# Patient Record
Sex: MALE | Race: WHITE | NOT HISPANIC OR LATINO
[De-identification: names, ages, dates, MRNs, and addresses within clinical notes are randomized per-mention and may not be internally consistent; named-entity substitution may affect disease eponyms.]

---

## 2017-05-22 ENCOUNTER — APPOINTMENT (OUTPATIENT)
Dept: DERMATOLOGY | Facility: CLINIC | Age: 72
End: 2017-05-22

## 2017-12-01 ENCOUNTER — APPOINTMENT (OUTPATIENT)
Dept: DERMATOLOGY | Facility: CLINIC | Age: 72
End: 2017-12-01
Payer: COMMERCIAL

## 2017-12-01 PROCEDURE — 99213 OFFICE O/P EST LOW 20 MIN: CPT | Mod: 25

## 2017-12-01 PROCEDURE — 11100 BX SKIN SUBCUTANEOUS&/MUCOUS MEMBRANE 1 LESION: CPT

## 2017-12-01 PROCEDURE — 11101 BIOPSY SKIN SUBQ&/MUCOUS MEMBRANE EA ADDL LESN: CPT

## 2018-06-15 ENCOUNTER — APPOINTMENT (OUTPATIENT)
Dept: DERMATOLOGY | Facility: CLINIC | Age: 73
End: 2018-06-15
Payer: COMMERCIAL

## 2018-06-15 PROCEDURE — 17003 DESTRUCT PREMALG LES 2-14: CPT

## 2018-06-15 PROCEDURE — 11101 BIOPSY SKIN SUBQ&/MUCOUS MEMBRANE EA ADDL LESN: CPT

## 2018-06-15 PROCEDURE — 99214 OFFICE O/P EST MOD 30 MIN: CPT | Mod: 25

## 2018-06-15 PROCEDURE — 17000 DESTRUCT PREMALG LESION: CPT

## 2018-06-15 PROCEDURE — 11100 BX SKIN SUBCUTANEOUS&/MUCOUS MEMBRANE 1 LESION: CPT | Mod: 59

## 2018-09-14 ENCOUNTER — APPOINTMENT (OUTPATIENT)
Dept: FAMILY MEDICINE | Facility: CLINIC | Age: 73
End: 2018-09-14
Payer: COMMERCIAL

## 2018-09-14 VITALS
SYSTOLIC BLOOD PRESSURE: 146 MMHG | HEART RATE: 68 BPM | WEIGHT: 201 LBS | DIASTOLIC BLOOD PRESSURE: 72 MMHG | HEIGHT: 71 IN | BODY MASS INDEX: 28.14 KG/M2

## 2018-09-14 VITALS — SYSTOLIC BLOOD PRESSURE: 128 MMHG | DIASTOLIC BLOOD PRESSURE: 84 MMHG

## 2018-09-14 DIAGNOSIS — Z78.9 OTHER SPECIFIED HEALTH STATUS: ICD-10-CM

## 2018-09-14 DIAGNOSIS — C44.91 BASAL CELL CARCINOMA OF SKIN, UNSPECIFIED: ICD-10-CM

## 2018-09-14 DIAGNOSIS — Z82.49 FAMILY HISTORY OF ISCHEMIC HEART DISEASE AND OTHER DISEASES OF THE CIRCULATORY SYSTEM: ICD-10-CM

## 2018-09-14 PROCEDURE — G0008: CPT

## 2018-09-14 PROCEDURE — 99204 OFFICE O/P NEW MOD 45 MIN: CPT | Mod: 25

## 2018-09-14 PROCEDURE — 90662 IIV NO PRSV INCREASED AG IM: CPT

## 2018-09-15 NOTE — REVIEW OF SYSTEMS
[Fever] : no fever [Chills] : no chills [Fatigue] : no fatigue [Discharge] : no discharge [Pain] : no pain [Chest Pain] : no chest pain [Palpitations] : no palpitations [Lower Ext Edema] : no lower extremity edema [Shortness Of Breath] : no shortness of breath [Cough] : no cough [Abdominal Pain] : no abdominal pain [Diarrhea] : no diarrhea [Vomiting] : no vomiting [Melena] : no melena [Dysuria] : no dysuria [Hematuria] : no hematuria [Joint Pain] : no joint pain [Back Pain] : no back pain [Headache] : no headache [Dizziness] : no dizziness [Fainting] : no fainting [Easy Bleeding] : no easy bleeding [Easy Bruising] : no easy bruising

## 2018-09-15 NOTE — PLAN
[FreeTextEntry1] : COLONOSCOPY REFUSED - PURPOSE REVIEWED; AWARE TO SCREEN FOR COLON CANCER\par FLU VACCINE GIVEN AND TOLERATED WELL \par CONSIDER NEW SHINGLES VACCINE\par NEED CARDIOLOGY CONSULTANT NOTES\par NEED COPY OF COMPLETE LAB WORK AT [facility]\par NEED VACCINATION RECORDS FOR REVIEW\par HEALTHY DIET AND LIFESTYLE MODIFICATIONS\par HEALTHY WEIGHT LOSS \par MONITOR BLOOD PRESSURE\par FOLLOW-UP ALL SPECIALISTS AS DIRECTED \par CALL WITH ANY QUESTIONS, CONCERNS OR CHANGES

## 2018-09-15 NOTE — HISTORY OF PRESENT ILLNESS
[FreeTextEntry1] : ESTABLISH CARE, HYPERTENSION [de-identified] : PRESENTING TO ESTABLISH CARE.  CHRONIC HISTORY OF HYPERTENSION AND HYPERLIPIDEMIA AS WELL AS A HISTORY OF BCC OF SKIN.  HAD A RECENT ECHOCARDIOGRAM.  REFERRED TO [facility] CARDIOLOGY FOR MURMUR.  HAD CAROTIDS, NUCLEAR STRESS TEST.  IS VERY ACTIVE.  TRIES TO WATCH DIET.  TRIES TO KEEP WEIGHT OFF.  TAKING ALL MEDICATIONS AS DIRECTED WITHOUT SIDE EFFECTS.  BLOOD PRESSURE USUALLY WELL CONTROLLED ON DIOVAN AND METOPROLOL.  STATES THAT SOMETIMES HIGHER AT DOCTORS VISITS DUE TO "WHITE COAT".   HAS HAD NO HEADACHE, DIZZINESS, CHEST PAIN, SHORTNESS OF BREATH, GI OR URINARY COMPLAINTS.  HAS NOT BEEN ON MEDICATION FOR CHOLESTEROL.  HAS NO OTHER COMPLAINTS TODAY.  TRIES TO AVOID THE SUN.  \par \par DERMATOLOGY: DR. SARMIENTO - SCREENING ROUTINELY\par CARDIOLOGY: DR. VILLA; JULY - TO FOLLOW-UP IN NOVEMBER\par OPHTHALMOLOGY: DR. CRUZ - TOREYLIP

## 2018-10-25 ENCOUNTER — APPOINTMENT (OUTPATIENT)
Dept: DERMATOLOGY | Facility: CLINIC | Age: 73
End: 2018-10-25
Payer: COMMERCIAL

## 2018-10-25 PROCEDURE — 99213 OFFICE O/P EST LOW 20 MIN: CPT

## 2018-11-16 ENCOUNTER — APPOINTMENT (OUTPATIENT)
Dept: FAMILY MEDICINE | Facility: CLINIC | Age: 73
End: 2018-11-16
Payer: COMMERCIAL

## 2018-11-16 VITALS
DIASTOLIC BLOOD PRESSURE: 90 MMHG | HEART RATE: 72 BPM | WEIGHT: 207 LBS | HEIGHT: 71 IN | BODY MASS INDEX: 28.98 KG/M2 | SYSTOLIC BLOOD PRESSURE: 162 MMHG

## 2018-11-16 VITALS — SYSTOLIC BLOOD PRESSURE: 144 MMHG | DIASTOLIC BLOOD PRESSURE: 88 MMHG

## 2018-11-16 DIAGNOSIS — Z92.29 PERSONAL HISTORY OF OTHER DRUG THERAPY: ICD-10-CM

## 2018-11-16 LAB — HEMOCCULT STL QL IA: POSITIVE

## 2018-11-16 PROCEDURE — 99214 OFFICE O/P EST MOD 30 MIN: CPT

## 2018-11-17 PROBLEM — Z92.29 HISTORY OF INFLUENZA VACCINATION: Status: RESOLVED | Noted: 2018-09-14 | Resolved: 2018-11-17

## 2018-11-17 NOTE — PHYSICAL EXAM
[No Acute Distress] : no acute distress [Well Nourished] : well nourished [Well-Appearing] : well-appearing [Normal Sclera/Conjunctiva] : normal sclera/conjunctiva [PERRL] : pupils equal round and reactive to light [EOMI] : extraocular movements intact [Supple] : supple [No Lymphadenopathy] : no lymphadenopathy [No Respiratory Distress] : no respiratory distress  [Clear to Auscultation] : lungs were clear to auscultation bilaterally [No Accessory Muscle Use] : no accessory muscle use [Normal Rate] : normal rate  [Regular Rhythm] : with a regular rhythm [Normal S1, S2] : normal S1 and S2 [No Murmur] : no murmur heard [Soft] : abdomen soft [Non Tender] : non-tender [Normal Bowel Sounds] : normal bowel sounds [No Joint Swelling] : no joint swelling [Grossly Normal Strength/Tone] : grossly normal strength/tone

## 2018-11-17 NOTE — HISTORY OF PRESENT ILLNESS
[FreeTextEntry1] : F/U HTN [de-identified] : PRESENTING FOR FOLLOW-UP.  CHRONIC HISTORY OF HYPERTENSION, HYPERLIPIDEMIA AND OVERWEIGHT.  IS TO SEE CARDIOLOGY IN FOLLOW-UP NEXT MONTH.  TO SEE OPHTHALMOLOGY THE END OF THE MONTH DR. GARCIA.  USED TO TAKE CHOLESTEROL A CHOLESTEROL MEDICATION BUT HAD SIDE EFFECTS TO MEDICATION.  CAN'T RECALL WHICH MEDICATION.  DOES NOT WANT TO TAKE MEDICATION.  BLOOD PRESSURE USUALLY CONTROLLED BUT STATES HE GETS "WHITE COAT".  ON METOPROLOL AND VALSARTAN.  TRYING TO WATCH DIET AND REMAIN ACTIVE.  HAS NOT GONE FOR COLONOSCOPY.  DENIES ABDOMINAL PAIN OR N/V/D.  NO BLOOD IN STOOL.   HAS HAD NO HEADACHE, DIZZINESS, CHEST PAIN, SHORTNESS OF BREATH, GI OR URINARY COMPLAINTS.  NO OTHER COMPLAINTS TODAY.

## 2018-11-17 NOTE — REVIEW OF SYSTEMS
[Fever] : no fever [Chills] : no chills [Fatigue] : no fatigue [Discharge] : no discharge [Pain] : no pain [Chest Pain] : no chest pain [Palpitations] : no palpitations [Lower Ext Edema] : no lower extremity edema [Shortness Of Breath] : no shortness of breath [Wheezing] : no wheezing [Cough] : no cough [Abdominal Pain] : no abdominal pain [Diarrhea] : no diarrhea [Melena] : no melena [Dysuria] : no dysuria [Hematuria] : no hematuria [Joint Pain] : no joint pain [Back Pain] : no back pain [Headache] : no headache [Dizziness] : no dizziness [Fainting] : no fainting

## 2018-11-17 NOTE — PLAN
[FreeTextEntry1] : GI EVALUATION FOR COLONOSCOPY WITH POSITIVE STOOL OCCULT - AWARE TO EVALUATE FOR COLON CANCER\par HEALTHY DIET AND LIFESTYLE MODIFICATIONS\par HEALTHY WEIGHT LOSS \par MONITOR LAB WORK INCLUDING LIPIDS\par TO SEE CARDIOLOGY NEXT MONTHS; DISCUSSED ELEVATED BLOOD PRESSURE AND MR. HOLDSWORTH STATES "WHITE COAT".  WILL HAVE RECHECKED AT CARDIOLOGY APPOINTMENT\par FOLLOW-UP ALL SPECIALISTS AS DIRECTED \par CALL WITH ANY QUESTIONS, CONCERNS OR CHANGES

## 2018-12-06 ENCOUNTER — APPOINTMENT (OUTPATIENT)
Dept: DERMATOLOGY | Facility: CLINIC | Age: 73
End: 2018-12-06
Payer: COMMERCIAL

## 2018-12-06 ENCOUNTER — RESULT REVIEW (OUTPATIENT)
Age: 73
End: 2018-12-06

## 2018-12-06 PROCEDURE — 11100 BX SKIN SUBCUTANEOUS&/MUCOUS MEMBRANE 1 LESION: CPT

## 2018-12-06 PROCEDURE — 99213 OFFICE O/P EST LOW 20 MIN: CPT | Mod: 25

## 2019-01-10 ENCOUNTER — APPOINTMENT (OUTPATIENT)
Dept: GASTROENTEROLOGY | Facility: CLINIC | Age: 74
End: 2019-01-10
Payer: COMMERCIAL

## 2019-01-10 VITALS
SYSTOLIC BLOOD PRESSURE: 156 MMHG | DIASTOLIC BLOOD PRESSURE: 97 MMHG | BODY MASS INDEX: 29.45 KG/M2 | HEIGHT: 70.5 IN | HEART RATE: 92 BPM | WEIGHT: 208 LBS

## 2019-01-10 DIAGNOSIS — Z86.39 PERSONAL HISTORY OF OTHER ENDOCRINE, NUTRITIONAL AND METABOLIC DISEASE: ICD-10-CM

## 2019-01-10 DIAGNOSIS — Z12.11 ENCOUNTER FOR SCREENING FOR MALIGNANT NEOPLASM OF COLON: ICD-10-CM

## 2019-01-10 DIAGNOSIS — Z86.79 PERSONAL HISTORY OF OTHER DISEASES OF THE CIRCULATORY SYSTEM: ICD-10-CM

## 2019-01-10 PROCEDURE — 99204 OFFICE O/P NEW MOD 45 MIN: CPT

## 2019-05-03 ENCOUNTER — OUTPATIENT (OUTPATIENT)
Dept: OUTPATIENT SERVICES | Facility: HOSPITAL | Age: 74
LOS: 1 days | End: 2019-05-03
Payer: COMMERCIAL

## 2019-05-03 ENCOUNTER — APPOINTMENT (OUTPATIENT)
Dept: GASTROENTEROLOGY | Facility: GI CENTER | Age: 74
End: 2019-05-03
Payer: COMMERCIAL

## 2019-05-03 ENCOUNTER — RESULT REVIEW (OUTPATIENT)
Age: 74
End: 2019-05-03

## 2019-05-03 DIAGNOSIS — Z12.11 ENCOUNTER FOR SCREENING FOR MALIGNANT NEOPLASM OF COLON: ICD-10-CM

## 2019-05-03 DIAGNOSIS — K64.8 OTHER HEMORRHOIDS: ICD-10-CM

## 2019-05-03 DIAGNOSIS — K57.90 DIVERTICULOSIS OF INTESTINE, PART UNSPECIFIED, W/OUT PERFORATION OR ABSCESS W/OUT BLEEDING: ICD-10-CM

## 2019-05-03 PROCEDURE — 88305 TISSUE EXAM BY PATHOLOGIST: CPT | Mod: 26

## 2019-05-03 PROCEDURE — 45381 COLONOSCOPY SUBMUCOUS NJX: CPT | Mod: 59

## 2019-05-03 PROCEDURE — 45385 COLONOSCOPY W/LESION REMOVAL: CPT

## 2019-05-03 PROCEDURE — 88305 TISSUE EXAM BY PATHOLOGIST: CPT

## 2019-05-03 PROCEDURE — 45380 COLONOSCOPY AND BIOPSY: CPT | Mod: XS

## 2019-05-03 PROCEDURE — 45385 COLONOSCOPY W/LESION REMOVAL: CPT | Mod: 59

## 2019-05-03 PROCEDURE — 45380 COLONOSCOPY AND BIOPSY: CPT | Mod: 59

## 2019-05-03 PROCEDURE — 45381 COLONOSCOPY SUBMUCOUS NJX: CPT | Mod: XS

## 2019-05-03 PROCEDURE — C1889: CPT

## 2019-05-03 NOTE — PROCEDURE
[Procedure Explained] : The procedure was explained [Allergies Reviewed] : allergies reviewed. [Benefits] : benefits [Risks] : Risks [Alternatives] : alternatives [Consent Obtained] : written consent was obtained prior to the procedure and is detailed in the patient's record [Patient] : the patient [Bowel Prep Kit] : the patient took the appropriate bowel preparation kit as directed [Approved Diet Followed] : the patient avoided solid foods and adhered to the approved diet list for 24 hours prior to the procedure [Automated Blood Pressure Cuff] : automated blood pressure cuff [Cardiac Monitor] : cardiac monitor [Left Lateral Decubitus] : The patient was positioned in the left lateral decubitus position [Pulse Oximeter] : pulse oximeter [No Difficulty] : without difficulty [Insufflated] : insufflated [Retroflex View] : a retroflex view of the rectum was performed [Sent to Pathology] : was sent to pathology for analysis [Tolerated Well] : the patient tolerated the procedure well [Vital Signs Stable] : the vital signs were stable [No Complications] : There were no complications [With Biopsy] : with biopsy [With Lisa Ink Tattoo] : Lisa ink tattoo [With Cautery] : cautery [With Snare Polypectomy] : snare polypectomy [Hemoccult +] : hemoccult positive stool [3] : 3 [Prep Qualtiy: ___] : Prep Quality:  [unfilled] [Sedation Clearance] : the patient was cleared for moderate sedation [Performed By: ___] : Performed by:  ANTONINO [Slightly Enlarged Prostate] : a slightly enlarged prostate [Terminal Ileum via Ileocecal Valve] : and the terminal ileum was examined by entering the ileocecal valve [Single Pass Needed] : after a single pass [Normal] : Normal [Biopsy] : biopsy [Hot Snare Polypectomy] : hot snare polypectomy [Polyps] : polyps [Hemorrhoids] : hemorrhoids [Diverticulosis] : diverticulosis [Cold Snare Polypectomy] : cold snare polypectomy [Abnormal Rectum] : a normal rectum [External Hemorrhoids] : no external hemorrhoids [Patient Rotated Into Alternating Positions] : the patient was not rotated [de-identified] : Ileocecal valve- Adenomatous polyp and on both lips s/p cold biopsy\par Normal terminal ileum [de-identified] : Hepatic flexure 3.5 cm fungating mass s/p cold biopsy and distal 3 quadrant tattoo placement [de-identified] : 1 cm polyp s/p hot snare polypectomy\par  [de-identified] : 5 mm polyp s/p cold snare polypectomy [de-identified] : Ileocecal valve polyp; Hepatic flexure mass; transverse colon polyp; descending colon polyp

## 2019-05-03 NOTE — HISTORY OF PRESENT ILLNESS
[de-identified] : the patient arrived for evaluation for screening colonoscopy due to positive FIT test.The patient otherwise denies any abdominal pain, nausea, vomiting, diarrhea, change in bowel habits or any blood in stool. He never had a colonoscopy done before. He denies any chest pain or dyspnea on exertion. He denies any family history of any colorectal cancer or polyps. He takes medication for high blood pressure. He has been even evaluated by cardiology. He had stress test done which was normal. He denies any smoking or any drug use. He drinks one to 2 drinks every day. He takes valsartan and metoprolol.

## 2019-05-03 NOTE — CONSULT LETTER
[Dear  ___] : Dear  [unfilled], [Consult Letter:] : I had the pleasure of evaluating your patient, [unfilled]. [Consult Closing:] : Thank you very much for allowing me to participate in the care of this patient.  If you have any questions, please do not hesitate to contact me. [Sincerely,] : Sincerely, [FreeTextEntry3] : Alex Son MD\par Gastroenterology \par \par

## 2019-05-03 NOTE — PHYSICAL EXAM
[General Appearance - Alert] : alert [General Appearance - In No Acute Distress] : in no acute distress [Sclera] : the sclera and conjunctiva were normal [PERRL With Normal Accommodation] : pupils were equal in size, round, and reactive to light [Extraocular Movements] : extraocular movements were intact [Outer Ear] : the ears and nose were normal in appearance [Oropharynx] : the oropharynx was normal [Neck Appearance] : the appearance of the neck was normal [Neck Cervical Mass (___cm)] : no neck mass was observed [Jugular Venous Distention Increased] : there was no jugular-venous distention [Thyroid Diffuse Enlargement] : the thyroid was not enlarged [Thyroid Nodule] : there were no palpable thyroid nodules [Auscultation Breath Sounds / Voice Sounds] : lungs were clear to auscultation bilaterally [Apical Impulse] : the apical impulse was normal [Heart Rate And Rhythm] : heart rate was normal and rhythm regular [Heart Sounds] : normal S1 and S2 [FreeTextEntry1] : Aortic systolic murmur [Full Pulse] : the pedal pulses are present [Edema] : there was no peripheral edema [Bowel Sounds] : normal bowel sounds [Abdomen Soft] : soft [Abdomen Tenderness] : non-tender [Abdomen Mass (___ Cm)] : no abdominal mass palpated [Cervical Lymph Nodes Enlarged Posterior Bilaterally] : posterior cervical [Supraclavicular Lymph Nodes Enlarged Bilaterally] : supraclavicular [Cervical Lymph Nodes Enlarged Anterior Bilaterally] : anterior cervical [No CVA Tenderness] : no ~M costovertebral angle tenderness [No Spinal Tenderness] : no spinal tenderness [Abnormal Walk] : normal gait [Motor Tone] : muscle strength and tone were normal [Nail Clubbing] : no clubbing  or cyanosis of the fingernails [Musculoskeletal - Swelling] : no joint swelling seen [Skin Turgor] : normal skin turgor [Skin Color & Pigmentation] : normal skin color and pigmentation [] : no rash [No Focal Deficits] : no focal deficits [Impaired Insight] : insight and judgment were intact [Oriented To Time, Place, And Person] : oriented to person, place, and time [Affect] : the affect was normal

## 2019-05-03 NOTE — ASSESSMENT
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

## 2019-05-03 NOTE — ASSESSMENT
[FreeTextEntry1] : The patient is doing fine. He has FIT test positive. I recommended a colonoscopy. The bowel preparation was discussed at length. Risks (including bleeding, pain, perforation, incomplete examination, splenic laceration, adverse reactions to medications, aspiration and death), benefits and alternatives were discussed. Patient is agreeable for the colonoscopy. The patient is medically optimized for the procedure. We will schedule the patient for the procedure. Bowel preparation was sent to the pharmacy.\par

## 2019-05-03 NOTE — PROCEDURE
[Procedure Explained] : The procedure was explained [Allergies Reviewed] : allergies reviewed. [Benefits] : benefits [Risks] : Risks [Alternatives] : alternatives [Patient] : the patient [Consent Obtained] : written consent was obtained prior to the procedure and is detailed in the patient's record [Bowel Prep Kit] : the patient took the appropriate bowel preparation kit as directed [Approved Diet Followed] : the patient avoided solid foods and adhered to the approved diet list for 24 hours prior to the procedure [Automated Blood Pressure Cuff] : automated blood pressure cuff [Cardiac Monitor] : cardiac monitor [Left Lateral Decubitus] : The patient was positioned in the left lateral decubitus position [Pulse Oximeter] : pulse oximeter [No Difficulty] : without difficulty [Insufflated] : insufflated [Sent to Pathology] : was sent to pathology for analysis [Retroflex View] : a retroflex view of the rectum was performed [Tolerated Well] : the patient tolerated the procedure well [Vital Signs Stable] : the vital signs were stable [No Complications] : There were no complications [With Lisa Ink Tattoo] : Lisa ink tattoo [With Biopsy] : with biopsy [With Snare Polypectomy] : snare polypectomy [With Cautery] : cautery [Hemoccult +] : hemoccult positive stool [3] : 3 [Prep Qualtiy: ___] : Prep Quality:  [unfilled] [Sedation Clearance] : the patient was cleared for moderate sedation [Performed By: ___] : Performed by:  ANTONINO [Slightly Enlarged Prostate] : a slightly enlarged prostate [Terminal Ileum via Ileocecal Valve] : and the terminal ileum was examined by entering the ileocecal valve [Single Pass Needed] : after a single pass [Biopsy] : biopsy [Normal] : Normal [Hot Snare Polypectomy] : hot snare polypectomy [Polyps] : polyps [Diverticulosis] : diverticulosis [Hemorrhoids] : hemorrhoids [Cold Snare Polypectomy] : cold snare polypectomy [Abnormal Rectum] : a normal rectum [External Hemorrhoids] : no external hemorrhoids [Patient Rotated Into Alternating Positions] : the patient was not rotated [de-identified] : Ileocecal valve- Adenomatous polyp and on both lips s/p cold biopsy\par Normal terminal ileum [de-identified] : Hepatic flexure 3.5 cm fungating mass s/p cold biopsy and distal 3 quadrant tattoo placement [de-identified] : 1 cm polyp s/p hot snare polypectomy\par  [de-identified] : 5 mm polyp s/p cold snare polypectomy [de-identified] : Ileocecal valve polyp; Hepatic flexure mass; transverse colon polyp; descending colon polyp

## 2019-05-03 NOTE — PHYSICAL EXAM
[General Appearance - Alert] : alert [General Appearance - In No Acute Distress] : in no acute distress [Sclera] : the sclera and conjunctiva were normal [PERRL With Normal Accommodation] : pupils were equal in size, round, and reactive to light [Extraocular Movements] : extraocular movements were intact [Outer Ear] : the ears and nose were normal in appearance [Oropharynx] : the oropharynx was normal [Neck Appearance] : the appearance of the neck was normal [Neck Cervical Mass (___cm)] : no neck mass was observed [Jugular Venous Distention Increased] : there was no jugular-venous distention [Thyroid Diffuse Enlargement] : the thyroid was not enlarged [Thyroid Nodule] : there were no palpable thyroid nodules [Auscultation Breath Sounds / Voice Sounds] : lungs were clear to auscultation bilaterally [Apical Impulse] : the apical impulse was normal [Heart Rate And Rhythm] : heart rate was normal and rhythm regular [Heart Sounds] : normal S1 and S2 [FreeTextEntry1] : Aortic systolic murmur [Full Pulse] : the pedal pulses are present [Edema] : there was no peripheral edema [Bowel Sounds] : normal bowel sounds [Abdomen Soft] : soft [Abdomen Tenderness] : non-tender [Abdomen Mass (___ Cm)] : no abdominal mass palpated [Cervical Lymph Nodes Enlarged Posterior Bilaterally] : posterior cervical [Supraclavicular Lymph Nodes Enlarged Bilaterally] : supraclavicular [Cervical Lymph Nodes Enlarged Anterior Bilaterally] : anterior cervical [No CVA Tenderness] : no ~M costovertebral angle tenderness [No Spinal Tenderness] : no spinal tenderness [Musculoskeletal - Swelling] : no joint swelling seen [Abnormal Walk] : normal gait [Motor Tone] : muscle strength and tone were normal [Nail Clubbing] : no clubbing  or cyanosis of the fingernails [Skin Color & Pigmentation] : normal skin color and pigmentation [Skin Turgor] : normal skin turgor [] : no rash [Oriented To Time, Place, And Person] : oriented to person, place, and time [Impaired Insight] : insight and judgment were intact [No Focal Deficits] : no focal deficits [Affect] : the affect was normal

## 2019-05-03 NOTE — PHYSICAL EXAM
[General Appearance - Alert] : alert [General Appearance - In No Acute Distress] : in no acute distress [Sclera] : the sclera and conjunctiva were normal [PERRL With Normal Accommodation] : pupils were equal in size, round, and reactive to light [Extraocular Movements] : extraocular movements were intact [Outer Ear] : the ears and nose were normal in appearance [Oropharynx] : the oropharynx was normal [Neck Appearance] : the appearance of the neck was normal [Neck Cervical Mass (___cm)] : no neck mass was observed [Jugular Venous Distention Increased] : there was no jugular-venous distention [Thyroid Diffuse Enlargement] : the thyroid was not enlarged [Thyroid Nodule] : there were no palpable thyroid nodules [Auscultation Breath Sounds / Voice Sounds] : lungs were clear to auscultation bilaterally [Apical Impulse] : the apical impulse was normal [Heart Rate And Rhythm] : heart rate was normal and rhythm regular [Heart Sounds] : normal S1 and S2 [Full Pulse] : the pedal pulses are present [Edema] : there was no peripheral edema [Bowel Sounds] : normal bowel sounds [Abdomen Soft] : soft [Abdomen Tenderness] : non-tender [Abdomen Mass (___ Cm)] : no abdominal mass palpated [Cervical Lymph Nodes Enlarged Posterior Bilaterally] : posterior cervical [Cervical Lymph Nodes Enlarged Anterior Bilaterally] : anterior cervical [Supraclavicular Lymph Nodes Enlarged Bilaterally] : supraclavicular [No CVA Tenderness] : no ~M costovertebral angle tenderness [No Spinal Tenderness] : no spinal tenderness [Abnormal Walk] : normal gait [Nail Clubbing] : no clubbing  or cyanosis of the fingernails [Musculoskeletal - Swelling] : no joint swelling seen [Motor Tone] : muscle strength and tone were normal [Skin Color & Pigmentation] : normal skin color and pigmentation [Skin Turgor] : normal skin turgor [] : no rash [No Focal Deficits] : no focal deficits [Oriented To Time, Place, And Person] : oriented to person, place, and time [Impaired Insight] : insight and judgment were intact [Affect] : the affect was normal [FreeTextEntry1] : Aortic systolic murmur

## 2019-05-07 LAB — SURGICAL PATHOLOGY STUDY: SIGNIFICANT CHANGE UP

## 2019-05-08 ENCOUNTER — RESULT REVIEW (OUTPATIENT)
Age: 74
End: 2019-05-08

## 2019-05-17 ENCOUNTER — APPOINTMENT (OUTPATIENT)
Dept: FAMILY MEDICINE | Facility: CLINIC | Age: 74
End: 2019-05-17
Payer: COMMERCIAL

## 2019-05-17 ENCOUNTER — APPOINTMENT (OUTPATIENT)
Dept: COLORECTAL SURGERY | Facility: CLINIC | Age: 74
End: 2019-05-17
Payer: COMMERCIAL

## 2019-05-17 VITALS — SYSTOLIC BLOOD PRESSURE: 148 MMHG | DIASTOLIC BLOOD PRESSURE: 90 MMHG

## 2019-05-17 VITALS
HEART RATE: 86 BPM | WEIGHT: 202 LBS | BODY MASS INDEX: 28.92 KG/M2 | SYSTOLIC BLOOD PRESSURE: 150 MMHG | HEIGHT: 70 IN | DIASTOLIC BLOOD PRESSURE: 88 MMHG

## 2019-05-17 VITALS
WEIGHT: 208 LBS | HEIGHT: 70 IN | HEART RATE: 88 BPM | SYSTOLIC BLOOD PRESSURE: 148 MMHG | TEMPERATURE: 98.1 F | BODY MASS INDEX: 29.78 KG/M2 | DIASTOLIC BLOOD PRESSURE: 84 MMHG

## 2019-05-17 DIAGNOSIS — E66.3 OVERWEIGHT: ICD-10-CM

## 2019-05-17 PROCEDURE — 99214 OFFICE O/P EST MOD 30 MIN: CPT

## 2019-05-17 PROCEDURE — 99245 OFF/OP CONSLTJ NEW/EST HI 55: CPT

## 2019-05-17 NOTE — CONSULT LETTER
[Dear  ___] : Dear  [unfilled], [Consult Letter:] : I had the pleasure of evaluating your patient, [unfilled]. [Please see my note below.] : Please see my note below. [Consult Closing:] : Thank you very much for allowing me to participate in the care of this patient.  If you have any questions, please do not hesitate to contact me. [Sincerely,] : Sincerely, [FreeTextEntry3] : Dong Chung M.D. FACS, FASCRS

## 2019-05-17 NOTE — DATA REVIEWED
[FreeTextEntry1] : Colonoscopy demonstrates a large ulcerated mass hepatic flexure\par Pathology demonstrates polyp

## 2019-05-17 NOTE — PHYSICAL EXAM
[Abdomen Tenderness] : ~T No ~M abdominal tenderness [Abdomen Masses] : No abdominal masses [Tender] : nontender [JVD] : no jugular venous distention  [Normal Breath Sounds] : Normal breath sounds [Normal Heart Sounds] : normal heart sounds [No Rash or Lesion] : No rash or lesion [Alert] : alert [Normal Rate and Rhythm] : normal rate and rhythm [Oriented to Time] : oriented to time [Oriented to Place] : oriented to place [Oriented to Person] : oriented to person [Calm] : calm [de-identified] : Looks well in no distress, of stated age. [de-identified] : moves all 4 extremities appropriately with 5 over 5 strength [de-identified] : pupils equal reactive to light normocephalic atraumatic.

## 2019-05-17 NOTE — ASSESSMENT
[FreeTextEntry1] : 73-year-old male with ulcerated mass of the hepatic colon flexure/transverse colon consistent with cancer. Recommend laparoscopic possible open extended right hemicolectomy with lymph node dissection.Risk and benefits of the surgery have been discussed which include bleeding, infection, sepsis, multiorgan failure, inadvertent injury including hollow viscus, solid organ, ureter neurovascular and neurological structures, DVT PE, heart attack, stroke, hernias, recurrence of tumor, leakage of anastomosis requiring reoperation possible stoma and death.

## 2019-05-20 ENCOUNTER — OTHER (OUTPATIENT)
Age: 74
End: 2019-05-20

## 2019-05-27 PROBLEM — E66.3 OVERWEIGHT: Status: ACTIVE | Noted: 2018-09-15

## 2019-05-27 NOTE — PLAN
[FreeTextEntry1] : SHINGRIX VACCINE RECOMMENDED\par MONITOR BLOOD PRESSURE\par HEALTHY DIET AND LIFESTYLE MODIFICATIONS\par HEALTHY WEIGHT LOSS \par MONITOR LAB WORK INCLUDING LIPIDS\par COLORECTAL CONSULTANT NOTE APPRECIATED\par SUPPORT GIVEN\par FOLLOW-UP ALL SPECIALISTS AS DIRECTED \par CALL WITH ANY QUESTIONS, CONCERNS OR CHANGES

## 2019-05-27 NOTE — HISTORY OF PRESENT ILLNESS
[FreeTextEntry1] : F/U HTN [de-identified] : PRESENTING FOR FOLLOW-UP.  HAD COLONOSCOPY AND POLYP FOUND.  REFERRED TO COLORECTAL SURGERY DR. JONES.  TO HAVE CT SCAN AND LAPAROSCOPIC RIGHT HEMICOLECTOMY RECOMMENDED.  CHRONIC HISTORY OF HYPERTENSION AND HYPERLIPIDEMIA.  ON VALSARTAN AND METOPROLOL FOR BLOOD PRESSURE.  STATES USUALLY WELL CONTROLLED.  SEEING CARDIOLOGY ROUTINELY.  TRYING TO AVOID MEDICATION FOR CHOLESTEROL.  REMAINING ACTIVE.  DIET IS OVERALL GOOD.  \par BITTEN BY A NEIGHBORS DOG ON RIGHT LATERAL CALF.  HAPPENED SIX WEEKS AND "ALL HEALED AND NO PROBLEMS".  \par SAW CARDIOLOGY LAST MONTH AND BLOOD PRESSURE WAS IN 'S.

## 2019-05-27 NOTE — PHYSICAL EXAM
[No Acute Distress] : no acute distress [Well Nourished] : well nourished [Well-Appearing] : well-appearing [Supple] : supple [No Lymphadenopathy] : no lymphadenopathy [Clear to Auscultation] : lungs were clear to auscultation bilaterally [No Respiratory Distress] : no respiratory distress  [Normal Rate] : normal rate  [No Accessory Muscle Use] : no accessory muscle use [Normal S1, S2] : normal S1 and S2 [Regular Rhythm] : with a regular rhythm [Soft] : abdomen soft [Non Tender] : non-tender [Normal Bowel Sounds] : normal bowel sounds [No Joint Swelling] : no joint swelling [Grossly Normal Strength/Tone] : grossly normal strength/tone [Normal Gait] : normal gait [No Focal Deficits] : no focal deficits

## 2019-06-05 ENCOUNTER — FORM ENCOUNTER (OUTPATIENT)
Age: 74
End: 2019-06-05

## 2019-06-06 ENCOUNTER — APPOINTMENT (OUTPATIENT)
Dept: CT IMAGING | Facility: CLINIC | Age: 74
End: 2019-06-06
Payer: COMMERCIAL

## 2019-06-06 ENCOUNTER — OUTPATIENT (OUTPATIENT)
Dept: OUTPATIENT SERVICES | Facility: HOSPITAL | Age: 74
LOS: 1 days | End: 2019-06-06

## 2019-06-06 DIAGNOSIS — K63.9 DISEASE OF INTESTINE, UNSPECIFIED: ICD-10-CM

## 2019-06-06 PROCEDURE — 74177 CT ABD & PELVIS W/CONTRAST: CPT | Mod: 26

## 2019-06-17 ENCOUNTER — OTHER (OUTPATIENT)
Age: 74
End: 2019-06-17

## 2019-06-17 ENCOUNTER — OUTPATIENT (OUTPATIENT)
Dept: OUTPATIENT SERVICES | Facility: HOSPITAL | Age: 74
LOS: 1 days | End: 2019-06-17
Payer: COMMERCIAL

## 2019-06-17 VITALS
SYSTOLIC BLOOD PRESSURE: 131 MMHG | RESPIRATION RATE: 16 BRPM | HEART RATE: 89 BPM | DIASTOLIC BLOOD PRESSURE: 86 MMHG | TEMPERATURE: 100 F | HEIGHT: 70 IN | WEIGHT: 203.49 LBS

## 2019-06-17 DIAGNOSIS — Z29.9 ENCOUNTER FOR PROPHYLACTIC MEASURES, UNSPECIFIED: ICD-10-CM

## 2019-06-17 DIAGNOSIS — Z98.890 OTHER SPECIFIED POSTPROCEDURAL STATES: Chronic | ICD-10-CM

## 2019-06-17 DIAGNOSIS — Z01.818 ENCOUNTER FOR OTHER PREPROCEDURAL EXAMINATION: ICD-10-CM

## 2019-06-17 DIAGNOSIS — C18.9 MALIGNANT NEOPLASM OF COLON, UNSPECIFIED: ICD-10-CM

## 2019-06-17 DIAGNOSIS — I10 ESSENTIAL (PRIMARY) HYPERTENSION: ICD-10-CM

## 2019-06-17 LAB
ALBUMIN SERPL ELPH-MCNC: 4.3 G/DL — SIGNIFICANT CHANGE UP (ref 3.3–5.2)
ALP SERPL-CCNC: 78 U/L — SIGNIFICANT CHANGE UP (ref 40–120)
ALT FLD-CCNC: 24 U/L — SIGNIFICANT CHANGE UP
ANION GAP SERPL CALC-SCNC: 15 MMOL/L — SIGNIFICANT CHANGE UP (ref 5–17)
APTT BLD: 29.6 SEC — SIGNIFICANT CHANGE UP (ref 27.5–36.3)
AST SERPL-CCNC: 26 U/L — SIGNIFICANT CHANGE UP
BASOPHILS # BLD AUTO: 0 K/UL — SIGNIFICANT CHANGE UP (ref 0–0.2)
BASOPHILS NFR BLD AUTO: 0.6 % — SIGNIFICANT CHANGE UP (ref 0–2)
BILIRUB SERPL-MCNC: 0.3 MG/DL — LOW (ref 0.4–2)
BLD GP AB SCN SERPL QL: SIGNIFICANT CHANGE UP
BUN SERPL-MCNC: 21 MG/DL — HIGH (ref 8–20)
CALCIUM SERPL-MCNC: 9.5 MG/DL — SIGNIFICANT CHANGE UP (ref 8.6–10.2)
CEA SERPL-MCNC: 4.3 NG/ML — HIGH (ref 0–3.8)
CHLORIDE SERPL-SCNC: 104 MMOL/L — SIGNIFICANT CHANGE UP (ref 98–107)
CO2 SERPL-SCNC: 23 MMOL/L — SIGNIFICANT CHANGE UP (ref 22–29)
CREAT SERPL-MCNC: 0.78 MG/DL — SIGNIFICANT CHANGE UP (ref 0.5–1.3)
EOSINOPHIL # BLD AUTO: 0.3 K/UL — SIGNIFICANT CHANGE UP (ref 0–0.5)
EOSINOPHIL NFR BLD AUTO: 4.2 % — SIGNIFICANT CHANGE UP (ref 0–5)
GLUCOSE SERPL-MCNC: 150 MG/DL — HIGH (ref 70–115)
HBA1C BLD-MCNC: 5.3 % — SIGNIFICANT CHANGE UP (ref 4–5.6)
HCT VFR BLD CALC: 48.4 % — SIGNIFICANT CHANGE UP (ref 42–52)
HGB BLD-MCNC: 16.5 G/DL — SIGNIFICANT CHANGE UP (ref 14–18)
INR BLD: 0.9 RATIO — SIGNIFICANT CHANGE UP (ref 0.88–1.16)
LYMPHOCYTES # BLD AUTO: 1.4 K/UL — SIGNIFICANT CHANGE UP (ref 1–4.8)
LYMPHOCYTES # BLD AUTO: 20.1 % — SIGNIFICANT CHANGE UP (ref 20–55)
MCHC RBC-ENTMCNC: 32.2 PG — HIGH (ref 27–31)
MCHC RBC-ENTMCNC: 34.1 G/DL — SIGNIFICANT CHANGE UP (ref 32–36)
MCV RBC AUTO: 94.3 FL — HIGH (ref 80–94)
MONOCYTES # BLD AUTO: 0.7 K/UL — SIGNIFICANT CHANGE UP (ref 0–0.8)
MONOCYTES NFR BLD AUTO: 9.9 % — SIGNIFICANT CHANGE UP (ref 3–10)
NEUTROPHILS # BLD AUTO: 4.7 K/UL — SIGNIFICANT CHANGE UP (ref 1.8–8)
NEUTROPHILS NFR BLD AUTO: 64.9 % — SIGNIFICANT CHANGE UP (ref 37–73)
PLATELET # BLD AUTO: 324 K/UL — SIGNIFICANT CHANGE UP (ref 150–400)
POTASSIUM SERPL-MCNC: 4.3 MMOL/L — SIGNIFICANT CHANGE UP (ref 3.5–5.3)
POTASSIUM SERPL-SCNC: 4.3 MMOL/L — SIGNIFICANT CHANGE UP (ref 3.5–5.3)
PROT SERPL-MCNC: 7.7 G/DL — SIGNIFICANT CHANGE UP (ref 6.6–8.7)
PROTHROM AB SERPL-ACNC: 10.3 SEC — SIGNIFICANT CHANGE UP (ref 10–12.9)
RBC # BLD: 5.13 M/UL — SIGNIFICANT CHANGE UP (ref 4.6–6.2)
RBC # FLD: 13 % — SIGNIFICANT CHANGE UP (ref 11–15.6)
SODIUM SERPL-SCNC: 142 MMOL/L — SIGNIFICANT CHANGE UP (ref 135–145)
TYPE + AB SCN PNL BLD: SIGNIFICANT CHANGE UP
WBC # BLD: 7.2 K/UL — SIGNIFICANT CHANGE UP (ref 4.8–10.8)
WBC # FLD AUTO: 7.2 K/UL — SIGNIFICANT CHANGE UP (ref 4.8–10.8)

## 2019-06-17 PROCEDURE — 80053 COMPREHEN METABOLIC PANEL: CPT

## 2019-06-17 PROCEDURE — 86850 RBC ANTIBODY SCREEN: CPT

## 2019-06-17 PROCEDURE — 83036 HEMOGLOBIN GLYCOSYLATED A1C: CPT

## 2019-06-17 PROCEDURE — 86901 BLOOD TYPING SEROLOGIC RH(D): CPT

## 2019-06-17 PROCEDURE — G0463: CPT

## 2019-06-17 PROCEDURE — 85730 THROMBOPLASTIN TIME PARTIAL: CPT

## 2019-06-17 PROCEDURE — 85027 COMPLETE CBC AUTOMATED: CPT

## 2019-06-17 PROCEDURE — 85610 PROTHROMBIN TIME: CPT

## 2019-06-17 PROCEDURE — 36415 COLL VENOUS BLD VENIPUNCTURE: CPT

## 2019-06-17 PROCEDURE — 82378 CARCINOEMBRYONIC ANTIGEN: CPT

## 2019-06-17 PROCEDURE — 93005 ELECTROCARDIOGRAM TRACING: CPT

## 2019-06-17 PROCEDURE — 93010 ELECTROCARDIOGRAM REPORT: CPT

## 2019-06-17 PROCEDURE — 86900 BLOOD TYPING SEROLOGIC ABO: CPT

## 2019-06-17 RX ORDER — SODIUM CHLORIDE 9 MG/ML
3 INJECTION INTRAMUSCULAR; INTRAVENOUS; SUBCUTANEOUS EVERY 8 HOURS
Refills: 0 | Status: DISCONTINUED | OUTPATIENT
Start: 2019-06-27 | End: 2019-06-29

## 2019-06-17 RX ORDER — BUPIVACAINE 13.3 MG/ML
20 INJECTION, SUSPENSION, LIPOSOMAL INFILTRATION ONCE
Refills: 0 | Status: DISCONTINUED | OUTPATIENT
Start: 2019-06-27 | End: 2019-06-29

## 2019-06-17 RX ORDER — CEFOTETAN DISODIUM 1 G
2 VIAL (EA) INJECTION ONCE
Refills: 0 | Status: COMPLETED | OUTPATIENT
Start: 2019-06-27 | End: 2019-06-27

## 2019-06-17 NOTE — H&P PST ADULT - ASSESSMENT
medications reviewed, instructions given on what medications to take and what not to take.   CAPRINI VTE 2.0 SCORE [CLOT updated 2019]    AGE RELATED RISK FACTORS                                                       MOBILITY RELATED FACTORS  [ ] Age 41-60 years                                            (1 Point)                    [ ] Bed rest                                                        (1 Point)  [x ] Age: 61-74 years                                           (2 Points)                  [ ] Plaster cast                                                   (2 Points)  [ ] Age= 75 years                                              (3 Points)                    [ ] Bed bound for more than 72 hours                 (2 Points)    DISEASE RELATED RISK FACTORS                                               GENDER SPECIFIC FACTORS  [ ] Edema in the lower extremities                       (1 Point)              [ ] Pregnancy                                                     (1 Point)  [ ] Varicose veins                                               (1 Point)                     [ ] Post-partum < 6 weeks                                   (1 Point)             [x ] BMI > 25 Kg/m2                                            (1 Point)                     [ ] Hormonal therapy  or oral contraception          (1 Point)                 [ ] Sepsis (in the previous month)                        (1 Point)               [ ] History of pregnancy complications                 (1 point)  [ ] Pneumonia or serious lung disease                                               [ ] Unexplained or recurrent                     (1 Point)           (in the previous month)                               (1 Point)  [ ] Abnormal pulmonary function test                     (1 Point)                 SURGERY RELATED RISK FACTORS  [ ] Acute myocardial infarction                              (1 Point)               [ ]  Section                                             (1 Point)  [ ] Congestive heart failure (in the previous month)  (1 Point)      [ ] Minor surgery                                                  (1 Point)   [ ] Inflammatory bowel disease                             (1 Point)               [ ] Arthroscopic surgery                                        (2 Points)  [ ] Central venous access                                      (2 Points)                [x ] General surgery lasting more than 45 minutes (2 points)  [x ] Malignancy- Present or previous                   (2 Points)                [ ] Elective arthroplasty                                         (5 points)    [ ] Stroke (in the previous month)                          (5 Points)                                                                                                                                                           HEMATOLOGY RELATED FACTORS                                                 TRAUMA RELATED RISK FACTORS  [ ] Prior episodes of VTE                                     (3 Points)                [ ] Fracture of the hip, pelvis, or leg                       (5 Points)  [ ] Positive family history for VTE                         (3 Points)             [ ] Acute spinal cord injury (in the previous month)  (5 Points)  [ ] Prothrombin 10872 A                                     (3 Points)               [ ] Paralysis  (less than 1 month)                             (5 Points)  [ ] Factor V Leiden                                             (3 Points)                  [ ] Multiple Trauma within 1 month                        (5 Points)  [ ] Lupus anticoagulants                                     (3 Points)                                                           [ ] Anticardiolipin antibodies                               (3 Points)                                                       [ ] High homocysteine in the blood                      (3 Points)                                             [ ] Other congenital or acquired thrombophilia      (3 Points)                                                [ ] Heparin induced thrombocytopenia                  (3 Points)                                     Total Score [   7       ]  OPIOID RISK TOOL    ESTEFANY EACH BOX THAT APPLIES AND ADD TOTALS AT THE END    FAMILY HISTORY OF SUBSTANCE ABUSE                 FEMALE         MALE                                                Alcohol                             [  ]1 pt          [  ]3pts                                               Illegal Drugs                     [  ]2 pts        [  ]3pts                                               Rx Drugs                           [  ]4 pts        [  ]4 pts    PERSONAL HISTORY OF SUBSTANCE ABUSE                                                                                          Alcohol                             [  ]3 pts       [  ]3 pts                                               Illegal Drugs                     [  ]4 pts        [  ]4 pts                                               Rx Drugs                           [  ]5 pts        [  ]5 pts    AGE BETWEEN 16-45 YEARS                                      [  ]1 pt         [  ]1 pt    HISTORY OF PREADOLESCENT   SEXUAL ABUSE                                                             [  ]3 pts        [  ]0pts    PSYCHOLOGICAL DISEASE                     ADD, OCD, Bipolar, Schizophrenia        [  ]2 pts         [  ]2 pts                      Depression                                               [  ]1 pt           [  ]1 pt           SCORING TOTAL   (add numbers and type here)              ( 0)                                     A score of 3 or lower indicated LOW risk for future opioid abuse  A score of 4 to 7 indicated moderate risk for future opioid abuse  A score of 8 or higher indicates a high risk for opioid abuse

## 2019-06-17 NOTE — PATIENT PROFILE ADULT - NSPROEDALEARNPREF_GEN_A_NUR
Pre op teaching surgical scrub pain management instructions given to pt/verbal instruction/individual instruction/written material

## 2019-06-17 NOTE — H&P PST ADULT - NSANTHOSAYNRD_GEN_A_CORE
No. CAPRI screening performed.  STOP BANG Legend: 0-2 = LOW Risk; 3-4 = INTERMEDIATE Risk; 5-8 = HIGH Risk

## 2019-06-17 NOTE — H&P PST ADULT - NSICDXPROBLEM_GEN_ALL_CORE_FT
PROBLEM DIAGNOSES  Problem: Need for prophylactic measure  Assessment and Plan: Caprini Score 7 High risk,  Surgical team should assess /Strongly recommend pharmacological and mechanical measures for VTE prophylaxis     Problem: Hypertension  Assessment and Plan: continue medications as instructed. Asked the patient to take the Blood pressure medication/ heart medication on DOP.     Problem: Colon cancer  Assessment and Plan: Laparoscopic robotic assisted possible open right hemicolectomy mobilization of splenic flexure, rigid proctosigmoidoscopy lymph node dissection. Medical Clearance pending

## 2019-06-17 NOTE — H&P PST ADULT - HISTORY OF PRESENT ILLNESS
73 year old male 73 year old male who states that he had a routine colonoscopy in May 2019 which showed a mass in his colon , he has no complains of pain ,bleeding or constipation

## 2019-06-20 ENCOUNTER — APPOINTMENT (OUTPATIENT)
Dept: FAMILY MEDICINE | Facility: CLINIC | Age: 74
End: 2019-06-20
Payer: COMMERCIAL

## 2019-06-20 VITALS
HEART RATE: 82 BPM | BODY MASS INDEX: 28.63 KG/M2 | DIASTOLIC BLOOD PRESSURE: 82 MMHG | HEIGHT: 70 IN | WEIGHT: 200 LBS | SYSTOLIC BLOOD PRESSURE: 136 MMHG

## 2019-06-20 DIAGNOSIS — Z01.818 ENCOUNTER FOR OTHER PREPROCEDURAL EXAMINATION: ICD-10-CM

## 2019-06-20 PROCEDURE — 99215 OFFICE O/P EST HI 40 MIN: CPT

## 2019-06-20 NOTE — PHYSICAL EXAM
[No Acute Distress] : no acute distress [Well Nourished] : well nourished [Well-Appearing] : well-appearing [Normal Sclera/Conjunctiva] : normal sclera/conjunctiva [PERRL] : pupils equal round and reactive to light [Normal Outer Ear/Nose] : the outer ears and nose were normal in appearance [Normal Oropharynx] : the oropharynx was normal [Normal TMs] : both tympanic membranes were normal [No Lymphadenopathy] : no lymphadenopathy [Supple] : supple [No Accessory Muscle Use] : no accessory muscle use [Clear to Auscultation] : lungs were clear to auscultation bilaterally [No Respiratory Distress] : no respiratory distress  [Normal Rate] : normal rate  [Regular Rhythm] : with a regular rhythm [Normal S1, S2] : normal S1 and S2 [Soft] : abdomen soft [Non Tender] : non-tender [Normal Bowel Sounds] : normal bowel sounds [Grossly Normal Strength/Tone] : grossly normal strength/tone [No Joint Swelling] : no joint swelling [Normal Gait] : normal gait [Coordination Grossly Intact] : coordination grossly intact [No Rash] : no rash [Deep Tendon Reflexes (DTR)] : deep tendon reflexes were 2+ and symmetric [No Focal Deficits] : no focal deficits [Normal Insight/Judgement] : insight and judgment were intact [Speech Grossly Normal] : speech grossly normal [Normal Mood] : the mood was normal [de-identified] : TROUBLE RAISING LEFT ARM ABOVE HEAD [de-identified] : +MURMUR

## 2019-06-20 NOTE — ASSESSMENT
[Patient Optimized for Surgery] : Patient optimized for surgery [Cardiology consultation] : Cardiology consultation [As per surgery] : as per surgery

## 2019-06-20 NOTE — HISTORY OF PRESENT ILLNESS
[Aortic Stenosis] : aortic stenosis [No Pertinent Pulmonary History] : no history of asthma, COPD, sleep apnea, or smoking [No Adverse Anesthesia Reaction] : no adverse anesthesia reaction in self or family member [(Patient denies any chest pain, claudication, dyspnea on exertion, orthopnea, palpitations or syncope)] : Patient denies any chest pain, claudication, dyspnea on exertion, orthopnea, palpitations or syncope [Atrial Fibrillation] : no atrial fibrillation [Coronary Artery Disease] : no coronary artery disease [Recent Myocardial Infarction] : no recent myocardial infarction [Implantable Device/Pacemaker] : no implantable device/pacemaker [Asthma] : no asthma [COPD] : no COPD [Sleep Apnea] : no sleep apnea [Smoker] : not a smoker [Family Member] : no family member with adverse anesthesia reaction/sudden death [Self] : no previous adverse anesthesia reaction [Chronic Anticoagulation] : no chronic anticoagulation [Chronic Kidney Disease] : no chronic kidney disease [Diabetes] : no diabetes [FreeTextEntry1] : LAPAROSCOPIC POSSIBLE OPEN EXTENDED RIGHT HEMICOLECTOMY WITH LYMPH NODE DISSECTION [FreeTextEntry2] : JUNE 27, 2019 [FreeTextEntry3] : DR. JONES [FreeTextEntry4] : PRESENTING FOR MEDICAL CLEARANCE FOR UPCOMING COLON SURGERY DUE TO AN ULCERATED MASS OF THE HEPATIC COLON FLEXURE/TRANSVERSE COLON ON COLONOSCOPY.  SENT FOR CT ABDOMEN/PELVIS - COLON MASS, NO METASTASIS.  FEELING WELL TODAY.  NO HISTORY OF MI, STROKE OR SEIZURE.  NO PERSONAL OR FAMILY HISTORY OF BLOOD DISORDERS.  DENIES EASY BLEEDING OR BRUISING.  IS ABLE TO WALK MULTIPLE BLOCKS AND GO UP MULTIPLE FLIGHTS OF STAIRS WITHOUT DIFFICULTY.   CHRONIC HISTORY OF RHEUMATIC AORTIC STENOSIS.  SAW CARDIOLOGY FOR CLEARANCE.  HAD ECHO IN APRIL.  ALSO CHRONIC HISTORY OF HYPERTENSION AND HYPERLIPIDEMIA.  BLOOD PRESSURE CONTROLLED ON MEDICATIONS.  HAS HAD NO HEADACHE, DIZZINESS, CHEST PAIN, SHORTNESS OF BREATH, GI OR URINARY COMPLAINTS.  NO OTHER COMPLAINTS TODAY.  [FreeTextEntry5] : RHEUMATIC AORTIC STENOSIS [FreeTextEntry7] : ECHOCARDIOGRAM APRIL 2019 WITH CARDIOLOGY\par SAW CARDIOLOGY FOR CLEARANCE MAY 2019

## 2019-06-20 NOTE — PLAN
[FreeTextEntry1] : OPTIMIZED MEDICALLY FOR PROPOSED SURGICAL PROCEDURE\par SEE CARDIOLOGY CLEARANCE AND RECOMMENDATIONS\par PRE-OPERATIVE TESTING REVIEWED\par REVIEWED CT ABDOMEN/PELVIS\par WILL NEED DEDICATED CT CHEST POST PROCEDURE\par RE-ADVISED LAB WORK INCLUDING PSA - UROLOGY EVALUATION PENDING RESULTS \par HAS GOOD EXERCISE CAPACITY\par GI/DVT PROPHYLAXIS PER SURGERY\par AVOIDANCE OF NSAIDS, VITAMINS AND ASPIRIN CONTAINING PRODUCTS PRIOR TO SURGERY\par CALL WITH ANY QUESTIONS, CONCERNS OR CHANGES PRIOR TO PROCEDURE\par SUPPORT PROVIDED\par FOLLOW-UP POST-OPERATIVELY

## 2019-06-26 ENCOUNTER — TRANSCRIPTION ENCOUNTER (OUTPATIENT)
Age: 74
End: 2019-06-26

## 2019-06-27 ENCOUNTER — INPATIENT (INPATIENT)
Facility: HOSPITAL | Age: 74
LOS: 1 days | Discharge: HOME | DRG: 331 | End: 2019-06-29
Attending: COLON & RECTAL SURGERY | Admitting: COLON & RECTAL SURGERY
Payer: COMMERCIAL

## 2019-06-27 ENCOUNTER — RESULT REVIEW (OUTPATIENT)
Age: 74
End: 2019-06-27

## 2019-06-27 ENCOUNTER — APPOINTMENT (OUTPATIENT)
Dept: COLORECTAL SURGERY | Facility: HOSPITAL | Age: 74
End: 2019-06-27
Payer: COMMERCIAL

## 2019-06-27 VITALS
SYSTOLIC BLOOD PRESSURE: 122 MMHG | OXYGEN SATURATION: 98 % | WEIGHT: 203.49 LBS | HEIGHT: 70 IN | DIASTOLIC BLOOD PRESSURE: 69 MMHG | RESPIRATION RATE: 16 BRPM | HEART RATE: 82 BPM | TEMPERATURE: 98 F

## 2019-06-27 DIAGNOSIS — Z98.890 OTHER SPECIFIED POSTPROCEDURAL STATES: Chronic | ICD-10-CM

## 2019-06-27 DIAGNOSIS — C18.9 MALIGNANT NEOPLASM OF COLON, UNSPECIFIED: ICD-10-CM

## 2019-06-27 PROBLEM — I10 ESSENTIAL (PRIMARY) HYPERTENSION: Chronic | Status: ACTIVE | Noted: 2019-06-17

## 2019-06-27 PROBLEM — I35.0 NONRHEUMATIC AORTIC (VALVE) STENOSIS: Chronic | Status: ACTIVE | Noted: 2019-06-17

## 2019-06-27 LAB
ABO RH CONFIRMATION: SIGNIFICANT CHANGE UP
GLUCOSE BLDC GLUCOMTR-MCNC: 108 MG/DL — HIGH (ref 70–99)
GLUCOSE BLDC GLUCOMTR-MCNC: 134 MG/DL — HIGH (ref 70–99)
GLUCOSE BLDC GLUCOMTR-MCNC: 137 MG/DL — HIGH (ref 70–99)

## 2019-06-27 PROCEDURE — 44204 LAPARO PARTIAL COLECTOMY: CPT | Mod: AS

## 2019-06-27 PROCEDURE — 38570 LAPAROSCOPY LYMPH NODE BIOP: CPT

## 2019-06-27 PROCEDURE — 44213 LAP MOBIL SPLENIC FL ADD-ON: CPT

## 2019-06-27 PROCEDURE — 88307 TISSUE EXAM BY PATHOLOGIST: CPT | Mod: 26

## 2019-06-27 PROCEDURE — 44213 LAP MOBIL SPLENIC FL ADD-ON: CPT | Mod: AS

## 2019-06-27 PROCEDURE — 38570 LAPAROSCOPY LYMPH NODE BIOP: CPT | Mod: AS

## 2019-06-27 PROCEDURE — 88342 IMHCHEM/IMCYTCHM 1ST ANTB: CPT | Mod: 26

## 2019-06-27 PROCEDURE — 44204 LAPARO PARTIAL COLECTOMY: CPT

## 2019-06-27 RX ORDER — ONDANSETRON 8 MG/1
4 TABLET, FILM COATED ORAL ONCE
Refills: 0 | Status: DISCONTINUED | OUTPATIENT
Start: 2019-06-27 | End: 2019-06-27

## 2019-06-27 RX ORDER — HYDROMORPHONE HYDROCHLORIDE 2 MG/ML
0.5 INJECTION INTRAMUSCULAR; INTRAVENOUS; SUBCUTANEOUS
Refills: 0 | Status: DISCONTINUED | OUTPATIENT
Start: 2019-06-27 | End: 2019-06-27

## 2019-06-27 RX ORDER — ACETAMINOPHEN 500 MG
650 TABLET ORAL EVERY 6 HOURS
Refills: 0 | Status: DISCONTINUED | OUTPATIENT
Start: 2019-06-27 | End: 2019-06-29

## 2019-06-27 RX ORDER — SODIUM CHLORIDE 9 MG/ML
1000 INJECTION, SOLUTION INTRAVENOUS
Refills: 0 | Status: DISCONTINUED | OUTPATIENT
Start: 2019-06-27 | End: 2019-06-28

## 2019-06-27 RX ORDER — ACETAMINOPHEN 500 MG
1000 TABLET ORAL EVERY 6 HOURS
Refills: 0 | Status: COMPLETED | OUTPATIENT
Start: 2019-06-27 | End: 2019-06-28

## 2019-06-27 RX ORDER — HEPARIN SODIUM 5000 [USP'U]/ML
5000 INJECTION INTRAVENOUS; SUBCUTANEOUS ONCE
Refills: 0 | Status: COMPLETED | OUTPATIENT
Start: 2019-06-27 | End: 2019-06-27

## 2019-06-27 RX ORDER — HEPARIN SODIUM 5000 [USP'U]/ML
5000 INJECTION INTRAVENOUS; SUBCUTANEOUS EVERY 12 HOURS
Refills: 0 | Status: DISCONTINUED | OUTPATIENT
Start: 2019-06-28 | End: 2019-06-29

## 2019-06-27 RX ORDER — VALSARTAN 80 MG/1
160 TABLET ORAL DAILY
Refills: 0 | Status: DISCONTINUED | OUTPATIENT
Start: 2019-06-27 | End: 2019-06-29

## 2019-06-27 RX ORDER — METOPROLOL TARTRATE 50 MG
25 TABLET ORAL DAILY
Refills: 0 | Status: DISCONTINUED | OUTPATIENT
Start: 2019-06-27 | End: 2019-06-29

## 2019-06-27 RX ORDER — ALVIMOPAN 12 MG/1
12 CAPSULE ORAL ONCE
Refills: 0 | Status: COMPLETED | OUTPATIENT
Start: 2019-06-27 | End: 2019-06-27

## 2019-06-27 RX ORDER — CEFOTETAN DISODIUM 1 G
2 VIAL (EA) INJECTION ONCE
Refills: 0 | Status: COMPLETED | OUTPATIENT
Start: 2019-06-28 | End: 2019-06-28

## 2019-06-27 RX ORDER — IBUPROFEN 200 MG
600 TABLET ORAL
Refills: 0 | Status: DISCONTINUED | OUTPATIENT
Start: 2019-06-27 | End: 2019-06-29

## 2019-06-27 RX ORDER — SODIUM CHLORIDE 9 MG/ML
1000 INJECTION, SOLUTION INTRAVENOUS
Refills: 0 | Status: DISCONTINUED | OUTPATIENT
Start: 2019-06-27 | End: 2019-06-27

## 2019-06-27 RX ORDER — FENTANYL CITRATE 50 UG/ML
25 INJECTION INTRAVENOUS
Refills: 0 | Status: DISCONTINUED | OUTPATIENT
Start: 2019-06-27 | End: 2019-06-27

## 2019-06-27 RX ORDER — METHOCARBAMOL 500 MG/1
750 TABLET, FILM COATED ORAL THREE TIMES A DAY
Refills: 0 | Status: DISCONTINUED | OUTPATIENT
Start: 2019-06-27 | End: 2019-06-29

## 2019-06-27 RX ORDER — ALVIMOPAN 12 MG/1
12 CAPSULE ORAL
Refills: 0 | Status: DISCONTINUED | OUTPATIENT
Start: 2019-06-27 | End: 2019-06-29

## 2019-06-27 RX ORDER — ONDANSETRON 8 MG/1
4 TABLET, FILM COATED ORAL EVERY 6 HOURS
Refills: 0 | Status: DISCONTINUED | OUTPATIENT
Start: 2019-06-27 | End: 2019-06-29

## 2019-06-27 RX ADMIN — SODIUM CHLORIDE 3 MILLILITER(S): 9 INJECTION INTRAMUSCULAR; INTRAVENOUS; SUBCUTANEOUS at 21:38

## 2019-06-27 RX ADMIN — Medication 400 MILLIGRAM(S): at 22:52

## 2019-06-27 RX ADMIN — Medication 400 MILLIGRAM(S): at 17:23

## 2019-06-27 RX ADMIN — Medication 1000 MILLIGRAM(S): at 23:07

## 2019-06-27 RX ADMIN — HEPARIN SODIUM 5000 UNIT(S): 5000 INJECTION INTRAVENOUS; SUBCUTANEOUS at 07:27

## 2019-06-27 RX ADMIN — Medication 1000 MILLIGRAM(S): at 17:35

## 2019-06-27 RX ADMIN — SODIUM CHLORIDE 3 MILLILITER(S): 9 INJECTION INTRAMUSCULAR; INTRAVENOUS; SUBCUTANEOUS at 15:55

## 2019-06-27 RX ADMIN — ALVIMOPAN 12 MILLIGRAM(S): 12 CAPSULE ORAL at 07:26

## 2019-06-27 RX ADMIN — METHOCARBAMOL 750 MILLIGRAM(S): 500 TABLET, FILM COATED ORAL at 22:53

## 2019-06-27 RX ADMIN — ALVIMOPAN 12 MILLIGRAM(S): 12 CAPSULE ORAL at 17:23

## 2019-06-27 NOTE — BRIEF OPERATIVE NOTE - OPERATION/FINDINGS
tattoo present at hepatic flexure, medial to lateral dissection performed, ileotransverse anastomosis created extracorporeally without tension

## 2019-06-27 NOTE — CHART NOTE - NSCHARTNOTEFT_GEN_A_CORE
Brief Post Op Check    Patient now s/p uncomplicated Lap R hemicolectomy; progressing well, adequate UOP.    S: Patient without complaints; denies pain, SOB, CP, fevers. Has not passed flatus nor BM as expected. States that he has plans to walk in the morning.     PE:    VS WNL, afebrile  Midline Prevena with good seal  Lap incision sites c/d/i  Abd appropriately TTP  Barrios draining cristino colored urine    A/P:    Pt now s/p  uncomplicated Lap R hemicolectomy; progressing well, HDW, adequate UOP, pain controlled, no complaints     -Plan to d/c Barrios in AM; RN aware  -ERAS  -Ambulate tomorrow

## 2019-06-28 LAB
ANION GAP SERPL CALC-SCNC: 13 MMOL/L — SIGNIFICANT CHANGE UP (ref 5–17)
BASOPHILS # BLD AUTO: 0.02 K/UL — SIGNIFICANT CHANGE UP (ref 0–0.2)
BASOPHILS NFR BLD AUTO: 0.2 % — SIGNIFICANT CHANGE UP (ref 0–2)
BUN SERPL-MCNC: 20 MG/DL — SIGNIFICANT CHANGE UP (ref 8–20)
CALCIUM SERPL-MCNC: 9 MG/DL — SIGNIFICANT CHANGE UP (ref 8.6–10.2)
CHLORIDE SERPL-SCNC: 103 MMOL/L — SIGNIFICANT CHANGE UP (ref 98–107)
CO2 SERPL-SCNC: 22 MMOL/L — SIGNIFICANT CHANGE UP (ref 22–29)
CREAT SERPL-MCNC: 0.83 MG/DL — SIGNIFICANT CHANGE UP (ref 0.5–1.3)
EOSINOPHIL # BLD AUTO: 0.03 K/UL — SIGNIFICANT CHANGE UP (ref 0–0.5)
EOSINOPHIL NFR BLD AUTO: 0.2 % — SIGNIFICANT CHANGE UP (ref 0–6)
GLUCOSE SERPL-MCNC: 97 MG/DL — SIGNIFICANT CHANGE UP (ref 70–115)
HCT VFR BLD CALC: 43 % — SIGNIFICANT CHANGE UP (ref 39–50)
HGB BLD-MCNC: 14.3 G/DL — SIGNIFICANT CHANGE UP (ref 13–17)
IMM GRANULOCYTES NFR BLD AUTO: 0.4 % — SIGNIFICANT CHANGE UP (ref 0–1.5)
LYMPHOCYTES # BLD AUTO: 1.31 K/UL — SIGNIFICANT CHANGE UP (ref 1–3.3)
LYMPHOCYTES # BLD AUTO: 10.3 % — LOW (ref 13–44)
MAGNESIUM SERPL-MCNC: 1.9 MG/DL — SIGNIFICANT CHANGE UP (ref 1.6–2.6)
MCHC RBC-ENTMCNC: 32.1 PG — SIGNIFICANT CHANGE UP (ref 27–34)
MCHC RBC-ENTMCNC: 33.3 GM/DL — SIGNIFICANT CHANGE UP (ref 32–36)
MCV RBC AUTO: 96.6 FL — SIGNIFICANT CHANGE UP (ref 80–100)
MONOCYTES # BLD AUTO: 1.35 K/UL — HIGH (ref 0–0.9)
MONOCYTES NFR BLD AUTO: 10.6 % — SIGNIFICANT CHANGE UP (ref 2–14)
NEUTROPHILS # BLD AUTO: 9.95 K/UL — HIGH (ref 1.8–7.4)
NEUTROPHILS NFR BLD AUTO: 78.3 % — HIGH (ref 43–77)
PHOSPHATE SERPL-MCNC: 4.3 MG/DL — SIGNIFICANT CHANGE UP (ref 2.4–4.7)
PLATELET # BLD AUTO: 295 K/UL — SIGNIFICANT CHANGE UP (ref 150–400)
POTASSIUM SERPL-MCNC: 4.2 MMOL/L — SIGNIFICANT CHANGE UP (ref 3.5–5.3)
POTASSIUM SERPL-SCNC: 4.2 MMOL/L — SIGNIFICANT CHANGE UP (ref 3.5–5.3)
RBC # BLD: 4.45 M/UL — SIGNIFICANT CHANGE UP (ref 4.2–5.8)
RBC # FLD: 12.3 % — SIGNIFICANT CHANGE UP (ref 10.3–14.5)
SODIUM SERPL-SCNC: 138 MMOL/L — SIGNIFICANT CHANGE UP (ref 135–145)
WBC # BLD: 12.71 K/UL — HIGH (ref 3.8–10.5)
WBC # FLD AUTO: 12.71 K/UL — HIGH (ref 3.8–10.5)

## 2019-06-28 RX ORDER — LANOLIN ALCOHOL/MO/W.PET/CERES
3 CREAM (GRAM) TOPICAL AT BEDTIME
Refills: 0 | Status: DISCONTINUED | OUTPATIENT
Start: 2019-06-28 | End: 2019-06-29

## 2019-06-28 RX ORDER — SODIUM CHLORIDE 9 MG/ML
1000 INJECTION, SOLUTION INTRAVENOUS
Refills: 0 | Status: DISCONTINUED | OUTPATIENT
Start: 2019-06-28 | End: 2019-06-29

## 2019-06-28 RX ADMIN — METHOCARBAMOL 750 MILLIGRAM(S): 500 TABLET, FILM COATED ORAL at 21:57

## 2019-06-28 RX ADMIN — Medication 100 GRAM(S): at 06:04

## 2019-06-28 RX ADMIN — HEPARIN SODIUM 5000 UNIT(S): 5000 INJECTION INTRAVENOUS; SUBCUTANEOUS at 06:05

## 2019-06-28 RX ADMIN — Medication 1000 MILLIGRAM(S): at 12:00

## 2019-06-28 RX ADMIN — SODIUM CHLORIDE 75 MILLILITER(S): 9 INJECTION, SOLUTION INTRAVENOUS at 13:50

## 2019-06-28 RX ADMIN — SODIUM CHLORIDE 3 MILLILITER(S): 9 INJECTION INTRAMUSCULAR; INTRAVENOUS; SUBCUTANEOUS at 05:54

## 2019-06-28 RX ADMIN — SODIUM CHLORIDE 3 MILLILITER(S): 9 INJECTION INTRAMUSCULAR; INTRAVENOUS; SUBCUTANEOUS at 21:56

## 2019-06-28 RX ADMIN — METHOCARBAMOL 750 MILLIGRAM(S): 500 TABLET, FILM COATED ORAL at 14:34

## 2019-06-28 RX ADMIN — Medication 1000 MILLIGRAM(S): at 06:17

## 2019-06-28 RX ADMIN — ALVIMOPAN 12 MILLIGRAM(S): 12 CAPSULE ORAL at 06:05

## 2019-06-28 RX ADMIN — ALVIMOPAN 12 MILLIGRAM(S): 12 CAPSULE ORAL at 18:54

## 2019-06-28 RX ADMIN — Medication 25 MILLIGRAM(S): at 06:04

## 2019-06-28 RX ADMIN — VALSARTAN 160 MILLIGRAM(S): 80 TABLET ORAL at 06:04

## 2019-06-28 RX ADMIN — HEPARIN SODIUM 5000 UNIT(S): 5000 INJECTION INTRAVENOUS; SUBCUTANEOUS at 18:53

## 2019-06-28 RX ADMIN — Medication 400 MILLIGRAM(S): at 11:30

## 2019-06-28 RX ADMIN — SODIUM CHLORIDE 3 MILLILITER(S): 9 INJECTION INTRAMUSCULAR; INTRAVENOUS; SUBCUTANEOUS at 14:35

## 2019-06-28 RX ADMIN — METHOCARBAMOL 750 MILLIGRAM(S): 500 TABLET, FILM COATED ORAL at 06:05

## 2019-06-28 RX ADMIN — Medication 400 MILLIGRAM(S): at 06:04

## 2019-06-28 NOTE — PHYSICAL THERAPY INITIAL EVALUATION ADULT - STAIR PATTERN, REHAB EVAL
[facility] [place] Lab Visit: 
 
[id number]  Pt arrived via stretcher with ambulance transport and in no distress, labs drawn per Fredy Kimble RN peripherally in right arm. Departed [facility] [place] via stretcher with ambulance transport and in no distress.  
 
Dakota Bourgeois RN 
 
 
 step over step

## 2019-06-28 NOTE — PROGRESS NOTE ADULT - ASSESSMENT
74yo male s/p right hemicolectomy. Pt progressing well.     - Adv diet  - multimodal pain control  - TOV this am  - DVT ppx: heparin  Injectable 5000 Unit(s) SubCutaneous every 12 hours and SCD's

## 2019-06-28 NOTE — PROGRESS NOTE ADULT - ATTENDING COMMENTS
Seen and examined.  No c/o. Seen at PT. Ambulating. Passing gas and stool but feels distended. No N/V.  Keep on clear liquids for now.  Decrease IVF.  Cont postop care.

## 2019-06-28 NOTE — PHYSICAL THERAPY INITIAL EVALUATION ADULT - ADDITIONAL COMMENTS
Pt [place] ranch [place] with spouse, 3 steps to enter (+) right ascending rail. Independent at baseline, no device. Pt reports that spouse and extended family are very supportive and available to assist as needed.

## 2019-06-28 NOTE — PHYSICAL THERAPY INITIAL EVALUATION ADULT - GENERAL OBSERVATIONS, REHAB EVAL
Pt received reclined in bedside chair, (+) prevena wound vac, (+) IV left UE, NAD. Agreeable to PT evaluation. Pt received reclined in bedside chair, (+) Prevena wound vac, (+) IV left UE, NAD. Agreeable to PT evaluation.

## 2019-06-28 NOTE — PROGRESS NOTE ADULT - SUBJECTIVE AND OBJECTIVE BOX
Colorectal Surgery Progress Note:  No acute overnight events. Patient afebrile, VSS. Pain well controlled. Denies n/v/f/c/cp/sob.     Medications:   acetaminophen  IVPB .. 1000 milliGRAM(s) IV Intermittent every 6 hours  alvimopan 12 milliGRAM(s) Oral two times a day  BUpivacaine liposome 1.3% Injectable 20 milliLiter(s) Local Injection once  cefoTEtan  IVPB 2 Gram(s) IV Intermittent once  heparin  Injectable 5000 Unit(s) SubCutaneous every 12 hours  lactated ringers. 1000 milliLiter(s) (125 mL/Hr) IV Continuous <Continuous>  methocarbamol 750 milliGRAM(s) Oral three times a day  metoprolol succinate ER 25 milliGRAM(s) Oral daily  sodium chloride 0.9% lock flush 3 milliLiter(s) IV Push every 8 hours  valsartan 160 milliGRAM(s) Oral daily    acetaminophen   Tablet .. 650 milliGRAM(s) Oral every 6 hours PRN Temp greater or equal to 38C (100.4F), Mild Pain (1 - 3)  ibuprofen  Tablet. 600 milliGRAM(s) Oral two times a day PRN Moderate Pain (4 - 6)  ondansetron Injectable 4 milliGRAM(s) IV Push every 6 hours PRN Nausea and/or Vomiting          Objective:   T(F): 97.6 (06-28 @ 00:30), Max: 98.1 (06-27 @ 06:57)  HR: 63 (06-28 @ 00:30) (63 - 82)  BP: 122/64 (06-28 @ 00:30) (103/62 - 141/63)  BP(mean): --  ABP: --  ABP(mean): --  RR: 18 (06-28 @ 00:30) (10 - 21)  SpO2: 98% (06-28 @ 00:30) (94% - 100%)      Physical Exam:   GEN: patient resting comfortably in bed, in no acute distress  RESP: respirations are unlabored, no accessory muscle use, no conversational dyspnea  CVS: RRR  GI: Abdomen soft, apppropriately tender, non-distended, no rebound tenderness / guarding. Incisions c/d/i.     I&O's    06-27 @ 07:01  -  06-28 @ 05:42  --------------------------------------------------------  IN:  Total IN: 0 mL    OUT:    Voided: 500 mL  Total OUT: 500 mL    Total NET: -500 mL          LABS:                MICROBIOLOGY:       PATHOLOGY:

## 2019-06-28 NOTE — PHYSICAL THERAPY INITIAL EVALUATION ADULT - PERTINENT HX OF CURRENT PROBLEM, REHAB EVAL
72 y/o male with mass in colon found on routine colonoscopy 72 y/o male with mass in colon found on routine colonoscopy. Now status post hemicolectomy

## 2019-06-29 ENCOUNTER — TRANSCRIPTION ENCOUNTER (OUTPATIENT)
Age: 74
End: 2019-06-29

## 2019-06-29 VITALS
SYSTOLIC BLOOD PRESSURE: 108 MMHG | RESPIRATION RATE: 18 BRPM | OXYGEN SATURATION: 98 % | HEART RATE: 79 BPM | TEMPERATURE: 98 F | DIASTOLIC BLOOD PRESSURE: 71 MMHG

## 2019-06-29 LAB
ANION GAP SERPL CALC-SCNC: 11 MMOL/L — SIGNIFICANT CHANGE UP (ref 5–17)
BASOPHILS # BLD AUTO: 0.05 K/UL — SIGNIFICANT CHANGE UP (ref 0–0.2)
BASOPHILS NFR BLD AUTO: 0.4 % — SIGNIFICANT CHANGE UP (ref 0–2)
BUN SERPL-MCNC: 11 MG/DL — SIGNIFICANT CHANGE UP (ref 8–20)
CALCIUM SERPL-MCNC: 9.6 MG/DL — SIGNIFICANT CHANGE UP (ref 8.6–10.2)
CHLORIDE SERPL-SCNC: 102 MMOL/L — SIGNIFICANT CHANGE UP (ref 98–107)
CO2 SERPL-SCNC: 24 MMOL/L — SIGNIFICANT CHANGE UP (ref 22–29)
CREAT SERPL-MCNC: 0.6 MG/DL — SIGNIFICANT CHANGE UP (ref 0.5–1.3)
EOSINOPHIL # BLD AUTO: 0.16 K/UL — SIGNIFICANT CHANGE UP (ref 0–0.5)
EOSINOPHIL NFR BLD AUTO: 1.2 % — SIGNIFICANT CHANGE UP (ref 0–6)
GLUCOSE SERPL-MCNC: 90 MG/DL — SIGNIFICANT CHANGE UP (ref 70–115)
HCT VFR BLD CALC: 41 % — SIGNIFICANT CHANGE UP (ref 39–50)
HGB BLD-MCNC: 13.8 G/DL — SIGNIFICANT CHANGE UP (ref 13–17)
IMM GRANULOCYTES NFR BLD AUTO: 0.6 % — SIGNIFICANT CHANGE UP (ref 0–1.5)
LYMPHOCYTES # BLD AUTO: 1.3 K/UL — SIGNIFICANT CHANGE UP (ref 1–3.3)
LYMPHOCYTES # BLD AUTO: 10 % — LOW (ref 13–44)
MAGNESIUM SERPL-MCNC: 1.8 MG/DL — SIGNIFICANT CHANGE UP (ref 1.6–2.6)
MCHC RBC-ENTMCNC: 31.7 PG — SIGNIFICANT CHANGE UP (ref 27–34)
MCHC RBC-ENTMCNC: 33.7 GM/DL — SIGNIFICANT CHANGE UP (ref 32–36)
MCV RBC AUTO: 94 FL — SIGNIFICANT CHANGE UP (ref 80–100)
MONOCYTES # BLD AUTO: 1.3 K/UL — HIGH (ref 0–0.9)
MONOCYTES NFR BLD AUTO: 10 % — SIGNIFICANT CHANGE UP (ref 2–14)
NEUTROPHILS # BLD AUTO: 10.14 K/UL — HIGH (ref 1.8–7.4)
NEUTROPHILS NFR BLD AUTO: 77.8 % — HIGH (ref 43–77)
PHOSPHATE SERPL-MCNC: 2.1 MG/DL — LOW (ref 2.4–4.7)
PLATELET # BLD AUTO: 293 K/UL — SIGNIFICANT CHANGE UP (ref 150–400)
POTASSIUM SERPL-MCNC: 4 MMOL/L — SIGNIFICANT CHANGE UP (ref 3.5–5.3)
POTASSIUM SERPL-SCNC: 4 MMOL/L — SIGNIFICANT CHANGE UP (ref 3.5–5.3)
RBC # BLD: 4.36 M/UL — SIGNIFICANT CHANGE UP (ref 4.2–5.8)
RBC # FLD: 12.2 % — SIGNIFICANT CHANGE UP (ref 10.3–14.5)
SODIUM SERPL-SCNC: 137 MMOL/L — SIGNIFICANT CHANGE UP (ref 135–145)
WBC # BLD: 13.03 K/UL — HIGH (ref 3.8–10.5)
WBC # FLD AUTO: 13.03 K/UL — HIGH (ref 3.8–10.5)

## 2019-06-29 PROCEDURE — 88307 TISSUE EXAM BY PATHOLOGIST: CPT

## 2019-06-29 PROCEDURE — C1889: CPT

## 2019-06-29 PROCEDURE — 80048 BASIC METABOLIC PNL TOTAL CA: CPT

## 2019-06-29 PROCEDURE — 84100 ASSAY OF PHOSPHORUS: CPT

## 2019-06-29 PROCEDURE — 88342 IMHCHEM/IMCYTCHM 1ST ANTB: CPT

## 2019-06-29 PROCEDURE — 97163 PT EVAL HIGH COMPLEX 45 MIN: CPT

## 2019-06-29 PROCEDURE — 36415 COLL VENOUS BLD VENIPUNCTURE: CPT

## 2019-06-29 PROCEDURE — 83735 ASSAY OF MAGNESIUM: CPT

## 2019-06-29 PROCEDURE — 85027 COMPLETE CBC AUTOMATED: CPT

## 2019-06-29 PROCEDURE — 82962 GLUCOSE BLOOD TEST: CPT

## 2019-06-29 RX ORDER — IBUPROFEN 200 MG
1 TABLET ORAL
Qty: 0 | Refills: 0 | DISCHARGE
Start: 2019-06-29

## 2019-06-29 RX ORDER — OXYCODONE HYDROCHLORIDE 5 MG/1
1 TABLET ORAL
Qty: 12 | Refills: 0
Start: 2019-06-29 | End: 2019-07-01

## 2019-06-29 RX ORDER — ACETAMINOPHEN 500 MG
2 TABLET ORAL
Qty: 0 | Refills: 0 | DISCHARGE
Start: 2019-06-29

## 2019-06-29 RX ADMIN — METHOCARBAMOL 750 MILLIGRAM(S): 500 TABLET, FILM COATED ORAL at 13:05

## 2019-06-29 RX ADMIN — ALVIMOPAN 12 MILLIGRAM(S): 12 CAPSULE ORAL at 06:00

## 2019-06-29 RX ADMIN — VALSARTAN 160 MILLIGRAM(S): 80 TABLET ORAL at 06:00

## 2019-06-29 RX ADMIN — SODIUM CHLORIDE 3 MILLILITER(S): 9 INJECTION INTRAMUSCULAR; INTRAVENOUS; SUBCUTANEOUS at 06:42

## 2019-06-29 RX ADMIN — Medication 25 MILLIGRAM(S): at 06:00

## 2019-06-29 RX ADMIN — HEPARIN SODIUM 5000 UNIT(S): 5000 INJECTION INTRAVENOUS; SUBCUTANEOUS at 05:59

## 2019-06-29 RX ADMIN — SODIUM CHLORIDE 3 MILLILITER(S): 9 INJECTION INTRAMUSCULAR; INTRAVENOUS; SUBCUTANEOUS at 13:05

## 2019-06-29 RX ADMIN — METHOCARBAMOL 750 MILLIGRAM(S): 500 TABLET, FILM COATED ORAL at 06:00

## 2019-06-29 NOTE — PROGRESS NOTE ADULT - ASSESSMENT
72yo male s/p right hemicolectomy. Pt progressing well.     - Adv diet  - multimodal pain control  - TOV this am  - DVT ppx: heparin  Injectable 5000 Unit(s) SubCutaneous every 12 hours and SCD's  - DC home today

## 2019-06-29 NOTE — DISCHARGE NOTE PROVIDER - HOSPITAL COURSE
Patient presented to [facility] on 6/27 for elective procedure of laparoscopic R hemicolectomy for colon polyp. Patient remained hemodynamically stable, no complications, tolerated procedure well, was extubated and transferred from PACU to floor.     Hospital course uneventful, patient's diet was advanced, tolerated regular diet without nausea or vomiting, pain well controlled, ambulating and voiding indepedently, HD stable, labs/vitals stable.     Will follow-up outpatient.    Appropriate medications sent to pharmacy.

## 2019-06-29 NOTE — PROGRESS NOTE ADULT - SUBJECTIVE AND OBJECTIVE BOX
SUBJECTIVE: Doing well this AM. Tolerating fulls. Denies nausea/vomiting. Passing flatus and having bowel movements.       MEDICATIONS  (STANDING):  BUpivacaine liposome 1.3% Injectable 20 milliLiter(s) Local Injection once  heparin  Injectable 5000 Unit(s) SubCutaneous every 12 hours  methocarbamol 750 milliGRAM(s) Oral three times a day  metoprolol succinate ER 25 milliGRAM(s) Oral daily  sodium chloride 0.9% lock flush 3 milliLiter(s) IV Push every 8 hours  valsartan 160 milliGRAM(s) Oral daily    MEDICATIONS  (PRN):  acetaminophen   Tablet .. 650 milliGRAM(s) Oral every 6 hours PRN Temp greater or equal to 38C (100.4F), Mild Pain (1 - 3)  ibuprofen  Tablet. 600 milliGRAM(s) Oral two times a day PRN Moderate Pain (4 - 6)  melatonin 3 milliGRAM(s) Oral at bedtime PRN Insomnia  ondansetron Injectable 4 milliGRAM(s) IV Push every 6 hours PRN Nausea and/or Vomiting      Vital Signs Last 24 Hrs  T(C): 36.7 (29 Jun 2019 04:02), Max: 37.1 (28 Jun 2019 23:13)  T(F): 98.1 (29 Jun 2019 04:02), Max: 98.8 (28 Jun 2019 23:13)  HR: 84 (29 Jun 2019 04:02) (82 - 84)  BP: 139/78 (29 Jun 2019 04:02) (127/69 - 139/78)  BP(mean): --  RR: 18 (29 Jun 2019 04:02) (18 - 18)  SpO2: 92% (29 Jun 2019 04:02) (92% - 92%)    PE  GEN: patient resting comfortably in bed, in no acute distress  RESP: respirations are unlabored, no accessory muscle use, no conversational dyspnea  CVS: RRR  GI: Abdomen soft, apppropriately tender, non-distended, no rebound tenderness / guarding. Incisions c/d/i.     I&O's Detail    28 Jun 2019 07:01  -  29 Jun 2019 07:00  --------------------------------------------------------  IN:    IV PiggyBack: 100 mL    lactated ringers.: 625 mL    lactated ringers.: 1350 mL    Oral Fluid: 240 mL  Total IN: 2315 mL    OUT:    Voided: 1375 mL  Total OUT: 1375 mL    Total NET: 940 mL          LABS:                        13.8   13.03 )-----------( 293      ( 29 Jun 2019 06:55 )             41.0     06-28    138  |  103  |  20.0  ----------------------------<  97  4.2   |  22.0  |  0.83    Ca    9.0      28 Jun 2019 06:00  Phos  4.3     06-28  Mg     1.9     06-28            RADIOLOGY & ADDITIONAL STUDIES:

## 2019-06-29 NOTE — DISCHARGE NOTE PROVIDER - CARE PROVIDER_API CALL
Dong Chung)  ColonRectal Surgery; Surgery  [address] [place] [address]  Phone: [phone]  Fax: [phone]  Follow Up Time:

## 2019-06-29 NOTE — DISCHARGE NOTE PROVIDER - NSDCFUADDINST_GEN_ALL_CORE_FT
WOUND CARE: You may put gauze and paper tape over incision once a day. The dermabond over the port sites will fall off on its own.   BATHING: Please do not submerge wound underwater. You may shower and/or sponge bathe.   ACTIVITY: No heavy lifting or straining. Otherwise, you may return to your usual level of physical activity. If you are taking narcotic pain medication (such as oxycodone) DO NOT drive a car, operate machinery or make important decisions.  DIET: Return to your usual diet.  NOTIFY YOUR SURGEON IF: You have any bleeding that does not stop, any pus draining from your wound(s), any fever (over 100.4 F) or chills, persistent nausea/vomiting, persistent diarrhea, or if your pain is not controlled on your discharge pain medications.  FOLLOW-UP: Please follow up with colorectal in 1-2 weeks. Call for appointment upon discharge.   A prescription for oxycodone has been sent to your pharmacy. You are encouraged to take tylenol or ibuprofen for mild-moderate pain, oxycodone for severe.

## 2019-06-29 NOTE — DISCHARGE NOTE NURSING/CASE MANAGEMENT/SOCIAL WORK - NSDCDPATPORTLINK_GEN_ALL_CORE
You can access the [facility] Patient Portal, offered by [facility], by registering with the following website: [email or web address]

## 2019-07-03 ENCOUNTER — INPATIENT (INPATIENT)
Facility: HOSPITAL | Age: 74
LOS: 3 days | Discharge: HOME | DRG: 871 | End: 2019-07-07
Attending: STUDENT IN AN ORGANIZED HEALTH CARE EDUCATION/TRAINING PROGRAM | Admitting: STUDENT IN AN ORGANIZED HEALTH CARE EDUCATION/TRAINING PROGRAM
Payer: COMMERCIAL

## 2019-07-03 VITALS
HEART RATE: 76 BPM | OXYGEN SATURATION: 92 % | WEIGHT: 169.98 LBS | SYSTOLIC BLOOD PRESSURE: 73 MMHG | TEMPERATURE: 98 F | DIASTOLIC BLOOD PRESSURE: 52 MMHG | HEIGHT: 71 IN | RESPIRATION RATE: 20 BRPM

## 2019-07-03 DIAGNOSIS — Z98.890 OTHER SPECIFIED POSTPROCEDURAL STATES: Chronic | ICD-10-CM

## 2019-07-03 DIAGNOSIS — Z90.49 ACQUIRED ABSENCE OF OTHER SPECIFIED PARTS OF DIGESTIVE TRACT: Chronic | ICD-10-CM

## 2019-07-03 LAB
ALBUMIN SERPL ELPH-MCNC: 4.1 G/DL — SIGNIFICANT CHANGE UP (ref 3.3–5.2)
ALP SERPL-CCNC: 85 U/L — SIGNIFICANT CHANGE UP (ref 40–120)
ALT FLD-CCNC: 117 U/L — HIGH
ANION GAP SERPL CALC-SCNC: 25 MMOL/L — HIGH (ref 5–17)
APTT BLD: 27.5 SEC — SIGNIFICANT CHANGE UP (ref 27.5–36.3)
AST SERPL-CCNC: 62 U/L — HIGH
BASOPHILS # BLD AUTO: 0 K/UL — SIGNIFICANT CHANGE UP (ref 0–0.2)
BASOPHILS NFR BLD AUTO: 0 % — SIGNIFICANT CHANGE UP (ref 0–2)
BILIRUB SERPL-MCNC: 0.4 MG/DL — SIGNIFICANT CHANGE UP (ref 0.4–2)
BLD GP AB SCN SERPL QL: SIGNIFICANT CHANGE UP
BUN SERPL-MCNC: 101 MG/DL — HIGH (ref 8–20)
CALCIUM SERPL-MCNC: 9.8 MG/DL — SIGNIFICANT CHANGE UP (ref 8.6–10.2)
CHLORIDE SERPL-SCNC: 96 MMOL/L — LOW (ref 98–107)
CO2 SERPL-SCNC: 13 MMOL/L — LOW (ref 22–29)
CREAT SERPL-MCNC: 10.46 MG/DL — HIGH (ref 0.5–1.3)
EOSINOPHIL # BLD AUTO: 0.51 K/UL — HIGH (ref 0–0.5)
EOSINOPHIL NFR BLD AUTO: 2.6 % — SIGNIFICANT CHANGE UP (ref 0–6)
GLUCOSE SERPL-MCNC: 131 MG/DL — HIGH (ref 70–115)
HCT VFR BLD CALC: 46.7 % — SIGNIFICANT CHANGE UP (ref 39–50)
HGB BLD-MCNC: 15.9 G/DL — SIGNIFICANT CHANGE UP (ref 13–17)
INR BLD: 1.03 RATIO — SIGNIFICANT CHANGE UP (ref 0.88–1.16)
LACTATE BLDV-MCNC: 1.4 MMOL/L — SIGNIFICANT CHANGE UP (ref 0.5–2)
LIDOCAIN IGE QN: 80 U/L — HIGH (ref 22–51)
LYMPHOCYTES # BLD AUTO: 11.3 % — LOW (ref 13–44)
LYMPHOCYTES # BLD AUTO: 2.21 K/UL — SIGNIFICANT CHANGE UP (ref 1–3.3)
MAGNESIUM SERPL-MCNC: 2.8 MG/DL — HIGH (ref 1.6–2.6)
MANUAL SMEAR VERIFICATION: SIGNIFICANT CHANGE UP
MCHC RBC-ENTMCNC: 31.5 PG — SIGNIFICANT CHANGE UP (ref 27–34)
MCHC RBC-ENTMCNC: 34 GM/DL — SIGNIFICANT CHANGE UP (ref 32–36)
MCV RBC AUTO: 92.5 FL — SIGNIFICANT CHANGE UP (ref 80–100)
MONOCYTES # BLD AUTO: 1.37 K/UL — HIGH (ref 0–0.9)
MONOCYTES NFR BLD AUTO: 7 % — SIGNIFICANT CHANGE UP (ref 2–14)
NEUTROPHILS # BLD AUTO: 15.46 K/UL — HIGH (ref 1.8–7.4)
NEUTROPHILS NFR BLD AUTO: 79.1 % — HIGH (ref 43–77)
PLAT MORPH BLD: NORMAL — SIGNIFICANT CHANGE UP
PLATELET # BLD AUTO: 518 K/UL — HIGH (ref 150–400)
POTASSIUM SERPL-MCNC: 5.4 MMOL/L — HIGH (ref 3.5–5.3)
POTASSIUM SERPL-SCNC: 5.4 MMOL/L — HIGH (ref 3.5–5.3)
PROT SERPL-MCNC: 8.5 G/DL — SIGNIFICANT CHANGE UP (ref 6.6–8.7)
PROTHROM AB SERPL-ACNC: 11.9 SEC — SIGNIFICANT CHANGE UP (ref 10–12.9)
RBC # BLD: 5.05 M/UL — SIGNIFICANT CHANGE UP (ref 4.2–5.8)
RBC # FLD: 12.2 % — SIGNIFICANT CHANGE UP (ref 10.3–14.5)
RBC BLD AUTO: NORMAL — SIGNIFICANT CHANGE UP
SODIUM SERPL-SCNC: 134 MMOL/L — LOW (ref 135–145)
SURGICAL PATHOLOGY STUDY: SIGNIFICANT CHANGE UP
TYPE + AB SCN PNL BLD: SIGNIFICANT CHANGE UP
WBC # BLD: 19.55 K/UL — HIGH (ref 3.8–10.5)
WBC # FLD AUTO: 19.55 K/UL — HIGH (ref 3.8–10.5)

## 2019-07-03 PROCEDURE — 74176 CT ABD & PELVIS W/O CONTRAST: CPT | Mod: 26

## 2019-07-03 PROCEDURE — 99285 EMERGENCY DEPT VISIT HI MDM: CPT

## 2019-07-03 PROCEDURE — 71045 X-RAY EXAM CHEST 1 VIEW: CPT | Mod: 26

## 2019-07-03 PROCEDURE — 99223 1ST HOSP IP/OBS HIGH 75: CPT | Mod: 24

## 2019-07-03 RX ORDER — VANCOMYCIN HCL 1 G
1000 VIAL (EA) INTRAVENOUS ONCE
Refills: 0 | Status: COMPLETED | OUTPATIENT
Start: 2019-07-03 | End: 2019-07-03

## 2019-07-03 RX ORDER — SODIUM CHLORIDE 9 MG/ML
2500 INJECTION INTRAMUSCULAR; INTRAVENOUS; SUBCUTANEOUS ONCE
Refills: 0 | Status: COMPLETED | OUTPATIENT
Start: 2019-07-03 | End: 2019-07-03

## 2019-07-03 RX ORDER — MORPHINE SULFATE 50 MG/1
4 CAPSULE, EXTENDED RELEASE ORAL ONCE
Refills: 0 | Status: DISCONTINUED | OUTPATIENT
Start: 2019-07-03 | End: 2019-07-03

## 2019-07-03 RX ORDER — PIPERACILLIN AND TAZOBACTAM 4; .5 G/20ML; G/20ML
3.38 INJECTION, POWDER, LYOPHILIZED, FOR SOLUTION INTRAVENOUS ONCE
Refills: 0 | Status: COMPLETED | OUTPATIENT
Start: 2019-07-03 | End: 2019-07-03

## 2019-07-03 RX ADMIN — MORPHINE SULFATE 4 MILLIGRAM(S): 50 CAPSULE, EXTENDED RELEASE ORAL at 20:36

## 2019-07-03 RX ADMIN — MORPHINE SULFATE 4 MILLIGRAM(S): 50 CAPSULE, EXTENDED RELEASE ORAL at 21:16

## 2019-07-03 RX ADMIN — Medication 250 MILLIGRAM(S): at 22:49

## 2019-07-03 RX ADMIN — SODIUM CHLORIDE 5000 MILLILITER(S): 9 INJECTION INTRAMUSCULAR; INTRAVENOUS; SUBCUTANEOUS at 21:43

## 2019-07-03 RX ADMIN — PIPERACILLIN AND TAZOBACTAM 3.38 GRAM(S): 4; .5 INJECTION, POWDER, LYOPHILIZED, FOR SOLUTION INTRAVENOUS at 23:38

## 2019-07-03 RX ADMIN — SODIUM CHLORIDE 2500 MILLILITER(S): 9 INJECTION INTRAMUSCULAR; INTRAVENOUS; SUBCUTANEOUS at 23:38

## 2019-07-03 RX ADMIN — PIPERACILLIN AND TAZOBACTAM 200 GRAM(S): 4; .5 INJECTION, POWDER, LYOPHILIZED, FOR SOLUTION INTRAVENOUS at 21:43

## 2019-07-03 NOTE — ED ADULT TRIAGE NOTE - CHIEF COMPLAINT QUOTE
patient had colon surgery thursday, states dehydration, clammy,  unable to keep foods or drink  down lost 30 lbs , denies fever, sent by MD

## 2019-07-03 NOTE — ED PROVIDER NOTE - CLINICAL SUMMARY MEDICAL DECISION MAKING FREE TEXT BOX
spoke with surgery requesting admision for sicu hydratedin ed ct scan reviewed will cover for sepsis based off recent surgery pt and pts family agree to plan of care

## 2019-07-03 NOTE — ED PROVIDER NOTE - OBJECTIVE STATEMENT
72 y/o M pt with hx of HTN, aortic stenosis, and ELGIN inguinal hernia repair presents to ED c/o generalized weakness, nausea, and bilious vomiting that began 2 days ago s/p hemicolectomy 6 days ago. Pt was released from the hospital 5  Pt's wife reports he has been constantly hiccuping. He has been unable to tolerate PO. Denies lightheadedness, dizziness. denies fever. denies HA or neck pain. no chest pain or sob. no abd pain. no diarrhea. no urinary f/u/d. no back pain. no motor or sensory deficits. denies illicit drug use. no recent travel. no rash. no other acute issues symptoms or concerns.   Surgeon: Abbie  PMD: Cher Calvillo

## 2019-07-03 NOTE — H&P ADULT - HISTORY OF PRESENT ILLNESS
73 year old male presents to ED with "pressure-like" abdominal pain that has persisted since he was discharged from the [facility] recently. Patient has had poor oral intake both with food/liquids. Started experiencing bilious emesis today and has hiccups. Passing flatus and having nonbloody diarrhea. Has only urinated once today. subjective fevers  Recently underwent laparoscopic right hemicolectomy with primary ileotransverse anastomosis on June 27 with pathology findings consistent with tubulovillous adenoma and 19 benign lymph nodes. Post operative course was uncomplicated and patient was discharged post operative day 2

## 2019-07-03 NOTE — H&P ADULT - SKIN COMMENTS
mini midline skin incision with staples intact. no erythema/drainage or fluctuance. LLQ trochar site with 2 staples in tact

## 2019-07-03 NOTE — ED ADULT NURSE NOTE - NS ED NURSE LEVEL OF CONSCIOUSNESS ORIENTATION
07/03/17 1920   Maternal Vital Signs   Temp (!) 100.9 °F (38.3 °C)   Temp Source Axillary   Pulse (Heart Rate) 82   Level of Consciousness Alert   /57   MAP (Calculated) 74   BP 1 Method Automatic   BP 1 Location Right arm   BP Patient Position Sitting Oriented - self; Oriented - place; Oriented - time

## 2019-07-03 NOTE — H&P ADULT - NSICDXPASTSURGICALHX_GEN_ALL_CORE_FT
PAST SURGICAL HISTORY:  H/O inguinal hernia repair B/L 2013    S/P laparoscopic colectomy right colectomy with ileotransverse anastomosis

## 2019-07-03 NOTE — H&P ADULT - ASSESSMENT
73 year old male with history of tubulovillous adenoma s/p laparoscopic right hemicolectomy with ileotransverse anastomosis POD 6 who returns with abdominal pain, poor oral intake, hypotensive with acute renal failure. leukocytosis of 20K    -admit to SICU   -NPO/IV fluids  -IV zosyn  -aggressive fluid resuscitation  -ram for strict I/O's  -hold home med metoprolol   -SCDs bilaterally

## 2019-07-03 NOTE — ED PROVIDER NOTE - CARE PLAN
Principal Discharge DX:	Renal failure Principal Discharge DX:	Renal failure  Secondary Diagnosis:	Dehydration  Secondary Diagnosis:	Nausea & vomiting

## 2019-07-03 NOTE — ED ADULT NURSE NOTE - OBJECTIVE STATEMENT
Pt presents to ED with decreased PO intake, N/V/D, and a feeling of pressure in the LRQ of abd s/p hemicolectomy last Thursday with symptoms beginning Saturday when he went home. Pt denies pain and states "it feels more like pressure". Wife states two days ago Pt had pain in extremeties but since has subsided. Wife states Pt has had one egg in the last 4 days and has not been able to tolerate anything PO. Wife states pt lost 30lbs in last 7 days. Pt has hx of HTN, aortic stenosis. Pt abd does not appear distended and incision sites are clean, dry and intact and are located under umbilicus.

## 2019-07-04 DIAGNOSIS — N19 UNSPECIFIED KIDNEY FAILURE: ICD-10-CM

## 2019-07-04 LAB
ACETONE SERPL-MCNC: NEGATIVE — SIGNIFICANT CHANGE UP
ALBUMIN SERPL ELPH-MCNC: 3 G/DL — LOW (ref 3.3–5.2)
ALBUMIN SERPL ELPH-MCNC: 3.3 G/DL — SIGNIFICANT CHANGE UP (ref 3.3–5.2)
ALP SERPL-CCNC: 67 U/L — SIGNIFICANT CHANGE UP (ref 40–120)
ALP SERPL-CCNC: 70 U/L — SIGNIFICANT CHANGE UP (ref 40–120)
ALT FLD-CCNC: 77 U/L — HIGH
ALT FLD-CCNC: 94 U/L — HIGH
ANION GAP SERPL CALC-SCNC: 11 MMOL/L — SIGNIFICANT CHANGE UP (ref 5–17)
ANION GAP SERPL CALC-SCNC: 13 MMOL/L — SIGNIFICANT CHANGE UP (ref 5–17)
ANION GAP SERPL CALC-SCNC: 15 MMOL/L — SIGNIFICANT CHANGE UP (ref 5–17)
ANION GAP SERPL CALC-SCNC: 20 MMOL/L — HIGH (ref 5–17)
APPEARANCE UR: ABNORMAL
APPEARANCE UR: ABNORMAL
AST SERPL-CCNC: 37 U/L — SIGNIFICANT CHANGE UP
AST SERPL-CCNC: 52 U/L — HIGH
BACTERIA # UR AUTO: ABNORMAL
BACTERIA # UR AUTO: ABNORMAL
BASE EXCESS BLDA CALC-SCNC: -12.6 MMOL/L — LOW (ref -2–2)
BASE EXCESS BLDV CALC-SCNC: -4.5 MMOL/L — LOW (ref -3–3)
BASOPHILS # BLD AUTO: 0.02 K/UL — SIGNIFICANT CHANGE UP (ref 0–0.2)
BASOPHILS # BLD AUTO: 0.06 K/UL — SIGNIFICANT CHANGE UP (ref 0–0.2)
BASOPHILS NFR BLD AUTO: 0.1 % — SIGNIFICANT CHANGE UP (ref 0–2)
BASOPHILS NFR BLD AUTO: 0.4 % — SIGNIFICANT CHANGE UP (ref 0–2)
BILIRUB SERPL-MCNC: 0.3 MG/DL — LOW (ref 0.4–2)
BILIRUB SERPL-MCNC: 0.3 MG/DL — LOW (ref 0.4–2)
BILIRUB UR-MCNC: ABNORMAL
BILIRUB UR-MCNC: NEGATIVE — SIGNIFICANT CHANGE UP
BLOOD GAS COMMENTS ARTERIAL: SIGNIFICANT CHANGE UP
BUN SERPL-MCNC: 79 MG/DL — HIGH (ref 8–20)
BUN SERPL-MCNC: 85 MG/DL — HIGH (ref 8–20)
BUN SERPL-MCNC: 91 MG/DL — HIGH (ref 8–20)
BUN SERPL-MCNC: 94 MG/DL — HIGH (ref 8–20)
C DIFF BY PCR RESULT: SIGNIFICANT CHANGE UP
C DIFF TOX GENS STL QL NAA+PROBE: SIGNIFICANT CHANGE UP
CA-I SERPL-SCNC: 1.18 MMOL/L — SIGNIFICANT CHANGE UP (ref 1.12–1.3)
CALCIUM SERPL-MCNC: 8 MG/DL — LOW (ref 8.6–10.2)
CALCIUM SERPL-MCNC: 8.2 MG/DL — LOW (ref 8.6–10.2)
CALCIUM SERPL-MCNC: 8.2 MG/DL — LOW (ref 8.6–10.2)
CALCIUM SERPL-MCNC: 8.5 MG/DL — LOW (ref 8.6–10.2)
CHLORIDE BLDV-SCNC: 112 MMOL/L — HIGH (ref 98–106)
CHLORIDE SERPL-SCNC: 103 MMOL/L — SIGNIFICANT CHANGE UP (ref 98–107)
CHLORIDE SERPL-SCNC: 108 MMOL/L — HIGH (ref 98–107)
CHLORIDE SERPL-SCNC: 109 MMOL/L — HIGH (ref 98–107)
CHLORIDE SERPL-SCNC: 109 MMOL/L — HIGH (ref 98–107)
CO2 SERPL-SCNC: 12 MMOL/L — LOW (ref 22–29)
CO2 SERPL-SCNC: 12 MMOL/L — LOW (ref 22–29)
CO2 SERPL-SCNC: 16 MMOL/L — LOW (ref 22–29)
CO2 SERPL-SCNC: 17 MMOL/L — LOW (ref 22–29)
COLOR SPEC: YELLOW — SIGNIFICANT CHANGE UP
COLOR SPEC: YELLOW — SIGNIFICANT CHANGE UP
CREAT ?TM UR-MCNC: 491 MG/DL — SIGNIFICANT CHANGE UP
CREAT SERPL-MCNC: 2.27 MG/DL — HIGH (ref 0.5–1.3)
CREAT SERPL-MCNC: 3.58 MG/DL — HIGH (ref 0.5–1.3)
CREAT SERPL-MCNC: 6.33 MG/DL — HIGH (ref 0.5–1.3)
CREAT SERPL-MCNC: 8.67 MG/DL — HIGH (ref 0.5–1.3)
DIFF PNL FLD: ABNORMAL
DIFF PNL FLD: ABNORMAL
EOSINOPHIL # BLD AUTO: 0.11 K/UL — SIGNIFICANT CHANGE UP (ref 0–0.5)
EOSINOPHIL # BLD AUTO: 0.2 K/UL — SIGNIFICANT CHANGE UP (ref 0–0.5)
EOSINOPHIL NFR BLD AUTO: 0.7 % — SIGNIFICANT CHANGE UP (ref 0–6)
EOSINOPHIL NFR BLD AUTO: 1.3 % — SIGNIFICANT CHANGE UP (ref 0–6)
EPI CELLS # UR: SIGNIFICANT CHANGE UP
EPI CELLS # UR: SIGNIFICANT CHANGE UP
GAS PNL BLDA: SIGNIFICANT CHANGE UP
GAS PNL BLDV: 139 MMOL/L — SIGNIFICANT CHANGE UP (ref 136–146)
GAS PNL BLDV: SIGNIFICANT CHANGE UP
GAS PNL BLDV: SIGNIFICANT CHANGE UP
GLUCOSE BLDV-MCNC: 106 MG/DL — HIGH (ref 70–99)
GLUCOSE SERPL-MCNC: 105 MG/DL — SIGNIFICANT CHANGE UP (ref 70–115)
GLUCOSE SERPL-MCNC: 119 MG/DL — HIGH (ref 70–115)
GLUCOSE SERPL-MCNC: 145 MG/DL — HIGH (ref 70–115)
GLUCOSE SERPL-MCNC: 94 MG/DL — SIGNIFICANT CHANGE UP (ref 70–115)
GLUCOSE UR QL: 50 MG/DL
GLUCOSE UR QL: NEGATIVE MG/DL — SIGNIFICANT CHANGE UP
HCO3 BLDA-SCNC: 15 MMOL/L — LOW (ref 20–26)
HCO3 BLDV-SCNC: 20 MMOL/L — SIGNIFICANT CHANGE UP (ref 20–26)
HCT VFR BLD CALC: 39.5 % — SIGNIFICANT CHANGE UP (ref 39–50)
HCT VFR BLD CALC: 42.7 % — SIGNIFICANT CHANGE UP (ref 39–50)
HCT VFR BLDA CALC: 41 — SIGNIFICANT CHANGE UP (ref 39–50)
HGB BLD CALC-MCNC: 13.2 G/DL — SIGNIFICANT CHANGE UP (ref 13–17)
HGB BLD-MCNC: 13.1 G/DL — SIGNIFICANT CHANGE UP (ref 13–17)
HGB BLD-MCNC: 14.6 G/DL — SIGNIFICANT CHANGE UP (ref 13–17)
HOROWITZ INDEX BLDA+IHG-RTO: 21 — SIGNIFICANT CHANGE UP
IMM GRANULOCYTES NFR BLD AUTO: 0.9 % — SIGNIFICANT CHANGE UP (ref 0–1.5)
IMM GRANULOCYTES NFR BLD AUTO: 1 % — SIGNIFICANT CHANGE UP (ref 0–1.5)
KETONES UR-MCNC: ABNORMAL
KETONES UR-MCNC: NEGATIVE — SIGNIFICANT CHANGE UP
LACTATE BLDV-MCNC: 0.8 MMOL/L — SIGNIFICANT CHANGE UP (ref 0.5–2)
LACTATE BLDV-MCNC: 1 MMOL/L — SIGNIFICANT CHANGE UP (ref 0.5–2)
LEUKOCYTE ESTERASE UR-ACNC: ABNORMAL
LEUKOCYTE ESTERASE UR-ACNC: ABNORMAL
LYMPHOCYTES # BLD AUTO: 0.68 K/UL — LOW (ref 1–3.3)
LYMPHOCYTES # BLD AUTO: 1.11 K/UL — SIGNIFICANT CHANGE UP (ref 1–3.3)
LYMPHOCYTES # BLD AUTO: 4.2 % — LOW (ref 13–44)
LYMPHOCYTES # BLD AUTO: 7.3 % — LOW (ref 13–44)
MAGNESIUM SERPL-MCNC: 2.1 MG/DL — SIGNIFICANT CHANGE UP (ref 1.8–2.6)
MAGNESIUM SERPL-MCNC: 2.3 MG/DL — SIGNIFICANT CHANGE UP (ref 1.6–2.6)
MCHC RBC-ENTMCNC: 31.3 PG — SIGNIFICANT CHANGE UP (ref 27–34)
MCHC RBC-ENTMCNC: 31.9 PG — SIGNIFICANT CHANGE UP (ref 27–34)
MCHC RBC-ENTMCNC: 33.2 GM/DL — SIGNIFICANT CHANGE UP (ref 32–36)
MCHC RBC-ENTMCNC: 34.2 GM/DL — SIGNIFICANT CHANGE UP (ref 32–36)
MCV RBC AUTO: 93.2 FL — SIGNIFICANT CHANGE UP (ref 80–100)
MCV RBC AUTO: 94.5 FL — SIGNIFICANT CHANGE UP (ref 80–100)
MONOCYTES # BLD AUTO: 1.68 K/UL — HIGH (ref 0–0.9)
MONOCYTES # BLD AUTO: 1.71 K/UL — HIGH (ref 0–0.9)
MONOCYTES NFR BLD AUTO: 10.4 % — SIGNIFICANT CHANGE UP (ref 2–14)
MONOCYTES NFR BLD AUTO: 11 % — SIGNIFICANT CHANGE UP (ref 2–14)
NEUTROPHILS # BLD AUTO: 12.02 K/UL — HIGH (ref 1.8–7.4)
NEUTROPHILS # BLD AUTO: 13.7 K/UL — HIGH (ref 1.8–7.4)
NEUTROPHILS NFR BLD AUTO: 79.1 % — HIGH (ref 43–77)
NEUTROPHILS NFR BLD AUTO: 83.6 % — HIGH (ref 43–77)
NITRITE UR-MCNC: NEGATIVE — SIGNIFICANT CHANGE UP
NITRITE UR-MCNC: POSITIVE
OTHER CELLS CSF MANUAL: 17 ML/DL — LOW (ref 18–22)
PCO2 BLDA: 29 MMHG — LOW (ref 35–45)
PCO2 BLDV: 33 MMHG — LOW (ref 35–50)
PH BLDA: 7.26 — LOW (ref 7.35–7.45)
PH BLDV: 7.38 — SIGNIFICANT CHANGE UP (ref 7.35–7.45)
PH UR: 5 — SIGNIFICANT CHANGE UP (ref 5–8)
PH UR: 5 — SIGNIFICANT CHANGE UP (ref 5–8)
PHOSPHATE SERPL-MCNC: 6.6 MG/DL — HIGH (ref 2.4–4.7)
PHOSPHATE SERPL-MCNC: 8 MG/DL — HIGH (ref 2.4–4.7)
PLATELET # BLD AUTO: 372 K/UL — SIGNIFICANT CHANGE UP (ref 150–400)
PLATELET # BLD AUTO: 379 K/UL — SIGNIFICANT CHANGE UP (ref 150–400)
PO2 BLDA: 84 MMHG — SIGNIFICANT CHANGE UP (ref 83–108)
PO2 BLDV: 58 MMHG — HIGH (ref 25–45)
POTASSIUM BLDV-SCNC: 3.8 MMOL/L — SIGNIFICANT CHANGE UP (ref 3.4–4.5)
POTASSIUM SERPL-MCNC: 3.9 MMOL/L — SIGNIFICANT CHANGE UP (ref 3.5–5.3)
POTASSIUM SERPL-MCNC: 4.3 MMOL/L — SIGNIFICANT CHANGE UP (ref 3.5–5.3)
POTASSIUM SERPL-MCNC: 4.9 MMOL/L — SIGNIFICANT CHANGE UP (ref 3.5–5.3)
POTASSIUM SERPL-MCNC: 5 MMOL/L — SIGNIFICANT CHANGE UP (ref 3.5–5.3)
POTASSIUM SERPL-SCNC: 3.9 MMOL/L — SIGNIFICANT CHANGE UP (ref 3.5–5.3)
POTASSIUM SERPL-SCNC: 4.3 MMOL/L — SIGNIFICANT CHANGE UP (ref 3.5–5.3)
POTASSIUM SERPL-SCNC: 4.9 MMOL/L — SIGNIFICANT CHANGE UP (ref 3.5–5.3)
POTASSIUM SERPL-SCNC: 5 MMOL/L — SIGNIFICANT CHANGE UP (ref 3.5–5.3)
POTASSIUM UR-SCNC: 40 MMOL/L — SIGNIFICANT CHANGE UP
PROT SERPL-MCNC: 6.1 G/DL — LOW (ref 6.6–8.7)
PROT SERPL-MCNC: 7.1 G/DL — SIGNIFICANT CHANGE UP (ref 6.6–8.7)
PROT UR-MCNC: 100 MG/DL
PROT UR-MCNC: 30 MG/DL
RBC # BLD: 4.18 M/UL — LOW (ref 4.2–5.8)
RBC # BLD: 4.58 M/UL — SIGNIFICANT CHANGE UP (ref 4.2–5.8)
RBC # FLD: 12.3 % — SIGNIFICANT CHANGE UP (ref 10.3–14.5)
RBC # FLD: 12.3 % — SIGNIFICANT CHANGE UP (ref 10.3–14.5)
RBC CASTS # UR COMP ASSIST: ABNORMAL /HPF (ref 0–4)
RBC CASTS # UR COMP ASSIST: ABNORMAL /HPF (ref 0–4)
SAO2 % BLDA: 96 % — SIGNIFICANT CHANGE UP (ref 95–99)
SAO2 % BLDV: 91 % — HIGH (ref 67–88)
SODIUM SERPL-SCNC: 135 MMOL/L — SIGNIFICANT CHANGE UP (ref 135–145)
SODIUM SERPL-SCNC: 135 MMOL/L — SIGNIFICANT CHANGE UP (ref 135–145)
SODIUM SERPL-SCNC: 136 MMOL/L — SIGNIFICANT CHANGE UP (ref 135–145)
SODIUM SERPL-SCNC: 139 MMOL/L — SIGNIFICANT CHANGE UP (ref 135–145)
SODIUM UR-SCNC: <30 MMOL/L — SIGNIFICANT CHANGE UP
SP GR SPEC: 1.02 — SIGNIFICANT CHANGE UP (ref 1.01–1.02)
SP GR SPEC: 1.02 — SIGNIFICANT CHANGE UP (ref 1.01–1.02)
TROPONIN T SERPL-MCNC: 0.02 NG/ML — SIGNIFICANT CHANGE UP (ref 0–0.06)
UROBILINOGEN FLD QL: 1 MG/DL
UROBILINOGEN FLD QL: NEGATIVE MG/DL — SIGNIFICANT CHANGE UP
WBC # BLD: 15.21 K/UL — HIGH (ref 3.8–10.5)
WBC # BLD: 16.38 K/UL — HIGH (ref 3.8–10.5)
WBC # FLD AUTO: 15.21 K/UL — HIGH (ref 3.8–10.5)
WBC # FLD AUTO: 16.38 K/UL — HIGH (ref 3.8–10.5)
WBC UR QL: SIGNIFICANT CHANGE UP
WBC UR QL: SIGNIFICANT CHANGE UP

## 2019-07-04 PROCEDURE — 93010 ELECTROCARDIOGRAM REPORT: CPT

## 2019-07-04 PROCEDURE — 99291 CRITICAL CARE FIRST HOUR: CPT

## 2019-07-04 PROCEDURE — 99233 SBSQ HOSP IP/OBS HIGH 50: CPT | Mod: 24

## 2019-07-04 PROCEDURE — 99233 SBSQ HOSP IP/OBS HIGH 50: CPT

## 2019-07-04 PROCEDURE — 99255 IP/OBS CONSLTJ NEW/EST HI 80: CPT

## 2019-07-04 PROCEDURE — 74018 RADEX ABDOMEN 1 VIEW: CPT | Mod: 26

## 2019-07-04 PROCEDURE — 76775 US EXAM ABDO BACK WALL LIM: CPT | Mod: 26

## 2019-07-04 RX ORDER — ACETAMINOPHEN 500 MG
650 TABLET ORAL EVERY 6 HOURS
Refills: 0 | Status: DISCONTINUED | OUTPATIENT
Start: 2019-07-04 | End: 2019-07-04

## 2019-07-04 RX ORDER — HEPARIN SODIUM 5000 [USP'U]/ML
5000 INJECTION INTRAVENOUS; SUBCUTANEOUS EVERY 8 HOURS
Refills: 0 | Status: DISCONTINUED | OUTPATIENT
Start: 2019-07-04 | End: 2019-07-07

## 2019-07-04 RX ORDER — METRONIDAZOLE 500 MG
TABLET ORAL
Refills: 0 | Status: DISCONTINUED | OUTPATIENT
Start: 2019-07-04 | End: 2019-07-04

## 2019-07-04 RX ORDER — CHLORHEXIDINE GLUCONATE 213 G/1000ML
1 SOLUTION TOPICAL
Refills: 0 | Status: DISCONTINUED | OUTPATIENT
Start: 2019-07-04 | End: 2019-07-07

## 2019-07-04 RX ORDER — METRONIDAZOLE 500 MG
500 TABLET ORAL EVERY 8 HOURS
Refills: 0 | Status: DISCONTINUED | OUTPATIENT
Start: 2019-07-04 | End: 2019-07-04

## 2019-07-04 RX ORDER — SODIUM CHLORIDE 9 MG/ML
1000 INJECTION INTRAMUSCULAR; INTRAVENOUS; SUBCUTANEOUS ONCE
Refills: 0 | Status: COMPLETED | OUTPATIENT
Start: 2019-07-04 | End: 2019-07-04

## 2019-07-04 RX ORDER — ACETAMINOPHEN 500 MG
1000 TABLET ORAL ONCE
Refills: 0 | Status: DISCONTINUED | OUTPATIENT
Start: 2019-07-04 | End: 2019-07-05

## 2019-07-04 RX ORDER — VANCOMYCIN HCL 1 G
500 VIAL (EA) INTRAVENOUS EVERY 6 HOURS
Refills: 0 | Status: DISCONTINUED | OUTPATIENT
Start: 2019-07-04 | End: 2019-07-04

## 2019-07-04 RX ORDER — SODIUM CHLORIDE 9 MG/ML
1000 INJECTION, SOLUTION INTRAVENOUS
Refills: 0 | Status: DISCONTINUED | OUTPATIENT
Start: 2019-07-04 | End: 2019-07-05

## 2019-07-04 RX ORDER — PANTOPRAZOLE SODIUM 20 MG/1
40 TABLET, DELAYED RELEASE ORAL DAILY
Refills: 0 | Status: DISCONTINUED | OUTPATIENT
Start: 2019-07-04 | End: 2019-07-07

## 2019-07-04 RX ORDER — ONDANSETRON 8 MG/1
4 TABLET, FILM COATED ORAL EVERY 6 HOURS
Refills: 0 | Status: DISCONTINUED | OUTPATIENT
Start: 2019-07-04 | End: 2019-07-07

## 2019-07-04 RX ORDER — METRONIDAZOLE 500 MG
500 TABLET ORAL ONCE
Refills: 0 | Status: COMPLETED | OUTPATIENT
Start: 2019-07-04 | End: 2019-07-04

## 2019-07-04 RX ADMIN — HEPARIN SODIUM 5000 UNIT(S): 5000 INJECTION INTRAVENOUS; SUBCUTANEOUS at 06:49

## 2019-07-04 RX ADMIN — SODIUM CHLORIDE 1000 MILLILITER(S): 9 INJECTION INTRAMUSCULAR; INTRAVENOUS; SUBCUTANEOUS at 03:20

## 2019-07-04 RX ADMIN — Medication 100 MILLIGRAM(S): at 10:04

## 2019-07-04 RX ADMIN — PANTOPRAZOLE SODIUM 40 MILLIGRAM(S): 20 TABLET, DELAYED RELEASE ORAL at 10:04

## 2019-07-04 RX ADMIN — SODIUM CHLORIDE 115 MILLILITER(S): 9 INJECTION, SOLUTION INTRAVENOUS at 10:04

## 2019-07-04 RX ADMIN — ONDANSETRON 4 MILLIGRAM(S): 8 TABLET, FILM COATED ORAL at 01:00

## 2019-07-04 RX ADMIN — SODIUM CHLORIDE 1000 MILLILITER(S): 9 INJECTION INTRAMUSCULAR; INTRAVENOUS; SUBCUTANEOUS at 00:40

## 2019-07-04 RX ADMIN — HEPARIN SODIUM 5000 UNIT(S): 5000 INJECTION INTRAVENOUS; SUBCUTANEOUS at 13:03

## 2019-07-04 RX ADMIN — HEPARIN SODIUM 5000 UNIT(S): 5000 INJECTION INTRAVENOUS; SUBCUTANEOUS at 21:20

## 2019-07-04 NOTE — PROGRESS NOTE ADULT - ASSESSMENT
73 year old male with history of tubulovillous adenoma s/p laparoscopic right hemicolectomy with ileotransverse anastomosis on 6/27 who presented with sepsis, poor PO intake, and RADHA.  - d/c NGT  - CLD  - continue IV zosyn  - aggressive fluid resuscitation  - continue ram  - continue home meds  - f/u renal consult 73 year old male with history of tubulovillous adenoma s/p laparoscopic right hemicolectomy with ileotransverse anastomosis on 6/27 who presented with sepsis, poor PO intake, and RADHA.  - d/c NGT  - CLD  - aggressive fluid resuscitation  - continue ram  - continue home meds  - f/u renal consult

## 2019-07-04 NOTE — CONSULT NOTE ADULT - ASSESSMENT
1) Prerenal azotemia  2) Acidosis  3) Hyperkalemia  4) Poor po intake    Elvin < 30 consistent with prerenal azotemia  Continue with sodium acetate  Maintain ram; monitor I/O  No need for dialysis at this time  judith Hannah

## 2019-07-04 NOTE — CONSULT NOTE ADULT - SUBJECTIVE AND OBJECTIVE BOX
SICU Consult note     HPI:   73yMale w/ PMH Aortic stenosis, HTN, and lap right hemicolectomy for 2x tubular adenomas on 6/27  presents on 07-04-19 w/ complaint of abd pain and poor po intake. Pt prompted by his wife to present to ED today after several days decreased PO intake w/ associated nonbloody diarrhea and cramp like abdominal pain associated w/ bloating sensation. Pt also noted that he has not urinated his normal amount. He specifically denies emesis but has experienced persistent hiccups. on arrival to [facility] ED, pt hypotensive w/ SBP of 70 and had labs noteable for significant leukocytosis and profound RADHA. Code sepsis was initiated and colorectal surgery saw patient. After CT scan performed, pt transferredt to SICU for fluid resuscitation and monitoring given his critical condition.     PMH:  Aortic stenosis [Active]  Hypertension [Active]      PSH:  S/P laparoscopic colectomy: right colectomy with ileotransverse anastomosis 6/27/2019 - [facility]  H/O inguinal hernia repair: B/L 2013    Home Medications:  metoprolol succinate 25 mg oral tablet, extended release: 1 tab(s) orally once a day  valsartan 160 mg oral capsule: orally once a day    ALL: NKDA      FMH:  Denies family history of gastrointestinal malignancy       SOC Hx:   Denies tobacco/drug use. Drinks socially.      Physical Exam:   Gen: middle aged male,  NAD  Neuro: AOx3, EOMI, PERRLA, no gross deficit on exam  HEENT: No oral/mucosal lesions, no neck masses or lymphadenopathy  RESP: CTABL, nonlabored breathing. Frequent Hiccups  CVS: RRR,   GI: abd soft, NT, moderate distension, tympanic to percussion.  no rebound/guarding. Midline and drain incisions c/d/i.   : ram in place, no CVA tenderness  Extremities: 2+ peripheral pulses

## 2019-07-04 NOTE — PROGRESS NOTE ADULT - SUBJECTIVE AND OBJECTIVE BOX
INTERVAL HPI/OVERNIGHT EVENTS/SUBJECTIVE:  Admitted overnight with hypovolemic shock, hypotension, acute renal failure, likely pre renal, ileus, diarrhea.      ICU Vital Signs Last 24 Hrs  T(C): 37.1 (2019 08:00), Max: 37.1 (2019 08:00)  T(F): 98.8 (2019 08:00), Max: 98.8 (2019 08:00)  HR: 81 (2019 10:00) (71 - 81)  BP: 112/56 (2019 10:00) (73/52 - 127/58)  BP(mean): 78 (2019 10:00) (64 - 83)  ABP: --  ABP(mean): --  RR: 19 (2019 10:00) (19 - 34)  SpO2: 97% (2019 10:00) (92% - 97%)      I&O's Detail    2019 07:01  -  2019 07:00  --------------------------------------------------------  IN:    Sodium Chloride 0.9% IV Bolus: 2000 mL  Total IN: 2000 mL    OUT:    Indwelling Catheter - Urethral: 925 mL    Nasoenteral Tube: 300 mL  Total OUT: 1225 mL    Total NET: 775 mL      2019 07:01  -  2019 11:27  --------------------------------------------------------  IN:    dextrose 5%: 230 mL    IV PiggyBack: 100 mL  Total IN: 330 mL    OUT:    Indwelling Catheter - Urethral: 425 mL    Stool: 300 mL  Total OUT: 725 mL    Total NET: -395 mL                MEDICATIONS  (STANDING):  chlorhexidine 4% Liquid 1 Application(s) Topical <User Schedule>  dextrose 5% 1000 milliLiter(s) (115 mL/Hr) IV Continuous <Continuous>  heparin  Injectable 5000 Unit(s) SubCutaneous every 8 hours  metroNIDAZOLE  IVPB      metroNIDAZOLE  IVPB 500 milliGRAM(s) IV Intermittent every 8 hours  pantoprazole  Injectable 40 milliGRAM(s) IV Push daily  vancomycin    Solution 500 milliGRAM(s) Oral every 6 hours    MEDICATIONS  (PRN):  acetaminophen  IVPB .. 1000 milliGRAM(s) IV Intermittent once PRN Temp greater or equal to 38C (100.4F), Mild Pain (1 - 3)  ondansetron Injectable 4 milliGRAM(s) IV Push every 6 hours PRN Nausea and/or Vomiting         PHYSICAL EXAM:    Gen: NAD    Eyes: PERRLA    Neurological: AAOx3, non focal    Neck: Trachea midline    Pulmonary: Tachypnea non labored    Cardiovascular: RRR    Gastrointestinal: Soft    Genitourinary: Caceres          LABS:  CBC Full  -  ( 2019 08:50 )  WBC Count : 16.38 K/uL  RBC Count : 4.18 M/uL  Hemoglobin : 13.1 g/dL  Hematocrit : 39.5 %  Platelet Count - Automated : 379 K/uL  Mean Cell Volume : 94.5 fl  Mean Cell Hemoglobin : 31.3 pg  Mean Cell Hemoglobin Concentration : 33.2 gm/dL  Auto Neutrophil # : 13.70 K/uL  Auto Lymphocyte # : 0.68 K/uL  Auto Monocyte # : 1.71 K/uL  Auto Eosinophil # : 0.11 K/uL  Auto Basophil # : 0.02 K/uL  Auto Neutrophil % : 83.6 %  Auto Lymphocyte % : 4.2 %  Auto Monocyte % : 10.4 %  Auto Eosinophil % : 0.7 %  Auto Basophil % : 0.1 %    07-    135  |  108<H>  |  91.0<H>  ----------------------------<  94  4.9   |  12.0<L>  |  6.33<H>    Ca    8.2<L>      2019 08:50  Phos  6.6     07-04  Mg     2.1     07-04    TPro  6.1<L>  /  Alb  3.0<L>  /  TBili  0.3<L>  /  DBili  x   /  AST  37  /  ALT  77<H>  /  AlkPhos  67  07-04    PT/INR - ( 2019 20:40 )   PT: 11.9 sec;   INR: 1.03 ratio         PTT - ( 2019 20:40 )  PTT:27.5 sec  Urinalysis Basic - ( 2019 08:49 )    Color: Yellow / Appearance: Slightly Turbid / S.020 / pH: x  Gluc: x / Ketone: Negative  / Bili: Negative / Urobili: Negative mg/dL   Blood: x / Protein: 30 mg/dL / Nitrite: Negative   Leuk Esterase: Trace / RBC: 25-50 /HPF / WBC 0-2   Sq Epi: x / Non Sq Epi: Occasional / Bacteria: Few      RECENT CULTURES:      LIVER FUNCTIONS - ( 2019 08:50 )  Alb: 3.0 g/dL / Pro: 6.1 g/dL / ALK PHOS: 67 U/L / ALT: 77 U/L / AST: 37 U/L / GGT: x           CARDIAC MARKERS ( 2019 01:10 )  x     / 0.02 ng/mL / x     / x     / x          CAPILLARY BLOOD GLUCOSE      RADIOLOGY & ADDITIONAL STUDIES:    ASSESSMENT/PLAN:  73yMale presenting with:  Hypotension from hypovolemia with shock, acute renal failure, severe metabolic acidosis from hyperchloremia and acute uremia.      Neuro: Avoid delirium.      CV: Shock resolved.  Hypotension resolved.  Cont to monitor.      Pulm:  Tachypnea likely secondary to compensation for metabolic acidosis.  ABG to determine pH of serum, ensure compensation remains adequate.  High risk of hypercapnic resp failure if needs to continue to maintain elevated minute ventilation.       GI/Nutrition:  ? C diff colitis given high volume diarrhea and leukocytosis.  PO vanc, IV flagyl.  Stool culture.      /Renal:  Acute renal failure. Improving with volume expansion.  Given rapid rise in BUN and creat concern for potential obstruction, or renal vascular pathology.   Ultrasound ordered.  Consult to nephrology.  Metabolic acidosis.  Hyperchloremia and uremia.  Switch to sodium acetate.  Recheck SMP this PM.      Lines/Tubes: Caceres.  maintain fro strict I/Os, renal failure.     Proph: SQH    Dispo: ICU.     45 minutes of critical care time spent providing medical care for patient's acute illness/conditions that impairs at least one vital organ system and/or poses a high risk of imminent or life threatening deterioration in the patient's condition. It includes time spent evaluating and treating the patient's acute illness as well as time spent reviewing labs, radiology, discussing goals of care with patient and/or patient's family, and discussing the case with a multidisciplinary team in an effort to prevent further life threatening deterioration or end organ damage. This time is independent of any procedures performed.

## 2019-07-04 NOTE — CONSULT NOTE ADULT - SUBJECTIVE AND OBJECTIVE BOX
[facility] DIVISION OF KIDNEY DISEASES AND HYPERTENSION -- INITIAL CONSULT NOTE  --------------------------------------------------------------------------------  HPI:  73M with history of abdominal pain, recent laparoscopic right hemicolectomy with primary ileotransverse anastomosis on June 27th; had tubulovillous adenoma. Pt returns to [facility] with poor po intake, bilious emesis with nonbloody diarrhea; found to have a creatinine nearing 11. Started on IVF; now on sodium acetate with improvement of Cr to 6; making good urine; dark/concentrated. Seen/examined at bedside; awake/alert; in good spirits; able to provide history.    Med HPI:  73 year old male presents to ED with "pressure-like" abdominal pain that has persisted since he was discharged from the [facility] recently. Patient has had poor oral intake both with food/liquids. Started experiencing bilious emesis today and has hiccups. Passing flatus and having nonbloody diarrhea. Has only urinated once today. subjective fevers  Recently underwent laparoscopic right hemicolectomy with primary ileotransverse anastomosis on June 27 with pathology findings consistent with tubulovillous adenoma and 19 benign lymph nodes. Post operative course was uncomplicated and patient was discharged post operative day 2      PAST HISTORY  --------------------------------------------------------------------------------  PAST MEDICAL & SURGICAL HISTORY:  Aortic stenosis  Hypertension  S/P laparoscopic colectomy: right colectomy with ileotransverse anastomosis  H/O inguinal hernia repair: B/L 2013    FAMILY HISTORY:  No pertinent family history in first degree relatives    PAST SOCIAL HISTORY:    ALLERGIES & MEDICATIONS  --------------------------------------------------------------------------------  Allergies    No Known Allergies    Intolerances      Standing Inpatient Medications  chlorhexidine 4% Liquid 1 Application(s) Topical <User Schedule>  dextrose 5% 1000 milliLiter(s) IV Continuous <Continuous>  heparin  Injectable 5000 Unit(s) SubCutaneous every 8 hours  metroNIDAZOLE  IVPB      metroNIDAZOLE  IVPB 500 milliGRAM(s) IV Intermittent every 8 hours  pantoprazole  Injectable 40 milliGRAM(s) IV Push daily  vancomycin    Solution 500 milliGRAM(s) Oral every 6 hours    PRN Inpatient Medications  acetaminophen  IVPB .. 1000 milliGRAM(s) IV Intermittent once PRN  ondansetron Injectable 4 milliGRAM(s) IV Push every 6 hours PRN      REVIEW OF SYSTEMS  --------------------------------------------------------------------------------  Gen: No weight changes, fatigue, fevers/chills, weakness  Skin: No rashes  Head/Eyes/Ears/Mouth: No headache; Normal hearing; Normal vision w/o blurriness; No sinus pain/discomfort, sore throat  Respiratory: No dyspnea, cough, wheezing, hemoptysis  CV: No chest pain, PND, orthopnea  GI: abd pain; diarrhea  : No increased frequency, dysuria, hematuria, nocturia  MSK: No joint pain/swelling; no back pain; no edema  Neuro: No dizziness/lightheadedness, weakness, seizures, numbness, tingling  Heme: No easy bruising or bleeding  Endo: No heat/cold intolerance  Psych: No significant nervousness, anxiety, stress, depression    All other systems were reviewed and are negative, except as noted.    VITALS/PHYSICAL EXAM  --------------------------------------------------------------------------------  T(C): 37.1 (07-04-19 @ 12:00), Max: 37.1 (07-04-19 @ 08:00)  HR: 81 (07-04-19 @ 10:00) (71 - 81)  BP: 112/56 (07-04-19 @ 10:00) (73/52 - 127/58)  RR: 19 (07-04-19 @ 10:00) (19 - 34)  SpO2: 97% (07-04-19 @ 10:00) (92% - 97%)  Wt(kg): --  Height (cm): 180.34 (07-03-19 @ 20:16)  Weight (kg): 77.1 (07-03-19 @ 20:16)  BMI (kg/m2): 23.7 (07-03-19 @ 20:16)  BSA (m2): 1.97 (07-03-19 @ 20:16)      07-03-19 @ 07:01  -  07-04-19 @ 07:00  --------------------------------------------------------  IN: 2000 mL / OUT: 1225 mL / NET: 775 mL    07-04-19 @ 07:01  -  07-04-19 @ 12:48  --------------------------------------------------------  IN: 330 mL / OUT: 725 mL / NET: -395 mL      Physical Exam:  · Constitutional	Well-developed, well nourished	  · Respiratory	Breath Sounds equal & clear to percussion & auscultation, no accessory muscle use	  · Cardiovascular	Regular rate & rhythm, normal S1, S2; no murmurs, gallops or rubs; no S3, S4	  · Gastrointestinal	detailed exam	  · GI Normal	soft; no distention; no rebound tenderness; no guarding; no rigidity	  · GI Abnormal	tender	  · Tenderness	RLQ	  · Rectal	No mass or lesion	  · Vascular	Equal and normal pulses (carotid, femoral, dorsalis pedis)	  · Neurological	Alert & oriented; no sensory, motor or coordination deficits, normal reflexes	  · Skin	detailed exam	  · Skin Comments	mini midline skin incision with staples intact. no erythema/drainage or fluctuance. LLQ trochar site with 2 staples in tact	    LABS/STUDIES  --------------------------------------------------------------------------------              13.1   16.38 >-----------<  379      [07-04-19 @ 08:50]              39.5     135  |  108  |  91.0  ----------------------------<  94      [07-04-19 @ 08:50]  4.9   |  12.0  |  6.33        Ca     8.2     [07-04-19 @ 08:50]      Mg     2.1     [07-04-19 @ 08:50]      Phos  6.6     [07-04-19 @ 08:50]    TPro  6.1  /  Alb  3.0  /  TBili  0.3  /  DBili  x   /  AST  37  /  ALT  77  /  AlkPhos  67  [07-04-19 @ 08:50]    PT/INR: PT 11.9 , INR 1.03       [07-03-19 @ 20:40]  PTT: 27.5       [07-03-19 @ 20:40]    Troponin 0.02      [07-04-19 @ 01:10]    Creatinine Trend:  SCr 6.33 [07-04 @ 08:50]  SCr 8.67 [07-04 @ 01:10]  SCr 10.46 [07-03 @ 20:40]  SCr 0.60 [06-29 @ 06:55]  SCr 0.83 [06-28 @ 06:00]    Urinalysis - [07-04-19 @ 08:49]      Color Yellow / Appearance Slightly Turbid / SG 1.020 / pH 5.0      Gluc Negative / Ketone Negative  / Bili Negative / Urobili Negative       Blood Large / Protein 30 / Leuk Est Trace / Nitrite Negative      RBC 25-50 / WBC 0-2 / Hyaline  / Gran  / Sq Epi  / Non Sq Epi Occasional / Bacteria Few    Urine Creatinine 491      [07-04-19 @ 00:10]  Urine Sodium <30      [07-04-19 @ 00:10]  Urine Potassium 40      [07-04-19 @ 00:10]    HbA1c 5.3      [06-17-19 @ 08:30]

## 2019-07-04 NOTE — PROGRESS NOTE ADULT - SUBJECTIVE AND OBJECTIVE BOX
HPI/OVERNIGHT EVENTS:  Pt was admitted to the ICU for sepsis. NGT placed for nausea and spitting up. Pt complaining of a distended abdomen, however, is continuing to pass flatus. This AM, NGT <50cc of gastric content. Denied nausea/vomiting. Complaining of the room being too cold.    MEDICATIONS  (STANDING):  chlorhexidine 4% Liquid 1 Application(s) Topical <User Schedule>  dextrose 5% 1000 milliLiter(s) (115 mL/Hr) IV Continuous <Continuous>  heparin  Injectable 5000 Unit(s) SubCutaneous every 8 hours  metroNIDAZOLE  IVPB      metroNIDAZOLE  IVPB 500 milliGRAM(s) IV Intermittent every 8 hours  pantoprazole  Injectable 40 milliGRAM(s) IV Push daily  vancomycin    Solution 500 milliGRAM(s) Oral every 6 hours    MEDICATIONS  (PRN):  acetaminophen  IVPB .. 1000 milliGRAM(s) IV Intermittent once PRN Temp greater or equal to 38C (100.4F), Mild Pain (1 - 3)  ondansetron Injectable 4 milliGRAM(s) IV Push every 6 hours PRN Nausea and/or Vomiting      Vital Signs Last 24 Hrs  T(C): 37.1 (2019 08:00), Max: 37.1 (2019 08:00)  T(F): 98.8 (2019 08:00), Max: 98.8 (2019 08:00)  HR: 81 (2019 10:00) (71 - 81)  BP: 112/56 (2019 10:00) (73/52 - 127/58)  BP(mean): 78 (2019 10:00) (64 - 83)  RR: 19 (2019 10:00) (19 - 34)  SpO2: 97% (2019 10:00) (92% - 97%)    gen: nad, a&ox3  cv: rrr  resp: nonlabored breathing  gi: soft, mildly distended, nttp. staples are c/d/i without signs of infection  gu: yo in place with good UOP      I&O's Detail    2019 07:01  -  2019 07:00  --------------------------------------------------------  IN:    Sodium Chloride 0.9% IV Bolus: 2000 mL  Total IN: 2000 mL    OUT:    Indwelling Catheter - Urethral: 925 mL    Nasoenteral Tube: 300 mL  Total OUT: 1225 mL    Total NET: 775 mL      2019 07:  -  2019 11:18  --------------------------------------------------------  IN:    dextrose 5%: 230 mL    IV PiggyBack: 100 mL  Total IN: 330 mL    OUT:    Indwelling Catheter - Urethral: 425 mL    Stool: 300 mL  Total OUT: 725 mL    Total NET: -395 mL          LABS:                        13.1   16.38 )-----------( 379      ( 2019 08:50 )             39.5     07-04    135  |  108<H>  |  91.0<H>  ----------------------------<  94  4.9   |  12.0<L>  |  6.33<H>    Ca    8.2<L>      2019 08:50  Phos  6.6     07-04  Mg     2.1     07-04    TPro  6.1<L>  /  Alb  3.0<L>  /  TBili  0.3<L>  /  DBili  x   /  AST  37  /  ALT  77<H>  /  AlkPhos  67  07-04    PT/INR - ( 2019 20:40 )   PT: 11.9 sec;   INR: 1.03 ratio         PTT - ( 2019 20:40 )  PTT:27.5 sec  Urinalysis Basic - ( 2019 08:49 )    Color: Yellow / Appearance: Slightly Turbid / S.020 / pH: x  Gluc: x / Ketone: Negative  / Bili: Negative / Urobili: Negative mg/dL   Blood: x / Protein: 30 mg/dL / Nitrite: Negative   Leuk Esterase: Trace / RBC: 25-50 /HPF / WBC 0-2   Sq Epi: x / Non Sq Epi: Occasional / Bacteria: Few

## 2019-07-04 NOTE — PATIENT PROFILE ADULT - STATED REASON FOR ADMISSION
Bowel surgery less then a week ago and on Monday started having pain in esophagus and coughing up bile but not nauseous.

## 2019-07-04 NOTE — CONSULT NOTE ADULT - ASSESSMENT
74yo male w/ hx of lap right hemicolectomy on 6/27 readmitted on 7/3 w/ abd distension and severe RADHA. CT scan nonspecific, dilated bowel w/o transition point. Possible sepsis, no external source of infx noted and CT scan w/o clear evidence of leak. Patient in critical condition and in need of ongoing SICU level of care.       Neuro:   - pain control    Pulm: Metabolic acidosis  - encourage OOB/IS  - monitor for increased wob given acidosis    CVS: hypotension 2/2 septic vs hypovolemic shock, hx of Aortic stenosis,   - hypotension resolving w/ fluid resuscitation  - continue cardiac monitoring    GI: s/p right hemicolectomy on 6/27, Diarrhea, distension w/ poor po intake 2/2 likely ileus.   - continue to monitor serial abdominal exams.   - 72yo male w/ hx of lap right hemicolectomy on 6/27 readmitted on 7/3 w/ abd distension and severe RADHA. CT scan nonspecific, dilated bowel w/o transition point. Possible sepsis, no external source of infx noted and CT scan w/o clear evidence of leak. Patient in critical condition and in need of ongoing SICU level of care.       Neuro:   - pain control    Pulm: Metabolic acidosis  - encourage OOB/IS  - monitor for increased wob given acidosis    CVS: hypotension 2/2 septic vs hypovolemic shock, hx of Aortic stenosis,   - hypotension resolving w/ fluid resuscitation  - continue cardiac monitoring  - f/u records for recent preop cardiac eval and outpatient echo    GI: s/p right hemicolectomy on 6/27 for tubular adenoma x 2, Diarrhea, distension w/ poor po intake 2/2 likely ileus.   - continue to monitor serial abdominal exams.   - NPO  - If vomits or distension worsens then pt gets NGT  - Given diarrhea and leukocytosis in pt w/ recent hospitalization and abx, Cdiff ordred as he is high risk.   - plan to start vanco PO empirically for possible CDiff     : Severe RADHA likely 2/2 hypovolemia from poor po intake and diarrhea.   - ram for critical I/O's  - monitor Uop and trend Cr  - f/u urine lytes  - aggressive volume resuscitation    Heme:  - Dvt ppx w/ SQH + scd's    ID: Leukocytosis, possible septic shock.   - f/u 7/3 blood cultures  - Cont Zosyn for empiric coverage of possible leak  - PO vanc started empircially for tx of possible Cdiff    Skin/Wound  - midline incision closed w/ staples, no erythema/drainage noted. continue to monitor    Dispo:   - Pt remains critically ill and in need of SICU level of care. 74yo male w/ hx of lap right hemicolectomy on 6/27 readmitted on 7/3 w/ abd distension and severe RADHA. CT scan nonspecific, dilated bowel w/o transition point. Possible sepsis, no external source of infx noted and CT scan w/o clear evidence of leak. Patient in critical condition and in need of ongoing SICU level of care.       Neuro:   - pain control    Pulm: Metabolic acidosis  - encourage OOB/IS  - monitor for increased wob given acidosis    CVS: hypotension 2/2 septic vs hypovolemic shock, hx of Aortic stenosis,   - hypotension resolving w/ fluid resuscitation  - continue cardiac monitoring  - f/u records for recent preop cardiac eval and outpatient echo    GI: s/p right hemicolectomy on 6/27 for tubular adenoma x 2, Diarrhea, distension w/ poor po intake 2/2 likely ileus.   - continue to monitor serial abdominal exams.   - NPO  - If vomits or distension worsens then pt gets NGT  - Given diarrhea and leukocytosis in pt w/ recent hospitalization and abx, Cdiff ordred as he is high risk.   - plan to start vanco PO empirically for possible CDiff     : Severe RADHA likely 2/2 hypovolemia from poor po intake and diarrhea.   - ram for critical I/O's  - monitor Uop and trend Cr  - f/u urine lytes  - aggressive volume resuscitation    Heme:  - Dvt ppx w/ SQH + scd's    ID: Leukocytosis, possible septic shock.   - f/u 7/3 blood cultures  - Cont Zosyn for empiric coverage of possible  leak  - PO vanc started empirically for tx of possible Cdiff    Skin/Wound  - midline incision closed w/ staples, no erythema/drainage noted. continue to monitor    Dispo:   - Pt remains critically ill and in need of SICU level of care.

## 2019-07-04 NOTE — CONSULT NOTE ADULT - ATTENDING COMMENTS
Patient seen and examined on arrival to SICU.  Non toxic, POD 6 right colectomy with poor PO intake, diarrhea and abd bloating.  on exam no toxic, abdomen soft, incisional tenderness, nor rebound, no guarding.  on land leukocytosis and RADHA.  CT reviewed, post op changes, no collection no pneumoperitoneum.  A/P RADHA, leukocytosis the initial concern was leak, however, CT and exam speaks otherwise.  Aggressive fluid resuscitation, ram for UO. broad spectrum abx  concern or C diff: empirically start metronidazole IV  NGT placed.  DVt chemoprophylaxis  renal consult, Patient seen and examined on arrival to SICU.  Non toxic, POD 6 right colectomy with poor PO intake, diarrhea and abd bloating.  on exam no toxic, abdomen soft, incisional tenderness, nor rebound, no guarding.  on land leukocytosis and RADHA.  CT reviewed, post op changes, no collection no pneumoperitoneum.  A/P RADHA, leukocytosis the initial concern was leak, however, CT and exam speaks otherwise.  lacate is normal  Aggressive fluid resuscitation, ram for UO. broad spectrum abx  concern or C diff: empirically start metronidazole IV  NGT placed.  DVt chemoprophylaxis  renal consult,

## 2019-07-05 LAB
ANION GAP SERPL CALC-SCNC: 13 MMOL/L — SIGNIFICANT CHANGE UP (ref 5–17)
BASOPHILS # BLD AUTO: 0.04 K/UL — SIGNIFICANT CHANGE UP (ref 0–0.2)
BASOPHILS NFR BLD AUTO: 0.4 % — SIGNIFICANT CHANGE UP (ref 0–2)
BUN SERPL-MCNC: 68 MG/DL — HIGH (ref 8–20)
CALCIUM SERPL-MCNC: 8.3 MG/DL — LOW (ref 8.6–10.2)
CHLORIDE SERPL-SCNC: 109 MMOL/L — HIGH (ref 98–107)
CO2 SERPL-SCNC: 22 MMOL/L — SIGNIFICANT CHANGE UP (ref 22–29)
CREAT SERPL-MCNC: 1.44 MG/DL — HIGH (ref 0.5–1.3)
CULTURE RESULTS: NO GROWTH — SIGNIFICANT CHANGE UP
EOSINOPHIL # BLD AUTO: 0.38 K/UL — SIGNIFICANT CHANGE UP (ref 0–0.5)
EOSINOPHIL NFR BLD AUTO: 4 % — SIGNIFICANT CHANGE UP (ref 0–6)
GLUCOSE SERPL-MCNC: 120 MG/DL — HIGH (ref 70–115)
HCT VFR BLD CALC: 36.4 % — LOW (ref 39–50)
HGB BLD-MCNC: 12.6 G/DL — LOW (ref 13–17)
IMM GRANULOCYTES NFR BLD AUTO: 0.3 % — SIGNIFICANT CHANGE UP (ref 0–1.5)
LYMPHOCYTES # BLD AUTO: 0.88 K/UL — LOW (ref 1–3.3)
LYMPHOCYTES # BLD AUTO: 9.2 % — LOW (ref 13–44)
MAGNESIUM SERPL-MCNC: 2.1 MG/DL — SIGNIFICANT CHANGE UP (ref 1.6–2.6)
MCHC RBC-ENTMCNC: 31.8 PG — SIGNIFICANT CHANGE UP (ref 27–34)
MCHC RBC-ENTMCNC: 34.6 GM/DL — SIGNIFICANT CHANGE UP (ref 32–36)
MCV RBC AUTO: 91.9 FL — SIGNIFICANT CHANGE UP (ref 80–100)
MONOCYTES # BLD AUTO: 1.25 K/UL — HIGH (ref 0–0.9)
MONOCYTES NFR BLD AUTO: 13 % — SIGNIFICANT CHANGE UP (ref 2–14)
NEUTROPHILS # BLD AUTO: 7 K/UL — SIGNIFICANT CHANGE UP (ref 1.8–7.4)
NEUTROPHILS NFR BLD AUTO: 73.1 % — SIGNIFICANT CHANGE UP (ref 43–77)
PHOSPHATE SERPL-MCNC: 3.1 MG/DL — SIGNIFICANT CHANGE UP (ref 2.4–4.7)
PLATELET # BLD AUTO: 369 K/UL — SIGNIFICANT CHANGE UP (ref 150–400)
POTASSIUM SERPL-MCNC: 3.9 MMOL/L — SIGNIFICANT CHANGE UP (ref 3.5–5.3)
POTASSIUM SERPL-SCNC: 3.9 MMOL/L — SIGNIFICANT CHANGE UP (ref 3.5–5.3)
RBC # BLD: 3.96 M/UL — LOW (ref 4.2–5.8)
RBC # FLD: 12 % — SIGNIFICANT CHANGE UP (ref 10.3–14.5)
SODIUM SERPL-SCNC: 144 MMOL/L — SIGNIFICANT CHANGE UP (ref 135–145)
SPECIMEN SOURCE: SIGNIFICANT CHANGE UP
WBC # BLD: 9.58 K/UL — SIGNIFICANT CHANGE UP (ref 3.8–10.5)
WBC # FLD AUTO: 9.58 K/UL — SIGNIFICANT CHANGE UP (ref 3.8–10.5)

## 2019-07-05 PROCEDURE — 99231 SBSQ HOSP IP/OBS SF/LOW 25: CPT | Mod: 24

## 2019-07-05 PROCEDURE — 99232 SBSQ HOSP IP/OBS MODERATE 35: CPT | Mod: 24

## 2019-07-05 PROCEDURE — 99233 SBSQ HOSP IP/OBS HIGH 50: CPT

## 2019-07-05 PROCEDURE — 99232 SBSQ HOSP IP/OBS MODERATE 35: CPT

## 2019-07-05 RX ORDER — SODIUM CHLORIDE 9 MG/ML
1000 INJECTION, SOLUTION INTRAVENOUS
Refills: 0 | Status: DISCONTINUED | OUTPATIENT
Start: 2019-07-05 | End: 2019-07-06

## 2019-07-05 RX ORDER — ACETAMINOPHEN 500 MG
650 TABLET ORAL EVERY 6 HOURS
Refills: 0 | Status: DISCONTINUED | OUTPATIENT
Start: 2019-07-05 | End: 2019-07-07

## 2019-07-05 RX ORDER — BENZOCAINE AND MENTHOL 5; 1 G/100ML; G/100ML
1 LIQUID ORAL ONCE
Refills: 0 | Status: DISCONTINUED | OUTPATIENT
Start: 2019-07-05 | End: 2019-07-07

## 2019-07-05 RX ORDER — BENZOCAINE AND MENTHOL 5; 1 G/100ML; G/100ML
1 LIQUID ORAL ONCE
Refills: 0 | Status: COMPLETED | OUTPATIENT
Start: 2019-07-05 | End: 2019-07-05

## 2019-07-05 RX ADMIN — HEPARIN SODIUM 5000 UNIT(S): 5000 INJECTION INTRAVENOUS; SUBCUTANEOUS at 13:04

## 2019-07-05 RX ADMIN — BENZOCAINE AND MENTHOL 1 LOZENGE: 5; 1 LIQUID ORAL at 11:00

## 2019-07-05 RX ADMIN — HEPARIN SODIUM 5000 UNIT(S): 5000 INJECTION INTRAVENOUS; SUBCUTANEOUS at 21:39

## 2019-07-05 RX ADMIN — CHLORHEXIDINE GLUCONATE 1 APPLICATION(S): 213 SOLUTION TOPICAL at 05:37

## 2019-07-05 RX ADMIN — SODIUM CHLORIDE 100 MILLILITER(S): 9 INJECTION, SOLUTION INTRAVENOUS at 06:26

## 2019-07-05 RX ADMIN — HEPARIN SODIUM 5000 UNIT(S): 5000 INJECTION INTRAVENOUS; SUBCUTANEOUS at 05:37

## 2019-07-05 RX ADMIN — SODIUM CHLORIDE 115 MILLILITER(S): 9 INJECTION, SOLUTION INTRAVENOUS at 05:38

## 2019-07-05 RX ADMIN — PANTOPRAZOLE SODIUM 40 MILLIGRAM(S): 20 TABLET, DELAYED RELEASE ORAL at 13:04

## 2019-07-05 NOTE — PROGRESS NOTE ADULT - ASSESSMENT
73yMale presenting with:  Hypotension from hypovolemia with shock, acute renal failure, severe metabolic acidosis from hyperchloremia and acute uremia.      Neuro: Avoid delirium.      CV: Shock resolved.  Hypotension resolved.  Cont to monitor.      Pulm:  Continue pulmonary toilet.      GI/Nutrition:  Tolerating clears and states had BM.  Continue serial abdominal exams.  Diet plan as per primary team    /Renal:  Acute renal failure. Improving with volume expansion.  Creatinine now 1.4.  Making adequate urine.  Acidosis resolved with NaAcetate.  Switched to plasmalyte.      Lines/Tubes: Barrios.  Maintain for strict I/Os, renal failure.      Proph: SQH    Dispo: ICU.

## 2019-07-05 NOTE — PROGRESS NOTE ADULT - SUBJECTIVE AND OBJECTIVE BOX
HPI/OVERNIGHT EVENTS:  No acute events overnight. Pt's NGT was d/c'ed. Patient continues to have a ram to monitor I/Os. Patient is not feeling nauseous. He is +flatus and + BM. States he feels better.     MEDICATIONS  (STANDING):  chlorhexidine 4% Liquid 1 Application(s) Topical <User Schedule>  dextrose 5% 1000 milliLiter(s) (115 mL/Hr) IV Continuous <Continuous>  heparin  Injectable 5000 Unit(s) SubCutaneous every 8 hours  pantoprazole  Injectable 40 milliGRAM(s) IV Push daily    MEDICATIONS  (PRN):  acetaminophen  IVPB .. 1000 milliGRAM(s) IV Intermittent once PRN Temp greater or equal to 38C (100.4F), Mild Pain (1 - 3)  ondansetron Injectable 4 milliGRAM(s) IV Push every 6 hours PRN Nausea and/or Vomiting      Vital Signs Last 24 Hrs  T(C): 36.7 (2019 00:04), Max: 37.1 (2019 08:00)  T(F): 98.1 (2019 00:04), Max: 98.8 (2019 08:00)  HR: 67 (2019 01:00) (67 - 81)  BP: 127/62 (2019 01:00) (102/57 - 127/62)  BP(mean): 89 (2019 01:00) (76 - 89)  RR: 18 (2019 01:00) (16 - 37)  SpO2: 95% (2019 01:00) (94% - 98%)    Gen: nad, a&ox3  cv: rrr  resp: nonlabored breathing  gi: soft, nd, nttp. incisions are c/d/i without evidence of infection  msk: no c/c/e      I&O's Detail    2019 07:01  -  2019 07:00  --------------------------------------------------------  IN:    Sodium Chloride 0.9% IV Bolus: 2000 mL  Total IN: 2000 mL    OUT:    Indwelling Catheter - Urethral: 925 mL    Nasoenteral Tube: 300 mL  Total OUT: 1225 mL    Total NET: 775 mL      2019 07:01  -  2019 01:37  --------------------------------------------------------  IN:    dextrose 5%: 1495 mL    IV PiggyBack: 100 mL  Total IN: 1595 mL    OUT:    Indwelling Catheter - Urethral: 2210 mL    Stool: 300 mL  Total OUT: 2510 mL    Total NET: -915 mL          LABS:                        13.1   16.38 )-----------( 379      ( 2019 08:50 )             39.5     07-04    139  |  109<H>  |  79.0<H>  ----------------------------<  105  3.9   |  17.0<L>  |  2.27<H>    Ca    8.2<L>      2019 21:39  Phos  6.6     07-  Mg     2.1     07-04    TPro  6.1<L>  /  Alb  3.0<L>  /  TBili  0.3<L>  /  DBili  x   /  AST  37  /  ALT  77<H>  /  AlkPhos  67  07-04    PT/INR - ( 2019 20:40 )   PT: 11.9 sec;   INR: 1.03 ratio         PTT - ( 2019 20:40 )  PTT:27.5 sec  Urinalysis Basic - ( 2019 08:49 )    Color: Yellow / Appearance: Slightly Turbid / S.020 / pH: x  Gluc: x / Ketone: Negative  / Bili: Negative / Urobili: Negative mg/dL   Blood: x / Protein: 30 mg/dL / Nitrite: Negative   Leuk Esterase: Trace / RBC: 25-50 /HPF / WBC 0-2   Sq Epi: x / Non Sq Epi: Occasional / Bacteria: Few HPI/OVERNIGHT EVENTS:  No acute events overnight. Pt's NGT was d/c'ed. Patient continues to have a ram to monitor I/Os. Patient is not feeling nauseous. Tolerating liquid diet. He is +flatus and + BM. States he feels better.    MEDICATIONS  (STANDING):  chlorhexidine 4% Liquid 1 Application(s) Topical <User Schedule>  dextrose 5% 1000 milliLiter(s) (115 mL/Hr) IV Continuous <Continuous>  heparin  Injectable 5000 Unit(s) SubCutaneous every 8 hours  pantoprazole  Injectable 40 milliGRAM(s) IV Push daily    MEDICATIONS  (PRN):  acetaminophen  IVPB .. 1000 milliGRAM(s) IV Intermittent once PRN Temp greater or equal to 38C (100.4F), Mild Pain (1 - 3)  ondansetron Injectable 4 milliGRAM(s) IV Push every 6 hours PRN Nausea and/or Vomiting      Vital Signs Last 24 Hrs  T(C): 36.7 (2019 00:04), Max: 37.1 (2019 08:00)  T(F): 98.1 (2019 00:04), Max: 98.8 (2019 08:00)  HR: 67 (2019 01:00) (67 - 81)  BP: 127/62 (2019 01:00) (102/57 - 127/62)  BP(mean): 89 (2019 01:00) (76 - 89)  RR: 18 (2019 01:00) (16 - 37)  SpO2: 95% (2019 01:00) (94% - 98%)    Gen: nad, a&ox3  cv: rrr  resp: nonlabored breathing  gi: soft, nd, nttp. incisions are c/d/i without evidence of infection  msk: no c/c/e      I&O's Detail    2019 07:01  -  2019 07:00  --------------------------------------------------------  IN:    Sodium Chloride 0.9% IV Bolus: 2000 mL  Total IN: 2000 mL    OUT:    Indwelling Catheter - Urethral: 925 mL    Nasoenteral Tube: 300 mL  Total OUT: 1225 mL    Total NET: 775 mL      2019 07:01  -  2019 01:37  --------------------------------------------------------  IN:    dextrose 5%: 1495 mL    IV PiggyBack: 100 mL  Total IN: 1595 mL    OUT:    Indwelling Catheter - Urethral: 2210 mL    Stool: 300 mL  Total OUT: 2510 mL    Total NET: -915 mL          LABS:                        13.1   16.38 )-----------( 379      ( 2019 08:50 )             39.5     07-04    139  |  109<H>  |  79.0<H>  ----------------------------<  105  3.9   |  17.0<L>  |  2.27<H>    Ca    8.2<L>      2019 21:39  Phos  6.6     07-04  Mg     2.1     07-04    TPro  6.1<L>  /  Alb  3.0<L>  /  TBili  0.3<L>  /  DBili  x   /  AST  37  /  ALT  77<H>  /  AlkPhos  67  07-04    PT/INR - ( 2019 20:40 )   PT: 11.9 sec;   INR: 1.03 ratio         PTT - ( 2019 20:40 )  PTT:27.5 sec  Urinalysis Basic - ( 2019 08:49 )    Color: Yellow / Appearance: Slightly Turbid / S.020 / pH: x  Gluc: x / Ketone: Negative  / Bili: Negative / Urobili: Negative mg/dL   Blood: x / Protein: 30 mg/dL / Nitrite: Negative   Leuk Esterase: Trace / RBC: 25-50 /HPF / WBC 0-2   Sq Epi: x / Non Sq Epi: Occasional / Bacteria: Few

## 2019-07-05 NOTE — DIETITIAN INITIAL EVALUATION ADULT. - OTHER INFO
73yoM history of tubulovillous adenoma s/p laparoscopic right hemicolectomy with ileotransverse anastomosis on 6/27 who presented with sepsis, poor PO intake, and RADHA. Pt reports little to no PO intake post surgery one week ago, wt loss of 30# over pst few weeks secondary to poor PO and inability to tolerate solid diet. Pt reports tolerating clear liquids, however now with +diarrhea

## 2019-07-05 NOTE — PROGRESS NOTE ADULT - SUBJECTIVE AND OBJECTIVE BOX
INTERVAL HPI/OVERNIGHT EVENTS:    Pt improving clinically.  Tolerated clears overnight with nausea.  Pt states had BM.  Metabolic acidosis resolved with sodium acetate.  Switched to plasmalyte.  Making adequate urine.  Creatinine down to 1.44    MEDICATIONS  (STANDING):  chlorhexidine 4% Liquid 1 Application(s) Topical <User Schedule>  heparin  Injectable 5000 Unit(s) SubCutaneous every 8 hours  multiple electrolytes Injection Type 1 1000 milliLiter(s) (100 mL/Hr) IV Continuous <Continuous>  pantoprazole  Injectable 40 milliGRAM(s) IV Push daily    MEDICATIONS  (PRN):  acetaminophen  IVPB .. 1000 milliGRAM(s) IV Intermittent once PRN Temp greater or equal to 38C (100.4F), Mild Pain (1 - 3)  ondansetron Injectable 4 milliGRAM(s) IV Push every 6 hours PRN Nausea and/or Vomiting      Drug Dosing Weight  Height (cm): 180.34 (2019 20:16)  Weight (kg): 77.1 (2019 20:16)  BMI (kg/m2): 23.7 (2019 20:16)  BSA (m2): 1.97 (2019 20:16)        PAST MEDICAL & SURGICAL HISTORY:  Aortic stenosis  Hypertension  S/P laparoscopic colectomy: right colectomy with ileotransverse anastomosis  H/O inguinal hernia repair: B/L       ICU Vital Signs Last 24 Hrs  T(C): 36.7 (2019 00:04), Max: 37.1 (2019 08:00)  T(F): 98.1 (2019 00:04), Max: 98.8 (2019 08:00)  HR: 66 (2019 05:00) (59 - 81)  BP: 102/56 (2019 05:00) (102/56 - 134/65)  BP(mean): 73 (2019 05:00) (73 - 94)  ABP: --  ABP(mean): --  RR: 15 (2019 05:00) (15 - 37)  SpO2: 98% (2019 04:00) (94% - 98%)      ABG - ( 2019 11:58 )  pH, Arterial: 7.26  pH, Blood: x     /  pCO2: 29    /  pO2: 84    / HCO3: 15    / Base Excess: -12.6 /  SaO2: 96                  I&O's Detail    2019 07:01  -  2019 07:00  --------------------------------------------------------  IN:    Sodium Chloride 0.9% IV Bolus: 2000 mL  Total IN: 2000 mL    OUT:    Indwelling Catheter - Urethral: 925 mL    Nasoenteral Tube: 300 mL  Total OUT: 1225 mL    Total NET: 775 mL      2019 07:  -  2019 06:21  --------------------------------------------------------  IN:    dextrose 5%: 2070 mL    IV PiggyBack: 100 mL  Total IN: 2170 mL    OUT:    Indwelling Catheter - Urethral: 2745 mL    Stool: 300 mL  Total OUT: 3045 mL    Total NET: -875 mL          PHYSICAL EXAM:    Gen: NAD    Eyes: PERRLA    Neurological: AAOx3, non focal    Neck: Trachea midline    Pulmonary: Tachypnea non labored    Cardiovascular: RRR    Gastrointestinal: Soft, non-distended.  No rigidity, no rebound, no guarding.      Genitourinary: Barrios        LABS:  CBC Full  -  ( 2019 04:12 )  WBC Count : 9.58 K/uL  RBC Count : 3.96 M/uL  Hemoglobin : 12.6 g/dL  Hematocrit : 36.4 %  Platelet Count - Automated : 369 K/uL  Mean Cell Volume : 91.9 fl  Mean Cell Hemoglobin : 31.8 pg  Mean Cell Hemoglobin Concentration : 34.6 gm/dL  Auto Neutrophil # : 7.00 K/uL  Auto Lymphocyte # : 0.88 K/uL  Auto Monocyte # : 1.25 K/uL  Auto Eosinophil # : 0.38 K/uL  Auto Basophil # : 0.04 K/uL  Auto Neutrophil % : 73.1 %  Auto Lymphocyte % : 9.2 %  Auto Monocyte % : 13.0 %  Auto Eosinophil % : 4.0 %  Auto Basophil % : 0.4 %        144  |  109<H>  |  68.0<H>  ----------------------------<  120<H>  3.9   |  22.0  |  1.44<H>    Ca    8.3<L>      2019 04:12  Phos  3.1     07-  Mg     2.1     07-    TPro  6.1<L>  /  Alb  3.0<L>  /  TBili  0.3<L>  /  DBili  x   /  AST  37  /  ALT  77<H>  /  AlkPhos  67  07-04    PT/INR - ( 2019 20:40 )   PT: 11.9 sec;   INR: 1.03 ratio         PTT - ( 2019 20:40 )  PTT:27.5 sec  Urinalysis Basic - ( 2019 08:49 )    Color: Yellow / Appearance: Slightly Turbid / S.020 / pH: x  Gluc: x / Ketone: Negative  / Bili: Negative / Urobili: Negative mg/dL   Blood: x / Protein: 30 mg/dL / Nitrite: Negative   Leuk Esterase: Trace / RBC: 25-50 /HPF / WBC 0-2   Sq Epi: x / Non Sq Epi: Occasional / Bacteria: Few

## 2019-07-05 NOTE — DIETITIAN INITIAL EVALUATION ADULT. - ETIOLOGY
Related to inadequate protein-energy intake with altered GI function in setting of tubulovillous adenoma s/p laparoscopic right hemicolectomy with ileotransverse anastomosis

## 2019-07-05 NOTE — PROGRESS NOTE ADULT - ASSESSMENT
73yoM history of tubulovillous adenoma s/p laparoscopic right hemicolectomy with ileotransverse anastomosis on 6/27 who presented with sepsis, poor PO intake, and RADHA. Cr downtrending.   - Per renal: prerenal azotemia  - continue IVF hydration  - continue ram  - ADAT  - continue DEB 73yoM history of tubulovillous adenoma s/p laparoscopic right hemicolectomy with ileotransverse anastomosis on 6/27 who presented with sepsis, poor PO intake, and RADHA. Cr downtrending.   - Per renal: prerenal azotemia  -downgrade from SICU  -advance diet to full liquid diet  - continue IVF hydration  - continue ram  - ADAT  - continue DEB

## 2019-07-05 NOTE — DIETITIAN INITIAL EVALUATION ADULT. - PERTINENT LABORATORY DATA
07-05 Na144 mmol/L Glu 120 mg/dL<H> K+ 3.9 mmol/L Cr  1.44 mg/dL<H> BUN 68.0 mg/dL<H> Phos 3.1 mg/dL Alb n/a   PAB n/a     n/a  HgbA1C

## 2019-07-05 NOTE — PROGRESS NOTE ADULT - SUBJECTIVE AND OBJECTIVE BOX
[facility] DIVISION OF KIDNEY DISEASES AND HYPERTENSION -- FOLLOW UP NOTE  --------------------------------------------------------------------------------  Chief Complaint: RADHA    24 hour events/subjective:  Seen/examined  Downtrending SCr with IVF; nearing normal      PAST HISTORY  --------------------------------------------------------------------------------  No significant changes to PMH, PSH, FHx, SHx, unless otherwise noted    ALLERGIES & MEDICATIONS  --------------------------------------------------------------------------------  Allergies    No Known Allergies    Intolerances      Standing Inpatient Medications  chlorhexidine 4% Liquid 1 Application(s) Topical <User Schedule>  heparin  Injectable 5000 Unit(s) SubCutaneous every 8 hours  multiple electrolytes Injection Type 1 1000 milliLiter(s) IV Continuous <Continuous>  pantoprazole  Injectable 40 milliGRAM(s) IV Push daily    PRN Inpatient Medications  acetaminophen   Tablet .. 650 milliGRAM(s) Oral every 6 hours PRN  ondansetron Injectable 4 milliGRAM(s) IV Push every 6 hours PRN      REVIEW OF SYSTEMS  --------------------------------------------------------------------------------  Gen: No weight changes, fatigue, fevers/chills, weakness  Skin: No rashes  Head/Eyes/Ears/Mouth: No headache; Normal hearing; Normal vision w/o blurriness; No sinus pain/discomfort, sore throat  Respiratory: No dyspnea, cough, wheezing, hemoptysis  CV: No chest pain, PND, orthopnea  GI: No abdominal pain, diarrhea, constipation, nausea, vomiting, melena, hematochezia  : No increased frequency, dysuria, hematuria, nocturia  MSK: No joint pain/swelling; no back pain; no edema  Neuro: No dizziness/lightheadedness, weakness, seizures, numbness, tingling  Heme: No easy bruising or bleeding  Endo: No heat/cold intolerance  Psych: No significant nervousness, anxiety, stress, depression    All other systems were reviewed and are negative, except as noted.    VITALS/PHYSICAL EXAM  --------------------------------------------------------------------------------  T(C): 36.8 (07-05-19 @ 08:00), Max: 37.1 (07-04-19 @ 16:00)  HR: 71 (07-05-19 @ 13:00) (58 - 76)  BP: 130/69 (07-05-19 @ 13:00) (102/56 - 151/75)  RR: 18 (07-05-19 @ 13:00) (15 - 39)  SpO2: 97% (07-05-19 @ 13:00) (94% - 99%)  Wt(kg): --  Height (cm): 180.34 (07-03-19 @ 20:16)  Weight (kg): 77.1 (07-03-19 @ 20:16)  BMI (kg/m2): 23.7 (07-03-19 @ 20:16)  BSA (m2): 1.97 (07-03-19 @ 20:16)      07-04-19 @ 07:01  -  07-05-19 @ 07:00  --------------------------------------------------------  IN: 2270 mL / OUT: 3155 mL / NET: -885 mL    07-05-19 @ 07:01  -  07-05-19 @ 13:28  --------------------------------------------------------  IN: 300 mL / OUT: 75 mL / NET: 225 mL      Physical Exam:  · Constitutional	Well-developed, well nourished	  · Respiratory	Breath Sounds equal & clear to percussion & auscultation, no accessory muscle use	  · Cardiovascular	Regular rate & rhythm, normal S1, S2; no murmurs, gallops or rubs; no S3, S4	  · Gastrointestinal	detailed exam	  · GI Normal	soft; no distention; no rebound tenderness; no guarding; no rigidity	  · GI Abnormal	tender	  · Tenderness	RLQ	  · Rectal	No mass or lesion	  · Vascular	Equal and normal pulses (carotid, femoral, dorsalis pedis)	  · Neurological	Alert & oriented; no sensory, motor or coordination deficits, normal reflexes	  · Skin	detailed exam	  · Skin Comments	mini midline skin incision with staples intact. no erythema/drainage or fluctuance. LLQ trochar site with 2 staples in tact	    LABS/STUDIES  --------------------------------------------------------------------------------              12.6   9.58  >-----------<  369      [07-05-19 @ 04:12]              36.4     144  |  109  |  68.0  ----------------------------<  120      [07-05-19 @ 04:12]  3.9   |  22.0  |  1.44        Ca     8.3     [07-05-19 @ 04:12]      Mg     2.1     [07-05-19 @ 04:12]      Phos  3.1     [07-05-19 @ 04:12]    TPro  6.1  /  Alb  3.0  /  TBili  0.3  /  DBili  x   /  AST  37  /  ALT  77  /  AlkPhos  67  [07-04-19 @ 08:50]    PT/INR: PT 11.9 , INR 1.03       [07-03-19 @ 20:40]  PTT: 27.5       [07-03-19 @ 20:40]    Troponin 0.02      [07-04-19 @ 01:10]    Creatinine Trend:  SCr 1.44 [07-05 @ 04:12]  SCr 2.27 [07-04 @ 21:39]  SCr 3.58 [07-04 @ 16:04]  SCr 6.33 [07-04 @ 08:50]  SCr 8.67 [07-04 @ 01:10]    Urinalysis - [07-04-19 @ 08:49]      Color Yellow / Appearance Slightly Turbid / SG 1.020 / pH 5.0      Gluc Negative / Ketone Negative  / Bili Negative / Urobili Negative       Blood Large / Protein 30 / Leuk Est Trace / Nitrite Negative      RBC 25-50 / WBC 0-2 / Hyaline  / Gran  / Sq Epi  / Non Sq Epi Occasional / Bacteria Few    Urine Creatinine 491      [07-04-19 @ 00:10]  Urine Sodium <30      [07-04-19 @ 00:10]  Urine Potassium 40      [07-04-19 @ 00:10]    HbA1c 5.3      [06-17-19 @ 08:30]

## 2019-07-05 NOTE — CHART NOTE - NSCHARTNOTEFT_GEN_A_CORE
Upon Nutritional Assessment by the Registered Dietitian your patient was determined to meet criteria / has evidence of the following diagnosis/diagnoses:          [ ]  Mild Protein Calorie Malnutrition        [ ]  Moderate Protein Calorie Malnutrition        [x ] Severe Protein Calorie Malnutrition        [ ] Unspecified Protein Calorie Malnutrition        [ ] Underweight / BMI <19        [ ] Morbid Obesity / BMI > 40      Findings as based on:  •  Comprehensive nutrition assessment and consultation  •  Calorie counts (nutrient intake analysis)  •  Food acceptance and intake status from observations by staff  •  Follow up  •  Patient education  •  Intervention secondary to interdisciplinary rounds  •   concerns      Treatment:    The following diet has been recommended:    Ensure Clear TID     PROVIDER Section:     By signing this assessment you are acknowledging and agree with the diagnosis/diagnoses assigned by the Registered Dietitian    Comments:

## 2019-07-05 NOTE — PROGRESS NOTE ADULT - ASSESSMENT
1) Prerenal azotemia  2) Acidosis  3) Hyperkalemia  4) Poor po intake    Elvin < 30 consistent with prerenal azotemia  Can change fluid to isotonic saline  Would continue with IVF 24-48 hours  Cr nearing normal  Signing off  Please recall if needed    judith Hannah

## 2019-07-06 ENCOUNTER — TRANSCRIPTION ENCOUNTER (OUTPATIENT)
Age: 74
End: 2019-07-06

## 2019-07-06 LAB
ANION GAP SERPL CALC-SCNC: 11 MMOL/L — SIGNIFICANT CHANGE UP (ref 5–17)
BUN SERPL-MCNC: 37 MG/DL — HIGH (ref 8–20)
CALCIUM SERPL-MCNC: 8.7 MG/DL — SIGNIFICANT CHANGE UP (ref 8.6–10.2)
CHLORIDE SERPL-SCNC: 110 MMOL/L — HIGH (ref 98–107)
CO2 SERPL-SCNC: 23 MMOL/L — SIGNIFICANT CHANGE UP (ref 22–29)
CREAT SERPL-MCNC: 0.72 MG/DL — SIGNIFICANT CHANGE UP (ref 0.5–1.3)
GLUCOSE SERPL-MCNC: 120 MG/DL — HIGH (ref 70–115)
MAGNESIUM SERPL-MCNC: 2.1 MG/DL — SIGNIFICANT CHANGE UP (ref 1.6–2.6)
PHOSPHATE SERPL-MCNC: 1.7 MG/DL — LOW (ref 2.4–4.7)
POTASSIUM SERPL-MCNC: 4 MMOL/L — SIGNIFICANT CHANGE UP (ref 3.5–5.3)
POTASSIUM SERPL-SCNC: 4 MMOL/L — SIGNIFICANT CHANGE UP (ref 3.5–5.3)
SODIUM SERPL-SCNC: 144 MMOL/L — SIGNIFICANT CHANGE UP (ref 135–145)

## 2019-07-06 PROCEDURE — 99233 SBSQ HOSP IP/OBS HIGH 50: CPT

## 2019-07-06 RX ORDER — SODIUM,POTASSIUM PHOSPHATES 278-250MG
1 POWDER IN PACKET (EA) ORAL
Refills: 0 | Status: DISCONTINUED | OUTPATIENT
Start: 2019-07-06 | End: 2019-07-07

## 2019-07-06 RX ORDER — SODIUM,POTASSIUM PHOSPHATES 278-250MG
1 POWDER IN PACKET (EA) ORAL ONCE
Refills: 0 | Status: COMPLETED | OUTPATIENT
Start: 2019-07-06 | End: 2019-07-06

## 2019-07-06 RX ORDER — POTASSIUM PHOSPHATE, MONOBASIC POTASSIUM PHOSPHATE, DIBASIC 236; 224 MG/ML; MG/ML
30 INJECTION, SOLUTION INTRAVENOUS ONCE
Refills: 0 | Status: COMPLETED | OUTPATIENT
Start: 2019-07-06 | End: 2019-07-06

## 2019-07-06 RX ORDER — SODIUM,POTASSIUM PHOSPHATES 278-250MG
1 POWDER IN PACKET (EA) ORAL
Refills: 0 | Status: DISCONTINUED | OUTPATIENT
Start: 2019-07-06 | End: 2019-07-06

## 2019-07-06 RX ADMIN — HEPARIN SODIUM 5000 UNIT(S): 5000 INJECTION INTRAVENOUS; SUBCUTANEOUS at 14:57

## 2019-07-06 RX ADMIN — HEPARIN SODIUM 5000 UNIT(S): 5000 INJECTION INTRAVENOUS; SUBCUTANEOUS at 05:22

## 2019-07-06 RX ADMIN — Medication 1 TABLET(S): at 18:53

## 2019-07-06 RX ADMIN — HEPARIN SODIUM 5000 UNIT(S): 5000 INJECTION INTRAVENOUS; SUBCUTANEOUS at 21:36

## 2019-07-06 RX ADMIN — PANTOPRAZOLE SODIUM 40 MILLIGRAM(S): 20 TABLET, DELAYED RELEASE ORAL at 12:00

## 2019-07-06 RX ADMIN — POTASSIUM PHOSPHATE, MONOBASIC POTASSIUM PHOSPHATE, DIBASIC 83.33 MILLIMOLE(S): 236; 224 INJECTION, SOLUTION INTRAVENOUS at 10:16

## 2019-07-06 RX ADMIN — Medication 1 PACKET(S): at 10:16

## 2019-07-06 RX ADMIN — SODIUM CHLORIDE 100 MILLILITER(S): 9 INJECTION, SOLUTION INTRAVENOUS at 05:22

## 2019-07-06 RX ADMIN — Medication 1 TABLET(S): at 14:57

## 2019-07-06 RX ADMIN — Medication 1 TABLET(S): at 21:36

## 2019-07-06 NOTE — PROGRESS NOTE ADULT - ASSESSMENT
72 yo M history of tubulovillous adenoma s/p laparoscopic right hemicolectomy with ileotransverse anastomosis on 6/27 who presented with sepsis, poor PO intake, and RADHA. Patient had return of bowel function, is tolerating diet, and has well controlled pain.    Plan:  - Advance diet as tolerated  - Encourage ambulation  - Follow Nephro recs  - f/u AM labs  - DVT ppx

## 2019-07-06 NOTE — DISCHARGE NOTE NURSING/CASE MANAGEMENT/SOCIAL WORK - NSDCDPATPORTLINK_GEN_ALL_CORE
You can access the [facility] Patient Portal, offered by [facility], by registering with the following website: [email or web address]

## 2019-07-06 NOTE — PROGRESS NOTE ADULT - SUBJECTIVE AND OBJECTIVE BOX
HPI/OVERNIGHT EVENTS: Nephro signed off on patient yesterday. No acute events overnight. Patient seen and examined at bedside this AM. Patient reports his pain is well controlled. He is tolerating low fiber diet. He continues passing flatus. Denies fever, chills, nausea, vomiting, chest pain, SOB, dizziness, abd pain or any other concerning symptoms    Vital Signs Last 24 Hrs  T(C): 36.7 (2019 23:23), Max: 36.8 (2019 05:00)  T(F): 98.1 (2019 23:23), Max: 98.3 (2019 20:30)  HR: 80 (2019 20:00) (58 - 82)  BP: 120/70 (2019 20:00) (102/56 - 151/75)  BP(mean): 84 (2019 20:00) (73 - 100)  RR: 19 (2019 20:00) (15 - 39)  SpO2: 96% (2019 20:00) (94% - 99%)    I&O's Detail    2019 07:01  -  2019 07:00  --------------------------------------------------------  IN:    dextrose 5%: 2070 mL    IV PiggyBack: 100 mL    multiple electrolytes Injection Type 1: 100 mL  Total IN: 2270 mL    OUT:    Indwelling Catheter - Urethral: 2855 mL    Stool: 300 mL  Total OUT: 3155 mL    Total NET: -885 mL      2019 07:01  -  2019 01:04  --------------------------------------------------------  IN:    multiple electrolytes Injection Type 1: 1400 mL    Oral Fluid: 360 mL  Total IN: 1760 mL    OUT:    Indwelling Catheter - Urethral: 75 mL    Voided: 750 mL  Total OUT: 825 mL    Total NET: 935 mL    Physical Exam:  Constitutional: patient resting comfortably in chair, in no acute distress  HEENT: EOMI / PERRL b/l   Neck: No JVD, full ROM without pain  Respiratory: CTAB respirations are unlabored, no accessory muscle use, no conversational dyspnea  Cardiovascular: regular rate & rhythm   Gastrointestinal: Abdomen soft, non-tender, non-distended, no rebound tenderness / guarding  Incision/Wound: Clean, dry and intact  Neurological: GCS: 15 (4/5/6). A&O x 3; no gross sensory / motor / coordination deficits  Back: No CVA tenderness   Psychiatric: Normal mood, normal affect  Musculoskeletal: No joint pain, swelling or deformity; no limitation of movement    LABS:                        12.6   9.58  )-----------( 369      ( 2019 04:12 )             36.4     07-05    144  |  109<H>  |  68.0<H>  ----------------------------<  120<H>  3.9   |  22.0  |  1.44<H>    Ca    8.3<L>      2019 04:12  Phos  3.1     07-05  Mg     2.1     07-05    TPro  6.1<L>  /  Alb  3.0<L>  /  TBili  0.3<L>  /  DBili  x   /  AST  37  /  ALT  77<H>  /  AlkPhos  67  07-04      Urinalysis Basic - ( 2019 08:49 )    Color: Yellow / Appearance: Slightly Turbid / S.020 / pH: x  Gluc: x / Ketone: Negative  / Bili: Negative / Urobili: Negative mg/dL   Blood: x / Protein: 30 mg/dL / Nitrite: Negative   Leuk Esterase: Trace / RBC: 25-50 /HPF / WBC 0-2   Sq Epi: x / Non Sq Epi: Occasional / Bacteria: Few      MEDICATIONS  (STANDING):  chlorhexidine 4% Liquid 1 Application(s) Topical <User Schedule>  heparin  Injectable 5000 Unit(s) SubCutaneous every 8 hours  multiple electrolytes Injection Type 1 1000 milliLiter(s) (100 mL/Hr) IV Continuous <Continuous>  pantoprazole  Injectable 40 milliGRAM(s) IV Push daily    MEDICATIONS  (PRN):  acetaminophen   Tablet .. 650 milliGRAM(s) Oral every 6 hours PRN Mild Pain (1 - 3)  benzocaine 15 mG/menthol 3.6 mG Lozenge 1 Lozenge Oral once PRN Sore Throat  ondansetron Injectable 4 milliGRAM(s) IV Push every 6 hours PRN Nausea and/or Vomiting

## 2019-07-06 NOTE — DISCHARGE NOTE PROVIDER - INSTRUCTIONS
Advanced diet as tolerated.    Please avoid heavy lifting or strenuous activity until cleared by your surgeon at follow-up.

## 2019-07-06 NOTE — DISCHARGE NOTE PROVIDER - NSDCCPCAREPLAN_GEN_ALL_CORE_FT
PRINCIPAL DISCHARGE DIAGNOSIS  Diagnosis: Renal failure  Assessment and Plan of Treatment: Please follow up with Dr. Valentin in 1 week of discharge. If you should have any change in clinical status please return to the emergency room as soon as possible      SECONDARY DISCHARGE DIAGNOSES  Diagnosis: Nausea & vomiting  Assessment and Plan of Treatment:     Diagnosis: Dehydration  Assessment and Plan of Treatment:

## 2019-07-06 NOTE — DISCHARGE NOTE NURSING/CASE MANAGEMENT/SOCIAL WORK - NSDPDISTO_GEN_ALL_CORE
Pt was presenting with seizure like activity, clenching of jaw, tachycardic and hypertensive, 1mg Ativan given as per MD Kaplan request with positive effect, will continue to monitor Home

## 2019-07-06 NOTE — PROGRESS NOTE ADULT - ATTENDING COMMENTS
Seen and examined.  [place] regular diet for breakfast without N/V/abd distention. Cont pass flatus and stools.  Cr normalized.  AFVSS  NAD  incision CDI, soft, NT, mild distention  A/P -   Anticipate d/c home after lunch.
Seen and examined.  Labs normalizing with IV hydration.  C diff negative.  U/S kidneys WNL.  Abd benign  Advance diet.  D/C planning.

## 2019-07-06 NOTE — DISCHARGE NOTE PROVIDER - HOSPITAL COURSE
73 year old male presents to ED with "pressure-like" abdominal pain that has persisted since he was discharged from the [facility] recently. Patient has had poor oral intake both with food/liquids. Started experiencing bilious emesis today and has hiccups. Passing flatus and having nonbloody diarrhea. Has only urinated once today. subjective fevers. He recently underwent laparoscopic right hemicolectomy with primary ileotransverse anastomosis on June 27 with pathology findings consistent with tubulovillous adenoma and 19 benign lymph nodes. Post operative course was uncomplicated and patient was discharged post operative day 2. Patient was worked up in the emergency room and found to be in acute kidney failure and a leukocytosis to 20. Patient was admitted to the surgical ICU and had ct scan of the abdomen that showed Postsurgical changes. Dilated small bowel loops in the right abdomen, which may represent ileus. No discrete transition to suggest bowel obstruction. Patient was admitted the surgical ICU for res 73 year old male presents to ED with "pressure-like" abdominal pain that has persisted since he was discharged from the [facility] recently. Patient has had poor oral intake both with food/liquids. Started experiencing bilious emesis today and has hiccups. Passing flatus and having nonbloody diarrhea. Has only urinated once today. subjective fevers. He recently underwent laparoscopic right hemicolectomy with primary ileotransverse anastomosis on June 27 with pathology findings consistent with tubulovillous adenoma and 19 benign lymph nodes. Post operative course was uncomplicated and patient was discharged post operative day 2. Patient was worked up in the emergency room and found to be in acute kidney failure and a leukocytosis to 20. Patient was admitted to the surgical ICU and had ct scan of the abdomen that showed Postsurgical changes. Dilated small bowel loops in the right abdomen, which may represent ileus. No discrete transition to suggest bowel obstruction. Patient was admitted the surgical ICU for IV resuscitation, he was there for 1 day during that time they he was given  additional liter of fluid  and  sodium acetate was started.  Bicarb 12->16, PH 7.26.  Cr 10.6--> 3.58.  Gap closed.  Colorectal removed NGT and started a clear liquid diet.  He had good urine output. A sample of stool was sent for c-diff and it was negative  and antibiotics were stopped.   Overnight patient  tolerated a clear liquid diet and  Creatinine down to 1.4 .  Acidosis resolved.  [facility] states transfer to floor.  Patient was transferred to the floor with no issues, leukocytosis cleared to 9.58 and creatinine down to 0.71. His diet was advanced and tolerated well, normal bowel function and good urine output continued. Patient will be discharged home and follow up with Dr. Valentin as an outpatient

## 2019-07-06 NOTE — DISCHARGE NOTE PROVIDER - CARE PROVIDER_API CALL
Emili Valentin)  Surgery  [address] [place] [address]  Phone: [phone]  Fax: [phone]  Follow Up Time:

## 2019-07-07 VITALS — TEMPERATURE: 99 F

## 2019-07-07 LAB
ANION GAP SERPL CALC-SCNC: 10 MMOL/L — SIGNIFICANT CHANGE UP (ref 5–17)
BASOPHILS # BLD AUTO: 0.06 K/UL — SIGNIFICANT CHANGE UP (ref 0–0.2)
BASOPHILS NFR BLD AUTO: 0.6 % — SIGNIFICANT CHANGE UP (ref 0–2)
BUN SERPL-MCNC: 20 MG/DL — SIGNIFICANT CHANGE UP (ref 8–20)
CALCIUM SERPL-MCNC: 8.9 MG/DL — SIGNIFICANT CHANGE UP (ref 8.6–10.2)
CHLORIDE SERPL-SCNC: 110 MMOL/L — HIGH (ref 98–107)
CO2 SERPL-SCNC: 25 MMOL/L — SIGNIFICANT CHANGE UP (ref 22–29)
CREAT SERPL-MCNC: 0.75 MG/DL — SIGNIFICANT CHANGE UP (ref 0.5–1.3)
EOSINOPHIL # BLD AUTO: 0.73 K/UL — HIGH (ref 0–0.5)
EOSINOPHIL NFR BLD AUTO: 7.7 % — HIGH (ref 0–6)
GLUCOSE SERPL-MCNC: 101 MG/DL — SIGNIFICANT CHANGE UP (ref 70–115)
HCT VFR BLD CALC: 37.3 % — LOW (ref 39–50)
HGB BLD-MCNC: 12.3 G/DL — LOW (ref 13–17)
IMM GRANULOCYTES NFR BLD AUTO: 0.5 % — SIGNIFICANT CHANGE UP (ref 0–1.5)
LYMPHOCYTES # BLD AUTO: 1.46 K/UL — SIGNIFICANT CHANGE UP (ref 1–3.3)
LYMPHOCYTES # BLD AUTO: 15.5 % — SIGNIFICANT CHANGE UP (ref 13–44)
MAGNESIUM SERPL-MCNC: 1.7 MG/DL — SIGNIFICANT CHANGE UP (ref 1.6–2.6)
MCHC RBC-ENTMCNC: 31.9 PG — SIGNIFICANT CHANGE UP (ref 27–34)
MCHC RBC-ENTMCNC: 33 GM/DL — SIGNIFICANT CHANGE UP (ref 32–36)
MCV RBC AUTO: 96.6 FL — SIGNIFICANT CHANGE UP (ref 80–100)
MONOCYTES # BLD AUTO: 1.09 K/UL — HIGH (ref 0–0.9)
MONOCYTES NFR BLD AUTO: 11.6 % — SIGNIFICANT CHANGE UP (ref 2–14)
NEUTROPHILS # BLD AUTO: 6.04 K/UL — SIGNIFICANT CHANGE UP (ref 1.8–7.4)
NEUTROPHILS NFR BLD AUTO: 64.1 % — SIGNIFICANT CHANGE UP (ref 43–77)
PHOSPHATE SERPL-MCNC: 2.6 MG/DL — SIGNIFICANT CHANGE UP (ref 2.4–4.7)
PLATELET # BLD AUTO: 397 K/UL — SIGNIFICANT CHANGE UP (ref 150–400)
POTASSIUM SERPL-MCNC: 4.4 MMOL/L — SIGNIFICANT CHANGE UP (ref 3.5–5.3)
POTASSIUM SERPL-SCNC: 4.4 MMOL/L — SIGNIFICANT CHANGE UP (ref 3.5–5.3)
RBC # BLD: 3.86 M/UL — LOW (ref 4.2–5.8)
RBC # FLD: 12.1 % — SIGNIFICANT CHANGE UP (ref 10.3–14.5)
SODIUM SERPL-SCNC: 145 MMOL/L — SIGNIFICANT CHANGE UP (ref 135–145)
WBC # BLD: 9.43 K/UL — SIGNIFICANT CHANGE UP (ref 3.8–10.5)
WBC # FLD AUTO: 9.43 K/UL — SIGNIFICANT CHANGE UP (ref 3.8–10.5)

## 2019-07-07 PROCEDURE — 86901 BLOOD TYPING SEROLOGIC RH(D): CPT

## 2019-07-07 PROCEDURE — 36600 WITHDRAWAL OF ARTERIAL BLOOD: CPT

## 2019-07-07 PROCEDURE — 36415 COLL VENOUS BLD VENIPUNCTURE: CPT

## 2019-07-07 PROCEDURE — 96365 THER/PROPH/DIAG IV INF INIT: CPT

## 2019-07-07 PROCEDURE — 84100 ASSAY OF PHOSPHORUS: CPT

## 2019-07-07 PROCEDURE — 71045 X-RAY EXAM CHEST 1 VIEW: CPT

## 2019-07-07 PROCEDURE — 80053 COMPREHEN METABOLIC PANEL: CPT

## 2019-07-07 PROCEDURE — 93005 ELECTROCARDIOGRAM TRACING: CPT

## 2019-07-07 PROCEDURE — 84133 ASSAY OF URINE POTASSIUM: CPT

## 2019-07-07 PROCEDURE — 99238 HOSP IP/OBS DSCHRG MGMT 30/<: CPT | Mod: 24

## 2019-07-07 PROCEDURE — 74018 RADEX ABDOMEN 1 VIEW: CPT

## 2019-07-07 PROCEDURE — 85014 HEMATOCRIT: CPT

## 2019-07-07 PROCEDURE — 83690 ASSAY OF LIPASE: CPT

## 2019-07-07 PROCEDURE — 82009 KETONE BODYS QUAL: CPT

## 2019-07-07 PROCEDURE — 87493 C DIFF AMPLIFIED PROBE: CPT

## 2019-07-07 PROCEDURE — 84295 ASSAY OF SERUM SODIUM: CPT

## 2019-07-07 PROCEDURE — 86900 BLOOD TYPING SEROLOGIC ABO: CPT

## 2019-07-07 PROCEDURE — 83735 ASSAY OF MAGNESIUM: CPT

## 2019-07-07 PROCEDURE — 82570 ASSAY OF URINE CREATININE: CPT

## 2019-07-07 PROCEDURE — 82947 ASSAY GLUCOSE BLOOD QUANT: CPT

## 2019-07-07 PROCEDURE — 82330 ASSAY OF CALCIUM: CPT

## 2019-07-07 PROCEDURE — 80048 BASIC METABOLIC PNL TOTAL CA: CPT

## 2019-07-07 PROCEDURE — 96375 TX/PRO/DX INJ NEW DRUG ADDON: CPT

## 2019-07-07 PROCEDURE — 86850 RBC ANTIBODY SCREEN: CPT

## 2019-07-07 PROCEDURE — 84132 ASSAY OF SERUM POTASSIUM: CPT

## 2019-07-07 PROCEDURE — 97163 PT EVAL HIGH COMPLEX 45 MIN: CPT

## 2019-07-07 PROCEDURE — 84484 ASSAY OF TROPONIN QUANT: CPT

## 2019-07-07 PROCEDURE — 99285 EMERGENCY DEPT VISIT HI MDM: CPT | Mod: 25

## 2019-07-07 PROCEDURE — 85730 THROMBOPLASTIN TIME PARTIAL: CPT

## 2019-07-07 PROCEDURE — 83605 ASSAY OF LACTIC ACID: CPT

## 2019-07-07 PROCEDURE — 84300 ASSAY OF URINE SODIUM: CPT

## 2019-07-07 PROCEDURE — 82803 BLOOD GASES ANY COMBINATION: CPT

## 2019-07-07 PROCEDURE — 74176 CT ABD & PELVIS W/O CONTRAST: CPT

## 2019-07-07 PROCEDURE — 76775 US EXAM ABDO BACK WALL LIM: CPT

## 2019-07-07 PROCEDURE — 96366 THER/PROPH/DIAG IV INF ADDON: CPT

## 2019-07-07 PROCEDURE — 85027 COMPLETE CBC AUTOMATED: CPT

## 2019-07-07 PROCEDURE — 87040 BLOOD CULTURE FOR BACTERIA: CPT

## 2019-07-07 PROCEDURE — 82435 ASSAY OF BLOOD CHLORIDE: CPT

## 2019-07-07 PROCEDURE — 85610 PROTHROMBIN TIME: CPT

## 2019-07-07 PROCEDURE — 87086 URINE CULTURE/COLONY COUNT: CPT

## 2019-07-07 PROCEDURE — 81001 URINALYSIS AUTO W/SCOPE: CPT

## 2019-07-07 RX ADMIN — Medication 1 TABLET(S): at 08:14

## 2019-07-07 RX ADMIN — HEPARIN SODIUM 5000 UNIT(S): 5000 INJECTION INTRAVENOUS; SUBCUTANEOUS at 06:08

## 2019-07-07 NOTE — PROGRESS NOTE ADULT - ASSESSMENT
74 yo M history of tubulovillous adenoma s/p laparoscopic right hemicolectomy with ileotransverse anastomosis on 6/27 who presented with sepsis, poor PO intake, and RADHA. Patient had return of bowel function, is tolerating regular diet, and has well controlled pain.    Plan:  - OOB  - D/c home today

## 2019-07-07 NOTE — PROGRESS NOTE ADULT - SUBJECTIVE AND OBJECTIVE BOX
HPI/OVERNIGHT EVENTS:  No acute events overnight. Patient is tolerating low-fiber diet. Denies nausea, vomiting, SOB, chest pain, fever. He continues to pass gas and have bowel movements. Feels more comfortable about being discharged home today.    MEDICATIONS  (STANDING):  chlorhexidine 4% Liquid 1 Application(s) Topical <User Schedule>  heparin  Injectable 5000 Unit(s) SubCutaneous every 8 hours  pantoprazole  Injectable 40 milliGRAM(s) IV Push daily  potassium acid phosphate/sodium acid phosphate tablet (K-PHOS No. 2) 1 Tablet(s) Oral four times a day with meals    MEDICATIONS  (PRN):  acetaminophen   Tablet .. 650 milliGRAM(s) Oral every 6 hours PRN Mild Pain (1 - 3)  benzocaine 15 mG/menthol 3.6 mG Lozenge 1 Lozenge Oral once PRN Sore Throat  ondansetron Injectable 4 milliGRAM(s) IV Push every 6 hours PRN Nausea and/or Vomiting      Vital Signs Last 24 Hrs  T(C): 36.8 (07 Jul 2019 04:00), Max: 37 (06 Jul 2019 15:22)  T(F): 98.2 (07 Jul 2019 04:00), Max: 98.6 (06 Jul 2019 15:22)  HR: 66 (07 Jul 2019 04:00) (63 - 73)  BP: 125/74 (07 Jul 2019 04:00) (125/74 - 134/69)  BP(mean): 88 (07 Jul 2019 04:00) (86 - 89)  RR: 20 (07 Jul 2019 04:00) (16 - 20)  SpO2: 99% (07 Jul 2019 04:00) (99% - 100%)    Constitutional: patient resting comfortably in bed, in no acute distress  HEENT: EOMI / PERRL b/l, no active drainage or redness  Neck: No JVD, full ROM without pain  Respiratory: respirations are unlabored, no accessory muscle use, no conversational dyspnea  Cardiovascular: regular rate & rhythm  Gastrointestinal: Abdomen soft, non-tender, mildly distended, no rebound tenderness / guarding  Neurological: GCS: 15 (4/5/6). A&O x 3; no gross sensory / motor / coordination deficits  Psychiatric: Normal mood, normal affect  Musculoskeletal: No joint pain, swelling or deformity; no limitation of movement      I&O's Detail    05 Jul 2019 07:01  -  06 Jul 2019 07:00  --------------------------------------------------------  IN:    multiple electrolytes Injection Type 1multiple electrolytes Injection Type 1: 2300 mL    Oral Fluid: 360 mL  Total IN: 2660 mL    OUT:    Indwelling Catheter - Urethral: 75 mL    Voided: 750 mL  Total OUT: 825 mL    Total NET: 1835 mL      06 Jul 2019 07:01  -  07 Jul 2019 06:26  --------------------------------------------------------  IN:    multiple electrolytes Injection Type 1multiple electrolytes Injection Type 1: 600 mL    Oral Fluid: 360 mL    Solution: 333.2 mL  Total IN: 1293.2 mL    OUT:  Total OUT: 0 mL    Total NET: 1293.2 mL          LABS:    07-06    144  |  110<H>  |  37.0<H>  ----------------------------<  120<H>  4.0   |  23.0  |  0.72    Ca    8.7      06 Jul 2019 07:15  Phos  1.7     07-06  Mg     2.1     07-06

## 2019-07-08 LAB
CULTURE RESULTS: SIGNIFICANT CHANGE UP
CULTURE RESULTS: SIGNIFICANT CHANGE UP
SPECIMEN SOURCE: SIGNIFICANT CHANGE UP
SPECIMEN SOURCE: SIGNIFICANT CHANGE UP

## 2019-07-11 ENCOUNTER — EMERGENCY (EMERGENCY)
Facility: HOSPITAL | Age: 74
LOS: 1 days | Discharge: HOME | End: 2019-07-11
Attending: STUDENT IN AN ORGANIZED HEALTH CARE EDUCATION/TRAINING PROGRAM
Payer: COMMERCIAL

## 2019-07-11 ENCOUNTER — APPOINTMENT (OUTPATIENT)
Dept: COLORECTAL SURGERY | Facility: CLINIC | Age: 74
End: 2019-07-11
Payer: COMMERCIAL

## 2019-07-11 VITALS
SYSTOLIC BLOOD PRESSURE: 120 MMHG | DIASTOLIC BLOOD PRESSURE: 70 MMHG | WEIGHT: 200 LBS | HEIGHT: 70 IN | BODY MASS INDEX: 28.63 KG/M2 | TEMPERATURE: 98.1 F

## 2019-07-11 VITALS
WEIGHT: 179.9 LBS | HEART RATE: 94 BPM | TEMPERATURE: 101 F | SYSTOLIC BLOOD PRESSURE: 128 MMHG | HEIGHT: 70 IN | DIASTOLIC BLOOD PRESSURE: 77 MMHG | OXYGEN SATURATION: 95 % | RESPIRATION RATE: 16 BRPM

## 2019-07-11 DIAGNOSIS — Z90.49 ACQUIRED ABSENCE OF OTHER SPECIFIED PARTS OF DIGESTIVE TRACT: ICD-10-CM

## 2019-07-11 DIAGNOSIS — Z90.49 ACQUIRED ABSENCE OF OTHER SPECIFIED PARTS OF DIGESTIVE TRACT: Chronic | ICD-10-CM

## 2019-07-11 DIAGNOSIS — K63.9 DISEASE OF INTESTINE, UNSPECIFIED: ICD-10-CM

## 2019-07-11 DIAGNOSIS — Z98.890 OTHER SPECIFIED POSTPROCEDURAL STATES: Chronic | ICD-10-CM

## 2019-07-11 LAB
ANION GAP SERPL CALC-SCNC: 12 MMOL/L — SIGNIFICANT CHANGE UP (ref 5–17)
APPEARANCE UR: CLEAR — SIGNIFICANT CHANGE UP
APTT BLD: 28.8 SEC — SIGNIFICANT CHANGE UP (ref 27.5–36.3)
BACTERIA # UR AUTO: ABNORMAL
BASOPHILS # BLD AUTO: 0.06 K/UL — SIGNIFICANT CHANGE UP (ref 0–0.2)
BASOPHILS NFR BLD AUTO: 0.4 % — SIGNIFICANT CHANGE UP (ref 0–2)
BILIRUB UR-MCNC: NEGATIVE — SIGNIFICANT CHANGE UP
BUN SERPL-MCNC: 7 MG/DL — LOW (ref 8–20)
CALCIUM SERPL-MCNC: 9 MG/DL — SIGNIFICANT CHANGE UP (ref 8.6–10.2)
CHLORIDE SERPL-SCNC: 101 MMOL/L — SIGNIFICANT CHANGE UP (ref 98–107)
CO2 SERPL-SCNC: 24 MMOL/L — SIGNIFICANT CHANGE UP (ref 22–29)
COLOR SPEC: YELLOW — SIGNIFICANT CHANGE UP
CREAT SERPL-MCNC: 0.85 MG/DL — SIGNIFICANT CHANGE UP (ref 0.5–1.3)
DIFF PNL FLD: ABNORMAL
EOSINOPHIL # BLD AUTO: 0.83 K/UL — HIGH (ref 0–0.5)
EOSINOPHIL NFR BLD AUTO: 5.5 % — SIGNIFICANT CHANGE UP (ref 0–6)
EPI CELLS # UR: SIGNIFICANT CHANGE UP
GLUCOSE SERPL-MCNC: 122 MG/DL — HIGH (ref 70–115)
GLUCOSE UR QL: NEGATIVE MG/DL — SIGNIFICANT CHANGE UP
HCT VFR BLD CALC: 38.7 % — LOW (ref 39–50)
HGB BLD-MCNC: 13 G/DL — SIGNIFICANT CHANGE UP (ref 13–17)
IMM GRANULOCYTES NFR BLD AUTO: 0.5 % — SIGNIFICANT CHANGE UP (ref 0–1.5)
INR BLD: 1.21 RATIO — HIGH (ref 0.88–1.16)
KETONES UR-MCNC: NEGATIVE — SIGNIFICANT CHANGE UP
LACTATE BLDV-MCNC: 1.2 MMOL/L — SIGNIFICANT CHANGE UP (ref 0.5–2)
LEUKOCYTE ESTERASE UR-ACNC: ABNORMAL
LYMPHOCYTES # BLD AUTO: 1.15 K/UL — SIGNIFICANT CHANGE UP (ref 1–3.3)
LYMPHOCYTES # BLD AUTO: 7.6 % — LOW (ref 13–44)
MCHC RBC-ENTMCNC: 31.5 PG — SIGNIFICANT CHANGE UP (ref 27–34)
MCHC RBC-ENTMCNC: 33.6 GM/DL — SIGNIFICANT CHANGE UP (ref 32–36)
MCV RBC AUTO: 93.7 FL — SIGNIFICANT CHANGE UP (ref 80–100)
MONOCYTES # BLD AUTO: 1.05 K/UL — HIGH (ref 0–0.9)
MONOCYTES NFR BLD AUTO: 7 % — SIGNIFICANT CHANGE UP (ref 2–14)
NEUTROPHILS # BLD AUTO: 11.94 K/UL — HIGH (ref 1.8–7.4)
NEUTROPHILS NFR BLD AUTO: 79 % — HIGH (ref 43–77)
NITRITE UR-MCNC: NEGATIVE — SIGNIFICANT CHANGE UP
PH UR: 5 — SIGNIFICANT CHANGE UP (ref 5–8)
PLATELET # BLD AUTO: 399 K/UL — SIGNIFICANT CHANGE UP (ref 150–400)
POTASSIUM SERPL-MCNC: 3.9 MMOL/L — SIGNIFICANT CHANGE UP (ref 3.5–5.3)
POTASSIUM SERPL-SCNC: 3.9 MMOL/L — SIGNIFICANT CHANGE UP (ref 3.5–5.3)
PROT UR-MCNC: 15 MG/DL
PROTHROM AB SERPL-ACNC: 14 SEC — HIGH (ref 10–12.9)
RBC # BLD: 4.13 M/UL — LOW (ref 4.2–5.8)
RBC # FLD: 12 % — SIGNIFICANT CHANGE UP (ref 10.3–14.5)
RBC CASTS # UR COMP ASSIST: SIGNIFICANT CHANGE UP /HPF (ref 0–4)
SODIUM SERPL-SCNC: 137 MMOL/L — SIGNIFICANT CHANGE UP (ref 135–145)
SP GR SPEC: 1.01 — SIGNIFICANT CHANGE UP (ref 1.01–1.02)
UROBILINOGEN FLD QL: NEGATIVE MG/DL — SIGNIFICANT CHANGE UP
WBC # BLD: 15.1 K/UL — HIGH (ref 3.8–10.5)
WBC # FLD AUTO: 15.1 K/UL — HIGH (ref 3.8–10.5)
WBC UR QL: SIGNIFICANT CHANGE UP

## 2019-07-11 PROCEDURE — 83690 ASSAY OF LIPASE: CPT

## 2019-07-11 PROCEDURE — 99284 EMERGENCY DEPT VISIT MOD MDM: CPT

## 2019-07-11 PROCEDURE — 81001 URINALYSIS AUTO W/SCOPE: CPT

## 2019-07-11 PROCEDURE — 87086 URINE CULTURE/COLONY COUNT: CPT

## 2019-07-11 PROCEDURE — 80076 HEPATIC FUNCTION PANEL: CPT

## 2019-07-11 PROCEDURE — 99284 EMERGENCY DEPT VISIT MOD MDM: CPT | Mod: 25

## 2019-07-11 PROCEDURE — 74176 CT ABD & PELVIS W/O CONTRAST: CPT

## 2019-07-11 PROCEDURE — 85027 COMPLETE CBC AUTOMATED: CPT

## 2019-07-11 PROCEDURE — 36415 COLL VENOUS BLD VENIPUNCTURE: CPT

## 2019-07-11 PROCEDURE — 85730 THROMBOPLASTIN TIME PARTIAL: CPT

## 2019-07-11 PROCEDURE — 85610 PROTHROMBIN TIME: CPT

## 2019-07-11 PROCEDURE — 87040 BLOOD CULTURE FOR BACTERIA: CPT

## 2019-07-11 PROCEDURE — 99024 POSTOP FOLLOW-UP VISIT: CPT

## 2019-07-11 PROCEDURE — 80048 BASIC METABOLIC PNL TOTAL CA: CPT

## 2019-07-11 PROCEDURE — 83605 ASSAY OF LACTIC ACID: CPT

## 2019-07-11 RX ORDER — ACETAMINOPHEN 500 MG
650 TABLET ORAL ONCE
Refills: 0 | Status: COMPLETED | OUTPATIENT
Start: 2019-07-11 | End: 2019-07-11

## 2019-07-11 RX ORDER — VALSARTAN 80 MG/1
0 TABLET ORAL
Qty: 0 | Refills: 0 | DISCHARGE

## 2019-07-11 RX ORDER — METRONIDAZOLE 500 MG/1
500 TABLET ORAL
Qty: 3 | Refills: 0 | Status: DISCONTINUED | COMMUNITY
Start: 2019-05-20 | End: 2019-07-11

## 2019-07-11 RX ORDER — POLYETHYLENE GLYCOL 3350, SODIUM SULFATE, SODIUM CHLORIDE, POTASSIUM CHLORIDE, ASCORBIC ACID, SODIUM ASCORBATE 7.5-2.691G
100 KIT ORAL
Qty: 1 | Refills: 0 | Status: DISCONTINUED | COMMUNITY
Start: 2019-01-10 | End: 2019-07-11

## 2019-07-11 RX ORDER — SODIUM CHLORIDE 9 MG/ML
1000 INJECTION INTRAMUSCULAR; INTRAVENOUS; SUBCUTANEOUS ONCE
Refills: 0 | Status: COMPLETED | OUTPATIENT
Start: 2019-07-11 | End: 2019-07-11

## 2019-07-11 RX ORDER — NEOMYCIN SULFATE 500 MG/1
500 TABLET ORAL
Qty: 6 | Refills: 0 | Status: DISCONTINUED | COMMUNITY
Start: 2019-05-20 | End: 2019-07-11

## 2019-07-11 RX ADMIN — Medication 650 MILLIGRAM(S): at 19:30

## 2019-07-11 RX ADMIN — SODIUM CHLORIDE 1000 MILLILITER(S): 9 INJECTION INTRAMUSCULAR; INTRAVENOUS; SUBCUTANEOUS at 22:14

## 2019-07-11 NOTE — ED ADULT NURSE NOTE - OBJECTIVE STATEMENT
c/o fever denies pain denies cough urinary symptoms c/o fever denies pain denies cough urinary symptoms code sepsis activated by walk in triage, pt with 0 cough or urinary complaints, denies complaints

## 2019-07-11 NOTE — ED PROVIDER NOTE - PROGRESS NOTE DETAILS
PT seen and reassessed.  Patient symptomatically improved.   Blood cultures pending. AAOX3, NAD, VSS.  Discussed test results w/ patient, given copy of results. Patient verbalized understanding of hospital course and outpatient plans, has decisional making capacity.  Will follow-up with Primary care doctor in the next 2 days; patient will call for an appointment. Will return to the ED if there is any worsening of symptoms.  Patient able to ambulate w/o difficulty, is tolerating PO intake.

## 2019-07-11 NOTE — ED PROVIDER NOTE - OBJECTIVE STATEMENT
73 year old male with a PMHx of hypertension and aortic stenosis is status 2 weeks post bowel resection for polyps. Patient was hospitalized 6 days after his surgery for sepsis and was released this past Monday. Patient has been afebrile in the four days following his hospital release.  Elevated temperature was found incidentally by home nurse that the patient's wife hired to come in once a day to take vitals. Patient was not catheterized during his last hospital stay. Patient denies trouble breathing, cough, chills, abdominal pain, dysuria, pain on urination, diarrhea or constipation. Patient denies pain, or swelling at or around the surgical site.

## 2019-07-11 NOTE — ED ADULT NURSE REASSESSMENT NOTE - NS ED NURSE REASSESS COMMENT FT1
Report received by Yesi Farley RN. Patient denies any pain at this time. Awaiting blood work results and new orders. Plan of care explained. Verbalized understanding.

## 2019-07-11 NOTE — ED ADULT TRIAGE NOTE - CHIEF COMPLAINT QUOTE
"I had a colon resection on 6/27. I came back and had to be admitted again cause I was septic. now the home care nurse is sending me back in because I have fever." a and o x3. breathing even and unlabored.

## 2019-07-11 NOTE — ED PROVIDER NOTE - CLINICAL SUMMARY MEDICAL DECISION MAKING FREE TEXT BOX
74yo male with pmh of HTN, AS, post op 2 weeks large bowel resection presents with home nurse found him to be febrile. Pt was hospitalized 7/2 for sepsis after his surgery. Pt states during hosp no clear source of infection since then afebrile until today. Pt with flatus and normal stools. Pt denies fevers/chills, ha, neck stiffness, loc, focal neuro deficits, cp/sob/palp, cough, abd pain/n/v/d, urinary symptoms, recent travel and sick contacts.   pt febrile here - will do a infectious work up

## 2019-07-12 ENCOUNTER — APPOINTMENT (OUTPATIENT)
Dept: FAMILY MEDICINE | Facility: CLINIC | Age: 74
End: 2019-07-12
Payer: COMMERCIAL

## 2019-07-12 VITALS
HEART RATE: 84 BPM | SYSTOLIC BLOOD PRESSURE: 114 MMHG | DIASTOLIC BLOOD PRESSURE: 68 MMHG | HEIGHT: 70 IN | WEIGHT: 190 LBS | BODY MASS INDEX: 27.2 KG/M2 | TEMPERATURE: 99.2 F

## 2019-07-12 VITALS
RESPIRATION RATE: 19 BRPM | OXYGEN SATURATION: 99 % | HEART RATE: 75 BPM | TEMPERATURE: 100 F | SYSTOLIC BLOOD PRESSURE: 112 MMHG | DIASTOLIC BLOOD PRESSURE: 66 MMHG

## 2019-07-12 DIAGNOSIS — Z09 ENCOUNTER FOR FOLLOW-UP EXAMINATION AFTER COMPLETED TREATMENT FOR CONDITIONS OTHER THAN MALIGNANT NEOPLASM: ICD-10-CM

## 2019-07-12 DIAGNOSIS — R82.90 UNSPECIFIED ABNORMAL FINDINGS IN URINE: ICD-10-CM

## 2019-07-12 PROCEDURE — 81003 URINALYSIS AUTO W/O SCOPE: CPT | Mod: QW

## 2019-07-12 PROCEDURE — 74176 CT ABD & PELVIS W/O CONTRAST: CPT | Mod: 26

## 2019-07-12 PROCEDURE — 99496 TRANSJ CARE MGMT HIGH F2F 7D: CPT | Mod: 25

## 2019-07-13 PROBLEM — K63.9 MASS OF COLON: Status: ACTIVE | Noted: 2019-05-03

## 2019-07-13 PROBLEM — Z90.49 S/P RIGHT HEMICOLECTOMY: Status: ACTIVE | Noted: 2019-07-13

## 2019-07-13 LAB
CULTURE RESULTS: NO GROWTH — SIGNIFICANT CHANGE UP
SPECIMEN SOURCE: SIGNIFICANT CHANGE UP

## 2019-07-13 NOTE — PHYSICAL EXAM
[Normal Breath Sounds] : Normal breath sounds [Normal Heart Sounds] : normal heart sounds [Normal Rate and Rhythm] : normal rate and rhythm [No Rash or Lesion] : No rash or lesion [Alert] : alert [Oriented to Person] : oriented to person [Oriented to Place] : oriented to place [Oriented to Time] : oriented to time [Calm] : calm [de-identified] : soft, NDNT, surgical incision CDI, no erythema, drainage or odor. all staples were removed  [de-identified] : well appearing, no distress

## 2019-07-13 NOTE — ASSESSMENT
[FreeTextEntry1] : A: 73 year old male s/p right hemicolectomy for colon mass found to benign on surgical pathology. Doing well. \par \par P:\par s/p right hemicolectomy\par - regular diet\par - keep incision clean and dry\par - daily showers with antibacterial soap\par - no heavy lifting\par \par RTC in 2-3 months for f/u

## 2019-07-13 NOTE — HISTORY OF PRESENT ILLNESS
[FreeTextEntry1] : 73 year old male with colon polyp who underwent a laparoscopic right hemicolectomy on 6/27/19 at [facility] with Dr. Chung. Colon mass was found to be benign on surgical pathology.  His post operative course was complicated by re-admission for ileus which resolved with NGT.  He is now home and tolerating PO, ambulating and having daily soft BMs.  Denies fever/chills, n/v.

## 2019-07-14 LAB
BACTERIA UR CULT: NORMAL
BILIRUB UR QL STRIP: NEGATIVE
CLARITY UR: CLEAR
COLLECTION METHOD: NORMAL
GLUCOSE UR-MCNC: NEGATIVE
HCG UR QL: 0.2 EU/DL
HGB UR QL STRIP.AUTO: ABNORMAL
KETONES UR-MCNC: NEGATIVE
LEUKOCYTE ESTERASE UR QL STRIP: NEGATIVE
NITRITE UR QL STRIP: NEGATIVE
PH UR STRIP: 5.5
PROT UR STRIP-MCNC: ABNORMAL
SP GR UR STRIP: 1.02

## 2019-07-17 PROBLEM — Z09 HOSPITAL DISCHARGE FOLLOW-UP: Status: ACTIVE | Noted: 2019-07-17

## 2019-07-17 PROBLEM — R82.90 ABNORMAL URINE FINDINGS: Status: ACTIVE | Noted: 2019-07-12

## 2019-07-17 NOTE — HISTORY OF PRESENT ILLNESS
[Spouse] : spouse [FreeTextEntry1] : HOSPITAL F/U, FEVER [de-identified] : ADMITTED [facility] FROM 7/3/19 - 7/6/19\par CALLED ON PHONE TO DISCUSS HOW IS DOING AND CONCERN FOR FEVER.  REFERRED TO ER YESTERDAY.\par MR. HOLDSWORTH IS A PLEASANT 74 YO PRESENTING FOR FOLLOW-UP FROM RECENT HOSPITALIZATION.  HE PRESENTED TO THE ED WITH "PRESSURE LIKE" ABDOMINAL PAIN THAT HAD PERSISTED SINCE DISCHARGE. HE RECENTLY UNDERWENT A LAP RIGHT HEMICOLECTOMY WITH PRIMARY ILEOTRANSVERSE ANASTOMOSIS ON JUNE 27TH WITH PATHOLOGY CONSISTENT WITH TUBULOVILLOUS ADENOMA AND 19 BENIGN LYMPH NODES. HIS POST OPERATIVE COURSE WAS UNCOMPLICATED AND HE WAS DISCHARGED POST OP DAY 2.  AFTER DISCHARGE HE HAD POOR ORAL INTAKE WITH BILIOUS EMESIS WITH NON-BLOOD DIARRHEA.  ONLY ABLE TO URINATE ONCE THE DAY OF ADMISSION.  HE WAS FOUND TO BE IN ACUTE KIDNEY FAILURE AND HAD LEUKOCYTOSIS AT 20.  HE WAS ADMITTED TO THE SURGICAL ICU AND HAD CT OF THE ABDOMEN SHOWING POSTSURGICAL CHANGES AND POSSIBLE ILEUS.  NO BOWEL OBSTRUCTION.  GIVEN IV FLUIDS.  LAB WORK IMPROVED.  NGT TUBE WAS INITIALLY PLACED AND SUBSEQUENTLY REMOVED.  WAS STARTED ON CLEAR LIQUIDS.  C-DIFF TESTING WAS NEGATIVE.  HE FOLLOWED UP WITH COLORECTAL SURGERY DR. HUYNH YESTERDAY.  HAS HAD A GOOD APPETITE.  FEELING WELL.  TAKING METOPROLOL FOR HYPERTENSION.  VALSARTAN WAS HELD IN THE HOSPITAL DUE TO ACUTE KIDNEY FAILURE.  WE REFERRED HIM BACK TO THE ER YESTERDAY DUE TO FEVER UP .  HAS NOT HAD A FEVER TODAY.  NO D/H/F. NO HEMATURIA.  DENIES ABDOMINAL PAIN OR N/V/D.  NO COUGH.  DENIES CHEST PAIN OR SHORTNESS OF BREATH.  NO EDEMA.  IS "FEELING WELL".

## 2019-07-17 NOTE — HISTORY OF PRESENT ILLNESS
[Spouse] : spouse [FreeTextEntry1] : HOSPITAL F/U, FEVER [de-identified] : ADMITTED [facility] FROM 7/3/19 - 7/6/19\par CALLED ON PHONE TO DISCUSS HOW IS DOING AND CONCERN FOR FEVER.  REFERRED TO ER YESTERDAY.\par MR. HOLDSWORTH IS A PLEASANT 72 YO PRESENTING FOR FOLLOW-UP FROM RECENT HOSPITALIZATION.  HE PRESENTED TO THE ED WITH "PRESSURE LIKE" ABDOMINAL PAIN THAT HAD PERSISTED SINCE DISCHARGE. HE RECENTLY UNDERWENT A LAP RIGHT HEMICOLECTOMY WITH PRIMARY ILEOTRANSVERSE ANASTOMOSIS ON JUNE 27TH WITH PATHOLOGY CONSISTENT WITH TUBULOVILLOUS ADENOMA AND 19 BENIGN LYMPH NODES. HIS POST OPERATIVE COURSE WAS UNCOMPLICATED AND HE WAS DISCHARGED POST OP DAY 2.  AFTER DISCHARGE HE HAD POOR ORAL INTAKE WITH BILIOUS EMESIS WITH NON-BLOOD DIARRHEA.  ONLY ABLE TO URINATE ONCE THE DAY OF ADMISSION.  HE WAS FOUND TO BE IN ACUTE KIDNEY FAILURE AND HAD LEUKOCYTOSIS AT 20.  HE WAS ADMITTED TO THE SURGICAL ICU AND HAD CT OF THE ABDOMEN SHOWING POSTSURGICAL CHANGES AND POSSIBLE ILEUS.  NO BOWEL OBSTRUCTION.  GIVEN IV FLUIDS.  LAB WORK IMPROVED.  NGT TUBE WAS INITIALLY PLACED AND SUBSEQUENTLY REMOVED.  WAS STARTED ON CLEAR LIQUIDS.  C-DIFF TESTING WAS NEGATIVE.  HE FOLLOWED UP WITH COLORECTAL SURGERY DR. HUYNH YESTERDAY.  HAS HAD A GOOD APPETITE.  FEELING WELL.  TAKING METOPROLOL FOR HYPERTENSION.  VALSARTAN WAS HELD IN THE HOSPITAL DUE TO ACUTE KIDNEY FAILURE.  WE REFERRED HIM BACK TO THE ER YESTERDAY DUE TO FEVER UP .  HAS NOT HAD A FEVER TODAY.  NO D/H/F. NO HEMATURIA.  DENIES ABDOMINAL PAIN OR N/V/D.  NO COUGH.  DENIES CHEST PAIN OR SHORTNESS OF BREATH.  NO EDEMA.  IS "FEELING WELL".

## 2019-07-17 NOTE — PHYSICAL EXAM
[No Acute Distress] : no acute distress [Well Nourished] : well nourished [Well-Appearing] : well-appearing [Normal Outer Ear/Nose] : the outer ears and nose were normal in appearance [Normal Oropharynx] : the oropharynx was normal [Normal TMs] : both tympanic membranes were normal [No Lymphadenopathy] : no lymphadenopathy [Supple] : supple [No Respiratory Distress] : no respiratory distress  [No Accessory Muscle Use] : no accessory muscle use [Clear to Auscultation] : lungs were clear to auscultation bilaterally [Normal Rate] : normal rate  [Regular Rhythm] : with a regular rhythm [Normal S1, S2] : normal S1 and S2 [Soft] : abdomen soft [Non Tender] : non-tender [Normal Bowel Sounds] : normal bowel sounds [No Joint Swelling] : no joint swelling [Grossly Normal Strength/Tone] : grossly normal strength/tone [Coordination Grossly Intact] : coordination grossly intact [No Focal Deficits] : no focal deficits [Normal Gait] : normal gait [Speech Grossly Normal] : speech grossly normal [Normal Mood] : the mood was normal [Normal Insight/Judgement] : insight and judgment were intact

## 2019-07-17 NOTE — PLAN
[FreeTextEntry1] : DISCHARGE SUMMARY REVIEWED\par MEDICATION LIST REVIEWED AND UPDATED\par WILL MONITOR BLOOD PRESSURE; VALSARTAN ON HOLD\par REMAIN HYDRATED\par ER LAB WORK REVIEWED\par WILL SEND FOR URINE CULTURE\par BLOOD CULTURES PENDING\par FEELING WELL.  AGREES TO GO TO ER WITH ANY WORSENING OR CONCERNS OR IF RETURN OF FEVER - WIFE TO MONITOR CLOSELY\par FOLLOW-UP WITH COLORECTAL SURGERY\par FOLLOW-UP FOR BLOOD PRESSURE CHECK AND BLOOD WORK MONITORING\par SUPPORT GIVEN\par CALL WITH ANY QUESTIONS, CONCERNS OR CHANGES

## 2019-07-26 ENCOUNTER — APPOINTMENT (OUTPATIENT)
Dept: FAMILY MEDICINE | Facility: CLINIC | Age: 74
End: 2019-07-26
Payer: COMMERCIAL

## 2019-07-26 VITALS
SYSTOLIC BLOOD PRESSURE: 120 MMHG | WEIGHT: 190 LBS | DIASTOLIC BLOOD PRESSURE: 82 MMHG | BODY MASS INDEX: 27.2 KG/M2 | HEIGHT: 70 IN | HEART RATE: 86 BPM

## 2019-07-26 VITALS — SYSTOLIC BLOOD PRESSURE: 118 MMHG | DIASTOLIC BLOOD PRESSURE: 72 MMHG

## 2019-07-26 PROCEDURE — 99214 OFFICE O/P EST MOD 30 MIN: CPT

## 2019-07-28 NOTE — COUNSELING
[Activity counseling provided] : activity [Healthy eating counseling provided] : healthy eating [Low Fat Diet] : Low fat diet [Walking] : Walking [Low Salt Diet] : Low salt diet [None] : None

## 2019-07-28 NOTE — PHYSICAL EXAM
[No Acute Distress] : no acute distress [Well Nourished] : well nourished [Well-Appearing] : well-appearing [No Lymphadenopathy] : no lymphadenopathy [No Respiratory Distress] : no respiratory distress  [No Accessory Muscle Use] : no accessory muscle use [Clear to Auscultation] : lungs were clear to auscultation bilaterally [Normal Rate] : normal rate  [Regular Rhythm] : with a regular rhythm [Normal S1, S2] : normal S1 and S2 [No Murmur] : no murmur heard [Soft] : abdomen soft [Non Tender] : non-tender [Normal Bowel Sounds] : normal bowel sounds [Pedal Pulses Present] : the pedal pulses are present [No Edema] : there was no peripheral edema [No Joint Swelling] : no joint swelling [Grossly Normal Strength/Tone] : grossly normal strength/tone [Normal Gait] : normal gait [No Focal Deficits] : no focal deficits [Coordination Grossly Intact] : coordination grossly intact [Normal Insight/Judgement] : insight and judgment were intact [Normal Mood] : the mood was normal [Speech Grossly Normal] : speech grossly normal

## 2019-07-28 NOTE — PLAN
[FreeTextEntry1] : BLOOD PRESSURE CONTROLLED - WILL CONTINUE TO MONITOR\par HOME BLOOD PRESSURE MACHINE CHECKED TODAY\par RECHECK LAB WORK FOR ELEVATED WBC\par HEALTHY DIET AND LIFESTYLE MODIFICATIONS\par CONTINUE ALL MEDICATION AS DIRECTED.\par FOLLOW-UP WITH ALL SPECIALISTS AS DIRECTED. \par CALL WITH ANY QUESTIONS, CONCERNS OR CHANGES.

## 2019-07-28 NOTE — HISTORY OF PRESENT ILLNESS
[FreeTextEntry1] : F/U HTN. [de-identified] : MR. HOLDSWORTH IS A PLEASANT 72 YO PRESENTING FOR FOLLOW-UP. CHRONIC HISTORY OF HYPERTENSION AND HYPERLIPIDEMIA. MONITORS BLOOD PRESSURE AT HOME. BLOOD PRESSURE CONTROLLED ON METOPROLOL  DISCONTINUED USE OF VALSARTAN WHILE HOSPITALIZED DUE TO ACUTE RENAL FAILURE.  BLOOD PRESSURE HAS REMAINED CONTROLLED OFF MEDICATION. IS TRYING TO AVOID MEDICATION FOR CHOLESTEROL WITH LIFESTYLE CHANGES. IS FEELING VERY WELL.  HAS HAD NO FEVER. NO N/V. DENIES ABDOMINAL PAIN OR BLOOD IN STOOL.  NO URINARY COMPLAINTS OR COUGH.  HAS HAD NO SWELLING.  DENIES CHEST PAIN, SHORTNESS OF BREATH, HEADACHE, DIZZINESS, GI OR URINARY COMPLAINTS. NO OTHER COMPLAINTS TODAY.\par

## 2019-09-20 ENCOUNTER — APPOINTMENT (OUTPATIENT)
Dept: COLORECTAL SURGERY | Facility: CLINIC | Age: 74
End: 2019-09-20
Payer: COMMERCIAL

## 2019-09-20 VITALS
BODY MASS INDEX: 27.2 KG/M2 | WEIGHT: 190 LBS | HEART RATE: 88 BPM | SYSTOLIC BLOOD PRESSURE: 124 MMHG | TEMPERATURE: 97.9 F | HEIGHT: 70 IN | DIASTOLIC BLOOD PRESSURE: 74 MMHG

## 2019-09-20 DIAGNOSIS — K63.5 POLYP OF COLON: ICD-10-CM

## 2019-09-20 PROCEDURE — 99024 POSTOP FOLLOW-UP VISIT: CPT

## 2019-09-20 NOTE — ASSESSMENT
[FreeTextEntry1] : 73-year-old male postop laparoscopic right hemicolectomy for large sessile polyps. Doing well

## 2019-09-20 NOTE — PHYSICAL EXAM
[Abdomen Masses] : No abdominal masses [Abdomen Tenderness] : ~T No ~M abdominal tenderness [de-identified] : Looks well in no distress, of stated age. [de-identified] : Well-healed incision

## 2019-09-20 NOTE — HISTORY OF PRESENT ILLNESS
[FreeTextEntry1] : 73-year-old male postop laparoscopic right hemicolectomy for sessile polyps. Doing very well denies any pain fevers chills nausea or vomiting. Very happy with the results of his surgery

## 2019-10-02 ENCOUNTER — APPOINTMENT (OUTPATIENT)
Dept: CARDIOLOGY | Facility: CLINIC | Age: 74
End: 2019-10-02
Payer: COMMERCIAL

## 2019-10-02 VITALS
HEIGHT: 70 IN | RESPIRATION RATE: 16 BRPM | DIASTOLIC BLOOD PRESSURE: 84 MMHG | SYSTOLIC BLOOD PRESSURE: 136 MMHG | HEART RATE: 86 BPM | BODY MASS INDEX: 27.77 KG/M2 | OXYGEN SATURATION: 98 % | WEIGHT: 194 LBS

## 2019-10-02 VITALS
OXYGEN SATURATION: 98 % | DIASTOLIC BLOOD PRESSURE: 72 MMHG | RESPIRATION RATE: 16 BRPM | HEART RATE: 86 BPM | SYSTOLIC BLOOD PRESSURE: 138 MMHG

## 2019-10-02 PROCEDURE — 99204 OFFICE O/P NEW MOD 45 MIN: CPT | Mod: 25

## 2019-10-02 PROCEDURE — 93000 ELECTROCARDIOGRAM COMPLETE: CPT

## 2019-10-02 RX ORDER — FLUTICASONE PROPIONATE 0.5 MG/G
0.05 CREAM TOPICAL
Refills: 0 | Status: DISCONTINUED | COMMUNITY
Start: 2018-10-25 | End: 2019-10-02

## 2019-10-02 NOTE — HISTORY OF PRESENT ILLNESS
[FreeTextEntry1] : moderate aortic stenosis, hypertension \par \par This is a 73 year old male with history of aortic stenosis, hypertension  here for a new cardiology appt\par feels good. denies any cehst pauin no dyspnea. 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 no dizziness. no chest pauin. no syncope.\par

## 2019-10-02 NOTE — PHYSICAL EXAM
[General Appearance - Well Developed] : well developed [Well Groomed] : well groomed [Normal Appearance] : normal appearance [General Appearance - Well Nourished] : well nourished [No Deformities] : no deformities [General Appearance - In No Acute Distress] : no acute distress [Normal Conjunctiva] : the conjunctiva exhibited no abnormalities [Eyelids - No Xanthelasma] : the eyelids demonstrated no xanthelasmas [Normal Oral Mucosa] : normal oral mucosa [No Oral Pallor] : no oral pallor [No Oral Cyanosis] : no oral cyanosis [Normal Jugular Venous A Waves Present] : normal jugular venous A waves present [Normal Jugular Venous V Waves Present] : normal jugular venous V waves present [No Jugular Venous Parks A Waves] : no jugular venous parks A waves [Heart Rate And Rhythm] : heart rate and rhythm were normal [Heart Sounds] : normal S1 and S2 [FreeTextEntry1] : 4/6 systolci ejection murmur.  [Respiration, Rhythm And Depth] : normal respiratory rhythm and effort [Exaggerated Use Of Accessory Muscles For Inspiration] : no accessory muscle use [Auscultation Breath Sounds / Voice Sounds] : lungs were clear to auscultation bilaterally [Abdomen Soft] : soft [Abdomen Tenderness] : non-tender [Abdomen Mass (___ Cm)] : no abdominal mass palpated [Gait - Sufficient For Exercise Testing] : the gait was sufficient for exercise testing [Abnormal Walk] : normal gait [Nail Clubbing] : no clubbing of the fingernails [Petechial Hemorrhages (___cm)] : no petechial hemorrhages [Cyanosis, Localized] : no localized cyanosis [Skin Color & Pigmentation] : normal skin color and pigmentation [] : no rash [No Venous Stasis] : no venous stasis [Skin Lesions] : no skin lesions [No Skin Ulcers] : no skin ulcer [No Xanthoma] : no  xanthoma was observed [Oriented To Time, Place, And Person] : oriented to person, place, and time [Affect] : the affect was normal [Mood] : the mood was normal [No Anxiety] : not feeling anxious

## 2019-10-02 NOTE — DISCUSSION/SUMMARY
[Patient] : the patient [Risks] : risks [Benefits] : benefits [Alternatives] : alternatives [___ Month(s)] : [unfilled] month(s) [With Me] : with me [FreeTextEntry1] : This is a 73 year old male with history of hypertension and dyslipidemia , moderate aortic stenosis, here with change of cardiologist.\par \par 1) Moderate aortic stenosis: NO cardiac symptoms. Yearly follow up.  \par 2) coronary artery disease evaluation: recent stress test negative. \par 3) hypertension : cotnrolled. ct current meds,.

## 2019-10-02 NOTE — REASON FOR VISIT
[Initial Evaluation] : an initial evaluation of [FreeTextEntry2] : Aortic stenosis.  [FreeTextEntry1] : Aortic stenosis.

## 2019-10-02 NOTE — CARDIOLOGY SUMMARY
[No Exercise Ind Arr] : no exercise induced arrhythmias [No Symptoms] : no Symptoms [___] : [unfilled] [de-identified] : carotid Doppler: Mild plaque without stensois.

## 2019-10-03 ENCOUNTER — APPOINTMENT (OUTPATIENT)
Dept: FAMILY MEDICINE | Facility: CLINIC | Age: 74
End: 2019-10-03
Payer: COMMERCIAL

## 2019-10-03 VITALS
WEIGHT: 198 LBS | HEART RATE: 72 BPM | HEIGHT: 70 IN | BODY MASS INDEX: 28.35 KG/M2 | SYSTOLIC BLOOD PRESSURE: 110 MMHG | DIASTOLIC BLOOD PRESSURE: 74 MMHG

## 2019-10-03 DIAGNOSIS — N40.0 BENIGN PROSTATIC HYPERPLASIA WITHOUT LOWER URINARY TRACT SYMPMS: ICD-10-CM

## 2019-10-03 PROCEDURE — 90471 IMMUNIZATION ADMIN: CPT

## 2019-10-03 PROCEDURE — 99214 OFFICE O/P EST MOD 30 MIN: CPT | Mod: 25

## 2019-10-03 PROCEDURE — 90662 IIV NO PRSV INCREASED AG IM: CPT

## 2019-10-03 NOTE — PLAN
[FreeTextEntry1] : RECENT LAB WORK REVIEWED AND WILL MONITOR INCLUDING LIPIDS AND PSA\par HEALTHY DIET AND LIFESTYLE MODIFICATIONS\par HEALTHY WEIGHT LOSS\par BLOOD PRESSURE CONTROLLED\par FLU VACCINE GIVEN AND TOLERATED WELL\par CONTINUE ALL MEDICATIONS AS DIRECTED\par FOLLOW-UP WITH ALL SPECIALISTS AS DIRECTED\par CALL WITH ANY QUESTIONS, CONCERNS OR CHANGES

## 2019-10-03 NOTE — HISTORY OF PRESENT ILLNESS
[FreeTextEntry1] : F/U HYPERTENSION [de-identified] : MR. HOLDSWORTH IS A PLEASANT 72 YO PRESENTING FOR FOLLOW-UP. CHRONIC HISTORY OF HYPERTENSION AND HYPERLIPIDEMIA. MONITORS BLOOD PRESSURE AT HOME. BLOOD PRESSURE CONTROLLED ON VALSARTAN AND METOPROLOL.  NOW SEEING DR. KEMP FOR CARDIOLOGY CARE. DIET IS "NOT BAD".  TRYING TO MAKE HEALTHIER CHOICES.  REMAINING ACTIVE.  HISTORY OF ENLARGED PROSTATE.  HAS HAD NO HEADACHES, DIZZINESS, CHEST PAIN, SHORTNESS OF BREATH, GI OR URINARY COMPLAINTS. NO OTHER COMPLAINTS TODAY.

## 2019-10-03 NOTE — PHYSICAL EXAM
[No Acute Distress] : no acute distress [Well Nourished] : well nourished [Well-Appearing] : well-appearing [No Lymphadenopathy] : no lymphadenopathy [Supple] : supple [No Respiratory Distress] : no respiratory distress  [No Accessory Muscle Use] : no accessory muscle use [Clear to Auscultation] : lungs were clear to auscultation bilaterally [Normal Rate] : normal rate  [Regular Rhythm] : with a regular rhythm [Normal S1, S2] : normal S1 and S2 [No Murmur] : no murmur heard [Pedal Pulses Present] : the pedal pulses are present [No Edema] : there was no peripheral edema [Soft] : abdomen soft [Non Tender] : non-tender [Normal Bowel Sounds] : normal bowel sounds [No Joint Swelling] : no joint swelling [Grossly Normal Strength/Tone] : grossly normal strength/tone [Speech Grossly Normal] : speech grossly normal [Memory Grossly Normal] : memory grossly normal [Normal Affect] : the affect was normal [Alert and Oriented x3] : oriented to person, place, and time

## 2019-10-03 NOTE — REVIEW OF SYSTEMS
[Fever] : no fever [Chills] : no chills [Fatigue] : no fatigue [Discharge] : no discharge [Pain] : no pain [Chest Pain] : no chest pain [Palpitations] : no palpitations [Lower Ext Edema] : no lower extremity edema [Shortness Of Breath] : no shortness of breath [Wheezing] : no wheezing [Cough] : no cough [Abdominal Pain] : no abdominal pain [Nausea] : no nausea [Diarrhea] : no diarrhea [Vomiting] : no vomiting [Dysuria] : no dysuria [Joint Pain] : no joint pain [Hematuria] : no hematuria [Muscle Pain] : no muscle pain [Muscle Weakness] : no muscle weakness [Headache] : no headache [Dizziness] : no dizziness [Fainting] : no fainting

## 2020-01-28 ENCOUNTER — RX RENEWAL (OUTPATIENT)
Age: 75
End: 2020-01-28

## 2020-06-05 ENCOUNTER — APPOINTMENT (OUTPATIENT)
Dept: CARDIOLOGY | Facility: CLINIC | Age: 75
End: 2020-06-05
Payer: MEDICARE

## 2020-06-05 VITALS
SYSTOLIC BLOOD PRESSURE: 135 MMHG | OXYGEN SATURATION: 99 % | WEIGHT: 192 LBS | DIASTOLIC BLOOD PRESSURE: 64 MMHG | HEART RATE: 78 BPM | TEMPERATURE: 98.5 F | HEIGHT: 70.5 IN | BODY MASS INDEX: 27.18 KG/M2

## 2020-06-05 PROCEDURE — 93000 ELECTROCARDIOGRAM COMPLETE: CPT

## 2020-06-05 PROCEDURE — 99215 OFFICE O/P EST HI 40 MIN: CPT

## 2020-06-05 RX ORDER — VALSARTAN 160 MG/1
160 TABLET, COATED ORAL DAILY
Qty: 90 | Refills: 1 | Status: DISCONTINUED | COMMUNITY
Start: 2020-01-28 | End: 2020-06-05

## 2020-06-05 NOTE — CARDIOLOGY SUMMARY
[No Exercise Ind Arr] : no exercise induced arrhythmias [No Symptoms] : no Symptoms [___] : [unfilled] [de-identified] : carotid Doppler: Mild plaque without stensois.

## 2020-06-05 NOTE — HISTORY OF PRESENT ILLNESS
[FreeTextEntry1] : moderate aortic stenosis, hypertension \par \par \par HPI for today: : feels good. No headaches./ Drinks wine.  every day. 2-3 glasses. no headaches. no dizziness.  \par \par old note: This is a 73 year old male with history of aortic stenosis, hypertension  here for a new cardiology appt\par feels good. denies any cehst pauin no dyspnea. 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 no dizziness. no chest pauin. no syncope.\par

## 2020-06-05 NOTE — PHYSICAL EXAM
[General Appearance - Well Developed] : well developed [Normal Appearance] : normal appearance [Well Groomed] : well groomed [General Appearance - Well Nourished] : well nourished [No Deformities] : no deformities [General Appearance - In No Acute Distress] : no acute distress [Normal Conjunctiva] : the conjunctiva exhibited no abnormalities [Eyelids - No Xanthelasma] : the eyelids demonstrated no xanthelasmas [Normal Oral Mucosa] : normal oral mucosa [No Oral Pallor] : no oral pallor [No Oral Cyanosis] : no oral cyanosis [Normal Jugular Venous A Waves Present] : normal jugular venous A waves present [Normal Jugular Venous V Waves Present] : normal jugular venous V waves present [No Jugular Venous Parks A Waves] : no jugular venous parks A waves [Respiration, Rhythm And Depth] : normal respiratory rhythm and effort [Auscultation Breath Sounds / Voice Sounds] : lungs were clear to auscultation bilaterally [Exaggerated Use Of Accessory Muscles For Inspiration] : no accessory muscle use [Heart Rate And Rhythm] : heart rate and rhythm were normal [Heart Sounds] : normal S1 and S2 [FreeTextEntry1] : 4/6 systolci ejection murmur.  [Abdomen Soft] : soft [Abdomen Tenderness] : non-tender [Abdomen Mass (___ Cm)] : no abdominal mass palpated [Gait - Sufficient For Exercise Testing] : the gait was sufficient for exercise testing [Abnormal Walk] : normal gait [Nail Clubbing] : no clubbing of the fingernails [Cyanosis, Localized] : no localized cyanosis [Petechial Hemorrhages (___cm)] : no petechial hemorrhages [Skin Color & Pigmentation] : normal skin color and pigmentation [] : no rash [No Venous Stasis] : no venous stasis [Skin Lesions] : no skin lesions [No Skin Ulcers] : no skin ulcer [No Xanthoma] : no  xanthoma was observed [Oriented To Time, Place, And Person] : oriented to person, place, and time [Affect] : the affect was normal [Mood] : the mood was normal [No Anxiety] : not feeling anxious

## 2020-06-05 NOTE — DISCUSSION/SUMMARY
[Patient] : the patient [Risks] : risks [Benefits] : benefits [Alternatives] : alternatives [___ Month(s)] : [unfilled] month(s) [With Me] : with me [FreeTextEntry1] : This is a 73 year old male with history of hypertension and dyslipidemia , moderate aortic stenosis, here with change of cardiologist.\par \par 1) Moderate aortic stenosis: NO cardiac symptoms. Yearly follow up.  2D echo, \par 2) coronary artery disease evaluation:  intermediate risk factors for coronary artery disease.   patient defers stress test. echocardiogram with contrast if needed.  \par 3) hypertension : controlled. ct current meds,. \par 4) mild carotid plaque: carotid Duplex.

## 2020-06-18 ENCOUNTER — APPOINTMENT (OUTPATIENT)
Dept: CARDIOLOGY | Facility: CLINIC | Age: 75
End: 2020-06-18
Payer: MEDICARE

## 2020-06-18 PROCEDURE — 93356 MYOCRD STRAIN IMG SPCKL TRCK: CPT

## 2020-06-18 PROCEDURE — 93306 TTE W/DOPPLER COMPLETE: CPT

## 2020-06-22 ENCOUNTER — APPOINTMENT (OUTPATIENT)
Dept: FAMILY MEDICINE | Facility: CLINIC | Age: 75
End: 2020-06-22
Payer: MEDICARE

## 2020-06-22 VITALS
HEIGHT: 70.5 IN | SYSTOLIC BLOOD PRESSURE: 120 MMHG | WEIGHT: 188 LBS | HEART RATE: 66 BPM | DIASTOLIC BLOOD PRESSURE: 60 MMHG | BODY MASS INDEX: 26.62 KG/M2

## 2020-06-22 DIAGNOSIS — Z12.5 ENCOUNTER FOR SCREENING FOR MALIGNANT NEOPLASM OF PROSTATE: ICD-10-CM

## 2020-06-22 PROCEDURE — 36415 COLL VENOUS BLD VENIPUNCTURE: CPT

## 2020-06-22 PROCEDURE — 99214 OFFICE O/P EST MOD 30 MIN: CPT | Mod: 25

## 2020-06-22 NOTE — PLAN
[FreeTextEntry1] : RE-ADVISED CT CHEST\par CHECK LAB WORK INCLUDING LIPIDS\par BLOOD PRESSURE CONTROLLED\par COMMENDED ON HEALTHY DIET AND LIFESTYLE MODIFICATIONS; HEALTHY WEIGHT LOSS\par FOLLOW-UP ALL SPECIALISTS AS DIRECTED INCLUDING GI\par FLU VACCINE IN THE FALL RECOMMENDED\par CONSIDER SHINGRIX\par CALL WITH ANY QUESTIONS, CONCERNS OR CHANGES

## 2020-06-22 NOTE — HISTORY OF PRESENT ILLNESS
[de-identified] : MR. HOLDSWORTH IS A PLEASANT 75 YO PRESENTING FOR FOLLOW-UP.  CHRONIC HISTORY OF HYPERTENSION, AORTIC STENOSIS  AND HYPERLIPIDEMIA. REMAINS ON METOPROLOL AND VALSARTAN FOR BLOOD PRESSURE.  SAW CARDIOLOGY FOR ROUTINE FOLLOW-UP.  HAD ECHOCARDIOGRAM.  TO HAVE CAROTIDS CHECKED TOMORROW.  SEES DERMATOLOGY DR. DE LA ROSA.  HAS HAD SUCCESSFUL WEIGHT LOSS.  PORTIONS IMPROVED.  NOT EATING BIG MEALS LIKE HE HAD BEEN.  PRIOR INCIDENTAL PULMONARY NODULE ON CT IMAGING WITH COLORECTAL SURGERY.  DID NOT GO FOR DEDICATED CT CHEST BUT IS WILLING TO GO.  NO NEW COMPLAINTS TODAY. [FreeTextEntry1] : F/U CHOLESTEROL, BLOOD PRESSURE

## 2020-06-22 NOTE — PHYSICAL EXAM
[No Acute Distress] : no acute distress [Well Nourished] : well nourished [Well-Appearing] : well-appearing [Normal Sclera/Conjunctiva] : normal sclera/conjunctiva [PERRL] : pupils equal round and reactive to light [Normal Outer Ear/Nose] : the outer ears and nose were normal in appearance [Normal Oropharynx] : the oropharynx was normal [Normal TMs] : both tympanic membranes were normal [No Lymphadenopathy] : no lymphadenopathy [Supple] : supple [No Respiratory Distress] : no respiratory distress  [No Accessory Muscle Use] : no accessory muscle use [Clear to Auscultation] : lungs were clear to auscultation bilaterally [Normal Rate] : normal rate  [Regular Rhythm] : with a regular rhythm [Normal S1, S2] : normal S1 and S2 [Soft] : abdomen soft [Non Tender] : non-tender [Normal Bowel Sounds] : normal bowel sounds [No Joint Swelling] : no joint swelling [Grossly Normal Strength/Tone] : grossly normal strength/tone

## 2020-06-23 ENCOUNTER — APPOINTMENT (OUTPATIENT)
Dept: CARDIOLOGY | Facility: CLINIC | Age: 75
End: 2020-06-23
Payer: MEDICARE

## 2020-06-23 PROCEDURE — 93880 EXTRACRANIAL BILAT STUDY: CPT

## 2020-06-25 LAB
ALBUMIN SERPL ELPH-MCNC: 4.7 G/DL
ALP BLD-CCNC: 55 U/L
ALT SERPL-CCNC: 22 U/L
ANION GAP SERPL CALC-SCNC: 17 MMOL/L
AST SERPL-CCNC: 29 U/L
BASOPHILS # BLD AUTO: 0.06 K/UL
BASOPHILS NFR BLD AUTO: 0.7 %
BILIRUB SERPL-MCNC: 1 MG/DL
BUN SERPL-MCNC: 14 MG/DL
CALCIUM SERPL-MCNC: 9.7 MG/DL
CHLORIDE SERPL-SCNC: 102 MMOL/L
CHOLEST SERPL-MCNC: 243 MG/DL
CHOLEST/HDLC SERPL: 3.9 RATIO
CO2 SERPL-SCNC: 22 MMOL/L
CREAT SERPL-MCNC: 0.79 MG/DL
EOSINOPHIL # BLD AUTO: 0.67 K/UL
EOSINOPHIL NFR BLD AUTO: 7.3 %
ESTIMATED AVERAGE GLUCOSE: 105 MG/DL
GLUCOSE SERPL-MCNC: 84 MG/DL
HBA1C MFR BLD HPLC: 5.3 %
HCT VFR BLD CALC: 45.7 %
HDLC SERPL-MCNC: 63 MG/DL
HGB BLD-MCNC: 15.1 G/DL
IMM GRANULOCYTES NFR BLD AUTO: 0.1 %
LDLC SERPL CALC-MCNC: 158 MG/DL
LYMPHOCYTES # BLD AUTO: 1.5 K/UL
LYMPHOCYTES NFR BLD AUTO: 16.4 %
MAN DIFF?: NORMAL
MCHC RBC-ENTMCNC: 32.4 PG
MCHC RBC-ENTMCNC: 33 GM/DL
MCV RBC AUTO: 98.1 FL
MONOCYTES # BLD AUTO: 0.85 K/UL
MONOCYTES NFR BLD AUTO: 9.3 %
NEUTROPHILS # BLD AUTO: 6.05 K/UL
NEUTROPHILS NFR BLD AUTO: 66.2 %
PLATELET # BLD AUTO: 281 K/UL
POTASSIUM SERPL-SCNC: 4.9 MMOL/L
PROT SERPL-MCNC: 7.2 G/DL
PSA SERPL-MCNC: 3.17 NG/ML
RBC # BLD: 4.66 M/UL
RBC # FLD: 13.2 %
SODIUM SERPL-SCNC: 140 MMOL/L
TRIGL SERPL-MCNC: 110 MG/DL
WBC # FLD AUTO: 9.14 K/UL

## 2020-07-30 ENCOUNTER — APPOINTMENT (OUTPATIENT)
Dept: CT IMAGING | Facility: CLINIC | Age: 75
End: 2020-07-30

## 2020-07-30 ENCOUNTER — OUTPATIENT (OUTPATIENT)
Dept: OUTPATIENT SERVICES | Facility: HOSPITAL | Age: 75
LOS: 1 days | End: 2020-07-30
Payer: MEDICARE

## 2020-07-30 DIAGNOSIS — Z90.49 ACQUIRED ABSENCE OF OTHER SPECIFIED PARTS OF DIGESTIVE TRACT: Chronic | ICD-10-CM

## 2020-07-30 DIAGNOSIS — R91.1 SOLITARY PULMONARY NODULE: ICD-10-CM

## 2020-07-30 DIAGNOSIS — Z98.890 OTHER SPECIFIED POSTPROCEDURAL STATES: Chronic | ICD-10-CM

## 2020-07-30 PROCEDURE — 71250 CT THORAX DX C-: CPT | Mod: 26

## 2020-08-06 NOTE — ED ADULT NURSE NOTE - SEPSIS REFERENCE DATA FOR CRITERIA 1 WBC
encephalopathy/confusion and gait instability likely 2/2 worsening cognitive impairment  neurology input appreciated    fall precautions  out of bed with assistance, ambulate with assistance   nutrition consult appreciated     PT consult appreciated, will likely need rehab placement  DNR and DNI - MOLST in the chart  Daughter phone number [phone] encephalopathy/confusion and gait instability likely 2/2 worsening cognitive impairment  neurology input appreciated    fall precautions  out of bed with assistance, ambulate with assistance   nutrition consult appreciated     PT consult appreciated, will likely need rehab placement  Daughter refusing [facility], requesting patient return home with PT and outpatient podiatry follow up  DNR and DNI - MOLST in the chart  Daughter phone number [phone] encephalopathy/confusion and gait instability likely 2/2 worsening cognitive impairment  neurology input appreciated    fall precautions  out of bed with assistance, ambulate with assistance   nutrition consult appreciated     PT consult appreciated, will likely need rehab placement  Daughter declines [facility] due to concern for worsening cognitive impairment and "what will PT do for her if she can't bear weight on her L foot". Despite my counseling, she declines dispo to [facility] and prefers home PT.   DNR and DNI - MOLST in the chart  Daughter phone number [phone] < 4,000 OR > 12,000

## 2020-10-13 ENCOUNTER — RX RENEWAL (OUTPATIENT)
Age: 75
End: 2020-10-13

## 2021-02-05 ENCOUNTER — NON-APPOINTMENT (OUTPATIENT)
Age: 76
End: 2021-02-05

## 2021-02-05 ENCOUNTER — APPOINTMENT (OUTPATIENT)
Dept: CARDIOLOGY | Facility: CLINIC | Age: 76
End: 2021-02-05
Payer: MEDICARE

## 2021-02-05 VITALS
HEART RATE: 72 BPM | TEMPERATURE: 99.1 F | OXYGEN SATURATION: 96 % | SYSTOLIC BLOOD PRESSURE: 140 MMHG | BODY MASS INDEX: 28.03 KG/M2 | DIASTOLIC BLOOD PRESSURE: 78 MMHG | WEIGHT: 198 LBS | HEIGHT: 70.5 IN

## 2021-02-05 PROCEDURE — 99215 OFFICE O/P EST HI 40 MIN: CPT

## 2021-02-05 PROCEDURE — 93000 ELECTROCARDIOGRAM COMPLETE: CPT

## 2021-02-05 NOTE — CARDIOLOGY SUMMARY
[No Exercise Ind Arr] : no exercise induced arrhythmias [No Symptoms] : no Symptoms [___] : [unfilled] [de-identified] : carotid Doppler: Mild plaque without stensois.

## 2021-02-05 NOTE — DISCUSSION/SUMMARY
[Patient] : the patient [Risks] : risks [Benefits] : benefits [Alternatives] : alternatives [___ Month(s)] : [unfilled] month(s) [With Me] : with me [FreeTextEntry1] : This is a 73 year old male with history of hypertension and dyslipidemia , moderate aortic stenosis, here with change of cardiologist.\par \par 1) Moderate aortic stenosis:  stable add statins \par 2) coronary artery disease evaluation: moderate coronary artery calcifications. initiate crestor 5 mg daily coq10 multivitamine \par 3) hypertension : controlled. ct current meds,.  valsartan 80 mg daily. ct toprol 25 mg daily. \par 4) mild carotid plaque:   statins. \par 5) aortic calcifications,  carotid plaque: risk of MI and stroke.  initaite aspirin 81 .

## 2021-02-05 NOTE — PHYSICAL EXAM
[General Appearance - Well Developed] : well developed [Normal Appearance] : normal appearance [Well Groomed] : well groomed [General Appearance - Well Nourished] : well nourished [No Deformities] : no deformities [General Appearance - In No Acute Distress] : no acute distress [Normal Conjunctiva] : the conjunctiva exhibited no abnormalities [Eyelids - No Xanthelasma] : the eyelids demonstrated no xanthelasmas [Normal Oral Mucosa] : normal oral mucosa [No Oral Pallor] : no oral pallor [No Oral Cyanosis] : no oral cyanosis [Normal Jugular Venous A Waves Present] : normal jugular venous A waves present [Normal Jugular Venous V Waves Present] : normal jugular venous V waves present [No Jugular Venous Parks A Waves] : no jugular venous parks A waves [Respiration, Rhythm And Depth] : normal respiratory rhythm and effort [Exaggerated Use Of Accessory Muscles For Inspiration] : no accessory muscle use [Auscultation Breath Sounds / Voice Sounds] : lungs were clear to auscultation bilaterally [Heart Sounds] : normal S1 and S2 [Heart Rate And Rhythm] : heart rate and rhythm were normal [FreeTextEntry1] : 4/6 systolci ejection murmur.  [Abdomen Soft] : soft [Abdomen Tenderness] : non-tender [Abdomen Mass (___ Cm)] : no abdominal mass palpated [Abnormal Walk] : normal gait [Gait - Sufficient For Exercise Testing] : the gait was sufficient for exercise testing [Nail Clubbing] : no clubbing of the fingernails [Cyanosis, Localized] : no localized cyanosis [Petechial Hemorrhages (___cm)] : no petechial hemorrhages [Skin Color & Pigmentation] : normal skin color and pigmentation [] : no rash [No Venous Stasis] : no venous stasis [Skin Lesions] : no skin lesions [No Skin Ulcers] : no skin ulcer [No Xanthoma] : no  xanthoma was observed [Oriented To Time, Place, And Person] : oriented to person, place, and time [Affect] : the affect was normal [Mood] : the mood was normal [No Anxiety] : not feeling anxious

## 2021-02-05 NOTE — HISTORY OF PRESENT ILLNESS
[FreeTextEntry1] : moderate aortic stenosis, hypertension \par \par HPI for today: :  no headches. no dizziness. no palpitations.  drinks one glass of wine every day\par he is out.  physically active.\par ct scan reviewed: my self.  midl aortic calficiation. atlease motderate to severe coronay calcificaitons in LAd and PDA . \par last time he had side effect of statins. possible. he is not on statin. only taking losartan\par \par \par \par \par OLD NOTE:  : feels good. No headaches./ Drinks wine.  every day. 2-3 glasses. no headaches. no dizziness.  \par \par old note: This is a 73 year old male with history of aortic stenosis, hypertension  here for a new cardiology appt\par feels good. denies any cehst pauin no dyspnea. 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 no dizziness. no chest pauin. no syncope.\par

## 2021-04-14 ENCOUNTER — APPOINTMENT (OUTPATIENT)
Dept: CARDIOLOGY | Facility: CLINIC | Age: 76
End: 2021-04-14
Payer: MEDICARE

## 2021-04-14 PROCEDURE — 93351 STRESS TTE COMPLETE: CPT

## 2021-04-14 PROCEDURE — 93320 DOPPLER ECHO COMPLETE: CPT

## 2021-05-06 ENCOUNTER — RX RENEWAL (OUTPATIENT)
Age: 76
End: 2021-05-06

## 2021-06-28 ENCOUNTER — APPOINTMENT (OUTPATIENT)
Dept: FAMILY MEDICINE | Facility: CLINIC | Age: 76
End: 2021-06-28
Payer: MEDICARE

## 2021-06-28 VITALS
TEMPERATURE: 98 F | HEART RATE: 64 BPM | HEIGHT: 70 IN | BODY MASS INDEX: 27.63 KG/M2 | SYSTOLIC BLOOD PRESSURE: 110 MMHG | DIASTOLIC BLOOD PRESSURE: 68 MMHG | OXYGEN SATURATION: 98 % | WEIGHT: 193 LBS

## 2021-06-28 DIAGNOSIS — Z85.038 PERSONAL HISTORY OF OTHER MALIGNANT NEOPLASM OF LARGE INTESTINE: ICD-10-CM

## 2021-06-28 PROCEDURE — 99214 OFFICE O/P EST MOD 30 MIN: CPT

## 2021-06-28 RX ORDER — ROSUVASTATIN CALCIUM 5 MG/1
5 TABLET, FILM COATED ORAL DAILY
Qty: 90 | Refills: 3 | Status: DISCONTINUED | COMMUNITY
Start: 2021-02-05 | End: 2021-06-28

## 2021-06-28 NOTE — PLAN
[FreeTextEntry1] : HEALTHY DIET AND LIFESTYLE MODIFICATIONS\par HEALTHY WEIGHT LOSS \par TRINH NOT TAKE CRESTOR OR ANY OTHER CHOLESTEROL MEDICATIONS\par CHECK LAB WORK\par BLOOD PRESSURE CONTROLLED\par CONTINUE CURRENT MEDICATIONS METOPROLOL 25 MG DAILY AND VALSARTAN 80 MG DAILY\par RX CT CHEST\par FOLLOW-UP ALL SPECIALISTS AS DIRECTED \par CALL WITH ANY QUESTIONS, CONCERNS OR CHANGES \par FOLLOW-UP FOR CPE\par PRIOR LAB WORK, CT CHEST AND CARDIOLOGY CONSULTANT NOTE REVIEWED

## 2021-06-28 NOTE — PHYSICAL EXAM
[No Acute Distress] : no acute distress [Well Nourished] : well nourished [Well-Appearing] : well-appearing [Normal Sclera/Conjunctiva] : normal sclera/conjunctiva [PERRL] : pupils equal round and reactive to light [No Lymphadenopathy] : no lymphadenopathy [Supple] : supple [No Respiratory Distress] : no respiratory distress  [No Accessory Muscle Use] : no accessory muscle use [Clear to Auscultation] : lungs were clear to auscultation bilaterally [Normal Rate] : normal rate  [Regular Rhythm] : with a regular rhythm [Normal S1, S2] : normal S1 and S2 [Speech Grossly Normal] : speech grossly normal [Normal Affect] : the affect was normal [Normal Mood] : the mood was normal

## 2021-06-28 NOTE — HISTORY OF PRESENT ILLNESS
[FreeTextEntry1] : F/U HYPERTENSION [de-identified] : MR. HOLDSWORTH IS A PLEASANT 76 YO PRESENTING FOR FOLLOW-UP.  HISTORY O FHYPERTENSION, HYPERLIPIDEMIA AND OVERWEIGHT.  WILL NOT TAKE CRESTOR.  HAS BEEN MORE ACTIVE.  PORTIONS BETTER.  LESS CARBOHYDRATES  TRYING TO EAT HEALTHIER.  BLOOD PRESSURE CONTROLLED ON METOPROLOL AND VALSARTAN.  PRIOR INCIDENTAL PULMONARY NODULES VISUALIZED ON CT CHEST.  FEELING WELL TODAY WITHOUT COMPLAINT.

## 2021-08-02 ENCOUNTER — OUTPATIENT (OUTPATIENT)
Dept: OUTPATIENT SERVICES | Facility: HOSPITAL | Age: 76
LOS: 1 days | End: 2021-08-02

## 2021-08-02 ENCOUNTER — APPOINTMENT (OUTPATIENT)
Dept: CT IMAGING | Facility: CLINIC | Age: 76
End: 2021-08-02
Payer: MEDICARE

## 2021-08-02 DIAGNOSIS — Z98.890 OTHER SPECIFIED POSTPROCEDURAL STATES: Chronic | ICD-10-CM

## 2021-08-02 DIAGNOSIS — Z90.49 ACQUIRED ABSENCE OF OTHER SPECIFIED PARTS OF DIGESTIVE TRACT: Chronic | ICD-10-CM

## 2021-08-02 DIAGNOSIS — R91.1 SOLITARY PULMONARY NODULE: ICD-10-CM

## 2021-08-02 PROCEDURE — 71250 CT THORAX DX C-: CPT | Mod: 26

## 2021-09-02 NOTE — HISTORY OF PRESENT ILLNESS
NEW PATIENT SPECIALTY PHARMACY CONSULTATION     Situation  Spoke to patient today regarding the new medication, Mavyret (glecaprevir/pibrentasvir), to set up delivery and provide consultation.     Start of Therapy Date: Patient is to start therapy on 9/7/21    Background    Verify Allergies: ALLERGIES:  Patient has no known allergies.    Verify Medication list:   Current Outpatient Medications   Medication Sig   • Glecaprevir-Pibrentasvir (MAVYRET) 100-40 MG Tab per tablet Take 3 tablets by mouth daily. Take with food. For 8 weeks.   • atorvastatin (LIPITOR) 20 MG tablet Take 1 tablet by mouth daily.   • METHADONE HCL PO Take 150 mg by mouth.     No current facility-administered medications for this visit.       Drug Interactions Identified?    No- pt currently not taking atorvastating    Screen Medications for ISMP high alert drugs   Medication profile does NOT include ISMP high alert drugs    Assessment    Purpose of treatment (indication)  - Hepatitis C  - Genotype: 3  - Treatment status: Treatment-naive  - Cirrhosis status: No cirrhosis (Stage 0-3 fibrosis)  - Treatment Duration: 12 weeks    Dose appropriate for renal function: Yes    Dose appropriate for hepatic function: Yes      Warnings and Precautions  - [US BOXED WARNING] Hepatitis B Reactivation: Hepatitis B reactivation can occur in patients co-infected with HBV during or after treatment of HCV infection. Some cases have resulted in fulminant hepatitis, hepatic failure and death. All patients starting hepatitis C treatment should be tested for the presence hepatitis B virus in their blood prior to starting treatment. If you are co-infected with HBV/HCV, you will need to be monitored for hepatitis flare during and after hepatitis C treatment. Hepatitis B treatment may also be warranted.   - Drug-drug interactions:  - Carbamazepine, efavirenz, and Claudia’s wort may significantly decrease plasma concentrations of glecaprevir and pibrentasvir, leading to  [de-identified] : Patient arrived for the colonoscopy. No complaints. Doing well. reduced therapeutic effect of MAVYRET. The use of these agents with MAVYRET is not recommended. Keep all lab and MD appointments to monitor effectiveness and lab values.   - MAVYRET is contraindicated with atazanavir or rifampin  - Contraindicated in patients with severe hepatic impairment (Child-Calderon C)    Fertility, Pregnancy and Lactation  - Pregnancy: No adequate human data are available to establish whether or not MAVYRET poses a risk to pregnancy outcomes.  - Lactation: Not known. The decision to breastfeed during therapy should take into account the risk of exposure to infants and benefits of treatment to the mother.    How to Administer (including dosing schedules if necessary)  - Mavyret (glecaprevir/pibrentasvir) comes as 100-40 mg tablets. Take 3 tablets by  mouth once daily with food.    Missed Doses- Patient was informed to not miss doses, however if one is missed:  • Less than 18 hours from the usual time that MAVYRET should have been taken - advise the patient to take the dose as soon as possible and then to take the next dose at the usual time.   • More than 18 hours from the usual time that MAVYRET should have been taken - advise the patient not to take the missed dose and to take the next dose at the usual time.    Toxicities (Side Effects)  Most common adverse reactions observed were headache and fatigue.    Safe Handling and Disposal  - These medicines must be stored at room temperature, away from heat, moisture and direct light.   - IF MD stops your medication, do not dispose in the household trash, toilet or sink.  - Participate in a drug take-back program in your community.   - Recommend to wear gloves when handling pills and/or regular handwashing after touching the pills.   - Keep away from pets and children.     Educational Materials Provided  ? [employer]   ? [facility] Customer Information Handbook   ? Notice of Privacy Practices    Additional Supplies and  materials given:      List relevant caregivers that can be contacted and/or medication information be discussed    Name Relation Phone Number               Literacy and Adherence Assessment  Patient's potential for non-adherence was assessed using the four question [place]y tool. The patient's responses shown below indicate high probability of adherence. (One or more answers of \"Yes\" is a potential sign of low adherence to medication).    1. Do you ever forget to take your medication? No    2. Do you ever have problems remembering to take your medication? No    3. When you feel better, do you sometimes stop taking your medication? No    4. Sometimes, if you feel worse when you take your medication, do you stop taking it? No    Patient's health literacy was assessed with a brief three question literacy tool.     1. How often do you have someone (like a family member, friend, hospital/clinic worker, or caregiver) help you read healthcare materials? None of the time    2. How often do you have problems learning about your medical condition because of difficulty understanding written information? None of the time    3. How confident are you filling out forms by yourself? All of the time    Based on patient's responses, does patient have potential health literacy issues? No    Recommendations  Pharmacists Care plan including follow up (including clinic contact instructions)  1. Recommend to start medication as prescribed.  2. Patient was counseled on Mavyret (ie. warnings/precautions, side effects, dosing, frequency, route, additional instructions to take etc) and verbalized understanding.  3. Goals of care:  a. Ensure adherence  b. Minimize side effects  c. Maximize patient’s response to therapy  4. Patient was counseled on proper use of additional supplies provided for the initial shipment of medications as detailed above to help with common side effects and promote adherence  5. Patient’s medical records (EPIC  SmartChart) will be reviewed once every month or every cycle, whichever is less, to evaluate refill appropriateness and make interventions based on changes in their profile (ie. new medications, recent lab results etc).  6. An [facility] caregiver will follow up for refill delivery in 4 weeks, and determine treatment compliance and presence of adverse effects to treatment  7. [facility] 30-day assessment due in sept 2021 to evaluate patient’s response to therapy, side effects, changes in their medications/allergies, drug interactions and adherence.  8. Medication will be shipped to home via [employer] and estimated delivery on 9/7/21 [address] [address]  9. .  10. Patient has [facility] phone number, [phone], for questions and concerns.   11. Patient acknowledges and agrees with the plan of care.

## 2021-10-05 ENCOUNTER — APPOINTMENT (OUTPATIENT)
Dept: CARDIOLOGY | Facility: CLINIC | Age: 76
End: 2021-10-05
Payer: MEDICARE

## 2021-10-05 ENCOUNTER — NON-APPOINTMENT (OUTPATIENT)
Age: 76
End: 2021-10-05

## 2021-10-05 VITALS
HEART RATE: 64 BPM | DIASTOLIC BLOOD PRESSURE: 66 MMHG | WEIGHT: 192 LBS | OXYGEN SATURATION: 97 % | BODY MASS INDEX: 27.49 KG/M2 | SYSTOLIC BLOOD PRESSURE: 126 MMHG | TEMPERATURE: 97.8 F | HEIGHT: 70 IN

## 2021-10-05 DIAGNOSIS — I06.0 RHEUMATIC AORTIC STENOSIS: ICD-10-CM

## 2021-10-05 PROCEDURE — 93000 ELECTROCARDIOGRAM COMPLETE: CPT

## 2021-10-05 PROCEDURE — 99215 OFFICE O/P EST HI 40 MIN: CPT

## 2021-10-05 RX ORDER — ASPIRIN 81 MG/1
81 TABLET ORAL DAILY
Qty: 30 | Refills: 3 | Status: DISCONTINUED | COMMUNITY
Start: 2021-02-05 | End: 2021-10-05

## 2021-10-05 NOTE — DISCUSSION/SUMMARY
[Patient] : the patient [Risks] : risks [Benefits] : benefits [Alternatives] : alternatives [With Me] : with me [___ Month(s)] : in [unfilled] month(s) [FreeTextEntry1] : This is a 73 year old male with history of hypertension and dyslipidemia , moderate aortic stenosis, here with change of cardiologist.\par \par 1) Moderate aortic stenosis:  stable repeat echo \par 2) coronary artery disease evaluation: moderate coronary artery calcifications. could not tolerate  crestor 5 mg daily  initiate zetia 10 mg dt bedtime. \par 3) hypertension : controlled. ct current meds,.  valsartan 80 mg daily. ct toprol 25 mg daily. \par 4) mild carotid plaque:  cannot toelrate statins. zetia. fish oil. \par 5) aortic calcifications,  carotid plaque: risk of MI and stroke.   ct aspirin 81 gera other day . canot tatke  statins. \par Will order and review ECG for the above mentioned diagnosis/condition/symptoms

## 2021-10-05 NOTE — HISTORY OF PRESENT ILLNESS
[FreeTextEntry1] : moderate aortic stenosis, hypertension \par \par HPI for today: :  he feels good.  cuts his grass. lawn .  physically active. does not have formal exercsie. very heavy physiocal activity.\par no new symptosm. no chest pain . no dyspnea on exertion \par \par \par \par \par old note :  no headches. no dizziness. no palpitations.  drinks one glass of wine every day\par he is out.  physically active.\par ct scan reviewed: my self.  midl aortic calficiation. atlease motderate to severe coronay calcificaitons in LAd and PDA . \par last time he had side effect of statins. possible. he is not on statin. only taking losartan\par \par \par \par \par OLD NOTE:  : feels good. No headaches./ Drinks wine.  every day. 2-3 glasses. no headaches. no dizziness.  \par \par old note: This is a 73 year old male with history of aortic stenosis, hypertension  here for a new cardiology appt\par feels good. denies any cehst pauin no dyspnea. 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 no dizziness. no chest pauin. no syncope.\par

## 2021-10-05 NOTE — PHYSICAL EXAM
[General Appearance - Well Developed] : well developed [Normal Appearance] : normal appearance [Well Groomed] : well groomed [General Appearance - Well Nourished] : well nourished [No Deformities] : no deformities [General Appearance - In No Acute Distress] : no acute distress [Normal Conjunctiva] : the conjunctiva exhibited no abnormalities [Eyelids - No Xanthelasma] : the eyelids demonstrated no xanthelasmas [Normal Oral Mucosa] : normal oral mucosa [No Oral Pallor] : no oral pallor [No Oral Cyanosis] : no oral cyanosis [Normal Jugular Venous A Waves Present] : normal jugular venous A waves present [Normal Jugular Venous V Waves Present] : normal jugular venous V waves present [No Jugular Venous Parks A Waves] : no jugular venous parks A waves [Respiration, Rhythm And Depth] : normal respiratory rhythm and effort [Exaggerated Use Of Accessory Muscles For Inspiration] : no accessory muscle use [Auscultation Breath Sounds / Voice Sounds] : lungs were clear to auscultation bilaterally [Heart Rate And Rhythm] : heart rate and rhythm were normal [Heart Sounds] : normal S1 and S2 [FreeTextEntry1] : 4/6 systolci ejection murmur.  [Abdomen Soft] : soft [Abdomen Tenderness] : non-tender [Abdomen Mass (___ Cm)] : no abdominal mass palpated [Abnormal Walk] : normal gait [Gait - Sufficient For Exercise Testing] : the gait was sufficient for exercise testing [Nail Clubbing] : no clubbing of the fingernails [Cyanosis, Localized] : no localized cyanosis [Petechial Hemorrhages (___cm)] : no petechial hemorrhages [Skin Color & Pigmentation] : normal skin color and pigmentation [] : no rash [No Venous Stasis] : no venous stasis [Skin Lesions] : no skin lesions [No Skin Ulcers] : no skin ulcer [No Xanthoma] : no  xanthoma was observed [Oriented To Time, Place, And Person] : oriented to person, place, and time [Affect] : the affect was normal [Mood] : the mood was normal [No Anxiety] : not feeling anxious

## 2021-10-05 NOTE — CARDIOLOGY SUMMARY
[de-identified] : 10 5 2021 Sinus  Rhythm \par WITHIN NORMAL LIMITS\par  [de-identified] : july 2021:  ; Total cholesterol 241.  [No Exercise Ind Arr] : no exercise induced arrhythmias [No Symptoms] : no Symptoms [___] : [unfilled] [de-identified] : carotid Doppler: Mild plaque without stensois.  No

## 2021-10-05 NOTE — REASON FOR VISIT
[FreeTextEntry1] : Aortic stenosis.  [Initial Evaluation] : an initial evaluation of [FreeTextEntry2] : Aortic stenosis.

## 2021-10-18 ENCOUNTER — APPOINTMENT (OUTPATIENT)
Dept: DERMATOLOGY | Facility: CLINIC | Age: 76
End: 2021-10-18
Payer: MEDICARE

## 2021-10-18 PROCEDURE — 99213 OFFICE O/P EST LOW 20 MIN: CPT | Mod: 25

## 2021-10-18 PROCEDURE — 17000 DESTRUCT PREMALG LESION: CPT

## 2021-10-27 ENCOUNTER — APPOINTMENT (OUTPATIENT)
Dept: CARDIOLOGY | Facility: CLINIC | Age: 76
End: 2021-10-27
Payer: MEDICARE

## 2021-10-27 PROCEDURE — 93306 TTE W/DOPPLER COMPLETE: CPT

## 2022-01-13 ENCOUNTER — RX RENEWAL (OUTPATIENT)
Age: 77
End: 2022-01-13

## 2022-02-14 ENCOUNTER — NON-APPOINTMENT (OUTPATIENT)
Age: 77
End: 2022-02-14

## 2022-02-14 ENCOUNTER — APPOINTMENT (OUTPATIENT)
Dept: FAMILY MEDICINE | Facility: CLINIC | Age: 77
End: 2022-02-14
Payer: MEDICARE

## 2022-02-14 VITALS
WEIGHT: 195 LBS | DIASTOLIC BLOOD PRESSURE: 70 MMHG | HEART RATE: 85 BPM | HEIGHT: 70 IN | BODY MASS INDEX: 27.92 KG/M2 | SYSTOLIC BLOOD PRESSURE: 140 MMHG

## 2022-02-14 VITALS
SYSTOLIC BLOOD PRESSURE: 130 MMHG | BODY MASS INDEX: 31.64 KG/M2 | DIASTOLIC BLOOD PRESSURE: 80 MMHG | HEART RATE: 56 BPM | WEIGHT: 221 LBS | HEIGHT: 70 IN

## 2022-02-14 VITALS — SYSTOLIC BLOOD PRESSURE: 124 MMHG | DIASTOLIC BLOOD PRESSURE: 84 MMHG

## 2022-02-14 DIAGNOSIS — Z87.898 PERSONAL HISTORY OF OTHER SPECIFIED CONDITIONS: ICD-10-CM

## 2022-02-14 PROCEDURE — 99213 OFFICE O/P EST LOW 20 MIN: CPT

## 2022-02-14 RX ORDER — EZETIMIBE 10 MG/1
10 TABLET ORAL
Qty: 90 | Refills: 3 | Status: DISCONTINUED | COMMUNITY
Start: 2021-10-05 | End: 2022-02-14

## 2022-02-14 NOTE — PHYSICAL EXAM
[No Acute Distress] : no acute distress [Well Nourished] : well nourished [Well-Appearing] : well-appearing [No Respiratory Distress] : no respiratory distress  [No Accessory Muscle Use] : no accessory muscle use [Clear to Auscultation] : lungs were clear to auscultation bilaterally [Normal Rate] : normal rate  [Regular Rhythm] : with a regular rhythm [Normal S1, S2] : normal S1 and S2 [No Joint Swelling] : no joint swelling [Grossly Normal Strength/Tone] : grossly normal strength/tone [Speech Grossly Normal] : speech grossly normal [Normal Mood] : the mood was normal [Memory Grossly Normal] : memory grossly normal

## 2022-02-14 NOTE — PLAN
[FreeTextEntry1] : CARDIOLOGY CONSULTANT NOTE APPRECIATED\par MONITOR LAB WORK INCLUDING LIPIDS\par HEALTHY DIET AND LIFESTYLE MODIFICATIONS\par HEALTHY WEIGHT LOSS \par BLOOD PRESSURE CONTROLLED\par CONTINUE CURRENT MEDICATIONS\par RE-ADVISED UROLOGY FOLLOW-UP\par CALL WITH ANY QUESTIONS, CONCERNS OR CHANGES

## 2022-02-14 NOTE — HISTORY OF PRESENT ILLNESS
[FreeTextEntry1] : F/U HYPERTENSION [de-identified] : MR. HOLDSWORTH IS A PLEASANT 77 YO PRESENTING FOR FOLLOW-UP.  HISTORY OF HYPERTENSION AND HYPERLIPIDEMIA.  SAW CARDIOLOGY AND HAD ECHOCARDIOGRAM.  AORTIC STENOSIS AND AORTIC CALCIFICATION.  IS TAKING VALSARTAN 80 MG 1 1/2 TABLETS DAILY.  IS NOT TAKING ZETIA.  DOES NOT WANT TO TAKE CHOLESTEROL MEDICATION.  STILL NEEDS UROLOGY FOR ELEVATED PSA.

## 2022-06-07 ENCOUNTER — APPOINTMENT (OUTPATIENT)
Dept: CARDIOLOGY | Facility: CLINIC | Age: 77
End: 2022-06-07

## 2022-07-15 ENCOUNTER — RX RENEWAL (OUTPATIENT)
Age: 77
End: 2022-07-15

## 2022-08-02 NOTE — PATIENT PROFILE ADULT - NSPROGENBLOODRESTRICT_GEN_A_NUR
Consent 1/Introductory Paragraph: The rationale for Mohs was explained to the patient and consent was obtained. The risks, benefits and alternatives to therapy were discussed in detail. Specifically, the risks of infection, scarring, pain, bleeding, prolonged wound healing, incomplete removal, allergy to anesthesia, nerve injury, recurrence and the possible need of delayed or additional reconstruction were addressed. Prior to the procedure, the treatment site was clearly identified and confirmed by the patient. All components of Universal Protocol/PAUSE Rule completed. none

## 2022-09-15 ENCOUNTER — APPOINTMENT (OUTPATIENT)
Dept: FAMILY MEDICINE | Facility: CLINIC | Age: 77
End: 2022-09-15

## 2022-09-15 ENCOUNTER — LABORATORY RESULT (OUTPATIENT)
Age: 77
End: 2022-09-15

## 2022-09-15 VITALS
WEIGHT: 187 LBS | OXYGEN SATURATION: 98 % | DIASTOLIC BLOOD PRESSURE: 66 MMHG | SYSTOLIC BLOOD PRESSURE: 122 MMHG | HEART RATE: 69 BPM | HEIGHT: 70 IN | BODY MASS INDEX: 26.77 KG/M2

## 2022-09-15 DIAGNOSIS — Z78.9 OTHER SPECIFIED HEALTH STATUS: ICD-10-CM

## 2022-09-15 DIAGNOSIS — R91.1 SOLITARY PULMONARY NODULE: ICD-10-CM

## 2022-09-15 DIAGNOSIS — Z23 ENCOUNTER FOR IMMUNIZATION: ICD-10-CM

## 2022-09-15 DIAGNOSIS — R97.20 ELEVATED PROSTATE, SPECIFIC ANTIGEN [PSA]: ICD-10-CM

## 2022-09-15 DIAGNOSIS — R71.8 OTHER ABNORMALITY OF RED BLOOD CELLS: ICD-10-CM

## 2022-09-15 PROCEDURE — 36415 COLL VENOUS BLD VENIPUNCTURE: CPT

## 2022-09-15 PROCEDURE — 99214 OFFICE O/P EST MOD 30 MIN: CPT | Mod: 25

## 2022-09-15 PROCEDURE — G0438: CPT

## 2022-09-15 PROCEDURE — G0008: CPT

## 2022-09-15 PROCEDURE — 90662 IIV NO PRSV INCREASED AG IM: CPT

## 2022-09-15 RX ORDER — FLUOROURACIL 50 MG/G
5 CREAM TOPICAL
Qty: 40 | Refills: 0 | Status: DISCONTINUED | COMMUNITY
Start: 2021-10-22 | End: 2022-09-15

## 2022-09-15 RX ORDER — VALSARTAN 80 MG/1
80 TABLET, COATED ORAL DAILY
Qty: 60 | Refills: 2 | Status: DISCONTINUED | COMMUNITY
Start: 2020-10-13 | End: 2022-09-15

## 2022-09-17 PROBLEM — R91.1 PULMONARY NODULE: Status: ACTIVE | Noted: 2019-06-20

## 2022-09-17 PROBLEM — R71.8 ELEVATED MCV: Status: ACTIVE | Noted: 2022-09-17

## 2022-09-17 PROBLEM — R97.20 ELEVATED PSA: Status: ACTIVE | Noted: 2022-09-15

## 2022-09-17 PROBLEM — Z23 INFLUENZA VACCINE NEEDED: Status: ACTIVE | Noted: 2019-10-03

## 2022-09-17 LAB
ALBUMIN SERPL ELPH-MCNC: 4.5 G/DL
ALP BLD-CCNC: 64 U/L
ALT SERPL-CCNC: 23 U/L
ANION GAP SERPL CALC-SCNC: 15 MMOL/L
APPEARANCE: CLEAR
AST SERPL-CCNC: 30 U/L
BILIRUB SERPL-MCNC: 0.9 MG/DL
BILIRUBIN URINE: NEGATIVE
BLOOD URINE: NEGATIVE
BUN SERPL-MCNC: 12 MG/DL
CALCIUM SERPL-MCNC: 9.4 MG/DL
CHLORIDE SERPL-SCNC: 103 MMOL/L
CHOLEST SERPL-MCNC: 236 MG/DL
CO2 SERPL-SCNC: 22 MMOL/L
COLOR: YELLOW
CREAT SERPL-MCNC: 0.81 MG/DL
EGFR: 91 ML/MIN/1.73M2
ESTIMATED AVERAGE GLUCOSE: 105 MG/DL
GLUCOSE QUALITATIVE U: NEGATIVE
GLUCOSE SERPL-MCNC: 99 MG/DL
HBA1C MFR BLD HPLC: 5.3 %
HDLC SERPL-MCNC: 63 MG/DL
KETONES URINE: NEGATIVE
LDLC SERPL CALC-MCNC: 153 MG/DL
LEUKOCYTE ESTERASE URINE: ABNORMAL
NITRITE URINE: NEGATIVE
NONHDLC SERPL-MCNC: 173 MG/DL
PH URINE: 5.5
POTASSIUM SERPL-SCNC: 4.4 MMOL/L
PROT SERPL-MCNC: 7.1 G/DL
PROTEIN URINE: NORMAL
PSA SERPL-MCNC: 3.59 NG/ML
SODIUM SERPL-SCNC: 141 MMOL/L
SPECIFIC GRAVITY URINE: 1.03
T4 FREE SERPL-MCNC: 1 NG/DL
TRIGL SERPL-MCNC: 101 MG/DL
TSH SERPL-ACNC: 1.84 UIU/ML
UROBILINOGEN URINE: NORMAL

## 2022-09-17 NOTE — PLAN
[FreeTextEntry1] : HEALTHY DIET AND LIFESTYLE MODIFICATIONS\par BLOOD PRESSURE CONTROLLED\par CONTINUE VALSARTAN AND METOPROLOL\par MONITOR LAB WORK INCLUDING LIPIDS\par PRIOR LAB WORK, CONSULTANT NOTES AND IMAGING REVIEWED\par CHECK CT CHEST\par FOLLOW-UP ALL SPECIALISTS AS DIRECTED \par CALL WITH ANY QUESTIONS, CONCERNS OR CHANGES

## 2022-09-17 NOTE — HEALTH RISK ASSESSMENT
[Very Good] : ~his/her~ current health as very good [Excellent] : ~his/her~  mood as  excellent [Never] : Never [Yes] : Yes [4 or more  times a week (4 pts)] : 4 or more  times a week (4 points) [1 or 2 (0 pts)] : 1 or 2 (0 points) [Never (0 pts)] : Never (0 points) [No] : In the past 12 months have you used drugs other than those required for medical reasons? No [No falls in past year] : Patient reported no falls in the past year [0] : 2) Feeling down, depressed, or hopeless: Not at all (0) [HIV test declined] : HIV test declined [Hepatitis C test declined] : Hepatitis C test declined [None] : None [With Family] : lives with family [# of Members in Household ___] :  household currently consist of [unfilled] member(s) [Employed] : employed [College] : College [] :  [# Of Children ___] : has [unfilled] children [Sexually Active] : sexually active [Feels Safe at Home] : Feels safe at home [Fully functional (bathing, dressing, toileting, transferring, walking, feeding)] : Fully functional (bathing, dressing, toileting, transferring, walking, feeding) [Fully functional (using the telephone, shopping, preparing meals, housekeeping, doing laundry, using] : Fully functional and needs no help or supervision to perform IADLs (using the telephone, shopping, preparing meals, housekeeping, doing laundry, using transportation, managing medications and managing finances) [Reports normal functional visual acuity (ie: able to read med bottle)] : Reports normal functional visual acuity [Smoke Detector] : smoke detector [Carbon Monoxide Detector] : carbon monoxide detector [Safety elements used in home] : safety elements used in home [Seat Belt] :  uses seat belt [With Patient/Caregiver] : , with patient/caregiver [Audit-CScore] : 4 [de-identified] : REMAINING VERY ACTIVE, WALKING [de-identified] : "GOOD" [[id number]] : 0 [Change in mental status noted] : No change in mental status noted [Language] : denies difficulty with language [Learning/Retaining New Information] : denies difficulty learning/retaining new information [Handling Complex Tasks] : denies difficulty handling complex tasks [High Risk Behavior] : no high risk behavior [Reports changes in hearing] : Reports no changes in hearing [Reports changes in vision] : Reports no changes in vision [Reports changes in dental health] : Reports no changes in dental health [Sunscreen] : does not use sunscreen [ColonoscopyDate] : 05/19 [de-identified] : GLASSES FOR READING [AdvancecareDate] : 09/22

## 2022-09-17 NOTE — PHYSICAL EXAM

## 2022-09-17 NOTE — HEALTH RISK ASSESSMENT
[Very Good] : ~his/her~ current health as very good [Excellent] : ~his/her~  mood as  excellent [Never] : Never [Yes] : Yes [4 or more  times a week (4 pts)] : 4 or more  times a week (4 points) [1 or 2 (0 pts)] : 1 or 2 (0 points) [Never (0 pts)] : Never (0 points) [No] : In the past 12 months have you used drugs other than those required for medical reasons? No [No falls in past year] : Patient reported no falls in the past year [0] : 2) Feeling down, depressed, or hopeless: Not at all (0) [HIV test declined] : HIV test declined [Hepatitis C test declined] : Hepatitis C test declined [None] : None [With Family] : lives with family [# of Members in Household ___] :  household currently consist of [unfilled] member(s) [Employed] : employed [College] : College [] :  [# Of Children ___] : has [unfilled] children [Sexually Active] : sexually active [Feels Safe at Home] : Feels safe at home [Fully functional (bathing, dressing, toileting, transferring, walking, feeding)] : Fully functional (bathing, dressing, toileting, transferring, walking, feeding) [Fully functional (using the telephone, shopping, preparing meals, housekeeping, doing laundry, using] : Fully functional and needs no help or supervision to perform IADLs (using the telephone, shopping, preparing meals, housekeeping, doing laundry, using transportation, managing medications and managing finances) [Reports normal functional visual acuity (ie: able to read med bottle)] : Reports normal functional visual acuity [Smoke Detector] : smoke detector [Carbon Monoxide Detector] : carbon monoxide detector [Safety elements used in home] : safety elements used in home [Seat Belt] :  uses seat belt [With Patient/Caregiver] : , with patient/caregiver [Audit-CScore] : 4 [de-identified] : REMAINING VERY ACTIVE, WALKING [de-identified] : "GOOD" [[id number]] : 0 [Change in mental status noted] : No change in mental status noted [Language] : denies difficulty with language [Learning/Retaining New Information] : denies difficulty learning/retaining new information [Handling Complex Tasks] : denies difficulty handling complex tasks [High Risk Behavior] : no high risk behavior [Reports changes in hearing] : Reports no changes in hearing [Reports changes in vision] : Reports no changes in vision [Reports changes in dental health] : Reports no changes in dental health [Sunscreen] : does not use sunscreen [ColonoscopyDate] : 05/19 [de-identified] : GLASSES FOR READING [AdvancecareDate] : 09/22

## 2022-09-17 NOTE — HISTORY OF PRESENT ILLNESS
[FreeTextEntry1] : FOLLOW-UP HYPERTENSION [de-identified] : MR. HOLDSWORTH IS A PLEASANT 75 YO PRESENTING FOR FOLLOW-UP. HISTORY OF HYPERTENSION AND HYPERLIPIDEMIA AS WELL AS AORTIC STENOSIS AND AORTIC CALCIFICATION MONITORED BY CARDIOLOGY.  IS DUE TO SEE CARDIOLOGY..  IS NOT TAKING CHOLESTEROL MEDICATION - WILL NOT TAKE.  BLOOD PRESSURE CONTROLLED ON METOPROLOL AND VALSARTAN.  HAD COVID IN AUGUST.  SAW UROLOGY FOR ELEVATED PSA - HE IS MONITORING.  FEELING WELL TODAY.  DUE FOR FOLLOW-UP CT CHEST FOR PULMONARY NODULE.

## 2022-09-17 NOTE — PHYSICAL EXAM
[No Acute Distress] : no acute distress [Well Nourished] : well nourished [Well-Appearing] : well-appearing [Normal Sclera/Conjunctiva] : normal sclera/conjunctiva [PERRL] : pupils equal round and reactive to light [Normal Outer Ear/Nose] : the outer ears and nose were normal in appearance [Normal Oropharynx] : the oropharynx was normal [Normal TMs] : both tympanic membranes were normal [No Respiratory Distress] : no respiratory distress  [No Accessory Muscle Use] : no accessory muscle use [Clear to Auscultation] : lungs were clear to auscultation bilaterally [Normal Rate] : normal rate  [Regular Rhythm] : with a regular rhythm [Normal S1, S2] : normal S1 and S2 [Soft] : abdomen soft [Non Tender] : non-tender [Normal Bowel Sounds] : normal bowel sounds [No Joint Swelling] : no joint swelling [Grossly Normal Strength/Tone] : grossly normal strength/tone [No Focal Deficits] : no focal deficits [Normal Gait] : normal gait [Deep Tendon Reflexes (DTR)] : deep tendon reflexes were 2+ and symmetric [Speech Grossly Normal] : speech grossly normal [Memory Grossly Normal] : memory grossly normal [Normal Mood] : the mood was normal [de-identified] : SEE SAME DAY NOTE CPE

## 2022-09-17 NOTE — HISTORY OF PRESENT ILLNESS
[FreeTextEntry1] : FOLLOW-UP HYPERTENSION [de-identified] : MR. HOLDSWORTH IS A PLEASANT 75 YO PRESENTING FOR FOLLOW-UP. HISTORY OF HYPERTENSION AND HYPERLIPIDEMIA AS WELL AS AORTIC STENOSIS AND AORTIC CALCIFICATION MONITORED BY CARDIOLOGY.  IS DUE TO SEE CARDIOLOGY..  IS NOT TAKING CHOLESTEROL MEDICATION - WILL NOT TAKE.  BLOOD PRESSURE CONTROLLED ON METOPROLOL AND VALSARTAN.  HAD COVID IN AUGUST.  SAW UROLOGY FOR ELEVATED PSA - HE IS MONITORING.  FEELING WELL TODAY.  DUE FOR FOLLOW-UP CT CHEST FOR PULMONARY NODULE.

## 2022-09-21 LAB
BASOPHILS # BLD AUTO: 0.05 K/UL
BASOPHILS NFR BLD AUTO: 0.6 %
EOSINOPHIL # BLD AUTO: 0.3 K/UL
EOSINOPHIL NFR BLD AUTO: 3.5 %
HCT VFR BLD CALC: 44.5 %
HGB BLD-MCNC: 14.3 G/DL
IMM GRANULOCYTES NFR BLD AUTO: 0.3 %
LYMPHOCYTES # BLD AUTO: 1.6 K/UL
LYMPHOCYTES NFR BLD AUTO: 18.6 %
MAN DIFF?: NORMAL
MCHC RBC-ENTMCNC: 32.1 GM/DL
MCHC RBC-ENTMCNC: 32.6 PG
MCV RBC AUTO: 101.6 FL
MONOCYTES # BLD AUTO: 0.78 K/UL
MONOCYTES NFR BLD AUTO: 9.1 %
NEUTROPHILS # BLD AUTO: 5.83 K/UL
NEUTROPHILS NFR BLD AUTO: 67.9 %
PLATELET # BLD AUTO: 290 K/UL
RBC # BLD: 4.38 M/UL
RBC # FLD: 13.4 %
WBC # FLD AUTO: 8.59 K/UL

## 2022-09-26 LAB — VIT B12 SERPL-MCNC: 283 PG/ML

## 2022-09-28 NOTE — PHYSICAL THERAPY INITIAL EVALUATION ADULT - FOLLOWS COMMANDS/ANSWERS QUESTIONS, REHAB EVAL
100% of the time Bill For Surgical Tray: no Performing Laboratory: 0 Billing Type: [employer] Expected Date Of Service: 09/28/2022

## 2022-10-18 ENCOUNTER — APPOINTMENT (OUTPATIENT)
Dept: DERMATOLOGY | Facility: CLINIC | Age: 77
End: 2022-10-18

## 2022-10-18 PROCEDURE — 17000 DESTRUCT PREMALG LESION: CPT

## 2022-10-18 PROCEDURE — 99213 OFFICE O/P EST LOW 20 MIN: CPT | Mod: 25

## 2022-10-18 PROCEDURE — 17003 DESTRUCT PREMALG LES 2-14: CPT

## 2022-10-19 ENCOUNTER — APPOINTMENT (OUTPATIENT)
Dept: FAMILY MEDICINE | Facility: CLINIC | Age: 77
End: 2022-10-19

## 2022-10-19 VITALS
HEART RATE: 77 BPM | DIASTOLIC BLOOD PRESSURE: 68 MMHG | BODY MASS INDEX: 26.34 KG/M2 | WEIGHT: 184 LBS | SYSTOLIC BLOOD PRESSURE: 126 MMHG | HEIGHT: 70 IN | OXYGEN SATURATION: 99 %

## 2022-10-19 DIAGNOSIS — E53.8 DEFICIENCY OF OTHER SPECIFIED B GROUP VITAMINS: ICD-10-CM

## 2022-10-19 PROCEDURE — 99214 OFFICE O/P EST MOD 30 MIN: CPT | Mod: 25

## 2022-10-19 PROCEDURE — 96372 THER/PROPH/DIAG INJ SC/IM: CPT

## 2022-10-19 RX ORDER — CYANOCOBALAMIN 1000 UG/ML
1000 INJECTION INTRAMUSCULAR; SUBCUTANEOUS
Qty: 0 | Refills: 0 | Status: COMPLETED | OUTPATIENT
Start: 2022-10-19

## 2022-10-19 RX ADMIN — CYANOCOBALAMINE 0 MCG/ML: 1000 INJECTION INTRAMUSCULAR; SUBCUTANEOUS at 00:00

## 2022-10-24 PROBLEM — E53.8 DEFICIENCY OF VITAMIN B12: Status: ACTIVE | Noted: 2022-10-19

## 2022-10-24 NOTE — HISTORY OF PRESENT ILLNESS
[FreeTextEntry1] : f/u lab work [de-identified] : MR. HOLDSWORTH IS A PLEASANT 78 YO PRESENTING FOR FOLLOW-UP OF LAB WORK.  I NOTICED ABNORMALITY RIGHT EYE.  STATES THAT ON SATURDAY HE WAS WEED WACKING.  WASN'T WEARING HIS GOGGLES.  ROCK HIT NEAR RIGHT EYE.  DOESN'T BOTHER HIM AT ALL.  CONJUNCTIVAL HEMORRHAGE.  DIDN'T SEE HIS OPHTHALMOLOGIST.  NO PAIN OR CHANGES IN VISION.  HISTORY OF HYPERLIPIDEMIA.  WON'T TAKE CHOLESTEROL MEDICATIONS.  NO KNOWN PRIOR LOW B12.  NOT TIRED.  STILL NEEDS CT CHEST.

## 2022-10-24 NOTE — PHYSICAL EXAM
[No Acute Distress] : no acute distress [Well Nourished] : well nourished [Well-Appearing] : well-appearing [PERRL] : pupils equal round and reactive to light [No Respiratory Distress] : no respiratory distress  [No Accessory Muscle Use] : no accessory muscle use [Clear to Auscultation] : lungs were clear to auscultation bilaterally [Normal Rate] : normal rate  [Regular Rhythm] : with a regular rhythm [Normal S1, S2] : normal S1 and S2 [No Joint Swelling] : no joint swelling [Speech Grossly Normal] : speech grossly normal [Memory Grossly Normal] : memory grossly normal [Normal Mood] : the mood was normal [de-identified] : CONJUNCTIVAL HEMORRHAGE?

## 2022-10-24 NOTE — PLAN
[FreeTextEntry1] : PRIOR CT CHEST REVIEWED - TO GO FOR FOLLOW-UP STUDY\par PRIOR NOTE AND LAB WORK REVIEWED\par B12 SHOT GIVEN AND TOLERATED WELL\par START VITAMIN B12 1000 DAILY\par MONITOR B12 AND WILL CHECK INTRINSIC FACTOR AB LEVEL\par WILL NOT TAKE CHOLESTEROL MEDICATION\par HEALTHY DIET AND LIFESTYLE MODIFICATIONS\par HEALTHY WEIGHT LOSS \par STRESSED EVALUATION WITH OPHTHALMOLOGY FOR EYE - DOES NOT WANT TO GO.  ADVISED EXPEDITED APPOINTMENT FOR FULL EVALUATION; DOES NOT WANT TO GO\par CALL WITH ANY QUESTIONS, CONCERNS OR CHANGES

## 2022-11-01 ENCOUNTER — INPATIENT (INPATIENT)
Facility: HOSPITAL | Age: 77
LOS: 5 days | Discharge: HOME | DRG: 872 | End: 2022-11-07
Attending: STUDENT IN AN ORGANIZED HEALTH CARE EDUCATION/TRAINING PROGRAM | Admitting: STUDENT IN AN ORGANIZED HEALTH CARE EDUCATION/TRAINING PROGRAM
Payer: MEDICARE

## 2022-11-01 VITALS
TEMPERATURE: 98 F | RESPIRATION RATE: 20 BRPM | WEIGHT: 169.98 LBS | OXYGEN SATURATION: 97 % | DIASTOLIC BLOOD PRESSURE: 53 MMHG | HEIGHT: 66 IN | HEART RATE: 89 BPM | SYSTOLIC BLOOD PRESSURE: 87 MMHG

## 2022-11-01 DIAGNOSIS — Z90.49 ACQUIRED ABSENCE OF OTHER SPECIFIED PARTS OF DIGESTIVE TRACT: Chronic | ICD-10-CM

## 2022-11-01 DIAGNOSIS — Z98.890 OTHER SPECIFIED POSTPROCEDURAL STATES: Chronic | ICD-10-CM

## 2022-11-01 LAB
ALBUMIN SERPL ELPH-MCNC: 4.1 G/DL — SIGNIFICANT CHANGE UP (ref 3.3–5.2)
ALP SERPL-CCNC: 79 U/L — SIGNIFICANT CHANGE UP (ref 40–120)
ALT FLD-CCNC: 86 U/L — HIGH
ANION GAP SERPL CALC-SCNC: 17 MMOL/L — SIGNIFICANT CHANGE UP (ref 5–17)
APPEARANCE UR: CLEAR — SIGNIFICANT CHANGE UP
APTT BLD: 31.5 SEC — SIGNIFICANT CHANGE UP (ref 27.5–35.5)
AST SERPL-CCNC: 56 U/L — HIGH
BACTERIA # UR AUTO: ABNORMAL
BASOPHILS # BLD AUTO: 0 K/UL — SIGNIFICANT CHANGE UP (ref 0–0.2)
BASOPHILS NFR BLD AUTO: 0 % — SIGNIFICANT CHANGE UP (ref 0–2)
BILIRUB SERPL-MCNC: 0.9 MG/DL — SIGNIFICANT CHANGE UP (ref 0.4–2)
BILIRUB UR-MCNC: NEGATIVE — SIGNIFICANT CHANGE UP
BUN SERPL-MCNC: 20.2 MG/DL — HIGH (ref 8–20)
CALCIUM SERPL-MCNC: 10.1 MG/DL — SIGNIFICANT CHANGE UP (ref 8.4–10.5)
CHLORIDE SERPL-SCNC: 93 MMOL/L — LOW (ref 96–108)
CO2 SERPL-SCNC: 21 MMOL/L — LOW (ref 22–29)
COLOR SPEC: YELLOW — SIGNIFICANT CHANGE UP
CREAT SERPL-MCNC: 1.4 MG/DL — HIGH (ref 0.5–1.3)
DIFF PNL FLD: ABNORMAL
EGFR: 51 ML/MIN/1.73M2 — LOW
EOSINOPHIL # BLD AUTO: 0.45 K/UL — SIGNIFICANT CHANGE UP (ref 0–0.5)
EOSINOPHIL NFR BLD AUTO: 1.7 % — SIGNIFICANT CHANGE UP (ref 0–6)
EPI CELLS # UR: SIGNIFICANT CHANGE UP
GLUCOSE SERPL-MCNC: 92 MG/DL — SIGNIFICANT CHANGE UP (ref 70–99)
GLUCOSE UR QL: NEGATIVE MG/DL — SIGNIFICANT CHANGE UP
HCT VFR BLD CALC: 43.2 % — SIGNIFICANT CHANGE UP (ref 39–50)
HGB BLD-MCNC: 15 G/DL — SIGNIFICANT CHANGE UP (ref 13–17)
HYALINE CASTS # UR AUTO: ABNORMAL /LPF
INR BLD: 1.15 RATIO — SIGNIFICANT CHANGE UP (ref 0.88–1.16)
KETONES UR-MCNC: ABNORMAL
LACTATE BLDV-MCNC: 0.9 MMOL/L — SIGNIFICANT CHANGE UP (ref 0.5–2)
LEUKOCYTE ESTERASE UR-ACNC: ABNORMAL
LYMPHOCYTES # BLD AUTO: 1.86 K/UL — SIGNIFICANT CHANGE UP (ref 1–3.3)
LYMPHOCYTES # BLD AUTO: 7 % — LOW (ref 13–44)
MANUAL SMEAR VERIFICATION: SIGNIFICANT CHANGE UP
MCHC RBC-ENTMCNC: 32.8 PG — SIGNIFICANT CHANGE UP (ref 27–34)
MCHC RBC-ENTMCNC: 34.7 GM/DL — SIGNIFICANT CHANGE UP (ref 32–36)
MCV RBC AUTO: 94.5 FL — SIGNIFICANT CHANGE UP (ref 80–100)
MONOCYTES # BLD AUTO: 2.07 K/UL — HIGH (ref 0–0.9)
MONOCYTES NFR BLD AUTO: 7.8 % — SIGNIFICANT CHANGE UP (ref 2–14)
NEUTROPHILS # BLD AUTO: 22.14 K/UL — HIGH (ref 1.8–7.4)
NEUTROPHILS NFR BLD AUTO: 83.5 % — HIGH (ref 43–77)
NITRITE UR-MCNC: NEGATIVE — SIGNIFICANT CHANGE UP
PH UR: 5 — SIGNIFICANT CHANGE UP (ref 5–8)
PLAT MORPH BLD: NORMAL — SIGNIFICANT CHANGE UP
PLATELET # BLD AUTO: 476 K/UL — HIGH (ref 150–400)
POTASSIUM SERPL-MCNC: 4.1 MMOL/L — SIGNIFICANT CHANGE UP (ref 3.5–5.3)
POTASSIUM SERPL-SCNC: 4.1 MMOL/L — SIGNIFICANT CHANGE UP (ref 3.5–5.3)
PROT SERPL-MCNC: 8 G/DL — SIGNIFICANT CHANGE UP (ref 6.6–8.7)
PROT UR-MCNC: 15
PROTHROM AB SERPL-ACNC: 13.4 SEC — SIGNIFICANT CHANGE UP (ref 10.5–13.4)
RBC # BLD: 4.57 M/UL — SIGNIFICANT CHANGE UP (ref 4.2–5.8)
RBC # FLD: 12.3 % — SIGNIFICANT CHANGE UP (ref 10.3–14.5)
RBC BLD AUTO: NORMAL — SIGNIFICANT CHANGE UP
RBC CASTS # UR COMP ASSIST: SIGNIFICANT CHANGE UP /HPF (ref 0–4)
SODIUM SERPL-SCNC: 131 MMOL/L — LOW (ref 135–145)
SP GR SPEC: 1.02 — SIGNIFICANT CHANGE UP (ref 1.01–1.02)
TROPONIN T SERPL-MCNC: 0.02 NG/ML — SIGNIFICANT CHANGE UP (ref 0–0.06)
UROBILINOGEN FLD QL: NEGATIVE MG/DL — SIGNIFICANT CHANGE UP
WBC # BLD: 26.51 K/UL — HIGH (ref 3.8–10.5)
WBC # FLD AUTO: 26.51 K/UL — HIGH (ref 3.8–10.5)
WBC UR QL: ABNORMAL /HPF (ref 0–5)

## 2022-11-01 PROCEDURE — 71045 X-RAY EXAM CHEST 1 VIEW: CPT | Mod: 26

## 2022-11-01 PROCEDURE — 99285 EMERGENCY DEPT VISIT HI MDM: CPT | Mod: CS

## 2022-11-01 PROCEDURE — 93010 ELECTROCARDIOGRAM REPORT: CPT

## 2022-11-01 RX ORDER — ACETAMINOPHEN 500 MG
650 TABLET ORAL ONCE
Refills: 0 | Status: COMPLETED | OUTPATIENT
Start: 2022-11-01 | End: 2022-11-01

## 2022-11-01 RX ORDER — AZITHROMYCIN 500 MG/1
500 TABLET, FILM COATED ORAL ONCE
Refills: 0 | Status: COMPLETED | OUTPATIENT
Start: 2022-11-01 | End: 2022-11-01

## 2022-11-01 RX ORDER — CEFTRIAXONE 500 MG/1
1000 INJECTION, POWDER, FOR SOLUTION INTRAMUSCULAR; INTRAVENOUS ONCE
Refills: 0 | Status: DISCONTINUED | OUTPATIENT
Start: 2022-11-01 | End: 2022-11-01

## 2022-11-01 RX ORDER — DEXAMETHASONE 0.5 MG/5ML
10 ELIXIR ORAL ONCE
Refills: 0 | Status: COMPLETED | OUTPATIENT
Start: 2022-11-01 | End: 2022-11-01

## 2022-11-01 RX ORDER — SODIUM CHLORIDE 9 MG/ML
2000 INJECTION INTRAMUSCULAR; INTRAVENOUS; SUBCUTANEOUS ONCE
Refills: 0 | Status: COMPLETED | OUTPATIENT
Start: 2022-11-01 | End: 2022-11-01

## 2022-11-01 RX ORDER — CEFTRIAXONE 500 MG/1
1000 INJECTION, POWDER, FOR SOLUTION INTRAMUSCULAR; INTRAVENOUS ONCE
Refills: 0 | Status: COMPLETED | OUTPATIENT
Start: 2022-11-01 | End: 2022-11-01

## 2022-11-01 RX ADMIN — Medication 650 MILLIGRAM(S): at 17:20

## 2022-11-01 RX ADMIN — Medication 102 MILLIGRAM(S): at 17:21

## 2022-11-01 RX ADMIN — CEFTRIAXONE 1000 MILLIGRAM(S): 500 INJECTION, POWDER, FOR SOLUTION INTRAMUSCULAR; INTRAVENOUS at 19:53

## 2022-11-01 RX ADMIN — AZITHROMYCIN 255 MILLIGRAM(S): 500 TABLET, FILM COATED ORAL at 17:21

## 2022-11-01 RX ADMIN — SODIUM CHLORIDE 2000 MILLILITER(S): 9 INJECTION INTRAMUSCULAR; INTRAVENOUS; SUBCUTANEOUS at 17:21

## 2022-11-01 NOTE — ED PROVIDER NOTE - ADMIT DISPOSITION PRESENT ON ADMISSION SEPSIS
Visit 2/  Name: Arian Farris  Clinic Number: [id number]  Date of Treatment: 07/19/2017   Diagnosis:   Encounter Diagnosis   Name Primary?    Left shoulder pain, unspecified chronicity Yes   Diagnosis:  L shoulder pain   Arm Dominance:  R  Involved:  L  DOI:   2nd week May 2017  DOS: NA  Time in:  14:00  Time Out: 15:00  Total Treatment Time: 60'  Group Time: NA  Subjective:  Arian reports worsening of symptoms.  Patient reports their pain to be 0/10 on a 0-10 scale with 0 being no pain and 10 being the worst pain imaginable.  Objective  No objective measures taken this PM  Arian received therapeutic exercises to develop strength for 45 minutes including:   TB ER 3x15 yll  TB IR 3x15 yll  Sup pro 3x15 5#   S/L ER 3x15 2#   Prone s' ext/ER 3x15 0#  Prone row 3x15 5#  Prone HAB/ER 3x10 0#  Written Home Exercises Provided:  Provided RC/scap program, hold s' stabilization program   Pt demo good understanding of the education provided. Arian demonstrated good return demonstration of activities.   Assessment:   Good training effect in L shoulder girdle musculature without pain   Pt will continue to benefit from skilled PT intervention. Medical Necessity is demonstrated by:  Pain limits function of effected part for some activities.  Patient is making good progress towards established goals.  New/Revised goals:   Short Term Goals: 3-4 weeks     1. Increase ROM 50-75%  2. Increase strength 50-75%  Long Term Goals:  6-8 weeks   1. Decrease complaints of pain 100%  2. Restore 100% full ROM t/o shoulder  3. Restore 100% strength t/o shoulder  4. Return to 100% preinjury level of function (see functional limitations)  Plan:  Continue with established Plan of Care towards PT goals with focus on scap stability and RC/scap strength   Lamberto Henley, PT  7/19/2017    
Yes

## 2022-11-01 NOTE — ED ADULT TRIAGE NOTE - CHIEF COMPLAINT QUOTE
Pt walks in c/o cold symptoms , congestion , runny nose  and occasional cough since Friday . denies fever SOB/ chest pains Pt walks in c/o cold symptoms , congestion , runny nose  and occasional cough since Friday . denies fever SOB/ chest pains. Pt hypotensive during Triage. Brought in to [facility] for further eval and treatement

## 2022-11-01 NOTE — ED PROVIDER NOTE - OBJECTIVE STATEMENT
78 y/o M with h/o colon resection and sepsis, former smoker p/w flu like symptoms, fever, chills, cold and congestion for few days and today got worse. Pt brought by ambulance for low BP. Pt denies any nausea, vomiting, diarrhea or abd pain

## 2022-11-01 NOTE — ED ADULT NURSE NOTE - NSIMPLEMENTINTERV_GEN_ALL_ED
Implemented All Fall Risk Interventions:  [place] to call system. Call bell, personal items and telephone within reach. Instruct patient to call for assistance. Room bathroom lighting operational. Non-slip footwear when patient is off stretcher. Physically safe environment: no spills, clutter or unnecessary equipment. Stretcher in lowest position, wheels locked, appropriate side rails in place. Provide visual cue, wrist band, yellow gown, etc. Monitor gait and stability. Monitor for mental status changes and reorient to person, place, and time. Review medications for side effects contributing to fall risk. Reinforce activity limits and safety measures with patient and family.

## 2022-11-01 NOTE — ED PROVIDER NOTE - CLINICAL SUMMARY MEDICAL DECISION MAKING FREE TEXT BOX
p ti shypotnesive with decreased breath sounds, will r/p pna, sepsis, viral illness, treat symptomatically

## 2022-11-01 NOTE — ED ADULT NURSE NOTE - CHIEF COMPLAINT QUOTE
Pt walks in c/o cold symptoms , congestion , runny nose  and occasional cough since Friday . denies fever SOB/ chest pains. Pt hypotensive during Triage. Brought in to [facility] for further eval and treatement

## 2022-11-01 NOTE — ED ADULT NURSE NOTE - OBJECTIVE STATEMENT
Pt is here with c/o congestion and cold/flu symptoms for the past few days.  States today he felt worse.

## 2022-11-02 DIAGNOSIS — N39.0 URINARY TRACT INFECTION, SITE NOT SPECIFIED: ICD-10-CM

## 2022-11-02 LAB
-  STREPTOCOCCUS SP. (NOT GRP A, B OR S PNEUMONIAE): SIGNIFICANT CHANGE UP
ANION GAP SERPL CALC-SCNC: 16 MMOL/L — SIGNIFICANT CHANGE UP (ref 5–17)
BASOPHILS # BLD AUTO: 0.06 K/UL — SIGNIFICANT CHANGE UP (ref 0–0.2)
BASOPHILS NFR BLD AUTO: 0.3 % — SIGNIFICANT CHANGE UP (ref 0–2)
BUN SERPL-MCNC: 24.8 MG/DL — HIGH (ref 8–20)
CALCIUM SERPL-MCNC: 9.4 MG/DL — SIGNIFICANT CHANGE UP (ref 8.4–10.5)
CHLORIDE SERPL-SCNC: 101 MMOL/L — SIGNIFICANT CHANGE UP (ref 96–108)
CO2 SERPL-SCNC: 16 MMOL/L — LOW (ref 22–29)
CREAT SERPL-MCNC: 1.21 MG/DL — SIGNIFICANT CHANGE UP (ref 0.5–1.3)
CULTURE RESULTS: SIGNIFICANT CHANGE UP
EGFR: 61 ML/MIN/1.73M2 — SIGNIFICANT CHANGE UP
EOSINOPHIL # BLD AUTO: 0.03 K/UL — SIGNIFICANT CHANGE UP (ref 0–0.5)
EOSINOPHIL NFR BLD AUTO: 0.1 % — SIGNIFICANT CHANGE UP (ref 0–6)
GLUCOSE SERPL-MCNC: 120 MG/DL — HIGH (ref 70–99)
GRAM STN FLD: SIGNIFICANT CHANGE UP
HCT VFR BLD CALC: 36 % — LOW (ref 39–50)
HGB BLD-MCNC: 12.4 G/DL — LOW (ref 13–17)
IMM GRANULOCYTES NFR BLD AUTO: 0.9 % — SIGNIFICANT CHANGE UP (ref 0–0.9)
LYMPHOCYTES # BLD AUTO: 1.1 K/UL — SIGNIFICANT CHANGE UP (ref 1–3.3)
LYMPHOCYTES # BLD AUTO: 4.8 % — LOW (ref 13–44)
MCHC RBC-ENTMCNC: 32.6 PG — SIGNIFICANT CHANGE UP (ref 27–34)
MCHC RBC-ENTMCNC: 34.4 GM/DL — SIGNIFICANT CHANGE UP (ref 32–36)
MCV RBC AUTO: 94.7 FL — SIGNIFICANT CHANGE UP (ref 80–100)
METHOD TYPE: SIGNIFICANT CHANGE UP
MONOCYTES # BLD AUTO: 1.77 K/UL — HIGH (ref 0–0.9)
MONOCYTES NFR BLD AUTO: 7.7 % — SIGNIFICANT CHANGE UP (ref 2–14)
NEUTROPHILS # BLD AUTO: 19.72 K/UL — HIGH (ref 1.8–7.4)
NEUTROPHILS NFR BLD AUTO: 86.2 % — HIGH (ref 43–77)
PLATELET # BLD AUTO: 363 K/UL — SIGNIFICANT CHANGE UP (ref 150–400)
POTASSIUM SERPL-MCNC: 4.5 MMOL/L — SIGNIFICANT CHANGE UP (ref 3.5–5.3)
POTASSIUM SERPL-SCNC: 4.5 MMOL/L — SIGNIFICANT CHANGE UP (ref 3.5–5.3)
RAPID RVP RESULT: SIGNIFICANT CHANGE UP
RBC # BLD: 3.8 M/UL — LOW (ref 4.2–5.8)
RBC # FLD: 12.4 % — SIGNIFICANT CHANGE UP (ref 10.3–14.5)
SARS-COV-2 RNA SPEC QL NAA+PROBE: SIGNIFICANT CHANGE UP
SODIUM SERPL-SCNC: 132 MMOL/L — LOW (ref 135–145)
SPECIMEN SOURCE: SIGNIFICANT CHANGE UP
WBC # BLD: 22.88 K/UL — HIGH (ref 3.8–10.5)
WBC # FLD AUTO: 22.88 K/UL — HIGH (ref 3.8–10.5)

## 2022-11-02 PROCEDURE — 74177 CT ABD & PELVIS W/CONTRAST: CPT | Mod: 26,MA

## 2022-11-02 PROCEDURE — 99223 1ST HOSP IP/OBS HIGH 75: CPT

## 2022-11-02 RX ORDER — METOPROLOL TARTRATE 50 MG
25 TABLET ORAL DAILY
Refills: 0 | Status: DISCONTINUED | OUTPATIENT
Start: 2022-11-02 | End: 2022-11-07

## 2022-11-02 RX ORDER — SODIUM CHLORIDE 9 MG/ML
1000 INJECTION INTRAMUSCULAR; INTRAVENOUS; SUBCUTANEOUS
Refills: 0 | Status: DISCONTINUED | OUTPATIENT
Start: 2022-11-02 | End: 2022-11-07

## 2022-11-02 RX ORDER — CEFTRIAXONE 500 MG/1
2000 INJECTION, POWDER, FOR SOLUTION INTRAMUSCULAR; INTRAVENOUS EVERY 24 HOURS
Refills: 0 | Status: DISCONTINUED | OUTPATIENT
Start: 2022-11-02 | End: 2022-11-07

## 2022-11-02 RX ORDER — LANOLIN ALCOHOL/MO/W.PET/CERES
3 CREAM (GRAM) TOPICAL AT BEDTIME
Refills: 0 | Status: DISCONTINUED | OUTPATIENT
Start: 2022-11-02 | End: 2022-11-07

## 2022-11-02 RX ORDER — ASPIRIN/CALCIUM CARB/MAGNESIUM 324 MG
81 TABLET ORAL DAILY
Refills: 0 | Status: DISCONTINUED | OUTPATIENT
Start: 2022-11-02 | End: 2022-11-07

## 2022-11-02 RX ORDER — VALSARTAN 80 MG/1
80 TABLET ORAL
Refills: 0 | Status: DISCONTINUED | OUTPATIENT
Start: 2022-11-02 | End: 2022-11-02

## 2022-11-02 RX ORDER — ONDANSETRON 8 MG/1
4 TABLET, FILM COATED ORAL EVERY 8 HOURS
Refills: 0 | Status: DISCONTINUED | OUTPATIENT
Start: 2022-11-02 | End: 2022-11-07

## 2022-11-02 RX ORDER — HEPARIN SODIUM 5000 [USP'U]/ML
5000 INJECTION INTRAVENOUS; SUBCUTANEOUS EVERY 8 HOURS
Refills: 0 | Status: DISCONTINUED | OUTPATIENT
Start: 2022-11-02 | End: 2022-11-07

## 2022-11-02 RX ORDER — CEFTRIAXONE 500 MG/1
2000 INJECTION, POWDER, FOR SOLUTION INTRAMUSCULAR; INTRAVENOUS EVERY 24 HOURS
Refills: 0 | Status: DISCONTINUED | OUTPATIENT
Start: 2022-11-02 | End: 2022-11-02

## 2022-11-02 RX ORDER — PIPERACILLIN AND TAZOBACTAM 4; .5 G/20ML; G/20ML
3.38 INJECTION, POWDER, LYOPHILIZED, FOR SOLUTION INTRAVENOUS EVERY 8 HOURS
Refills: 0 | Status: DISCONTINUED | OUTPATIENT
Start: 2022-11-02 | End: 2022-11-02

## 2022-11-02 RX ORDER — ACETAMINOPHEN 500 MG
975 TABLET ORAL ONCE
Refills: 0 | Status: COMPLETED | OUTPATIENT
Start: 2022-11-02 | End: 2022-11-02

## 2022-11-02 RX ORDER — ACETAMINOPHEN 500 MG
650 TABLET ORAL EVERY 6 HOURS
Refills: 0 | Status: DISCONTINUED | OUTPATIENT
Start: 2022-11-02 | End: 2022-11-07

## 2022-11-02 RX ADMIN — HEPARIN SODIUM 5000 UNIT(S): 5000 INJECTION INTRAVENOUS; SUBCUTANEOUS at 14:19

## 2022-11-02 RX ADMIN — CEFTRIAXONE 2000 MILLIGRAM(S): 500 INJECTION, POWDER, FOR SOLUTION INTRAMUSCULAR; INTRAVENOUS at 21:22

## 2022-11-02 RX ADMIN — Medication 650 MILLIGRAM(S): at 03:45

## 2022-11-02 RX ADMIN — SODIUM CHLORIDE 85 MILLILITER(S): 9 INJECTION INTRAMUSCULAR; INTRAVENOUS; SUBCUTANEOUS at 08:46

## 2022-11-02 RX ADMIN — Medication 975 MILLIGRAM(S): at 03:00

## 2022-11-02 RX ADMIN — PIPERACILLIN AND TAZOBACTAM 25 GRAM(S): 4; .5 INJECTION, POWDER, LYOPHILIZED, FOR SOLUTION INTRAVENOUS at 14:15

## 2022-11-02 RX ADMIN — Medication 81 MILLIGRAM(S): at 12:16

## 2022-11-02 RX ADMIN — HEPARIN SODIUM 5000 UNIT(S): 5000 INJECTION INTRAVENOUS; SUBCUTANEOUS at 06:22

## 2022-11-02 RX ADMIN — PIPERACILLIN AND TAZOBACTAM 25 GRAM(S): 4; .5 INJECTION, POWDER, LYOPHILIZED, FOR SOLUTION INTRAVENOUS at 06:22

## 2022-11-02 RX ADMIN — HEPARIN SODIUM 5000 UNIT(S): 5000 INJECTION INTRAVENOUS; SUBCUTANEOUS at 21:23

## 2022-11-02 NOTE — H&P ADULT - HISTORY OF PRESENT ILLNESS
78 yo male with pmhx HTN presenting to the ED with body aches, fatigue and fever (Tmax 102) at home for the past 12 days. He recently had COVID 10/6/22 and recovered from that. During that time, however, he was diagnosed with Vit B12 deficiency and returned to his doctor 2 weeks ago for a vitamin B12 injection. After that is when he reports starting to feel unwell. He also had his Flu shot within the past month. He denies chest pain, dyspnea, cough, N/V/D, dysuria, or hematuria.     Just prior to my evaluation, patient spiked a temp of 104.7 rectally and became tachycardic to 137. At this time, patient was confused. He was given Tylenol with resolution of symptoms and is AAOx4 during my exam.

## 2022-11-02 NOTE — PROGRESS NOTE ADULT - ASSESSMENT
76 yo male with pmhx HTN presenting to the ED with body aches, fatigue and fever (Tmax 102) at home for the past 12 days.    1- Sepsis   suspected UTI   blood cx gram positive cocci in pairs +   continue empirical iv zosyn   repeat blood cx in am   trend fever leukocytosis     2- h/o HTN   blood pressure low soft   stop valsartan die to low BP and RADHA   metoprolol with holding parameters     3-ARF   improving   cont IVF   hold valsartan     DVt prophylaxis heparin sq   repeat blood cx , labs in am ordered

## 2022-11-02 NOTE — H&P ADULT - NSHPLABSRESULTS_GEN_ALL_CORE
CARDIAC MARKERS ( 2022 16:30 )  x     / 0.02 ng/mL / x     / x     / x                                15.0   26.51 )-----------( 476      ( 2022 16:30 )             43.2     2022 16:30    131    |  93     |  20.2   ----------------------------<  92     4.1     |  21.0   |  1.40     Ca    10.1       :    TPro  8.0    /  Alb  4.1    /  TBili  0.9    /  DBili  x      /  AST  56     /  ALT  86     /  AlkPhos  79     :    PT/INR - (  )   PT: 13.4 sec;   INR: 1.15 ratio         PTT - (  )  PTT:31.5 sec  CAPILLARY BLOOD GLUCOSE        LIVER FUNCTIONS - (  )  Alb: 4.1 g/dL / Pro: 8.0 g/dL / ALK PHOS: 79 U/L / ALT: 86 U/L / AST: 56 U/L / GGT: x           Urinalysis Basic - ( 2022 16:34 )    Color: Yellow / Appearance: Clear / S.025 / pH: x  Gluc: x / Ketone: Trace  / Bili: Negative / Urobili: Negative mg/dL   Blood: x / Protein: 15 / Nitrite: Negative   Leuk Esterase: Small / RBC: 0-2 /HPF / WBC 6-10 /HPF   Sq Epi: x / Non Sq Epi: Occasional / Bacteria: Occasional      < from: CT Abdomen and Pelvis w/ IV Cont (22 @ 00:17) >    IMPRESSION:  No CT evidence of bowel obstruction, active inflammatory process or   intra-abdominal source for infection.    No hydronephrosis or renal stones.  The bladder is underdistended.  Enlarged prostate with volume of approximately 124 mL.    Status post right hemicolectomy with right upper quadrant ileocolic   anastomosis.  Fluid-filled loops of distal small bowel.  Diffuse fluid   distention of the colon and rectum.  No bowel wall thickening or   mesenteric inflammatory changes.      VERTEBRAL BODY ANALYSIS: No Vertebral fracture or low bone density   identified.    < end of copied text >

## 2022-11-02 NOTE — PHARMACOTHERAPY INTERVENTION NOTE - COMMENTS
Recommended de-escalating from piperacillin-tazobactam to ceftriaxone 2g IV q24h in the setting of Streptococcus sp. bacteremia.     Rajendra Love PharmD  Clinical Pharmacy Specialist, Infectious Diseases  Tele-Antimicrobial Stewardship Program (Tele-ASP)  Tele-ASP Phone: [phone]

## 2022-11-02 NOTE — H&P ADULT - ASSESSMENT
76 yo male with pmhx HTN presenting to the ED with body aches, fatigue and fever (Tmax 102) at home for the past 12 days.    Sepsis 2/2 ?Acute UTI   -Admit to medicine  -SIRS criteria met: T 104.7, , WBC 26K, RR 22  -CXR negative for consolidation, CT a/p without sign of infection, enlarged prostrate chronic from prior CTs  -UA +LE, WBCs, Blood, Bacteria  -Lactate 1.90  -Broaden coverage to Zosyn pending cultures  -Follow up Blood and Urine cultures  -Monitor CBC, BMP    HTN  -Continue Metoprolol 25 mg daily and Valsartan 80 mg BID    DVTppx: Heparin SubQ

## 2022-11-02 NOTE — PATIENT PROFILE ADULT - FALL HARM RISK - HARM RISK INTERVENTIONS

## 2022-11-02 NOTE — PATIENT PROFILE ADULT - NSTRANSFERBELONGINGSRESP_GEN_A_NUR
Reviewed phone note from Dr. Flynn's office and they would like PCP to r/o pilonidal cyst. She is 28 weeks pregnant with twins and has a pain above her buttock crease. OBGYN provider is out of office until Tuesday.     Pt states she hasn't seen Dr. Kwong in a while so she didn't call us until OBGYN told her to. Last OV was 2.5 years ago.     Pt is given appt tomorrow to r/o pilonidal cyst.    yes

## 2022-11-02 NOTE — PROGRESS NOTE ADULT - SUBJECTIVE AND OBJECTIVE BOX
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 3.375 Gram(s) IV Intermittent every 8 hours  sodium chloride 0.9%. 1000 milliLiter(s) (85 mL/Hr) IV Continuous <Continuous>    MEDICATIONS  (PRN):  acetaminophen     Tablet .. 650 milliGRAM(s) Oral every 6 hours PRN Temp greater or equal to 38C (100.4F), Mild Pain (1 - 3)  aluminum hydroxide/magnesium hydroxide/simethicone Suspension 30 milliLiter(s) Oral every 4 hours PRN Dyspepsia  melatonin 3 milliGRAM(s) Oral at bedtime PRN Insomnia  ondansetron Injectable 4 milliGRAM(s) IV Push every 8 hours PRN Nausea and/or Vomiting    Vital Signs Last 24 Hrs  T(F): 98.1 (2022 15:39), Max: 104.7 (2022 03:01)  HR: 88 (2022 15:39) (63 - 137)  BP: 101/61 (2022 15:39) (92/56 - 134/67)  RR: 18 (2022 15:39) (18 - 22)  SpO2: 98% (2022 15:39) (95% - 98%)  I&O's Summary      PHYSICAL EXAM:  General: awake alert , no distress   ENT: mouth mucosa moist   Neck: supple, no JVD   Lungs: CTA bilateral , no wheezing   Cardio: RRR, S1/S2, No murmur  Abdomen: Soft, Nontender, Nondistended; Bowel sounds present  Extremities: No calf tenderness, No pitting edema  Skin : warm , normal color , no rash in extremities   LABS:                        12.4   22.88 )-----------( 363      ( 2022 06:58 )             36.0     11-02    132  |  101  |  24.8  ----------------------------<  120  4.5   |  16.0  |  1.21    Ca    9.4      2022 06:58    TPro  8.0  /  Alb  4.1  /  TBili  0.9  /  DBili  x   /  AST  56  /  ALT  86  /  AlkPhos  79  11-01          PT/INR - ( 2022 16:30 )   PT: 13.4 sec;   INR: 1.15 ratio         PTT - ( 2022 16:30 )  PTT:31.5 sec              21:35 - VBG - pH:       | pCO2:       | pO2:       | Lactate: 0.90                 Urinalysis Basic - ( 2022 16:34 )    Color: Yellow / Appearance: Clear / S.025 / pH: x  Gluc: x / Ketone: Trace  / Bili: Negative / Urobili: Negative mg/dL   Blood: x / Protein: 15 / Nitrite: Negative   Leuk Esterase: Small / RBC: 0-2 /HPF / WBC 6-10 /HPF   Sq Epi: x / Non Sq Epi: Occasional / Bacteria: Occasional        Culture - Blood (collected 2022 16:30)  Source: .Blood Blood-Peripheral  Gram Stain (2022 15:22):    Growth in aerobic bottle: Gram Positive Cocci in Pairs and Chains  Preliminary Report (2022 15:22):    Growth in aerobic bottle: Gram Positive Cocci in Pairs and Chains    ***Blood Panel PCR results on this specimen are available    approximately 3 hours after the Gram stain result.***    Gram stain, PCR, and/or culture results may not always    correspond due to difference in methodologies.    ************************************************************    This PCR assay was performed by multiplex PCR. This    Assay tests for 66 bacterial and resistance gene targets.    Please refer to the [facility] [employer] test directory    at [email or web address] for details.    Culture - Blood (collected 2022 16:25)  Source: .Blood Blood-Peripheral  Gram Stain (2022 15:31):    Growth in anaerobic bottle: Gram Positive Cocci in Pairs and Chains  Preliminary Report (2022 15:31):    Growth in anaerobic bottle: Gram Positive Cocci in Pairs and Chains        RADIOLOGY & ADDITIONAL TESTS:

## 2022-11-02 NOTE — PROVIDER CONTACT NOTE (SEPSIS SCREENING) - SEPSIS CRITERIA TO COINCIDE WITH MEWS
WBC less than 4 or greater than 12/Heart Rate greater than 90/Respiratory Rate greater than 20/Temperature less than 96.8 or greater than 100.4/Acute Altered Mental Status

## 2022-11-02 NOTE — H&P ADULT - NSHPPHYSICALEXAM_GEN_ALL_CORE
Vital Signs Last 24 Hrs  T(C): 37.4 (02 Nov 2022 04:11), Max: 40.4 (02 Nov 2022 03:01)  T(F): 99.3 (02 Nov 2022 04:11), Max: 104.7 (02 Nov 2022 03:01)  HR: 118 (02 Nov 2022 03:17) (76 - 137)  BP: 117/62 (02 Nov 2022 03:17) (78/52 - 134/67)  BP(mean): --  RR: 22 (02 Nov 2022 03:17) (18 - 22)  SpO2: 95% (02 Nov 2022 03:17) (95% - 100%)    Parameters below as of 02 Nov 2022 03:17  Patient On (Oxygen Delivery Method): room air    General: Age-appearing, in no acute distress, stutter with speech (chronic)  Head: Normocephalic, atraumatic  ENMT: EOMI, neck supple  Cardiovascular: +S1, S2; Regular rate and rhythm, 3/5 systolic murmur loudest at the R 2nd intercostal space  Respiratory: CTA BL, no wheezes, rales, rhonchi  Gastrointestinal: Abdomen soft, non-tender, +BS in all 4 quadrants  Extremities: No clubbing, cyanosis, or edema  Vascular: 2+ pulses, cap refill < 2 seconds  Neuro: Non-focal, AAOx4, sensation intact BL  Musculoskeletal: Normal tone, no deformities  Skin: Warm, dry; no acute rash seen  Psych: Appropriate, cooperative

## 2022-11-03 LAB
24R-OH-CALCIDIOL SERPL-MCNC: 74.5 NG/ML — SIGNIFICANT CHANGE UP (ref 30–80)
ALBUMIN SERPL ELPH-MCNC: 3.3 G/DL — SIGNIFICANT CHANGE UP (ref 3.3–5.2)
ALP SERPL-CCNC: 63 U/L — SIGNIFICANT CHANGE UP (ref 40–120)
ALT FLD-CCNC: 78 U/L — HIGH
ANION GAP SERPL CALC-SCNC: 14 MMOL/L — SIGNIFICANT CHANGE UP (ref 5–17)
AST SERPL-CCNC: 51 U/L — HIGH
BASOPHILS # BLD AUTO: 0 K/UL — SIGNIFICANT CHANGE UP (ref 0–0.2)
BASOPHILS NFR BLD AUTO: 0 % — SIGNIFICANT CHANGE UP (ref 0–2)
BILIRUB SERPL-MCNC: 0.3 MG/DL — LOW (ref 0.4–2)
BUN SERPL-MCNC: 19.3 MG/DL — SIGNIFICANT CHANGE UP (ref 8–20)
BURR CELLS BLD QL SMEAR: PRESENT — SIGNIFICANT CHANGE UP
CALCIUM SERPL-MCNC: 9.2 MG/DL — SIGNIFICANT CHANGE UP (ref 8.4–10.5)
CHLORIDE SERPL-SCNC: 99 MMOL/L — SIGNIFICANT CHANGE UP (ref 96–108)
CO2 SERPL-SCNC: 19 MMOL/L — LOW (ref 22–29)
CREAT SERPL-MCNC: 0.85 MG/DL — SIGNIFICANT CHANGE UP (ref 0.5–1.3)
CULTURE RESULTS: SIGNIFICANT CHANGE UP
EGFR: 89 ML/MIN/1.73M2 — SIGNIFICANT CHANGE UP
EOSINOPHIL # BLD AUTO: 0.13 K/UL — SIGNIFICANT CHANGE UP (ref 0–0.5)
EOSINOPHIL NFR BLD AUTO: 0.8 % — SIGNIFICANT CHANGE UP (ref 0–6)
FOLATE SERPL-MCNC: 13.8 NG/ML — SIGNIFICANT CHANGE UP
GI PCR PANEL: DETECTED
GLUCOSE SERPL-MCNC: 88 MG/DL — SIGNIFICANT CHANGE UP (ref 70–99)
HCT VFR BLD CALC: 35.6 % — LOW (ref 39–50)
HGB BLD-MCNC: 12.4 G/DL — LOW (ref 13–17)
LYMPHOCYTES # BLD AUTO: 0.83 K/UL — LOW (ref 1–3.3)
LYMPHOCYTES # BLD AUTO: 5.2 % — LOW (ref 13–44)
MAGNESIUM SERPL-MCNC: 1.8 MG/DL — SIGNIFICANT CHANGE UP (ref 1.8–2.6)
MANUAL SMEAR VERIFICATION: SIGNIFICANT CHANGE UP
MCHC RBC-ENTMCNC: 33.1 PG — SIGNIFICANT CHANGE UP (ref 27–34)
MCHC RBC-ENTMCNC: 34.8 GM/DL — SIGNIFICANT CHANGE UP (ref 32–36)
MCV RBC AUTO: 94.9 FL — SIGNIFICANT CHANGE UP (ref 80–100)
MONOCYTES # BLD AUTO: 1.52 K/UL — HIGH (ref 0–0.9)
MONOCYTES NFR BLD AUTO: 9.5 % — SIGNIFICANT CHANGE UP (ref 2–14)
MYELOCYTES NFR BLD: 0.9 % — HIGH (ref 0–0)
NEUTROPHILS # BLD AUTO: 13.33 K/UL — HIGH (ref 1.8–7.4)
NEUTROPHILS NFR BLD AUTO: 83.6 % — HIGH (ref 43–77)
NOROVIRUS GI+II RNA STL QL NAA+NON-PROBE: DETECTED
NT-PROBNP SERPL-SCNC: 375 PG/ML — HIGH (ref 0–300)
PHOSPHATE SERPL-MCNC: 3 MG/DL — SIGNIFICANT CHANGE UP (ref 2.4–4.7)
PLAT MORPH BLD: NORMAL — SIGNIFICANT CHANGE UP
PLATELET # BLD AUTO: 383 K/UL — SIGNIFICANT CHANGE UP (ref 150–400)
POLYCHROMASIA BLD QL SMEAR: SLIGHT — SIGNIFICANT CHANGE UP
POTASSIUM SERPL-MCNC: 4.1 MMOL/L — SIGNIFICANT CHANGE UP (ref 3.5–5.3)
POTASSIUM SERPL-SCNC: 4.1 MMOL/L — SIGNIFICANT CHANGE UP (ref 3.5–5.3)
PROT SERPL-MCNC: 6.7 G/DL — SIGNIFICANT CHANGE UP (ref 6.6–8.7)
RBC # BLD: 3.75 M/UL — LOW (ref 4.2–5.8)
RBC # FLD: 12.5 % — SIGNIFICANT CHANGE UP (ref 10.3–14.5)
RBC BLD AUTO: ABNORMAL
SODIUM SERPL-SCNC: 132 MMOL/L — LOW (ref 135–145)
SPECIMEN SOURCE: SIGNIFICANT CHANGE UP
TSH SERPL-MCNC: 1.95 UIU/ML — SIGNIFICANT CHANGE UP (ref 0.27–4.2)
VIT B12 SERPL-MCNC: 376 PG/ML — SIGNIFICANT CHANGE UP (ref 232–1245)
WBC # BLD: 15.95 K/UL — HIGH (ref 3.8–10.5)
WBC # FLD AUTO: 15.95 K/UL — HIGH (ref 3.8–10.5)

## 2022-11-03 PROCEDURE — 99223 1ST HOSP IP/OBS HIGH 75: CPT

## 2022-11-03 PROCEDURE — 99233 SBSQ HOSP IP/OBS HIGH 50: CPT

## 2022-11-03 PROCEDURE — 93306 TTE W/DOPPLER COMPLETE: CPT | Mod: 26

## 2022-11-03 RX ADMIN — HEPARIN SODIUM 5000 UNIT(S): 5000 INJECTION INTRAVENOUS; SUBCUTANEOUS at 21:59

## 2022-11-03 RX ADMIN — HEPARIN SODIUM 5000 UNIT(S): 5000 INJECTION INTRAVENOUS; SUBCUTANEOUS at 15:27

## 2022-11-03 RX ADMIN — Medication 25 MILLIGRAM(S): at 05:37

## 2022-11-03 RX ADMIN — HEPARIN SODIUM 5000 UNIT(S): 5000 INJECTION INTRAVENOUS; SUBCUTANEOUS at 05:38

## 2022-11-03 RX ADMIN — CEFTRIAXONE 2000 MILLIGRAM(S): 500 INJECTION, POWDER, FOR SOLUTION INTRAMUSCULAR; INTRAVENOUS at 23:10

## 2022-11-03 NOTE — PROGRESS NOTE ADULT - SUBJECTIVE AND OBJECTIVE BOX
Chief complaint: SIRS    Patient seen and examined at bedside. No acute overnight events reported. No fever, chills, cough, chest pain, nausea or vomiting.     Vital Signs Last 24 Hrs  T(F): 97.8 (03 Nov 2022 10:43), Max: 98.8 (03 Nov 2022 07:37)  HR: 74 (03 Nov 2022 10:43) (70 - 88)  BP: 123/71 (03 Nov 2022 10:43) (101/61 - 123/71)  RR: 20 (03 Nov 2022 10:43) (18 - 20)  SpO2: 98% (03 Nov 2022 10:43) (96% - 98%)    Physical Exam:  Constitutional: alert and oriented, in no acute distress   Neck: Soft and supple  Respiratory: Clear to auscultation bilaterally, no wheezes or crackles  Cardiovascular: Regular rate and rhyhtm, no murmurs, gallops, rubs  Gastrointestinal: Soft, non-tender to palpation, +bs  Vascular: 2+ peripheral pulses  Neurological: A/O x 3, no focal neurological deficits  Musculoskeletal: 5/5 strength b/l upper and lower extremities, no lower extremity edema bilaterally    Labs;                        12.4   15.95 )-----------( 383      ( 03 Nov 2022 02:10 )             35.6   11-03    132<L>  |  99  |  19.3  ----------------------------<  88  4.1   |  19.0<L>  |  0.85    Ca    9.2      03 Nov 2022 02:10  Phos  3.0     11-03  Mg     1.8     11-03    TPro  6.7  /  Alb  3.3  /  TBili  0.3<L>  /  DBili  x   /  AST  51<H>  /  ALT  78<H>  /  AlkPhos  63  11-03

## 2022-11-03 NOTE — CONSULT NOTE ADULT - ASSESSMENT
76 yo male with pmhx HTN presenting to the ED with body aches, fatigue and fever (Tmax 102) at home for the past 12 days. He recently had COVID 10/6/22 and recovered from that. During that time, however, he was diagnosed with Vit B12 deficiency and returned to his doctor 2 weeks ago for a vitamin B12 injection. After that is when he reports starting to feel unwell.   Found to have streptococcus bacteremia unclear source, no recent dental work, skin infections, no respiratory symptoms    Bacteremia  Leukocytosis  Fever    - Blood cultures + streptococcus, f/u ID+s  - Repeat blood cultures  x2  - TTE  - ct a/p no clear source, suggest ct chest  - Continue ceftriaxone 2 g IV daily  - Trend Fever  - Trend Leukocytosis    d/w attending, pt  Will Follow

## 2022-11-03 NOTE — CONSULT NOTE ADULT - SUBJECTIVE AND OBJECTIVE BOX
[facility]                                                INFECTIOUS DISEASES  =======================================================                               Jl Vaughan MD#    Mk Alaniz MD*                           Sunita Laws MD*   Leila Pederson MD*            Diplomates American Board of Internal Medicine & Infectious Diseases                  # [place] Office - Appt - Tel  [phone] Fax [phone]                * [place] Office - Appt - Tel [phone] Fax [phone]                                  Hospital Consult line:  [phone]  =======================================================      [id number]  GEORGE HOLDSWORTH   HPI:  76 yo male with pmhx HTN presenting to the ED with body aches, fatigue and fever (Tmax 102) at home for the past 12 days. He recently had COVID 10/6/22 and recovered from that. During that time, however, he was diagnosed with Vit B12 deficiency and returned to his doctor 2 weeks ago for a vitamin B12 injection. After that is when he reports starting to feel unwell. He also had his Flu shot within the past month. He denies chest pain, dyspnea, cough, N/V/D, dysuria, or hematuria.     Just prior to my evaluation, patient spiked a temp of 104.7 rectally and became tachycardic to 137. At this time, patient was confused. He was given Tylenol with resolution of symptoms and is AAOx4 during my exam.  (02 Nov 2022 04:03)    ID note-above reviewed    saw dentist for routine eval in april-may  saw derm for annual eval in october, has h/o ?skin lesions  no recent         I have personally reviewed the labs and data; pertinent labs and data are listed in this note; please see below.   =======================================================  Past Medical & Surgical Hx:  =====================  PAST MEDICAL & SURGICAL HISTORY:  Hypertension      Aortic stenosis      H/O inguinal hernia repair  B/L 2013      S/P laparoscopic colectomy  right colectomy with ileotransverse anastomosis        Problem List:  ==========  HEALTH ISSUES - PROBLEM Dx:        Social Hx:  =======  no toxic habits currently    FAMILY HISTORY:  FH: heart disease (Father, Mother)    no significant family history of immunosuppressive disorders in mother or father   =======================================================    REVIEW OF SYSTEMS:  CONSTITUTIONAL:  +fever  HEENT:  No diplopia or blurred vision.  No earache, sore throat or runny nose.  CARDIOVASCULAR:  No pressure, squeezing, strangling, tightness, heaviness or aching about the chest, neck, axilla or epigastrium.  RESPIRATORY:  No cough, shortness of breath  GASTROINTESTINAL:  No nausea, vomiting or diarrhea.  GENITOURINARY:  No dysuria, frequency or urgency. No Blood in urine  MUSCULOSKELETAL:  no joint aches, no muscle pain  SKIN:  No change in skin, hair or nails.  NEUROLOGIC:  No Headaches, seizures or weakness.  PSYCHIATRIC:  No disorder of thought or mood.  ENDOCRINE:  No heat or cold intolerance  HEMATOLOGICAL:  No easy bruising or bleeding.    =======================================================  Allergies    No Known Allergies    Intolerances    Antibiotics:  cefTRIAXone Injectable. 2000 milliGRAM(s) IV Push every 24 hours    Other medications:  aspirin enteric coated 81 milliGRAM(s) Oral daily  heparin   Injectable 5000 Unit(s) SubCutaneous every 8 hours  metoprolol succinate ER 25 milliGRAM(s) Oral daily  sodium chloride 0.9%. 1000 milliLiter(s) IV Continuous <Continuous>   azithromycin  IVPB   255 mL/Hr IV Intermittent (11-01-22 @ 17:21)    cefTRIAXone Injectable.   2000 milliGRAM(s) IV Push (11-02-22 @ 21:22)    cefTRIAXone Injectable.   1000 milliGRAM(s) IV Push (11-01-22 @ 19:53)    piperacillin/tazobactam IVPB..   25 mL/Hr IV Intermittent (11-02-22 @ 06:22)   25 mL/Hr IV Intermittent (11-02-22 @ 14:15)      ======================================================  Physical Exam:  ============  T(F): 98.8 (03 Nov 2022 07:37), Max: 98.8 (03 Nov 2022 07:37)  HR: 70 (03 Nov 2022 07:37)  BP: 103/62 (03 Nov 2022 07:37)  RR: 18 (03 Nov 2022 07:37)  SpO2: 96% (03 Nov 2022 07:37) (96% - 98%)  temp max in last 48H T(F): , Max: 104.7 (11-02-22 @ 03:01)    General:  No acute distress.  Eye: Normal conjunctiva.  HENT: Normocephalic, Oral mucosa is moist, No pharyngeal erythema  Neck: Supple, No lymphadenopathy.  Respiratory: Lungs are clear to auscultation, Respirations are non-labored.  Cardiovascular: Normal rate, Regular rhythm, s1 + s2 +nori  Gastrointestinal: Soft, Non-tender, Non-distended, Normal bowel sounds.  Genitourinary: No costovertebral angle tenderness.  Lymphatics: No lymphadenopathy neck,   Musculoskeletal: Normal range of motion, Normal strength.  Integumentary: No rash.  Neurologic: Alert, Oriented, No focal deficits  Psychiatric: Appropriate mood & affect.    =======================================================  Labs:                        12.4   15.95 )-----------( 383      ( 03 Nov 2022 02:10 )             35.6     11-03    132<L>  |  99  |  19.3  ----------------------------<  88  4.1   |  19.0<L>  |  0.85    Ca    9.2      03 Nov 2022 02:10  Phos  3.0     11-03  Mg     1.8     11-03    TPro  6.7  /  Alb  3.3  /  TBili  0.3<L>  /  DBili  x   /  AST  51<H>  /  ALT  78<H>  /  AlkPhos  63  11-03      Culture - Urine (collected 11-01-22 @ 16:34)  Source: Clean Catch Clean Catch (Midstream)  Final Report (11-02-22 @ 21:33):    <10,000 CFU/mL Normal Urogenital Nataliia    Culture - Blood (collected 11-01-22 @ 16:30)  Source: .Blood Blood-Peripheral  Gram Stain (11-02-22 @ 17:02):    Growth in aerobic bottle: Gram Positive Cocci in Pairs and Chains    Growth in anaerobic bottle: Gram Positive Cocci in Pairs and Chains  Organism: Blood Culture PCR (11-02-22 @ 17:53)  Organism: Blood Culture PCR (11-02-22 @ 17:53)    Sensitivities:      -  Streptococcus sp. (Not Grp A, B or S pneumoniae): Detec      Method Type: PCR    Culture - Blood (collected 11-01-22 @ 16:25)  Source: .Blood Blood-Peripheral  Gram Stain (11-02-22 @ 22:29):    Growth in anaerobic bottle: Gram Positive Cocci in Pairs and Chains    Growth in aerobic bottle: Gram Positive Cocci in Pairs and Chains  Final Report (11-03-22 @ 10:14):    Growth in aerobic and anaerobic bottles: Streptococcus anginosus    See previous culture [id number]       SARS-CoV-2: NotDetec (11-02-22 @ 01:56)       < from: CT Abdomen and Pelvis w/ IV Cont (11.02.22 @ 00:17) >  FINDINGS:  LOWER CHEST: Heavy calcification of the aortic valve leaflets is   partially visualized; calcific aortic stenosis is not excluded.    LIVER: Within normal limits.  BILE DUCTS: Normal caliber.  GALLBLADDER: Contracted.  SPLEEN: Splenomegaly; the spleen measures 13.7 cm in length of 5:44).  PANCREAS: Two nonspecific punctate calcifications in the pancreatic head   (3:52) and in the pancreatic neck (3:46).  ADRENALS: Within normal limits.  KIDNEYS/URETERS: Kidneys enhance symmetrically without hydronephrosis or   renal stones.  Renal cysts measure up to 2.9 cm in the right kidney   (3:41) and 1.4 cm and the left kidney (3:53).  A few scattered   subcentimeter hypoattenuating foci in the left kidney are too small to   accurately characterize by CT scan.    BLADDER: Underdistended.  REPRODUCTIVE ORGANS: Enlarged heterogeneous prostate measures   approximately 5.8 x 6.7 x 6.1 cm, with an ellipsoid volume of   approximately 124 mL.    BOWEL: Patient is status post right hemicolectomy with right upper   quadrant ileocolic anastomosis.  No bowel obstruction, focal bowel wall   thickening or mesenteric inflammatory changes.  There are fluid-filled   loops of distal small bowel.  There is diffuse fluid distention of the   colon and rectum.  PERITONEUM: No ascites.  VESSELS: Atherosclerotic calcifications of the aortoiliac tree and   proximal thigh vasculature.  RETROPERITONEUM/LYMPH NODES: No lymphadenopathy.  ABDOMINAL WALL: The patient appears to be status mesh repair of a right   inguinalhernia.  BONES: Degenerative changes in the spine.  No acute fracture or   suspicious osseous lesion.      IMPRESSION:  No CT evidence of bowel obstruction, active inflammatory process or   intra-abdominal source for infection.    No hydronephrosis or renal stones.  The bladder is underdistended.  Enlarged prostate with volume of approximately 124 mL.    Status post right hemicolectomy with right upper quadrant ileocolic   anastomosis.  Fluid-filled loops of distal small bowel.  Diffuse fluid   distention of the colon and rectum.  No bowel wall thickening or   mesenteric inflammatory changes.      VERTEBRAL BODY ANALYSIS: No Vertebral fracture or low bone density   identified.    < end of copied text >                                              [facility]                                                INFECTIOUS DISEASES  =======================================================                               Jl Vaughan MD#    Mk Alaniz MD*                           Sunita Laws MD*   Leila Pederson MD*            Diplomates American Board of Internal Medicine & Infectious Diseases                  # [place] Office - Appt - Tel  [phone] Fax [phone]                * [place] Office - Appt - Tel [phone] Fax [phone]                                  Hospital Consult line:  [phone]  =======================================================      [id number]  GEORGE HOLDSWORTH   HPI:  76 yo male with pmhx HTN presenting to the ED with body aches, fatigue and fever (Tmax 102) at home for the past 12 days. He recently had COVID 10/6/22 and recovered from that. During that time, however, he was diagnosed with Vit B12 deficiency and returned to his doctor 2 weeks ago for a vitamin B12 injection. After that is when he reports starting to feel unwell. He also had his Flu shot within the past month. He denies chest pain, dyspnea, cough, N/V/D, dysuria, or hematuria.     Just prior to my evaluation, patient spiked a temp of 104.7 rectally and became tachycardic to 137. At this time, patient was confused. He was given Tylenol with resolution of symptoms and is AAOx4 during my exam.  (02 Nov 2022 04:03)    ID note-above reviewed    saw dentist for routine eval in april-may  saw derm for annual eval in october, has h/o ?skin lesions  no recent   skin infections      I have personally reviewed the labs and data; pertinent labs and data are listed in this note; please see below.   =======================================================  Past Medical & Surgical Hx:  =====================  PAST MEDICAL & SURGICAL HISTORY:  Hypertension      Aortic stenosis      H/O inguinal hernia repair  B/L 2013      S/P laparoscopic colectomy  right colectomy with ileotransverse anastomosis        Problem List:  ==========  HEALTH ISSUES - PROBLEM Dx:        Social Hx:  =======  no toxic habits currently    FAMILY HISTORY:  FH: heart disease (Father, Mother)    no significant family history of immunosuppressive disorders in mother or father   =======================================================    REVIEW OF SYSTEMS:  CONSTITUTIONAL:  +fever  HEENT:  No diplopia or blurred vision.  No earache, sore throat or runny nose.  CARDIOVASCULAR:  No pressure, squeezing, strangling, tightness, heaviness or aching about the chest, neck, axilla or epigastrium.  RESPIRATORY:  No cough, shortness of breath  GASTROINTESTINAL:  No nausea, vomiting or diarrhea.  GENITOURINARY:  No dysuria, frequency or urgency. No Blood in urine  MUSCULOSKELETAL:  no joint aches, no muscle pain  SKIN:  No change in skin, hair or nails.  NEUROLOGIC:  No Headaches, seizures or weakness.  PSYCHIATRIC:  No disorder of thought or mood.  ENDOCRINE:  No heat or cold intolerance  HEMATOLOGICAL:  No easy bruising or bleeding.    =======================================================  Allergies    No Known Allergies    Intolerances    Antibiotics:  cefTRIAXone Injectable. 2000 milliGRAM(s) IV Push every 24 hours    Other medications:  aspirin enteric coated 81 milliGRAM(s) Oral daily  heparin   Injectable 5000 Unit(s) SubCutaneous every 8 hours  metoprolol succinate ER 25 milliGRAM(s) Oral daily  sodium chloride 0.9%. 1000 milliLiter(s) IV Continuous <Continuous>   azithromycin  IVPB   255 mL/Hr IV Intermittent (11-01-22 @ 17:21)    cefTRIAXone Injectable.   2000 milliGRAM(s) IV Push (11-02-22 @ 21:22)    cefTRIAXone Injectable.   1000 milliGRAM(s) IV Push (11-01-22 @ 19:53)    piperacillin/tazobactam IVPB..   25 mL/Hr IV Intermittent (11-02-22 @ 06:22)   25 mL/Hr IV Intermittent (11-02-22 @ 14:15)      ======================================================  Physical Exam:  ============  T(F): 98.8 (03 Nov 2022 07:37), Max: 98.8 (03 Nov 2022 07:37)  HR: 70 (03 Nov 2022 07:37)  BP: 103/62 (03 Nov 2022 07:37)  RR: 18 (03 Nov 2022 07:37)  SpO2: 96% (03 Nov 2022 07:37) (96% - 98%)  temp max in last 48H T(F): , Max: 104.7 (11-02-22 @ 03:01)    General:  No acute distress.  Eye: Normal conjunctiva.  HENT: Normocephalic, Oral mucosa is moist, No pharyngeal erythema  Neck: Supple, No lymphadenopathy.  Respiratory: Lungs are clear to auscultation, Respirations are non-labored.  Cardiovascular: Normal rate, Regular rhythm, s1 + s2 +nori  Gastrointestinal: Soft, Non-tender, Non-distended, Normal bowel sounds.  Genitourinary: No costovertebral angle tenderness.  Lymphatics: No lymphadenopathy neck,   Musculoskeletal: Normal range of motion, Normal strength.  Integumentary: No rash.  Neurologic: Alert, Oriented, No focal deficits  Psychiatric: Appropriate mood & affect.    =======================================================  Labs:                        12.4   15.95 )-----------( 383      ( 03 Nov 2022 02:10 )             35.6     11-03    132<L>  |  99  |  19.3  ----------------------------<  88  4.1   |  19.0<L>  |  0.85    Ca    9.2      03 Nov 2022 02:10  Phos  3.0     11-03  Mg     1.8     11-03    TPro  6.7  /  Alb  3.3  /  TBili  0.3<L>  /  DBili  x   /  AST  51<H>  /  ALT  78<H>  /  AlkPhos  63  11-03      Culture - Urine (collected 11-01-22 @ 16:34)  Source: Clean Catch Clean Catch (Midstream)  Final Report (11-02-22 @ 21:33):    <10,000 CFU/mL Normal Urogenital Nataliia    Culture - Blood (collected 11-01-22 @ 16:30)  Source: .Blood Blood-Peripheral  Gram Stain (11-02-22 @ 17:02):    Growth in aerobic bottle: Gram Positive Cocci in Pairs and Chains    Growth in anaerobic bottle: Gram Positive Cocci in Pairs and Chains  Organism: Blood Culture PCR (11-02-22 @ 17:53)  Organism: Blood Culture PCR (11-02-22 @ 17:53)    Sensitivities:      -  Streptococcus sp. (Not Grp A, B or S pneumoniae): Detec      Method Type: PCR    Culture - Blood (collected 11-01-22 @ 16:25)  Source: .Blood Blood-Peripheral  Gram Stain (11-02-22 @ 22:29):    Growth in anaerobic bottle: Gram Positive Cocci in Pairs and Chains    Growth in aerobic bottle: Gram Positive Cocci in Pairs and Chains  Final Report (11-03-22 @ 10:14):    Growth in aerobic and anaerobic bottles: Streptococcus anginosus    See previous culture [id number]       SARS-CoV-2: NotDetec (11-02-22 @ 01:56)       < from: CT Abdomen and Pelvis w/ IV Cont (11.02.22 @ 00:17) >  FINDINGS:  LOWER CHEST: Heavy calcification of the aortic valve leaflets is   partially visualized; calcific aortic stenosis is not excluded.    LIVER: Within normal limits.  BILE DUCTS: Normal caliber.  GALLBLADDER: Contracted.  SPLEEN: Splenomegaly; the spleen measures 13.7 cm in length of 5:44).  PANCREAS: Two nonspecific punctate calcifications in the pancreatic head   (3:52) and in the pancreatic neck (3:46).  ADRENALS: Within normal limits.  KIDNEYS/URETERS: Kidneys enhance symmetrically without hydronephrosis or   renal stones.  Renal cysts measure up to 2.9 cm in the right kidney   (3:41) and 1.4 cm and the left kidney (3:53).  A few scattered   subcentimeter hypoattenuating foci in the left kidney are too small to   accurately characterize by CT scan.    BLADDER: Underdistended.  REPRODUCTIVE ORGANS: Enlarged heterogeneous prostate measures   approximately 5.8 x 6.7 x 6.1 cm, with an ellipsoid volume of   approximately 124 mL.    BOWEL: Patient is status post right hemicolectomy with right upper   quadrant ileocolic anastomosis.  No bowel obstruction, focal bowel wall   thickening or mesenteric inflammatory changes.  There are fluid-filled   loops of distal small bowel.  There is diffuse fluid distention of the   colon and rectum.  PERITONEUM: No ascites.  VESSELS: Atherosclerotic calcifications of the aortoiliac tree and   proximal thigh vasculature.  RETROPERITONEUM/LYMPH NODES: No lymphadenopathy.  ABDOMINAL WALL: The patient appears to be status mesh repair of a right   inguinalhernia.  BONES: Degenerative changes in the spine.  No acute fracture or   suspicious osseous lesion.      IMPRESSION:  No CT evidence of bowel obstruction, active inflammatory process or   intra-abdominal source for infection.    No hydronephrosis or renal stones.  The bladder is underdistended.  Enlarged prostate with volume of approximately 124 mL.    Status post right hemicolectomy with right upper quadrant ileocolic   anastomosis.  Fluid-filled loops of distal small bowel.  Diffuse fluid   distention of the colon and rectum.  No bowel wall thickening or   mesenteric inflammatory changes.      VERTEBRAL BODY ANALYSIS: No Vertebral fracture or low bone density   identified.    < end of copied text >

## 2022-11-03 NOTE — PROGRESS NOTE ADULT - ASSESSMENT
76 yo male with pmhx HTN presenting to the ED with body aches, fatigue and fever (Tmax 102) at home for the past 12 days.    Sepsis secondary to Streptococcus bacteremia  - Source unknown  - ID consult appreciated  - Ceftriaxone 2g daily  - TTE ordered  - CT chest w/wo contrast  - Repeat blood cultures in process    HTN  - Valsartan held  - Toprol XL 25mg daily     ARF  - Resolved  - Valsartan held    DVT PPX  - Heparin SQ

## 2022-11-04 LAB
-  CEFTRIAXONE: SIGNIFICANT CHANGE UP
-  CLINDAMYCIN: SIGNIFICANT CHANGE UP
-  ERYTHROMYCIN: SIGNIFICANT CHANGE UP
-  LEVOFLOXACIN: SIGNIFICANT CHANGE UP
-  PENICILLIN: SIGNIFICANT CHANGE UP
-  VANCOMYCIN: SIGNIFICANT CHANGE UP
ANION GAP SERPL CALC-SCNC: 13 MMOL/L — SIGNIFICANT CHANGE UP (ref 5–17)
BASOPHILS # BLD AUTO: 0.06 K/UL — SIGNIFICANT CHANGE UP (ref 0–0.2)
BASOPHILS NFR BLD AUTO: 0.5 % — SIGNIFICANT CHANGE UP (ref 0–2)
BUN SERPL-MCNC: 12.4 MG/DL — SIGNIFICANT CHANGE UP (ref 8–20)
CALCIUM SERPL-MCNC: 9.3 MG/DL — SIGNIFICANT CHANGE UP (ref 8.4–10.5)
CHLORIDE SERPL-SCNC: 106 MMOL/L — SIGNIFICANT CHANGE UP (ref 96–108)
CO2 SERPL-SCNC: 19 MMOL/L — LOW (ref 22–29)
CREAT SERPL-MCNC: 0.67 MG/DL — SIGNIFICANT CHANGE UP (ref 0.5–1.3)
CULTURE RESULTS: SIGNIFICANT CHANGE UP
EGFR: 96 ML/MIN/1.73M2 — SIGNIFICANT CHANGE UP
EOSINOPHIL # BLD AUTO: 0.24 K/UL — SIGNIFICANT CHANGE UP (ref 0–0.5)
EOSINOPHIL NFR BLD AUTO: 1.8 % — SIGNIFICANT CHANGE UP (ref 0–6)
GLUCOSE SERPL-MCNC: 108 MG/DL — HIGH (ref 70–99)
HCT VFR BLD CALC: 34.8 % — LOW (ref 39–50)
HGB BLD-MCNC: 12 G/DL — LOW (ref 13–17)
IMM GRANULOCYTES NFR BLD AUTO: 0.8 % — SIGNIFICANT CHANGE UP (ref 0–0.9)
LYMPHOCYTES # BLD AUTO: 1.35 K/UL — SIGNIFICANT CHANGE UP (ref 1–3.3)
LYMPHOCYTES # BLD AUTO: 10.2 % — LOW (ref 13–44)
MCHC RBC-ENTMCNC: 32.2 PG — SIGNIFICANT CHANGE UP (ref 27–34)
MCHC RBC-ENTMCNC: 34.5 GM/DL — SIGNIFICANT CHANGE UP (ref 32–36)
MCV RBC AUTO: 93.3 FL — SIGNIFICANT CHANGE UP (ref 80–100)
METHOD TYPE: SIGNIFICANT CHANGE UP
MONOCYTES # BLD AUTO: 1.23 K/UL — HIGH (ref 0–0.9)
MONOCYTES NFR BLD AUTO: 9.3 % — SIGNIFICANT CHANGE UP (ref 2–14)
NEUTROPHILS # BLD AUTO: 10.27 K/UL — HIGH (ref 1.8–7.4)
NEUTROPHILS NFR BLD AUTO: 77.4 % — HIGH (ref 43–77)
ORGANISM # SPEC MICROSCOPIC CNT: SIGNIFICANT CHANGE UP
PLATELET # BLD AUTO: 397 K/UL — SIGNIFICANT CHANGE UP (ref 150–400)
POTASSIUM SERPL-MCNC: 4.3 MMOL/L — SIGNIFICANT CHANGE UP (ref 3.5–5.3)
POTASSIUM SERPL-SCNC: 4.3 MMOL/L — SIGNIFICANT CHANGE UP (ref 3.5–5.3)
RBC # BLD: 3.73 M/UL — LOW (ref 4.2–5.8)
RBC # FLD: 12.5 % — SIGNIFICANT CHANGE UP (ref 10.3–14.5)
SODIUM SERPL-SCNC: 138 MMOL/L — SIGNIFICANT CHANGE UP (ref 135–145)
SPECIMEN SOURCE: SIGNIFICANT CHANGE UP
WBC # BLD: 13.25 K/UL — HIGH (ref 3.8–10.5)
WBC # FLD AUTO: 13.25 K/UL — HIGH (ref 3.8–10.5)

## 2022-11-04 PROCEDURE — 99233 SBSQ HOSP IP/OBS HIGH 50: CPT

## 2022-11-04 PROCEDURE — 99231 SBSQ HOSP IP/OBS SF/LOW 25: CPT

## 2022-11-04 RX ADMIN — Medication 25 MILLIGRAM(S): at 05:59

## 2022-11-04 RX ADMIN — Medication 81 MILLIGRAM(S): at 11:08

## 2022-11-04 RX ADMIN — HEPARIN SODIUM 5000 UNIT(S): 5000 INJECTION INTRAVENOUS; SUBCUTANEOUS at 05:59

## 2022-11-04 RX ADMIN — HEPARIN SODIUM 5000 UNIT(S): 5000 INJECTION INTRAVENOUS; SUBCUTANEOUS at 13:01

## 2022-11-04 RX ADMIN — SODIUM CHLORIDE 85 MILLILITER(S): 9 INJECTION INTRAMUSCULAR; INTRAVENOUS; SUBCUTANEOUS at 13:01

## 2022-11-04 RX ADMIN — HEPARIN SODIUM 5000 UNIT(S): 5000 INJECTION INTRAVENOUS; SUBCUTANEOUS at 22:28

## 2022-11-04 RX ADMIN — CEFTRIAXONE 2000 MILLIGRAM(S): 500 INJECTION, POWDER, FOR SOLUTION INTRAMUSCULAR; INTRAVENOUS at 22:28

## 2022-11-04 NOTE — PROGRESS NOTE ADULT - SUBJECTIVE AND OBJECTIVE BOX
[facility]                                                INFECTIOUS DISEASES  =======================================================                               Jl Vaughan MD#    Mk Alaniz MD*                           Sunita Laws MD*   Leila Pederson MD*            Diplomates American Board of Internal Medicine & Infectious Diseases                  # [place] Office - Appt - Tel  [phone] Fax [phone]                * [place] Office - Appt - Tel [phone] Fax [phone]                                  Hospital Consult line:  [phone]  =======================================================      [facility]-[id number]  GEORGE HOLDSWORTH   follow up for: bacteremia  afebrile  wbc better  feels better  eager to go home  +diarrhea  patient seen and examined.       I have personally reviewed the labs and data; pertinent labs and data are listed in this note; please see below.   ===================================================  REVIEW OF SYSTEMS:  CONSTITUTIONAL:  No Fever or chills  HEENT:  No diplopia or blurred vision.  No earache, sore throat or runny nose.  CARDIOVASCULAR:  No pressure, squeezing, strangling, tightness, heaviness or aching about the chest, neck, axilla or epigastrium.  RESPIRATORY:  No cough, shortness of breath  GASTROINTESTINAL:  No nausea, vomiting + diarrhea.  GENITOURINARY:  No dysuria, frequency or urgency. No Blood in urine  MUSCULOSKELETAL:  no joint aches, no muscle pain  SKIN:  No change in skin, hair or nails.  NEUROLOGIC:  No Headaches, seizures or weakness.  PSYCHIATRIC:  No disorder of thought or mood.  ENDOCRINE:  No heat or cold intolerance  HEMATOLOGICAL:  No easy bruising or bleeding.    =======================================================  Allergies    No Known Allergies    Intolerances    Antibiotics:  cefTRIAXone Injectable. 2000 milliGRAM(s) IV Push every 24 hours    Other medications:  aspirin enteric coated 81 milliGRAM(s) Oral daily  heparin   Injectable 5000 Unit(s) SubCutaneous every 8 hours  metoprolol succinate ER 25 milliGRAM(s) Oral daily  sodium chloride 0.9%. 1000 milliLiter(s) IV Continuous <Continuous>    ======================================================  Physical Exam:  ============  T(F): 98 (04 Nov 2022 04:50), Max: 98 (03 Nov 2022 16:25)  HR: 64 (04 Nov 2022 04:50)  BP: 120/70 (04 Nov 2022 04:50)  RR: 18 (04 Nov 2022 04:50)  SpO2: 95% (04 Nov 2022 04:50) (95% - 95%)  temp max in last 48H T(F): , Max: 98.8 (11-03-22 @ 07:37)    General:  No acute distress.  Eye: no conjunctival pallor, no scleral icterus  HENT: Normocephalic  Respiratory: Lungs are clear to auscultation, Respirations are non-labored.  Cardiovascular: Normal rate, Regular rhythm,  s1+s2 +nori  Gastrointestinal: Soft, Non-tender, Non-distended, Normal bowel sounds.  Genitourinary: No costovertebral angle tenderness  Musculoskeletal: Normal range of motion, Normal strength  Integumentary: No rash  Neurologic: Alert, Oriented, No focal deficits  Psychiatric: Appropriate mood & affect  =======================================================  Labs:                        12.0   13.25 )-----------( 327      ( 04 Nov 2022 05:37 )             34.8     11-04    138  |  106  |  12.4  ----------------------------<  108<H>  4.3   |  19.0<L>  |  0.67    Ca    9.3      04 Nov 2022 05:37  Phos  3.0     11-03  Mg     1.8     11-03    TPro  6.7  /  Alb  3.3  /  TBili  0.3<L>  /  DBili  x   /  AST  51<H>  /  ALT  78<H>  /  AlkPhos  63  11-03      Culture - Blood (collected 11-03-22 @ 02:16)  Source: .Blood Blood-Peripheral    Culture - Blood (collected 11-03-22 @ 02:10)  Source: .Blood Blood-Peripheral    Culture - Urine (collected 11-01-22 @ 16:34)  Source: Clean Catch Clean Catch (Midstream)  Final Report (11-02-22 @ 21:33):    <10,000 CFU/mL Normal Urogenital Nataliia    Culture - Blood (collected 11-01-22 @ 16:30)  Source: .Blood Blood-Peripheral  Gram Stain (11-02-22 @ 17:02):    Growth in aerobic bottle: Gram Positive Cocci in Pairs and Chains    Growth in anaerobic bottle: Gram Positive Cocci in Pairs and Chains  Final Report (11-04-22 @ 12:41):    Growth in aerobic and anaerobic bottles: Streptococcus anginosus    ***Blood Panel PCR results on this specimen are available    approximately 3 hours after the Gram stain result.***    Gram stain, PCR, and/or culture results may not always    correspond due to difference in methodologies.    ************************************************************    This PCR assay was performed by multiplex PCR. This    Assay tests for 66 bacterial and resistance gene targets.    Please refer to the [facility] directory    at [email or web address] for details.  Organism: Blood Culture PCR  Streptococcus anginosus (11-04-22 @ 12:41)  Organism: Streptococcus anginosus (11-04-22 @ 12:41)    Sensitivities:      -  Ceftriaxone: S <=0.25      -  Clindamycin: S <=0.06      -  Erythromycin: S <=0.06      -  Levofloxacin: S 0.5      -  Penicillin: S <=0.03      -  Vancomycin: S 1      Method Type: ANGELINA  Organism: Blood Culture PCR (11-04-22 @ 12:41)    Sensitivities:      -  Streptococcus sp. (Not Grp A, B or S pneumoniae): Detec      Method Type: PCR    Culture - Blood (collected 11-01-22 @ 16:25)  Source: .Blood Blood-Peripheral  Gram Stain (11-02-22 @ 22:29):    Growth in anaerobic bottle: Gram Positive Cocci in Pairs and Chains    Growth in aerobic bottle: Gram Positive Cocci in Pairs and Chains  Final Report (11-03-22 @ 10:14):    Growth in aerobic and anaerobic bottles: Streptococcus anginosus    See previous culture [id number]

## 2022-11-04 NOTE — PROGRESS NOTE ADULT - ASSESSMENT
76 yo male with pmhx HTN presenting to the ED with body aches, fatigue and fever (Tmax 102) at home for the past 12 days. He recently had COVID 10/6/22 and recovered from that. During that time, however, he was diagnosed with Vit B12 deficiency and returned to his doctor 2 weeks ago for a vitamin B12 injection. After that is when he reports starting to feel unwell.   Found to have streptococcus bacteremia unclear source, no recent dental work, skin infections, no respiratory symptoms    Bacteremia  Leukocytosis  Fever  Norovirus    - feels well, reports 2-3x diarrhea per day since monday, reports intermittent diarrhea sine his bowel surgery 2019  - Blood cultures + streptococcus anginosis pansensitive   - Repeat blood cultures  x2  - TTE did not report vegetations  - ct a/p no clear source, suggest CT chest  - if no clear source would consider LILIAN   - Continue ceftriaxone 2 g IV daily  - Trend Fever  - Trend Leukocytosis-improving  - for norovirus-supportive care and contact isolation    d/w attending, pt, wife  Will Follow

## 2022-11-04 NOTE — PROGRESS NOTE ADULT - SUBJECTIVE AND OBJECTIVE BOX
Chief Complaint:  sepsis     SUBJECTIVE / OVERNIGHT EVENTS:     Patient denies chest pain, SOB, abd pain, N/V, fever, chills, dysuria or any other complaints. All remainder ROS negative.       I&O's Summary    03 Nov 2022 07:01  -  04 Nov 2022 07:00  --------------------------------------------------------  IN: 1275 mL / OUT: 1050 mL / NET: 225 mL          PHYSICAL EXAM:  Vital Signs Last 24 Hrs  T(C): 36.7 (04 Nov 2022 04:50), Max: 36.7 (03 Nov 2022 16:25)  T(F): 98 (04 Nov 2022 04:50), Max: 98 (03 Nov 2022 16:25)  HR: 64 (04 Nov 2022 04:50) (64 - 78)  BP: 120/70 (04 Nov 2022 04:50) (111/61 - 120/70)  BP(mean): 78 (03 Nov 2022 16:25) (78 - 78)  RR: 18 (04 Nov 2022 04:50) (18 - 18)  SpO2: 95% (04 Nov 2022 04:50) (95% - 95%)    Parameters below as of 03 Nov 2022 16:25  Patient On (Oxygen Delivery Method): room air          CONSTITUTIONAL: pt examined bedside, laying comfortably in bed in NAD  HEENT: NC/AT, moist oral mucosa, clear conjunctiva, sclera nonicteric, EOMI  RESPIRATORY: Normal respiratory effort; CTA b/l, no wheezing, rhonchi, rales  CARDIOVASCULAR: RRR, normal S1 and S2, no murmur/rub/gallop  ABDOMEN: soft, NT/ND, normoactive bowel sounds, no rebound/guarding, no HSM  MUSCLOSKELETAL:  no joint swelling or tenderness to palpation  EXTREMITIES: No cynaosis, no clubbing, no lower extremity edema; Peripheral pulses are 2+ bilaterally  PSYCH: affect appropriate and cooperative  NEUROLOGY: A+O to person, place, and time, no focal neurologic deficits appreciated   SKIN: No rashes or no palpable lesions        LABS:                        12.0   13.25 )-----------( 397      ( 04 Nov 2022 05:37 )             34.8     11-04    138  |  106  |  12.4  ----------------------------<  108<H>  4.3   |  19.0<L>  |  0.67    Ca    9.3      04 Nov 2022 05:37  Phos  3.0     11-03  Mg     1.8     11-03    TPro  6.7  /  Alb  3.3  /  TBili  0.3<L>  /  DBili  x   /  AST  51<H>  /  ALT  78<H>  /  AlkPhos  63  11-03              Culture - Blood (collected 03 Nov 2022 02:16)  Source: .Blood Blood-Peripheral  Preliminary Report (04 Nov 2022 10:01):    No growth to date.    Culture - Blood (collected 03 Nov 2022 02:10)  Source: .Blood Blood-Peripheral  Preliminary Report (04 Nov 2022 10:01):    No growth to date.    Culture - Urine (collected 01 Nov 2022 16:34)  Source: Clean Catch Clean Catch (Midstream)  Final Report (02 Nov 2022 21:33):    <10,000 CFU/mL Normal Urogenital Nataliia    Culture - Blood (collected 01 Nov 2022 16:30)  Source: .Blood Blood-Peripheral  Gram Stain (02 Nov 2022 17:02):    Growth in aerobic bottle: Gram Positive Cocci in Pairs and Chains    Growth in anaerobic bottle: Gram Positive Cocci in Pairs and Chains  Final Report (04 Nov 2022 12:41):    Growth in aerobic and anaerobic bottles: Streptococcus anginosus    ***Blood Panel PCR results on this specimen are available    approximately 3 hours after the Gram stain result.***    Gram stain, PCR, and/or culture results may not always    correspond due to difference in methodologies.    ************************************************************    This PCR assay was performed by multiplex PCR. This    Assay tests for 66 bacterial and resistance gene targets.    Please refer to the [facility] Labstest directory    at [email or web address] for details.  Organism: Blood Culture PCR  Streptococcus anginosus (04 Nov 2022 12:41)  Organism: Streptococcus anginosus (04 Nov 2022 12:41)  Organism: Blood Culture PCR (04 Nov 2022 12:41)    Culture - Blood (collected 01 Nov 2022 16:25)  Source: .Blood Blood-Peripheral  Gram Stain (02 Nov 2022 22:29):    Growth in anaerobic bottle: Gram Positive Cocci in Pairs and Chains    Growth in aerobic bottle: Gram Positive Cocci in Pairs and Chains  Final Report (03 Nov 2022 10:14):    Growth in aerobic and anaerobic bottles: Streptococcus anginosus    See previous culture [id number]      CAPILLARY BLOOD GLUCOSE            RADIOLOGY & ADDITIONAL TESTS:          MEDICATIONS  (STANDING):  aspirin enteric coated 81 milliGRAM(s) Oral daily  cefTRIAXone Injectable. 2000 milliGRAM(s) IV Push every 24 hours  heparin   Injectable 5000 Unit(s) SubCutaneous every 8 hours  metoprolol succinate ER 25 milliGRAM(s) Oral daily  sodium chloride 0.9%. 1000 milliLiter(s) (85 mL/Hr) IV Continuous <Continuous>    MEDICATIONS  (PRN):  acetaminophen     Tablet .. 650 milliGRAM(s) Oral every 6 hours PRN Temp greater or equal to 38C (100.4F), Mild Pain (1 - 3)  aluminum hydroxide/magnesium hydroxide/simethicone Suspension 30 milliLiter(s) Oral every 4 hours PRN Dyspepsia  melatonin 3 milliGRAM(s) Oral at bedtime PRN Insomnia  ondansetron Injectable 4 milliGRAM(s) IV Push every 8 hours PRN Nausea and/or Vomiting                                     Chief Complaint:  sepsis     SUBJECTIVE / OVERNIGHT EVENTS:     Patient denies chest pain, SOB, abd pain, N/V, fever, chills, dysuria or any other complaints. All remainder ROS negative.       I&O's Summary    03 Nov 2022 07:01  -  04 Nov 2022 07:00  --------------------------------------------------------  IN: 1275 mL / OUT: 1050 mL / NET: 225 mL          PHYSICAL EXAM:  Vital Signs Last 24 Hrs  T(C): 36.7 (04 Nov 2022 04:50), Max: 36.7 (03 Nov 2022 16:25)  T(F): 98 (04 Nov 2022 04:50), Max: 98 (03 Nov 2022 16:25)  HR: 64 (04 Nov 2022 04:50) (64 - 78)  BP: 120/70 (04 Nov 2022 04:50) (111/61 - 120/70)  BP(mean): 78 (03 Nov 2022 16:25) (78 - 78)  RR: 18 (04 Nov 2022 04:50) (18 - 18)  SpO2: 95% (04 Nov 2022 04:50) (95% - 95%)    Parameters below as of 03 Nov 2022 16:25  Patient On (Oxygen Delivery Method): room air          GENERAL: pt examined bedside, laying comfortably in bed in NAD  HEENT: NC/AT, moist oral mucosa, clear conjunctiva, sclera nonicteric  RESPIRATORY: Normal respiratory effort, no wheezing, rhonchi, rales  CARDIOVASCULAR: RRR, normal S1 and S2  ABDOMEN: soft, NT/ND, normoactive bowel sounds, no rebound/guarding  EXTREMITIES: No cynaosis, no clubbing, no lower extremity edema, pulses are 2+ bilaterally  NEUROLOGY: A+O to person, place, and time, no focal neurologic deficits appreciated   SKIN: No rashes or no palpable lesions        LABS:                        12.0   13.25 )-----------( 397      ( 04 Nov 2022 05:37 )             34.8     11-04    138  |  106  |  12.4  ----------------------------<  108<H>  4.3   |  19.0<L>  |  0.67    Ca    9.3      04 Nov 2022 05:37  Phos  3.0     11-03  Mg     1.8     11-03    TPro  6.7  /  Alb  3.3  /  TBili  0.3<L>  /  DBili  x   /  AST  51<H>  /  ALT  78<H>  /  AlkPhos  63  11-03              Culture - Blood (collected 03 Nov 2022 02:16)  Source: .Blood Blood-Peripheral  Preliminary Report (04 Nov 2022 10:01):    No growth to date.    Culture - Blood (collected 03 Nov 2022 02:10)  Source: .Blood Blood-Peripheral  Preliminary Report (04 Nov 2022 10:01):    No growth to date.    Culture - Urine (collected 01 Nov 2022 16:34)  Source: Clean Catch Clean Catch (Midstream)  Final Report (02 Nov 2022 21:33):    <10,000 CFU/mL Normal Urogenital Nataliia    Culture - Blood (collected 01 Nov 2022 16:30)  Source: .Blood Blood-Peripheral  Gram Stain (02 Nov 2022 17:02):    Growth in aerobic bottle: Gram Positive Cocci in Pairs and Chains    Growth in anaerobic bottle: Gram Positive Cocci in Pairs and Chains  Final Report (04 Nov 2022 12:41):    Growth in aerobic and anaerobic bottles: Streptococcus anginosus    ***Blood Panel PCR results on this specimen are available    approximately 3 hours after the Gram stain result.***    Gram stain, PCR, and/or culture results may not always    correspond due to difference in methodologies.    ************************************************************    This PCR assay was performed by multiplex PCR. This    Assay tests for 66 bacterial and resistance gene targets.    Please refer to the [facility] Labstest directory    at [email or web address] for details.  Organism: Blood Culture PCR  Streptococcus anginosus (04 Nov 2022 12:41)  Organism: Streptococcus anginosus (04 Nov 2022 12:41)  Organism: Blood Culture PCR (04 Nov 2022 12:41)    Culture - Blood (collected 01 Nov 2022 16:25)  Source: .Blood Blood-Peripheral  Gram Stain (02 Nov 2022 22:29):    Growth in anaerobic bottle: Gram Positive Cocci in Pairs and Chains    Growth in aerobic bottle: Gram Positive Cocci in Pairs and Chains  Final Report (03 Nov 2022 10:14):    Growth in aerobic and anaerobic bottles: Streptococcus anginosus    See previous culture [id number]      CAPILLARY BLOOD GLUCOSE            RADIOLOGY & ADDITIONAL TESTS:    < from: CT Abdomen and Pelvis w/ IV Cont (11.02.22 @ 00:17) >  IMPRESSION:  No CT evidence of bowel obstruction, active inflammatory process or   intra-abdominal source for infection.    No hydronephrosis or renal stones.  The bladder is underdistended.  Enlarged prostate with volume of approximately 124 mL.    Status post right hemicolectomy with right upper quadrant ileocolic   anastomosis.  Fluid-filled loops of distal small bowel.  Diffuse fluid   distention of the colon and rectum.  No bowel wall thickening or   mesenteric inflammatory changes.    < end of copied text >      < from: Xray Chest 1 View-PORTABLE IMMEDIATE (11.01.22 @ 18:28) >  Impression:    No acute pulmonary disease.    < end of copied text >    < from: TTE Echo Limited or F/U (11.03.22 @ 13:32) >  Summary:   1. Left ventricular ejection fraction, by visual estimation, is 60 to   65%.   2. Normal global left ventricular systolic function.   3. Spectral Doppler shows impaired relaxation pattern of left   ventricular myocardial filling (Grade Idiastolic dysfunction).   4. Normal left ventricular internal cavity size.   5. There is mild concentric left ventricular hypertrophy.   6. Normal right ventricular size and function.   7. Mildly enlarged left atrium.   8. Mild thickening of the anterior and posterior mitral valve leaflets.   9. Moderate mitral valve regurgitation.  10. Moderate aortic valve stenosis.  11. Mild to moderate aortic regurgitation.  12. There is no evidence of pericardial effusion.    < end of copied text >          MEDICATIONS  (STANDING):  aspirin enteric coated 81 milliGRAM(s) Oral daily  cefTRIAXone Injectable. 2000 milliGRAM(s) IV Push every 24 hours  heparin   Injectable 5000 Unit(s) SubCutaneous every 8 hours  metoprolol succinate ER 25 milliGRAM(s) Oral daily  sodium chloride 0.9%. 1000 milliLiter(s) (85 mL/Hr) IV Continuous <Continuous>    MEDICATIONS  (PRN):  acetaminophen     Tablet .. 650 milliGRAM(s) Oral every 6 hours PRN Temp greater or equal to 38C (100.4F), Mild Pain (1 - 3)  aluminum hydroxide/magnesium hydroxide/simethicone Suspension 30 milliLiter(s) Oral every 4 hours PRN Dyspepsia  melatonin 3 milliGRAM(s) Oral at bedtime PRN Insomnia  ondansetron Injectable 4 milliGRAM(s) IV Push every 8 hours PRN Nausea and/or Vomiting

## 2022-11-04 NOTE — PROGRESS NOTE ADULT - ASSESSMENT
76 y/o M w/ PMH of HTN came in presenting w/ body aches, fatigue and fever (Tmax 102) at home for the past 12 days.  pt admitted for sepsis given leukocytosis and fever.  Bcxs grew strep anguinosus.  On IV rocephin.  ID following.  Source of strep unclear; w/u ongoing.       Sepsis secondary to Strep anguinosis bacteremia  - Source unknown at this time   - TTE (-) for vegetations  - LILIAN recommended however pt undecided if he wants it   - CT a/p reviewed and noted above   - CT chest pending   - c/w Ceftriaxone 2g daily  - Repeat Bcxs from 11/03 NGTD  - Leukocytosis trending down  - GI pcr (+) for Noro virus; contact precautions in place   - Monitor CBC and temperature    - ID following and recs noted       HTN  - Valsartan held  - Toprol XL 25mg daily       ARF   - Resolved  - Valsartan held      VTE ppx: Heparin SQ    Dispo: pt remains acute requiring inpt hospitalization. 76 y/o M w/ PMH of HTN came in presenting w/ body aches, fatigue and fever (Tmax 102) at home for the past 12 days.  pt admitted for sepsis given leukocytosis and fever.  Bcxs grew strep anguinosus.  On IV rocephin.  ID following.  Source of strep unclear; w/u ongoing.       Sepsis secondary to Strep anguinosis bacteremia  - Source unknown at this time   - TTE (-) for vegetations  - LILIAN recommended however pt undecided if he wants it   - CT a/p reviewed and noted above   - CT chest pending   - c/w Ceftriaxone 2g daily  - Repeat Bcxs from 11/03 NGTD  - Leukocytosis trending down  - GI pcr (+) for Noro virus; contact precautions in place   - Monitor CBC and temperature    - ID following and recs noted       Normocytic anemia   - H/H stable, hemodynamically stable and no active bleeding  - Monitor CBC and transfuse as needed      HTN  - Valsartan held  - Toprol XL 25mg daily       ARF   - Resolved  - Valsartan held      VTE ppx: Heparin SQ    Dispo: pt remains acute requiring inpt hospitalization.

## 2022-11-05 LAB
ANION GAP SERPL CALC-SCNC: 13 MMOL/L — SIGNIFICANT CHANGE UP (ref 5–17)
BASOPHILS # BLD AUTO: 0.07 K/UL — SIGNIFICANT CHANGE UP (ref 0–0.2)
BASOPHILS NFR BLD AUTO: 0.5 % — SIGNIFICANT CHANGE UP (ref 0–2)
BUN SERPL-MCNC: 9.7 MG/DL — SIGNIFICANT CHANGE UP (ref 8–20)
CALCIUM SERPL-MCNC: 9.2 MG/DL — SIGNIFICANT CHANGE UP (ref 8.4–10.5)
CHLORIDE SERPL-SCNC: 104 MMOL/L — SIGNIFICANT CHANGE UP (ref 96–108)
CO2 SERPL-SCNC: 18 MMOL/L — LOW (ref 22–29)
CREAT SERPL-MCNC: 0.57 MG/DL — SIGNIFICANT CHANGE UP (ref 0.5–1.3)
EGFR: 100 ML/MIN/1.73M2 — SIGNIFICANT CHANGE UP
EOSINOPHIL # BLD AUTO: 0.25 K/UL — SIGNIFICANT CHANGE UP (ref 0–0.5)
EOSINOPHIL NFR BLD AUTO: 1.9 % — SIGNIFICANT CHANGE UP (ref 0–6)
GLUCOSE SERPL-MCNC: 103 MG/DL — HIGH (ref 70–99)
HCT VFR BLD CALC: 35.8 % — LOW (ref 39–50)
HGB BLD-MCNC: 12.4 G/DL — LOW (ref 13–17)
IMM GRANULOCYTES NFR BLD AUTO: 0.7 % — SIGNIFICANT CHANGE UP (ref 0–0.9)
LYMPHOCYTES # BLD AUTO: 1.5 K/UL — SIGNIFICANT CHANGE UP (ref 1–3.3)
LYMPHOCYTES # BLD AUTO: 11.5 % — LOW (ref 13–44)
MCHC RBC-ENTMCNC: 32.3 PG — SIGNIFICANT CHANGE UP (ref 27–34)
MCHC RBC-ENTMCNC: 34.6 GM/DL — SIGNIFICANT CHANGE UP (ref 32–36)
MCV RBC AUTO: 93.2 FL — SIGNIFICANT CHANGE UP (ref 80–100)
MONOCYTES # BLD AUTO: 1.14 K/UL — HIGH (ref 0–0.9)
MONOCYTES NFR BLD AUTO: 8.7 % — SIGNIFICANT CHANGE UP (ref 2–14)
NEUTROPHILS # BLD AUTO: 10 K/UL — HIGH (ref 1.8–7.4)
NEUTROPHILS NFR BLD AUTO: 76.7 % — SIGNIFICANT CHANGE UP (ref 43–77)
PLATELET # BLD AUTO: 444 K/UL — HIGH (ref 150–400)
POTASSIUM SERPL-MCNC: 4.1 MMOL/L — SIGNIFICANT CHANGE UP (ref 3.5–5.3)
POTASSIUM SERPL-SCNC: 4.1 MMOL/L — SIGNIFICANT CHANGE UP (ref 3.5–5.3)
RBC # BLD: 3.84 M/UL — LOW (ref 4.2–5.8)
RBC # FLD: 12.3 % — SIGNIFICANT CHANGE UP (ref 10.3–14.5)
SODIUM SERPL-SCNC: 135 MMOL/L — SIGNIFICANT CHANGE UP (ref 135–145)
WBC # BLD: 13.05 K/UL — HIGH (ref 3.8–10.5)
WBC # FLD AUTO: 13.05 K/UL — HIGH (ref 3.8–10.5)

## 2022-11-05 PROCEDURE — 99232 SBSQ HOSP IP/OBS MODERATE 35: CPT

## 2022-11-05 PROCEDURE — 71250 CT THORAX DX C-: CPT | Mod: 26

## 2022-11-05 RX ADMIN — HEPARIN SODIUM 5000 UNIT(S): 5000 INJECTION INTRAVENOUS; SUBCUTANEOUS at 15:12

## 2022-11-05 RX ADMIN — HEPARIN SODIUM 5000 UNIT(S): 5000 INJECTION INTRAVENOUS; SUBCUTANEOUS at 06:24

## 2022-11-05 RX ADMIN — CEFTRIAXONE 2000 MILLIGRAM(S): 500 INJECTION, POWDER, FOR SOLUTION INTRAMUSCULAR; INTRAVENOUS at 23:27

## 2022-11-05 RX ADMIN — Medication 25 MILLIGRAM(S): at 06:24

## 2022-11-05 RX ADMIN — Medication 81 MILLIGRAM(S): at 12:48

## 2022-11-05 RX ADMIN — HEPARIN SODIUM 5000 UNIT(S): 5000 INJECTION INTRAVENOUS; SUBCUTANEOUS at 23:27

## 2022-11-05 NOTE — PROGRESS NOTE ADULT - ASSESSMENT
76 y/o M w/ PMH of HTN came in presenting w/ body aches, fatigue and fever (Tmax 102) at home for the past 12 days.  pt admitted for sepsis given leukocytosis and fever.  Bcxs grew strep anguinosus.  On IV rocephin.  ID following.  Source of strep unclear; w/u ongoing.       Sepsis secondary to Strep anguinosis bacteremia  - Source unknown at this time   - TTE (-) for vegetations  - LILIAN recommended however pt declined   - CT a/p reviewed and noted above   - CT chest pending   - c/w Ceftriaxone 2g daily  - Repeat Bcxs from 11/03 NGTD  - Leukocytosis trending down  - GI pcr (+) for Noro virus; contact precautions in place   - Monitor CBC and temperature    - ID following and recs noted     Normocytic anemia   - H/H stable, hemodynamically stable and no active bleeding  - Monitor CBC and transfuse as needed    HTN  - Valsartan held  - Toprol XL 25mg daily     ARF   - Resolved  - Valsartan held    VTE ppx: Heparin SQ    Dispo: pending CT chest   will need set up home IV abx likely Monday

## 2022-11-05 NOTE — PROGRESS NOTE ADULT - SUBJECTIVE AND OBJECTIVE BOX
HPI  Pt is a 78yo M admitted to [facility] for sepsis secondary to Strep Angiosa   Pt was seen and examined at bedside with wife at bedside. No overnight complaints. Pt opt not having LILIAN. Tmax 99.0 noted. Reviewed with pt, CT chest ordered     Vital Signs Last 24 Hrs  T(C): 36.7 (05 Nov 2022 11:40), Max: 37.2 (04 Nov 2022 18:19)  T(F): 98 (05 Nov 2022 11:40), Max: 99 (04 Nov 2022 18:19)  HR: 68 (05 Nov 2022 11:40) (68 - 75)  BP: 138/62 (05 Nov 2022 11:40) (110/69 - 138/62)  BP(mean): --  RR: 18 (05 Nov 2022 11:40) (18 - 18)  SpO2: 99% (05 Nov 2022 11:40) (75% - 99%)    Parameters below as of 05 Nov 2022 05:10  Patient On (Oxygen Delivery Method): room air        I&O's Summary    05 Nov 2022 08:01  -  05 Nov 2022 14:45  --------------------------------------------------------  IN: 255 mL / OUT: 300 mL / NET: -45 mL        CAPILLARY BLOOD GLUCOSE          PHYSICAL EXAM:    Constitutional: NAD, awake and alert, well-developed  HEENT: PERR, EOMI, Normal Hearing, MMM  Neck: Soft and supple, No LAD, No JVD  Respiratory: Breath sounds are clear bilaterally, No wheezing, rales or rhonchi  Cardiovascular: S1 and S2, regular rate and rhythm, no Murmurs, gallops or rubs  Gastrointestinal: Bowel Sounds present, soft, nontender, nondistended, no guarding, no rebound  Extremities: No peripheral edema  Vascular: 2+ peripheral pulses  Neurological: A/O x 3, no focal deficits  Musculoskeletal: 5/5 strength b/l upper and lower extremities  Skin: No rashes    MEDICATIONS:  MEDICATIONS  (STANDING):  aspirin enteric coated 81 milliGRAM(s) Oral daily  cefTRIAXone Injectable. 2000 milliGRAM(s) IV Push every 24 hours  heparin   Injectable 5000 Unit(s) SubCutaneous every 8 hours  metoprolol succinate ER 25 milliGRAM(s) Oral daily  sodium chloride 0.9%. 1000 milliLiter(s) (85 mL/Hr) IV Continuous <Continuous>      LABS: All Labs Reviewed:                        12.4   13.05 )-----------( 444      ( 05 Nov 2022 05:00 )             35.8     11-05    135  |  104  |  9.7  ----------------------------<  103<H>  4.1   |  18.0<L>  |  0.57    Ca    9.2      05 Nov 2022 05:00            Blood Culture: 11-03 @ 02:16  Organism --  Gram Stain Blood -- Gram Stain --  Specimen Source .Blood Blood-Peripheral  Culture-Blood --    11-03 @ 02:10  Organism --  Gram Stain Blood -- Gram Stain --  Specimen Source .Blood Blood-Peripheral  Culture-Blood --    11-01 @ 16:34  Organism --  Gram Stain Blood -- Gram Stain --  Specimen Source Clean Catch Clean Catch (Midstream)  Culture-Blood --    11-01 @ 16:30  Organism Blood Culture PCR  Gram Stain Blood -- Gram Stain   Growth in aerobic bottle: Gram Positive Cocci in Pairs and Chains  Growth in anaerobic bottle: Gram Positive Cocci in Pairs and Chains  Specimen Source .Blood Blood-Peripheral  Culture-Blood --    11-01 @ 16:25  Organism --  Gram Stain Blood -- Gram Stain   Growth in anaerobic bottle: Gram Positive Cocci in Pairs and Chains  Growth in aerobic bottle: Gram Positive Cocci in Pairs and Chains  Specimen Source .Blood Blood-Peripheral  Culture-Blood --        RADIOLOGY/EKG:    DVT PPX:    ADVANCED DIRECTIVE:    DISPOSITION:

## 2022-11-06 PROCEDURE — 99232 SBSQ HOSP IP/OBS MODERATE 35: CPT

## 2022-11-06 RX ADMIN — HEPARIN SODIUM 5000 UNIT(S): 5000 INJECTION INTRAVENOUS; SUBCUTANEOUS at 06:37

## 2022-11-06 RX ADMIN — HEPARIN SODIUM 5000 UNIT(S): 5000 INJECTION INTRAVENOUS; SUBCUTANEOUS at 23:21

## 2022-11-06 RX ADMIN — Medication 81 MILLIGRAM(S): at 09:34

## 2022-11-06 RX ADMIN — Medication 25 MILLIGRAM(S): at 06:37

## 2022-11-06 RX ADMIN — CEFTRIAXONE 2000 MILLIGRAM(S): 500 INJECTION, POWDER, FOR SOLUTION INTRAMUSCULAR; INTRAVENOUS at 23:21

## 2022-11-06 NOTE — PROGRESS NOTE ADULT - SUBJECTIVE AND OBJECTIVE BOX
HPI   Pt is a 78yo M admitted to [facility] for sepsis secondary to Strep Angiosa   Pt was seen and examined at bedside. No overnight complaints. Reviewed pt CT chest result. Pt is agitated and want to go home. Explained that he should stay for IV abx and go home tomorrow.     Vital Signs Last 24 Hrs  T(C): 36.7 (05 Nov 2022 11:40), Max: 36.7 (05 Nov 2022 11:40)  T(F): 98 (05 Nov 2022 11:40), Max: 98 (05 Nov 2022 11:40)  HR: 68 (05 Nov 2022 11:40) (68 - 68)  BP: 138/62 (05 Nov 2022 11:40) (138/62 - 138/62)  BP(mean): --  RR: 18 (05 Nov 2022 11:40) (18 - 18)  SpO2: 99% (05 Nov 2022 11:40) (99% - 99%)        I&O's Summary    05 Nov 2022 08:01  -  06 Nov 2022 07:00  --------------------------------------------------------  IN: 255 mL / OUT: 300 mL / NET: -45 mL        CAPILLARY BLOOD GLUCOSE          PHYSICAL EXAM:  Constitutional: NAD, awake and alert, well-developed  HEENT: PERR, EOMI, Normal Hearing, MMM  Neck: Soft and supple, No LAD, No JVD  Respiratory: Breath sounds are clear bilaterally, No wheezing, rales or rhonchi  Cardiovascular: S1 and S2, regular rate and rhythm, no Murmurs, gallops or rubs  Gastrointestinal: Bowel Sounds present, soft, nontender, nondistended, no guarding, no rebound  Extremities: No peripheral edema  Vascular: 2+ peripheral pulses  Neurological: A/O x 3, no focal deficits  Musculoskeletal: 5/5 strength b/l upper and lower extremities  Skin: No rashes      MEDICATIONS:  MEDICATIONS  (STANDING):  aspirin enteric coated 81 milliGRAM(s) Oral daily  cefTRIAXone Injectable. 2000 milliGRAM(s) IV Push every 24 hours  heparin   Injectable 5000 Unit(s) SubCutaneous every 8 hours  metoprolol succinate ER 25 milliGRAM(s) Oral daily  sodium chloride 0.9%. 1000 milliLiter(s) (85 mL/Hr) IV Continuous <Continuous>      LABS: All Labs Reviewed:                        12.4   13.05 )-----------( 444      ( 05 Nov 2022 05:00 )             35.8     11-05    135  |  104  |  9.7  ----------------------------<  103<H>  4.1   |  18.0<L>  |  0.57    Ca    9.2      05 Nov 2022 05:00            Blood Culture: 11-03 @ 02:16  Organism --  Gram Stain Blood -- Gram Stain --  Specimen Source .Blood Blood-Peripheral  Culture-Blood --    11-03 @ 02:10  Organism --  Gram Stain Blood -- Gram Stain --  Specimen Source .Blood Blood-Peripheral  Culture-Blood --    11-01 @ 16:34  Organism --  Gram Stain Blood -- Gram Stain --  Specimen Source Clean Catch Clean Catch (Midstream)  Culture-Blood --    11-01 @ 16:30  Organism Blood Culture PCR  Gram Stain Blood -- Gram Stain   Growth in aerobic bottle: Gram Positive Cocci in Pairs and Chains  Growth in anaerobic bottle: Gram Positive Cocci in Pairs and Chains  Specimen Source .Blood Blood-Peripheral  Culture-Blood --    11-01 @ 16:25  Organism --  Gram Stain Blood -- Gram Stain   Growth in anaerobic bottle: Gram Positive Cocci in Pairs and Chains  Growth in aerobic bottle: Gram Positive Cocci in Pairs and Chains  Specimen Source .Blood Blood-Peripheral  Culture-Blood --        RADIOLOGY/EKG:    DVT PPX:    ADVANCED DIRECTIVE:    DISPOSITION:

## 2022-11-06 NOTE — PROGRESS NOTE ADULT - ASSESSMENT
76 y/o M w/ PMH of HTN came in presenting w/ body aches, fatigue and fever (Tmax 102) at home for the past 12 days.  pt admitted for sepsis given leukocytosis and fever.  Bcxs grew strep anguinosus.  On IV rocephin.  ID following.  Source of strep unclear; w/u ongoing.       *Sepsis secondary to Strep anguinosis bacteremia  Source unknown at this time   TTE (-) for vegetations  LILIAN recommended however pt declined   CT a/p reviewed and noted above   CT chest no acute abn   c/w Ceftriaxone 2g daily  Repeat Bcxs from 11/03 NGTD  Leukocytosis trending down  GI pcr (+) for Noro virus; contact precautions in place   Monitor CBC and temperature    ID following and recs noted     *Normocytic anemia   H/H stable, hemodynamically stable and no active bleeding  Monitor CBC and transfuse as needed    *HTN  Controlled  Valsartan held  Toprol XL 25mg daily     *ARF   Resolved  Valsartan held    VTE ppx: Heparin SQ    Dispo: pending CT chest   will need set up home IV abx likely tomorrow. Please get line set up tomorrow am and call ID  Case updated with wife via phone today

## 2022-11-07 ENCOUNTER — TRANSCRIPTION ENCOUNTER (OUTPATIENT)
Age: 77
End: 2022-11-07

## 2022-11-07 VITALS
TEMPERATURE: 98 F | HEART RATE: 79 BPM | SYSTOLIC BLOOD PRESSURE: 108 MMHG | DIASTOLIC BLOOD PRESSURE: 64 MMHG | RESPIRATION RATE: 19 BRPM | OXYGEN SATURATION: 97 %

## 2022-11-07 LAB
ANION GAP SERPL CALC-SCNC: 13 MMOL/L — SIGNIFICANT CHANGE UP (ref 5–17)
BUN SERPL-MCNC: 11.3 MG/DL — SIGNIFICANT CHANGE UP (ref 8–20)
CALCIUM SERPL-MCNC: 9.2 MG/DL — SIGNIFICANT CHANGE UP (ref 8.4–10.5)
CHLORIDE SERPL-SCNC: 101 MMOL/L — SIGNIFICANT CHANGE UP (ref 96–108)
CO2 SERPL-SCNC: 21 MMOL/L — LOW (ref 22–29)
CREAT SERPL-MCNC: 0.59 MG/DL — SIGNIFICANT CHANGE UP (ref 0.5–1.3)
EGFR: 99 ML/MIN/1.73M2 — SIGNIFICANT CHANGE UP
GLUCOSE SERPL-MCNC: 95 MG/DL — SIGNIFICANT CHANGE UP (ref 70–99)
HCT VFR BLD CALC: 33.6 % — LOW (ref 39–50)
HGB BLD-MCNC: 12 G/DL — LOW (ref 13–17)
MCHC RBC-ENTMCNC: 33.1 PG — SIGNIFICANT CHANGE UP (ref 27–34)
MCHC RBC-ENTMCNC: 35.7 GM/DL — SIGNIFICANT CHANGE UP (ref 32–36)
MCV RBC AUTO: 92.6 FL — SIGNIFICANT CHANGE UP (ref 80–100)
PLATELET # BLD AUTO: 446 K/UL — HIGH (ref 150–400)
POTASSIUM SERPL-MCNC: 4.2 MMOL/L — SIGNIFICANT CHANGE UP (ref 3.5–5.3)
POTASSIUM SERPL-SCNC: 4.2 MMOL/L — SIGNIFICANT CHANGE UP (ref 3.5–5.3)
RBC # BLD: 3.63 M/UL — LOW (ref 4.2–5.8)
RBC # FLD: 12.1 % — SIGNIFICANT CHANGE UP (ref 10.3–14.5)
SODIUM SERPL-SCNC: 135 MMOL/L — SIGNIFICANT CHANGE UP (ref 135–145)
WBC # BLD: 12.66 K/UL — HIGH (ref 3.8–10.5)
WBC # FLD AUTO: 12.66 K/UL — HIGH (ref 3.8–10.5)

## 2022-11-07 PROCEDURE — 99285 EMERGENCY DEPT VISIT HI MDM: CPT

## 2022-11-07 PROCEDURE — 99232 SBSQ HOSP IP/OBS MODERATE 35: CPT

## 2022-11-07 PROCEDURE — 85730 THROMBOPLASTIN TIME PARTIAL: CPT

## 2022-11-07 PROCEDURE — 96375 TX/PRO/DX INJ NEW DRUG ADDON: CPT

## 2022-11-07 PROCEDURE — 84484 ASSAY OF TROPONIN QUANT: CPT

## 2022-11-07 PROCEDURE — 87086 URINE CULTURE/COLONY COUNT: CPT

## 2022-11-07 PROCEDURE — 87077 CULTURE AEROBIC IDENTIFY: CPT

## 2022-11-07 PROCEDURE — 87507 IADNA-DNA/RNA PROBE TQ 12-25: CPT

## 2022-11-07 PROCEDURE — 74177 CT ABD & PELVIS W/CONTRAST: CPT | Mod: MA

## 2022-11-07 PROCEDURE — 87150 DNA/RNA AMPLIFIED PROBE: CPT

## 2022-11-07 PROCEDURE — 84100 ASSAY OF PHOSPHORUS: CPT

## 2022-11-07 PROCEDURE — 85610 PROTHROMBIN TIME: CPT

## 2022-11-07 PROCEDURE — 71045 X-RAY EXAM CHEST 1 VIEW: CPT

## 2022-11-07 PROCEDURE — 76937 US GUIDE VASCULAR ACCESS: CPT | Mod: 26,59

## 2022-11-07 PROCEDURE — 71045 X-RAY EXAM CHEST 1 VIEW: CPT | Mod: 26

## 2022-11-07 PROCEDURE — 82607 VITAMIN B-12: CPT

## 2022-11-07 PROCEDURE — 0225U NFCT DS DNA&RNA 21 SARSCOV2: CPT

## 2022-11-07 PROCEDURE — 76942 ECHO GUIDE FOR BIOPSY: CPT | Mod: 26,59

## 2022-11-07 PROCEDURE — 71250 CT THORAX DX C-: CPT

## 2022-11-07 PROCEDURE — 96374 THER/PROPH/DIAG INJ IV PUSH: CPT

## 2022-11-07 PROCEDURE — 99239 HOSP IP/OBS DSCHRG MGMT >30: CPT

## 2022-11-07 PROCEDURE — 80053 COMPREHEN METABOLIC PANEL: CPT

## 2022-11-07 PROCEDURE — 81001 URINALYSIS AUTO W/SCOPE: CPT

## 2022-11-07 PROCEDURE — 87186 SC STD MICRODIL/AGAR DIL: CPT

## 2022-11-07 PROCEDURE — 83735 ASSAY OF MAGNESIUM: CPT

## 2022-11-07 PROCEDURE — 82306 VITAMIN D 25 HYDROXY: CPT

## 2022-11-07 PROCEDURE — 87040 BLOOD CULTURE FOR BACTERIA: CPT

## 2022-11-07 PROCEDURE — 93308 TTE F-UP OR LMTD: CPT

## 2022-11-07 PROCEDURE — 36573 INSJ PICC RS&I 5 YR+: CPT

## 2022-11-07 PROCEDURE — 83880 ASSAY OF NATRIURETIC PEPTIDE: CPT

## 2022-11-07 PROCEDURE — 80048 BASIC METABOLIC PNL TOTAL CA: CPT

## 2022-11-07 PROCEDURE — 36415 COLL VENOUS BLD VENIPUNCTURE: CPT

## 2022-11-07 PROCEDURE — 84443 ASSAY THYROID STIM HORMONE: CPT

## 2022-11-07 PROCEDURE — 83605 ASSAY OF LACTIC ACID: CPT

## 2022-11-07 PROCEDURE — 85027 COMPLETE CBC AUTOMATED: CPT

## 2022-11-07 PROCEDURE — 85025 COMPLETE CBC W/AUTO DIFF WBC: CPT

## 2022-11-07 PROCEDURE — 82746 ASSAY OF FOLIC ACID SERUM: CPT

## 2022-11-07 PROCEDURE — 93005 ELECTROCARDIOGRAM TRACING: CPT

## 2022-11-07 RX ORDER — CHLORHEXIDINE GLUCONATE 213 G/1000ML
1 SOLUTION TOPICAL
Refills: 0 | Status: DISCONTINUED | OUTPATIENT
Start: 2022-11-07 | End: 2022-11-07

## 2022-11-07 RX ORDER — VALSARTAN 80 MG/1
80 TABLET ORAL DAILY
Refills: 0 | Status: DISCONTINUED | OUTPATIENT
Start: 2022-11-07 | End: 2022-11-07

## 2022-11-07 RX ORDER — VALSARTAN 80 MG/1
1 TABLET ORAL
Qty: 0 | Refills: 0 | DISCHARGE

## 2022-11-07 RX ORDER — SODIUM CHLORIDE 9 MG/ML
10 INJECTION INTRAMUSCULAR; INTRAVENOUS; SUBCUTANEOUS
Refills: 0 | Status: DISCONTINUED | OUTPATIENT
Start: 2022-11-07 | End: 2022-11-07

## 2022-11-07 RX ORDER — ASPIRIN/CALCIUM CARB/MAGNESIUM 324 MG
1 TABLET ORAL
Qty: 0 | Refills: 0 | DISCHARGE
Start: 2022-11-07

## 2022-11-07 RX ADMIN — VALSARTAN 80 MILLIGRAM(S): 80 TABLET ORAL at 13:14

## 2022-11-07 RX ADMIN — CHLORHEXIDINE GLUCONATE 1 APPLICATION(S): 213 SOLUTION TOPICAL at 13:15

## 2022-11-07 RX ADMIN — Medication 81 MILLIGRAM(S): at 13:14

## 2022-11-07 RX ADMIN — HEPARIN SODIUM 5000 UNIT(S): 5000 INJECTION INTRAVENOUS; SUBCUTANEOUS at 13:15

## 2022-11-07 RX ADMIN — Medication 25 MILLIGRAM(S): at 06:38

## 2022-11-07 NOTE — PROGRESS NOTE ADULT - REASON FOR ADMISSION
SIRS, possible sepsis 2/2 UTI

## 2022-11-07 NOTE — PROGRESS NOTE ADULT - SUBJECTIVE AND OBJECTIVE BOX
[facility] Division of Hospital Medicine    Chief Complaint: SIRS, possible sepsis 2/2 UTI     SUBJECTIVE / OVERNIGHT EVENTS: No acute events overnight. HD stable. Denies cough, n/v/f/c/h/d.     Patient denies chest pain, SOB, abd pain, N/V, fever, chills, dysuria or any other complaints. All remainder ROS negative.     MEDICATIONS  (STANDING):  aspirin enteric coated 81 milliGRAM(s) Oral daily  cefTRIAXone Injectable. 2000 milliGRAM(s) IV Push every 24 hours  heparin   Injectable 5000 Unit(s) SubCutaneous every 8 hours  metoprolol succinate ER 25 milliGRAM(s) Oral daily  sodium chloride 0.9%. 1000 milliLiter(s) (85 mL/Hr) IV Continuous <Continuous>    MEDICATIONS  (PRN):  acetaminophen     Tablet .. 650 milliGRAM(s) Oral every 6 hours PRN Temp greater or equal to 38C (100.4F), Mild Pain (1 - 3)  aluminum hydroxide/magnesium hydroxide/simethicone Suspension 30 milliLiter(s) Oral every 4 hours PRN Dyspepsia  melatonin 3 milliGRAM(s) Oral at bedtime PRN Insomnia  ondansetron Injectable 4 milliGRAM(s) IV Push every 8 hours PRN Nausea and/or Vomiting        I&O's Summary      PHYSICAL EXAM:  Vital Signs Last 24 Hrs  T(C): 36.4 (07 Nov 2022 10:17), Max: 37.2 (07 Nov 2022 04:53)  T(F): 97.5 (07 Nov 2022 10:17), Max: 99 (07 Nov 2022 04:53)  HR: 71 (07 Nov 2022 10:17) (71 - 77)  BP: 126/69 (07 Nov 2022 10:17) (117/74 - 132/72)  BP(mean): --  RR: 19 (07 Nov 2022 10:17) (16 - 19)  SpO2: 97% (07 Nov 2022 10:17) (97% - 98%)    Parameters below as of 07 Nov 2022 10:17  Patient On (Oxygen Delivery Method): room air      Constitutional: NAD, awake and alert  Respiratory: cta b/l no wheezing no rhonchi  Cardiovascular: +s1/s2 no edema b/l le  Gastrointestinal: soft nt nd bs+  Vascular: 2+ peripheral pulses  Neurological: A/O x 3, no focal deficits    LABS:                        12.0   12.66 )-----------( 446      ( 07 Nov 2022 04:53 )             33.6     11-07    135  |  101  |  11.3  ----------------------------<  95  4.2   |  21.0<L>  |  0.59    Ca    9.2      07 Nov 2022 04:53                CAPILLARY BLOOD GLUCOSE            RADIOLOGY & ADDITIONAL TESTS:  Results Reviewed:   Imaging Personally Reviewed:  Electrocardiogram Personally Reviewed:

## 2022-11-07 NOTE — DISCHARGE NOTE NURSING/CASE MANAGEMENT/SOCIAL WORK - PATIENT PORTAL LINK FT
You can access the FollowMyHealth Patient Portal offered by [facility] by registering at the following website: [email or web address]. By joining [facility]’s FollowMyHealth portal, you will also be able to view your health information using other applications (apps) compatible with our system.

## 2022-11-07 NOTE — DISCHARGE NOTE PROVIDER - ATTENDING DISCHARGE PHYSICAL EXAMINATION:
Vital Signs Last 24 Hrs  T(F): 97.5 (07 Nov 2022 10:17), Max: 99 (07 Nov 2022 04:53)  HR: 71 (07 Nov 2022 10:17) (71 - 77)  BP: 126/69 (07 Nov 2022 10:17) (117/74 - 132/72)  RR: 19 (07 Nov 2022 10:17) (16 - 19)  SpO2: 97% (07 Nov 2022 10:17) (97% - 98%)    Physical Exam:  Constitutional: NAD, awake and alert  Respiratory: cta b/l no wheezing no rhonchi  Cardiovascular: +s1/s2 no edema b/l le  Gastrointestinal: soft nt nd bs+  Vascular: 2+ peripheral pulses  Neurological: A/O x 3, no focal deficits

## 2022-11-07 NOTE — PROGRESS NOTE ADULT - ASSESSMENT
78 yo male with pmhx HTN presenting to the ED with body aches, fatigue and fever (Tmax 102) at home for the past 12 days. He recently had COVID 10/6/22 and recovered from that. During that time, however, he was diagnosed with Vit B12 deficiency and returned to his doctor 2 weeks ago for a vitamin B12 injection. After that is when he reports starting to feel unwell.   Found to have streptococcus bacteremia unclear source, no recent dental work, skin infections, no respiratory symptoms    Bacteremia  Leukocytosis  Fever  Norovirus    - feels well, reports 2-3x diarrhea per day since monday, reports intermittent diarrhea sine his bowel surgery 2019  - Blood cultures + streptococcus anginosis pansensitive   - Repeat blood cultures 11/3/22 x2 ngtd  - TTE did not report vegetations  - ct c/a/p no clear source  - LILIAN was recommended but pt refused  - Recommended dc home on iv abx. Continue ceftriaxone 2 g IV daily via pic end 11/30/22 weekly cbc cmp fax [phone]  - Trend Fever  - Trend Leukocytosis-improving  - for norovirus-supportive care and contact isolation    f/u ID 3-4 weeks will repeat blood cultures after completing antibiotics    d/w attending, pt, wife, rn, [facility]

## 2022-11-07 NOTE — DISCHARGE NOTE NURSING/CASE MANAGEMENT/SOCIAL WORK - NSDCPEFALRISK_GEN_ALL_CORE
For information on Fall & Injury Prevention, visit: [email or web address] OR  [email or web address] OR  https://www.cdc.gov/steadi/patient.html

## 2022-11-07 NOTE — DISCHARGE NOTE PROVIDER - NSDCMRMEDTOKEN_GEN_ALL_CORE_FT
aspirin 81 mg oral delayed release tablet: 1 tab(s) orally once a day  cefTRIAXone 2 g injection: 2 gram(s) injectable once a day till 12/3  metoprolol succinate 25 mg oral tablet, extended release: 1 tab(s) orally once a day  valsartan 80 mg oral capsule: 1 tab(s) orally 2 times a day   aspirin 81 mg oral delayed release tablet: 1 tab(s) orally once a day  cefTRIAXone 2 g injection: 2 gram(s) injectable once a day till 12/3  metoprolol succinate 25 mg oral tablet, extended release: 1 tab(s) orally once a day  valsartan 80 mg oral capsule: 1 tab(s) orally once a day

## 2022-11-07 NOTE — DISCHARGE NOTE PROVIDER - HOSPITAL COURSE
76 y/o M w/ PMH of HTN came in presenting w/ body aches, fatigue and fever (Tmax 102) at home for the past 12 days.  pt admitted for sepsis given leukocytosis and fever. Bcxs grew strep sanguinosus. On IV rocephin.  ID following. Source of strep unclear. Patient declined LILIAN. Patient will need to be empirically treated for 4 weeks and will need outpatient follow up with Infectious Disease for repeat blood cultures.    76 y/o M w/ PMH of HTN came in presenting w/ body aches, fatigue and fever (Tmax 102) at home for the past 12 days.  pt admitted for sepsis given leukocytosis and fever. Bcxs grew strep sanguinosus. On IV rocephin.  ID following. Source of strep unclear. Patient declined LILIAN. Patient will need to be empirically treated for 4 weeks and will need outpatient follow up with Infectious Disease for repeat blood cultures in 3-4 weeks.

## 2022-11-07 NOTE — PROGRESS NOTE ADULT - ASSESSMENT
78 y/o M w/ PMH of HTN came in presenting w/ body aches, fatigue and fever (Tmax 102) at home for the past 12 days.  pt admitted for sepsis given leukocytosis and fever.  Bcxs grew strep anguinosus.  On IV rocephin.  ID following.  Source of strep unclear; w/u ongoing.     *Sepsis secondary to Strep anguinosis bacteremia  Source unknown at this time   TTE (-) for vegetations  LILIAN recommended however pt declined   CT a/p reviewed and noted above   CT chest no acute abn   c/w Ceftriaxone 2g daily  Repeat Bcxs from 11/03 NGTD  Leukocytosis trending down  GI pcr (+) for Noro virus; contact precautions in place   Monitor CBC and temperature    Appreciate ID recs- Given that the patient declined LILIAN, will empirically treat with IV CTX for 4 weeks     *Normocytic anemia   H/H stable, hemodynamically stable and no active bleeding  Monitor CBC and transfuse as needed    *HTN  Controlled  Valsartan 80 mg QD restarted  Toprol XL 25mg daily     *ARF   Resolved    VTE ppx: Heparin SQ    Dispo: Pending PICC line placement and home IV abx setup.   Updated patient's wife, Shawna ([phone]) on 11/7.

## 2022-11-07 NOTE — DISCHARGE NOTE PROVIDER - NSDCCPCAREPLAN_GEN_ALL_CORE_FT
PRINCIPAL DISCHARGE DIAGNOSIS  Diagnosis: Sepsis, unspecified organism  Assessment and Plan of Treatment: You were found to have Strep sanguinosis bacteremia. Your TTE was negative for vegetations. You refused LILIAN to r/o vegetation. We will treat you empirically with IV CTX for 4 weeks. Please follow up with ID in 4 weeks for repeat blood cultures.      SECONDARY DISCHARGE DIAGNOSES  Diagnosis: Acute UTI  Assessment and Plan of Treatment:     Diagnosis: Sepsis, unspecified organism  Assessment and Plan of Treatment:

## 2022-11-07 NOTE — DISCHARGE NOTE PROVIDER - CARE PROVIDER_API CALL
Sunita Laws)  Internal Medicine  [address] [place] [address]  Phone: [phone]  Fax: [phone]  Follow Up Time: 1 month   150

## 2022-11-07 NOTE — PROGRESS NOTE ADULT - SUBJECTIVE AND OBJECTIVE BOX
[facility]                                                INFECTIOUS DISEASES  =======================================================                               Jl Vaughan MD#    Mk Alaniz MD*                           Sunita Laws MD*   Leila Pederson MD*            Diplomates American Board of Internal Medicine & Infectious Diseases                  # [place] Office - Appt - Tel  [phone] Fax [phone]                * [place] Office - Appt - Tel [phone] Fax [phone]                                  Hospital Consult line:  [phone]  =======================================================      [facility]-[id number]  GEORGE HOLDSWORTH   follow up for: bacteremia  afebrile  wbc better  feels better  LILIAN recommended but pt refused  picc placed-plan for dc home on iv abx  eager to go home- awaiting cxr final read  patient seen and examined.       I have personally reviewed the labs and data; pertinent labs and data are listed in this note; please see below.   ===================================================  REVIEW OF SYSTEMS:  CONSTITUTIONAL:  No Fever or chills  HEENT:  No diplopia or blurred vision.  No earache, sore throat or runny nose.  CARDIOVASCULAR:  No pressure, squeezing, strangling, tightness, heaviness or aching about the chest, neck, axilla or epigastrium.  RESPIRATORY:  No cough, shortness of breath  GASTROINTESTINAL:  No nausea, vomiting + diarrhea.  GENITOURINARY:  No dysuria, frequency or urgency. No Blood in urine  MUSCULOSKELETAL:  no joint aches, no muscle pain  SKIN:  No change in skin, hair or nails.  NEUROLOGIC:  No Headaches, seizures or weakness.  PSYCHIATRIC:  No disorder of thought or mood.  ENDOCRINE:  No heat or cold intolerance  HEMATOLOGICAL:  No easy bruising or bleeding.    =======================================================  Allergies    No Known Allergies    Intolerances    Antibiotics:  cefTRIAXone Injectable. 2000 milliGRAM(s) IV Push every 24 hours    Other medications:  aspirin enteric coated 81 milliGRAM(s) Oral daily  heparin   Injectable 5000 Unit(s) SubCutaneous every 8 hours  metoprolol succinate ER 25 milliGRAM(s) Oral daily  sodium chloride 0.9%. 1000 milliLiter(s) IV Continuous <Continuous>    ======================================================  Physical Exam:  ============  Vital Signs Last 24 Hrs  T(C): 36.4 (07 Nov 2022 10:17), Max: 37.2 (07 Nov 2022 04:53)  T(F): 97.5 (07 Nov 2022 10:17), Max: 99 (07 Nov 2022 04:53)  HR: 71 (07 Nov 2022 10:17) (71 - 77)  BP: 126/69 (07 Nov 2022 10:17) (126/69 - 132/72)  BP(mean): --  RR: 19 (07 Nov 2022 10:17) (16 - 19)  SpO2: 97% (07 Nov 2022 10:17) (97% - 98%)    Parameters below as of 07 Nov 2022 10:17  Patient On (Oxygen Delivery Method): room air        General:  No acute distress.  Eye: no conjunctival pallor, no scleral icterus  HENT: Normocephalic  Respiratory: Lungs are clear to auscultation, Respirations are non-labored.  Cardiovascular: Normal rate, Regular rhythm,  s1+s2 +nori  Gastrointestinal: Soft, Non-tender, Non-distended, Normal bowel sounds.  Genitourinary: No costovertebral angle tenderness  Musculoskeletal: Normal range of motion, Normal strength  Integumentary: No rash  Neurologic: Alert, Oriented, No focal deficits  Psychiatric: Appropriate mood & affect  =======================================================  Labs:                                      12.0   12.66 )-----------( 446      ( 07 Nov 2022 04:53 )             33.6       11-07    135  |  101  |  11.3  ----------------------------<  95  4.2   |  21.0<L>  |  0.59    Ca    9.2      07 Nov 2022 04:53                              CAPILLARY BLOOD GLUCOSE                  Culture - Blood (collected 11-03-22 @ 02:16)  Source: .Blood Blood-Peripheral    Culture - Blood (collected 11-03-22 @ 02:10)  Source: .Blood Blood-Peripheral    Culture - Urine (collected 11-01-22 @ 16:34)  Source: Clean Catch Clean Catch (Midstream)  Final Report (11-02-22 @ 21:33):    <10,000 CFU/mL Normal Urogenital Nataliia    Culture - Blood (collected 11-01-22 @ 16:30)  Source: .Blood Blood-Peripheral  Gram Stain (11-02-22 @ 17:02):    Growth in aerobic bottle: Gram Positive Cocci in Pairs and Chains    Growth in anaerobic bottle: Gram Positive Cocci in Pairs and Chains  Final Report (11-04-22 @ 12:41):    Growth in aerobic and anaerobic bottles: Streptococcus anginosus    ***Blood Panel PCR results on this specimen are available    approximately 3 hours after the Gram stain result.***    Gram stain, PCR, and/or culture results may not always    correspond due to difference in methodologies.    ************************************************************    This PCR assay was performed by multiplex PCR. This    Assay tests for 66 bacterial and resistance gene targets.    Please refer to the [facility] Labstest directory    at [email or web address] for details.  Organism: Blood Culture PCR  Streptococcus anginosus (11-04-22 @ 12:41)  Organism: Streptococcus anginosus (11-04-22 @ 12:41)    Sensitivities:      -  Ceftriaxone: S <=0.25      -  Clindamycin: S <=0.06      -  Erythromycin: S <=0.06      -  Levofloxacin: S 0.5      -  Penicillin: S <=0.03      -  Vancomycin: S 1      Method Type: ANGELINA  Organism: Blood Culture PCR (11-04-22 @ 12:41)    Sensitivities:      -  Streptococcus sp. (Not Grp A, B or S pneumoniae): Detec      Method Type: PCR    Culture - Blood (collected 11-01-22 @ 16:25)  Source: .Blood Blood-Peripheral  Gram Stain (11-02-22 @ 22:29):    Growth in anaerobic bottle: Gram Positive Cocci in Pairs and Chains    Growth in aerobic bottle: Gram Positive Cocci in Pairs and Chains  Final Report (11-03-22 @ 10:14):    Growth in aerobic and anaerobic bottles: Streptococcus anginosus    See previous culture [id number]      < from: CT Chest No Cont (11.05.22 @ 20:08) >    FINDINGS:    AIRWAYS AND LUNGS: The central tracheobronchial tree is patent.    Bilateral calcified granuloma and other nodules measuring up to 3 mm   unchanged.    MEDIASTINUM AND PLEURA: There are no enlarged mediastinal, hilar or   axillary lymph nodes. The visualized portion of the thyroid gland is   unremarkable. There is no pleural effusion. There is no pneumothorax.    HEART AND VESSELS: There is mild cardiomegaly.  There are atherosclerotic   calcifications of the aorta and coronary arteries.  There is no   pericardial effusion.  Aortic valve calcification.    UPPER ABDOMEN: Images of the upper abdomen demonstrate right renal cyst.    BONES AND SOFT TISSUES: The bones are unremarkable.  The soft tissues are   unremarkable.    IMPRESSION:  Stable exam    --- End of Report ---    < end of copied text >

## 2022-11-09 ENCOUNTER — NON-APPOINTMENT (OUTPATIENT)
Age: 77
End: 2022-11-09

## 2022-11-10 ENCOUNTER — NON-APPOINTMENT (OUTPATIENT)
Age: 77
End: 2022-11-10

## 2022-11-21 ENCOUNTER — APPOINTMENT (OUTPATIENT)
Dept: INTERNAL MEDICINE | Facility: CLINIC | Age: 77
End: 2022-11-21

## 2022-11-21 VITALS
DIASTOLIC BLOOD PRESSURE: 69 MMHG | TEMPERATURE: 97.3 F | HEART RATE: 69 BPM | OXYGEN SATURATION: 99 % | HEIGHT: 70 IN | WEIGHT: 173 LBS | SYSTOLIC BLOOD PRESSURE: 121 MMHG | BODY MASS INDEX: 24.77 KG/M2

## 2022-11-21 PROCEDURE — 99495 TRANSJ CARE MGMT MOD F2F 14D: CPT | Mod: 25

## 2022-11-21 PROCEDURE — 36415 COLL VENOUS BLD VENIPUNCTURE: CPT

## 2022-11-26 ENCOUNTER — INPATIENT (INPATIENT)
Facility: HOSPITAL | Age: 77
LOS: 37 days | Discharge: HOME | DRG: 853 | End: 2023-01-03
Attending: THORACIC SURGERY (CARDIOTHORACIC VASCULAR SURGERY) | Admitting: INTERNAL MEDICINE
Payer: MEDICARE

## 2022-11-26 DIAGNOSIS — Z90.49 ACQUIRED ABSENCE OF OTHER SPECIFIED PARTS OF DIGESTIVE TRACT: Chronic | ICD-10-CM

## 2022-11-26 DIAGNOSIS — Z98.890 OTHER SPECIFIED POSTPROCEDURAL STATES: Chronic | ICD-10-CM

## 2022-11-26 PROCEDURE — 99291 CRITICAL CARE FIRST HOUR: CPT | Mod: CS

## 2022-11-27 VITALS
RESPIRATION RATE: 52 BRPM | SYSTOLIC BLOOD PRESSURE: 87 MMHG | HEART RATE: 149 BPM | HEIGHT: 66 IN | TEMPERATURE: 103 F | DIASTOLIC BLOOD PRESSURE: 52 MMHG | OXYGEN SATURATION: 93 %

## 2022-11-27 DIAGNOSIS — A41.9 SEPSIS, UNSPECIFIED ORGANISM: ICD-10-CM

## 2022-11-27 LAB
ALBUMIN SERPL ELPH-MCNC: 3.8 G/DL — SIGNIFICANT CHANGE UP (ref 3.3–5.2)
ALP SERPL-CCNC: 177 U/L — HIGH (ref 40–120)
ALT FLD-CCNC: 101 U/L — HIGH
ANION GAP SERPL CALC-SCNC: 14 MMOL/L — SIGNIFICANT CHANGE UP (ref 5–17)
ANION GAP SERPL CALC-SCNC: 22 MMOL/L — HIGH (ref 5–17)
APPEARANCE UR: CLEAR — SIGNIFICANT CHANGE UP
APTT BLD: 26.2 SEC — LOW (ref 27.5–35.5)
AST SERPL-CCNC: 104 U/L — HIGH
BACTERIA # UR AUTO: ABNORMAL
BASE EXCESS BLDV CALC-SCNC: -11.4 MMOL/L — LOW (ref -2–3)
BASOPHILS # BLD AUTO: 0 K/UL — SIGNIFICANT CHANGE UP (ref 0–0.2)
BASOPHILS NFR BLD AUTO: 0 % — SIGNIFICANT CHANGE UP (ref 0–2)
BILIRUB SERPL-MCNC: 0.6 MG/DL — SIGNIFICANT CHANGE UP (ref 0.4–2)
BILIRUB UR-MCNC: NEGATIVE — SIGNIFICANT CHANGE UP
BUN SERPL-MCNC: 29 MG/DL — HIGH (ref 8–20)
BUN SERPL-MCNC: 31.8 MG/DL — HIGH (ref 8–20)
BURR CELLS BLD QL SMEAR: PRESENT — SIGNIFICANT CHANGE UP
CA-I SERPL-SCNC: 1.3 MMOL/L — SIGNIFICANT CHANGE UP (ref 1.15–1.33)
CALCIUM SERPL-MCNC: 10.4 MG/DL — SIGNIFICANT CHANGE UP (ref 8.4–10.5)
CALCIUM SERPL-MCNC: 9.2 MG/DL — SIGNIFICANT CHANGE UP (ref 8.4–10.5)
CHLORIDE BLDV-SCNC: 101 MMOL/L — SIGNIFICANT CHANGE UP (ref 96–108)
CHLORIDE SERPL-SCNC: 105 MMOL/L — SIGNIFICANT CHANGE UP (ref 96–108)
CHLORIDE SERPL-SCNC: 96 MMOL/L — SIGNIFICANT CHANGE UP (ref 96–108)
CK SERPL-CCNC: 5542 U/L — HIGH (ref 30–200)
CO2 SERPL-SCNC: 13 MMOL/L — LOW (ref 22–29)
CO2 SERPL-SCNC: 16 MMOL/L — LOW (ref 22–29)
COLOR SPEC: YELLOW — SIGNIFICANT CHANGE UP
COMMENT - URINE: SIGNIFICANT CHANGE UP
CREAT SERPL-MCNC: 1.9 MG/DL — HIGH (ref 0.5–1.3)
CREAT SERPL-MCNC: 2.17 MG/DL — HIGH (ref 0.5–1.3)
D DIMER BLD IA.RAPID-MCNC: 4591 NG/ML DDU — HIGH
DIFF PNL FLD: ABNORMAL
EGFR: 30 ML/MIN/1.73M2 — LOW
EGFR: 36 ML/MIN/1.73M2 — LOW
EOSINOPHIL # BLD AUTO: 0 K/UL — SIGNIFICANT CHANGE UP (ref 0–0.5)
EOSINOPHIL NFR BLD AUTO: 0 % — SIGNIFICANT CHANGE UP (ref 0–6)
EPI CELLS # UR: SIGNIFICANT CHANGE UP
GAS PNL BLDV: 130 MMOL/L — LOW (ref 136–145)
GAS PNL BLDV: SIGNIFICANT CHANGE UP
GAS PNL BLDV: SIGNIFICANT CHANGE UP
GLUCOSE BLDC GLUCOMTR-MCNC: 174 MG/DL — HIGH (ref 70–99)
GLUCOSE BLDV-MCNC: 135 MG/DL — HIGH (ref 70–99)
GLUCOSE SERPL-MCNC: 138 MG/DL — HIGH (ref 70–99)
GLUCOSE SERPL-MCNC: 184 MG/DL — HIGH (ref 70–99)
GLUCOSE UR QL: NEGATIVE MG/DL — SIGNIFICANT CHANGE UP
GRAN CASTS # UR COMP ASSIST: ABNORMAL /LPF
HCO3 BLDV-SCNC: 14 MMOL/L — LOW (ref 22–29)
HCT VFR BLD CALC: 39.2 % — SIGNIFICANT CHANGE UP (ref 39–50)
HCT VFR BLDA CALC: 44 % — SIGNIFICANT CHANGE UP
HGB BLD CALC-MCNC: 14.8 G/DL — SIGNIFICANT CHANGE UP (ref 12.6–17.4)
HGB BLD-MCNC: 13.9 G/DL — SIGNIFICANT CHANGE UP (ref 13–17)
HYALINE CASTS # UR AUTO: ABNORMAL /LPF
INR BLD: 1.29 RATIO — HIGH (ref 0.88–1.16)
KETONES UR-MCNC: NEGATIVE — SIGNIFICANT CHANGE UP
LACTATE BLDV-MCNC: 4.9 MMOL/L — CRITICAL HIGH (ref 0.5–2)
LACTATE SERPL-SCNC: 2.2 MMOL/L — HIGH (ref 0.5–2)
LEUKOCYTE ESTERASE UR-ACNC: NEGATIVE — SIGNIFICANT CHANGE UP
LYMPHOCYTES # BLD AUTO: 0.38 K/UL — LOW (ref 1–3.3)
LYMPHOCYTES # BLD AUTO: 1.7 % — LOW (ref 13–44)
MAGNESIUM SERPL-MCNC: 2.1 MG/DL — SIGNIFICANT CHANGE UP (ref 1.8–2.6)
MANUAL SMEAR VERIFICATION: SIGNIFICANT CHANGE UP
MCHC RBC-ENTMCNC: 31.9 PG — SIGNIFICANT CHANGE UP (ref 27–34)
MCHC RBC-ENTMCNC: 35.5 GM/DL — SIGNIFICANT CHANGE UP (ref 32–36)
MCV RBC AUTO: 89.9 FL — SIGNIFICANT CHANGE UP (ref 80–100)
MONOCYTES # BLD AUTO: 2.53 K/UL — HIGH (ref 0–0.9)
MONOCYTES NFR BLD AUTO: 11.3 % — SIGNIFICANT CHANGE UP (ref 2–14)
MRSA PCR RESULT.: SIGNIFICANT CHANGE UP
NEUTROPHILS # BLD AUTO: 19.49 K/UL — HIGH (ref 1.8–7.4)
NEUTROPHILS NFR BLD AUTO: 87 % — HIGH (ref 43–77)
NITRITE UR-MCNC: NEGATIVE — SIGNIFICANT CHANGE UP
OVALOCYTES BLD QL SMEAR: SLIGHT — SIGNIFICANT CHANGE UP
PCO2 BLDV: 24 MMHG — LOW (ref 42–55)
PH BLDV: 7.37 — SIGNIFICANT CHANGE UP (ref 7.32–7.43)
PH UR: 6 — SIGNIFICANT CHANGE UP (ref 5–8)
PHOSPHATE SERPL-MCNC: 4 MG/DL — SIGNIFICANT CHANGE UP (ref 2.4–4.7)
PLAT MORPH BLD: NORMAL — SIGNIFICANT CHANGE UP
PLATELET # BLD AUTO: 420 K/UL — HIGH (ref 150–400)
PO2 BLDV: 111 MMHG — HIGH (ref 25–45)
POIKILOCYTOSIS BLD QL AUTO: SLIGHT — SIGNIFICANT CHANGE UP
POLYCHROMASIA BLD QL SMEAR: SLIGHT — SIGNIFICANT CHANGE UP
POTASSIUM BLDV-SCNC: 4.2 MMOL/L — SIGNIFICANT CHANGE UP (ref 3.5–5.1)
POTASSIUM SERPL-MCNC: 3.9 MMOL/L — SIGNIFICANT CHANGE UP (ref 3.5–5.3)
POTASSIUM SERPL-MCNC: 4.4 MMOL/L — SIGNIFICANT CHANGE UP (ref 3.5–5.3)
POTASSIUM SERPL-SCNC: 3.9 MMOL/L — SIGNIFICANT CHANGE UP (ref 3.5–5.3)
POTASSIUM SERPL-SCNC: 4.4 MMOL/L — SIGNIFICANT CHANGE UP (ref 3.5–5.3)
PROCALCITONIN SERPL-MCNC: 21.2 NG/ML — HIGH (ref 0.02–0.1)
PROT SERPL-MCNC: 7.6 G/DL — SIGNIFICANT CHANGE UP (ref 6.6–8.7)
PROT UR-MCNC: 30 MG/DL
PROTHROM AB SERPL-ACNC: 15 SEC — HIGH (ref 10.5–13.4)
RAPID RVP RESULT: SIGNIFICANT CHANGE UP
RBC # BLD: 4.36 M/UL — SIGNIFICANT CHANGE UP (ref 4.2–5.8)
RBC # FLD: 12.4 % — SIGNIFICANT CHANGE UP (ref 10.3–14.5)
RBC BLD AUTO: SIGNIFICANT CHANGE UP
RBC CASTS # UR COMP ASSIST: ABNORMAL /HPF (ref 0–4)
S AUREUS DNA NOSE QL NAA+PROBE: SIGNIFICANT CHANGE UP
SAO2 % BLDV: 99.3 % — SIGNIFICANT CHANGE UP
SARS-COV-2 RNA SPEC QL NAA+PROBE: SIGNIFICANT CHANGE UP
SODIUM SERPL-SCNC: 131 MMOL/L — LOW (ref 135–145)
SODIUM SERPL-SCNC: 135 MMOL/L — SIGNIFICANT CHANGE UP (ref 135–145)
SP GR SPEC: 1.01 — SIGNIFICANT CHANGE UP (ref 1.01–1.02)
TROPONIN T SERPL-MCNC: 0.16 NG/ML — HIGH (ref 0–0.06)
TROPONIN T SERPL-MCNC: 0.17 NG/ML — HIGH (ref 0–0.06)
UROBILINOGEN FLD QL: NEGATIVE MG/DL — SIGNIFICANT CHANGE UP
WBC # BLD: 22.4 K/UL — HIGH (ref 3.8–10.5)
WBC # FLD AUTO: 22.4 K/UL — HIGH (ref 3.8–10.5)
WBC UR QL: SIGNIFICANT CHANGE UP /HPF (ref 0–5)

## 2022-11-27 PROCEDURE — 70450 CT HEAD/BRAIN W/O DYE: CPT | Mod: 26

## 2022-11-27 PROCEDURE — 74176 CT ABD & PELVIS W/O CONTRAST: CPT | Mod: 26

## 2022-11-27 PROCEDURE — 71250 CT THORAX DX C-: CPT | Mod: 26

## 2022-11-27 PROCEDURE — 99223 1ST HOSP IP/OBS HIGH 75: CPT

## 2022-11-27 PROCEDURE — 93010 ELECTROCARDIOGRAM REPORT: CPT

## 2022-11-27 PROCEDURE — 71045 X-RAY EXAM CHEST 1 VIEW: CPT | Mod: 26

## 2022-11-27 RX ORDER — ACETAMINOPHEN 500 MG
1000 TABLET ORAL ONCE
Refills: 0 | Status: COMPLETED | OUTPATIENT
Start: 2022-11-27 | End: 2022-11-27

## 2022-11-27 RX ORDER — VANCOMYCIN HCL 1 G
1000 VIAL (EA) INTRAVENOUS ONCE
Refills: 0 | Status: COMPLETED | OUTPATIENT
Start: 2022-11-27 | End: 2022-11-27

## 2022-11-27 RX ORDER — PIPERACILLIN AND TAZOBACTAM 4; .5 G/20ML; G/20ML
3.38 INJECTION, POWDER, LYOPHILIZED, FOR SOLUTION INTRAVENOUS ONCE
Refills: 0 | Status: COMPLETED | OUTPATIENT
Start: 2022-11-27 | End: 2022-11-27

## 2022-11-27 RX ORDER — VANCOMYCIN HCL 1 G
1000 VIAL (EA) INTRAVENOUS EVERY 24 HOURS
Refills: 0 | Status: DISCONTINUED | OUTPATIENT
Start: 2022-11-28 | End: 2022-11-30

## 2022-11-27 RX ORDER — SODIUM CHLORIDE 9 MG/ML
3000 INJECTION INTRAMUSCULAR; INTRAVENOUS; SUBCUTANEOUS ONCE
Refills: 0 | Status: COMPLETED | OUTPATIENT
Start: 2022-11-27 | End: 2022-11-27

## 2022-11-27 RX ORDER — SODIUM CHLORIDE 9 MG/ML
1000 INJECTION, SOLUTION INTRAVENOUS
Refills: 0 | Status: DISCONTINUED | OUTPATIENT
Start: 2022-11-27 | End: 2022-11-28

## 2022-11-27 RX ORDER — ENOXAPARIN SODIUM 100 MG/ML
40 INJECTION SUBCUTANEOUS EVERY 24 HOURS
Refills: 0 | Status: DISCONTINUED | OUTPATIENT
Start: 2022-11-27 | End: 2022-11-28

## 2022-11-27 RX ORDER — MEROPENEM 1 G/30ML
1000 INJECTION INTRAVENOUS EVERY 12 HOURS
Refills: 0 | Status: DISCONTINUED | OUTPATIENT
Start: 2022-11-27 | End: 2022-11-28

## 2022-11-27 RX ORDER — HYDROCORTISONE 20 MG
50 TABLET ORAL EVERY 6 HOURS
Refills: 0 | Status: DISCONTINUED | OUTPATIENT
Start: 2022-11-27 | End: 2022-11-28

## 2022-11-27 RX ORDER — SODIUM CHLORIDE 9 MG/ML
1000 INJECTION, SOLUTION INTRAVENOUS ONCE
Refills: 0 | Status: COMPLETED | OUTPATIENT
Start: 2022-11-27 | End: 2022-11-27

## 2022-11-27 RX ORDER — DEXMEDETOMIDINE HYDROCHLORIDE IN 0.9% SODIUM CHLORIDE 4 UG/ML
0.3 INJECTION INTRAVENOUS
Qty: 200 | Refills: 0 | Status: DISCONTINUED | OUTPATIENT
Start: 2022-11-27 | End: 2022-11-27

## 2022-11-27 RX ORDER — MIDODRINE HYDROCHLORIDE 2.5 MG/1
10 TABLET ORAL EVERY 8 HOURS
Refills: 0 | Status: DISCONTINUED | OUTPATIENT
Start: 2022-11-27 | End: 2022-12-10

## 2022-11-27 RX ORDER — SODIUM CHLORIDE 9 MG/ML
1000 INJECTION INTRAMUSCULAR; INTRAVENOUS; SUBCUTANEOUS
Refills: 0 | Status: DISCONTINUED | OUTPATIENT
Start: 2022-11-27 | End: 2022-11-27

## 2022-11-27 RX ORDER — NOREPINEPHRINE BITARTRATE/D5W 8 MG/250ML
0.05 PLASTIC BAG, INJECTION (ML) INTRAVENOUS
Qty: 8 | Refills: 0 | Status: DISCONTINUED | OUTPATIENT
Start: 2022-11-27 | End: 2022-11-28

## 2022-11-27 RX ADMIN — Medication 250 MILLIGRAM(S): at 01:13

## 2022-11-27 RX ADMIN — Medication 400 MILLIGRAM(S): at 00:47

## 2022-11-27 RX ADMIN — PIPERACILLIN AND TAZOBACTAM 25 GRAM(S): 4; .5 INJECTION, POWDER, LYOPHILIZED, FOR SOLUTION INTRAVENOUS at 06:20

## 2022-11-27 RX ADMIN — ENOXAPARIN SODIUM 40 MILLIGRAM(S): 100 INJECTION SUBCUTANEOUS at 12:44

## 2022-11-27 RX ADMIN — MEROPENEM 100 MILLIGRAM(S): 1 INJECTION INTRAVENOUS at 17:02

## 2022-11-27 RX ADMIN — Medication 50 MILLIGRAM(S): at 09:25

## 2022-11-27 RX ADMIN — MEROPENEM 100 MILLIGRAM(S): 1 INJECTION INTRAVENOUS at 08:12

## 2022-11-27 RX ADMIN — MIDODRINE HYDROCHLORIDE 10 MILLIGRAM(S): 2.5 TABLET ORAL at 22:57

## 2022-11-27 RX ADMIN — Medication 7.22 MICROGRAM(S)/KG/MIN: at 04:00

## 2022-11-27 RX ADMIN — SODIUM CHLORIDE 3000 MILLILITER(S): 9 INJECTION INTRAMUSCULAR; INTRAVENOUS; SUBCUTANEOUS at 01:29

## 2022-11-27 RX ADMIN — Medication 50 MILLIGRAM(S): at 22:56

## 2022-11-27 RX ADMIN — Medication 50 MILLIGRAM(S): at 16:52

## 2022-11-27 RX ADMIN — Medication 7.22 MICROGRAM(S)/KG/MIN: at 01:01

## 2022-11-27 RX ADMIN — SODIUM CHLORIDE 1000 MILLILITER(S): 9 INJECTION, SOLUTION INTRAVENOUS at 09:27

## 2022-11-27 RX ADMIN — PIPERACILLIN AND TAZOBACTAM 200 GRAM(S): 4; .5 INJECTION, POWDER, LYOPHILIZED, FOR SOLUTION INTRAVENOUS at 00:29

## 2022-11-27 RX ADMIN — MIDODRINE HYDROCHLORIDE 10 MILLIGRAM(S): 2.5 TABLET ORAL at 13:26

## 2022-11-27 RX ADMIN — SODIUM CHLORIDE 3000 MILLILITER(S): 9 INJECTION INTRAMUSCULAR; INTRAVENOUS; SUBCUTANEOUS at 00:29

## 2022-11-27 NOTE — H&P ADULT - NS PANP COMMENT GEN_ALL_CORE FT
78 yo male with hx of HTN, recent admission to [facility] 11/2-11/7 for sepsis secondary to strep sanguinosus bacteremia of unclear etiology, discharged home with PICC line with instructions to complete course of ceftriaxone.  Now presents with recurrent sepsis/ septic shock, syncope.  No clear cut source of infection noted.  Will treat empirically with broad spectrum abx, await cultures, needs LILIAN (refused in the past).  ID consult placed.

## 2022-11-27 NOTE — H&P ADULT - NSHPADDITIONALINFOADULT_GEN_ALL_CORE
Critical Care time: 35 mins assessing presenting problems of acute illness that poses high probability of life threatening deterioration or end organ damage/dysfunction.  Medical decision making inculding Initiating plan of care, reviewing data, reviewing radiology,direct patient bedside evaluation and interpretation of vital signs, any necessary ventilator management , discusion with multidisciplinary team, discussing goals of care with patient/family, all non inclusive of procedures, COVID 19 specific considerations and therapeutic  options based on the available and rapidly changing literature

## 2022-11-27 NOTE — ED PROVIDER NOTE - OBJECTIVE STATEMENT
76 y/o male with PMHx of HTN and aortic stenosis; recent dx of bacterial sanguinousis and DC'd on IV Rocephin with PICC line, presenting after syncope and collapse, found to be septic, hypotensive, hypoxic and febrile. Pt unsure of mechanism of fall, has a stutter at baseline per EMS. 78 y/o male with PMHx of HTN and aortic stenosis; recent dx of bacterial sanguinousis and DC'd on IV Rocephin with PICC line, presenting after syncope and collapse, found to be septic, hypotensive, hypoxic and febrile. Pt unsure of mechanism of fall, has a stutter at baseline per EMS. Pt was found face-down on bathroom floor, with left leg wedged under cabinet 78 y/o male with PMHx of HTN and aortic stenosis; recent dx of bacterial streptococcus anginosus and DC'd on IV Rocephin with PICC line, presenting after syncope and collapse, found to be septic, hypotensive, hypoxic and febrile. Pt unsure of mechanism of fall, has a stutter at baseline per EMS. Pt was found face-down on bathroom floor, with left leg wedged under cabinet. 78 y/o male with PMHx of HTN and aortic stenosis; recent dx of bacteremia secondary to streptococcus anginosus and DC'd on IV Rocephin with PICC line, presenting after syncope and collapse. As per EMS patient was found down on the bathroom floor by family; patient unsure how he got there but states he felt lightheaded and weak. Patient hypotensive, febrile, tachycardic, satting 90% on RA. Sepsis protocol activated. Patient aaox3, no FND, ill appearing, stutter at baseline, moving all extremities equally, minor abrasions to L frontal skull no active bleed or lacerations. Denies chills, dizziness, dysphagia, dysarthria, diplopia, photophobia, SOB, CP, abdominal pain, neck pain, back pain, diarrhea, dysuria, hematuria, hematochezia, hematemesis, n/v, recent travel, sick contacts, leg swelling.

## 2022-11-27 NOTE — ED PROVIDER NOTE - CONSTITUTIONAL, MLM
normal... Well appearing, awake, alert, oriented to person, place, time/situation and in no apparent distress. Ill appearing, aaox3, in mild distress

## 2022-11-27 NOTE — H&P ADULT - NSHPREVIEWOFSYSTEMS_GEN_ALL_CORE
CONSTITUTIONAL: possitive fever, abrasions with soft tissue swelling to left frontal skull   EYES: No eye pain, visual disturbances, or discharge  ENMT:  No difficulty hearing, tinnitus, vertigo; No sinus or throat pain  NECK: No pain or stiffness  BREASTS: No pain, masses, or nipple discharge  RESPIRATORY: No cough, wheezing, chills or hemoptysis; No shortness of breath  CARDIOVASCULAR: No chest pain, palpitations, dizziness, or leg swelling  GASTROINTESTINAL: No abdominal or epigastric pain. No nausea, vomiting, or hematemesis; No diarrhea or constipation. No melena or hematochezia.  GENITOURINARY: No dysuria, frequency, hematuria, or incontinence  NEUROLOGICAL: No headaches, memory loss, loss of strength, numbness, or tremors  SKIN: No itching, burning, rashes, or lesions   LYMPH NODES: No enlarged glands  ENDOCRINE: No heat or cold intolerance; No hair loss  MUSCULOSKELETAL: No joint pain or swelling; No muscle, back, or extremity pain  PSYCHIATRIC: No depression, anxiety, mood swings, or difficulty sleeping  HEME/LYMPH: No easy bruising, or bleeding gums  ALLERY AND IMMUNOLOGIC: No hives or eczema

## 2022-11-27 NOTE — H&P ADULT - NSHPPHYSICALEXAM_GEN_ALL_CORE
Physicial Exam:     Constitutional: abrasions with soft tissue swelling to left frontal skul  HEENT: PERRLA, EOMI, no drainage or redness, airway patent   Neck: No bruits; no thyromegaly or nodules,  No JVD  Back: Normal spine flexure, No CVA tenderness, No deformity or limitation of movement  Respiratory: Breath Sounds equal & clear to percussion & auscultation, no accessory muscle use  Cardiovascular: Regular rate & rhythm, normal S1, S2; no murmurs, gallops or rubs; no S3, S4  Gastrointestinal: Soft, non-tender, non distended no hepatosplenomegaly, normal bowel sounds  Extremities: No peripheral edema, No cyanosis, clubbing   Vascular: Equal and normal pulses: 2+ peripheral pulses throughout  Neurological: Alert and oriented to person,place and time, CN2-12 intact, PERRLA, EOMI,  Motor Strength 5/5 B/L upper and lower extremities; DTRs 2+ intact and symmetric, normal sensory exam  Psychiatric: Normal mood, normal affect  Musculoskeletal: No joint pain, swelling or deformity; no limitation of movement  Skin: No rashes, Warm and well perfused

## 2022-11-27 NOTE — ED PROVIDER NOTE - SKIN, MLM
Skin normal color for race, warm, dry and intact. No evidence of rash. Superficial abrasions to L frontal skull

## 2022-11-27 NOTE — H&P ADULT - HISTORY OF PRESENT ILLNESS
77 year old male with PMHx of recent  COVID  on 10/6/22, HTN , Vit B12 deficiency, presenting to the ED on 11/2 with body aches, fatigue and fever (Tmax 102) at home for 12 days found to have streptococcus bacteremia and admitted with unclear source, no recent dental work, skin infections, no respiratory symptoms. Pan scan was negative at that time ( LILIAN was recommended but pt refused) He was treated for Bacteremia ( Blood cultures + streptococcus anginosis pansensitive ) and norovirus with supportive care and contact isolation . He was discharged home on 11/7 with IV Rocephin via PICC line. Recommendation was to continue ABX  daily via pic end 11/30/22    Tonight he presented to ER after and episode of syncope and collapse, found to be septic, hypotensive, hypoxic and febrile in the ER. He is unsure of mechanism of fall but remembers being on the ground no reported LOC . He was found face-down on bathroom floor buy family with left leg wedged under cabinet. On my interview he is alert and oriented to person place and time, he has a baseline studder in his speech pattern but otherwise neurologically intcact without defecit.  In responose to hypotension he failed to repsoond to Inital sepsis fluid protocol in ER and now is requiring IV pressor in form of levophed to maintain MAP greater than 60 -65. In the ER he was febrile with inital labs signifacnt for WBC of 22.4 , ProCal of 21.2 first lactate 4.9 via abg with repeat post fluids of 2.2 venous sample.

## 2022-11-27 NOTE — ED ADULT NURSE NOTE - OBJECTIVE STATEMENT
Pt in no apparent distress at this time. Airway patent, breathing spontaneous and nonlabored. Pt A&Ox3 resting in stretcher. Pt BIBA after family found pt face down syncopal episode in bathroom, and AMS at home.  on arrival to ED found hypotensive, tachy, tachypneic, hypoxic, hyperthermic.  pt with RUE picc line.  abrasion to left forehead. no neuro deficits noted. stutter at baseline

## 2022-11-27 NOTE — PATIENT PROFILE ADULT - FALL HARM RISK - PATIENT NEEDS ASSISTANCE
Care Everywhere accessed for disease management for [facility].    Karen Awad, RN BSN  RN -Disease Management  Population Health Services  [facility]     Standing/Walking/Toileting

## 2022-11-27 NOTE — ED ADULT NURSE REASSESSMENT NOTE - NS ED NURSE REASSESS COMMENT FT1
pt BP improving, maintaining baseline mental status throughout time in ED, episode of diaphoresis, finger stick 174, pt with no complaints, vss.

## 2022-11-27 NOTE — ED PROVIDER NOTE - RESPIRATORY, MLM
Breath sounds clear and equal bilaterally. Breath sounds clear and equal bilaterally, not using accessory muscles, satting 90% on RA, improved to 95% on 2L NC

## 2022-11-27 NOTE — ED ADULT TRIAGE NOTE - CHIEF COMPLAINT QUOTE
patient status post syncope and collapse, found in bathroom by family.  family reported pateint altered mental status, awake but confused, arrives with abrasion to left forehead, picc line in right arm, febrile, tachypneic

## 2022-11-27 NOTE — H&P ADULT - ASSESSMENT
77 year old male admitted with sepsis of unkonwn origin/ septic shock not repsonding to fluids and requiring IV pressors to maintain MAP  77 year old male admitted with sepsis of unkonwn origin/ septic shock not repsonding to fluids and requiring IV pressors to maintain MAP     septic shock  -unknown source at this time  -30 cc/kg fluid challenge without improvement in blood pressure  -patient currently on Levophed, will titrate for MAP 65-70  -repeat lactate  -blood/urine/sputum cultures, then initiate broad spectrum antibiotics  -follow cultures and narrow antibitoics as able  -goal UOP >0.5 cc/kg/hr  -procalcitonin   -follow up Norovirus results  -contact isolation for now     suspected bactermia   -recent admission for streptococcus bacteremia   -contineu vanco / zosyn   -follow up cultures   -ID consult     RADHA   -Hold nephrotoxic meds  -Continue aggressive hydration  -Trend urine output  -Follow up BUN/Creatinine  -follow up electrolytes     Syncope and Fall   -no indication of cardiac cause for syncope ( patient did say he was dizzy when he stood)   -Head CT with abdomin and pelvis ordered   -will follow results for any indication of traumatic injury and adjust plan accordingly

## 2022-11-27 NOTE — ED PROVIDER NOTE - ATTENDING CONTRIBUTION TO CARE
VERONICA Hernández: see mdm    I have personally performed a face to face diagnostic evaluation on this patient.  I have reviewed the resident  note and agree with the history, exam, and plan of care, except as noted.   My medical decision making and observations are found above.

## 2022-11-27 NOTE — PATIENT PROFILE ADULT - FALL HARM RISK - HARM RISK INTERVENTIONS

## 2022-11-27 NOTE — ED ADULT NURSE NOTE - NSIMPLEMENTINTERV_GEN_ALL_ED
Implemented All Fall Risk Interventions:  [place] to call system. Call bell, personal items and telephone within reach. Instruct patient to call for assistance. Room bathroom lighting operational. Non-slip footwear when patient is off stretcher. Physically safe environment: no spills, clutter or unnecessary equipment. Stretcher in lowest position, wheels locked, appropriate side rails in place. Provide visual cue, wrist band, yellow gown, etc. Monitor gait and stability. Monitor for mental status changes and reorient to person, place, and time. Review medications for side effects contributing to fall risk. Reinforce activity limits and safety measures with patient and family.

## 2022-11-27 NOTE — ED PROVIDER NOTE - CLINICAL SUMMARY MEDICAL DECISION MAKING FREE TEXT BOX
76 y/o male with PMHx of HTN and aortic stenosis; recent dx of bacteremia secondary to streptococcus anginosus and DC'd on IV Rocephin with PICC line, presenting after syncope and collapse. As per EMS patient was found down on the bathroom floor by family; patient unsure how he got there but states he felt lightheaded and weak. Patient hypotensive, febrile, tachycardic, satting 90% on RA. Sepsis protocol activated. Patient aaox3, no FND, ill appearing, stutter at baseline, moving all extremities equally, minor abrasions to L frontal skull no active bleed or lacerations, lungs ctab, belly soft nontender. Will check labs cultures lactate, give 30 cc per kg of fluids, give IV abx, check CTH to r/o bleed, UA RVP CXR to identify source of infection, likely admit.

## 2022-11-27 NOTE — CONSULT NOTE ADULT - SUBJECTIVE AND OBJECTIVE BOX
INFECTIOUS DISEASES AND INTERNAL MEDICINE at [facility]  =======================================================  Flip Pedesron MD  Diplomates American Board of Internal Medicine and Infectious Diseases  Telephone [phone]  Fax            [phone]  =======================================================    HANNAH [id number]      HPI:  77 year old male with PMHx of recent  COVID  on 10/6/22, HTN , Vit B12 deficiency, presenting to the ED on  with body aches, fatigue and fever (Tmax 102) at home for 12 days found to have streptococcus bacteremia and admitted with unclear source, no recent dental work, skin infections, no respiratory symptoms. Pan scan was negative at that time ( LILIAN was recommended but pt refused) He was treated for Bacteremia ( Blood cultures + streptococcus anginosis pansensitive ) and norovirus with supportive care and contact isolation . He was discharged home on  with IV Rocephin via PICC line. Recommendation was to continue ABX  daily via pic end 22    he  presented to ER after and episode of syncope and collapse, found to be septic, hypotensive, hypoxic and febrile in the ER. He is unsure of mechanism of fall but remembers being on the ground no reported LOC . He was found face-down on bathroom floor buy family with left leg wedged under cabinet. On my interview he is alert and oriented to person place and time, he has a baseline studder in his speech pattern but otherwise neurologically intcact without defecit.  In responose to hypotension he failed to repsoond to Inital sepsis fluid protocol in ER is required IV pressor in form of levophed to maintain MAP greater than 60 -65. In the ER he was febrile with inital labs signifacnt for WBC of 22.4 , ProCal of 21.2 first lactate 4.9 via abg with repeat post fluids of 2.2 venous sample.    PT IN ICU  ASKED TO EVALUATE FROM ID STANDPOINT       PAST MEDICAL & SURGICAL HISTORY:  Hypertension      Aortic stenosis      H/O inguinal hernia repair  B/L       S/P laparoscopic colectomy  right colectomy with ileotransverse anastomosis          ANTIBIOTICS  meropenem  IVPB 1000 milliGRAM(s) IV Intermittent every 12 hours      Allergies    No Known Allergies    Intolerances        SOCIAL HISTORY:     FAMILY HX   FAMILY HISTORY:  FH: heart disease (Father, Mother)        Vital Signs Last 24 Hrs  T(C): 36.4 (2022 12:19), Max: 39.3 (2022 00:01)  T(F): 97.5 (2022 12:19), Max: 102.8 (2022 00:01)  HR: 112 (2022 14:45) (62 - 149)  BP: 104/76 (2022 14:30) (57/40 - 162/60)  BP(mean): 84 (2022 14:30) (45 - 106)  RR: 25 (2022 14:45) (11 - 52)  SpO2: 100% (2022 14:30) (63% - 100%)    Parameters below as of 2022 07:15  Patient On (Oxygen Delivery Method): nasal cannula  O2 Flow (L/min): 2    Drug Dosing Weight  Height (cm): 167.6 (2022 00:01)  Weight (kg): 77 (2022 00:30)  BMI (kg/m2): 27.4 (2022 00:30)  BSA (m2): 1.87 (2022 00:30)      REVIEW OF SYSTEMS:    CONSTITUTIONAL:  As per HPI.    HEENT:  Eyes:  No diplopia or blurred vision. ENT:  No earache, sore throat or runny nose.    CARDIOVASCULAR:  No pressure, squeezing, strangling, tightness, heaviness or aching about the chest, neck, axilla or epigastrium.    RESPIRATORY:  No cough, shortness of breath, PND or orthopnea.    GASTROINTESTINAL:  No nausea, vomiting or diarrhea.    GENITOURINARY:  No dysuria, frequency or urgency.    MUSCULOSKELETAL:  As per HPI.    SKIN:  No change in skin, hair or nails.    NEUROLOGIC:    weakness.                  PHYSICAL EXAMINATION:    GENERAL: The patient is a _____in no apparent distress. ___     VITAL SIGNS: T(C): 36.4 (22 @ 12:19), Max: 39.3 (22 @ 00:01)  HR: 112 (22 @ 14:45) (62 - 149)  BP: 104/76 (22 @ 14:30) (57/40 - 162/60)  RR: 25 (22 @ 14:45) (11 - 52)  SpO2: 100% (22 @ 14:30) (63% - 100%)  Wt(kg): --    HEENT: Head is normocephalic and atraumatic.  ANICTERIC  NECK: Supple. No carotid bruits.  No lymphadenopathy or thyromegaly.    LUNGS:COARSE BREATH SOUNDS    HEART: Regular rate and rhythm without murmur.    ABDOMEN: Soft, nontender, and nondistended.  Positive bowel sounds.  No hepatosplenomegaly was noted. NO REBOUND NO GUARDING    EXTREMITIES: NO EDEMA NO ERYTHEMA    NEUROLOGIC: NON FOCAL      SKIN: No ulceration or induration present. NO RASH        BLOOD CULTURES       URINE CX          LABS:                        13.9   22.40 )-----------( 420      ( 2022 00:04 )             39.2         135  |  105  |  29.0<H>  ----------------------------<  184<H>  3.9   |  16.0<L>  |  1.90<H>    Ca    9.2      2022 09:05  Phos  4.0       Mg     2.1         TPro  7.6  /  Alb  3.8  /  TBili  0.6  /  DBili  x   /  AST  104<H>  /  ALT  101<H>  /  AlkPhos  177<H>      PT/INR - ( 2022 00:04 )   PT: 15.0 sec;   INR: 1.29 ratio         PTT - ( 2022 00:04 )  PTT:26.2 sec  Urinalysis Basic - ( 2022 09:10 )    Color: Yellow / Appearance: Clear / S.010 / pH: x  Gluc: x / Ketone: Negative  / Bili: Negative / Urobili: Negative mg/dL   Blood: x / Protein: 30 mg/dL / Nitrite: Negative   Leuk Esterase: Negative / RBC: 6-10 /HPF / WBC 0-2 /HPF   Sq Epi: x / Non Sq Epi: Occasional / Bacteria: Few        RADIOLOGY & ADDITIONAL STUDIES:      ASSESSMENT/PLAN    77 year old male with PMHx of recent  COVID  on 10/6/22, HTN , Vit B12 deficiency, presenting to the ED on  with body aches, fatigue and fever (Tmax 102) at home for 12 days found to have streptococcus bacteremia and admitted with unclear source, no recent dental work, skin infections, no respiratory symptoms. Pan scan was negative at that time ( LILIAN was recommended but pt refused) He was treated for Bacteremia ( Blood cultures + streptococcus anginosis pansensitive ) and norovirus with supportive care and contact isolation . He was discharged home on  with IV Rocephin via PICC line. Recommendation was to continue ABX  daily via pic end 22    he  presented to ER after and episode of syncope and collapse, found to be septic, hypotensive, hypoxic and febrile in the ER. He is unsure of mechanism of fall but remembers being on the ground no reported LOC . He was found face-down on bathroom floor buy family with left leg wedged under cabinet. On my interview he is alert and oriented to person place and time, he has a baseline studder in his speech pattern but otherwise neurologically intcact without defecit.  In responose to hypotension he failed to repsoond to Inital sepsis fluid protocol in ER is required IV pressor in form of levophed to maintain MAP greater than 60 -65. In the ER he was febrile with inital labs signifacnt for WBC of 22.4 , ProCal of 21.2 first lactate 4.9 via abg with repeat post fluids of 2.2 venous sample.    PT IN ICU  AWAKE ALERT  NO CLEAR SOURCE FOR INFECTION   WBC 22K  AGREE WITH BROAD-SPECTRUM IV ABX UNTIL CX ARE FINALIZED IF RENAL FXN NORMALIZE  CONSIDER IMAGING WITH CONTRAST  WILL FOLLOW UP WITH FURTHER RECOMMENDATIONS                HELLEN HERRERA MD

## 2022-11-27 NOTE — H&P ADULT - NSHPLABSRESULTS_GEN_ALL_CORE
ICU Vital Signs Last 24 Hrs  T(C): 36.7 (27 Nov 2022 01:31), Max: 39.3 (27 Nov 2022 00:01)  T(F): 98 (27 Nov 2022 01:31), Max: 102.8 (27 Nov 2022 00:01)  HR: 83 (27 Nov 2022 01:50) (83 - 149)  BP: 82/51 (27 Nov 2022 01:50) (57/40 - 90/52)  BP(mean): 63 (27 Nov 2022 01:20) (45 - 63)  ABP: --  ABP(mean): --  RR: 24 (27 Nov 2022 01:50) (24 - 52)  SpO2: 97% (27 Nov 2022 01:50) (92% - 98%)    O2 Parameters below as of 27 Nov 2022 01:50  Patient On (Oxygen Delivery Method): nasal cannula  O2 Flow (L/min): 3    11-27    131<L>  |  96  |  31.8<H>  ----------------------------<  138<H>  4.4   |  13.0<L>  |  2.17<H>  Ca    10.4      27 Nov 2022 00:04  TPro  7.6  /  Alb  3.8  /  TBili  0.6  /  DBili  x   /  AST  104<H>  /  ALT  101<H>  /  AlkPhos  177<H>  11-27                       13.9   22.40 )-----------( 420      ( 27 Nov 2022 00:04 )             39.2     PT/INR - ( 27 Nov 2022 00:04 )   PT: 15.0 sec;   INR: 1.29 ratio     PTT - ( 27 Nov 2022 00:04 )  PTT:26.2 sec    LIVER FUNCTIONS - ( 27 Nov 2022 00:04 )  Alb: 3.8 g/dL / Pro: 7.6 g/dL / ALK PHOS: 177 U/L / ALT: 101 U/L / AST: 104 U/L / GGT: x           CAPILLARY BLOOD GLUCOSE  POCT Blood Glucose.: 174 mg/dL (27 Nov 2022 02:43)

## 2022-11-28 LAB
ANION GAP SERPL CALC-SCNC: 15 MMOL/L — SIGNIFICANT CHANGE UP (ref 5–17)
APTT BLD: 100.3 SEC — HIGH (ref 27.5–35.5)
APTT BLD: 57.1 SEC — HIGH (ref 27.5–35.5)
BUN SERPL-MCNC: 24.2 MG/DL — HIGH (ref 8–20)
CALCIUM SERPL-MCNC: 9.1 MG/DL — SIGNIFICANT CHANGE UP (ref 8.4–10.5)
CHLORIDE SERPL-SCNC: 107 MMOL/L — SIGNIFICANT CHANGE UP (ref 96–108)
CK SERPL-CCNC: 2194 U/L — HIGH (ref 30–200)
CO2 SERPL-SCNC: 17 MMOL/L — LOW (ref 22–29)
CREAT SERPL-MCNC: 1.3 MG/DL — SIGNIFICANT CHANGE UP (ref 0.5–1.3)
CULTURE RESULTS: NO GROWTH — SIGNIFICANT CHANGE UP
EGFR: 56 ML/MIN/1.73M2 — LOW
GLUCOSE SERPL-MCNC: 205 MG/DL — HIGH (ref 70–99)
HCT VFR BLD CALC: 31.8 % — LOW (ref 39–50)
HCT VFR BLD CALC: 34 % — LOW (ref 39–50)
HGB BLD-MCNC: 11.1 G/DL — LOW (ref 13–17)
HGB BLD-MCNC: 11.9 G/DL — LOW (ref 13–17)
MAGNESIUM SERPL-MCNC: 2 MG/DL — SIGNIFICANT CHANGE UP (ref 1.6–2.6)
MCHC RBC-ENTMCNC: 31.3 PG — SIGNIFICANT CHANGE UP (ref 27–34)
MCHC RBC-ENTMCNC: 31.4 PG — SIGNIFICANT CHANGE UP (ref 27–34)
MCHC RBC-ENTMCNC: 34.9 GM/DL — SIGNIFICANT CHANGE UP (ref 32–36)
MCHC RBC-ENTMCNC: 35 GM/DL — SIGNIFICANT CHANGE UP (ref 32–36)
MCV RBC AUTO: 89.6 FL — SIGNIFICANT CHANGE UP (ref 80–100)
MCV RBC AUTO: 89.7 FL — SIGNIFICANT CHANGE UP (ref 80–100)
PHOSPHATE SERPL-MCNC: 3 MG/DL — SIGNIFICANT CHANGE UP (ref 2.4–4.7)
PLATELET # BLD AUTO: 277 K/UL — SIGNIFICANT CHANGE UP (ref 150–400)
PLATELET # BLD AUTO: 312 K/UL — SIGNIFICANT CHANGE UP (ref 150–400)
POTASSIUM SERPL-MCNC: 3.7 MMOL/L — SIGNIFICANT CHANGE UP (ref 3.5–5.3)
POTASSIUM SERPL-SCNC: 3.7 MMOL/L — SIGNIFICANT CHANGE UP (ref 3.5–5.3)
RBC # BLD: 3.55 M/UL — LOW (ref 4.2–5.8)
RBC # BLD: 3.79 M/UL — LOW (ref 4.2–5.8)
RBC # FLD: 12.9 % — SIGNIFICANT CHANGE UP (ref 10.3–14.5)
RBC # FLD: 13 % — SIGNIFICANT CHANGE UP (ref 10.3–14.5)
SODIUM SERPL-SCNC: 139 MMOL/L — SIGNIFICANT CHANGE UP (ref 135–145)
SPECIMEN SOURCE: SIGNIFICANT CHANGE UP
TROPONIN T SERPL-MCNC: 0.1 NG/ML — HIGH (ref 0–0.06)
WBC # BLD: 14.62 K/UL — HIGH (ref 3.8–10.5)
WBC # BLD: 15.54 K/UL — HIGH (ref 3.8–10.5)
WBC # FLD AUTO: 14.62 K/UL — HIGH (ref 3.8–10.5)
WBC # FLD AUTO: 15.54 K/UL — HIGH (ref 3.8–10.5)

## 2022-11-28 PROCEDURE — 99233 SBSQ HOSP IP/OBS HIGH 50: CPT

## 2022-11-28 RX ORDER — HEPARIN SODIUM 5000 [USP'U]/ML
INJECTION INTRAVENOUS; SUBCUTANEOUS
Qty: 25000 | Refills: 0 | Status: DISCONTINUED | OUTPATIENT
Start: 2022-11-28 | End: 2022-12-01

## 2022-11-28 RX ORDER — AMIODARONE HYDROCHLORIDE 400 MG/1
0.5 TABLET ORAL
Qty: 900 | Refills: 0 | Status: DISCONTINUED | OUTPATIENT
Start: 2022-11-28 | End: 2022-11-30

## 2022-11-28 RX ORDER — HEPARIN SODIUM 5000 [USP'U]/ML
3000 INJECTION INTRAVENOUS; SUBCUTANEOUS EVERY 6 HOURS
Refills: 0 | Status: DISCONTINUED | OUTPATIENT
Start: 2022-11-28 | End: 2022-12-04

## 2022-11-28 RX ORDER — AMIODARONE HYDROCHLORIDE 400 MG/1
150 TABLET ORAL ONCE
Refills: 0 | Status: COMPLETED | OUTPATIENT
Start: 2022-11-28 | End: 2022-11-28

## 2022-11-28 RX ORDER — HEPARIN SODIUM 5000 [USP'U]/ML
6500 INJECTION INTRAVENOUS; SUBCUTANEOUS EVERY 6 HOURS
Refills: 0 | Status: DISCONTINUED | OUTPATIENT
Start: 2022-11-28 | End: 2022-12-04

## 2022-11-28 RX ORDER — POTASSIUM CHLORIDE 20 MEQ
10 PACKET (EA) ORAL
Refills: 0 | Status: COMPLETED | OUTPATIENT
Start: 2022-11-28 | End: 2022-11-28

## 2022-11-28 RX ORDER — MEROPENEM 1 G/30ML
1000 INJECTION INTRAVENOUS EVERY 8 HOURS
Refills: 0 | Status: COMPLETED | OUTPATIENT
Start: 2022-11-28 | End: 2022-12-05

## 2022-11-28 RX ORDER — HEPARIN SODIUM 5000 [USP'U]/ML
6500 INJECTION INTRAVENOUS; SUBCUTANEOUS ONCE
Refills: 0 | Status: COMPLETED | OUTPATIENT
Start: 2022-11-28 | End: 2022-11-28

## 2022-11-28 RX ORDER — METOPROLOL TARTRATE 50 MG
5 TABLET ORAL EVERY 4 HOURS
Refills: 0 | Status: DISCONTINUED | OUTPATIENT
Start: 2022-11-28 | End: 2022-11-28

## 2022-11-28 RX ORDER — AMIODARONE HYDROCHLORIDE 400 MG/1
1 TABLET ORAL
Qty: 900 | Refills: 0 | Status: DISCONTINUED | OUTPATIENT
Start: 2022-11-28 | End: 2022-11-30

## 2022-11-28 RX ADMIN — AMIODARONE HYDROCHLORIDE 33.3 MG/MIN: 400 TABLET ORAL at 10:02

## 2022-11-28 RX ADMIN — Medication 5 MILLIGRAM(S): at 08:08

## 2022-11-28 RX ADMIN — Medication 50 MILLIGRAM(S): at 01:17

## 2022-11-28 RX ADMIN — MIDODRINE HYDROCHLORIDE 10 MILLIGRAM(S): 2.5 TABLET ORAL at 23:13

## 2022-11-28 RX ADMIN — MEROPENEM 100 MILLIGRAM(S): 1 INJECTION INTRAVENOUS at 05:26

## 2022-11-28 RX ADMIN — Medication 50 MILLIGRAM(S): at 05:26

## 2022-11-28 RX ADMIN — MEROPENEM 1000 MILLIGRAM(S): 1 INJECTION INTRAVENOUS at 15:40

## 2022-11-28 RX ADMIN — AMIODARONE HYDROCHLORIDE 618 MILLIGRAM(S): 400 TABLET ORAL at 09:52

## 2022-11-28 RX ADMIN — MIDODRINE HYDROCHLORIDE 10 MILLIGRAM(S): 2.5 TABLET ORAL at 15:41

## 2022-11-28 RX ADMIN — HEPARIN SODIUM 1600 UNIT(S)/HR: 5000 INJECTION INTRAVENOUS; SUBCUTANEOUS at 15:39

## 2022-11-28 RX ADMIN — HEPARIN SODIUM 1400 UNIT(S)/HR: 5000 INJECTION INTRAVENOUS; SUBCUTANEOUS at 08:10

## 2022-11-28 RX ADMIN — HEPARIN SODIUM 3000 UNIT(S): 5000 INJECTION INTRAVENOUS; SUBCUTANEOUS at 15:41

## 2022-11-28 RX ADMIN — HEPARIN SODIUM 6500 UNIT(S): 5000 INJECTION INTRAVENOUS; SUBCUTANEOUS at 08:09

## 2022-11-28 RX ADMIN — Medication 50 MILLIGRAM(S): at 13:40

## 2022-11-28 RX ADMIN — Medication 250 MILLIGRAM(S): at 01:17

## 2022-11-28 RX ADMIN — MEROPENEM 1000 MILLIGRAM(S): 1 INJECTION INTRAVENOUS at 23:13

## 2022-11-28 RX ADMIN — Medication 100 MILLIEQUIVALENT(S): at 08:08

## 2022-11-28 RX ADMIN — Medication 100 MILLIEQUIVALENT(S): at 13:40

## 2022-11-28 RX ADMIN — MIDODRINE HYDROCHLORIDE 10 MILLIGRAM(S): 2.5 TABLET ORAL at 05:27

## 2022-11-28 RX ADMIN — SODIUM CHLORIDE 100 MILLILITER(S): 9 INJECTION, SOLUTION INTRAVENOUS at 05:34

## 2022-11-28 NOTE — PROGRESS NOTE ADULT - SUBJECTIVE AND OBJECTIVE BOX
INFECTIOUS DISEASES AND INTERNAL MEDICINE at [facility]  =======================================================  Flip Pederson MD  Diplomates American Board of Internal Medicine and Infectious Diseases  Telephone [phone]  Fax            [phone]  =======================================================    HANNAH [id number]      ID f/u- fever,       ANTIBIOTICS  meropenem  IVPB 1000 milliGRAM(s) IV Intermittent every 12 hours      Allergies    No Known Allergies    Intolerances        SOCIAL HISTORY:     FAMILY HX   FAMILY HISTORY:  FH: heart disease (Father, Mother)        Vital Signs Last 24 Hrs  T(C): 36.4 (2022 12:19), Max: 39.3 (2022 00:01)  T(F): 97.5 (2022 12:19), Max: 102.8 (2022 00:01)  HR: 112 (2022 14:45) (62 - 149)  BP: 104/76 (2022 14:30) (57/40 - 162/60)  BP(mean): 84 (2022 14:30) (45 - 106)  RR: 25 (2022 14:45) (11 - 52)  SpO2: 100% (2022 14:30) (63% - 100%)    Parameters below as of 2022 07:15  Patient On (Oxygen Delivery Method): nasal cannula  O2 Flow (L/min): 2    Drug Dosing Weight  Height (cm): 167.6 (2022 00:01)  Weight (kg): 77 (2022 00:30)  BMI (kg/m2): 27.4 (2022 00:30)  BSA (m2): 1.87 (2022 00:30)      REVIEW OF SYSTEMS:    CONSTITUTIONAL:  As per HPI.    HEENT:  Eyes:  No diplopia or blurred vision. ENT:  No earache, sore throat or runny nose.    CARDIOVASCULAR:  No pressure, squeezing, strangling, tightness, heaviness or aching about the chest, neck, axilla or epigastrium.    RESPIRATORY:  No cough, shortness of breath, PND or orthopnea.    GASTROINTESTINAL:  No nausea, vomiting or diarrhea.    GENITOURINARY:  No dysuria, frequency or urgency.    MUSCULOSKELETAL:  As per HPI.    SKIN:  No change in skin, hair or nails.    NEUROLOGIC:    weakness.                  PHYSICAL EXAMINATION:    GENERAL: The patient is a _____in no apparent distress. ___     VITAL SIGNS: T(C): 36.4 (22 @ 12:19), Max: 39.3 (22 @ 00:01)  HR: 112 (22 @ 14:45) (62 - 149)  BP: 104/76 (22 @ 14:30) (57/40 - 162/60)  RR: 25 (22 @ 14:45) (11 - 52)  SpO2: 100% (22 @ 14:30) (63% - 100%)  Wt(kg): --    HEENT: Head is normocephalic and atraumatic.  ANICTERIC  NECK: Supple. No carotid bruits.  No lymphadenopathy or thyromegaly.    LUNGS:COARSE BREATH SOUNDS    HEART: Regular rate and rhythm without murmur.    ABDOMEN: Soft, nontender, and nondistended.  Positive bowel sounds.  No hepatosplenomegaly was noted. NO REBOUND NO GUARDING    EXTREMITIES: NO EDEMA NO ERYTHEMA    NEUROLOGIC: NON FOCAL      SKIN: No ulceration or induration present. NO RASH        BLOOD CULTURES       URINE CX          LABS:                        13.9   22.40 )-----------( 420      ( 2022 00:04 )             39.2         135  |  105  |  29.0<H>  ----------------------------<  184<H>  3.9   |  16.0<L>  |  1.90<H>    Ca    9.2      2022 09:05  Phos  4.0       Mg     2.1         TPro  7.6  /  Alb  3.8  /  TBili  0.6  /  DBili  x   /  AST  104<H>  /  ALT  101<H>  /  AlkPhos  177<H>      PT/INR - ( 2022 00:04 )   PT: 15.0 sec;   INR: 1.29 ratio         PTT - ( 2022 00:04 )  PTT:26.2 sec  Urinalysis Basic - ( 2022 09:10 )    Color: Yellow / Appearance: Clear / S.010 / pH: x  Gluc: x / Ketone: Negative  / Bili: Negative / Urobili: Negative mg/dL   Blood: x / Protein: 30 mg/dL / Nitrite: Negative   Leuk Esterase: Negative / RBC: 6-10 /HPF / WBC 0-2 /HPF   Sq Epi: x / Non Sq Epi: Occasional / Bacteria: Few        RADIOLOGY & ADDITIONAL STUDIES:  < from: CT Chest No Cont (22 @ 03:46) >  IMPRESSION:  No pneumonia.  No acute CT findings in the abdomen or pelvis.    VERTEBRAL BODY ANALYSIS: Low bone density as described above, consider   further workup for osteoporosis.        --- End of Report ---        < end of copied text >      ASSESSMENT/PLAN    77 year old male with PMHx of recent  COVID  on 10/6/22, HTN , Vit B12 deficiency, presenting to the ED on  with body aches, fatigue and fever (Tmax 102) at home for 12 days found to have streptococcus bacteremia and admitted with unclear source, no recent dental work, skin infections, no respiratory symptoms. Pan scan was negative at that time ( LILIAN was recommended but pt refused) He was treated for Bacteremia ( Blood cultures + streptococcus anginosis pansensitive ) and norovirus with supportive care and contact isolation . He was discharged home on  with IV Rocephin via PICC line. Recommendation was to continue ABX  daily via pic end 22    he  presented to ER after and episode of syncope and collapse, found to be septic, hypotensive, hypoxic and febrile in the ER. He is unsure of mechanism of fall but remembers being on the ground no reported LOC . He was found face-down on bathroom floor buy family with left leg wedged under cabinet. On my interview he is alert and oriented to person place and time, he has a baseline studder in his speech pattern but otherwise neurologically intact without deficit  In response to hypotension he failed to respond to Inital sepsis fluid protocol in ER is required IV pressor in form of levophed to maintain MAP greater than 60 -65. In the ER he was febrile with initial labs significant for WBC of 22.4 , ProCal of 21.2 first lactate 4.9 via abg with repeat post fluids of 2.2 venous sample.   PT admitted to [facility] with septic shock unclear source, now off pressors. Found to have new onset A fib and on amio    c/w empiric meropenem and vancomycin by level  ct c/ap non contrast without clear source  bcx ngtd. picc in place  LILIAN pending, agree with LILIAN  may need indium scan  trend fever  trend wbc    will f/u

## 2022-11-28 NOTE — PROGRESS NOTE ADULT - ASSESSMENT
76 yo male with hx of HTN, recent admission to [facility] 11/2-11/7 for sepsis secondary to strep sanguinosus bacteremia of unclear etiology, discharged home with PICC line with instructions to complete course of ceftriaxone.  Now presents 11/26 with recurrent sepsis/ septic shock, syncope, fever.  No clear cut source of infection noted.  Developed new afib with RVR during this admission.     Septic shock  - weaned off pressors  - empiric abx  - if bacteremic again then will need LILIAN and picc removal  Source remains unclear to me    New afib  - started in hospital yesterday  - amio for rate/rhythm control  - heparin drip for AC  - repeat echo pending    Sedation/Analgesia: none  Vasoactive medications: none  DVT prophylaxis: hep  GI prophylaxis: protonix  Nutrition: dash diet  Central line: PICC line  Barrios: none  Mobility: OOB, PT  Patient updated at bedside, all questions answered

## 2022-11-28 NOTE — PROGRESS NOTE ADULT - SUBJECTIVE AND OBJECTIVE BOX
Interval HPI:  off pressors  feels well, no complaints    Exam:  alert and oriented, nad  irregular rhythm, afib  bilat air entry  abd soft  trace edema  skin warm and dry, macular nonblanching rash on outside of left thigh       On Admission  22 (1d)  HPI:  77 year old male with PMHx of recent  COVID  on 10/6/22, HTN , Vit B12 deficiency, presenting to the ED on  with body aches, fatigue and fever (Tmax 102) at home for 12 days found to have streptococcus bacteremia and admitted with unclear source, no recent dental work, skin infections, no respiratory symptoms. Pan scan was negative at that time ( LILIAN was recommended but pt refused) He was treated for Bacteremia ( Blood cultures + streptococcus anginosis pansensitive ) and norovirus with supportive care and contact isolation . He was discharged home on  with IV Rocephin via PICC line. Recommendation was to continue ABX  daily via pic end 22    Tonight he presented to ER after and episode of syncope and collapse, found to be septic, hypotensive, hypoxic and febrile in the ER. He is unsure of mechanism of fall but remembers being on the ground no reported LOC . He was found face-down on bathroom floor buy family with left leg wedged under cabinet. On my interview he is alert and oriented to person place and time, he has a baseline studder in his speech pattern but otherwise neurologically intcact without defecit.  In responose to hypotension he failed to repsoond to Inital sepsis fluid protocol in ER and now is requiring IV pressor in form of levophed to maintain MAP greater than 60 -65. In the ER he was febrile with inital labs signifacnt for WBC of 22.4 , ProCal of 21.2 first lactate 4.9 via abg with repeat post fluids of 2.2 venous sample.        (2022 02:26)    PAST MEDICAL & SURGICAL HISTORY:  Hypertension      Aortic stenosis      H/O inguinal hernia repair  B/L       S/P laparoscopic colectomy  right colectomy with ileotransverse anastomosis          Antimicrobial:  meropenem Injectable 1000 milliGRAM(s) IV Push every 8 hours  vancomycin  IVPB 1000 milliGRAM(s) IV Intermittent every 24 hours    Cardiovascular:  aMIOdarone Infusion 1 mG/Min IV Continuous <Continuous>  aMIOdarone Infusion 0.5 mG/Min IV Continuous <Continuous>  midodrine 10 milliGRAM(s) Oral every 8 hours    Pulmonary:    Hematalogic:  heparin   Injectable 6500 Unit(s) IV Push every 6 hours PRN  heparin   Injectable 3000 Unit(s) IV Push every 6 hours PRN  heparin  Infusion.  Unit(s)/Hr IV Continuous <Continuous>    Other:      Drug Dosing Weight  Height (cm): 167.6 (2022 00:01)  Weight (kg): 77 (2022 00:30)  BMI (kg/m2): 27.4 (2022 00:30)  BSA (m2): 1.87 (2022 00:30)    T(C): 36.4 (22 @ 11:34), Max: 36.4 (22 @ 00:00)  HR: 112 (22 @ 13:00)  BP: 88/70 (22 @ 13:00)  BP(mean): 77 (22 @ 13:00)  ABP: --  ABP(mean): --  RR: 18 (22 @ 13:00)  SpO2: 99% (22 @ 13:00)           @ 07:01  -   @ 07:00  --------------------------------------------------------  IN: 4381.8 mL / OUT: 2405 mL / NET: 1976.8 mL              LABS:  CBC Full  -  ( 2022 13:50 )  WBC Count : 14.62 K/uL  RBC Count : 3.55 M/uL  Hemoglobin : 11.1 g/dL  Hematocrit : 31.8 %  Platelet Count - Automated : 277 K/uL  Mean Cell Volume : 89.6 fl  Mean Cell Hemoglobin : 31.3 pg  Mean Cell Hemoglobin Concentration : 34.9 gm/dL  Auto Neutrophil # : x  Auto Lymphocyte # : x  Auto Monocyte # : x  Auto Eosinophil # : x  Auto Basophil # : x  Auto Neutrophil % : x  Auto Lymphocyte % : x  Auto Monocyte % : x  Auto Eosinophil % : x  Auto Basophil % : x        139  |  107  |  24.2<H>  ----------------------------<  205<H>  3.7   |  17.0<L>  |  1.30    Ca    9.1      2022 03:58  Phos  3.0       Mg     2.0         TPro  7.6  /  Alb  3.8  /  TBili  0.6  /  DBili  x   /  AST  104<H>  /  ALT  101<H>  /  AlkPhos  177<H>      PT/INR - ( 2022 00:04 )   PT: 15.0 sec;   INR: 1.29 ratio         PTT - ( 2022 13:50 )  PTT:57.1 sec  Urinalysis Basic - ( 2022 09:10 )    Color: Yellow / Appearance: Clear / S.010 / pH: x  Gluc: x / Ketone: Negative  / Bili: Negative / Urobili: Negative mg/dL   Blood: x / Protein: 30 mg/dL / Nitrite: Negative   Leuk Esterase: Negative / RBC: 6-10 /HPF / WBC 0-2 /HPF   Sq Epi: x / Non Sq Epi: Occasional / Bacteria: Few      Culture Results:   No growth ( @ 09:10)  Culture Results:   No growth to date. ( @ 00:10)  Culture Results:   No growth to date. ( @ 00:04)    ____________________________________________________________________________________________________

## 2022-11-29 DIAGNOSIS — A41.9 SEPSIS, UNSPECIFIED ORGANISM: ICD-10-CM

## 2022-11-29 DIAGNOSIS — I48.91 UNSPECIFIED ATRIAL FIBRILLATION: ICD-10-CM

## 2022-11-29 DIAGNOSIS — R78.81 BACTEREMIA: ICD-10-CM

## 2022-11-29 LAB
ALBUMIN SERPL ELPH-MCNC: 3 G/DL — LOW (ref 3.3–5.2)
ALP SERPL-CCNC: 94 U/L — SIGNIFICANT CHANGE UP (ref 40–120)
ALT FLD-CCNC: 115 U/L — HIGH
ANION GAP SERPL CALC-SCNC: 11 MMOL/L — SIGNIFICANT CHANGE UP (ref 5–17)
APTT BLD: 40.6 SEC — HIGH (ref 27.5–35.5)
APTT BLD: 62.9 SEC — HIGH (ref 27.5–35.5)
AST SERPL-CCNC: 136 U/L — HIGH
BILIRUB SERPL-MCNC: 0.3 MG/DL — LOW (ref 0.4–2)
BUN SERPL-MCNC: 26.1 MG/DL — HIGH (ref 8–20)
CALCIUM SERPL-MCNC: 8.9 MG/DL — SIGNIFICANT CHANGE UP (ref 8.4–10.5)
CHLORIDE SERPL-SCNC: 109 MMOL/L — HIGH (ref 96–108)
CO2 SERPL-SCNC: 20 MMOL/L — LOW (ref 22–29)
CREAT SERPL-MCNC: 1.24 MG/DL — SIGNIFICANT CHANGE UP (ref 0.5–1.3)
EGFR: 60 ML/MIN/1.73M2 — SIGNIFICANT CHANGE UP
GLUCOSE SERPL-MCNC: 106 MG/DL — HIGH (ref 70–99)
HCT VFR BLD CALC: 31.4 % — LOW (ref 39–50)
HGB BLD-MCNC: 11 G/DL — LOW (ref 13–17)
MAGNESIUM SERPL-MCNC: 2 MG/DL — SIGNIFICANT CHANGE UP (ref 1.6–2.6)
MCHC RBC-ENTMCNC: 31.7 PG — SIGNIFICANT CHANGE UP (ref 27–34)
MCHC RBC-ENTMCNC: 35 GM/DL — SIGNIFICANT CHANGE UP (ref 32–36)
MCV RBC AUTO: 90.5 FL — SIGNIFICANT CHANGE UP (ref 80–100)
PHOSPHATE SERPL-MCNC: 3 MG/DL — SIGNIFICANT CHANGE UP (ref 2.4–4.7)
PLATELET # BLD AUTO: 272 K/UL — SIGNIFICANT CHANGE UP (ref 150–400)
POTASSIUM SERPL-MCNC: 3.9 MMOL/L — SIGNIFICANT CHANGE UP (ref 3.5–5.3)
POTASSIUM SERPL-SCNC: 3.9 MMOL/L — SIGNIFICANT CHANGE UP (ref 3.5–5.3)
PROT SERPL-MCNC: 5.6 G/DL — LOW (ref 6.6–8.7)
RBC # BLD: 3.47 M/UL — LOW (ref 4.2–5.8)
RBC # FLD: 13 % — SIGNIFICANT CHANGE UP (ref 10.3–14.5)
SODIUM SERPL-SCNC: 140 MMOL/L — SIGNIFICANT CHANGE UP (ref 135–145)
VANCOMYCIN TROUGH SERPL-MCNC: 14.2 UG/ML — SIGNIFICANT CHANGE UP (ref 10–20)
WBC # BLD: 13.58 K/UL — HIGH (ref 3.8–10.5)
WBC # FLD AUTO: 13.58 K/UL — HIGH (ref 3.8–10.5)

## 2022-11-29 PROCEDURE — 99233 SBSQ HOSP IP/OBS HIGH 50: CPT

## 2022-11-29 PROCEDURE — 99223 1ST HOSP IP/OBS HIGH 75: CPT

## 2022-11-29 PROCEDURE — 93308 TTE F-UP OR LMTD: CPT | Mod: 26

## 2022-11-29 RX ORDER — SODIUM CHLORIDE 9 MG/ML
1000 INJECTION, SOLUTION INTRAVENOUS
Refills: 0 | Status: DISCONTINUED | OUTPATIENT
Start: 2022-11-29 | End: 2022-12-03

## 2022-11-29 RX ORDER — POTASSIUM CHLORIDE 20 MEQ
20 PACKET (EA) ORAL ONCE
Refills: 0 | Status: COMPLETED | OUTPATIENT
Start: 2022-11-29 | End: 2022-11-29

## 2022-11-29 RX ORDER — ASPIRIN/CALCIUM CARB/MAGNESIUM 324 MG
81 TABLET ORAL DAILY
Refills: 0 | Status: DISCONTINUED | OUTPATIENT
Start: 2022-11-29 | End: 2022-12-07

## 2022-11-29 RX ORDER — SODIUM CHLORIDE 9 MG/ML
1000 INJECTION, SOLUTION INTRAVENOUS ONCE
Refills: 0 | Status: COMPLETED | OUTPATIENT
Start: 2022-11-29 | End: 2022-11-29

## 2022-11-29 RX ORDER — METOPROLOL TARTRATE 50 MG
25 TABLET ORAL
Refills: 0 | Status: DISCONTINUED | OUTPATIENT
Start: 2022-11-29 | End: 2022-11-30

## 2022-11-29 RX ORDER — METOPROLOL TARTRATE 50 MG
5 TABLET ORAL EVERY 6 HOURS
Refills: 0 | Status: DISCONTINUED | OUTPATIENT
Start: 2022-11-29 | End: 2022-12-05

## 2022-11-29 RX ADMIN — Medication 25 MILLIGRAM(S): at 22:17

## 2022-11-29 RX ADMIN — HEPARIN SODIUM 1600 UNIT(S)/HR: 5000 INJECTION INTRAVENOUS; SUBCUTANEOUS at 01:58

## 2022-11-29 RX ADMIN — Medication 250 MILLIGRAM(S): at 01:51

## 2022-11-29 RX ADMIN — MEROPENEM 1000 MILLIGRAM(S): 1 INJECTION INTRAVENOUS at 22:16

## 2022-11-29 RX ADMIN — HEPARIN SODIUM 1800 UNIT(S)/HR: 5000 INJECTION INTRAVENOUS; SUBCUTANEOUS at 22:30

## 2022-11-29 RX ADMIN — SODIUM CHLORIDE 100 MILLILITER(S): 9 INJECTION, SOLUTION INTRAVENOUS at 22:28

## 2022-11-29 RX ADMIN — Medication 250 MILLIGRAM(S): at 15:37

## 2022-11-29 RX ADMIN — AMIODARONE HYDROCHLORIDE 16.7 MG/MIN: 400 TABLET ORAL at 01:50

## 2022-11-29 RX ADMIN — Medication 20 MILLIEQUIVALENT(S): at 09:23

## 2022-11-29 RX ADMIN — MEROPENEM 1000 MILLIGRAM(S): 1 INJECTION INTRAVENOUS at 05:09

## 2022-11-29 RX ADMIN — MIDODRINE HYDROCHLORIDE 10 MILLIGRAM(S): 2.5 TABLET ORAL at 05:08

## 2022-11-29 RX ADMIN — MIDODRINE HYDROCHLORIDE 10 MILLIGRAM(S): 2.5 TABLET ORAL at 13:15

## 2022-11-29 RX ADMIN — MEROPENEM 1000 MILLIGRAM(S): 1 INJECTION INTRAVENOUS at 13:14

## 2022-11-29 RX ADMIN — MIDODRINE HYDROCHLORIDE 10 MILLIGRAM(S): 2.5 TABLET ORAL at 22:17

## 2022-11-29 RX ADMIN — SODIUM CHLORIDE 1000 MILLILITER(S): 9 INJECTION, SOLUTION INTRAVENOUS at 21:16

## 2022-11-29 RX ADMIN — HEPARIN SODIUM 1800 UNIT(S)/HR: 5000 INJECTION INTRAVENOUS; SUBCUTANEOUS at 12:00

## 2022-11-29 NOTE — CONSULT NOTE ADULT - SUBJECTIVE AND OBJECTIVE BOX
This is a 77 year old male patient with a history of HTN, HLD, BPH, moderate aortic stenosis with ELDON 1.1cm, and COVID on 10/6/22 who presents with fever, chills, body aches and fatigue. Noted to have  streptococcus anginosis bacteremia, norovirus, and underwent treatment with Rocephin via PICC line and ultimately discharged on 22. He is now admitted once again now with an episode of syncope. Noted again to be septic, hypotensive, and hypoxic with elevated temperatures.    Cardiac Summary:   Echo: 2019: EF 60-65%; ELDON 1.1cm2, mild LAE; mild concentric LVH  Nuclear Stress Test: 2018: EF 59%; no evidence of ischemia or infarction    PAST MEDICAL & SURGICAL HISTORY:  Hypertension  Aortic stenosis  H/O inguinal hernia repair  B/L   S/P laparoscopic colectomy  right colectomy with ileotransverse anastomosis    REVIEW OF SYSTEMS  General:	  Skin/Breast:  Ophthalmologic:	  ENMT:	  Respiratory and Thorax:	  Cardiovascular:	  Gastrointestinal:	  Genitourinary:	  Musculoskeletal:	  Neurological:	  Psychiatric:	  Hematology/Lymphatics:	  Endocrine:	  Allergic/Immunologic:	    MEDICATIONS  (STANDING):  aMIOdarone Infusion 1 mG/Min (33.3 mL/Hr) IV Continuous <Continuous>  aMIOdarone Infusion 0.5 mG/Min (16.7 mL/Hr) IV Continuous <Continuous>  aspirin  chewable 81 milliGRAM(s) Oral daily  heparin  Infusion.  Unit(s)/Hr (14 mL/Hr) IV Continuous <Continuous>  meropenem Injectable 1000 milliGRAM(s) IV Push every 8 hours  midodrine 10 milliGRAM(s) Oral every 8 hours  vancomycin  IVPB 1000 milliGRAM(s) IV Intermittent <User Schedule>    MEDICATIONS  (PRN):  heparin   Injectable 6500 Unit(s) IV Push every 6 hours PRN For aPTT less than 40  heparin   Injectable 3000 Unit(s) IV Push every 6 hours PRN For aPTT between 40 - 57    Allergies  No Known Allergies    SOCIAL HISTORY: Never smoker, non drinker    FAMILY HISTORY:  FH: heart disease (Father, Mother)    Vital Signs Last 24 Hrs  T(C): 35.8 (2022 11:50), Max: 36.4 (2022 16:19)  T(F): 96.5 (2022 11:50), Max: 97.5 (2022 16:19)  HR: 132 (2022 15:00) (97 - 137)  BP: 113/84 (2022 15:00) (79/65 - 114/76)  BP(mean): 96 (2022 15:00) (60 - 96)  RR: 17 (2022 15:00) (12 - 30)  SpO2: 100% (2022 15:00) (68% - 100%)    Parameters below as of 2022 16:00  Patient On (Oxygen Delivery Method): room air    Physical Exam:  Constitutional: AAOx3, NAD  Neck: supple, No JVD  Cardiovascular: +S1S2 RRR, no murmurs, rubs, gallops   Pulmonary: CTA b/l, unlabored, no wheezes, rales. rhonci  Abdomen: +BS, soft NTND  Extremities: no edema b/l, +distal pulses b/l  Neuro: non focal, speech clear, MEZA x 4    LABS:                        11.0   13.58 )-----------( 272      ( 2022 05:00 )             31.4     140  |  109<H>  |  26.1<H>  ----------------------------<  106<H>  3.9   |  20.0<L>  |  1.24  Ca    8.9      2022 05:00  Phos  3.0     11-  Mg     2.0     11-29  TPro  5.6<L>  /  Alb  3.0<L>  /  TBili  0.3<L>  /  DBili  x   /  AST  136<H>  /  ALT  115<H>  /  AlkPhos  94  11-29  LIVER FUNCTIONS - ( 2022 05:00 )  Alb: 3.0 g/dL / Pro: 5.6 g/dL / ALK PHOS: 94 U/L / ALT: 115 U/L / AST: 136 U/L / GGT: x         PTT - ( 2022 10:39 )  PTT:40.6 secCARDIAC MARKERS ( 2022 13:50 )  x     / 0.10 ng/mL / 2194 U/L / x     / 121.2 ng/mL    RADIOLOGY & ADDITIONAL STUDIES:  TTE 11/29/22  Summary:   1. Technically difficult study.   2. Patient noted to be tachycardic throughout exam.   3. Normal global left ventricular systolic function.   4. Left ventricular ejection fraction, by visual estimation, is 55 to 60%.   5. The mitral in-flow pattern reveals no discernable A-wave, therefore no comment on diastolic function can be made.   6. Mild thickening of the anterior and posterior mitral valve leaflets.   7. Sclerotic aortic valve with decreased opening, gradients not obtained to evaluate degree of stenosis. Mild to moderate regurgitation. Heavily calcified valve.   8. Mild to moderate mitral valve regurgitation.   9. Estimated pulmonary artery systolic pressure is 40.2 mmHg assuming a right atrial pressure of 15 mmHg, which is consistent with mild pulmonary hypertension.  10. Mild pulmonic valve regurgitation.  11. No evidence of vegetation however cannot exclude, consider LILIAN if high clinical suspicion.    EKG: AFib at 105bpm; qRSD 108ms  EK22: SR at 67bpm; qRSD 92ms    A/P   This is a very pleasant 77 year old male patient with a history of HTN, HLD, BPH, moderate aortic stenosis with ELDON 1.1cm, and COVID on 10/6/22 who presents with fever, chills, body aches and fatigue. Noted to have streptococcus anginosis bacteremia, norovirus, and underwent treatment with Rocephin via PICC line and ultimately discharged on 22. He is now admitted once again, now with an episode of syncope.     The patient reports that since his discharge he has been convalescing at home. He feels well and has been taking care of his household and 10 month old dog. He reports he was sleeping upstairs when he got up in the middle of the night to go to the restroom and suddenly found himself on the floor. He known he had head trauma. When EMS arrived he heard them say his BP was low. He has never passed out before in his lifetime. 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 He denies any history of palpitations or chest pain in the past. He reports since COVID in  he has changed his diet and has lost around 70lbs intentionally.      Cardiac Summary:   Echo: 2019: EF 60-65%; ELDON 1.1cm2, mild LAE; mild concentric LVH  Nuclear Stress Test: 2018: EF 59%; no evidence of ischemia or infarction    PAST MEDICAL & SURGICAL HISTORY:  Hypertension  Aortic stenosis  H/O inguinal hernia repair  B/L   S/P laparoscopic colectomy  right colectomy with ileotransverse anastomosis    REVIEW OF SYSTEMS  General: + fever with chills, - fatigue  Skin/Breast: - rashes  Ophthalmologic: - blurred vision	  ENMT: - sore throat  Respiratory and Thorax: - cough	  Cardiovascular: - GERMAN, - chest pain or palpitations  Gastrointestinal:	- N/V/D/C, + intentional weight loss  Genitourinary: - dysuria  Musculoskeletal:	 - arthritis  Neurological: - weaknesses, + stuttering  Psychiatric: - anxiety  Hematology/Lymphatics: - bleeding disorders	  Endocrine: - heat or cold intolerance  Allergic/Immunologic: - allergies     MEDICATIONS  (STANDING):  aMIOdarone Infusion 1 mG/Min (33.3 mL/Hr) IV Continuous <Continuous>  aMIOdarone Infusion 0.5 mG/Min (16.7 mL/Hr) IV Continuous <Continuous>  aspirin  chewable 81 milliGRAM(s) Oral daily  heparin  Infusion.  Unit(s)/Hr (14 mL/Hr) IV Continuous <Continuous>  meropenem Injectable 1000 milliGRAM(s) IV Push every 8 hours  midodrine 10 milliGRAM(s) Oral every 8 hours  vancomycin  IVPB 1000 milliGRAM(s) IV Intermittent <User Schedule>    MEDICATIONS  (PRN):  heparin   Injectable 6500 Unit(s) IV Push every 6 hours PRN For aPTT less than 40  heparin   Injectable 3000 Unit(s) IV Push every 6 hours PRN For aPTT between 40 - 57    Allergies  No Known Allergies    SOCIAL HISTORY: Never smoker, rare ETOH use. Heavier drinker when younger    FAMILY HISTORY:  FH: heart disease (Father, Mother)    Vital Signs Last 24 Hrs  T(C): 35.8 (2022 11:50), Max: 36.4 (2022 16:19)  T(F): 96.5 (2022 11:50), Max: 97.5 (2022 16:19)  HR: 132 (2022 15:00) (97 - 137)  BP: 113/84 (2022 15:00) (79/65 - 114/76)  BP(mean): 96 (2022 15:00) (60 - 96)  RR: 17 (2022 15:00) (12 - 30)  SpO2: 100% (2022 15:00) (68% - 100%)    Parameters below as of 2022 16:00  Patient On (Oxygen Delivery Method): room air    Physical Exam:  Constitutional: AAOx3, NAD, ill appearing  Neck: supple, No JVD  Cardiovascular: +S1S2 IRIR; AFib at time of exam with 2/6 RAJENDRA at LSB   Pulmonary: CTA b/l, unlabored, no wheezes  Abdomen: +BS, soft NTND  Extremities: no edema b/l,  Neuro: non focal, speech clear, MEZA x 4  Psych: appropriate mood and affect.   LABS:                        11.0   13.58 )-----------( 272      ( 2022 05:00 )             31.4     140  |  109<H>  |  26.1<H>  ----------------------------<  106<H>  3.9   |  20.0<L>  |  1.24  Ca    8.9      2022 05:00  Phos  3.0       Mg     2.0       TPro  5.6<L>  /  Alb  3.0<L>  /  TBili  0.3<L>  /  DBili  x   /  AST  136<H>  /  ALT  115<H>  /  AlkPhos  94    LIVER FUNCTIONS - ( 2022 05:00 )  Alb: 3.0 g/dL / Pro: 5.6 g/dL / ALK PHOS: 94 U/L / ALT: 115 U/L / AST: 136 U/L / GGT: x         PTT - ( 2022 10:39 )  PTT:40.6 secCARDIAC MARKERS ( 2022 13:50 )  x     / 0.10 ng/mL / 2194 U/L / x     / 121.2 ng/mL    RADIOLOGY & ADDITIONAL STUDIES:  TTE 22  Summary:   1. Technically difficult study.   2. Patient noted to be tachycardic throughout exam.   3. Normal global left ventricular systolic function.   4. Left ventricular ejection fraction, by visual estimation, is 55 to 60%.   5. The mitral in-flow pattern reveals no discernable A-wave, therefore no comment on diastolic function can be made.   6. Mild thickening of the anterior and posterior mitral valve leaflets.   7. Sclerotic aortic valve with decreased opening, gradients not obtained to evaluate degree of stenosis. Mild to moderate regurgitation. Heavily calcified valve.   8. Mild to moderate mitral valve regurgitation.   9. Estimated pulmonary artery systolic pressure is 40.2 mmHg assuming a right atrial pressure of 15 mmHg, which is consistent with mild pulmonary hypertension.  10. Mild pulmonic valve regurgitation.  11. No evidence of vegetation however cannot exclude, consider LILIAN if high clinical suspicion.    EKG: AFib at 105bpm; qRSD 108ms  EK22: SR at 67bpm; qRSD 92ms    Telemetry: Onset of Afib on 22 at 08:16AM. Preceding SR with PACs. Currently in AFib with rates in the 140s.     A/P  77 year old male patient with a history of HTN, HLD, BPH, moderate aortic stenosis with ELDON 1.1cm, and COVID on 10/6/22 who presents with septic shock, syncope and new onset AFib with rapid rates.     AFib noted on telemetry to have begun on 22 at 08:16AM  Started on Heparin on 22 and Amiodarone gtt 22  BC negative thus far  CHASVASC: 3 (age, HTN)    - Cardiology note appreciated. Please comment on degree of AS on LILIAN.   - Rate control strategy for now. Would use beta blocker or CCB for the time being  - Would refrain from Amiodarone use till ESTEFANY can be cleared by LILIAN.   - Agree with IV Heparin gtt for now. Please maintain therapeutic ptt  - Will consider DCCV at time of LILIAN  - Patient unaware of Afib. May require MCOT with Dr. Young as outpatient.   - Full recommendations to follow.

## 2022-11-29 NOTE — CONSULT NOTE ADULT - PROBLEM SELECTOR RECOMMENDATION 2
.  - new on set Afib with RVR to 130s  - [id number] 3 (age, HTN)  - TTE preserved EF 55-60%, mod MR, mod AS, unchanged from previous  - normal exercise stress test in 2021  - continue heparin GTT and amiodarone GTT  - unable to add further GDMT, hypotensive requiring midodrine, will add as BP tolerates  - EP consult requested for rate control and rhythm management

## 2022-11-29 NOTE — CONSULT NOTE ADULT - SUBJECTIVE AND OBJECTIVE BOX
[facility]                                              CARDIOLOGY AT [place] [address]                                             Telephone: [phone]. Fax:[phone]                                                       CARDIOLOGY CONSULTATION NOTE                                                                                             History obtained by: Patient and medical record  Community Cardiologist: Dr. Young   obtained: Yes [  ] No [x  ]  Reason for Consultation: Afib, Bacteremia  Available out pt records reviewed: Yes [ x ] No [  ]    Chief complaint:    Patient is a 77y old  Male who presents with a chief complaint of septic shock unknown source (29 Nov 2022 11:59)      HPI:  77 year old male with PMHx of COVID (10/6/22), HTN, B12 deficiency, Aortic stenosis presenting to the ED on 11/2 with body aches, fatigue and fever (Tmax 102) at home for 12 days found to have streptococcus bacteremia and admitted with unclear source, no recent dental work, skin infections, no respiratory symptoms. Pan scan was negative at that time ( LILIAN was recommended but pt refused) He was treated for Bacteremia ( Blood cultures + streptococcus anginosis pansensitive ) and norovirus with supportive care and contact isolation . He was discharged home on 11/7 with IV Rocephin via PICC line. Recommendation was to continue ABX  daily via pic end 11/30/22  He presented to [facility] ED on 11/27 with syncopal episode. He was found to be in septic shock requiring vasopressor support.          CARDIAC TESTING: some reports obtained from allscripts  ECHO: 10/27/2021: mod concentric LVH, EF 60-65%, G1DD, mod AV stenosis, unchanged from previous      < from: TTE Echo Limited or F/U (11.29.22 @ 09:46) >  PHYSICIAN INTERPRETATION:  Left Ventricle: The left ventricular internal cavity size is normal.  Global LV systolic function was normal. Left ventricular ejection   fraction, by visual estimation, is 55 to 60%. The mitral in-flow pattern   reveals no discernable A-wave, therefore no comment on diastolic function   can be made.  Fused E/A due to tachycardia.  Right Ventricle: Normal right ventricular size and function.  Pericardium: There is no evidence of pericardial effusion.  Mitral Valve: Mild thickening of the anterior and posterior mitral valve   leaflets. Mild to moderate mitral valve regurgitation is seen. The MR jet   is eccentric posteriorly directed.  Tricuspid Valve: Estimated pulmonary artery systolic pressure is 40.2   mmHg assuming a right atrial pressure of 15 mmHg, which is consistent   with mild pulmonary hypertension.  Aortic Valve: The aortic valve is trileaflet. Sclerotic aortic valve with   decreased opening. Mild to moderate aortic valve regurgitation is seen.  Pulmonic Valve: Structurally normal pulmonic valve, with normal leaflet   excursion. Mild pulmonic valve regurgitation.  Venous: The inferior vena cava was dilated, with respiratory size   variation less than 50%.  In comparison to the previous echocardiogram(s): Prior examinations are   available and were reviewed for comparison purposes.      Summary:   1. Technically difficult study.   2. Patient noted to be tachycardic throughout exam.   3. Normal global left ventricular systolic function.   4. Left ventricular ejection fraction, by visual estimation, is 55 to   60%.   5. The mitral in-flow pattern reveals no discernable A-wave,therefore   no comment on diastolic function can be made.   6. Mild thickening of the anterior and posterior mitral valve leaflets.   7. Sclerotic aortic valve with decreased opening, gradients not obtained   to evaluate degree of stenosis. Mild to moderate regurgitation. Heavily   calacified valve.   8. Mild to moderate mitral valve regurgitation.   9. Estimated pulmonary artery systolic pressure is 40.2 mmHg assuming a   right atrial pressure of 15 mmHg, which is consistent with mild pulmonary   hypertension.  10. Mild pulmonic valve regurgitation.  11. No evidence of vegetation however cannot exclude, consider LILIAN if   high clinical suspicion.    MD Kay Electronically signed on 11/29/2022 at 12:12:23 PM    < end of copied text >            STRESS: 4/14/2021: METS 8, no ischemic changes, no stress induced WMA    CATH:     ELECTROPHYSIOLOGY:     Carotids: 6/23/2020: normal flow b/l      PAST MEDICAL HISTORY  Hypertension  Aortic stenosis    PAST SURGICAL HISTORY  H/O inguinal hernia repair  S/P laparoscopic colectomy    SOCIAL HISTORY:  Denies smoking/drugs  CIGARETTES:     ALCOHOL: social ETOH  DRUGS:    FAMILY HISTORY:  FH: heart disease (Father, Mother)    Family History of Cardiovascular Disease:  Yes [ x ] No [  ]  Coronary Artery Disease in first degree relative: Yes [ x ] No [  ]  Sudden Cardiac Death in First degree relative: Yes [  ] No [x  ]    HOME MEDICATIONS:  aspirin 81 mg oral delayed release tablet: 1 tab(s) orally once a day (28 Nov 2022 10:46)  cefTRIAXone 2 g injection: 2 gram(s) injectable once a day till 12/3 (28 Nov 2022 10:46)  metoprolol succinate 25 mg oral tablet, extended release: 1 tab(s) orally once a day (28 Nov 2022 10:46)  valsartan 80 mg oral capsule: 1 tab(s) orally once a day (28 Nov 2022 10:46)      CURRENT CARDIAC MEDICATIONS:  aMIOdarone Infusion 1 mG/Min IV Continuous <Continuous>, Stop order after: 6 Hours  aMIOdarone Infusion 0.5 mG/Min IV Continuous <Continuous>, Stop order after: 18 Hours  midodrine 10 milliGRAM(s) Oral every 8 hours      CURRENT OTHER MEDICATIONS:  aspirin  chewable 81 milliGRAM(s) Oral daily  heparin   Injectable 6500 Unit(s) IV Push every 6 hours PRN For aPTT less than 40  heparin   Injectable 3000 Unit(s) IV Push every 6 hours PRN For aPTT between 40 - 57  heparin  Infusion.  Unit(s)/Hr (14 mL/Hr) IV Continuous <Continuous>  meropenem Injectable 1000 milliGRAM(s) IV Push every 8 hours, Stop order after: 7 Days  vancomycin  IVPB 1000 milliGRAM(s) IV Intermittent every 24 hours, Stop order after: 7 Days      ALLERGIES:   No Known Allergies      REVIEW OF SYMPTOMS:   CONSTITUTIONAL: No fever, no chills, no weight loss, no weight gain, no fatigue   ENMT:  No vertigo; No sinus or throat pain  NECK: No pain or stiffness  CARDIOVASCULAR: No chest pain, no dyspnea, no syncope/presyncope, no palpitations, no dizziness, no Orthopnea, no Paroxsymal nocturnal dyspnea  RESPIRATORY: no Shortness of breath, no cough, no wheezing  : No dysuria, no hematuria   GI: No dark color stool, no nausea, no diarrhea, no constipation, no abdominal pain   NEURO: No headache, no slurred speech   MUSCULOSKELETAL: No joint pain or swelling; No muscle, back, or extremity pain  PSYCH: No agitation, no anxiety.    ALL OTHER REVIEW OF SYSTEMS ARE NEGATIVE.    VITAL SIGNS:  T(C): 35.8 (11-29-22 @ 11:50), Max: 36.4 (11-28-22 @ 16:19)  T(F): 96.5 (11-29-22 @ 11:50), Max: 97.5 (11-28-22 @ 16:19)  HR: 130 (11-29-22 @ 12:00) (97 - 137)  BP: 102/74 (11-29-22 @ 12:00) (70/59 - 114/70)  RR: 22 (11-29-22 @ 12:00) (12 - 30)  SpO2: 99% (11-29-22 @ 12:00) (68% - 100%)    INTAKE AND OUTPUT:     11-28 @ 07:01  -  11-29 @ 07:00  --------------------------------------------------------  IN: 1908.5 mL / OUT: 1225 mL / NET: 683.5 mL    11-29 @ 07:01  -  11-29 @ 14:10  --------------------------------------------------------  IN: 646.2 mL / OUT: 170 mL / NET: 476.2 mL        PHYSICAL EXAM:  Constitutional: Comfortable . No acute distress.   HEENT: Atraumatic and normocephalic , neck is supple . no JVD. No carotid bruit.  CNS: A&Ox3. No focal deficits.   Respiratory: CTAB, unlabored   Cardiovascular: RRR normal s1 s2. No murmur. No rubs or gallop.  Gastrointestinal: Soft, non-tender. +Bowel sounds.   Extremities: 2+ Peripheral Pulses, No clubbing, cyanosis, or edema  Psychiatric: Calm . no agitation.   Skin: Warm and dry, no ulcers on extremities     LABS:  ( 28 Nov 2022 13:50 )  Troponin T  0.10<H>,  CPK  2194<H>, CKMB  X    , BNP X        , ( 27 Nov 2022 09:05 )  Troponin T  0.16<H>,  CPK  5542<H>, CKMB  X    , BNP X        , ( 27 Nov 2022 00:04 )  Troponin T  0.17<H>,  CPK  X    , CKMB  X    , BNP X                                  11.0   13.58 )-----------( 272      ( 29 Nov 2022 05:00 )             31.4     11-29    140  |  109<H>  |  26.1<H>  ----------------------------<  106<H>  3.9   |  20.0<L>  |  1.24    Ca    8.9      29 Nov 2022 05:00  Phos  3.0     11-29  Mg     2.0     11-29    TPro  5.6<L>  /  Alb  3.0<L>  /  TBili  0.3<L>  /  DBili  x   /  AST  136<H>  /  ALT  115<H>  /  AlkPhos  94  11-29    PTT - ( 29 Nov 2022 10:39 )  PTT:40.6 sec            INTERPRETATION OF TELEMETRY:     ECG: Afib RVR to 130s  Prior ECG: Yes [x  ] No [  ]    RADIOLOGY & ADDITIONAL STUDIES:    X-ray:    CT scan: < from: CT Chest No Cont (11.27.22 @ 03:46) >  IMPRESSION:  No pneumonia.  No acute CT findings in the abdomen or pelvis.    VERTEBRAL BODY ANALYSIS: Low bone density as described above, consider   further workup for osteoporosis.  --- End of Report ---  < end of copied text >    MRI:   US:                                                [facility]                                              CARDIOLOGY AT [place] [address]                                             Telephone: [phone]. Fax:[phone]                                                       CARDIOLOGY CONSULTATION NOTE                                                                                             History obtained by: Patient and medical record  Community Cardiologist: Dr. Young   obtained: Yes [  ] No [x  ]  Reason for Consultation: Afib, Bacteremia  Available out pt records reviewed: Yes [ x ] No [  ]    Chief complaint:    Patient is a 77y old  Male who presents with a chief complaint of septic shock unknown source (29 Nov 2022 11:59)      HPI:  77 year old male with PMHx of COVID (10/6/22), HTN, B12 deficiency, Aortic stenosis presenting to the ED on 11/2 with body aches, fatigue and fever (Tmax 102) at home for 12 days found to have streptococcus bacteremia and admitted with unclear source, no recent dental work, skin infections, no respiratory symptoms. Pan scan was negative at that time ( LILIAN was recommended but pt refused) He was treated for Bacteremia ( Blood cultures + streptococcus anginosis pansensitive ) and norovirus with supportive care and contact isolation . He was discharged home on 11/7 with IV Rocephin via PICC line. Recommendation was to continue ABX  daily via pic end 11/30/22  He presented to [facility] ED on 11/27 with syncopal episode. He was found to be in septic shock requiring vasopressor support. He is now currently off vasopressors and is on amiodarone GTT and heparin GTT. Afib remains in RVR, unable to add GDMT due to hypotension. Patient will need LILIAN to r/o endocarditis. Denies chest pain, back pain, headache, dizziness, SOB, GERMAN, diaphoresis, syncope or N/V.       CARDIAC TESTING: some reports obtained from allscripts  ECHO: 10/27/2021: mod concentric LVH, EF 60-65%, G1DD, mod AV stenosis, unchanged from previous      < from: TTE Echo Limited or F/U (11.29.22 @ 09:46) >  PHYSICIAN INTERPRETATION:  Left Ventricle: The left ventricular internal cavity size is normal.  Global LV systolic function was normal. Left ventricular ejection   fraction, by visual estimation, is 55 to 60%. The mitral in-flow pattern   reveals no discernable A-wave, therefore no comment on diastolic function   can be made.  Fused E/A due to tachycardia.  Right Ventricle: Normal right ventricular size and function.  Pericardium: There is no evidence of pericardial effusion.  Mitral Valve: Mild thickening of the anterior and posterior mitral valve   leaflets. Mild to moderate mitral valve regurgitation is seen. The MR jet   is eccentric posteriorly directed.  Tricuspid Valve: Estimated pulmonary artery systolic pressure is 40.2   mmHg assuming a right atrial pressure of 15 mmHg, which is consistent   with mild pulmonary hypertension.  Aortic Valve: The aortic valve is trileaflet. Sclerotic aortic valve with   decreased opening. Mild to moderate aortic valve regurgitation is seen.  Pulmonic Valve: Structurally normal pulmonic valve, with normal leaflet   excursion. Mild pulmonic valve regurgitation.  Venous: The inferior vena cava was dilated, with respiratory size   variation less than 50%.  In comparison to the previous echocardiogram(s): Prior examinations are   available and were reviewed for comparison purposes.      Summary:   1. Technically difficult study.   2. Patient noted to be tachycardic throughout exam.   3. Normal global left ventricular systolic function.   4. Left ventricular ejection fraction, by visual estimation, is 55 to   60%.   5. The mitral in-flow pattern reveals no discernable A-wave,therefore   no comment on diastolic function can be made.   6. Mild thickening of the anterior and posterior mitral valve leaflets.   7. Sclerotic aortic valve with decreased opening, gradients not obtained   to evaluate degree of stenosis. Mild to moderate regurgitation. Heavily   calacified valve.   8. Mild to moderate mitral valve regurgitation.   9. Estimated pulmonary artery systolic pressure is 40.2 mmHg assuming a   right atrial pressure of 15 mmHg, which is consistent with mild pulmonary   hypertension.  10. Mild pulmonic valve regurgitation.  11. No evidence of vegetation however cannot exclude, consider LILIAN if   high clinical suspicion.    MD Kay Electronically signed on 11/29/2022 at 12:12:23 PM    < end of copied text >            STRESS: 4/14/2021: METS 8, no ischemic changes, no stress induced WMA    CATH:     ELECTROPHYSIOLOGY:     Carotids: 6/23/2020: normal flow b/l      PAST MEDICAL HISTORY  Hypertension  Aortic stenosis    PAST SURGICAL HISTORY  H/O inguinal hernia repair  S/P laparoscopic colectomy    SOCIAL HISTORY:  Denies smoking/drugs  CIGARETTES:     ALCOHOL: social ETOH  DRUGS:    FAMILY HISTORY:  FH: heart disease (Father, Mother)    Family History of Cardiovascular Disease:  Yes [ x ] No [  ]  Coronary Artery Disease in first degree relative: Yes [ x ] No [  ]  Sudden Cardiac Death in First degree relative: Yes [  ] No [x  ]    HOME MEDICATIONS:  aspirin 81 mg oral delayed release tablet: 1 tab(s) orally once a day (28 Nov 2022 10:46)  cefTRIAXone 2 g injection: 2 gram(s) injectable once a day till 12/3 (28 Nov 2022 10:46)  metoprolol succinate 25 mg oral tablet, extended release: 1 tab(s) orally once a day (28 Nov 2022 10:46)  valsartan 80 mg oral capsule: 1 tab(s) orally once a day (28 Nov 2022 10:46)      CURRENT CARDIAC MEDICATIONS:  aMIOdarone Infusion 1 mG/Min IV Continuous <Continuous>, Stop order after: 6 Hours  aMIOdarone Infusion 0.5 mG/Min IV Continuous <Continuous>, Stop order after: 18 Hours  midodrine 10 milliGRAM(s) Oral every 8 hours      CURRENT OTHER MEDICATIONS:  aspirin  chewable 81 milliGRAM(s) Oral daily  heparin   Injectable 6500 Unit(s) IV Push every 6 hours PRN For aPTT less than 40  heparin   Injectable 3000 Unit(s) IV Push every 6 hours PRN For aPTT between 40 - 57  heparin  Infusion.  Unit(s)/Hr (14 mL/Hr) IV Continuous <Continuous>  meropenem Injectable 1000 milliGRAM(s) IV Push every 8 hours, Stop order after: 7 Days  vancomycin  IVPB 1000 milliGRAM(s) IV Intermittent every 24 hours, Stop order after: 7 Days      ALLERGIES:   No Known Allergies      REVIEW OF SYMPTOMS:   CONSTITUTIONAL: No fever, no chills, no weight loss, no weight gain, no fatigue   ENMT:  No vertigo; No sinus or throat pain  NECK: No pain or stiffness  CARDIOVASCULAR: No chest pain, no dyspnea, no syncope/presyncope, no palpitations, no dizziness, no Orthopnea, no Paroxsymal nocturnal dyspnea  RESPIRATORY: no Shortness of breath, no cough, no wheezing  : No dysuria, no hematuria   GI: No dark color stool, no nausea, no diarrhea, no constipation, no abdominal pain   NEURO: No headache, no slurred speech   MUSCULOSKELETAL: No joint pain or swelling; No muscle, back, or extremity pain  PSYCH: No agitation, no anxiety.    ALL OTHER REVIEW OF SYSTEMS ARE NEGATIVE.    VITAL SIGNS:  T(C): 35.8 (11-29-22 @ 11:50), Max: 36.4 (11-28-22 @ 16:19)  T(F): 96.5 (11-29-22 @ 11:50), Max: 97.5 (11-28-22 @ 16:19)  HR: 130 (11-29-22 @ 12:00) (97 - 137)  BP: 102/74 (11-29-22 @ 12:00) (70/59 - 114/70)  RR: 22 (11-29-22 @ 12:00) (12 - 30)  SpO2: 99% (11-29-22 @ 12:00) (68% - 100%)    INTAKE AND OUTPUT:     11-28 @ 07:01  -  11-29 @ 07:00  --------------------------------------------------------  IN: 1908.5 mL / OUT: 1225 mL / NET: 683.5 mL    11-29 @ 07:01  -  11-29 @ 14:10  --------------------------------------------------------  IN: 646.2 mL / OUT: 170 mL / NET: 476.2 mL        PHYSICAL EXAM:  Constitutional: Comfortable . No acute distress.   HEENT: Atraumatic and normocephalic , neck is supple . no JVD. No carotid bruit.  CNS: A&Ox3. No focal deficits. with chronic stutter.  Respiratory: CTAB, unlabored   Cardiovascular: irreg irregular normal s1 s2. No murmur. No rubs or gallop.  Gastrointestinal: Soft, non-tender. +Bowel sounds.   Extremities: 2+ Peripheral Pulses, No clubbing, cyanosis, or edema  Psychiatric: Calm . no agitation.   Skin: Warm and dry, no ulcers on extremities     LABS:  ( 28 Nov 2022 13:50 )  Troponin T  0.10<H>,  CPK  2194<H>, CKMB  X    , BNP X        , ( 27 Nov 2022 09:05 )  Troponin T  0.16<H>,  CPK  5542<H>, CKMB  X    , BNP X        , ( 27 Nov 2022 00:04 )  Troponin T  0.17<H>,  CPK  X    , CKMB  X    , BNP X                                  11.0   13.58 )-----------( 272      ( 29 Nov 2022 05:00 )             31.4     11-29    140  |  109<H>  |  26.1<H>  ----------------------------<  106<H>  3.9   |  20.0<L>  |  1.24    Ca    8.9      29 Nov 2022 05:00  Phos  3.0     11-29  Mg     2.0     11-29    TPro  5.6<L>  /  Alb  3.0<L>  /  TBili  0.3<L>  /  DBili  x   /  AST  136<H>  /  ALT  115<H>  /  AlkPhos  94  11-29    PTT - ( 29 Nov 2022 10:39 )  PTT:40.6 sec            INTERPRETATION OF TELEMETRY:     ECG: Afib RVR to 130s  Prior ECG: Yes [x  ] No [  ]    RADIOLOGY & ADDITIONAL STUDIES:    X-ray:    CT scan: < from: CT Chest No Cont (11.27.22 @ 03:46) >  IMPRESSION:  No pneumonia.  No acute CT findings in the abdomen or pelvis.    VERTEBRAL BODY ANALYSIS: Low bone density as described above, consider   further workup for osteoporosis.  --- End of Report ---  < end of copied text >    MRI:   US:

## 2022-11-29 NOTE — CONSULT NOTE ADULT - PROBLEM SELECTOR RECOMMENDATION 9
.  - h/o strep anginosus bacteremia  - has been on abx for several weeks via PICC at home  - with leukocytosis  - Blood cx negative to date, unclear source  - TTE unchanged from previous, no vegetation or echodensity noted  - ID recc appreciated  - patient had previously refused LILIAN, now agreeable  - Keep NPO after midnight for LILIAN tomorrow patient

## 2022-11-29 NOTE — PROGRESS NOTE ADULT - ASSESSMENT
77M PMH recent COVID-19 (10/2022), HTN, recently found with streptococcus bacteremia, had negative pan-scan, refused LILIAN, was found Norovirus positive, sent home on 11/7/22 with PICC line and IV CTX. He presented on 11/27/22 with episode of syncope and collapse, f/w sepsis, hypotension requiring pressors, hypoxia, AFib with RVR    Impression/Plan:    Septic shock  Recent Norovirus GI infection  S. anginosus bacteremia - from BCx 11/1/22  - Pan-scan negative for source of infection  - C/w Vanc/Meropenem  - ID on board  - F/u repeat culture data  - F/u repeat TTE; if negative would pursue LILIAN  - If this work-up is unremarkable would need to consider WBC scan  - C/w Midodrine    Elevated lactate  - Improved from 4.9 to 2.2 after resuscitation    AFib with RVR  - Amiodarone infusion  - Heparin gtt  - Monitor s/s bleeding  - Serial aPTT    RADHA  - Improved after IVF  - Renally adjust all medications and avoid nephrotoxins    F/E/N/PPx/Lines  - Maintain euvolemia  - Replete lytes PRN  - DASH diet  - VTE PPx - on full A/C as above  - PIV    Ethics/Dispo  - Full code  - C/w care in ICU      Zeferino Allan M.D.  , Pulmonary & Critical Care Medicine  [facility]  Pulmonary and Sleep Medicine at [place]  [address]  [place] [address]  T: [phone]  F: [phone]

## 2022-11-29 NOTE — PROGRESS NOTE ADULT - SUBJECTIVE AND OBJECTIVE BOX
INFECTIOUS DISEASES AND INTERNAL MEDICINE at [place]  =======================================================  Flip Pederson MD  Diplomates [employer] Board of Internal Medicine and Infectious Diseases  Telephone [phone]  Fax            [phone]  =======================================================    GEORGE [id number]      ID f/u- fever  afebrile  HR uncontrolled  no complaints      ANTIBIOTICS  meropenem  IVPB 1000 milliGRAM(s) IV Intermittent every 12 hours      Allergies    No Known Allergies    Intolerances        SOCIAL HISTORY:     FAMILY HX   FAMILY HISTORY:  FH: heart disease (Father, Mother)        Vital Signs Last 24 Hrs  Vital Signs Last 24 Hrs  T(C): 35.8 (2022 11:50), Max: 36.4 (2022 16:19)  T(F): 96.5 (2022 11:50), Max: 97.5 (2022 16:19)  HR: 127 (2022 11:00) (97 - 137)  BP: 100/82 (2022 11:00) (70/59 - 114/70)  BP(mean): 81 (2022 11:00) (60 - 88)  RR: 16 (2022 11:00) (12 - 30)  SpO2: 99% (2022 11:00) (68% - 100%)    Parameters below as of 2022 16:00  Patient On (Oxygen Delivery Method): room air        Parameters below as of 2022 07:15  Patient On (Oxygen Delivery Method): nasal cannula  O2 Flow (L/min): 2    Drug Dosing Weight  Height (cm): 167.6 (2022 00:01)  Weight (kg): 77 (2022 00:30)  BMI (kg/m2): 27.4 (2022 00:30)  BSA (m2): 1.87 (2022 00:30)      REVIEW OF SYSTEMS:    CONSTITUTIONAL:  As per HPI.    HEENT:  Eyes:  No diplopia or blurred vision. ENT:  No earache, sore throat or runny nose.    CARDIOVASCULAR:  No pressure, squeezing, strangling, tightness, heaviness or aching about the chest, neck, axilla or epigastrium.    RESPIRATORY:  No cough, shortness of breath, PND or orthopnea.    GASTROINTESTINAL:  No nausea, vomiting or diarrhea.    GENITOURINARY:  No dysuria, frequency or urgency.    MUSCULOSKELETAL:  As per HPI.    SKIN:  No change in skin, hair or nails.    NEUROLOGIC:    weakness.                  PHYSICAL EXAMINATION:    GENERAL: The patient is a _____in no apparent distress. ___     HEENT: Head is normocephalic and atraumatic.  ANICTERIC left upper scalp ecchymoses  NECK: Supple. No carotid bruits.  No lymphadenopathy or thyromegaly.    LUNGS:clear BREATH SOUNDS    HEART: Regular rate and rhythm without murmur.    ABDOMEN: Soft, nontender, and nondistended.  Positive bowel sounds.  No hepatosplenomegaly was noted. NO REBOUND NO GUARDING    EXTREMITIES: NO EDEMA NO ERYTHEMA    NEUROLOGIC: NON FOCAL      SKIN: No ulceration or induration present. NO RASH picc rue            LABS:                                     11.0   13.58 )-----------( 272      ( 2022 05:00 )             31.4           140  |  109<H>  |  26.1<H>  ----------------------------<  106<H>  3.9   |  20.0<L>  |  1.24    Ca    8.9      2022 05:00  Phos  3.0       Mg     2.0         TPro  5.6<L>  /  Alb  3.0<L>  /  TBili  0.3<L>  /  DBili  x   /  AST  136<H>  /  ALT  115<H>  /  AlkPhos  94  -                  PTT - ( 2022 10:39 )  PTT:40.6 sec    CARDIAC MARKERS ( 2022 13:50 )  x     / 0.10 ng/mL / 2194 U/L / x     / 121.2 ng/mL        CAPILLARY BLOOD GLUCOSE                   PTT - ( 2022 00:04 )  PTT:26.2 sec  Urinalysis Basic - ( 2022 09:10 )    Color: Yellow / Appearance: Clear / S.010 / pH: x  Gluc: x / Ketone: Negative  / Bili: Negative / Urobili: Negative mg/dL   Blood: x / Protein: 30 mg/dL / Nitrite: Negative   Leuk Esterase: Negative / RBC: 6-10 /HPF / WBC 0-2 /HPF   Sq Epi: x / Non Sq Epi: Occasional / Bacteria: Few        RADIOLOGY & ADDITIONAL STUDIES:  < from: CT Chest No Cont (22 @ 03:46) >  IMPRESSION:  No pneumonia.  No acute CT findings in the abdomen or pelvis.    VERTEBRAL BODY ANALYSIS: Low bone density as described above, consider   further workup for osteoporosis.        --- End of Report ---        < end of copied text >      ASSESSMENT/PLAN    77 year old male with PMHx of recent  COVID  on 10/6/22, HTN , Vit B12 deficiency, presenting to the ED on  with body aches, fatigue and fever (Tmax 102) at home for 12 days found to have streptococcus bacteremia and admitted with unclear source, no recent dental work, skin infections, no respiratory symptoms. Pan scan was negative at that time ( LILIAN was recommended but pt refused) He was treated for Bacteremia ( Blood cultures + streptococcus anginosis pansensitive ) and norovirus with supportive care and contact isolation . He was discharged home on  with IV Rocephin via PICC line. Recommendation was to continue ABX  daily via pic end 22    he  presented to ER after and episode of syncope and collapse, found to be septic, hypotensive, hypoxic and febrile in the ER. He is unsure of mechanism of fall but remembers being on the ground no reported LOC . He was found face-down on bathroom floor buy family with left leg wedged under cabinet. On my interview he is alert and oriented to person place and time, he has a baseline studder in his speech pattern but otherwise neurologically intact without deficit  In response to hypotension he failed to respond to Inital sepsis fluid protocol in ER is required IV pressor in form of levophed to maintain MAP greater than 60 -65. In the ER he was febrile with initial labs significant for WBC of 22.4 , ProCal of 21.2 first lactate 4.9 via abg with repeat post fluids of 2.2 venous sample.   PT admitted to [facility] with septic shock unclear source, now off pressors. Found to have new onset A fib and on amio    Septic shock unclear source  New onset Afib  Leukocytosis  Fever  h/o strep anginosus bacteremia    c/w empiric meropenem and vancomycin by level, monitor trough  ct c/ap non contrast without clear source  bcx ngtd  TTE performed awaiting read  agree with LILIAN  dc picc  may need indium scan  trend fever  trend wbc, improving  cardio eval      d/w micu, pharmacy  will f/u

## 2022-11-29 NOTE — PROGRESS NOTE ADULT - SUBJECTIVE AND OBJECTIVE BOX
Patient is a 77y old  Male who presents with a chief complaint of septic shock unknown source (2022 19:56)      BRIEF HOSPITAL COURSE:   77M PMH recent COVID-19 (10/2022), HTN, recently found with streptococcus bacteremia, had negative pan-scan, refused LILIAN, was found Norovirus positive, sent home on 22 with PICC line and IV CTX. He presented on 22 with episode of syncope and collapse, f/w sepsis, hypotension requiring pressors, hypoxia. He developed AFib with RVR requiring amiodarone infusion. He was admitted to the ICU for further monitoring. He had repeat pan-scans that were negative for source of infection. No intracranial hemorrhage on CTH. He was switched from CTX to Vanc/Meropenem.       PAST MEDICAL & SURGICAL HISTORY:  Hypertension      Aortic stenosis      H/O inguinal hernia repair  B/L       S/P laparoscopic colectomy  right colectomy with ileotransverse anastomosis            Medications:  meropenem Injectable 1000 milliGRAM(s) IV Push every 8 hours  vancomycin  IVPB 1000 milliGRAM(s) IV Intermittent every 24 hours    aMIOdarone Infusion 1 mG/Min IV Continuous <Continuous>  aMIOdarone Infusion 0.5 mG/Min IV Continuous <Continuous>  midodrine 10 milliGRAM(s) Oral every 8 hours          heparin   Injectable 6500 Unit(s) IV Push every 6 hours PRN  heparin   Injectable 3000 Unit(s) IV Push every 6 hours PRN  heparin  Infusion.  Unit(s)/Hr IV Continuous <Continuous>                        ICU Vital Signs Last 24 Hrs  T(C): 35.6 (2022 23:59), Max: 36.4 (2022 11:34)  T(F): 96 (2022 23:59), Max: 97.6 (2022 11:34)  HR: 122 (2022 02:00) (93 - 133)  BP: 92/72 (2022 01:00) (70/59 - 130/115)  BP(mean): 80 (2022 01:00) (56 - 121)  ABP: --  ABP(mean): --  RR: 21 (2022 02:00) (13 - 30)  SpO2: 93% (2022 02:00) (93% - 100%)    O2 Parameters below as of 2022 16:00  Patient On (Oxygen Delivery Method): room air                I&O's Detail    2022 07:01  -  2022 07:00  --------------------------------------------------------  IN:    IV PiggyBack: 50 mL    IV PiggyBack: 200 mL    multiple electrolytes Injection Type 1 Bolus: 1000 mL    multiple electrolytes Injection Type 1.: 2300 mL    Norepinephrine: 311.8 mL    Oral Fluid: 520 mL  Total IN: 4381.8 mL    OUT:    Indwelling Catheter - Urethral (mL): 2405 mL  Total OUT: 2405 mL    Total NET: 1976.8 mL      2022 07:01  -  2022 02:28  --------------------------------------------------------  IN:    Amiodarone: 199.8 mL    Amiodarone: 267.2 mL    Heparin Infusion: 288 mL    IV PiggyBack: 200 mL    multiple electrolytes Injection Type 1.: 800 mL  Total IN: 1755 mL    OUT:    Indwelling Catheter - Urethral (mL): 500 mL  Total OUT: 500 mL    Total NET: 1255 mL            LABS:                        11.1   14.62 )-----------( 277      ( 2022 13:50 )             31.8         139  |  107  |  24.2<H>  ----------------------------<  205<H>  3.7   |  17.0<L>  |  1.30    Ca    9.1      2022 03:58  Phos  3.0       Mg     2.0             CARDIAC MARKERS ( 2022 13:50 )  x     / 0.10 ng/mL / 2194 U/L / x     / 121.2 ng/mL  CARDIAC MARKERS ( 2022 09:05 )  x     / 0.16 ng/mL / 5542 U/L / x     / 247.9 ng/mL      CAPILLARY BLOOD GLUCOSE      POCT Blood Glucose.: 174 mg/dL (2022 02:43)    PTT - ( 2022 21:30 )  PTT:100.3 sec  Urinalysis Basic - ( 2022 09:10 )    Color: Yellow / Appearance: Clear / S.010 / pH: x  Gluc: x / Ketone: Negative  / Bili: Negative / Urobili: Negative mg/dL   Blood: x / Protein: 30 mg/dL / Nitrite: Negative   Leuk Esterase: Negative / RBC: 6-10 /HPF / WBC 0-2 /HPF   Sq Epi: x / Non Sq Epi: Occasional / Bacteria: Few      CULTURES:  Culture Results:   No growth ( @ 09:10)  Rapid RVP Result: NotDetec ( @ 00:25)  Culture Results:   No growth to date. ( @ 00:10)  Culture Results:   No growth to date. ( @ 00:04)      Physical Examination:  GENERAL: In NAD   HEENT: Normocephalic  NECK: Supple, trachea midline  PULM: CTA anteriorly  CVS: +S1, S2  ABD: Soft, non-tender  EXTREMITIES: No pedal edema B/L  SKIN: L scalp wound  NEURO: Grossly non-focal    DEVICES:     RADIOLOGY:   < from: CT Chest No Cont (22 @ 03:46) >    ACC: [id number] EXAM:  CT ABDOMEN AND PELVIS                        ACC: [id number] EXAM:  CT CHEST                          PROCEDURE DATE:  2022          INTERPRETATION:  CLINICAL INFORMATION: Sepsis, septic shock    COMPARISON: CT chest 2022. CT abdomen pelvis 2022    CONTRAST/COMPLICATIONS:  IV Contrast: NONE  Oral Contrast: NONE  Complications: None reported at time of study completion  Lack of contrast limits evaluation    PROCEDURE:  CT of the Chest, Abdomen and Pelvis was performed.  Sagittal and coronal reformats were performed.    FINDINGS:  CHEST:  LUNGS AND LARGE AIRWAYS: Patent central airways. A few scattered   bilateral calcified granulomas and other nodules measuring up to 3 mm are   unchanged. Streaky bibasilar atelectasis.  PLEURA: No pleural effusion.  VESSELS: Atherosclerotic changes of the aorta and coronary arteries.  HEART: Heart size is normal. No pericardial effusion. Aortic valvular   calcifications.  MEDIASTINUM AND MIRTHA: No lymphadenopathy.  CHESTWALL AND LOWER NECK: Within normal limits.    ABDOMEN AND PELVIS:  LIVER: Within normal limits.  BILE DUCTS: Normal caliber.  GALLBLADDER: Within normal limits.  SPLEEN: Within normal limits.  PANCREAS: Within normal limits.  ADRENALS: Within normal limits.  KIDNEYS/URETERS: No radiodense calculus. No hydroureteronephrosis. Right   renal probable cyst.    BLADDER: Brarios catheter in place.  REPRODUCTIVE ORGANS: Enlarged prostate.    BOWEL: No bowel obstruction. Right hemicolectomy. Liquid stool throughout   the colon and rectum. Sigmoid diverticulosis without evidence of   diverticulitis.  PERITONEUM: No ascites.  VESSELS: Atherosclerotic changes.  RETROPERITONEUM/LYMPH NODES: No lymphadenopathy.  ABDOMINAL WALL: Within normal limits.  BONES: Degenerative changes.  AI VERTEBRAL BODY ANALYSIS:  T11: low bone density of 72 HU, suspicious for osteoporosis.  T12: low bone density of 97 HU, suspicious for osteoporosis.  L2: low bone density of 97 HU, suspicious for osteoporosis.    IMPRESSION:  No pneumonia.  No acute CT findings in the abdomen or pelvis.    VERTEBRAL BODY ANALYSIS: Low bone density as described above, consider   further workup for osteoporosis.        --- End of Report ---            BONNIE HAMILTON MD; Attending Radiologist  This document has been electronically signed. 2022  4:57AM    < end of copied text >

## 2022-11-29 NOTE — PROGRESS NOTE ADULT - TIME BILLING
reviewing labs, notes, orders, radiographic studies, as well as counseling and coordinating care with the relevant multidisciplinary team, including with the primary and consulting providers.
coordinating care with [facility] PA/resident, MICU RN, counseling patient.

## 2022-11-29 NOTE — CONSULT NOTE ADULT - ASSESSMENT
77 year old male with PMHx of COVID (10/6/22), HTN, B12 deficiency, Aortic stenosis presenting to the ED with new onset Afib with RVR and sepsis. Plan for LILIAN to r/o endocarditis and EP consult for Afib with RVR.

## 2022-11-30 DIAGNOSIS — I38 ENDOCARDITIS, VALVE UNSPECIFIED: ICD-10-CM

## 2022-11-30 DIAGNOSIS — I33.0 ACUTE AND SUBACUTE INFECTIVE ENDOCARDITIS: ICD-10-CM

## 2022-11-30 DIAGNOSIS — A41.9 SEPSIS, UNSPECIFIED ORGANISM: ICD-10-CM

## 2022-11-30 DIAGNOSIS — I35.0 NONRHEUMATIC AORTIC (VALVE) STENOSIS: ICD-10-CM

## 2022-11-30 LAB
ALBUMIN SERPL ELPH-MCNC: 2.8 G/DL — LOW (ref 3.3–5.2)
ALP SERPL-CCNC: 97 U/L — SIGNIFICANT CHANGE UP (ref 40–120)
ALT FLD-CCNC: 106 U/L — HIGH
ANION GAP SERPL CALC-SCNC: 9 MMOL/L — SIGNIFICANT CHANGE UP (ref 5–17)
APTT BLD: 143.8 SEC — CRITICAL HIGH (ref 27.5–35.5)
APTT BLD: 42.4 SEC — HIGH (ref 27.5–35.5)
APTT BLD: 81.3 SEC — HIGH (ref 27.5–35.5)
AST SERPL-CCNC: 118 U/L — HIGH
BASOPHILS # BLD AUTO: 0.01 K/UL — SIGNIFICANT CHANGE UP (ref 0–0.2)
BASOPHILS NFR BLD AUTO: 0.1 % — SIGNIFICANT CHANGE UP (ref 0–2)
BILIRUB SERPL-MCNC: 0.6 MG/DL — SIGNIFICANT CHANGE UP (ref 0.4–2)
BUN SERPL-MCNC: 20.5 MG/DL — HIGH (ref 8–20)
C DIFF BY PCR RESULT: SIGNIFICANT CHANGE UP
CALCIUM SERPL-MCNC: 8.9 MG/DL — SIGNIFICANT CHANGE UP (ref 8.4–10.5)
CHLORIDE SERPL-SCNC: 110 MMOL/L — HIGH (ref 96–108)
CO2 SERPL-SCNC: 19 MMOL/L — LOW (ref 22–29)
CREAT SERPL-MCNC: 1.11 MG/DL — SIGNIFICANT CHANGE UP (ref 0.5–1.3)
EGFR: 68 ML/MIN/1.73M2 — SIGNIFICANT CHANGE UP
EOSINOPHIL # BLD AUTO: 0 K/UL — SIGNIFICANT CHANGE UP (ref 0–0.5)
EOSINOPHIL NFR BLD AUTO: 0 % — SIGNIFICANT CHANGE UP (ref 0–6)
GI PCR PANEL: SIGNIFICANT CHANGE UP
GLUCOSE SERPL-MCNC: 90 MG/DL — SIGNIFICANT CHANGE UP (ref 70–99)
HCT VFR BLD CALC: 33.7 % — LOW (ref 39–50)
HGB BLD-MCNC: 11.4 G/DL — LOW (ref 13–17)
IMM GRANULOCYTES NFR BLD AUTO: 0.6 % — SIGNIFICANT CHANGE UP (ref 0–0.9)
LYMPHOCYTES # BLD AUTO: 1.42 K/UL — SIGNIFICANT CHANGE UP (ref 1–3.3)
LYMPHOCYTES # BLD AUTO: 9.3 % — LOW (ref 13–44)
MAGNESIUM SERPL-MCNC: 1.6 MG/DL — SIGNIFICANT CHANGE UP (ref 1.6–2.6)
MCHC RBC-ENTMCNC: 30.2 PG — SIGNIFICANT CHANGE UP (ref 27–34)
MCHC RBC-ENTMCNC: 33.8 GM/DL — SIGNIFICANT CHANGE UP (ref 32–36)
MCV RBC AUTO: 89.4 FL — SIGNIFICANT CHANGE UP (ref 80–100)
MONOCYTES # BLD AUTO: 1.35 K/UL — HIGH (ref 0–0.9)
MONOCYTES NFR BLD AUTO: 8.9 % — SIGNIFICANT CHANGE UP (ref 2–14)
NEUTROPHILS # BLD AUTO: 12.33 K/UL — HIGH (ref 1.8–7.4)
NEUTROPHILS NFR BLD AUTO: 81.1 % — HIGH (ref 43–77)
PHOSPHATE SERPL-MCNC: 2.6 MG/DL — SIGNIFICANT CHANGE UP (ref 2.4–4.7)
PLATELET # BLD AUTO: 331 K/UL — SIGNIFICANT CHANGE UP (ref 150–400)
POTASSIUM SERPL-MCNC: 3.8 MMOL/L — SIGNIFICANT CHANGE UP (ref 3.5–5.3)
POTASSIUM SERPL-SCNC: 3.8 MMOL/L — SIGNIFICANT CHANGE UP (ref 3.5–5.3)
PROT SERPL-MCNC: 5.6 G/DL — LOW (ref 6.6–8.7)
RBC # BLD: 3.77 M/UL — LOW (ref 4.2–5.8)
RBC # FLD: 12.8 % — SIGNIFICANT CHANGE UP (ref 10.3–14.5)
SODIUM SERPL-SCNC: 138 MMOL/L — SIGNIFICANT CHANGE UP (ref 135–145)
VANCOMYCIN TROUGH SERPL-MCNC: 16.1 UG/ML — SIGNIFICANT CHANGE UP (ref 10–20)
WBC # BLD: 15.2 K/UL — HIGH (ref 3.8–10.5)
WBC # FLD AUTO: 15.2 K/UL — HIGH (ref 3.8–10.5)

## 2022-11-30 PROCEDURE — 93320 DOPPLER ECHO COMPLETE: CPT | Mod: 26

## 2022-11-30 PROCEDURE — 76376 3D RENDER W/INTRP POSTPROCES: CPT | Mod: 26

## 2022-11-30 PROCEDURE — 99232 SBSQ HOSP IP/OBS MODERATE 35: CPT

## 2022-11-30 PROCEDURE — 99233 SBSQ HOSP IP/OBS HIGH 50: CPT

## 2022-11-30 PROCEDURE — 93880 EXTRACRANIAL BILAT STUDY: CPT | Mod: 26

## 2022-11-30 PROCEDURE — 99222 1ST HOSP IP/OBS MODERATE 55: CPT

## 2022-11-30 PROCEDURE — 76705 ECHO EXAM OF ABDOMEN: CPT | Mod: 26

## 2022-11-30 PROCEDURE — 93312 ECHO TRANSESOPHAGEAL: CPT | Mod: 26

## 2022-11-30 PROCEDURE — 93325 DOPPLER ECHO COLOR FLOW MAPG: CPT | Mod: 26

## 2022-11-30 RX ORDER — MAGNESIUM SULFATE 500 MG/ML
2 VIAL (ML) INJECTION ONCE
Refills: 0 | Status: COMPLETED | OUTPATIENT
Start: 2022-11-30 | End: 2022-11-30

## 2022-11-30 RX ORDER — METOPROLOL TARTRATE 50 MG
25 TABLET ORAL EVERY 8 HOURS
Refills: 0 | Status: DISCONTINUED | OUTPATIENT
Start: 2022-11-30 | End: 2022-12-02

## 2022-11-30 RX ORDER — POTASSIUM CHLORIDE 20 MEQ
40 PACKET (EA) ORAL ONCE
Refills: 0 | Status: COMPLETED | OUTPATIENT
Start: 2022-11-30 | End: 2022-11-30

## 2022-11-30 RX ORDER — PHYTONADIONE (VIT K1) 5 MG
5 TABLET ORAL DAILY
Refills: 0 | Status: DISCONTINUED | OUTPATIENT
Start: 2022-11-30 | End: 2022-11-30

## 2022-11-30 RX ADMIN — Medication 25 MILLIGRAM(S): at 21:19

## 2022-11-30 RX ADMIN — HEPARIN SODIUM 3000 UNIT(S): 5000 INJECTION INTRAVENOUS; SUBCUTANEOUS at 05:45

## 2022-11-30 RX ADMIN — HEPARIN SODIUM 1700 UNIT(S)/HR: 5000 INJECTION INTRAVENOUS; SUBCUTANEOUS at 20:49

## 2022-11-30 RX ADMIN — MIDODRINE HYDROCHLORIDE 10 MILLIGRAM(S): 2.5 TABLET ORAL at 21:19

## 2022-11-30 RX ADMIN — Medication 25 GRAM(S): at 13:29

## 2022-11-30 RX ADMIN — Medication 25 GRAM(S): at 07:30

## 2022-11-30 RX ADMIN — Medication 250 MILLIGRAM(S): at 15:30

## 2022-11-30 RX ADMIN — HEPARIN SODIUM 0 UNIT(S)/HR: 5000 INJECTION INTRAVENOUS; SUBCUTANEOUS at 19:13

## 2022-11-30 RX ADMIN — MEROPENEM 1000 MILLIGRAM(S): 1 INJECTION INTRAVENOUS at 13:29

## 2022-11-30 RX ADMIN — HEPARIN SODIUM 2000 UNIT(S)/HR: 5000 INJECTION INTRAVENOUS; SUBCUTANEOUS at 05:45

## 2022-11-30 RX ADMIN — Medication 25 MILLIGRAM(S): at 07:29

## 2022-11-30 RX ADMIN — HEPARIN SODIUM 2000 UNIT(S)/HR: 5000 INJECTION INTRAVENOUS; SUBCUTANEOUS at 11:34

## 2022-11-30 RX ADMIN — MEROPENEM 1000 MILLIGRAM(S): 1 INJECTION INTRAVENOUS at 21:19

## 2022-11-30 RX ADMIN — Medication 81 MILLIGRAM(S): at 13:30

## 2022-11-30 RX ADMIN — MEROPENEM 1000 MILLIGRAM(S): 1 INJECTION INTRAVENOUS at 05:46

## 2022-11-30 RX ADMIN — Medication 250 MILLIGRAM(S): at 04:27

## 2022-11-30 RX ADMIN — HEPARIN SODIUM 2000 UNIT(S)/HR: 5000 INJECTION INTRAVENOUS; SUBCUTANEOUS at 05:26

## 2022-11-30 RX ADMIN — MIDODRINE HYDROCHLORIDE 10 MILLIGRAM(S): 2.5 TABLET ORAL at 13:29

## 2022-11-30 RX ADMIN — SODIUM CHLORIDE 100 MILLILITER(S): 9 INJECTION, SOLUTION INTRAVENOUS at 20:49

## 2022-11-30 RX ADMIN — HEPARIN SODIUM 1700 UNIT(S)/HR: 5000 INJECTION INTRAVENOUS; SUBCUTANEOUS at 20:04

## 2022-11-30 RX ADMIN — Medication 40 MILLIEQUIVALENT(S): at 07:30

## 2022-11-30 RX ADMIN — MIDODRINE HYDROCHLORIDE 10 MILLIGRAM(S): 2.5 TABLET ORAL at 05:46

## 2022-11-30 NOTE — DIETITIAN INITIAL EVALUATION ADULT - PERTINENT LABORATORY DATA
11-30    138  |  110<H>  |  20.5<H>  ----------------------------<  90  3.8   |  19.0<L>  |  1.11    Ca    8.9      30 Nov 2022 04:30  Phos  2.6     11-30  Mg     1.6     11-30    TPro  5.6<L>  /  Alb  2.8<L>  /  TBili  0.6  /  DBili  x   /  AST  118<H>  /  ALT  106<H>  /  AlkPhos  97  11-30

## 2022-11-30 NOTE — DIETITIAN INITIAL EVALUATION ADULT - OTHER INFO
77 year old male with PMH of recent COVID-19 (10/2022), HTN, moderate AS, recent streptococcus anginosus bacteremia, recent norovirus infection, admitted on 11/27 s/p episode of syncope and collapse. Found to have septic shock of unclear source and new onset AF with RVR. Started on IV vasopressor therapy.    Pt currently NPO for LILIAN and possible DCCV depending on findings. Pt reports eating fairly well PTA, however, has been experiencing diarrhea which continues at this time. Pt states his weight ~2.5 years ago ~230 lbs and has lost a lot of weight since. Pt states he dislikes food here. Per RN, pt was eating fairly well prior to NPO status, however, states severe diarrhea is ongoing. RD to follow up.

## 2022-11-30 NOTE — PROGRESS NOTE ADULT - NS ATTEND AMEND GEN_ALL_CORE FT
1) Septic shock:  h/o strep anginosus bacteremia.  has been on abx for several weeks via PICC at home. Blood cx negative to date, unclear source  TTE unchanged from previous, no vegetation or echodensity noted. for LILIAN.   2) Atrial fibrillation, new onset with  RVR   [id number] 3 (age, HTN).  TTE preserved EF 55-60%, mod MR, mod AS, unchanged from previous  normal exercise stress test in 2021. On IBV hep and IV amio. Unable to add further GDMT, hypotensive requiring midodrine,   3) EP consulted 11/29: "Pt with moderate AS, AF, and recent bacteremia. Admitted with postural dizziness and low BP with NSR. Went into AF with RVR.   Suggest to start oral metoprolol at low dose q 6 hours PO, plus 5 mg iv prn every 4-6 hours for HR above 125 bpm > 2 hours. Given concerns about active infection and soft BP on admission, no need for strict HR control. Given suboptimal anticoagulation (PTT low), then suggest to hold amiodarone till LILIAN is done and no other invasive tests/procedures are planned for at least 3-4 weeks. If any concerns about IE, then should NOT do DCCV and c/w rate control. will follow up."

## 2022-11-30 NOTE — DIETITIAN NUTRITION RISK NOTIFICATION - TREATMENT: THE FOLLOWING DIET HAS BEEN RECOMMENDED
Diet, NPO after Midnight:      NPO Start Date: 29-Nov-2022,   NPO Start Time: 23:59 (11-29-22 @ 15:46) [Active]  Diet, DASH/TLC:   Sodium & Cholesterol Restricted (11-27-22 @ 08:44) [Active]

## 2022-11-30 NOTE — DIETITIAN INITIAL EVALUATION ADULT - NS FNS DIET ORDER
Diet, NPO after Midnight:      NPO Start Date: 29-Nov-2022,   NPO Start Time: 23:59 (11-29-22 @ 15:46)

## 2022-11-30 NOTE — PROGRESS NOTE ADULT - ASSESSMENT
77 year old male with PMHx of COVID (10/6/22), HTN, B12 deficiency, Aortic stenosis presenting to the ED with new onset Afib with RVR and sepsis. in atrial fib with moderate ventricular rate

## 2022-11-30 NOTE — DIETITIAN INITIAL EVALUATION ADULT - ETIOLOGY
related to inability to meet sufficient protein-energy in setting of multiple comorbidities, recent COVID, norovirus, and malabsorption due to severe diarrhea

## 2022-11-30 NOTE — PROGRESS NOTE ADULT - PROBLEM SELECTOR PLAN 2
H/o strept sepsis  Blood cultures negative thus far  May need indium scan  would check stool studies  LILIAN today

## 2022-11-30 NOTE — CONSULT NOTE ADULT - PROBLEM SELECTOR RECOMMENDATION 9
Recommend vitamin K 5 mg IV daily    Recommend bilateral carotid ultrasound as part of pre-operative workup  Recommend CT maxillofacial to evaluate for dental abscesses  Recommend CT chest/abd/pelvis with IV and PO contrast to rule out ischemia    Recommend GI consult  Recommend cardiology consult for cardiac catheterization  Recommend neurosurgery consult for cerebral angio vs CT angio for evaluation of possible mycotic aneurysm      Plan discussed with Dr. Flanagan Recommend vitamin K 5 mg IV daily    Recommend bilateral carotid ultrasound as part of pre-operative workup  Recommend CT maxillofacial to evaluate for dental abscesses  Recommend CT chest/abd/pelvis with IV and PO contrast to rule out ischemia    Recommend GI consult  Recommend cardiology consult for cardiac catheterization  Recommend neurosurgery consult for cerebral angio vs CT angio for evaluation of possible mycotic aneurysm    Consider renal function given multiple contrast load studies    Plan discussed with Dr. Flanagan

## 2022-11-30 NOTE — DIETITIAN INITIAL EVALUATION ADULT - ADD RECOMMEND
Rx: MVI daily. Consider add Bacid to support gut integrity. Monitor electrolytes and replete as needed. Obtain daily weights to monitor trends.

## 2022-11-30 NOTE — PROGRESS NOTE ADULT - SUBJECTIVE AND OBJECTIVE BOX
Patient is a 77y old  Male who presents with a chief complaint of Septic shock unknown source (29 Nov 2022 15:47)      BRIEF HOSPITAL COURSE: 76 y/o M with a h/o recent COVID-19 (10/2022), HTN, moderate AS,  recent streptococcus anginosus bacteremia (unremarkable CT-imaging, refused LILIAN, discharged on 11/7/22 with PICC/ceftriaxone), recent norovirus infection, admitted on 11/27 s/p episode of syncope and collapse. Found to have septic shock of unclear source and new onset AF with RVR. Started on IV vasopressor therapy.    Events last 24 hours: Weaned off IV vasopressor at this time, however remains with rapid AF despite amiodarone infusion. Soft BPs. He is having refractory diarrhea. Plan for LILIAN later today and possible DCCV depending on the findings.        PAST MEDICAL & SURGICAL HISTORY:  Hypertension      Aortic stenosis      H/O inguinal hernia repair  B/L 2013      S/P laparoscopic colectomy  right colectomy with ileotransverse anastomosis          Review of Systems:  CONSTITUTIONAL: No fever, chills, or fatigue  EYES: No eye pain, visual disturbances, or discharge  ENMT:  No difficulty hearing, tinnitus, vertigo; No sinus or throat pain  NECK: No pain or stiffness  RESPIRATORY: No cough, wheezing, chills or hemoptysis; No shortness of breath  CARDIOVASCULAR: No chest pain, palpitations, dizziness, or leg swelling  GASTROINTESTINAL: No abdominal or epigastric pain. No nausea, vomiting, or hematemesis; (+)diarrhea. No melena or hematochezia.  GENITOURINARY: No dysuria, frequency, hematuria, or incontinence  NEUROLOGICAL: No headaches, memory loss, loss of strength, numbness, or tremors  SKIN: No itching, burning, rashes, or lesions   MUSCULOSKELETAL: No joint pain or swelling; No muscle, back, or extremity pain  PSYCHIATRIC: No depression, anxiety, mood swings, or difficulty sleeping      Medications:  meropenem Injectable 1000 milliGRAM(s) IV Push every 8 hours  vancomycin  IVPB 1000 milliGRAM(s) IV Intermittent <User Schedule>  aMIOdarone Infusion 1 mG/Min IV Continuous <Continuous>  aMIOdarone Infusion 0.5 mG/Min IV Continuous <Continuous>  metoprolol tartrate 25 milliGRAM(s) Oral two times a day  metoprolol tartrate Injectable 5 milliGRAM(s) IV Push every 6 hours PRN  midodrine 10 milliGRAM(s) Oral every 8 hours  aspirin  chewable 81 milliGRAM(s) Oral daily  heparin   Injectable 6500 Unit(s) IV Push every 6 hours PRN  heparin   Injectable 3000 Unit(s) IV Push every 6 hours PRN  heparin  Infusion.  Unit(s)/Hr IV Continuous <Continuous>  lactated ringers. 1000 milliLiter(s) IV Continuous <Continuous>                ICU Vital Signs Last 24 Hrs  T(C): 36.8 (29 Nov 2022 23:33), Max: 36.8 (29 Nov 2022 23:33)  T(F): 98.3 (29 Nov 2022 23:33), Max: 98.3 (29 Nov 2022 23:33)  HR: 149 (29 Nov 2022 23:00) (113 - 149)  BP: 125/85 (29 Nov 2022 23:00) (89/67 - 135/86)  BP(mean): 93 (29 Nov 2022 23:00) (75 - 100)  ABP: --  ABP(mean): --  RR: 25 (29 Nov 2022 23:00) (12 - 28)  SpO2: 89% (29 Nov 2022 23:00) (68% - 100%)    O2 Parameters below as of 29 Nov 2022 20:00  Patient On (Oxygen Delivery Method): room air                I&O's Detail    28 Nov 2022 07:01  -  29 Nov 2022 07:00  --------------------------------------------------------  IN:    Amiodarone: 199.8 mL    Amiodarone: 350.7 mL    Heparin Infusion: 358 mL    IV PiggyBack: 200 mL    multiple electrolytes Injection Type 1.: 800 mL  Total IN: 1908.5 mL    OUT:    Indwelling Catheter - Urethral (mL): 1225 mL  Total OUT: 1225 mL    Total NET: 683.5 mL      29 Nov 2022 07:01  -  30 Nov 2022 01:09  --------------------------------------------------------  IN:    Amiodarone: 300.6 mL    Heparin Infusion: 312 mL    Lactated Ringers: 400 mL    Oral Fluid: 850 mL  Total IN: 1862.6 mL    OUT:    Indwelling Catheter - Urethral (mL): 170 mL  Total OUT: 170 mL    Total NET: 1692.6 mL            LABS:                        11.0   13.58 )-----------( 272      ( 29 Nov 2022 05:00 )             31.4     11-29    140  |  109<H>  |  26.1<H>  ----------------------------<  106<H>  3.9   |  20.0<L>  |  1.24    Ca    8.9      29 Nov 2022 05:00  Phos  3.0     11-29  Mg     2.0     11-29    TPro  5.6<L>  /  Alb  3.0<L>  /  TBili  0.3<L>  /  DBili  x   /  AST  136<H>  /  ALT  115<H>  /  AlkPhos  94  11-29      CARDIAC MARKERS ( 28 Nov 2022 13:50 )  x     / 0.10 ng/mL / 2194 U/L / x     / 121.2 ng/mL      CAPILLARY BLOOD GLUCOSE        PTT - ( 29 Nov 2022 20:00 )  PTT:62.9 sec    CULTURES:  Culture Results:   No growth (11-27-22 @ 09:10)  Rapid RVP Result: NotDetec (11-27-22 @ 00:25)  Culture Results:   No growth to date. (11-27-22 @ 00:10)  Culture Results:   No growth to date. (11-27-22 @ 00:04)        Physical Examination:    General: No acute distress.  Alert, oriented, interactive, nonfocal    HEENT: Pupils equal, reactive to light.  Symmetric.    PULM: Clear to auscultation bilaterally, no significant sputum production    CVS: tachycardic, irreg irreg rhythm, no murmurs, rubs, or gallops    ABD: Soft, nondistended, nontender, normoactive bowel sounds, no masses    EXT: No edema, nontender    SKIN: Warm and well perfused, no rashes noted.    NEURO: A&Ox3, strength 5/5 all extremities, cranial nerves grossly intact, no focal deficits        RADIOLOGY:     < from: CT Abdomen and Pelvis No Cont (11.27.22 @ 03:46) >  FINDINGS:  CHEST:  LUNGS AND LARGE AIRWAYS: Patent central airways. A few scattered   bilateral calcified granulomas and other nodules measuring up to 3 mm are   unchanged. Streaky bibasilar atelectasis.  PLEURA: No pleural effusion.  VESSELS: Atherosclerotic changes of the aorta and coronary arteries.  HEART: Heart size is normal. No pericardial effusion. Aortic valvular   calcifications.  MEDIASTINUM AND MIRTHA: No lymphadenopathy.  CHESTWALL AND LOWER NECK: Within normal limits.    ABDOMEN AND PELVIS:  LIVER: Within normal limits.  BILE DUCTS: Normal caliber.  GALLBLADDER: Within normal limits.  SPLEEN: Within normal limits.  PANCREAS: Within normal limits.  ADRENALS: Within normal limits.  KIDNEYS/URETERS: No radiodense calculus. No hydroureteronephrosis. Right   renal probable cyst.    BLADDER: Barrios catheter in place.  REPRODUCTIVE ORGANS: Enlarged prostate.    BOWEL: No bowel obstruction. Right hemicolectomy. Liquid stool throughout   the colon and rectum. Sigmoid diverticulosis without evidence of   diverticulitis.  PERITONEUM: No ascites.  VESSELS: Atherosclerotic changes.  RETROPERITONEUM/LYMPH NODES: No lymphadenopathy.  ABDOMINAL WALL: Within normal limits.  BONES: Degenerative changes.  AI VERTEBRAL BODY ANALYSIS:  T11: low bone density of 72 HU, suspicious for osteoporosis.  T12: low bone density of 97 HU, suspicious for osteoporosis.  L2: low bone density of 97 HU, suspicious for osteoporosis.    IMPRESSION:  No pneumonia.  No acute CT findings in the abdomen or pelvis.    VERTEBRAL BODY ANALYSIS: Low bone density as described above, consider   further workup for osteoporosis.          CRITICAL CARE TIME SPENT: 40 mins  Time spent evaluating/treating patient with medical issues that pose a high risk for life threatening deterioration and/or end-organ damage, reviewing data/labs/imaging, discussing case with multidisciplinary team, discussing plan/goals of care with patient/family. Non-inclusive of procedure time.

## 2022-11-30 NOTE — PROGRESS NOTE ADULT - ASSESSMENT
78 y/o M with a h/o recent COVID-19 (10/2022), HTN, moderate AS,  recent streptococcus anginosus bacteremia (unremarkable CT-imaging, refused LILIAN, discharged on 11/7/22 with PICC/ceftriaxone), recent norovirus infection, with:    # Septic shock  # Refractory diarrhea  # AF with RVR  # RADHA    Septic shock of unclear source. Possible GI infection given voluminous diarrhea. No abdominal findings on CT. Stool culture and GI PCR pending. Will send CDiff study. Plan for LILIAN today to evaluate for vegetation given recent h/o strep bacteremia, although blood cultures are negative for growth thus far.     - weaned from IV vasopressor at this time, although hemodynamics have been soft, restart infusion as necessary to maintain a MAP > 65  - continue midodrine 10mg TID for vascular tone  - bolus 1L LR x 1 and start maintenance crystalloid @ 100cc/hr given refractory diarrhea, this may be contributing to tachycardia/hypotension  - will hold amiodarone gtt in the setting of suboptimal anticoagulation and try to implement low dose BB therapy for assistance with HR control  - possible DCCV tomorrow pending LILIAN findings  - continue full anticoagulation with heparin infusion  - EP input appreciated  - RADHA improving, trend BUN/Cr, electrolytes, acid-base balance, and monitor UOP  - continue empiric meropenem and vancomycin    Case discussed with MICU physician, Dr. Allan.

## 2022-11-30 NOTE — CHART NOTE - NSCHARTNOTEFT_GEN_A_CORE
Pt presents to cath holding room bed 17 in anticipation of LILIAN +/- cardioversion.   Confirms NPO. On heparin gtt, PTT therapeutic @ 81.3   Consent w/MD

## 2022-11-30 NOTE — DIETITIAN NUTRITION RISK NOTIFICATION - ADDITIONAL COMMENTS/DIETITIAN RECOMMENDATIONS
Resume PO diet as medically feasible/tolerable; add Ensure Enlive TID to optimize po intake and provide an additional 350 kcal, 20g protein per serving and Banatrol TID to add bulk to stool.  Rx: MVI daily. Consider add Bacid to support gut integrity. Monitor electrolytes and replete as needed. Obtain daily weights to monitor trends.

## 2022-11-30 NOTE — PROGRESS NOTE ADULT - NS ATTEND AMEND GEN_ALL_CORE FT
Seen and examined, agree with above.     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 early signs of aortic root abscess. Can not do DCCV or rhythm control now. Will plan for rate control for now, with monitoring of his conduction. If aortic root abscess is confirmed will need to be more careful with uptitration of rate control agents due to risk of AVB. AC as tolerated. If going to cardiac surgery for IE, then advise to consider AF surgical ablation and ESTEFANY clip at that time.     Above d/w pt and Dr. Daigle

## 2022-11-30 NOTE — ASSESSMENT
[FreeTextEntry1] : Strep anginosus bacteremia on IV ceftriaxone\par \par \par - Blood cultures  were + streptococcus anginosis pansensitive , unclear source\par - Repeat blood cultures 11/3/22 x2 ngtd\par - TTE did not report vegetations\par - ct c/a/p did not show clear source\par - LILIAN was recommended to r/o endocarditis but pt refused and he was discharged home on ceftriaxone 2 g IV daily via pic end 11/30/22 for 4 week course which he is tolerating well\par - reviewed weekly labs wbc 3.6 and lft slight elevation, will be scanned to chart\par - will repeat cbc and cmp\par - pt needs dental eval\par - pt needs to f/u 2 weeks after completing treatment to repeat blood cultures\par \par all questions and concerns addressed\par  [Treatment Education] : treatment education [Treatment Adherence] : treatment adherence [Rx Dose / Side Effects] : Rx dose/side effects [Anticipatory Guidance] : anticipatory guidance

## 2022-11-30 NOTE — DIETITIAN INITIAL EVALUATION ADULT - CONTINUE CURRENT NUTRITION CARE PLAN
- Resume PO diet as medically feasible/tolerable; add Ensure Enlive TID to optimize po intake and provide an additional 350 kcal, 20g protein per serving and Banatrol TID to add bulk to stool./yes

## 2022-11-30 NOTE — HISTORY OF PRESENT ILLNESS
[FreeTextEntry1] : Patient here for post hospitalization visit discharged 11/7/22\par \par 78 yo male with pmhx HTN had presented to ED, \par Found to have streptococcus bacteremia unclear source, no recent dental work, skin infections, no respiratory symptoms\par \par Admitted for\par Bacteremia\par Leukocytosis\par Fever\par Norovirus\par \par \par - Blood cultures + streptococcus anginosis pansensitive \par - Repeat blood cultures 11/3/22 x2 ngtd\par - TTE did not report vegetations\par - ct c/a/p no clear source\par - LILIAN was recommended but pt refused\par - Recommended dc home on iv  ceftriaxone 2 g IV daily via pic end 11/30/22 for 4 week course\par \par pt is doing well since discharge. tolerating iv abx\par no fever\par no issues with picc\par

## 2022-11-30 NOTE — CHART NOTE - NSCHARTNOTEFT_GEN_A_CORE
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

## 2022-11-30 NOTE — DIETITIAN INITIAL EVALUATION ADULT - PERTINENT MEDS FT
MEDICATIONS  (STANDING):  aspirin  chewable 81 milliGRAM(s) Oral daily  heparin  Infusion.  Unit(s)/Hr (14 mL/Hr) IV Continuous <Continuous>  lactated ringers. 1000 milliLiter(s) (100 mL/Hr) IV Continuous <Continuous>  magnesium sulfate  IVPB 2 Gram(s) IV Intermittent once  meropenem Injectable 1000 milliGRAM(s) IV Push every 8 hours  metoprolol tartrate 25 milliGRAM(s) Oral two times a day  midodrine 10 milliGRAM(s) Oral every 8 hours  vancomycin  IVPB 1000 milliGRAM(s) IV Intermittent <User Schedule>    MEDICATIONS  (PRN):  heparin   Injectable 6500 Unit(s) IV Push every 6 hours PRN For aPTT less than 40  heparin   Injectable 3000 Unit(s) IV Push every 6 hours PRN For aPTT between 40 - 57  metoprolol tartrate Injectable 5 milliGRAM(s) IV Push every 6 hours PRN HR > 130

## 2022-11-30 NOTE — CONSULT NOTE ADULT - SUBJECTIVE AND OBJECTIVE BOX
SUBJECTIVE      77yMale with h/o HTN, moderate AS, recent Covid infection 10/6/2022 per chart, was admitted 11/1 for SIRS, found to have strep anginosus bacteremia ( 11/1/22), norovirus (+ GI PCR 11/3/22), had negative TTE 11/3 for endocarditis, negative noncon CT chest 11/5, had refused LILIAN on that admission and was treated empirically for endocarditis course of ceftriaxone that was to end on 11/30/22, presented again to [facility] on 11/27 for syncope w/ collapse and was admitted for sepsis.  States he had been having diarrhea for 1-2 days prior to syncopal episode.  Currently on meropenem, has rectal tube.  Pt with new onset Afib with RVR, currently on amiodarone infusion and metoprolol 25 bid with 5 mg IV prn.  Also hypotensive and on midodrine 10 mg q8h.  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  Pt states he has been able to tolerate po intake without issue.  Denies f/c, n/v, chest pain, sob, abdominal pain, constipation, urinary symptoms.        LAST BLOOD THINNER--> aspirin  chewable   81 milliGRAM(s) Oral (11-30-22 @ 13:30)    enoxaparin Injectable   40 milliGRAM(s) SubCutaneous (11-27-22 @ 12:44)    heparin   Injectable   6500 Unit(s) IV Push (11-28-22 @ 08:09)    heparin   Injectable   3000 Unit(s) IV Push (11-30-22 @ 05:45)   3000 Unit(s) IV Push (11-28-22 @ 15:41)    heparin  Infusion.   2000 Unit(s)/Hr IV Continuous (11-30-22 @ 05:46)   2000 Unit(s)/Hr IV Continuous (11-30-22 @ 05:27)   2000 Unit(s)/Hr IV Continuous (11-29-22 @ 22:31)   1800 Unit(s)/Hr IV Continuous (11-29-22 @ 12:01)   1800 Unit(s)/Hr IV Continuous (11-29-22 @ 01:59)   1600 Unit(s)/Hr IV Continuous (11-28-22 @ 15:40)   1600 Unit(s)/Hr IV Continuous (11-28-22 @ 08:11)   1400 Unit(s)/Hr IV Continuous (11-28-22 @ 07:07)        SOCIAL HISTORY     patient lives with spouse in single story ranch home;   assist devices - none  smoking history - denies  drinking history - denies  occupation - construction        PAST MEDICAL & SURGICAL HISTORY:  Hypertension    Aortic stenosis      H/O inguinal hernia repair  B/L 2013    S/P laparoscopic colectomy  right colectomy with ileotransverse anastomosis        HOME MEDICATIONS    aspirin 81 mg oral delayed release tablet: 1 tab(s) orally once a day (28 Nov 2022 10:46)  cefTRIAXone 2 g injection: 2 gram(s) injectable once a day till 12/3 (28 Nov 2022 10:46)  metoprolol succinate 25 mg oral tablet, extended release: 1 tab(s) orally once a day (28 Nov 2022 10:46)  valsartan 80 mg oral capsule: 1 tab(s) orally once a day (28 Nov 2022 10:46)        ALLERGIES    No Known Allergies        OBJECTIVE DATA    VITALS   currently in sinus rhythm  ICU Vital Signs Last 24 Hrs  T(C): 36.4 (30 Nov 2022 14:29), Max: 36.9 (30 Nov 2022 14:20)  T(F): 97.6 (30 Nov 2022 14:29), Max: 98.4 (30 Nov 2022 14:20)  HR: 124 (30 Nov 2022 18:00) (114 - 157)  BP: 104/74 (30 Nov 2022 18:00) (89/65 - 135/86)  BP(mean): 81 (30 Nov 2022 18:00) (72 - 111)  RR: 18 (30 Nov 2022 18:00) (11 - 34)  SpO2: 99% (30 Nov 2022 18:00) (82% - 100%)    O2 Parameters below as of 30 Nov 2022 15:30  Patient On (Oxygen Delivery Method): room air        LABS                        11.4   15.20 )-----------( 331      ( 30 Nov 2022 04:30 )             33.7   PTT - ( 30 Nov 2022 11:25 )  PTT:81.3 [id number]    138  |  110<H>  |  20.5<H>  ----------------------------<  90  3.8   |  19.0<L>  |  1.11    Ca    8.9      30 Nov 2022 04:30  Phos  2.6     11-30  Mg     1.6     11-30    TPro  5.6<L>  /  Alb  2.8<L>  /  TBili  0.6  /  DBili  x   /  AST  118<H>  /  ALT  106<H>  /  AlkPhos  97  11-30        INTAKE/OUTAKE    29 Nov 2022 07:01  -  30 Nov 2022 07:00  --------------------------------------------------------  IN: 2576.6 mL / OUT: 170 mL / NET: 2406.6 mL    30 Nov 2022 07:01  -  30 Nov 2022 18:36  --------------------------------------------------------  IN: 1180 mL / OUT: 920 mL / NET: 260 mL        PHYSICAL EXAM    Neuro: A+O x 3, non-focal, speech clear and intact  HEENT: EOMI, oral mucosa pink and moist  CV: tachycardic, irregular rhythm, +S1S2, no murmurs or rub  Pulm/chest: LCTAB, no accessory muscle use noted  Abd: soft, NT, ND, +BS  Ext: MEZA x 4, no C/C/E  Skin: warm, well perfused, no rashes        IMAGING   personally reviewed imaging        CT HEAD    < from: CT Head No Cont (11.27.22 @ 03:35) >    PROCEDURE DATE:  11/27/2022      Findings:  The ventricles and sulci are prominent in size, compatible with mild   generalized parenchymal volume loss. No acute intracranial hemorrhage is   identified.  There is no extra-axial fluid collection. No mass effect or   midline shift is seen.  There is no evidence of acute territorial   infarct.  There are periventricular and subcortical white matter   hypodensities, which are nonspecific, but likely reflect mild   microvascular ischemic disease.  There is mild bilateral maxillary sinus mucosal thickening and partial   opacification of a right ethmoid air cell. The mastoid air cells are well   aerated.  No acute osseous abnormality is seen. There is left   frontoparietal scalp soft tissue swelling.      Impression: Left frontoparietal scalp soft tissue swelling. No acute   intracranial hemorrhage.    --- End of Report ---    < end of copied text >        CT CHEST/ABD/PEL    < from: CT Chest No Cont (11.27.22 @ 03:46) >  FINDINGS:  CHEST:  LUNGS AND LARGE AIRWAYS: Patent central airways. A few scattered   bilateral calcified granulomas and other nodules measuring up to 3 mm are   unchanged. Streaky bibasilar atelectasis.  PLEURA: No pleural effusion.  VESSELS: Atherosclerotic changes of the aorta and coronary arteries.  HEART: Heart size is normal. No pericardial effusion. Aortic valvular   calcifications.  MEDIASTINUM AND MIRTHA: No lymphadenopathy.  CHESTWALL AND LOWER NECK: Within normal limits.    ABDOMEN AND PELVIS:  LIVER: Within normal limits.  BILE DUCTS: Normal caliber.  GALLBLADDER: Within normal limits.  SPLEEN: Within normal limits.  PANCREAS: Within normal limits.  ADRENALS: Within normal limits.  KIDNEYS/URETERS: No radiodense calculus. No hydroureteronephrosis. Right   renal probable cyst.    BLADDER: Barrios catheter in place.  REPRODUCTIVE ORGANS: Enlarged prostate.    BOWEL: No bowel obstruction. Right hemicolectomy. Liquid stool throughout   the colon and rectum. Sigmoid diverticulosis without evidence of   diverticulitis.  PERITONEUM: No ascites.  VESSELS: Atherosclerotic changes.  RETROPERITONEUM/LYMPH NODES: No lymphadenopathy.  ABDOMINAL WALL: Within normal limits.  BONES: Degenerative changes.  AI VERTEBRAL BODY ANALYSIS:  T11: low bone density of 72 HU, suspicious for osteoporosis.  T12: low bone density of 97 HU, suspicious for osteoporosis.  L2: low bone density of 97 HU, suspicious for osteoporosis.    IMPRESSION:  No pneumonia.  No acute CT findings in the abdomen or pelvis.    VERTEBRAL BODY ANALYSIS: Low bone density as described above, consider   further workup for osteoporosis.        --- End of Report ---    < end of copied text >      RUQ US    < from: US Abdomen Upper Quadrant Right (11.30.22 @ 10:53) >  FINDINGS:  Liver: There is increased hepatic echogenicity, suggesting hepatic   steatosis and/or fibrosis. The liver is within normal limits in size,   measuring up to 17.2 cm. The visualized main portal vein appears patent   with normal direction of flow.  Bile ducts: Normal caliber. Common bile duct measures 4 mm.  Gallbladder: Within normal limits. There is no gallbladder wall   thickening or pericholecystic fluid. No gallstones are seen. Negative   sonographic Starr sign.  Pancreas: Pancreas not visualized.  Right kidney: 10.1 cm. No hydronephrosis. There is a 2.3 x 2.6 x 1.7 cm   cyst at the interpolar region of the right kidney.  Ascites: None.  Aorta/IVC: Visualized portions are within normal limits.    IMPRESSION:  Increased hepatic echogenicity, suggesting hepatic steatosis/or fibrosis.    Pancreas not visualized.    --- End of Report ---    < end of copied text >        ECHO    `< from: LILIAN Echo Doppler (11.30.22 @ 14:52) >  Summary:   1. Left ventricular ejection fraction, by visual estimation, is 55 to   60%.   2. Normal global left ventricular systolic function.   3. The mitral in-flow pattern reveals no discernable A-wave, therefore   no comment on diastolic function can be made.   4. Normal right ventricular size and function, inadequate estimation of   RVSP.   5. Mildly enlarged left atrium.   6. Moderate mitral valve regurgitation, jet is eccentric posteriorly   directed.   7. Thickening of bileaflet mitral valve with subcentimeter filamentous  mobile lesions suggestive of vegetations.   8. Color flow doppler and intravenous injection of agitated saline   demonstrates the presence of an intact intra atrial septum.   9. No left atrial appendage thrombus and normal left atrial appendage   velocities.  10. Structurally normal tricuspid valve, with normal leaflet excursion.  11. Heavily calcified noncoronary cusp with moderate aortic valve   stenosis, ELDON (by 2D planimetry 1.34 cm2), peak velocity 2.8 m/s and mean   gradient of 15 mmHg.  12. Mild to moderate aortic regurgitation.  13. There is mild echolucency and thickening of the sinus of Valsalva   inbetween the left and non-coronary cusp, cannot exclude early root   abscess.  14. Mild simple atheromas at the aortic arch/descendingaorta.  15. There is no evidence of pericardial effusion.  16. No prior LILIAN study is available for comparison. 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 Cardioversion   deferred due to the presence of underlying infection/endocarditis.    John Frost DO Electronically signed on 11/30/2022 at 3:39:01 PM    < end of copied text >

## 2022-11-30 NOTE — PROGRESS NOTE ADULT - SUBJECTIVE AND OBJECTIVE BOX
[facility]                                                         CARDIOLOGY AT [place] [address]                                                         Telephone: [phone]. Fax:[phone]                                                                             PROGRESS NOTE    Reason for follow up: Sepsis  Update: Having very frequent diarrhea  Remains in atrial fib with moderate rates    Review of symptoms:   Cardiac:  No chest pain. No dyspnea. No palpitations.  Respiratory: no cough. No dyspnea  Gastrointestinal: No diarrhea. No abdominal pain. No bleeding.   Neuro: No focal neuro complaints.      Vitals:  T(C): 36.4 (11-30-22 @ 07:37), Max: 36.8 (11-29-22 @ 23:33)  HR: 117 (11-30-22 @ 10:00) (117 - 157)  BP: 101/69 (11-30-22 @ 10:00) (89/65 - 135/86)  RR: 19 (11-30-22 @ 10:00) (12 - 34)  SpO2: 96% (11-30-22 @ 10:00) (82% - 100%)  Wt(kg): --  I&O's Summary    29 Nov 2022 07:01  -  30 Nov 2022 07:00  --------------------------------------------------------  IN: 2576.6 mL / OUT: 170 mL / NET: 2406.6 mL    30 Nov 2022 07:01  -  30 Nov 2022 10:48  --------------------------------------------------------  IN: 530 mL / OUT: 0 mL / NET: 530 mL      Weight (kg): 77 (11-27 @ 00:30)  PHYSICAL EXAM:  Appearance:  Midly ill appearing  HEENT:  Atraumatic. Normocephalic.  Normal oral mucosa  Neurologic: A & O x 3, no gross focal deficits.  Cardiovascular: RRR S1 S2,  3/6 nori lsb   Respiratory: Slear lung fields  Gastrointestinal:  Soft, Non-tender, + BS  Lower Extremities: No edema  Psychiatry: Patient is calm. No agitation.   Skin: warm and dry.      CURRENT MEDICATIONS:  MEDICATIONS  (STANDING):  aspirin  chewable 81 milliGRAM(s) Oral daily  heparin  Infusion.  Unit(s)/Hr (14 mL/Hr) IV Continuous <Continuous>  lactated ringers. 1000 milliLiter(s) (100 mL/Hr) IV Continuous <Continuous>  magnesium sulfate  IVPB 2 Gram(s) IV Intermittent once  meropenem Injectable 1000 milliGRAM(s) IV Push every 8 hours  metoprolol tartrate 25 milliGRAM(s) Oral two times a day  midodrine 10 milliGRAM(s) Oral every 8 hours  vancomycin  IVPB 1000 milliGRAM(s) IV Intermittent <User Schedule>      LABS:	 	  CARDIAC MARKERS ( 28 Nov 2022 13:50 )  x     / 0.10 ng/mL / 2194 U/L / x     / 121.2 ng/mL  p-BNP 28 Nov 2022 13:50: x                              11.4   15.20 )-----------( 331      ( 30 Nov 2022 04:30 )             33.7     11-30    138  |  110<H>  |  20.5<H>  ----------------------------<  90  3.8   |  19.0<L>  |  1.11    Ca    8.9      30 Nov 2022 04:30  Phos  2.6     11-30  Mg     1.6     11-30    TPro  5.6<L>  /  Alb  2.8<L>  /  TBili  0.6  /  DBili  x   /  AST  118<H>  /  ALT  106<H>  /  AlkPhos  97  11-30      DIAGNOSTIC TESTING:  < from: TTE Echo Limited or F/U (11.29.22 @ 09:46) >  Summary:   1. Technically difficult study.   2. Patient noted to be tachycardic throughout exam.   3. Normal global left ventricular systolic function.   4. Left ventricular ejection fraction, by visual estimation, is 55 to   60%.   5. The mitral in-flow pattern reveals no discernable A-wave,therefore   no comment on diastolic function can be made.   6. Mild thickening of the anterior and posterior mitral valve leaflets.   7. Sclerotic aortic valve with decreased opening, gradients not obtained   to evaluate degree of stenosis. Mild to moderate regurgitation. Heavily   calacified valve.   8. Mild to moderate mitral valve regurgitation.   9. Estimated pulmonary artery systolic pressure is 40.2 mmHg assuming a   right atrial pressure of 15 mmHg, which is consistent with mild pulmonary   hypertension.  10. Mild pulmonic valve regurgitation.  11. No evidence of vegetation however cannot exclude, consider LILIAN if   high clinical suspicion.    ISCHEMIC WORK UP  STRESS ECHO: 4/14/2021: METS 8, no ischemic changes, no stress induced WMA

## 2022-11-30 NOTE — CONSULT NOTE ADULT - ASSESSMENT
77yMale with h/o HTN, moderate AS, recent Covid infection 10/6/2022 per chart, was admitted 11/1 for SIRS, found to have strep anginosus bacteremia ( 11/1/22), norovirus (+ GI PCR 11/3/22), had negative TTE 11/3 for endocarditis, negative noncon CT chest 11/5, had refused LILIAN on that admission and was treated empirically for endocarditis course of ceftriaxone that was to end on 11/30/22, presented again to [facility] on 11/27 for syncope w/ collapse and was admitted for sepsis.  States he had been having diarrhea for 1-2 days prior to syncopal episode.  Currently on meropenem, has rectal tube.  Pt with new onset Afib with RVR, currently on amiodarone infusion and metoprolol 25 bid with 5 mg IV prn.  Also hypotensive and on midodrine 10 mg q8h.  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  CTS consulted.

## 2022-11-30 NOTE — PHYSICAL EXAM
[General Appearance - Alert] : alert [General Appearance - In No Acute Distress] : in no acute distress [Sclera] : the sclera and conjunctiva were normal [PERRL With Normal Accommodation] : pupils were equal in size, round, reactive to light [Extraocular Movements] : extraocular movements were intact [Auscultation Breath Sounds / Voice Sounds] : lungs were clear to auscultation bilaterally [Heart Rate And Rhythm] : heart rate was normal and rhythm regular [Heart Sounds] : normal S1 and S2 [Heart Sounds Gallop] : no gallops [Murmurs] : no murmurs [Heart Sounds Pericardial Friction Rub] : no pericardial rub [FreeTextEntry1] : rue picc intact [Bowel Sounds] : normal bowel sounds [Abdomen Soft] : soft [Abdomen Tenderness] : non-tender [Abdomen Mass (___ Cm)] : no abdominal mass palpated [Costovertebral Angle Tenderness] : no CVA tenderness [Musculoskeletal - Swelling] : no joint swelling [Nail Clubbing] : no clubbing  or cyanosis of the fingernails [Motor Tone] : muscle strength and tone were normal [Skin Color & Pigmentation] : normal skin color and pigmentation [] : no rash

## 2022-11-30 NOTE — PROGRESS NOTE ADULT - SUBJECTIVE AND OBJECTIVE BOX
Pt doing well s/p uncomplicated LILIAN. Denies complaint post procedure.     PAST MEDICAL & SURGICAL HISTORY:  Hypertension  Aortic stenosis  H/O inguinal hernia repair B/L 2013  S/P laparoscopic colectomy right colectomy with ileotransverse anastomosis    MEDICATIONS  (STANDING):  aspirin  chewable 81 milliGRAM(s) Oral daily  heparin  Infusion.  Unit(s)/Hr (14 mL/Hr) IV Continuous <Continuous>  lactated ringers. 1000 milliLiter(s) (100 mL/Hr) IV Continuous <Continuous>  meropenem Injectable 1000 milliGRAM(s) IV Push every 8 hours  metoprolol tartrate 25 milliGRAM(s) Oral two times a day  midodrine 10 milliGRAM(s) Oral every 8 hours  vancomycin  IVPB 1000 milliGRAM(s) IV Intermittent <User Schedule>    MEDICATIONS  (PRN):  heparin   Injectable 6500 Unit(s) IV Push every 6 hours PRN For aPTT less than 40  heparin   Injectable 3000 Unit(s) IV Push every 6 hours PRN For aPTT between 40 - 57  metoprolol tartrate Injectable 5 milliGRAM(s) IV Push every 6 hours PRN HR > 130    Post-procedure VS: /65  O2 sat 99% RR 16   awake, alert, no obvious distress  Card: S1/S2, RRR, no m/g/r  Resp: lungs CTA b/l  Abd: S/NT/ND  Ext: no edema, distal pulses intact    LILIAN: 11/30/22:   Summary:   1. Left ventricular ejection fraction, by visual estimation, is 55 to 60%.   2. Normal global left ventricular systolic function.   3. The mitral in-flow pattern reveals no discernable A-wave, therefore no comment on diastolic function can be made.   4. Normal right ventricular size and function, inadequate estimation of RVSP.   5. Mildly enlarged left atrium.   6. Moderate mitral valve regurgitation, jet is eccentric posteriorly directed.   7. Thickening of bileaflet mitral valve with subcentimeter filamentous mobile lesions suggestive of vegetations.   8. Color flow doppler and intravenous injection of agitated saline demonstrates the presence of an intact intra atrial septum.   9. No left atrial appendage thrombus and normal left atrial appendage velocities.  10. Structurally normal tricuspid valve, with normal leaflet excursion.  11. Heavily calcified noncoronary cusp with moderate aortic valve stenosis, ELDON (by 2D planimetry 1.34 cm2), peak velocity 2.8 m/s and mean gradient of 15 mmHg.  12. Mild to moderate aortic regurgitation.  13. There is mild echolucency and thickening of the sinus of Valsalva in between the left and non-coronary cusp, cannot exclude early root abscess.  14. Mild simple atheromas at the aortic arch/descending aorta.  15. There is no evidence of pericardial effusion.  16. No prior LILIAN study is available for comparison. 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 Cardioversion deferred due to the presence of underlying infection/endocarditis.  John Daigle, DO Electronically signed on 11/30/2022 at 3:39:01 PM    Assessment:   77 year old male with PMH of HTN, hyperlipidemia, BPH, moderate aortic stenosis with ELDON 1.1 cm, and recent COVID infection 10/6/22 who presented to [facility] ED  patient with a history of HTN, HLD, BPH, moderate aortic stenosis with ELDON 1.1cm, and COVID on 10/6/22. Recent admission 11/2-7/22 for streptococcus anginosus bacteremia and norovirus infections. He was discharged home with a PICC line for 4 weeks of IV abx as per ID. He presented to [facility] ED on 11/27/22 following syncopal episode, found to be in septic shock and in new onset AFib with rapid rates. 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 Cardioversion deferred.     Plan:   #AFib with RVR   - Observation on telemetry per post op protocol.    - s/p LILIAN which demonstrates no ESTEFANY thrombus but + mitral valve vegetation   - antibiotics as per ID  - no cardioversion performed   - rate control strategy for now. Continue beta blocker and titrate up as tolerated.   - continue heparin gtt for now, please maintain therapeutic PTT   - cardiology following  - CTSx eval   - will discuss with Dr. Monterroso     INCOMPLETE NOTE          Pt doing well s/p uncomplicated LILIAN. Denies complaint post procedure.     PAST MEDICAL & SURGICAL HISTORY:  Hypertension  Aortic stenosis  H/O inguinal hernia repair B/L 2013  S/P laparoscopic colectomy right colectomy with ileotransverse anastomosis    MEDICATIONS  (STANDING):  aspirin  chewable 81 milliGRAM(s) Oral daily  heparin  Infusion.  Unit(s)/Hr (14 mL/Hr) IV Continuous <Continuous>  lactated ringers. 1000 milliLiter(s) (100 mL/Hr) IV Continuous <Continuous>  meropenem Injectable 1000 milliGRAM(s) IV Push every 8 hours  metoprolol tartrate 25 milliGRAM(s) Oral two times a day  midodrine 10 milliGRAM(s) Oral every 8 hours  vancomycin  IVPB 1000 milliGRAM(s) IV Intermittent <User Schedule>    MEDICATIONS  (PRN):  heparin   Injectable 6500 Unit(s) IV Push every 6 hours PRN For aPTT less than 40  heparin   Injectable 3000 Unit(s) IV Push every 6 hours PRN For aPTT between 40 - 57  metoprolol tartrate Injectable 5 milliGRAM(s) IV Push every 6 hours PRN HR > 130    Post-procedure VS: /65  O2 sat 99% RR 16   awake, alert, no obvious distress  Card: S1/S2, RRR, no m/g/r  Resp: lungs CTA b/l  Abd: S/NT/ND  Ext: no edema, distal pulses intact    LILIAN: 11/30/22:   Summary:   1. Left ventricular ejection fraction, by visual estimation, is 55 to 60%.   2. Normal global left ventricular systolic function.   3. The mitral in-flow pattern reveals no discernable A-wave, therefore no comment on diastolic function can be made.   4. Normal right ventricular size and function, inadequate estimation of RVSP.   5. Mildly enlarged left atrium.   6. Moderate mitral valve regurgitation, jet is eccentric posteriorly directed.   7. Thickening of bileaflet mitral valve with subcentimeter filamentous mobile lesions suggestive of vegetations.   8. Color flow doppler and intravenous injection of agitated saline demonstrates the presence of an intact intra atrial septum.   9. No left atrial appendage thrombus and normal left atrial appendage velocities.  10. Structurally normal tricuspid valve, with normal leaflet excursion.  11. Heavily calcified noncoronary cusp with moderate aortic valve stenosis, ELDON (by 2D planimetry 1.34 cm2), peak velocity 2.8 m/s and mean gradient of 15 mmHg.  12. Mild to moderate aortic regurgitation.  13. There is mild echolucency and thickening of the sinus of Valsalva in between the left and non-coronary cusp, cannot exclude early root abscess.  14. Mild simple atheromas at the aortic arch/descending aorta.  15. There is no evidence of pericardial effusion.  16. No prior LILIAN study is available for comparison. 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 Cardioversion deferred due to the presence of underlying infection/endocarditis.  John Daigle, DO Electronically signed on 11/30/2022 at 3:39:01 PM    Assessment:   77 year old male with PMH of HTN, hyperlipidemia, BPH, moderate aortic stenosis with ELDON 1.1 cm, and recent COVID infection 10/6/22. Recent admission 11/2-7/22 for streptococcus anginosus bacteremia and norovirus infections. He was discharged home with a PICC line for 4 weeks of IV abx as per ID. He presented to [facility] ED on 11/27/22 following syncopal episode, found to be in septic shock and in new onset AFib with rapid rates. 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 Cardioversion deferred.     Plan:   #AFib with RVR   - Observation on telemetry per post op protocol.    - s/p LILIAN which demonstrates no ESTEFANY thrombus but + mitral valve vegetation   - antibiotics as per ID  - no cardioversion performed   - rate control strategy for now. Increase metoprolol to 25mg Q 8 hrs. Monitor for heart block.    - continue heparin gtt for now, please maintain therapeutic PTT   - cardiology following  - CTSx eval  - discussed with Dr. Monterroso

## 2022-12-01 DIAGNOSIS — R19.7 DIARRHEA, UNSPECIFIED: ICD-10-CM

## 2022-12-01 LAB
ANION GAP SERPL CALC-SCNC: 10 MMOL/L — SIGNIFICANT CHANGE UP (ref 5–17)
APTT BLD: 40.2 SEC — HIGH (ref 27.5–35.5)
APTT BLD: 47.3 SEC — HIGH (ref 27.5–35.5)
APTT BLD: 62.8 SEC — HIGH (ref 27.5–35.5)
APTT BLD: 70.7 SEC — HIGH (ref 27.5–35.5)
BUN SERPL-MCNC: 18.3 MG/DL — SIGNIFICANT CHANGE UP (ref 8–20)
CALCIUM SERPL-MCNC: 9.2 MG/DL — SIGNIFICANT CHANGE UP (ref 8.4–10.5)
CHLORIDE SERPL-SCNC: 109 MMOL/L — HIGH (ref 96–108)
CO2 SERPL-SCNC: 21 MMOL/L — LOW (ref 22–29)
CREAT SERPL-MCNC: 0.97 MG/DL — SIGNIFICANT CHANGE UP (ref 0.5–1.3)
EGFR: 80 ML/MIN/1.73M2 — SIGNIFICANT CHANGE UP
GLUCOSE SERPL-MCNC: 84 MG/DL — SIGNIFICANT CHANGE UP (ref 70–99)
HCT VFR BLD CALC: 32.3 % — LOW (ref 39–50)
HGB BLD-MCNC: 11.1 G/DL — LOW (ref 13–17)
MAGNESIUM SERPL-MCNC: 2.1 MG/DL — SIGNIFICANT CHANGE UP (ref 1.6–2.6)
MCHC RBC-ENTMCNC: 31.2 PG — SIGNIFICANT CHANGE UP (ref 27–34)
MCHC RBC-ENTMCNC: 34.4 GM/DL — SIGNIFICANT CHANGE UP (ref 32–36)
MCV RBC AUTO: 90.7 FL — SIGNIFICANT CHANGE UP (ref 80–100)
PHOSPHATE SERPL-MCNC: 3.2 MG/DL — SIGNIFICANT CHANGE UP (ref 2.4–4.7)
PLATELET # BLD AUTO: 285 K/UL — SIGNIFICANT CHANGE UP (ref 150–400)
POTASSIUM SERPL-MCNC: 4 MMOL/L — SIGNIFICANT CHANGE UP (ref 3.5–5.3)
POTASSIUM SERPL-SCNC: 4 MMOL/L — SIGNIFICANT CHANGE UP (ref 3.5–5.3)
RBC # BLD: 3.56 M/UL — LOW (ref 4.2–5.8)
RBC # FLD: 12.9 % — SIGNIFICANT CHANGE UP (ref 10.3–14.5)
SODIUM SERPL-SCNC: 140 MMOL/L — SIGNIFICANT CHANGE UP (ref 135–145)
WBC # BLD: 13.98 K/UL — HIGH (ref 3.8–10.5)
WBC # FLD AUTO: 13.98 K/UL — HIGH (ref 3.8–10.5)

## 2022-12-01 PROCEDURE — 93010 ELECTROCARDIOGRAM REPORT: CPT

## 2022-12-01 PROCEDURE — 99223 1ST HOSP IP/OBS HIGH 75: CPT

## 2022-12-01 PROCEDURE — 99231 SBSQ HOSP IP/OBS SF/LOW 25: CPT

## 2022-12-01 PROCEDURE — 99233 SBSQ HOSP IP/OBS HIGH 50: CPT

## 2022-12-01 PROCEDURE — 70551 MRI BRAIN STEM W/O DYE: CPT | Mod: 26

## 2022-12-01 RX ORDER — CHLORHEXIDINE GLUCONATE 213 G/1000ML
1 SOLUTION TOPICAL DAILY
Refills: 0 | Status: DISCONTINUED | OUTPATIENT
Start: 2022-12-01 | End: 2022-12-13

## 2022-12-01 RX ORDER — PHYTONADIONE (VIT K1) 5 MG
5 TABLET ORAL DAILY
Refills: 0 | Status: DISCONTINUED | OUTPATIENT
Start: 2022-12-01 | End: 2022-12-05

## 2022-12-01 RX ORDER — SODIUM CHLORIDE 9 MG/ML
1000 INJECTION, SOLUTION INTRAVENOUS ONCE
Refills: 0 | Status: COMPLETED | OUTPATIENT
Start: 2022-12-01 | End: 2022-12-01

## 2022-12-01 RX ORDER — CHOLESTYRAMINE 4 G/9G
4 POWDER, FOR SUSPENSION ORAL DAILY
Refills: 0 | Status: DISCONTINUED | OUTPATIENT
Start: 2022-12-01 | End: 2022-12-03

## 2022-12-01 RX ORDER — SACCHAROMYCES BOULARDII 250 MG
250 POWDER IN PACKET (EA) ORAL
Refills: 0 | Status: DISCONTINUED | OUTPATIENT
Start: 2022-12-01 | End: 2022-12-05

## 2022-12-01 RX ORDER — HEPARIN SODIUM 5000 [USP'U]/ML
2100 INJECTION INTRAVENOUS; SUBCUTANEOUS
Qty: 25000 | Refills: 0 | Status: DISCONTINUED | OUTPATIENT
Start: 2022-12-01 | End: 2022-12-04

## 2022-12-01 RX ORDER — LOPERAMIDE HCL 2 MG
2 TABLET ORAL EVERY 6 HOURS
Refills: 0 | Status: DISCONTINUED | OUTPATIENT
Start: 2022-12-01 | End: 2022-12-03

## 2022-12-01 RX ADMIN — MIDODRINE HYDROCHLORIDE 10 MILLIGRAM(S): 2.5 TABLET ORAL at 13:54

## 2022-12-01 RX ADMIN — MEROPENEM 1000 MILLIGRAM(S): 1 INJECTION INTRAVENOUS at 05:45

## 2022-12-01 RX ADMIN — HEPARIN SODIUM 3000 UNIT(S): 5000 INJECTION INTRAVENOUS; SUBCUTANEOUS at 19:19

## 2022-12-01 RX ADMIN — Medication 25 MILLIGRAM(S): at 22:53

## 2022-12-01 RX ADMIN — Medication 250 MILLIGRAM(S): at 18:38

## 2022-12-01 RX ADMIN — Medication 25 MILLIGRAM(S): at 13:54

## 2022-12-01 RX ADMIN — CHOLESTYRAMINE 4 GRAM(S): 4 POWDER, FOR SUSPENSION ORAL at 14:25

## 2022-12-01 RX ADMIN — MEROPENEM 1000 MILLIGRAM(S): 1 INJECTION INTRAVENOUS at 22:54

## 2022-12-01 RX ADMIN — Medication 81 MILLIGRAM(S): at 13:46

## 2022-12-01 RX ADMIN — HEPARIN SODIUM 3000 UNIT(S): 5000 INJECTION INTRAVENOUS; SUBCUTANEOUS at 03:21

## 2022-12-01 RX ADMIN — MIDODRINE HYDROCHLORIDE 10 MILLIGRAM(S): 2.5 TABLET ORAL at 22:54

## 2022-12-01 RX ADMIN — SODIUM CHLORIDE 1000 MILLILITER(S): 9 INJECTION, SOLUTION INTRAVENOUS at 05:52

## 2022-12-01 RX ADMIN — Medication 250 MILLIGRAM(S): at 15:20

## 2022-12-01 RX ADMIN — MEROPENEM 1000 MILLIGRAM(S): 1 INJECTION INTRAVENOUS at 13:54

## 2022-12-01 RX ADMIN — SODIUM CHLORIDE 100 MILLILITER(S): 9 INJECTION, SOLUTION INTRAVENOUS at 18:39

## 2022-12-01 RX ADMIN — Medication 2 MILLIGRAM(S): at 18:38

## 2022-12-01 RX ADMIN — MIDODRINE HYDROCHLORIDE 10 MILLIGRAM(S): 2.5 TABLET ORAL at 05:46

## 2022-12-01 RX ADMIN — HEPARIN SODIUM 2100 UNIT(S)/HR: 5000 INJECTION INTRAVENOUS; SUBCUTANEOUS at 23:55

## 2022-12-01 RX ADMIN — CHLORHEXIDINE GLUCONATE 1 APPLICATION(S): 213 SOLUTION TOPICAL at 13:48

## 2022-12-01 RX ADMIN — HEPARIN SODIUM 1900 UNIT(S)/HR: 5000 INJECTION INTRAVENOUS; SUBCUTANEOUS at 03:19

## 2022-12-01 RX ADMIN — SODIUM CHLORIDE 1000 MILLILITER(S): 9 INJECTION, SOLUTION INTRAVENOUS at 13:46

## 2022-12-01 RX ADMIN — Medication 25 MILLIGRAM(S): at 05:46

## 2022-12-01 NOTE — PROGRESS NOTE ADULT - PROBLEM SELECTOR PLAN 1
.  - new on set Afib with better rate control  - [id number] 3 (age, HTN)  - TTE preserved EF 55-60%, mod MR, mod AS, unchanged from previous  - normal exercise stress test in 2021  - continue heparin GTT and amiodarone GTT  - started on metoprolol 25mg PO q8H for rate contol   - remains on midodrine, wean as tolerated .  - new on set Afib with better rate control, monitor conduction  - [id number] 3 (age, HTN)  - TTE preserved EF 55-60%, mod MR, mod AS, unchanged from previous  - s/p LILIAN which demonstrates no ESTEFANY thrombus but + mitral valve vegetation    - no DCCV performed  - normal exercise stress test in 2021  - continue heparin GTT  - started on metoprolol 25mg PO q8H for rate control   - remains on midodrine, wean as tolerated

## 2022-12-01 NOTE — PROGRESS NOTE ADULT - PROBLEM SELECTOR PLAN 1
mitral valve vegetations on LILIAN 11/30 11/27 blood cultures negative  pending preop work up including the following:  CT maxillofacial  CT C/A/P with IV contrast to eval for ischemia and septic emboli  d/w Dr. Richardson, rec MRI stroke protocol to assess for stroke, then consult neuro IR to determine if cerebral angio necessary for OR clearance  indium scan per ID to eval for original source of bacteremia   meropenem per ID   carotids without significant stenosis   possible OR next week     CT surgery to follow  d/w Dr. Flanagan

## 2022-12-01 NOTE — PROGRESS NOTE ADULT - SUBJECTIVE AND OBJECTIVE BOX
Subjective: no complaints, states he feels better, still with copious diarrhea, rectal tube in place, still with copious diarrhea, denies CP, palpitations, SOB, cough, fever, chills, itchiness/rash, diaphoresis, vision changes, HA, dizziness/lightheadedness, numbness/tingling, abd pain, N/V     T(C): 35.9 (12-01-22 @ 16:00), Max: 35.9 (12-01-22 @ 16:00)  HR: 114 (12-01-22 @ 16:00) (98 - 133)  BP: 95/51 (12-01-22 @ 16:00) (82/62 - 114/62)  RR: 19 (12-01-22 @ 16:00) (10 - 31)  SpO2: 95% (12-01-22 @ 16:00) (86% - 100%)    12-01    140  |  109<H>  |  18.3  ----------------------------<  84  4.0   |  21.0<L>  |  0.97    Ca    9.2      01 Dec 2022 02:00  Phos  3.2     12-01  Mg     2.1     12-01    TPro  5.6<L>  /  Alb  2.8<L>  /  TBili  0.6  /  DBili  x   /  AST  118<H>  /  ALT  106<H>  /  AlkPhos  97  11-30                               11.1   13.98 )-----------( 285      ( 01 Dec 2022 02:00 )             32.3        PTT - ( 01 Dec 2022 08:50 )  PTT:62.8 sec    I&O's Detail    30 Nov 2022 07:01  -  01 Dec 2022 07:00  --------------------------------------------------------  IN:    Heparin Infusion: 394 mL    IV PiggyBack: 100 mL    Lactated Ringers: 2000 mL    Lactated Ringers Bolus: 1000 mL  Total IN: 3494 mL    OUT:    Incontinent per Condom Catheter (mL): 1885 mL    Rectal Tube (mL): 1740 mL  Total OUT: 3625 mL  Total NET: -131 mL    01 Dec 2022 07:01  -  01 Dec 2022 17:18  --------------------------------------------------------  IN:    Heparin Infusion: 190 mL    Lactated Ringers: 1000 mL    Lactated Ringers Bolus: 1000 mL  Total IN: 2190 mL    OUT:  Total OUT: 0 mL  Total NET: 2190 mL    Drug Dosing Weight  Height (cm): 167.6 (30 Nov 2022 14:29)  Weight (kg): 76.8 (30 Nov 2022 14:29)  BMI (kg/m2): 27.3 (30 Nov 2022 14:29)  BSA (m2): 1.86 (30 Nov 2022 14:29)    MEDICATIONS  (STANDING):  aspirin  chewable 81 milliGRAM(s) Oral daily  chlorhexidine 2% Cloths 1 Application(s) Topical daily  cholestyramine Powder (Sugar-Free) 4 Gram(s) Oral daily  heparin  Infusion.  Unit(s)/Hr (14 mL/Hr) IV Continuous <Continuous>  lactated ringers. 1000 milliLiter(s) (100 mL/Hr) IV Continuous <Continuous>  loperamide 2 milliGRAM(s) Oral every 6 hours  meropenem Injectable 1000 milliGRAM(s) IV Push every 8 hours  metoprolol tartrate 25 milliGRAM(s) Oral every 8 hours  midodrine 10 milliGRAM(s) Oral every 8 hours  phytonadione   Solution 5 milliGRAM(s) Oral daily  saccharomyces boulardii 250 milliGRAM(s) Oral two times a day    MEDICATIONS  (PRN):  heparin   Injectable 6500 Unit(s) IV Push every 6 hours PRN For aPTT less than 40  heparin   Injectable 3000 Unit(s) IV Push every 6 hours PRN For aPTT between 40 - 57  metoprolol tartrate Injectable 5 milliGRAM(s) IV Push every 6 hours PRN HR > 130    Physical Exam  Gen: NAD  Neuro: A&Ox3 non focal stutter (chronic per pt)  Pulm: CTA b/l no wheezing  CV: S1S2 irregular, tachy  Abd: +BS soft NT ND  Extrem/MS: no edema/cyanosis, MEZA  rectal tube

## 2022-12-01 NOTE — PROGRESS NOTE ADULT - SUBJECTIVE AND OBJECTIVE BOX
[facility]                                                         CARDIOLOGY AT [facility]                                                                  [address] [facility], [place] [place] [address]                                                         Telephone: [phone]. Fax:[phone]                                                                             PROGRESS NOTE    Reason for follow up: Afib with RVR, Sepsis  Update: s/p LILIAN revealing MV vegetation, Afib more rate controlled      Review of symptoms:   Cardiac:  No chest pain. No dyspnea. No palpitations.  Respiratory: no cough. No dyspnea  Gastrointestinal: No diarrhea. No abdominal pain. No bleeding.   Neuro: No focal neuro complaints.    Vitals:  T(C): 35.2 (12-01-22 @ 07:11), Max: 36.9 (11-30-22 @ 14:20)  HR: 114 (12-01-22 @ 09:00) (98 - 133)  BP: 93/54 (12-01-22 @ 09:00) (87/59 - 124/65)  RR: 16 (12-01-22 @ 09:00) (10 - 31)  SpO2: 97% (12-01-22 @ 09:00) (86% - 99%)  Wt(kg): --  I&O's Summary    30 Nov 2022 07:01  -  01 Dec 2022 07:00  --------------------------------------------------------  IN: 3494 mL / OUT: 3625 mL / NET: -131 mL      Weight (kg): 76.8 (11-30 @ 14:29)    PHYSICAL EXAM:  Appearance: Comfortable. No acute distress  HEENT:  Atraumatic. Normocephalic.  Normal oral mucosa  Neurologic: A & O x 3, no gross focal deficits. stutter   Cardiovascular: irreg irregular S1 S2, No murmur, no rubs/gallops. No JVD  Respiratory: Lungs clear to auscultation, unlabored   Gastrointestinal:  Soft, Non-tender, + BS, with rectal tube  Lower Extremities: 2+ Peripheral Pulses, No clubbing, cyanosis, or edema  Psychiatry: Patient is calm. No agitation.   Skin: warm and dry.    CURRENT CARDIAC MEDICATIONS:  metoprolol tartrate 25 milliGRAM(s) Oral every 8 hours  metoprolol tartrate Injectable 5 milliGRAM(s) IV Push every 6 hours PRN  midodrine 10 milliGRAM(s) Oral every 8 hours      CURRENT OTHER MEDICATIONS:  meropenem Injectable 1000 milliGRAM(s) IV Push every 8 hours  aspirin  chewable 81 milliGRAM(s) Oral daily  chlorhexidine 2% Cloths 1 Application(s) Topical daily  heparin   Injectable 6500 Unit(s) IV Push every 6 hours PRN For aPTT less than 40  heparin   Injectable 3000 Unit(s) IV Push every 6 hours PRN For aPTT between 40 - 57  heparin  Infusion.  Unit(s)/Hr (14 mL/Hr) IV Continuous <Continuous>  lactated ringers. 1000 milliLiter(s) (100 mL/Hr) IV Continuous <Continuous>      LABS:	 	  ( 28 Nov 2022 13:50 )  Troponin T  0.10<H>,  CPK  2194<H>, CKMB  X    , BNP X        , ( 27 Nov 2022 09:05 )  Troponin T  0.16<H>,  CPK  5542<H>, CKMB  X    , BNP X        , ( 27 Nov 2022 00:04 )  Troponin T  0.17<H>,  CPK  X    , CKMB  X    , BNP X                                  11.1   13.98 )-----------( 285      ( 01 Dec 2022 02:00 )             32.3     12-01    140  |  109<H>  |  18.3  ----------------------------<  84  4.0   |  21.0<L>  |  0.97    Ca    9.2      01 Dec 2022 02:00  Phos  3.2     12-01  Mg     2.1     12-01    TPro  5.6<L>  /  Alb  2.8<L>  /  TBili  0.6  /  DBili  x   /  AST  118<H>  /  ALT  106<H>  /  AlkPhos  97  11-30    PT/INR/PTT ( 01 Dec 2022 08:50 )                       :                       :      X            :       62.8                  .        .                   .              .           .       X           .                                       Lipid Profile:   HgA1c:   TSH:     TELEMETRY: Afib 100s-110  ECG:    DIAGNOSTIC TESTING:  [ ] Echocardiogram: < from: LILIAN Echo Doppler (11.30.22 @ 14:52) >  PHYSICIAN INTERPRETATION:  Left Ventricle:  Global LV systolic function was normal. Left ventricular ejection   fraction, by visual estimation, is 55 to 60%. The mitral in-flow pattern   reveals no discernable A-wave, therefore no comment on diastolic function   can be made.  Right Ventricle: Normal right ventricular size and function.  Left Atrium: Mildly enlarged left atrium. No left atrial appendage   thrombus is seen and normal left atrial appendage velocities. Color flow   doppler and intravenous injection of agitated saline demonstrates the   presence of an intact intra atrial septum.  Pericardium: There is no evidence of pericardial effusion.  Mitral Valve: Moderate mitral valve regurgitation is seen. Thickening of   bileaflet mitral valve with subcentimeter filamentous mobile lesions   suggestive of vegetations.  Tricuspid Valve: Structurally normal tricuspid valve, with normal leaflet   excursion. Trivial tricuspid regurgitation is visualized.  Aortic Valve: Moderate aortic stenosis is present. Mild to moderate   aortic valve regurgitation is seen.  Shunts: Agitated saline contrast was given intravenously to evaluate for   intracardiac shunting.  In comparison to the previous echocardiogram(s): No prior LILIAN study is   available for comparison. 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 Cardioversion deferred due to the   presence of underlying infection/endocarditis.      Summary:   1. Left ventricular ejection fraction, by visual estimation, is 55 to   60%.   2. Normal global left ventricular systolic function.   3. The mitral in-flow pattern reveals no discernable A-wave, therefore   no comment on diastolic function can be made.   4. Normal right ventricular size and function, inadequate estimation of   RVSP.   5. Mildly enlarged left atrium.   6. Moderate mitral valve regurgitation, jet is eccentric posteriorly   directed.   7. Thickening of bileaflet mitral valve with subcentimeter filamentous  mobile lesions suggestive of vegetations.   8. Color flow doppler and intravenous injection of agitated saline   demonstrates the presence of an intact intra atrial septum.   9. No left atrial appendage thrombus and normal left atrial appendage   velocities.  10. Structurally normal tricuspid valve, with normal leaflet excursion.  11. Heavily calcified noncoronary cusp with moderate aortic valve   stenosis, ELDON (by 2D planimetry 1.34 cm2), peak velocity 2.8 m/s and mean   gradient of 15 mmHg.  12. Mild to moderate aortic regurgitation.  13. There is mild echolucency and thickening of the sinus of Valsalva   inbetween the left and non-coronary cusp, cannot exclude early root   abscess.  14. Mild simple atheromas at the aortic arch/descendingaorta.  15. There is no evidence of pericardial effusion.  16. No prior LILIAN study is available for comparison. 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 Cardioversion   deferred due to the presence of underlying infection/endocarditis.    John Frost DO Electronically signed on 11/30/2022 at 3:39:01 PM    < end of copied text >    [ ]  Catheterization:  [ ] Stress Test:    OTHER:

## 2022-12-01 NOTE — PROGRESS NOTE ADULT - ASSESSMENT
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

## 2022-12-01 NOTE — PROGRESS NOTE ADULT - PROBLEM SELECTOR PLAN 2
.  - h/o strep anginosus bacteremia  - has been on abx for several weeks via PICC at home  - with leukocytosis, diarrhea  - likely contributing to hypotension  - Blood cx negative to date, unclear source  - TTE unchanged from previous, no vegetation or echodensity noted  - LILIAN with MV vegetation  - CTSx following  - ID recc appreciated  - pending Indium scan

## 2022-12-01 NOTE — CONSULT NOTE ADULT - SUBJECTIVE AND OBJECTIVE BOX
Patient is a 77y old  Male who presents with a chief complaint of septic shock unknown source (01 Dec 2022 02:17)      HPI:  77 year old male with PMHx HTN, aortic stenosis, S/p right hemicolectomy in 2019 for benign colon mass, recent COVID on 10/6/22, Vit B12 deficiency, was admitted 11/1 for SIRS, found to have strep anginosus bacteremia ( 11/1/22), norovirus (+ GI PCR 11/3/22), had negative TTE 11/3 for endocarditis, negative noncon CT chest 11/5, had refused LILIAN on that admission and was treated empirically for endocarditis course of ceftriaxone that was to end on 11/30/22. Presented again to [facility] on 11/27 for syncope w/ collapse and was admitted for sepsis.    GI consulted for Diarrhea. Patient seen and evaluated at bedside, alert and oriented x 3, he has a baseline studder in his speech pattern. Patient reports ongoing diarrhea with a recent + GI PCR 11/3/22-norovirus. Repeat GI PCR 11/29 negative, Cdiff negative 11/30. Patient was seen by Gastroenterologist Dr. Son for a screening colonoscopy in 2019 with polypectomy which also showed colon mass. Patient subsequently underwent Right hemicolectomy with Dr. Chung with benign pathology. At this time, Denies nausea, vomiting, abdominal pain, chest pain, shortness of breath. Rectal tube in place with liquid nonbloody output. Currently on Meropenem. CT of abdomen and pelvis on 11/27 without acute CT findings.       PAST MEDICAL & SURGICAL HISTORY:  Hypertension      Aortic stenosis      H/O inguinal hernia repair  B/L 2013      S/P laparoscopic colectomy  right colectomy with ileotransverse anastomosis          Allergies    No Known Allergies    Intolerances        MEDICATIONS  (STANDING):  aspirin  chewable 81 milliGRAM(s) Oral daily  chlorhexidine 2% Cloths 1 Application(s) Topical daily  heparin  Infusion.  Unit(s)/Hr (14 mL/Hr) IV Continuous <Continuous>  lactated ringers. 1000 milliLiter(s) (100 mL/Hr) IV Continuous <Continuous>  meropenem Injectable 1000 milliGRAM(s) IV Push every 8 hours  metoprolol tartrate 25 milliGRAM(s) Oral every 8 hours  midodrine 10 milliGRAM(s) Oral every 8 hours    MEDICATIONS  (PRN):  heparin   Injectable 6500 Unit(s) IV Push every 6 hours PRN For aPTT less than 40  heparin   Injectable 3000 Unit(s) IV Push every 6 hours PRN For aPTT between 40 - 57  metoprolol tartrate Injectable 5 milliGRAM(s) IV Push every 6 hours PRN HR > 130      Social History:    Marital Status:  (   )    (   ) Single    (   )    (  )   Occupation:   Lives with: (  ) alone  (  ) children   (  ) spouse   (  ) parents  (  ) other    Substance Use (street drugs): (  ) never used  (  ) other:  Tobacco Usage:  (   ) never smoked   (   ) former smoker   (   ) current smoker  (     ) pack year  (        ) last cigarette date  Alcohol Usage:  Sexual History:     Family History   IBD (  ) Yes   (  ) No  GI Malignancy (  )  Yes    (  ) No    Health Management     Last Colonoscopy - in 2019 with polypectomy which also showed colon mass.       (     ) Advanced Directives: (     ) None    (      ) DNR    (     ) DNI    (     ) Health Care Proxy:       REVIEW OF SYSTEMS:   General: Negative  HEENT: Negative  CV: Negative  Respiratory: Negative  GI: See HPI  : Negative  MSK: Negative  Hematologic: Negative  Skin: Negative      Vital Signs Last 24 Hrs  T(C): 35.2 (01 Dec 2022 07:11), Max: 36.9 (30 Nov 2022 14:20)  T(F): 95.4 (01 Dec 2022 07:11), Max: 98.4 (30 Nov 2022 14:20)  HR: 114 (01 Dec 2022 09:00) (98 - 133)  BP: 93/54 (01 Dec 2022 09:00) (87/59 - 124/65)  BP(mean): 68 (01 Dec 2022 09:00) (67 - 87)  RR: 16 (01 Dec 2022 09:00) (10 - 31)  SpO2: 97% (01 Dec 2022 09:00) (86% - 99%)    Parameters below as of 01 Dec 2022 08:00  Patient On (Oxygen Delivery Method): room air        PHYSICAL EXAM:   GENERAL:  Elderly pleasant male, No acute distress  HEENT:  NC/AT, conjunctiva clear, sclera anicteric  CHEST:  No increased effort, breath sounds clear  HEART:  Regular rhythm, S1, S2,   ABDOMEN:  See HPI  Rectal: Rectal tube in place with nonbloody liquid stool   EXTREMITIES: No edema  SKIN:  Warm, dry  NEURO:  Calm, cooperative        LABS:                        11.1   13.98 )-----------( 285      ( 01 Dec 2022 02:00 )             32.3     12-01    140  |  109<H>  |  18.3  ----------------------------<  84  4.0   |  21.0<L>  |  0.97    Ca    9.2      01 Dec 2022 02:00  Phos  3.2     12-01  Mg     2.1     12-01    TPro  5.6<L>  /  Alb  2.8<L>  /  TBili  0.6  /  DBili  x   /  AST  118<H>  /  ALT  106<H>  /  AlkPhos  97  11-30    PTT - ( 01 Dec 2022 08:50 )  PTT:62.8 sec    LIVER FUNCTIONS - ( 30 Nov 2022 04:30 )  Alb: 2.8 g/dL / Pro: 5.6 g/dL / ALK PHOS: 97 U/L / ALT: 106 U/L / AST: 118 U/L / GGT: x             RADIOLOGY & ADDITIONAL TESTS:      < from: CT Abdomen and Pelvis No Cont (11.27.22 @ 03:46) >    ACC: [id number] EXAM:  CT ABDOMEN AND PELVIS                        ACC: [id number] EXAM:  CT CHEST                          PROCEDURE DATE:  11/27/2022          INTERPRETATION:  CLINICAL INFORMATION: Sepsis, septic shock    COMPARISON: CT chest 11/5/2022. CT abdomen pelvis 11/2/2022    CONTRAST/COMPLICATIONS:  IV Contrast: NONE  Oral Contrast: NONE  Complications: None reported at time of study completion  Lack of contrast limits evaluation    PROCEDURE:  CT of the Chest, Abdomen and Pelvis was performed.  Sagittal and coronal reformats were performed.    FINDINGS:  CHEST:  LUNGS AND LARGE AIRWAYS: Patent central airways. A few scattered   bilateral calcified granulomas and other nodules measuring up to 3 mm are   unchanged. Streaky bibasilar atelectasis.  PLEURA: No pleural effusion.  VESSELS: Atherosclerotic changes of the aorta and coronary arteries.  HEART: Heart size is normal. No pericardial effusion. Aortic valvular   calcifications.  MEDIASTINUM AND MIRTHA: No lymphadenopathy.  CHESTWALL AND LOWER NECK: Within normal limits.    ABDOMEN AND PELVIS:  LIVER: Within normal limits.  BILE DUCTS: Normal caliber.  GALLBLADDER: Within normal limits.  SPLEEN: Within normal limits.  PANCREAS: Within normal limits.  ADRENALS: Within normal limits.  KIDNEYS/URETERS: No radiodense calculus. No hydroureteronephrosis. Right   renal probable cyst.    BLADDER: Barrios catheter in place.  REPRODUCTIVE ORGANS: Enlarged prostate.    BOWEL: No bowel obstruction. Right hemicolectomy. Liquid stool throughout   the colon and rectum. Sigmoid diverticulosis without evidence of   diverticulitis.  PERITONEUM: No ascites.  VESSELS: Atherosclerotic changes.  RETROPERITONEUM/LYMPH NODES: No lymphadenopathy.  ABDOMINAL WALL: Within normal limits.  BONES: Degenerative changes.  AI VERTEBRAL BODY ANALYSIS:  T11: low bone density of 72 HU, suspicious for osteoporosis.  T12: low bone density of 97 HU, suspicious for osteoporosis.  L2: low bone density of 97 HU, suspicious for osteoporosis.    IMPRESSION:  No pneumonia.  No acute CT findings in the abdomen or pelvis.    VERTEBRAL BODY ANALYSIS: Low bone density as described above, consider   further workup for osteoporosis.        --- End of Report ---            BONNIE HAMILTON MD; Attending Radiologist  This document has been electronically signed. Nov 27 2022  4:    < end of copied text >

## 2022-12-01 NOTE — PROGRESS NOTE ADULT - SUBJECTIVE AND OBJECTIVE BOX
Subjective: Pt seen and examined, resting comfortably in bed, denies any complaints. Remains in the MICU on midodrine. Reports diarrhea improving. Pt remains in AF with average rates ~115bpm with faster episodes ~130bpm. LILIAN performed yesterday revealed mitral valve vegetation and possible aortic root abscess. Cardioversion CANCELLED. Metoprolol was increased to 25mg every 8 hours for better rate control. BP soft (SBP ~90). CT surgery consulted.     EKG: AF w/ HR ~115bpm with episodes of faster rates ~130bpm    MEDICATIONS  (STANDING):  aspirin  chewable 81 milliGRAM(s) Oral daily  chlorhexidine 2% Cloths 1 Application(s) Topical daily  cholestyramine Powder (Sugar-Free) 4 Gram(s) Oral daily  heparin  Infusion.  Unit(s)/Hr (14 mL/Hr) IV Continuous <Continuous>  lactated ringers. 1000 milliLiter(s) (100 mL/Hr) IV Continuous <Continuous>  loperamide 2 milliGRAM(s) Oral every 6 hours  meropenem Injectable 1000 milliGRAM(s) IV Push every 8 hours  metoprolol tartrate 25 milliGRAM(s) Oral every 8 hours  midodrine 10 milliGRAM(s) Oral every 8 hours  phytonadione   Solution 5 milliGRAM(s) Oral daily  saccharomyces boulardii 250 milliGRAM(s) Oral two times a day    MEDICATIONS  (PRN):  heparin   Injectable 6500 Unit(s) IV Push every 6 hours PRN For aPTT less than 40  heparin   Injectable 3000 Unit(s) IV Push every 6 hours PRN For aPTT between 40 - 57  metoprolol tartrate Injectable 5 milliGRAM(s) IV Push every 6 hours PRN HR > 130      Allergies  No Known Allergies        Vital Signs Last 24 Hrs  T(C): 35.7 (01 Dec 2022 11:32), Max: 35.7 (01 Dec 2022 11:32)  T(F): 96.3 (01 Dec 2022 11:32), Max: 96.3 (01 Dec 2022 11:32)  HR: 114 (01 Dec 2022 16:00) (98 - 133)  BP: 95/51 (01 Dec 2022 16:00) (82/62 - 120/74)  BP(mean): 66 (01 Dec 2022 16:00) (66 - 89)  RR: 19 (01 Dec 2022 16:00) (10 - 31)  SpO2: 95% (01 Dec 2022 16:00) (86% - 100%)    Parameters below as of 01 Dec 2022 16:00  Patient On (Oxygen Delivery Method): room air        Physical Exam:  Constitutional: NAD, Awake, alert  HEENT: Abrasion left forehead  Cardiovascular: +S1S2 Irregular, mildly tachycardic   Pulmonary: CTA b/l, unlabored  GI: soft NTND  Extremities: no pedal edema, warm to touch   Neuro: non focal, MEZA x4    LABS:                        11.1   13.98 )-----------( 285      ( 01 Dec 2022 02:00 )             32.3     12-01    140  |  109<H>  |  18.3  ----------------------------<  84  4.0   |  21.0<L>  |  0.97    Ca    9.2      01 Dec 2022 02:00  Phos  3.2     12-01  Mg     2.1     12-01    TPro  5.6<L>  /  Alb  2.8<L>  /  TBili  0.6  /  DBili  x   /  AST  118<H>  /  ALT  106<H>  /  AlkPhos  97  11-30    PTT - ( 01 Dec 2022 08:50 )  PTT:62.8 sec      RADIOLOGY & ADDITIONAL TESTS:  TTE 11/29/2022  Summary:   1. Technically difficult study.   2. Patient noted to be tachycardic throughout exam.   3. Normal global left ventricular systolic function.   4. Left ventricular ejection fraction, by visual estimation, is 55 to   60%.   5. The mitral in-flow pattern reveals no discernable A-wave,therefore   no comment on diastolic function can be made.   6. Mild thickening of the anterior and posterior mitral valve leaflets.   7. Sclerotic aortic valve with decreased opening, gradients not obtained   to evaluate degree of stenosis. Mild to moderate regurgitation. Heavily   calacified valve.   8. Mild to moderate mitral valve regurgitation.   9. Estimated pulmonary artery systolic pressure is 40.2 mmHg assuming a   right atrial pressure of 15 mmHg, which is consistent with mild pulmonary   hypertension.  10. Mild pulmonic valve regurgitation.  11. No evidence of vegetation however cannot exclude, consider LILIAN if   high clinical suspicion.      LILIAN 11/30/2022  Summary:   1. Left ventricular ejection fraction, by visual estimation, is 55 to   60%.   2. Normal global left ventricular systolic function.   3. The mitral in-flow pattern reveals no discernable A-wave, therefore   no comment on diastolic function can be made.   4. Normal right ventricular size and function, inadequate estimation of   RVSP.   5. Mildly enlarged left atrium.   6. Moderate mitral valve regurgitation, jet is eccentric posteriorly   directed.   7. Thickening of bileaflet mitral valve with subcentimeter filamentous  mobile lesions suggestive of vegetations.   8. Color flow doppler and intravenous injection of agitated saline   demonstrates the presence of an intact intra atrial septum.   9. No left atrial appendage thrombus and normal left atrial appendage   velocities.  10. Structurally normal tricuspid valve, with normal leaflet excursion.  11. Heavily calcified noncoronary cusp with moderate aortic valve   stenosis, ELDON (by 2D planimetry 1.34 cm2), peak velocity 2.8 m/s and mean   gradient of 15 mmHg.  12. Mild to moderate aortic regurgitation.  13. There is mild echolucency and thickening of the sinus of Valsalva   inbetween the left and non-coronary cusp, cannot exclude early root   abscess.  14. Mild simple atheromas at the aortic arch/descendingaorta.  15. There is no evidence of pericardial effusion.  16. No prior LILIAN study is available for comparison. 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 Cardioversion   deferred due to the presence of underlying infection/endocarditis.       Subjective: Pt seen and examined, resting comfortably in bed, denies any complaints. Remains in the MICU on midodrine. Pt remains in AF with average rates ~115bpm with faster episodes ~130bpm. LILIAN performed yesterday revealed mitral valve vegetation and possible aortic root abscess. Cardioversion CANCELLED. Metoprolol was increased to 25mg every 8 hours for better rate control. BP soft (SBP ~90). CT surgery consulted.     EKG: AF w/ HR ~115bpm with episodes of faster rates ~130bpm    MEDICATIONS  (STANDING):  aspirin  chewable 81 milliGRAM(s) Oral daily  chlorhexidine 2% Cloths 1 Application(s) Topical daily  cholestyramine Powder (Sugar-Free) 4 Gram(s) Oral daily  heparin  Infusion.  Unit(s)/Hr (14 mL/Hr) IV Continuous <Continuous>  lactated ringers. 1000 milliLiter(s) (100 mL/Hr) IV Continuous <Continuous>  loperamide 2 milliGRAM(s) Oral every 6 hours  meropenem Injectable 1000 milliGRAM(s) IV Push every 8 hours  metoprolol tartrate 25 milliGRAM(s) Oral every 8 hours  midodrine 10 milliGRAM(s) Oral every 8 hours  phytonadione   Solution 5 milliGRAM(s) Oral daily  saccharomyces boulardii 250 milliGRAM(s) Oral two times a day    MEDICATIONS  (PRN):  heparin   Injectable 6500 Unit(s) IV Push every 6 hours PRN For aPTT less than 40  heparin   Injectable 3000 Unit(s) IV Push every 6 hours PRN For aPTT between 40 - 57  metoprolol tartrate Injectable 5 milliGRAM(s) IV Push every 6 hours PRN HR > 130      Allergies  No Known Allergies        Vital Signs Last 24 Hrs  T(C): 35.7 (01 Dec 2022 11:32), Max: 35.7 (01 Dec 2022 11:32)  T(F): 96.3 (01 Dec 2022 11:32), Max: 96.3 (01 Dec 2022 11:32)  HR: 114 (01 Dec 2022 16:00) (98 - 133)  BP: 95/51 (01 Dec 2022 16:00) (82/62 - 120/74)  BP(mean): 66 (01 Dec 2022 16:00) (66 - 89)  RR: 19 (01 Dec 2022 16:00) (10 - 31)  SpO2: 95% (01 Dec 2022 16:00) (86% - 100%)    Parameters below as of 01 Dec 2022 16:00  Patient On (Oxygen Delivery Method): room air        Physical Exam:  Constitutional: NAD, Awake, alert  HEENT: Abrasion left forehead  Cardiovascular: +S1S2 Irregular, mildly tachycardic   Pulmonary: CTA b/l, unlabored  GI: soft NTND  Extremities: no pedal edema, warm to touch   Neuro: non focal, MEZA x4    LABS:                        11.1   13.98 )-----------( 285      ( 01 Dec 2022 02:00 )             32.3     12-01    140  |  109<H>  |  18.3  ----------------------------<  84  4.0   |  21.0<L>  |  0.97    Ca    9.2      01 Dec 2022 02:00  Phos  3.2     12-01  Mg     2.1     12-01    TPro  5.6<L>  /  Alb  2.8<L>  /  TBili  0.6  /  DBili  x   /  AST  118<H>  /  ALT  106<H>  /  AlkPhos  97  11-30    PTT - ( 01 Dec 2022 08:50 )  PTT:62.8 sec      RADIOLOGY & ADDITIONAL TESTS:  TTE 11/29/2022  Summary:   1. Technically difficult study.   2. Patient noted to be tachycardic throughout exam.   3. Normal global left ventricular systolic function.   4. Left ventricular ejection fraction, by visual estimation, is 55 to   60%.   5. The mitral in-flow pattern reveals no discernable A-wave,therefore   no comment on diastolic function can be made.   6. Mild thickening of the anterior and posterior mitral valve leaflets.   7. Sclerotic aortic valve with decreased opening, gradients not obtained   to evaluate degree of stenosis. Mild to moderate regurgitation. Heavily   calacified valve.   8. Mild to moderate mitral valve regurgitation.   9. Estimated pulmonary artery systolic pressure is 40.2 mmHg assuming a   right atrial pressure of 15 mmHg, which is consistent with mild pulmonary   hypertension.  10. Mild pulmonic valve regurgitation.  11. No evidence of vegetation however cannot exclude, consider LILIAN if   high clinical suspicion.      LILIAN 11/30/2022  Summary:   1. Left ventricular ejection fraction, by visual estimation, is 55 to   60%.   2. Normal global left ventricular systolic function.   3. The mitral in-flow pattern reveals no discernable A-wave, therefore   no comment on diastolic function can be made.   4. Normal right ventricular size and function, inadequate estimation of   RVSP.   5. Mildly enlarged left atrium.   6. Moderate mitral valve regurgitation, jet is eccentric posteriorly   directed.   7. Thickening of bileaflet mitral valve with subcentimeter filamentous  mobile lesions suggestive of vegetations.   8. Color flow doppler and intravenous injection of agitated saline   demonstrates the presence of an intact intra atrial septum.   9. No left atrial appendage thrombus and normal left atrial appendage   velocities.  10. Structurally normal tricuspid valve, with normal leaflet excursion.  11. Heavily calcified noncoronary cusp with moderate aortic valve   stenosis, ELDON (by 2D planimetry 1.34 cm2), peak velocity 2.8 m/s and mean   gradient of 15 mmHg.  12. Mild to moderate aortic regurgitation.  13. There is mild echolucency and thickening of the sinus of Valsalva   inbetween the left and non-coronary cusp, cannot exclude early root   abscess.  14. Mild simple atheromas at the aortic arch/descendingaorta.  15. There is no evidence of pericardial effusion.  16. No prior LILIAN study is available for comparison. 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 Cardioversion   deferred due to the presence of underlying infection/endocarditis.

## 2022-12-01 NOTE — PROGRESS NOTE ADULT - ASSESSMENT
77 year old male with PMH of HTN, hyperlipidemia, BPH, moderate aortic stenosis with ELDON 1.1 cm, and recent COVID infection 10/6/22. Recent admission 11/2-7/22 for streptococcus anginosus bacteremia and norovirus infections. He was discharged home with a PICC line for 4 weeks of IV abx as per ID. He presented to [facility] ED on 11/27/22 following syncopal episode, found to be in septic shock and in new onset AFib with rapid rates. 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 Cardioversion CANCELLED. Metoprolol was increased to 25mg every 8 hours for better rate control and CT surgery consulted.     Recommendations:    1. Atrial fibrillation; new onset  - Unable to cardiovert due to MV vegetation; rate control strategy for now.    - Rates better controlled on Metoprolol 25mg every 8 hours. Recommend continuing with hold parameters. Would accept rates up to 120bpm given current medical illness.   - [id number]= 3; age, HTN. Remains on therapeutic heparin; maintain therapeutic PTT     2. Bacterial endocarditis and possible aortic root abscess  - CT surgery consulted; workup in progress  - Abx per ID    Full recommendations to follow      77 year old male with PMH of HTN, hyperlipidemia, BPH, moderate aortic stenosis with ELDON 1.1 cm, and recent COVID infection 10/6/22. Recent admission 11/2-7/22 for streptococcus anginosus bacteremia and norovirus infections. He was discharged home with a PICC line for 4 weeks of IV abx as per ID. He presented to [facility] ED on 11/27/22 following syncopal episode, found to be in septic shock and in new onset AFib with rapid rates. 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 Cardioversion CANCELLED. Metoprolol was increased to 25mg every 8 hours for better rate control and CT surgery consulted.     Recommendations:    1. Atrial fibrillation; new onset  - Unable to cardiovert due to MV vegetation; rate control strategy for now.    - Rates better controlled on Metoprolol 25mg every 8 hours. Recommend continuing with hold parameters. Would accept rates up to 120bpm given current medical illness.   - [id number]= 3; age, HTN. Remains on therapeutic heparin; maintain therapeutic PTT     2. 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   - If aortic root abscess is confirmed will need to be careful with uptitration of rate control agents due to risk of AVB.   - If going to cardiac surgery, then advise to consider AF surgical ablation and ESTEFANY clip at that time.   - Abx per ID    Full recommendations to follow

## 2022-12-01 NOTE — PROGRESS NOTE ADULT - ASSESSMENT
76 y/o M with a h/o recent COVID-19 (10/2022), HTN, moderate AS,  recent streptococcus anginosus bacteremia (unremarkable CT-imaging, refused LILIAN, discharged on 11/7/22 with PICC/ceftriaxone), recent norovirus infection, with:    # Septic shock  # Bacterial endocarditis  # Refractory diarrhea  # AF with RVR  # RADHA    Septic shock secondary to suspected bacterial endocarditis given new LILIAN finding of mitral valve vegetation and possible aortic root abscess in the setting of recent strep anginosis bacteremia, although blood cultures are negative for growth thus far this hospital admission. Voluminous diarrhea persists. No abdominal findings on CT. Stool culture pending. GI PCR and Cdiff negative.     - progressive hypotension (SBP 80s), bolus 1L LR x 1, continue @ 100cc/hr to replace ongoing GI losses  - restart IV vasopressor as necessary to maintain a MAP > 65  - continue midodrine 10mg TID for vascular tone  - rapid AF continues, slightly better with BB on board, although hemodynamics will not allow for uptitration of dose  - DCCV not an option given LILIAN findings  - continue full anticoagulation with heparin infusion  - EP input appreciated  - RADHA improving, trend BUN/Cr, electrolytes, acid-base balance, and monitor UOP  - continue empiric meropenem, discontinue vancomycin  - pre-op workup imaging ordered as per cardiothoracic surgery  - Indium scan planned for today  - remove PICC    Case discussed with MICU physician, Dr. Allan.

## 2022-12-01 NOTE — PROGRESS NOTE ADULT - SUBJECTIVE AND OBJECTIVE BOX
INFECTIOUS DISEASES AND INTERNAL MEDICINE at [place]  =======================================================  Flip Pederson MD  Diplomates American Board of Internal Medicine and Infectious Diseases  Telephone [phone]  Fax            [phone]  =======================================================    GEORGE [id number]      ID f/u- fever, endocarditis  still with diarrhea  c diff and gi pcr (-)      ANTIBIOTICS  meropenem  IVPB 1000 milliGRAM(s) IV Intermittent every 12 hours      Allergies    No Known Allergies    Intolerances        SOCIAL HISTORY:     FAMILY HX   FAMILY HISTORY:  FH: heart disease (Father, Mother)        Vital Signs Last 24 Hrs  Vital Signs Last 24 Hrs  T(C): 35.8 (2022 11:50), Max: 36.4 (2022 16:19)  T(F): 96.5 (2022 11:50), Max: 97.5 (2022 16:19)  HR: 127 (2022 11:00) (97 - 137)  BP: 100/82 (2022 11:00) (70/59 - 114/70)  BP(mean): 81 (2022 11:00) (60 - 88)  RR: 16 (2022 11:00) (12 - 30)  SpO2: 99% (2022 11:00) (68% - 100%)    Parameters below as of 2022 16:00  Patient On (Oxygen Delivery Method): room air        Parameters below as of 2022 07:15  Patient On (Oxygen Delivery Method): nasal cannula  O2 Flow (L/min): 2    Drug Dosing Weight  Height (cm): 167.6 (2022 00:01)  Weight (kg): 77 (2022 00:30)  BMI (kg/m2): 27.4 (2022 00:30)  BSA (m2): 1.87 (2022 00:30)      REVIEW OF SYSTEMS:    CONSTITUTIONAL:  As per HPI.    HEENT:  Eyes:  No diplopia or blurred vision. ENT:  No earache, sore throat or runny nose.    CARDIOVASCULAR:  No pressure, squeezing, strangling, tightness, heaviness or aching about the chest, neck, axilla or epigastrium.    RESPIRATORY:  No cough, shortness of breath, PND or orthopnea.    GASTROINTESTINAL:  No nausea, vomiting or diarrhea.    GENITOURINARY:  No dysuria, frequency or urgency.    MUSCULOSKELETAL:  As per HPI.    SKIN:  No change in skin, hair or nails.    NEUROLOGIC:    weakness.                  PHYSICAL EXAMINATION:    GENERAL: nad    HEENT: Head is normocephalic and atraumatic.  ANICTERIC left upper scalp ecchymoses  NECK: Supple. No carotid bruits.  No lymphadenopathy or thyromegaly.    LUNGS:clear BREATH SOUNDS    HEART: Regular rate and rhythm without murmur.    ABDOMEN: Soft, nontender, and nondistended.  Positive bowel sounds.  No hepatosplenomegaly was noted. NO REBOUND NO GUARDING    EXTREMITIES: NO EDEMA NO ERYTHEMA    NEUROLOGIC: NON FOCAL      SKIN: No ulceration or induration present. NO RASH picc rue            LABS:                                     11.0   13.58 )-----------( 272      ( 2022 05:00 )             31.4           140  |  109<H>  |  26.1<H>  ----------------------------<  106<H>  3.9   |  20.0<L>  |  1.24    Ca    8.9      2022 05:00  Phos  3.0       Mg     2.0         TPro  5.6<L>  /  Alb  3.0<L>  /  TBili  0.3<L>  /  DBili  x   /  AST  136<H>  /  ALT  115<H>  /  AlkPhos  94  -                  PTT - ( 2022 10:39 )  PTT:40.6 sec    CARDIAC MARKERS ( 2022 13:50 )  x     / 0.10 ng/mL / 2194 U/L / x     / 121.2 ng/mL        CAPILLARY BLOOD GLUCOSE                   PTT - ( 2022 00:04 )  PTT:26.2 sec  Urinalysis Basic - ( 2022 09:10 )    Color: Yellow / Appearance: Clear / S.010 / pH: x  Gluc: x / Ketone: Negative  / Bili: Negative / Urobili: Negative mg/dL   Blood: x / Protein: 30 mg/dL / Nitrite: Negative   Leuk Esterase: Negative / RBC: 6-10 /HPF / WBC 0-2 /HPF   Sq Epi: x / Non Sq Epi: Occasional / Bacteria: Few        RADIOLOGY & ADDITIONAL STUDIES:  < from: CT Chest No Cont (22 @ 03:46) >  IMPRESSION:  No pneumonia.  No acute CT findings in the abdomen or pelvis.    VERTEBRAL BODY ANALYSIS: Low bone density as described above, consider   further workup for osteoporosis.        --- End of Report ---        < end of copied text >        < from: LILIAN Echo Doppler (22 @ 14:52) >  Summary:   1. Left ventricular ejection fraction, by visual estimation, is 55 to   60%.   2. Normal global left ventricular systolic function.   3. The mitral in-flow pattern reveals no discernable A-wave, therefore   no comment on diastolic function can be made.   4. Normal right ventricular size and function, inadequate estimation of   RVSP.   5. Mildly enlarged left atrium.   6. Moderate mitral valve regurgitation, jet is eccentric posteriorly   directed.   7. Thickening of bileaflet mitral valve with subcentimeter filamentous  mobile lesions suggestive of vegetations.   8. Color flow doppler and intravenous injection of agitated saline   demonstrates the presence of an intact intra atrial septum.   9. No left atrial appendage thrombus and normal left atrial appendage   velocities.  10. Structurally normal tricuspid valve, with normal leaflet excursion.  11. Heavily calcified noncoronary cusp with moderate aortic valve   stenosis, ELDON (by 2D planimetry 1.34 cm2), peak velocity 2.8 m/s and mean   gradient of 15 mmHg.  12. Mild to moderate aortic regurgitation.  13. There is mild echolucency and thickening of the sinus of Valsalva   inbetween the left and non-coronary cusp, cannot exclude early root   abscess.  14. Mild simple atheromas at the aortic arch/descendingaorta.  15. There is no evidence of pericardial effusion.  16. No prior LILIAN study is available for comparison. 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 Cardioversion   deferred due to the presence of underlying infection/endocarditis.    John Frost DO Electronically signed on 2022 at 3:39:01 PM    < end of copied text >      ASSESSMENT/PLAN    77 year old male with PMHx of recent  COVID  on 10/6/22, HTN , Vit B12 deficiency, presenting to the ED on  with body aches, fatigue and fever (Tmax 102) at home for 12 days found to have streptococcus bacteremia and admitted with unclear source, no recent dental work, skin infections, no respiratory symptoms. Pan scan was negative at that time ( LILIAN was recommended but pt refused) He was treated for Bacteremia ( Blood cultures + streptococcus anginosis pansensitive ) and norovirus with supportive care and contact isolation . He was discharged home on  with IV Rocephin via PICC line. Recommendation was to continue ABX  daily via pic end 22    he  presented to ER after and episode of syncope and collapse, found to be septic, hypotensive, hypoxic and febrile in the ER. He is unsure of mechanism of fall but remembers being on the ground no reported LOC . He was found face-down on bathroom floor buy family with left leg wedged under cabinet. On my interview he is alert and oriented to person place and time, he has a baseline studder in his speech pattern but otherwise neurologically intact without deficit  In response to hypotension he failed to respond to Inital sepsis fluid protocol in ER is required IV pressor in form of levophed to maintain MAP greater than 60 -65. In the ER he was febrile with initial labs significant for WBC of 22.4 , ProCal of 21.2 first lactate 4.9 via abg with repeat post fluids of 2.2 venous sample.   PT admitted to [facility] with septic shock unclear source, now off pressors. Found to have new onset A fib and on amio. 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    Septic shock 2/2 strep anginosus endocarditis suspected aortic root abscess  New onset Afib  Leukocytosis  Fever  h/o strep anginosus bacteremia  Diarrhea    c/w empiric meropenem   vanco stopped  ct c/ap non contrast without clear source  bcx ngtd  LILIAN + MV vegetations and possible early aortic root abscess  indium pending  trend fever  trend wbc, improving  ctsx following. preop imaging ordered  monitor HR now on lopessor  monitor diarrhea, c/w imodium      d/w  pharmacy  will f/u

## 2022-12-01 NOTE — PROGRESS NOTE ADULT - PROBLEM SELECTOR PLAN 4
prior norovirus + 11/3  still with rectal tube in place due to copious diarrhea  GI consult appreciated  neg c diff ? antibiotics associated  cont imodium and cholestyramine

## 2022-12-01 NOTE — PROGRESS NOTE ADULT - NS ATTEND AMEND GEN_ALL_CORE FT
1) Septic shock:  h/o strep anginosus bacteremia.  has been on abx for several weeks via PICC at home. Blood cx negative to date, unclear source  TTE unchanged from previous, no vegetation or echodensity noted.   2) Atrial fibrillation, new onset with  RVR   [id number] (age, HTN).  TTE preserved EF 55-60%, mod MR, mod AS, unchanged from previous  normal exercise stress test in 2021. On IV hep. Unable to add further GDMT, hypotensive requiring midodrine,   3) EP consulted 11/29: "Pt with moderate AS, AF, and recent bacteremia. Admitted with postural dizziness and low BP with NSR. Went into AF with RVR.   Suggest to start oral metoprolol at low dose q 6 hours PO, plus 5 mg iv prn every 4-6 hours for HR above 125 bpm > 2 hours. Given concerns about active infection and soft BP on admission, no need for strict HR control. Given suboptimal anticoagulation (PTT low), then suggest to hold amiodarone till LILIAN is done and no other invasive tests/procedures are planned for at least 3-4 weeks. If any concerns about IE, then should NOT do DCCV and c/w rate control. will follow up.".  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 Moderate AS, Ml-Mod AR, There is mild echolucency and thickening of the sinus of Valsalva   in between the left and non-coronary cusp, cannot exclude early root abscess. Mitral valve   vegetations and possible early aortic root abscess present, recommend clinical correlation for endocarditis, consider FDG-PET/CT or Indium scan   to confirm root abscess or short interval repeat LILIAN study. Cardioversion deferred due to the presence of underlying infection/endocarditis.  CTS on the case, pre-op workup imaging ordered as per cardiothoracic surgery. Indium scan planned for today  5) Septic shock:  h/o strep anginosus bacteremia,  refused LILIAN, discharged on 11/7/22 with PICC/ceftriaxone, has been on abx for several weeks via PICC at home.Blood cx negative to date, unclear source. Septic shock secondary to suspected bacterial endocarditis.  Hypotension on IVF to replace ongoing GI losses. IV vasopressor as necessary to maintain a MAP > 65.continue midodrine 10mg TID  6) Refractory diarrhea: GI on the case  7) RADHA: Minotor renal fx, UO.

## 2022-12-01 NOTE — PROGRESS NOTE ADULT - SUBJECTIVE AND OBJECTIVE BOX
Patient is a 77y old  Male who presents with a chief complaint of septic shock unknown source (30 Nov 2022 18:35)      BRIEF HOSPITAL COURSE: ***    Events last 24 hours: ***        PAST MEDICAL & SURGICAL HISTORY:  Hypertension      Aortic stenosis      H/O inguinal hernia repair  B/L 2013      S/P laparoscopic colectomy  right colectomy with ileotransverse anastomosis          Review of Systems:  CONSTITUTIONAL: No fever, chills, or fatigue  EYES: No eye pain, visual disturbances, or discharge  ENMT:  No difficulty hearing, tinnitus, vertigo; No sinus or throat pain  NECK: No pain or stiffness  RESPIRATORY: No cough, wheezing, chills or hemoptysis; No shortness of breath  CARDIOVASCULAR: No chest pain, palpitations, dizziness, or leg swelling  GASTROINTESTINAL: No abdominal or epigastric pain. No nausea, vomiting, or hematemesis; No diarrhea or constipation. No melena or hematochezia.  GENITOURINARY: No dysuria, frequency, hematuria, or incontinence  NEUROLOGICAL: No headaches, memory loss, loss of strength, numbness, or tremors  SKIN: No itching, burning, rashes, or lesions   MUSCULOSKELETAL: No joint pain or swelling; No muscle, back, or extremity pain  PSYCHIATRIC: No depression, anxiety, mood swings, or difficulty sleeping      Medications:  meropenem Injectable 1000 milliGRAM(s) IV Push every 8 hours    metoprolol tartrate 25 milliGRAM(s) Oral every 8 hours  metoprolol tartrate Injectable 5 milliGRAM(s) IV Push every 6 hours PRN  midodrine 10 milliGRAM(s) Oral every 8 hours          aspirin  chewable 81 milliGRAM(s) Oral daily  heparin   Injectable 3000 Unit(s) IV Push every 6 hours PRN  heparin   Injectable 6500 Unit(s) IV Push every 6 hours PRN  heparin  Infusion.  Unit(s)/Hr IV Continuous <Continuous>          lactated ringers. 1000 milliLiter(s) IV Continuous <Continuous>                ICU Vital Signs Last 24 Hrs  T(C): 36.4 (30 Nov 2022 14:29), Max: 36.9 (30 Nov 2022 14:20)  T(F): 97.6 (30 Nov 2022 14:29), Max: 98.4 (30 Nov 2022 14:20)  HR: 109 (30 Nov 2022 23:00) (105 - 157)  BP: 96/81 (30 Nov 2022 23:00) (89/65 - 124/65)  BP(mean): 87 (30 Nov 2022 23:00) (67 - 90)  ABP: --  ABP(mean): --  RR: 14 (30 Nov 2022 23:00) (11 - 27)  SpO2: 99% (30 Nov 2022 23:00) (86% - 99%)    O2 Parameters below as of 30 Nov 2022 20:00  Patient On (Oxygen Delivery Method): room air                I&O's Detail    29 Nov 2022 07:01  -  30 Nov 2022 07:00  --------------------------------------------------------  IN:    Amiodarone: 300.6 mL    Heparin Infusion: 426 mL    Lactated Ringers: 1000 mL    Oral Fluid: 850 mL  Total IN: 2576.6 mL    OUT:    Indwelling Catheter - Urethral (mL): 170 mL  Total OUT: 170 mL    Total NET: 2406.6 mL      30 Nov 2022 07:01  -  01 Dec 2022 02:17  --------------------------------------------------------  IN:    Heparin Infusion: 265 mL    IV PiggyBack: 100 mL    Lactated Ringers: 1400 mL  Total IN: 1765 mL    OUT:    Incontinent per Condom Catheter (mL): 1255 mL    Rectal Tube (mL): 890 mL  Total OUT: 2145 mL    Total NET: -380 mL            LABS:                        11.4   15.20 )-----------( 331      ( 30 Nov 2022 04:30 )             33.7     11-30    138  |  110<H>  |  20.5<H>  ----------------------------<  90  3.8   |  19.0<L>  |  1.11    Ca    8.9      30 Nov 2022 04:30  Phos  2.6     11-30  Mg     1.6     11-30    TPro  5.6<L>  /  Alb  2.8<L>  /  TBili  0.6  /  DBili  x   /  AST  118<H>  /  ALT  106<H>  /  AlkPhos  97  11-30          CAPILLARY BLOOD GLUCOSE        PTT - ( 30 Nov 2022 18:16 )  PTT:143.8 sec    CULTURES:  C Diff by PCR Result: NotDetec (11-30-22 @ 12:54)  Culture Results:   No growth (11-27-22 @ 09:10)  Rapid RVP Result: NotDetec (11-27-22 @ 00:25)  Culture Results:   No growth to date. (11-27-22 @ 00:10)  Culture Results:   No growth to date. (11-27-22 @ 00:04)        Physical Examination:    General: No acute distress.  Alert, oriented, interactive, nonfocal    HEENT: Pupils equal, reactive to light.  Symmetric.    PULM: Clear to auscultation bilaterally, no significant sputum production    CVS: Regular rate and rhythm, no murmurs, rubs, or gallops    ABD: Soft, nondistended, nontender, normoactive bowel sounds, no masses    EXT: No edema, nontender    SKIN: Warm and well perfused, no rashes noted.    NEURO: A&Ox3, strength 5/5 all extremities, cranial nerves grossly intact, no focal deficits        RADIOLOGY: ***        CRITICAL CARE TIME SPENT: ***  Time spent evaluating/treating patient with medical issues that pose a high risk for life threatening deterioration and/or end-organ damage, reviewing data/labs/imaging, discussing case with multidisciplinary team, discussing plan/goals of care with patient/family. Non-inclusive of procedure time.   Patient is a 77y old  Male who presents with a chief complaint of septic shock unknown source (30 Nov 2022 18:35)      BRIEF HOSPITAL COURSE: 76 y/o M with a h/o recent COVID-19 (10/2022), HTN, moderate AS,  recent streptococcus anginosus bacteremia (unremarkable CT-imaging, refused LILIAN, discharged on 11/7/22 with PICC/ceftriaxone), recent norovirus infection, admitted on 11/27 s/p episode of syncope and collapse. Found to have septic shock of unclear source and new onset AF with RVR. Started on IV vasopressor therapy.    Events last 24 hours: Remains in rapid AF despite amiodarone infusion. Hypotensive. Profuse diarrhea. LILIAN revealed mitral valve vegetation and possible aortic root abscess.        PAST MEDICAL & SURGICAL HISTORY:  Hypertension      Aortic stenosis      H/O inguinal hernia repair  B/L 2013      S/P laparoscopic colectomy  right colectomy with ileotransverse anastomosis          Review of Systems:  CONSTITUTIONAL: No fever, chills, or fatigue  EYES: No eye pain, visual disturbances, or discharge  ENMT:  No difficulty hearing, tinnitus, vertigo; No sinus or throat pain  NECK: No pain or stiffness  RESPIRATORY: No cough, wheezing, chills or hemoptysis; No shortness of breath  CARDIOVASCULAR: No chest pain, palpitations, dizziness, or leg swelling  GASTROINTESTINAL: No abdominal or epigastric pain. No nausea, vomiting, or hematemesis;  (+)diarrhea, No melena or hematochezia.  GENITOURINARY: No dysuria, frequency, hematuria, or incontinence  NEUROLOGICAL: No headaches, memory loss, loss of strength, numbness, or tremors  SKIN: No itching, burning, rashes, or lesions   MUSCULOSKELETAL: No joint pain or swelling; No muscle, back, or extremity pain  PSYCHIATRIC: No depression, anxiety, mood swings, or difficulty sleeping      Medications:  meropenem Injectable 1000 milliGRAM(s) IV Push every 8 hours  metoprolol tartrate 25 milliGRAM(s) Oral every 8 hours  metoprolol tartrate Injectable 5 milliGRAM(s) IV Push every 6 hours PRN  midodrine 10 milliGRAM(s) Oral every 8 hours  aspirin  chewable 81 milliGRAM(s) Oral daily  heparin   Injectable 3000 Unit(s) IV Push every 6 hours PRN  heparin   Injectable 6500 Unit(s) IV Push every 6 hours PRN  heparin  Infusion.  Unit(s)/Hr IV Continuous <Continuous>  lactated ringers. 1000 milliLiter(s) IV Continuous <Continuous>        ICU Vital Signs Last 24 Hrs  T(C): 36.4 (30 Nov 2022 14:29), Max: 36.9 (30 Nov 2022 14:20)  T(F): 97.6 (30 Nov 2022 14:29), Max: 98.4 (30 Nov 2022 14:20)  HR: 109 (30 Nov 2022 23:00) (105 - 157)  BP: 96/81 (30 Nov 2022 23:00) (89/65 - 124/65)  BP(mean): 87 (30 Nov 2022 23:00) (67 - 90)  ABP: --  ABP(mean): --  RR: 14 (30 Nov 2022 23:00) (11 - 27)  SpO2: 99% (30 Nov 2022 23:00) (86% - 99%)    O2 Parameters below as of 30 Nov 2022 20:00  Patient On (Oxygen Delivery Method): room air                I&O's Detail    29 Nov 2022 07:01  -  30 Nov 2022 07:00  --------------------------------------------------------  IN:    Amiodarone: 300.6 mL    Heparin Infusion: 426 mL    Lactated Ringers: 1000 mL    Oral Fluid: 850 mL  Total IN: 2576.6 mL    OUT:    Indwelling Catheter - Urethral (mL): 170 mL  Total OUT: 170 mL    Total NET: 2406.6 mL      30 Nov 2022 07:01  -  01 Dec 2022 02:17  --------------------------------------------------------  IN:    Heparin Infusion: 265 mL    IV PiggyBack: 100 mL    Lactated Ringers: 1400 mL  Total IN: 1765 mL    OUT:    Incontinent per Condom Catheter (mL): 1255 mL    Rectal Tube (mL): 890 mL  Total OUT: 2145 mL    Total NET: -380 mL            LABS:                        11.4   15.20 )-----------( 331      ( 30 Nov 2022 04:30 )             33.7     11-30    138  |  110<H>  |  20.5<H>  ----------------------------<  90  3.8   |  19.0<L>  |  1.11    Ca    8.9      30 Nov 2022 04:30  Phos  2.6     11-30  Mg     1.6     11-30    TPro  5.6<L>  /  Alb  2.8<L>  /  TBili  0.6  /  DBili  x   /  AST  118<H>  /  ALT  106<H>  /  AlkPhos  97  11-30          CAPILLARY BLOOD GLUCOSE        PTT - ( 30 Nov 2022 18:16 )  PTT:143.8 sec    CULTURES:  C Diff by PCR Result: NotDetec (11-30-22 @ 12:54)  Culture Results:   No growth (11-27-22 @ 09:10)  Rapid RVP Result: NotDetec (11-27-22 @ 00:25)  Culture Results:   No growth to date. (11-27-22 @ 00:10)  Culture Results:   No growth to date. (11-27-22 @ 00:04)        Physical Examination:    General: No acute distress.  Alert, oriented, interactive, nonfocal    HEENT: Pupils equal, reactive to light.  Symmetric.    PULM: Clear to auscultation bilaterally, no significant sputum production    CVS: tachycardic, irreg irreg rhythm, no murmurs, rubs, or gallops    ABD: Soft, nondistended, nontender, normoactive bowel sounds, no masses    EXT: No edema, nontender    SKIN: Warm and well perfused, no rashes noted.    NEURO: A&Ox3, strength 5/5 all extremities, cranial nerves grossly intact, no focal deficits        RADIOLOGY:     < from: US Abdomen Upper Quadrant Right (11.30.22 @ 10:53) >  FINDINGS:  Liver: There is increased hepatic echogenicity, suggesting hepatic   steatosis and/or fibrosis. The liver is within normal limits in size,   measuring up to 17.2 cm. The visualized main portal vein appears patent   with normal direction of flow.  Bile ducts: Normal caliber. Common bile duct measures 4 mm.  Gallbladder: Within normal limits. There is no gallbladder wall   thickening or pericholecystic fluid. No gallstones are seen. Negative   sonographic Starr sign.  Pancreas: Pancreas not visualized.  Right kidney: 10.1 cm. No hydronephrosis. There is a 2.3 x 2.6 x 1.7 cm   cyst at the interpolar region of the right kidney.  Ascites: None.  Aorta/IVC: Visualized portions are within normal limits.    IMPRESSION:  Increased hepatic echogenicity, suggesting hepatic steatosis/or fibrosis.    Pancreas not visualized.        CRITICAL CARE TIME SPENT: 40 mins  Time spent evaluating/treating patient with medical issues that pose a high risk for life threatening deterioration and/or end-organ damage, reviewing data/labs/imaging, discussing case with multidisciplinary team, discussing plan/goals of care with patient/family. Non-inclusive of procedure time. Patient is a 77y old  Male who presents with a chief complaint of septic shock unknown source (30 Nov 2022 18:35)      BRIEF HOSPITAL COURSE: 78 y/o M with a h/o recent COVID-19 (10/2022), HTN, moderate AS,  recent streptococcus anginosus bacteremia (unremarkable CT-imaging, refused LILIAN, discharged on 11/7/22 with PICC/ceftriaxone), recent norovirus infection, admitted on 11/27 s/p episode of syncope and collapse. Found to have septic shock of unclear source and new onset AF with RVR. Started on IV vasopressor therapy.    Events last 24 hours: Remains in rapid AF. Hypotensive. Profuse diarrhea. LILIAN revealed mitral valve vegetation and possible aortic root abscess.        PAST MEDICAL & SURGICAL HISTORY:  Hypertension      Aortic stenosis      H/O inguinal hernia repair  B/L 2013      S/P laparoscopic colectomy  right colectomy with ileotransverse anastomosis          Review of Systems:  CONSTITUTIONAL: No fever, chills, or fatigue  EYES: No eye pain, visual disturbances, or discharge  ENMT:  No difficulty hearing, tinnitus, vertigo; No sinus or throat pain  NECK: No pain or stiffness  RESPIRATORY: No cough, wheezing, chills or hemoptysis; No shortness of breath  CARDIOVASCULAR: No chest pain, palpitations, dizziness, or leg swelling  GASTROINTESTINAL: No abdominal or epigastric pain. No nausea, vomiting, or hematemesis;  (+)diarrhea, No melena or hematochezia.  GENITOURINARY: No dysuria, frequency, hematuria, or incontinence  NEUROLOGICAL: No headaches, memory loss, loss of strength, numbness, or tremors  SKIN: No itching, burning, rashes, or lesions   MUSCULOSKELETAL: No joint pain or swelling; No muscle, back, or extremity pain  PSYCHIATRIC: No depression, anxiety, mood swings, or difficulty sleeping      Medications:  meropenem Injectable 1000 milliGRAM(s) IV Push every 8 hours  metoprolol tartrate 25 milliGRAM(s) Oral every 8 hours  metoprolol tartrate Injectable 5 milliGRAM(s) IV Push every 6 hours PRN  midodrine 10 milliGRAM(s) Oral every 8 hours  aspirin  chewable 81 milliGRAM(s) Oral daily  heparin   Injectable 3000 Unit(s) IV Push every 6 hours PRN  heparin   Injectable 6500 Unit(s) IV Push every 6 hours PRN  heparin  Infusion.  Unit(s)/Hr IV Continuous <Continuous>  lactated ringers. 1000 milliLiter(s) IV Continuous <Continuous>        ICU Vital Signs Last 24 Hrs  T(C): 36.4 (30 Nov 2022 14:29), Max: 36.9 (30 Nov 2022 14:20)  T(F): 97.6 (30 Nov 2022 14:29), Max: 98.4 (30 Nov 2022 14:20)  HR: 109 (30 Nov 2022 23:00) (105 - 157)  BP: 96/81 (30 Nov 2022 23:00) (89/65 - 124/65)  BP(mean): 87 (30 Nov 2022 23:00) (67 - 90)  ABP: --  ABP(mean): --  RR: 14 (30 Nov 2022 23:00) (11 - 27)  SpO2: 99% (30 Nov 2022 23:00) (86% - 99%)    O2 Parameters below as of 30 Nov 2022 20:00  Patient On (Oxygen Delivery Method): room air                I&O's Detail    29 Nov 2022 07:01  -  30 Nov 2022 07:00  --------------------------------------------------------  IN:    Amiodarone: 300.6 mL    Heparin Infusion: 426 mL    Lactated Ringers: 1000 mL    Oral Fluid: 850 mL  Total IN: 2576.6 mL    OUT:    Indwelling Catheter - Urethral (mL): 170 mL  Total OUT: 170 mL    Total NET: 2406.6 mL      30 Nov 2022 07:01  -  01 Dec 2022 02:17  --------------------------------------------------------  IN:    Heparin Infusion: 265 mL    IV PiggyBack: 100 mL    Lactated Ringers: 1400 mL  Total IN: 1765 mL    OUT:    Incontinent per Condom Catheter (mL): 1255 mL    Rectal Tube (mL): 890 mL  Total OUT: 2145 mL    Total NET: -380 mL            LABS:                        11.4   15.20 )-----------( 331      ( 30 Nov 2022 04:30 )             33.7     11-30    138  |  110<H>  |  20.5<H>  ----------------------------<  90  3.8   |  19.0<L>  |  1.11    Ca    8.9      30 Nov 2022 04:30  Phos  2.6     11-30  Mg     1.6     11-30    TPro  5.6<L>  /  Alb  2.8<L>  /  TBili  0.6  /  DBili  x   /  AST  118<H>  /  ALT  106<H>  /  AlkPhos  97  11-30          CAPILLARY BLOOD GLUCOSE        PTT - ( 30 Nov 2022 18:16 )  PTT:143.8 sec    CULTURES:  C Diff by PCR Result: NotDetec (11-30-22 @ 12:54)  Culture Results:   No growth (11-27-22 @ 09:10)  Rapid RVP Result: NotDetec (11-27-22 @ 00:25)  Culture Results:   No growth to date. (11-27-22 @ 00:10)  Culture Results:   No growth to date. (11-27-22 @ 00:04)        Physical Examination:    General: No acute distress.  Alert, oriented, interactive, nonfocal    HEENT: Pupils equal, reactive to light.  Symmetric.    PULM: Clear to auscultation bilaterally, no significant sputum production    CVS: tachycardic, irreg irreg rhythm, no murmurs, rubs, or gallops    ABD: Soft, nondistended, nontender, normoactive bowel sounds, no masses    EXT: No edema, nontender    SKIN: Warm and well perfused, no rashes noted.    NEURO: A&Ox3, strength 5/5 all extremities, cranial nerves grossly intact, no focal deficits        RADIOLOGY:     < from: US Abdomen Upper Quadrant Right (11.30.22 @ 10:53) >  FINDINGS:  Liver: There is increased hepatic echogenicity, suggesting hepatic   steatosis and/or fibrosis. The liver is within normal limits in size,   measuring up to 17.2 cm. The visualized main portal vein appears patent   with normal direction of flow.  Bile ducts: Normal caliber. Common bile duct measures 4 mm.  Gallbladder: Within normal limits. There is no gallbladder wall   thickening or pericholecystic fluid. No gallstones are seen. Negative   sonographic Starr sign.  Pancreas: Pancreas not visualized.  Right kidney: 10.1 cm. No hydronephrosis. There is a 2.3 x 2.6 x 1.7 cm   cyst at the interpolar region of the right kidney.  Ascites: None.  Aorta/IVC: Visualized portions are within normal limits.    IMPRESSION:  Increased hepatic echogenicity, suggesting hepatic steatosis/or fibrosis.    Pancreas not visualized.        CRITICAL CARE TIME SPENT: 40 mins  Time spent evaluating/treating patient with medical issues that pose a high risk for life threatening deterioration and/or end-organ damage, reviewing data/labs/imaging, discussing case with multidisciplinary team, discussing plan/goals of care with patient/family. Non-inclusive of procedure time.

## 2022-12-01 NOTE — CONSULT NOTE ADULT - ASSESSMENT
77 year old male with PMHx HTN, aortic stenosis, S/p right hemicolectomy in 2019 for benign colon mass, recent COVID on 10/6/22, Vit B12 deficiency, was admitted 11/1 for SIRS, found to have strep anginosus bacteremia ( 11/1/22), norovirus (+ GI PCR 11/3/22), had negative TTE 11/3 for endocarditis, negative noncon CT chest 11/5, had refused LILIAN on that admission and was treated empirically for endocarditis course of ceftriaxone that was to end on 11/30/22. Presented again to [facility] on 11/27 for syncope w/ collapse and was admitted for sepsis.    Diarrhea:  Possibly Antibiotic associated diarrhea  Ongoing diarrhea with a recent + GI PCR 11/3/22-norovirus. Repeat GI PCR 11/29 negative, Cdiff negative 11/30. CT of abdomen and pelvis on 11/27 without acute CT findings.   Last colonoscopy by Dr. Son in 2019 with polypectomy which also showed colon mass. S/p Right hemicolectomy with benign pathology.  Rectal tube in place with liquid nonbloody output. Currently on Meropenem.    - Will recommend Imodium and cholestyramine for diarrhea    - Can continue with rectal tube    - Continue Antibiotics as per ID    - Diet as tolerated    - Can follow up outpatient with Gastroenterologist Dr. Son

## 2022-12-02 DIAGNOSIS — I63.9 CEREBRAL INFARCTION, UNSPECIFIED: ICD-10-CM

## 2022-12-02 LAB
ALBUMIN SERPL ELPH-MCNC: 2.4 G/DL — LOW (ref 3.3–5.2)
ALP SERPL-CCNC: 76 U/L — SIGNIFICANT CHANGE UP (ref 40–120)
ALT FLD-CCNC: 71 U/L — HIGH
ANION GAP SERPL CALC-SCNC: 10 MMOL/L — SIGNIFICANT CHANGE UP (ref 5–17)
APTT BLD: 60.4 SEC — HIGH (ref 27.5–35.5)
APTT BLD: 69 SEC — HIGH (ref 27.5–35.5)
AST SERPL-CCNC: 56 U/L — HIGH
BASOPHILS # BLD AUTO: 0.03 K/UL — SIGNIFICANT CHANGE UP (ref 0–0.2)
BASOPHILS NFR BLD AUTO: 0.2 % — SIGNIFICANT CHANGE UP (ref 0–2)
BILIRUB SERPL-MCNC: 0.5 MG/DL — SIGNIFICANT CHANGE UP (ref 0.4–2)
BUN SERPL-MCNC: 15.4 MG/DL — SIGNIFICANT CHANGE UP (ref 8–20)
CALCIUM SERPL-MCNC: 9.1 MG/DL — SIGNIFICANT CHANGE UP (ref 8.4–10.5)
CHLORIDE SERPL-SCNC: 106 MMOL/L — SIGNIFICANT CHANGE UP (ref 96–108)
CO2 SERPL-SCNC: 22 MMOL/L — SIGNIFICANT CHANGE UP (ref 22–29)
CREAT SERPL-MCNC: 0.87 MG/DL — SIGNIFICANT CHANGE UP (ref 0.5–1.3)
CULTURE RESULTS: SIGNIFICANT CHANGE UP
EGFR: 88 ML/MIN/1.73M2 — SIGNIFICANT CHANGE UP
EOSINOPHIL # BLD AUTO: 0 K/UL — SIGNIFICANT CHANGE UP (ref 0–0.5)
EOSINOPHIL NFR BLD AUTO: 0 % — SIGNIFICANT CHANGE UP (ref 0–6)
GLUCOSE SERPL-MCNC: 91 MG/DL — SIGNIFICANT CHANGE UP (ref 70–99)
HCT VFR BLD CALC: 34.1 % — LOW (ref 39–50)
HGB BLD-MCNC: 11.6 G/DL — LOW (ref 13–17)
IMM GRANULOCYTES NFR BLD AUTO: 0.7 % — SIGNIFICANT CHANGE UP (ref 0–0.9)
LYMPHOCYTES # BLD AUTO: 13.5 % — SIGNIFICANT CHANGE UP (ref 13–44)
LYMPHOCYTES # BLD AUTO: 2.16 K/UL — SIGNIFICANT CHANGE UP (ref 1–3.3)
MAGNESIUM SERPL-MCNC: 1.7 MG/DL — SIGNIFICANT CHANGE UP (ref 1.6–2.6)
MCHC RBC-ENTMCNC: 30.9 PG — SIGNIFICANT CHANGE UP (ref 27–34)
MCHC RBC-ENTMCNC: 34 GM/DL — SIGNIFICANT CHANGE UP (ref 32–36)
MCV RBC AUTO: 90.7 FL — SIGNIFICANT CHANGE UP (ref 80–100)
MONOCYTES # BLD AUTO: 1.62 K/UL — HIGH (ref 0–0.9)
MONOCYTES NFR BLD AUTO: 10.1 % — SIGNIFICANT CHANGE UP (ref 2–14)
NEUTROPHILS # BLD AUTO: 12.1 K/UL — HIGH (ref 1.8–7.4)
NEUTROPHILS NFR BLD AUTO: 75.5 % — SIGNIFICANT CHANGE UP (ref 43–77)
PHOSPHATE SERPL-MCNC: 3.5 MG/DL — SIGNIFICANT CHANGE UP (ref 2.4–4.7)
PLATELET # BLD AUTO: 325 K/UL — SIGNIFICANT CHANGE UP (ref 150–400)
POTASSIUM SERPL-MCNC: 4 MMOL/L — SIGNIFICANT CHANGE UP (ref 3.5–5.3)
POTASSIUM SERPL-SCNC: 4 MMOL/L — SIGNIFICANT CHANGE UP (ref 3.5–5.3)
PROT SERPL-MCNC: 5.1 G/DL — LOW (ref 6.6–8.7)
RBC # BLD: 3.76 M/UL — LOW (ref 4.2–5.8)
RBC # FLD: 13.1 % — SIGNIFICANT CHANGE UP (ref 10.3–14.5)
SODIUM SERPL-SCNC: 138 MMOL/L — SIGNIFICANT CHANGE UP (ref 135–145)
SPECIMEN SOURCE: SIGNIFICANT CHANGE UP
WBC # BLD: 16.03 K/UL — HIGH (ref 3.8–10.5)
WBC # FLD AUTO: 16.03 K/UL — HIGH (ref 3.8–10.5)

## 2022-12-02 PROCEDURE — 99222 1ST HOSP IP/OBS MODERATE 55: CPT

## 2022-12-02 PROCEDURE — 99232 SBSQ HOSP IP/OBS MODERATE 35: CPT

## 2022-12-02 PROCEDURE — 78803 RP LOCLZJ TUM SPECT 1 AREA: CPT | Mod: 26

## 2022-12-02 PROCEDURE — 78802 RP LOCLZJ TUM WHBDY 1 D IMG: CPT | Mod: 26

## 2022-12-02 PROCEDURE — 70496 CT ANGIOGRAPHY HEAD: CPT | Mod: 26

## 2022-12-02 PROCEDURE — 71260 CT THORAX DX C+: CPT | Mod: 26

## 2022-12-02 PROCEDURE — 70486 CT MAXILLOFACIAL W/O DYE: CPT | Mod: 26

## 2022-12-02 PROCEDURE — 99233 SBSQ HOSP IP/OBS HIGH 50: CPT

## 2022-12-02 PROCEDURE — 99291 CRITICAL CARE FIRST HOUR: CPT

## 2022-12-02 PROCEDURE — 74177 CT ABD & PELVIS W/CONTRAST: CPT | Mod: 26

## 2022-12-02 PROCEDURE — 70498 CT ANGIOGRAPHY NECK: CPT | Mod: 26

## 2022-12-02 RX ORDER — DIGOXIN 250 MCG
250 TABLET ORAL DAILY
Refills: 0 | Status: DISCONTINUED | OUTPATIENT
Start: 2022-12-03 | End: 2022-12-05

## 2022-12-02 RX ORDER — DIGOXIN 250 MCG
250 TABLET ORAL EVERY 8 HOURS
Refills: 0 | Status: COMPLETED | OUTPATIENT
Start: 2022-12-02 | End: 2022-12-03

## 2022-12-02 RX ORDER — METOPROLOL TARTRATE 50 MG
25 TABLET ORAL EVERY 6 HOURS
Refills: 0 | Status: DISCONTINUED | OUTPATIENT
Start: 2022-12-02 | End: 2022-12-05

## 2022-12-02 RX ADMIN — Medication 2 MILLIGRAM(S): at 01:26

## 2022-12-02 RX ADMIN — SODIUM CHLORIDE 100 MILLILITER(S): 9 INJECTION, SOLUTION INTRAVENOUS at 06:12

## 2022-12-02 RX ADMIN — Medication 2 MILLIGRAM(S): at 17:34

## 2022-12-02 RX ADMIN — Medication 250 MILLIGRAM(S): at 17:34

## 2022-12-02 RX ADMIN — MIDODRINE HYDROCHLORIDE 10 MILLIGRAM(S): 2.5 TABLET ORAL at 06:13

## 2022-12-02 RX ADMIN — MIDODRINE HYDROCHLORIDE 10 MILLIGRAM(S): 2.5 TABLET ORAL at 21:37

## 2022-12-02 RX ADMIN — CHLORHEXIDINE GLUCONATE 1 APPLICATION(S): 213 SOLUTION TOPICAL at 13:40

## 2022-12-02 RX ADMIN — MEROPENEM 1000 MILLIGRAM(S): 1 INJECTION INTRAVENOUS at 06:13

## 2022-12-02 RX ADMIN — MIDODRINE HYDROCHLORIDE 10 MILLIGRAM(S): 2.5 TABLET ORAL at 13:42

## 2022-12-02 RX ADMIN — Medication 25 MILLIGRAM(S): at 13:42

## 2022-12-02 RX ADMIN — Medication 25 MILLIGRAM(S): at 06:13

## 2022-12-02 RX ADMIN — Medication 25 MILLIGRAM(S): at 17:34

## 2022-12-02 RX ADMIN — Medication 250 MILLIGRAM(S): at 06:13

## 2022-12-02 RX ADMIN — Medication 250 MICROGRAM(S): at 21:36

## 2022-12-02 RX ADMIN — MEROPENEM 1000 MILLIGRAM(S): 1 INJECTION INTRAVENOUS at 13:47

## 2022-12-02 RX ADMIN — MEROPENEM 1000 MILLIGRAM(S): 1 INJECTION INTRAVENOUS at 23:16

## 2022-12-02 RX ADMIN — Medication 81 MILLIGRAM(S): at 13:39

## 2022-12-02 RX ADMIN — Medication 2 MILLIGRAM(S): at 06:13

## 2022-12-02 RX ADMIN — HEPARIN SODIUM 2100 UNIT(S)/HR: 5000 INJECTION INTRAVENOUS; SUBCUTANEOUS at 14:23

## 2022-12-02 RX ADMIN — Medication 2 MILLIGRAM(S): at 13:39

## 2022-12-02 RX ADMIN — HEPARIN SODIUM 2100 UNIT(S)/HR: 5000 INJECTION INTRAVENOUS; SUBCUTANEOUS at 06:12

## 2022-12-02 RX ADMIN — Medication 5 MILLIGRAM(S): at 13:56

## 2022-12-02 NOTE — PROGRESS NOTE ADULT - SUBJECTIVE AND OBJECTIVE BOX
Subjective: Pt seen and examined, resting comfortably in bed, denies complaints. Telemetry reviewed, remains in AF, with rapid HR (HR this AM ~140bpm; asymptomatic).  Care transferred to CT surgical team, workup in progress for mitral valve vegetation and possible aortic root abscess. MRI head performed on 12/1 as part of w/u revealed acute ischemia; embolic phenomenon is suggested, with underlying septic emboli not excluded. Neuro following.     TELE: AF with elevated HR (Average HR ~140bpm)    MEDICATIONS  (STANDING):  aspirin  chewable 81 milliGRAM(s) Oral daily  chlorhexidine 2% Cloths 1 Application(s) Topical daily  cholestyramine Powder (Sugar-Free) 4 Gram(s) Oral daily  heparin  Infusion. 2100 Unit(s)/Hr (21 mL/Hr) IV Continuous <Continuous>  lactated ringers. 1000 milliLiter(s) (100 mL/Hr) IV Continuous <Continuous>  loperamide 2 milliGRAM(s) Oral every 6 hours  meropenem Injectable 1000 milliGRAM(s) IV Push every 8 hours  metoprolol tartrate 25 milliGRAM(s) Oral every 6 hours  midodrine 10 milliGRAM(s) Oral every 8 hours  phytonadione   Solution 5 milliGRAM(s) Oral daily  saccharomyces boulardii 250 milliGRAM(s) Oral two times a day    MEDICATIONS  (PRN):  heparin   Injectable 6500 Unit(s) IV Push every 6 hours PRN For aPTT less than 40  heparin   Injectable 3000 Unit(s) IV Push every 6 hours PRN For aPTT between 40 - 57  metoprolol tartrate Injectable 5 milliGRAM(s) IV Push every 6 hours PRN HR > 130      Allergies  No Known Allergies      Vital Signs Last 24 Hrs  T(C): 36.6 (02 Dec 2022 12:30), Max: 36.7 (01 Dec 2022 23:00)  T(F): 97.8 (02 Dec 2022 12:30), Max: 98 (01 Dec 2022 23:00)  HR: 127 (02 Dec 2022 14:00) (101 - 152)  BP: 110/69 (02 Dec 2022 14:00) (88/69 - 131/98)  BP(mean): 79 (02 Dec 2022 14:00) (66 - 109)  RR: 24 (02 Dec 2022 14:00) (15 - 25)  SpO2: 97% (02 Dec 2022 14:00) (92% - 100%)    Parameters below as of 02 Dec 2022 08:00  Patient On (Oxygen Delivery Method): room air      Physical Exam:  Constitutional: NAD, Awake, alert  HEENT: Abrasion left forehead  Cardiovascular: +S1S2 Irregular, mildly tachycardic   Pulmonary: CTA b/l, unlabored  GI: soft NTND; rectal tube in place  Extremities: no pedal edema  Neuro: non focal      LABS:                        11.6   16.03 )-----------( 325      ( 02 Dec 2022 05:30 )             34.1     12-02    138  |  106  |  15.4  ----------------------------<  91  4.0   |  22.0  |  0.87    Ca    9.1      02 Dec 2022 05:30  Phos  3.5     12-02  Mg     1.7     12-02    TPro  5.1<L>  /  Alb  2.4<L>  /  TBili  0.5  /  DBili  x   /  AST  56<H>  /  ALT  71<H>  /  AlkPhos  76  12-02    PTT - ( 02 Dec 2022 12:55 )  PTT:69.0 sec      RADIOLOGY & ADDITIONAL TESTS:    TTE 11/29/2022  Summary:   1. Technically difficult study.   2. Patient noted to be tachycardic throughout exam.   3. Normal global left ventricular systolic function.   4. Left ventricular ejection fraction, by visual estimation, is 55 to   60%.   5. The mitral in-flow pattern reveals no discernable A-wave,therefore   no comment on diastolic function can be made.   6. Mild thickening of the anterior and posterior mitral valve leaflets.   7. Sclerotic aortic valve with decreased opening, gradients not obtained   to evaluate degree of stenosis. Mild to moderate regurgitation. Heavily   calacified valve.   8. Mild to moderate mitral valve regurgitation.   9. Estimated pulmonary artery systolic pressure is 40.2 mmHg assuming a   right atrial pressure of 15 mmHg, which is consistent with mild pulmonary   hypertension.  10. Mild pulmonic valve regurgitation.  11. No evidence of vegetation however cannot exclude, consider LILIAN if   high clinical suspicion.      LILIAN 11/30/2022  Summary:   1. Left ventricular ejection fraction, by visual estimation, is 55 to   60%.   2. Normal global left ventricular systolic function.   3. The mitral in-flow pattern reveals no discernable A-wave, therefore   no comment on diastolic function can be made.   4. Normal right ventricular size and function, inadequate estimation of   RVSP.   5. Mildly enlarged left atrium.   6. Moderate mitral valve regurgitation, jet is eccentric posteriorly   directed.   7. Thickening of bileaflet mitral valve with subcentimeter filamentous  mobile lesions suggestive of vegetations.   8. Color flow doppler and intravenous injection of agitated saline   demonstrates the presence of an intact intra atrial septum.   9. No left atrial appendage thrombus and normal left atrial appendage   velocities.  10. Structurally normal tricuspid valve, with normal leaflet excursion.  11. Heavily calcified noncoronary cusp with moderate aortic valve   stenosis, ELDON (by 2D planimetry 1.34 cm2), peak velocity 2.8 m/s and mean   gradient of 15 mmHg.  12. Mild to moderate aortic regurgitation.  13. There is mild echolucency and thickening of the sinus of Valsalva   inbetween the left and non-coronary cusp, cannot exclude early root   abscess.  14. Mild simple atheromas at the aortic arch/descendingaorta.  15. There is no evidence of pericardial effusion.  16. No prior LILIAN study is available for comparison. 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 Cardioversion   deferred due to the presence of underlying infection/endocarditis.    MRI Head 12/1/2022  IMPRESSION:  Scattered foci of restricted diffusion in the bilateral parieto-occipital   lobes, suggestive of acute ischemia. No intracranial hemorrhage or mass   effect.    Given the bilateral distribution of involvement, embolic phenomenon is   suggested, with underlying septic emboli not excluded. Further evaluation   by contrast-enhanced MRI may provide further characterization.

## 2022-12-02 NOTE — PROGRESS NOTE ADULT - SUBJECTIVE AND OBJECTIVE BOX
INFECTIOUS DISEASES AND INTERNAL MEDICINE at [place]  =======================================================  Flip Pederson MD  Diplomates [employer] Board of Internal Medicine and Infectious Diseases  Telephone [phone]  Fax            [phone]  =======================================================    GEORGE [id number]      ID f/u- fever, endocarditis  still with diarrhea but better  c diff and gi pcr (-)      ANTIBIOTICS  meropenem  IVPB 1000 milliGRAM(s) IV Intermittent every 12 hours      Allergies    No Known Allergies    Intolerances        SOCIAL HISTORY:     FAMILY HX   FAMILY HISTORY:  FH: heart disease (Father, Mother)        ICU Vital Signs Last 24 Hrs  T(C): 36.7 (02 Dec 2022 16:00), Max: 36.7 (01 Dec 2022 23:00)  T(F): 98 (02 Dec 2022 16:00), Max: 98 (01 Dec 2022 23:00)  HR: 119 (02 Dec 2022 19:00) (105 - 155)  BP: 117/75 (02 Dec 2022 19:00) (95/73 - 131/98)  BP(mean): 88 (02 Dec 2022 19:00) (67 - 109)  ABP: --  ABP(mean): --  RR: 17 (02 Dec 2022 19:00) (16 - 26)  SpO2: 98% (02 Dec 2022 19:00) (93% - 100%)    O2 Parameters below as of 02 Dec 2022 08:00  Patient On (Oxygen Delivery Method): room air          REVIEW OF SYSTEMS:    CONSTITUTIONAL:  As per HPI.    HEENT:  Eyes:  No diplopia or blurred vision. ENT:  No earache, sore throat or runny nose.    CARDIOVASCULAR:  No pressure, squeezing, strangling, tightness, heaviness or aching about the chest, neck, axilla or epigastrium.    RESPIRATORY:  No cough, shortness of breath, PND or orthopnea.    GASTROINTESTINAL:  No nausea, vomiting or diarrhea.    GENITOURINARY:  No dysuria, frequency or urgency.    MUSCULOSKELETAL:  As per HPI.    SKIN:  No change in skin, hair or nails.    NEUROLOGIC:    weakness.                  PHYSICAL EXAMINATION:    GENERAL: nad    HEENT: Head is normocephalic and atraumatic.  ANICTERIC left upper scalp ecchymoses  NECK: Supple. No carotid bruits.  No lymphadenopathy or thyromegaly.    LUNGS :clear BREATH SOUNDS    HEART: Regular rate and rhythm without murmur.    ABDOMEN: Soft, nontender, and nondistended.  Positive bowel sounds.  No hepatosplenomegaly was noted. NO REBOUND NO GUARDING    EXTREMITIES: NO EDEMA NO ERYTHEMA    NEUROLOGIC: NON FOCAL      SKIN: No ulceration or induration present. NO RASH picc rue            LABS:                                   11.6   16.03 )-----------( 325      ( 02 Dec 2022 05:30 )             34.1       12    138  |  106  |  15.4  ----------------------------<  91  4.0   |  22.0  |  0.87    Ca    9.1      02 Dec 2022 05:30  Phos  3.5     12-  Mg     1.7         TPro  5.1<L>  /  Alb  2.4<L>  /  TBili  0.5  /  DBili  x   /  AST  56<H>  /  ALT  71<H>  /  AlkPhos  76  12                  PTT - ( 02 Dec 2022 12:55 )  PTT:69.0 sec          CAPILLARY BLOOD GLUCOSE                Color: Yellow / Appearance: Clear / S.010 / pH: x  Gluc: x / Ketone: Negative  / Bili: Negative / Urobili: Negative mg/dL   Blood: x / Protein: 30 mg/dL / Nitrite: Negative   Leuk Esterase: Negative / RBC: 6-10 /HPF / WBC 0-2 /HPF   Sq Epi: x / Non Sq Epi: Occasional / Bacteria: Few        RADIOLOGY & ADDITIONAL STUDIES:  < from: CT Chest No Cont (22 @ 03:46) >  IMPRESSION:  No pneumonia.  No acute CT findings in the abdomen or pelvis.    VERTEBRAL BODY ANALYSIS: Low bone density as described above, consider   further workup for osteoporosis.        --- End of Report ---        < end of copied text >        < from: LILIAN Echo Doppler (22 @ 14:52) >  Summary:   1. Left ventricular ejection fraction, by visual estimation, is 55 to   60%.   2. Normal global left ventricular systolic function.   3. The mitral in-flow pattern reveals no discernable A-wave, therefore   no comment on diastolic function can be made.   4. Normal right ventricular size and function, inadequate estimation of   RVSP.   5. Mildly enlarged left atrium.   6. Moderate mitral valve regurgitation, jet is eccentric posteriorly   directed.   7. Thickening of bileaflet mitral valve with subcentimeter filamentous  mobile lesions suggestive of vegetations.   8. Color flow doppler and intravenous injection of agitated saline   demonstrates the presence of an intact intra atrial septum.   9. No left atrial appendage thrombus and normal left atrial appendage   velocities.  10. Structurally normal tricuspid valve, with normal leaflet excursion.  11. Heavily calcified noncoronary cusp with moderate aortic valve   stenosis, ELDON (by 2D planimetry 1.34 cm2), peak velocity 2.8 m/s and mean   gradient of 15 mmHg.  12. Mild to moderate aortic regurgitation.  13. There is mild echolucency and thickening of the sinus of Valsalva   inbetween the left and non-coronary cusp, cannot exclude early root   abscess.  14. Mild simple atheromas at the aortic arch/descendingaorta.  15. There is no evidence of pericardial effusion.  16. No prior LILIAN study is available for comparison. 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 Cardioversion   deferred due to the presence of underlying infection/endocarditis.    John Frost,  Electronically signed on 2022 at 3:39:01 PM    < end of copied text >      < from: MR Head No Cont (22 @ 21:35) >  IMPRESSION:  Scattered foci of restricted diffusion in the bilateral parieto-occipital   lobes, suggestive of acute ischemia. No intracranial hemorrhage or mass   effect.    Given the bilateral distribution of involvement, embolic phenomenon is   suggested, with underlying septic emboli not excluded. Further evaluation   by contrast-enhanced MRI may provide further characterization.      --- End of Report ---    < end of copied text >    < from: NM Inflammatory Loc Wholebody WBC, Single Day (22 @ 11:02) >  IMPRESSION: Normal Indium-111 labeled leukocyte scan. No scan evidence of   infection.    --- End of Report ---        < end of copied text >        ASSESSMENT/PLAN    77 year old male with PMHx of recent  COVID  on 10/6/22, HTN , Vit B12 deficiency, presenting to the ED on  with body aches, fatigue and fever (Tmax 102) at home for 12 days found to have streptococcus bacteremia and admitted with unclear source, no recent dental work, skin infections, no respiratory symptoms. Pan scan was negative at that time ( LILIAN was recommended but pt refused) He was treated for Bacteremia ( Blood cultures + streptococcus anginosis pansensitive ) and norovirus with supportive care and contact isolation . He was discharged home on  with IV Rocephin via PICC line. Recommendation was to continue ABX  daily via pic end 22    he  presented to ER after and episode of syncope and collapse, found to be septic, hypotensive, hypoxic and febrile in the ER. He is unsure of mechanism of fall but remembers being on the ground no reported LOC . He was found face-down on bathroom floor buy family with left leg wedged under cabinet. On my interview he is alert and oriented to person place and time, he has a baseline studder in his speech pattern but otherwise neurologically intact without deficit  In response to hypotension he failed to respond to Inital sepsis fluid protocol in ER is required IV pressor in form of levophed to maintain MAP greater than 60 -65. In the ER he was febrile with initial labs significant for WBC of 22.4 , ProCal of 21.2 first lactate 4.9 via abg with repeat post fluids of 2.2 venous sample.   PT admitted to [facility] with septic shock unclear source, now off pressors. Found to have new onset A fib and on amio. 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 preop imaging ordered  monitor HR now on lopessor  monitor diarrhea, c/w imodium  prior h/o norovirus-on contact iso        will f/u

## 2022-12-02 NOTE — CONSULT NOTE ADULT - SUBJECTIVE AND OBJECTIVE BOX
Preliminary note, offical recommendations pending attending review/signature , seen and examined by Dr. Richardson.   HPI:  77 year old male with PMHx of recent  COVID  on 10/6/22, HTN , Vit B12 deficiency, presenting to the ED on 11/2 with body aches, fatigue and fever (Tmax 102) at home for 12 days found to have streptococcus bacteremia and admitted with unclear source, no recent dental work, skin infections, no respiratory symptoms. Pan scan was negative at that time ( LILIAN was recommended but pt refused) He was treated for Bacteremia ( Blood cultures + streptococcus anginosis pansensitive ) and norovirus with supportive care and contact isolation . He was discharged home on 11/7 with IV Rocephin via PICC line. Recommendation was to continue ABX  daily via pic end 11/30/22 11/27/22 he presented to ER after and episode of syncope and collapse, found to be septic, hypotensive, hypoxic and febrile in the ER. He is unsure of mechanism of fall but remembers being on the ground no reported LOC . He was found face-down on bathroom floor buy family with left leg wedged under cabinet.  Called to evaluate patient for stroke.     SUBJECTIVE: No events overnight.  No new neurologic complaints.  ROS reported negative unless otherwise noted.    aspirin  chewable 81 milliGRAM(s) Oral daily  chlorhexidine 2% Cloths 1 Application(s) Topical daily  cholestyramine Powder (Sugar-Free) 4 Gram(s) Oral daily  heparin   Injectable 6500 Unit(s) IV Push every 6 hours PRN  heparin   Injectable 3000 Unit(s) IV Push every 6 hours PRN  heparin  Infusion. 2100 Unit(s)/Hr IV Continuous <Continuous>  lactated ringers. 1000 milliLiter(s) IV Continuous <Continuous>  loperamide 2 milliGRAM(s) Oral every 6 hours  meropenem Injectable 1000 milliGRAM(s) IV Push every 8 hours  metoprolol tartrate 25 milliGRAM(s) Oral every 6 hours  metoprolol tartrate Injectable 5 milliGRAM(s) IV Push every 6 hours PRN  midodrine 10 milliGRAM(s) Oral every 8 hours  phytonadione   Solution 5 milliGRAM(s) Oral daily  saccharomyces boulardii 250 milliGRAM(s) Oral two times a day      PHYSICAL EXAM:   Vital Signs Last 24 Hrs  T(C): 36.7 (02 Dec 2022 07:23), Max: 36.7 (01 Dec 2022 23:00)  T(F): 98 (02 Dec 2022 07:23), Max: 98 (01 Dec 2022 23:00)  HR: 152 (02 Dec 2022 09:00) (101 - 152)  BP: 113/99 (02 Dec 2022 09:00) (88/69 - 128/81)  BP(mean): 105 (02 Dec 2022 09:00) (66 - 105)  RR: 24 (02 Dec 2022 09:00) (15 - 24)  SpO2: 98% (02 Dec 2022 09:00) (92% - 100%)    Parameters below as of 02 Dec 2022 08:00  Patient On (Oxygen Delivery Method): room air        General: No acute distress    NEUROLOGICAL EXAM:  Mental status: Awake, alert, oriented x3, speech fluent, follows commands, no neglect, normal memory   Cranial Nerves: No facial asymmetry, no nystagmus, no dysarthria,  tongue midline  Motor exam: Normal tone, no drift, 5/5 RUE, 5/5 RLE, 5/5 LUE, 5/5 LLE, normal fine finger movements.  Sensation: Intact to light touch   Coordination/ Gait: No dysmetria, gait not tested    LABS:                        11.6   16.03 )-----------( 325      ( 02 Dec 2022 05:30 )             34.1    12-02    138  |  106  |  15.4  ----------------------------<  91  4.0   |  22.0  |  0.87    Ca    9.1      02 Dec 2022 05:30  Phos  3.5     12-02  Mg     1.7     12-02    TPro  5.1<L>  /  Alb  2.4<L>  /  TBili  0.5  /  DBili  x   /  AST  56<H>  /  ALT  71<H>  /  AlkPhos  76  12-02  PTT - ( 02 Dec 2022 05:30 )  PTT:60.4 sec      IMAGING: Reviewed by me.     MR Head No Cont (12.01.22 @ 21:35)   Scattered foci of restricted diffusion in the bilateral parieto-occipital   lobes, suggestive of acute ischemia. No intracranial hemorrhage or mass   effect.    Given the bilateral distribution of involvement, embolic phenomenon is   suggested, with underlying septic emboli not excluded. Further evaluation   by contrast-enhanced MRI may provide further characterization.    CT Head No Cont (11.27.22 @ 03:35)   Left frontoparietal scalp soft tissue swelling. No acute   intracranial hemorrhage.     Preliminary note, offical recommendations pending attending review/signature , seen and examined by Dr. Richardson.   HPI:  77 year old male with PMHx of recent  COVID  on 10/6/22, HTN , Vit B12 deficiency, presenting to the ED on 11/2 with body aches, fatigue and fever (Tmax 102) at home for 12 days found to have streptococcus bacteremia and admitted with unclear source, no recent dental work, skin infections, no respiratory symptoms. Pan scan was negative at that time ( LILIAN was recommended but pt refused) He was treated for Bacteremia ( Blood cultures + streptococcus anginosis pansensitive ) and norovirus with supportive care and contact isolation . He was discharged home on 11/7 with IV Rocephin via PICC line. Recommendation was to continue ABX  daily via pic end 11/30/22 11/27/22 he presented to ER after and episode of syncope and collapse, found to be septic, hypotensive, hypoxic and febrile in the ER. He is unsure of mechanism of fall but remembers being on the ground no reported LOC . He was found face-down on bathroom floor buy family with left leg wedged under cabinet.  Called to evaluate patient for stroke.     SUBJECTIVE: No events overnight.  No new neurologic complaints.  ROS reported negative unless otherwise noted.    aspirin  chewable 81 milliGRAM(s) Oral daily  chlorhexidine 2% Cloths 1 Application(s) Topical daily  cholestyramine Powder (Sugar-Free) 4 Gram(s) Oral daily  heparin   Injectable 6500 Unit(s) IV Push every 6 hours PRN  heparin   Injectable 3000 Unit(s) IV Push every 6 hours PRN  heparin  Infusion. 2100 Unit(s)/Hr IV Continuous <Continuous>  lactated ringers. 1000 milliLiter(s) IV Continuous <Continuous>  loperamide 2 milliGRAM(s) Oral every 6 hours  meropenem Injectable 1000 milliGRAM(s) IV Push every 8 hours  metoprolol tartrate 25 milliGRAM(s) Oral every 6 hours  metoprolol tartrate Injectable 5 milliGRAM(s) IV Push every 6 hours PRN  midodrine 10 milliGRAM(s) Oral every 8 hours  phytonadione   Solution 5 milliGRAM(s) Oral daily  saccharomyces boulardii 250 milliGRAM(s) Oral two times a day      PHYSICAL EXAM:   Vital Signs Last 24 Hrs  T(C): 36.7 (02 Dec 2022 07:23), Max: 36.7 (01 Dec 2022 23:00)  T(F): 98 (02 Dec 2022 07:23), Max: 98 (01 Dec 2022 23:00)  HR: 152 (02 Dec 2022 09:00) (101 - 152)  BP: 113/99 (02 Dec 2022 09:00) (88/69 - 128/81)  BP(mean): 105 (02 Dec 2022 09:00) (66 - 105)  RR: 24 (02 Dec 2022 09:00) (15 - 24)  SpO2: 98% (02 Dec 2022 09:00) (92% - 100%)    Parameters below as of 02 Dec 2022 08:00  Patient On (Oxygen Delivery Method): room air        General: No acute distress    NEUROLOGICAL EXAM:  Mental status: Awake, alert, oriented x3, speech fluent, follows commands, no neglect, normal memory   Cranial Nerves: No facial asymmetry, no nystagmus, no dysarthria,  tongue midline  Motor exam: Normal tone, no drift, 5/5 RUE, 5/5 RLE, 5/5 LUE, 5/5 LLE, normal fine finger movements.  Sensation: Intact to light touch   Coordination/ Gait: No dysmetria, gait not tested    LABS:                        11.6   16.03 )-----------( 325      ( 02 Dec 2022 05:30 )             34.1    12-02    138  |  106  |  15.4  ----------------------------<  91  4.0   |  22.0  |  0.87    Ca    9.1      02 Dec 2022 05:30  Phos  3.5     12-02  Mg     1.7     12-02    TPro  5.1<L>  /  Alb  2.4<L>  /  TBili  0.5  /  DBili  x   /  AST  56<H>  /  ALT  71<H>  /  AlkPhos  76  12-02  PTT - ( 02 Dec 2022 05:30 )  PTT:60.4 sec      IMAGING: Reviewed by me.     MR Head No Cont (12.01.22 @ 21:35)   Scattered foci of restricted diffusion in the bilateral parieto-occipital   lobes, suggestive of acute ischemia. No intracranial hemorrhage or mass   effect.    Given the bilateral distribution of involvement, embolic phenomenon is   suggested, with underlying septic emboli not excluded. Further evaluation   by contrast-enhanced MRI may provide further characterization.    CT Head No Cont (11.27.22 @ 03:35)   Left frontoparietal scalp soft tissue swelling. No acute   intracranial hemorrhage.    US Duplex Carotid Arteries Complete, Bilateral (11.30.22 @ 21:30)   No significant hemodynamic stenosis of either carotid artery.    TTE Echo Limited or F/U (11.29.22 @ 09:46)    1. Technically difficult study.   2. Patient noted to be tachycardic throughout exam.   3. Normal global left ventricular systolic function.   4. Left ventricular ejection fraction, by visual estimation, is 55 to   60%.   5. The mitral in-flow pattern reveals no discernable A-wave,therefore   no comment on diastolic function can be made.   6. Mild thickening of the anterior and posterior mitral valve leaflets.   7. Sclerotic aortic valve with decreased opening, gradients not obtained   to evaluate degree of stenosis. Mild to moderate regurgitation. Heavily   calacified valve.   8. Mild to moderate mitral valve regurgitation.   9. Estimated pulmonary artery systolic pressure is 40.2 mmHg assuming a   right atrial pressure of 15 mmHg, which is consistent with mild pulmonary   hypertension.  10. Mild pulmonic valve regurgitation.  11. No evidence of vegetation however cannot exclude, consider LILIAN if   high clinical suspicion.    LILIAN Echo Doppler (11.30.22 @ 14:52)    1. Left ventricular ejection fraction, by visual estimation, is 55 to   60%.   2. Normal global left ventricular systolic function.   3. The mitral in-flow pattern reveals no discernable A-wave, therefore   no comment on diastolic function can be made.   4. Normal right ventricular size and function, inadequate estimation of   RVSP.   5. Mildly enlarged left atrium.   6. Moderate mitral valve regurgitation, jet is eccentric posteriorly   directed.   7. Thickening of bileaflet mitral valve with subcentimeter filamentous  mobile lesions suggestive of vegetations.   8. Color flow doppler and intravenous injection of agitated saline   demonstrates the presence of an intact intra atrial septum.   9. No left atrial appendage thrombus and normal left atrial appendage   velocities.  10. Structurally normal tricuspid valve, with normal leaflet excursion.  11. Heavily calcified noncoronary cusp with moderate aortic valve   stenosis, ELDON (by 2D planimetry 1.34 cm2), peak velocity 2.8 m/s and mean   gradient of 15 mmHg.  12. Mild to moderate aortic regurgitation.  13. There is mild echolucency and thickening of the sinus of Valsalva   inbetween the left and non-coronary cusp, cannot exclude early root   abscess.  14. Mild simple atheromas at the aortic arch/descendingaorta.  15. There is no evidence of pericardial effusion.  16. No prior LILIAN study is available for comparison. 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 Cardioversion   deferred due to the presence of underlying infection/endocarditis.             HPI:  77 year old male with PMHx of recent  COVID  on 10/6/22, HTN , Vit B12 deficiency, presenting to the ED on 11/2 with body aches, fatigue and fever (Tmax 102) at home for 12 days found to have streptococcus bacteremia and admitted with unclear source, no recent dental work, skin infections, no respiratory symptoms. Pan scan was negative at that time ( LILIAN was recommended but pt refused) He was treated for Bacteremia ( Blood cultures + streptococcus anginosis pansensitive ) and norovirus with supportive care and contact isolation . He was discharged home on 11/7 with IV Rocephin via PICC line. Recommendation was to continue ABX  daily via pic end 11/30/22 11/27/22 he presented to ER after and episode of syncope and collapse, found to be septic, hypotensive, hypoxic and febrile in the ER. He is unsure of mechanism of fall but remembers being on the ground no reported LOC . He was found face-down on bathroom floor buy family with left leg wedged under cabinet.  Called to evaluate patient for stroke.     SUBJECTIVE: No events overnight.  No new neurologic complaints.  ROS reported negative unless otherwise noted.    aspirin  chewable 81 milliGRAM(s) Oral daily  chlorhexidine 2% Cloths 1 Application(s) Topical daily  cholestyramine Powder (Sugar-Free) 4 Gram(s) Oral daily  heparin   Injectable 6500 Unit(s) IV Push every 6 hours PRN  heparin   Injectable 3000 Unit(s) IV Push every 6 hours PRN  heparin  Infusion. 2100 Unit(s)/Hr IV Continuous <Continuous>  lactated ringers. 1000 milliLiter(s) IV Continuous <Continuous>  loperamide 2 milliGRAM(s) Oral every 6 hours  meropenem Injectable 1000 milliGRAM(s) IV Push every 8 hours  metoprolol tartrate 25 milliGRAM(s) Oral every 6 hours  metoprolol tartrate Injectable 5 milliGRAM(s) IV Push every 6 hours PRN  midodrine 10 milliGRAM(s) Oral every 8 hours  phytonadione   Solution 5 milliGRAM(s) Oral daily  saccharomyces boulardii 250 milliGRAM(s) Oral two times a day      PHYSICAL EXAM:   Vital Signs Last 24 Hrs  T(C): 36.7 (02 Dec 2022 07:23), Max: 36.7 (01 Dec 2022 23:00)  T(F): 98 (02 Dec 2022 07:23), Max: 98 (01 Dec 2022 23:00)  HR: 152 (02 Dec 2022 09:00) (101 - 152)  BP: 113/99 (02 Dec 2022 09:00) (88/69 - 128/81)  BP(mean): 105 (02 Dec 2022 09:00) (66 - 105)  RR: 24 (02 Dec 2022 09:00) (15 - 24)  SpO2: 98% (02 Dec 2022 09:00) (92% - 100%)    Parameters below as of 02 Dec 2022 08:00  Patient On (Oxygen Delivery Method): room air        General: No acute distress    NEUROLOGICAL EXAM:  Mental status: Awake, alert, oriented x3, speech fluent, follows commands, no neglect, normal memory   Cranial Nerves: Mild flattening of the left nasolabial fold is noted. No nystagmus, no dysarthria,  tongue midline  Motor exam: Normal tone, no drift, 5/5 RUE, 5/5 RLE, 5/5 LUE, 5/5 LLE, normal fine finger movements.  Sensation: Intact to light touch   Coordination/ Gait: No dysmetria, gait not tested    LABS:                        11.6   16.03 )-----------( 325      ( 02 Dec 2022 05:30 )             34.1    12-02    138  |  106  |  15.4  ----------------------------<  91  4.0   |  22.0  |  0.87    Ca    9.1      02 Dec 2022 05:30  Phos  3.5     12-02  Mg     1.7     12-02    TPro  5.1<L>  /  Alb  2.4<L>  /  TBili  0.5  /  DBili  x   /  AST  56<H>  /  ALT  71<H>  /  AlkPhos  76  12-02  PTT - ( 02 Dec 2022 05:30 )  PTT:60.4 sec      IMAGING: Reviewed by me.     MR Head No Cont (12.01.22 @ 21:35)   Scattered foci of restricted diffusion in the bilateral parieto-occipital   lobes, suggestive of acute ischemia. No intracranial hemorrhage or mass   effect.    Given the bilateral distribution of involvement, embolic phenomenon is   suggested, with underlying septic emboli not excluded. Further evaluation   by contrast-enhanced MRI may provide further characterization.    CT Head No Cont (11.27.22 @ 03:35)   Left frontoparietal scalp soft tissue swelling. No acute   intracranial hemorrhage.    US Duplex Carotid Arteries Complete, Bilateral (11.30.22 @ 21:30)   No significant hemodynamic stenosis of either carotid artery.    TTE Echo Limited or F/U (11.29.22 @ 09:46)    1. Technically difficult study.   2. Patient noted to be tachycardic throughout exam.   3. Normal global left ventricular systolic function.   4. Left ventricular ejection fraction, by visual estimation, is 55 to   60%.   5. The mitral in-flow pattern reveals no discernable A-wave,therefore   no comment on diastolic function can be made.   6. Mild thickening of the anterior and posterior mitral valve leaflets.   7. Sclerotic aortic valve with decreased opening, gradients not obtained   to evaluate degree of stenosis. Mild to moderate regurgitation. Heavily   calacified valve.   8. Mild to moderate mitral valve regurgitation.   9. Estimated pulmonary artery systolic pressure is 40.2 mmHg assuming a   right atrial pressure of 15 mmHg, which is consistent with mild pulmonary   hypertension.  10. Mild pulmonic valve regurgitation.  11. No evidence of vegetation however cannot exclude, consider LILIAN if   high clinical suspicion.    LILIAN Echo Doppler (11.30.22 @ 14:52)    1. Left ventricular ejection fraction, by visual estimation, is 55 to   60%.   2. Normal global left ventricular systolic function.   3. The mitral in-flow pattern reveals no discernable A-wave, therefore   no comment on diastolic function can be made.   4. Normal right ventricular size and function, inadequate estimation of   RVSP.   5. Mildly enlarged left atrium.   6. Moderate mitral valve regurgitation, jet is eccentric posteriorly   directed.   7. Thickening of bileaflet mitral valve with subcentimeter filamentous  mobile lesions suggestive of vegetations.   8. Color flow doppler and intravenous injection of agitated saline   demonstrates the presence of an intact intra atrial septum.   9. No left atrial appendage thrombus and normal left atrial appendage   velocities.  10. Structurally normal tricuspid valve, with normal leaflet excursion.  11. Heavily calcified noncoronary cusp with moderate aortic valve   stenosis, ELDON (by 2D planimetry 1.34 cm2), peak velocity 2.8 m/s and mean   gradient of 15 mmHg.  12. Mild to moderate aortic regurgitation.  13. There is mild echolucency and thickening of the sinus of Valsalva   inbetween the left and non-coronary cusp, cannot exclude early root   abscess.  14. Mild simple atheromas at the aortic arch/descendingaorta.  15. There is no evidence of pericardial effusion.  16. No prior LILIAN study is available for comparison. 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 Cardioversion   deferred due to the presence of underlying infection/endocarditis.

## 2022-12-02 NOTE — PROGRESS NOTE ADULT - PROBLEM SELECTOR PLAN 4
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    CTS planning of surgery next week and request [facility] prior to surgery  [facility] scheduled for Monday  NPO sunday night. 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    CTS planning of surgery next week and requesting [facility] prior to surgery  [facility] scheduled for Monday- as D/W interventional  NPO Valentin night. 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    CTS planning surgery next week and requesting [facility] prior to surgery  [facility] scheduled for Monday- as D/W interventional  NPO Valentin night. 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    CTS planning surgery next week and requesting [facility] prior to surgery  [facility] scheduled for Monday- as D/W interventional, Dr Dunn  NPO Sunday night. 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  No cardioversion was performed.      CTS planning surgery next week and requesting [facility] prior to surgery  [facility] scheduled for Monday- as D/W interventional, Dr Dunn  NPKAYLA Sunday night.

## 2022-12-02 NOTE — PROGRESS NOTE ADULT - NS ATTEND AMEND GEN_ALL_CORE FT
Seen and examined, agree with above. Can uptitrate BB as tolerated/needed, now on 25 mg q 6 hours. Can use IV PRN 5 mg every 4-6 hours for HR above 120 bpm for >1-2 hours. Better to avoid amiodarone given concerns of chemical cardioversion. Also better to avoid digoxin for now. Continue with AC as tolerated.     Will follow up

## 2022-12-02 NOTE — PROGRESS NOTE ADULT - PROBLEM SELECTOR PLAN 2
h/o strep anginosus bacteremia  has been on abx for several weeks via PICC at home - currently not on pressors.   VS stable.    Ct leukocytosis, diarrhea  Blood cx negative to date, unclear source  TTE unchanged from previous, no vegetation or echodensity noted  LILIAN with MV vegetation  CTSx following  ID recc appreciated  pending Indium scan. h/o strep anginosus bacteremia  has been on abx for several weeks via PICC at home - currently not on pressors.   VS stable.    Contined leukocytosis, diarrhea - ID following  Blood cx negative to date, unclear source  TTE unchanged from previous, no vegetation or echodensity noted  LILIAN with MV vegetation  CTSx following  pending Indium scan. h/o strep anginosus bacteremia  has been on abx for several weeks via PICC at home - currently not on pressors.   VS stable.    Continued leukocytosis - ID following  Blood cx negative to date, unclear source  TTE unchanged from previous, no vegetation or echodensity noted  LILIAN with MV vegetation  CTSx following  pending Indium scan. h/o strep anginosus bacteremia  has been on abx for several weeks via PICC at home - currently not on pressors.   VS stable.    Continued leukocytosis - ID following  Blood cx negative to date, unclear source  TTE unchanged from previous, no vegetation or echodensity noted  LILIAN with MV vegetation  pending Indium scan.  ID following - shock state has resolved.    BP stabilized 110/60 - titrate midodrine when bp allows.

## 2022-12-02 NOTE — PROGRESS NOTE ADULT - ATTENDING COMMENTS
1) Septic shock:  h/o strep anginosus bacteremia.  has been on abx for several weeks via PICC at home. Blood cx negative to date, unclear source  TTE unchanged from previous, no vegetation or echodensity noted.   2) Atrial fibrillation; new onset: Unable to cardiovert due to MV vegetation; rate control strategy for now.    Increase Metoprolol to 25mg every 6 hours. Would accept rates up to 120bpm given current medical illness.   [id number]= 3; age, HTN. Cont IV hep.  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 CTS DOHERTY in progress. If going to cardiac surgery, then advise to consider AF surgical ablation and ESTEFANY clip at that time. Plan: Continue heparin GTT. LHC scheduled for Monday. NPO sunday night.-  4) Septic shock:  h/o strep anginosus bacteremia,  refused LILIAN, discharged on 11/7/22 with PICC/ceftriaxone, has been on abx for several weeks via PICC at home.Blood cx negative to date, unclear source. Septic shock secondary to suspected bacterial endocarditis.  Hypotension on IVF to replace ongoing GI losses. IV vasopressor as necessary to maintain a MAP > 65.continue midodrine 10mg TID  5) MRI on 12/1/22 revealed scattered bilateral parieto-occipital acute infarcts.  Carotid US without evidence of Etiology likely cardioembolic in the setting of atrial fibrillation and endocarditis. MRA Head w/o and Neck w/contrast if no contraindication , patient will likely need cerebral angiogram to rule out mycotic aneurysm prior to surgical intervention. ASA 81mg daily. Heparin gtt/ PTT goal 50-70 for now as to minimize risk of hemorrhagic transformation in setting of acute cerebral infarction, avoid bolus if feasible.

## 2022-12-02 NOTE — PROGRESS NOTE ADULT - SUBJECTIVE AND OBJECTIVE BOX
[facility]                                                         CARDIOLOGY AT [facility]                                                                  [address] [address]                                                         Telephone: [phone]. Fax:[phone]                                                                             PROGRESS NOTE    Reason for follow up:   Afib with RVR, Sepsis  Update:  S/P  LILIAN revealing MV vegetation, Afib more rate controlled.     Patient is resting in ICU, no pressors, VS stable, on room air with Heparin infusing.    CTS requesting [facility] to evaluate for CAD.  Tentatively set for Monday, NPO Sunday night.   Repeat Covid PCR.    Review of symptoms:   Cardiac:  No chest pain. No dyspnea. No palpitations.  Respiratory: no cough. No dyspnea  Gastrointestinal: No diarrhea. No abdominal pain. No bleeding.   Neuro: No focal neuro complaints.    Vitals:  T(C): 36.7 (12-02-22 @ 07:23), Max: 36.7 (12-01-22 @ 23:00)  HR: 152 (12-02-22 @ 09:00) (101 - 152)  BP: 113/99 (12-02-22 @ 09:00) (88/69 - 128/81)  RR: 24 (12-02-22 @ 09:00) (15 - 24)  SpO2: 98% (12-02-22 @ 09:00) (92% - 100%)    I&O's Summary  01 Dec 2022 07:01  -  02 Dec 2022 07:00  --------------------------------------------------------  IN: 3884 mL / OUT: 2090 mL / NET: 1794 mL  02 Dec 2022 07:01  -  02 Dec 2022 13:37  --------------------------------------------------------  IN: 242 mL / OUT: 0 mL / NET: 242 mL  Weight (kg): 76.8 (11-30 @ 14:29)    PHYSICAL EXAM:  Appearance: Comfortable.  Thin, frail  HEENT:  Atraumatic. Normocephalic.  Normal oral mucosa  Neurologic: A & O x 3, no gross focal deficits.  Cardiovascular: RRR S1 S2, No murmur, no rubs/gallops. No JVD  Respiratory: Diminished bilaterally   Gastrointestinal:  Soft, Non-tender, + BS  Lower Extremities: + Peripheral Pulses, No clubbing, cyanosis, or edema  Psychiatry: Patient is calm. No agitation.   Skin: warm and dry.    CURRENT CARDIAC MEDICATIONS:  metoprolol tartrate 25 milliGRAM(s) Oral every 6 hours  metoprolol tartrate Injectable 5 milliGRAM(s) IV Push every 6 hours PRN  midodrine 10 milliGRAM(s) Oral every 8 hours    CURRENT OTHER MEDICATIONS:  meropenem Injectable 1000 milliGRAM(s) IV Push every 8 hours  loperamide 2 milliGRAM(s) Oral every 6 hours  cholestyramine Powder (Sugar-Free) 4 Gram(s) Oral daily  aspirin  chewable 81 milliGRAM(s) Oral daily  chlorhexidine 2% Cloths 1 Application(s) Topical daily  heparin   Injectable 6500 Unit(s) IV Push every 6 hours PRN For aPTT less than 40  heparin   Injectable 3000 Unit(s) IV Push every 6 hours PRN For aPTT between 40 - 57  heparin  Infusion. 2100 Unit(s)/Hr (21 mL/Hr) IV Continuous <Continuous>  lactated ringers. 1000 milliLiter(s) (100 mL/Hr) IV Continuous <Continuous>  phytonadione   Solution 5 milliGRAM(s) Oral daily    LABS:	 	  ( 28 Nov 2022 13:50 )  Troponin T  0.10<H>,  CPK  2194<H>, CKMB  X    , BNP X        , ( 27 Nov 2022 09:05 )  Troponin T  0.16<H>,  CPK  5542<H>, CKMB  X    , BNP X        , ( 27 Nov 2022 00:04 )  Troponin T  0.17<H>,  CPK  X    , CKMB  X    , BNP X                           11.6   16.03 )-----------( 325      ( 02 Dec 2022 05:30 )             34.1     12-02    138  |  106  |  15.4  ----------------------------<  91  4.0   |  22.0  |  0.87    Ca    9.1      02 Dec 2022 05:30  Phos  3.5     12-02  Mg     1.7     12-02    TPro  5.1<L>  /  Alb  2.4<L>  /  TBili  0.5  /  DBili  x   /  AST  56<H>  /  ALT  71<H>  /  AlkPhos  76  12-02    PT/INR/PTT ( 02 Dec 2022 12:55 )      X            :       69.0                  .        .                   .              .           .       X           .                                       TELEMETRY:   afib 94                                                                [facility]                                                         CARDIOLOGY AT [facility]                                                                  [address] [address]                                                         Telephone: [phone]. Fax:[phone]                                                                             PROGRESS NOTE    Reason for follow up:   Afib with RVR, Sepsis  Update:  S/P  LILIAN revealing MV vegetation, Afib more rate controlled.     Patient is resting in ICU, no pressors, VS stable, on room air with Heparin infusing.    CTS requesting [facility] to evaluate for CAD.  Tentatively set for Monday, NPO Sunday night.   Repeat Covid PCR.    Review of symptoms:   Cardiac:  No chest pain. No dyspnea. No palpitations.  Respiratory: no cough. No dyspnea  Gastrointestinal: No diarrhea. No abdominal pain. No bleeding.   Neuro: No focal neuro complaints.    Vitals:  T(C): 36.7 (12-02-22 @ 07:23), Max: 36.7 (12-01-22 @ 23:00)  HR: 152 (12-02-22 @ 09:00) (101 - 152)  BP: 113/99 (12-02-22 @ 09:00) (88/69 - 128/81)  RR: 24 (12-02-22 @ 09:00) (15 - 24)  SpO2: 98% (12-02-22 @ 09:00) (92% - 100%)    I&O's Summary  01 Dec 2022 07:01  -  02 Dec 2022 07:00  --------------------------------------------------------  IN: 3884 mL / OUT: 2090 mL / NET: 1794 mL  02 Dec 2022 07:01  -  02 Dec 2022 13:37  --------------------------------------------------------  IN: 242 mL / OUT: 0 mL / NET: 242 mL  Weight (kg): 76.8 (11-30 @ 14:29)    PHYSICAL EXAM:  Appearance: Comfortable.  Thin, frail  HEENT:  Atraumatic. Normocephalic.  Normal oral mucosa  Neurologic: A & O x 3, no gross focal deficits.  Cardiovascular: RRR S1 S2, No murmur, no rubs/gallops. No JVD  Respiratory: Diminished bilaterally   Gastrointestinal:  Soft, Non-tender, + BS  Lower Extremities: + Peripheral Pulses, No clubbing, cyanosis, or edema  Psychiatry: Patient is calm. No agitation.   Skin: warm and dry.    CURRENT CARDIAC MEDICATIONS:  metoprolol tartrate 25 milliGRAM(s) Oral every 6 hours  metoprolol tartrate Injectable 5 milliGRAM(s) IV Push every 6 hours PRN  midodrine 10 milliGRAM(s) Oral every 8 hours    CURRENT OTHER MEDICATIONS:  meropenem Injectable 1000 milliGRAM(s) IV Push every 8 hours  loperamide 2 milliGRAM(s) Oral every 6 hours  cholestyramine Powder (Sugar-Free) 4 Gram(s) Oral daily  aspirin  chewable 81 milliGRAM(s) Oral daily  chlorhexidine 2% Cloths 1 Application(s) Topical daily  heparin   Injectable 6500 Unit(s) IV Push every 6 hours PRN For aPTT less than 40  heparin   Injectable 3000 Unit(s) IV Push every 6 hours PRN For aPTT between 40 - 57  heparin  Infusion. 2100 Unit(s)/Hr (21 mL/Hr) IV Continuous <Continuous>  lactated ringers. 1000 milliLiter(s) (100 mL/Hr) IV Continuous <Continuous>  phytonadione   Solution 5 milliGRAM(s) Oral daily    LABS:	 	  ( 28 Nov 2022 13:50 )  Troponin T  0.10<H>,  CPK  2194<H>, CKMB  X    , BNP X        , ( 27 Nov 2022 09:05 )  Troponin T  0.16<H>,  CPK  5542<H>, CKMB  X    , BNP X        , ( 27 Nov 2022 00:04 )  Troponin T  0.17<H>,  CPK  X    , CKMB  X    , BNP X                           11.6   16.03 )-----------( 325      ( 02 Dec 2022 05:30 )             34.1     12-02    138  |  106  |  15.4  ----------------------------<  91  4.0   |  22.0  |  0.87    Ca    9.1      02 Dec 2022 05:30  Phos  3.5     12-02  Mg     1.7     12-02    TPro  5.1<L>  /  Alb  2.4<L>  /  TBili  0.5  /  DBili  x   /  AST  56<H>  /  ALT  71<H>  /  AlkPhos  76  12-02    PT/INR/PTT ( 02 Dec 2022 12:55 )      X            :       69.0                  .        .                   .              .           .       X           .                                       TELEMETRY:   afib 94                                                                [facility]                                                         CARDIOLOGY AT [facility]                                                                  [address], [place] [address]                                                         Telephone: [phone]. Fax:[phone]                                                                             PROGRESS NOTE    Reason for follow up:   Afib with RVR, Sepsis  Update:  S/P  LILIAN revealing MV vegetation, Afib more rate controlled.     Patient is resting in ICU, no pressors, VS stable, on room air with Heparin infusing.   MRI head performed on 12/1 as part of w/u revealed acute ischemia; embolic phenomenon is suggested.  Neuro following.    CTS requesting [facility] to evaluate for CAD.  Tentatively set for Monday, NPO Sunday night.   Repeat Covid PCR.    Review of symptoms:   Cardiac:  No chest pain. No dyspnea. No palpitations.  Respiratory: no cough. No dyspnea  Gastrointestinal: No diarrhea. No abdominal pain. No bleeding.   Neuro: No focal neuro complaints.    Vitals:  T(C): 36.7 (12-02-22 @ 07:23), Max: 36.7 (12-01-22 @ 23:00)  HR: 152 (12-02-22 @ 09:00) (101 - 152)  BP: 113/99 (12-02-22 @ 09:00) (88/69 - 128/81)  RR: 24 (12-02-22 @ 09:00) (15 - 24)  SpO2: 98% (12-02-22 @ 09:00) (92% - 100%)    I&O's Summary  01 Dec 2022 07:01  -  02 Dec 2022 07:00  --------------------------------------------------------  IN: 3884 mL / OUT: 2090 mL / NET: 1794 mL  02 Dec 2022 07:01  -  02 Dec 2022 13:37  --------------------------------------------------------  IN: 242 mL / OUT: 0 mL / NET: 242 mL  Weight (kg): 76.8 (11-30 @ 14:29)    PHYSICAL EXAM:  Appearance: Comfortable.  Thin, frail  HEENT:  Atraumatic. Normocephalic.  Normal oral mucosa  Neurologic: A & O x 3, no gross focal deficits.  Cardiovascular: RRR S1 S2, No murmur, no rubs/gallops. No JVD  Respiratory: Diminished bilaterally   Gastrointestinal:  Soft, Non-tender, + BS  Lower Extremities: + Peripheral Pulses, No clubbing, cyanosis, or edema  Psychiatry: Patient is calm. No agitation.   Skin: warm and dry.    CURRENT CARDIAC MEDICATIONS:  metoprolol tartrate 25 milliGRAM(s) Oral every 6 hours  metoprolol tartrate Injectable 5 milliGRAM(s) IV Push every 6 hours PRN  midodrine 10 milliGRAM(s) Oral every 8 hours    CURRENT OTHER MEDICATIONS:  meropenem Injectable 1000 milliGRAM(s) IV Push every 8 hours  loperamide 2 milliGRAM(s) Oral every 6 hours  cholestyramine Powder (Sugar-Free) 4 Gram(s) Oral daily  aspirin  chewable 81 milliGRAM(s) Oral daily  chlorhexidine 2% Cloths 1 Application(s) Topical daily  heparin   Injectable 6500 Unit(s) IV Push every 6 hours PRN For aPTT less than 40  heparin   Injectable 3000 Unit(s) IV Push every 6 hours PRN For aPTT between 40 - 57  heparin  Infusion. 2100 Unit(s)/Hr (21 mL/Hr) IV Continuous <Continuous>  lactated ringers. 1000 milliLiter(s) (100 mL/Hr) IV Continuous <Continuous>  phytonadione   Solution 5 milliGRAM(s) Oral daily    LABS:	 	  ( 28 Nov 2022 13:50 )  Troponin T  0.10<H>,  CPK  2194<H>, CKMB  X    , BNP X        , ( 27 Nov 2022 09:05 )  Troponin T  0.16<H>,  CPK  5542<H>, CKMB  X    , BNP X        , ( 27 Nov 2022 00:04 )  Troponin T  0.17<H>,  CPK  X    , CKMB  X    , BNP X                           11.6   16.03 )-----------( 325      ( 02 Dec 2022 05:30 )             34.1     12-02    138  |  106  |  15.4  ----------------------------<  91  4.0   |  22.0  |  0.87    Ca    9.1      02 Dec 2022 05:30  Phos  3.5     12-02  Mg     1.7     12-02    TPro  5.1<L>  /  Alb  2.4<L>  /  TBili  0.5  /  DBili  x   /  AST  56<H>  /  ALT  71<H>  /  AlkPhos  76  12-02    PT/INR/PTT ( 02 Dec 2022 12:55 )      X            :       69.0                  .        .                   .              .           .       X           .                                       TELEMETRY:   afib with rates up to 140's to 150's                                                                  [facility]                                                         CARDIOLOGY AT [facility]                                                                  [address], [place] [address]                                                         Telephone: [phone]. Fax:[phone]                                                                             PROGRESS NOTE    Reason for follow up:   Afib with RVR, Sepsis  Update:  S/P  LILIAN revealing MV vegetation, Afib with rates up to 140-150s this am - EP following.   Patient is resting in ICU, no pressors, VS stable, , on room air with Heparin infusing.   MRI head performed on 12/1 as part of w/u revealed acute ischemia suggestive of embolic phenomenon..  Neuro following.    CTS requesting [facility] to evaluate for CAD.  Tentatively set for Monday, NPO Sunday night.   Repeat Covid PCR.    Review of symptoms:   Cardiac:  No chest pain. No dyspnea. No palpitations.  Respiratory: no cough. No dyspnea  Gastrointestinal: No diarrhea. No abdominal pain. No bleeding.   Neuro: No focal neuro complaints.    Vitals:  T(C): 36.7 (12-02-22 @ 07:23), Max: 36.7 (12-01-22 @ 23:00)  HR: 152 (12-02-22 @ 09:00) (101 - 152)  BP: 113/99 (12-02-22 @ 09:00) (88/69 - 128/81)  RR: 24 (12-02-22 @ 09:00) (15 - 24)  SpO2: 98% (12-02-22 @ 09:00) (92% - 100%)    I&O's Summary  01 Dec 2022 07:01  -  02 Dec 2022 07:00  --------------------------------------------------------  IN: 3884 mL / OUT: 2090 mL / NET: 1794 mL  02 Dec 2022 07:01  -  02 Dec 2022 13:37  --------------------------------------------------------  IN: 242 mL / OUT: 0 mL / NET: 242 mL  Weight (kg): 76.8 (11-30 @ 14:29)    PHYSICAL EXAM:  Appearance: Comfortable.  Thin, frail  HEENT:  Atraumatic. Normocephalic.  Normal oral mucosa  Neurologic: A & O x 3, no gross focal deficits.  Cardiovascular: RRR S1 S2, No murmur, no rubs/gallops. No JVD  Respiratory: Diminished bilaterally   Gastrointestinal:  Soft, Non-tender, + BS  Lower Extremities: + Peripheral Pulses, No clubbing, cyanosis, or edema  Psychiatry: Patient is calm. No agitation.   Skin: warm and dry.    CURRENT CARDIAC MEDICATIONS:  metoprolol tartrate 25 milliGRAM(s) Oral every 6 hours  metoprolol tartrate Injectable 5 milliGRAM(s) IV Push every 6 hours PRN  midodrine 10 milliGRAM(s) Oral every 8 hours    CURRENT OTHER MEDICATIONS:  meropenem Injectable 1000 milliGRAM(s) IV Push every 8 hours  loperamide 2 milliGRAM(s) Oral every 6 hours  cholestyramine Powder (Sugar-Free) 4 Gram(s) Oral daily  aspirin  chewable 81 milliGRAM(s) Oral daily  chlorhexidine 2% Cloths 1 Application(s) Topical daily  heparin   Injectable 6500 Unit(s) IV Push every 6 hours PRN For aPTT less than 40  heparin   Injectable 3000 Unit(s) IV Push every 6 hours PRN For aPTT between 40 - 57  heparin  Infusion. 2100 Unit(s)/Hr (21 mL/Hr) IV Continuous <Continuous>  lactated ringers. 1000 milliLiter(s) (100 mL/Hr) IV Continuous <Continuous>  phytonadione   Solution 5 milliGRAM(s) Oral daily    LABS:	 	  ( 28 Nov 2022 13:50 )  Troponin T  0.10<H>,  CPK  2194<H>, CKMB  X    , BNP X        , ( 27 Nov 2022 09:05 )  Troponin T  0.16<H>,  CPK  5542<H>, CKMB  X    , BNP X        , ( 27 Nov 2022 00:04 )  Troponin T  0.17<H>,  CPK  X    , CKMB  X    , BNP X                           11.6   16.03 )-----------( 325      ( 02 Dec 2022 05:30 )             34.1     12-02    138  |  106  |  15.4  ----------------------------<  91  4.0   |  22.0  |  0.87    Ca    9.1      02 Dec 2022 05:30  Phos  3.5     12-02  Mg     1.7     12-02    TPro  5.1<L>  /  Alb  2.4<L>  /  TBili  0.5  /  DBili  x   /  AST  56<H>  /  ALT  71<H>  /  AlkPhos  76  12-02    PT/INR/PTT ( 02 Dec 2022 12:55 )      X            :       69.0                  .        .                   .              .           .       X           .                                       TELEMETRY:   afib with rates up to 140's to 150's

## 2022-12-02 NOTE — PROGRESS NOTE ADULT - SUBJECTIVE AND OBJECTIVE BOX
HOLDSWORTH, GEORGE  MRN-[id number]    HPI:  77 year old male with PMHx of recent  COVID  on 10/6/22, HTN , Vit B12 deficiency, presenting to the ED on 11/2 with body aches, fatigue and fever (Tmax 102) at home for 12 days found to have streptococcus bacteremia and admitted with unclear source, no recent dental work, skin infections, no respiratory symptoms. Pan scan was negative at that time ( LILIAN was recommended but pt refused) He was treated for Bacteremia ( Blood cultures + streptococcus anginosis pansensitive ) and norovirus with supportive care and contact isolation . He was discharged home on 11/7 with IV Rocephin via PICC line. Recommendation was to continue ABX  daily via pic end 11/30/22  Tonight he presented to ER after and episode of syncope and collapse, found to be septic, hypotensive, hypoxic and febrile in the ER. He is unsure of mechanism of fall but remembers being on the ground no reported LOC . He was found face-down on bathroom floor buy family with left leg wedged under cabinet. On my interview he is alert and oriented to person place and time, he has a baseline studder in his speech pattern but otherwise neurologically intcact without defecit.  In responose to hypotension he failed to repsoond to Inital sepsis fluid protocol in ER and now is requiring IV pressor in form of levophed to maintain MAP greater than 60 -65. In the ER he was febrile with inital labs signifacnt for WBC of 22.4 , ProCal of 21.2 first lactate 4.9 via abg with repeat post fluids of 2.2 venous sample.   (27 Nov 2022 02:26)      Hospital Course:  -Mitral valve vegetations on LILIAN 11/30  -11/27 blood cultures negative  -pending preop work up including the following:          CT maxillofacial          CT C/A/P with IV contrast to eval for ischemia and septic emboli  -MRI stroke protocol to assess for stroke, then consult neuro IR to determine if cerebral angio necessary for OR clearance  -indium scan per ID to eval for original source of bacteremia   -meropenem per ID   -carotids without significant stenosis   -EP following, rec surgical ablation and ESTEFANY clip/ligation if open heart surgery.  - prior norovirus + 11/3      ICU Vital Signs Last 24 Hrs  T(C): 36.6 (02 Dec 2022 12:30), Max: 36.7 (01 Dec 2022 23:00)  T(F): 97.8 (02 Dec 2022 12:30), Max: 98 (01 Dec 2022 23:00)  HR: 127 (02 Dec 2022 14:00) (101 - 152)  BP: 110/69 (02 Dec 2022 14:00) (88/69 - 131/98)  BP(mean): 79 (02 Dec 2022 14:00) (66 - 109)  ABP: --  ABP(mean): --  RR: 24 (02 Dec 2022 14:00) (15 - 25)  SpO2: 97% (02 Dec 2022 14:00) (92% - 100%)    O2 Parameters below as of 02 Dec 2022 08:00  Patient On (Oxygen Delivery Method): room air            Physical Exam:  Gen: A&O   CNS: non focal 	  Neck: no JVD  RES : clear , no wheezing              CVS: Regular  rhythm. Normal S1/S2  Abd: Soft, non-distended. Bowel sounds present.  Skin: No rash.  Ext:  no edema    ============================I/O===========================   I&O's Detail    01 Dec 2022 07:01  -  02 Dec 2022 07:00  --------------------------------------------------------  IN:    Heparin Infusion: 168 mL    Heparin Infusion: 316 mL    Lactated Ringers: 2400 mL    Lactated Ringers Bolus: 1000 mL  Total IN: 3884 mL    OUT:    Incontinent per Condom Catheter (mL): 1390 mL    Rectal Tube (mL): 700 mL  Total OUT: 2090 mL    Total NET: 1794 mL      02 Dec 2022 07:01  -  02 Dec 2022 14:48  --------------------------------------------------------  IN:    Heparin Infusion: 168 mL    Lactated Ringers: 800 mL  Total IN: 968 mL    OUT:  Total OUT: 0 mL    Total NET: 968 mL        ============================ LABS =========================                        11.6   16.03 )-----------( 325      ( 02 Dec 2022 05:30 )             34.1     12-02    138  |  106  |  15.4  ----------------------------<  91  4.0   |  22.0  |  0.87    Ca    9.1      02 Dec 2022 05:30  Phos  3.5     12-02  Mg     1.7     12-02    TPro  5.1<L>  /  Alb  2.4<L>  /  TBili  0.5  /  DBili  x   /  AST  56<H>  /  ALT  71<H>  /  AlkPhos  76  12-02    LIVER FUNCTIONS - ( 02 Dec 2022 05:30 )  Alb: 2.4 g/dL / Pro: 5.1 g/dL / ALK PHOS: 76 U/L / ALT: 71 U/L / AST: 56 U/L / GGT: x           PTT - ( 02 Dec 2022 12:55 )  PTT:69.0 sec      ======================Micro/Rad/Cardio=================  Culture: Reviewed   CXR: Reviewed  Echo:Reviewed  ======================================================  PAST MEDICAL & SURGICAL HISTORY:  Hypertension      Aortic stenosis      H/O inguinal hernia repair  B/L 2013      S/P laparoscopic colectomy  right colectomy with ileotransverse anastomosis        ====================ASSESSMENT ==============  77 year old male with PMHx of COVID (10/6/22), HTN, B12 deficiency, Aortic stenosis presenting to the ED with new onset Afib with RVR and sepsis. in atrial fib with moderate ventricular rate      ---Atrial fibrillation, new onset.   --- afib RVR this am up to 160's.    ---CHADs2  VASC 3 (age, HTN)  ---TTE preserved EF 55-60%, mod MR, mod AS, unchanged from previous  ---s/p LILIAN which demonstrates no ESTEFANY thrombus but + mitral valve vegetation    ---H/O Septic shock.   --- h/o strep anginosus bacteremia  ---has been on abx for several weeks via PICC at home -   VS stable.    ---LILIAN with MV vegetation  ---Abscess of aortic root.   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 Moderate AS, Ml-Mod AR, There is mild echolucency and thickening of the sinus of Valsalva  Mitral valve vegetations and possible early aortic root abscess present,  ---C scheduled for Monday    Plan:  -Continue heparin GTT  -Strict I and O's.  -LHC scheduled for Monday  -NPO sunday night.-  ====================== NEUROLOGY=====================    ==================== RESPIRATORY======================    ====================CARDIOVASCULAR==================  metoprolol tartrate 25 milliGRAM(s) Oral every 6 hours  metoprolol tartrate Injectable 5 milliGRAM(s) IV Push every 6 hours PRN HR > 130  midodrine 10 milliGRAM(s) Oral every 8 hours    ===================HEMATOLOGIC/ONC ===================  Monitor H&H/Plts    aspirin  chewable 81 milliGRAM(s) Oral daily  heparin   Injectable 6500 Unit(s) IV Push every 6 hours PRN For aPTT less than 40  heparin   Injectable 3000 Unit(s) IV Push every 6 hours PRN For aPTT between 40 - 57  heparin  Infusion. 2100 Unit(s)/Hr (21 mL/Hr) IV Continuous <Continuous>    ===================== RENAL =========================  Continue monitoring urine output, I&OS, BUN/Cr     ==================== GASTROINTESTINAL===================  lactated ringers. 1000 milliLiter(s) (100 mL/Hr) IV Continuous <Continuous>  loperamide 2 milliGRAM(s) Oral every 6 hours  phytonadione   Solution 5 milliGRAM(s) Oral daily    =======================    ENDOCRINE  =====================  cholestyramine Powder (Sugar-Free) 4 Gram(s) Oral daily    ========================INFECTIOUS DISEASE================  meropenem Injectable 1000 milliGRAM(s) IV Push every 8 hours      -Close hemodynamic , ventilatory and drain monitoring and management per post op routine .  -Monitor Neurologic status ,   -Head of the bed should remain elevated to 45 degrees,  -Monitor adequacy of oxygenation and ventilation and attempt to wean oxygen ,  -Monitor for arrhythmias and monitor parameters for organ perfusion,  -Glycemic control is satisfactory,  -Nutritional goals will be met using po eventually , insure adequate caloric intake and monitor the same ,  -Electrolytes have been repleted as necessary , pain control has been achieved  and wound care has been carried out ,  -Stress ulcer and VTE prophylaxis will be achieved,  -Agressive PT and early mobility and ambulation goals will be met,  -The family was updated about the course and plan .      I have spent 35 minutes providing acute care for this critically ill patient     Patient requires continuous monitoring with bedside rhythm monitoring, pulse ox monitoring, and intermittent blood gas analysis. Care plan discussed with ICU care team. Patient remained critical and at risk for life threatening decompensation.

## 2022-12-02 NOTE — CHART NOTE - NSCHARTNOTEFT_GEN_A_CORE
78 y/o M with a h/o recent COVID-19 (10/2022), HTN, moderate AS, recent streptococcus anginosus bacteremia (unremarkable CT-imaging, refused LILIAN, discharged on 11/7/22 with PICC/ceftriaxone), recent norovirus infection, admitted on 11/27 s/p episode of syncope and collapse. Found to have septic shock s/t suspected bacterial endocarditis given new LILIAN findings of MV vegetation  and new onset AF with RVR. Started on IV vasopressor therapy.     LILIAN revealed mitral valve vegetation and possible aortic root abscess. 78 y/o M with a h/o recent COVID-19 (10/2022), HTN, moderate AS, recent streptococcus anginosus bacteremia (unremarkable CT-imaging, refused LILIAN, discharged on 11/7/22 with PICC/ceftriaxone), recent norovirus infection, admitted on 11/27 s/p episode of syncope and collapse. Found to have new onset AFib RVR and septic shock s/t suspected bacterial endocarditis given new LILIAN findings of MV vegetation and aortic root abscess. Started on IV vasopressor therapy, empiric meropenem, and rate control attempted with standing PO metoprolol and IV lopressor prn. MRI head revealed 76 y/o M with a h/o recent COVID-19 (10/2022), HTN, moderate AS, recent streptococcus anginosus bacteremia (unremarkable CT-imaging, refused LILIAN, discharged on 11/7/22 with PICC/ceftriaxone), recent norovirus infection, admitted on 11/27 s/p episode of syncope and collapse. Found to have new onset AFib RVR and septic shock s/t suspected bacterial endocarditis given new LILIAN findings of MV vegetation and aortic root abscess. Blood cxs negative however recent hx strep anginosis bacteremia. Started on IV vasopressor therapy, empiric meropenem, and rate control attempted with standing PO metoprolol and IV lopressor PRN. MRI head revealed acute stroke likely s/t septic emboli, neuro IR consulted. Pending further imaging in preparation of OR with CTS for valvular replacement.     Patient to be transferred to CTS service under Dr Flanagan for further workup and management.

## 2022-12-02 NOTE — PROGRESS NOTE ADULT - SUBJECTIVE AND OBJECTIVE BOX
Chief Complaint:  Patient is a 77y old  Male who presents with a chief complaint of septic shock unknown source (01 Dec 2022 20:01)      HPI/ 24 hr events: Patient seen and examined at bedside. Pt feeling well this morning. Tolerating diet. BM. Denies nausea, vomiting, diarrhea, abdominal pain, hematemesis, hematochezia, melena. Vitals are stable, no leukocytosis,    700 cc in rectal tube output in 24 hours     REVIEW OF SYSTEMS:   General: Negative  HEENT: Negative  CV: Negative  Respiratory: Negative  GI: See HPI  : Negative  MSK: Negative  Hematologic: Negative  Skin: Negative    MEDICATIONS:   MEDICATIONS  (STANDING):  aspirin  chewable 81 milliGRAM(s) Oral daily  chlorhexidine 2% Cloths 1 Application(s) Topical daily  cholestyramine Powder (Sugar-Free) 4 Gram(s) Oral daily  heparin  Infusion. 2100 Unit(s)/Hr (21 mL/Hr) IV Continuous <Continuous>  lactated ringers. 1000 milliLiter(s) (100 mL/Hr) IV Continuous <Continuous>  loperamide 2 milliGRAM(s) Oral every 6 hours  meropenem Injectable 1000 milliGRAM(s) IV Push every 8 hours  metoprolol tartrate 25 milliGRAM(s) Oral every 8 hours  midodrine 10 milliGRAM(s) Oral every 8 hours  phytonadione   Solution 5 milliGRAM(s) Oral daily  saccharomyces boulardii 250 milliGRAM(s) Oral two times a day    MEDICATIONS  (PRN):  heparin   Injectable 6500 Unit(s) IV Push every 6 hours PRN For aPTT less than 40  heparin   Injectable 3000 Unit(s) IV Push every 6 hours PRN For aPTT between 40 - 57  metoprolol tartrate Injectable 5 milliGRAM(s) IV Push every 6 hours PRN HR > 130      ALLERGIES:   Allergies    No Known Allergies    Intolerances        VITAL SIGNS:   Vital Signs Last 24 Hrs  T(C): 36.7 (02 Dec 2022 07:23), Max: 36.7 (01 Dec 2022 23:00)  T(F): 98 (02 Dec 2022 07:23), Max: 98 (01 Dec 2022 23:00)  HR: 122 (02 Dec 2022 07:00) (101 - 137)  BP: 117/86 (02 Dec 2022 07:00) (82/62 - 128/81)  BP(mean): 96 (02 Dec 2022 07:00) (66 - 96)  RR: 24 (02 Dec 2022 07:00) (15 - 24)  SpO2: 98% (02 Dec 2022 07:00) (92% - 100%)    Parameters below as of 02 Dec 2022 05:00  Patient On (Oxygen Delivery Method): room air      I&O's Summary    01 Dec 2022 07:01  -  02 Dec 2022 07:00  --------------------------------------------------------  IN: 3884 mL / OUT: 2090 mL / NET: 1794 mL        PHYSICAL EXAM:   GENERAL:  No acute distress  HEENT:  NC/AT, conjunctiva clear, sclera anicteric  CHEST:  No increased effort, breath sounds clear  HEART:  Regular rhythm, S1, S2, rate regular   ABDOMEN:  Soft, non-tender, non-distended, normoactive bowel sounds, no rebound or guarding, stool in rectal tube   EXTREMITIES: No edema  SKIN:  Warm, dry  NEURO:  Calm, cooperative    LABS:                        11.6   16.03 )-----------( 325      ( 02 Dec 2022 05:30 )             34.1     12-02    138  |  106  |  15.4  ----------------------------<  91  4.0   |  22.0  |  0.87    Ca    9.1      02 Dec 2022 05:30  Phos  3.5     12-02  Mg     1.7     12-02    TPro  5.1<L>  /  Alb  2.4<L>  /  TBili  0.5  /  DBili  x   /  AST  56<H>  /  ALT  71<H>  /  AlkPhos  76  12-02    LIVER FUNCTIONS - ( 02 Dec 2022 05:30 )  Alb: 2.4 g/dL / Pro: 5.1 g/dL / ALK PHOS: 76 U/L / ALT: 71 U/L / AST: 56 U/L / GGT: x           PTT - ( 02 Dec 2022 05:30 )  PTT:60.4 sec      Culture - Stool (collected 29 Nov 2022 21:00)  Source: .Stool Feces  Preliminary Report (01 Dec 2022 14:28):    No enteric pathogens to date: Final culture pending    No enteric gram negative rods isolated                      GI PCR Panel: [facility] (11-29-22 @ 21:00)              RADIOLOGY & ADDITIONAL STUDIES:      -- -- --   ACC: [id number] EXAM:  US ABDOMEN RT UPR QUADRANT                          PROCEDURE DATE:  11/30/2022          INTERPRETATION:  CLINICAL INFORMATION: Elevated LFTs    COMPARISON: CT of the chest, abdomen and pelvis from 11/27/2022.    TECHNIQUE: Sonography of the right upper quadrant.    FINDINGS:  Liver: There is increased hepatic echogenicity, suggesting hepatic   steatosis and/or fibrosis. The liver is within normal limits in size,   measuring up to 17.2 cm. The visualized main portal vein appears patent   with normal direction of flow.  Bile ducts: Normal caliber. Common bile duct measures 4 mm.  Gallbladder: Within normal limits. There is no gallbladder wall   thickening or pericholecystic fluid. No gallstones are seen. Negative   sonographic Starr sign.  Pancreas: Pancreas not visualized.  Right kidney: 10.1 cm. No hydronephrosis. There is a 2.3 x 2.6 x 1.7 cm   cyst at the interpolar region of the right kidney.  Ascites: None.  Aorta/IVC: Visualized portions are within normal limits.    IMPRESSION:  Increased hepatic echogenicity, suggesting hepatic steatosis/or fibrosis.    Pancreas not visualized.    --- End of Report ---            MIRELLA JIMÉNEZ MD; Attending Radiologist  This document has been electronically signed. Nov 302022 11:10AM

## 2022-12-02 NOTE — PROGRESS NOTE ADULT - ASSESSMENT
77 year old male with PMH of HTN, hyperlipidemia, BPH, moderate aortic stenosis with ELDON 1.1 cm, and recent COVID infection 10/6/22. Recent admission 11/2-7/22 for streptococcus anginosus bacteremia and norovirus infections. He was discharged home with a PICC line for 4 weeks of IV abx as per ID. He presented to [facility] ED on 11/27/22 following syncopal episode, found to be in septic shock and in new onset AFib with rapid rates. 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 Cardioversion CANCELLED. Care transferred to CT surgery team. Pending further workup in preparation for OR with CT surgery. MRI head performed on 12/1 as part of w/u revealed acute ischemia; embolic phenomenon is suggested, with underlying septic emboli not excluded. Neuro following.     Recommendations:    1. Atrial fibrillation; new onset  - Unable to cardiovert due to MV vegetation; rate control strategy for now.    - Increase Metoprolol to 25mg every 6 hours (D/w CTS team). Recommend continuing with hold parameters. Would accept rates up to 120bpm given current medical illness.   - [id number]= 3; age, HTN. Remains on therapeutic heparin; maintain therapeutic PTT     2. 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   - If aortic root abscess is confirmed will need to be careful with uptitration of rate control agents due to risk of AVB.   - If going to cardiac surgery, then advise to consider AF surgical ablation and ESTEFANY clip at that time.   - Abx per ID

## 2022-12-02 NOTE — PROGRESS NOTE ADULT - PROBLEM SELECTOR PLAN 1
new on set Afib, afib RVR this am up to 160's. No acute alarms noted.    [id number] 3 (age, HTN)  TTE preserved EF 55-60%, mod MR, mod AS, unchanged from previous  s/p LILIAN which demonstrates no ESTEFANY thrombus but + mitral valve vegetation    no DCCV performed  normal exercise stress test in 2021  Continue heparin GTT  Increased metoprolol 25mg PO q6H for rate control   EP following new on set Afib, afib RVR this am up to 140 to 150's.  [id number] 3 (age, HTN)  TTE preserved EF 55-60%, mod MR, mod AS, unchanged from previous  s/p LILIAN which demonstrates no ESTEFANY thrombus but + mitral valve vegetation    no DCCV performed  normal exercise stress test in 2021  Continue heparin GTT  Increased metoprolol 25mg PO q6H for rate control   EP following new on set Afib, afib RVR this am up to 140 to 150's - EP following  [id number] 3 (age, HTN)  TTE preserved EF 55-60%, mod MR, mod AS, unchanged from previous  s/p LILIAN which demonstrates no ESTEFANY thrombus but + mitral valve vegetation    No DCCV performed  Continue heparin GTT as per ICU management and neuro  Increased metoprolol 25mg PO q6H for rate control and IV prn

## 2022-12-02 NOTE — PROGRESS NOTE ADULT - ASSESSMENT
77 year old male with PMHx HTN, aortic stenosis, S/p right hemicolectomy in 2019 for benign colon mass, recent COVID on 10/6/22, Vit B12 deficiency, was admitted 11/1 for SIRS, found to have strep anginosus bacteremia now with LILIAN concerning for endocarditis with aortic root abscess.  Presented with syncope here with sepsis. GI consulted for diarrhea.      Diarrhea:  Suspect antibiotic associated diarrhea, stool studies negative on this admission, no evidence of ischemia on non contrast CT   Continue imodium (16mg/day max) and cholestyramine (titrate up to three times per day) monitor rectal tube output in response   Continue Antibiotics as per ID   Continue diet as tolerated   Remainder of care per ICU team   Follow up as an outpatient with Dr. Son

## 2022-12-03 LAB
ANION GAP SERPL CALC-SCNC: 12 MMOL/L — SIGNIFICANT CHANGE UP (ref 5–17)
APPEARANCE UR: ABNORMAL
APTT BLD: 55.1 SEC — HIGH (ref 27.5–35.5)
APTT BLD: 67.9 SEC — HIGH (ref 27.5–35.5)
APTT BLD: 91.1 SEC — HIGH (ref 27.5–35.5)
BACTERIA # UR AUTO: ABNORMAL
BILIRUB UR-MCNC: NEGATIVE — SIGNIFICANT CHANGE UP
BUN SERPL-MCNC: 13.6 MG/DL — SIGNIFICANT CHANGE UP (ref 8–20)
CALCIUM SERPL-MCNC: 9.1 MG/DL — SIGNIFICANT CHANGE UP (ref 8.4–10.5)
CHLORIDE SERPL-SCNC: 103 MMOL/L — SIGNIFICANT CHANGE UP (ref 96–108)
CO2 SERPL-SCNC: 24 MMOL/L — SIGNIFICANT CHANGE UP (ref 22–29)
COLOR SPEC: ABNORMAL
CREAT SERPL-MCNC: 0.81 MG/DL — SIGNIFICANT CHANGE UP (ref 0.5–1.3)
DIFF PNL FLD: ABNORMAL
DIGOXIN SERPL-MCNC: 0.5 NG/ML — LOW (ref 0.8–2)
EGFR: 90 ML/MIN/1.73M2 — SIGNIFICANT CHANGE UP
EPI CELLS # UR: SIGNIFICANT CHANGE UP
GLUCOSE SERPL-MCNC: 96 MG/DL — SIGNIFICANT CHANGE UP (ref 70–99)
GLUCOSE UR QL: NEGATIVE MG/DL — SIGNIFICANT CHANGE UP
GRAN CASTS # UR COMP ASSIST: ABNORMAL /LPF
HCT VFR BLD CALC: 32.1 % — LOW (ref 39–50)
HGB BLD-MCNC: 11.2 G/DL — LOW (ref 13–17)
HYALINE CASTS # UR AUTO: ABNORMAL /LPF
KETONES UR-MCNC: ABNORMAL
LEUKOCYTE ESTERASE UR-ACNC: ABNORMAL
MAGNESIUM SERPL-MCNC: 1.6 MG/DL — SIGNIFICANT CHANGE UP (ref 1.6–2.6)
MCHC RBC-ENTMCNC: 31.7 PG — SIGNIFICANT CHANGE UP (ref 27–34)
MCHC RBC-ENTMCNC: 34.9 GM/DL — SIGNIFICANT CHANGE UP (ref 32–36)
MCV RBC AUTO: 90.9 FL — SIGNIFICANT CHANGE UP (ref 80–100)
NITRITE UR-MCNC: NEGATIVE — SIGNIFICANT CHANGE UP
PH UR: 6.5 — SIGNIFICANT CHANGE UP (ref 5–8)
PLATELET # BLD AUTO: 344 K/UL — SIGNIFICANT CHANGE UP (ref 150–400)
POTASSIUM SERPL-MCNC: 3.9 MMOL/L — SIGNIFICANT CHANGE UP (ref 3.5–5.3)
POTASSIUM SERPL-SCNC: 3.9 MMOL/L — SIGNIFICANT CHANGE UP (ref 3.5–5.3)
PROT UR-MCNC: 30 MG/DL
RBC # BLD: 3.53 M/UL — LOW (ref 4.2–5.8)
RBC # FLD: 13.2 % — SIGNIFICANT CHANGE UP (ref 10.3–14.5)
RBC CASTS # UR COMP ASSIST: >50 /HPF (ref 0–4)
SODIUM SERPL-SCNC: 139 MMOL/L — SIGNIFICANT CHANGE UP (ref 135–145)
SP GR SPEC: 1.01 — SIGNIFICANT CHANGE UP (ref 1.01–1.02)
UROBILINOGEN FLD QL: NEGATIVE MG/DL — SIGNIFICANT CHANGE UP
WBC # BLD: 12.92 K/UL — HIGH (ref 3.8–10.5)
WBC # FLD AUTO: 12.92 K/UL — HIGH (ref 3.8–10.5)
WBC UR QL: ABNORMAL /HPF (ref 0–5)

## 2022-12-03 PROCEDURE — 99233 SBSQ HOSP IP/OBS HIGH 50: CPT

## 2022-12-03 PROCEDURE — 99232 SBSQ HOSP IP/OBS MODERATE 35: CPT

## 2022-12-03 PROCEDURE — 71045 X-RAY EXAM CHEST 1 VIEW: CPT | Mod: 26

## 2022-12-03 PROCEDURE — 93971 EXTREMITY STUDY: CPT | Mod: 26,LT

## 2022-12-03 PROCEDURE — 93010 ELECTROCARDIOGRAM REPORT: CPT

## 2022-12-03 RX ORDER — SODIUM CHLORIDE 9 MG/ML
1000 INJECTION, SOLUTION INTRAVENOUS ONCE
Refills: 0 | Status: COMPLETED | OUTPATIENT
Start: 2022-12-03 | End: 2022-12-03

## 2022-12-03 RX ORDER — MAGNESIUM SULFATE 500 MG/ML
2 VIAL (ML) INJECTION ONCE
Refills: 0 | Status: COMPLETED | OUTPATIENT
Start: 2022-12-03 | End: 2022-12-03

## 2022-12-03 RX ORDER — SODIUM CHLORIDE 9 MG/ML
1000 INJECTION, SOLUTION INTRAVENOUS
Refills: 0 | Status: DISCONTINUED | OUTPATIENT
Start: 2022-12-03 | End: 2022-12-04

## 2022-12-03 RX ORDER — LOPERAMIDE HCL 2 MG
4 TABLET ORAL EVERY 6 HOURS
Refills: 0 | Status: DISCONTINUED | OUTPATIENT
Start: 2022-12-03 | End: 2022-12-12

## 2022-12-03 RX ORDER — TAMSULOSIN HYDROCHLORIDE 0.4 MG/1
0.4 CAPSULE ORAL AT BEDTIME
Refills: 0 | Status: DISCONTINUED | OUTPATIENT
Start: 2022-12-03 | End: 2022-12-13

## 2022-12-03 RX ORDER — CHOLESTYRAMINE 4 G/9G
4 POWDER, FOR SUSPENSION ORAL
Refills: 0 | Status: DISCONTINUED | OUTPATIENT
Start: 2022-12-03 | End: 2022-12-12

## 2022-12-03 RX ADMIN — TAMSULOSIN HYDROCHLORIDE 0.4 MILLIGRAM(S): 0.4 CAPSULE ORAL at 22:06

## 2022-12-03 RX ADMIN — HEPARIN SODIUM 2300 UNIT(S)/HR: 5000 INJECTION INTRAVENOUS; SUBCUTANEOUS at 07:25

## 2022-12-03 RX ADMIN — MIDODRINE HYDROCHLORIDE 10 MILLIGRAM(S): 2.5 TABLET ORAL at 13:03

## 2022-12-03 RX ADMIN — Medication 25 GRAM(S): at 10:07

## 2022-12-03 RX ADMIN — Medication 4 MILLIGRAM(S): at 09:57

## 2022-12-03 RX ADMIN — Medication 25 MILLIGRAM(S): at 06:13

## 2022-12-03 RX ADMIN — MEROPENEM 1000 MILLIGRAM(S): 1 INJECTION INTRAVENOUS at 13:03

## 2022-12-03 RX ADMIN — Medication 81 MILLIGRAM(S): at 13:01

## 2022-12-03 RX ADMIN — CHOLESTYRAMINE 4 GRAM(S): 4 POWDER, FOR SUSPENSION ORAL at 20:04

## 2022-12-03 RX ADMIN — CHLORHEXIDINE GLUCONATE 1 APPLICATION(S): 213 SOLUTION TOPICAL at 10:03

## 2022-12-03 RX ADMIN — MEROPENEM 1000 MILLIGRAM(S): 1 INJECTION INTRAVENOUS at 22:06

## 2022-12-03 RX ADMIN — Medication 2 MILLIGRAM(S): at 06:14

## 2022-12-03 RX ADMIN — Medication 25 MILLIGRAM(S): at 13:01

## 2022-12-03 RX ADMIN — Medication 250 MILLIGRAM(S): at 06:12

## 2022-12-03 RX ADMIN — HEPARIN SODIUM 2100 UNIT(S)/HR: 5000 INJECTION INTRAVENOUS; SUBCUTANEOUS at 02:57

## 2022-12-03 RX ADMIN — Medication 4 MILLIGRAM(S): at 17:20

## 2022-12-03 RX ADMIN — Medication 250 MICROGRAM(S): at 06:13

## 2022-12-03 RX ADMIN — MIDODRINE HYDROCHLORIDE 10 MILLIGRAM(S): 2.5 TABLET ORAL at 06:13

## 2022-12-03 RX ADMIN — MEROPENEM 1000 MILLIGRAM(S): 1 INJECTION INTRAVENOUS at 06:13

## 2022-12-03 RX ADMIN — Medication 5 MILLIGRAM(S): at 13:00

## 2022-12-03 RX ADMIN — Medication 250 MILLIGRAM(S): at 17:20

## 2022-12-03 RX ADMIN — Medication 250 MICROGRAM(S): at 06:14

## 2022-12-03 RX ADMIN — Medication 2 MILLIGRAM(S): at 01:00

## 2022-12-03 RX ADMIN — SODIUM CHLORIDE 100 MILLILITER(S): 9 INJECTION, SOLUTION INTRAVENOUS at 02:56

## 2022-12-03 RX ADMIN — MIDODRINE HYDROCHLORIDE 10 MILLIGRAM(S): 2.5 TABLET ORAL at 22:05

## 2022-12-03 RX ADMIN — Medication 25 MILLIGRAM(S): at 17:20

## 2022-12-03 RX ADMIN — HEPARIN SODIUM 2300 UNIT(S)/HR: 5000 INJECTION INTRAVENOUS; SUBCUTANEOUS at 15:13

## 2022-12-03 RX ADMIN — SODIUM CHLORIDE 1000 MILLILITER(S): 9 INJECTION, SOLUTION INTRAVENOUS at 14:25

## 2022-12-03 RX ADMIN — CHOLESTYRAMINE 4 GRAM(S): 4 POWDER, FOR SUSPENSION ORAL at 09:12

## 2022-12-03 RX ADMIN — Medication 25 MILLIGRAM(S): at 02:57

## 2022-12-03 RX ADMIN — SODIUM CHLORIDE 100 MILLILITER(S): 9 INJECTION, SOLUTION INTRAVENOUS at 13:55

## 2022-12-03 RX ADMIN — HEPARIN SODIUM 2300 UNIT(S)/HR: 5000 INJECTION INTRAVENOUS; SUBCUTANEOUS at 23:38

## 2022-12-03 NOTE — PROGRESS NOTE ADULT - SUBJECTIVE AND OBJECTIVE BOX
Pt stable overnight. Remains in AF w/ rates 120s bpm despite digoxin load. Continues to have significant diarrhea - rectal tube in place.     MEDICATIONS  (STANDING):  aspirin  chewable 81 milliGRAM(s) Oral daily  chlorhexidine 2% Cloths 1 Application(s) Topical daily  cholestyramine Powder (Sugar-Free) 4 Gram(s) Oral two times a day  digoxin     Tablet 250 MICROGram(s) Oral daily  heparin  Infusion. 2100 Unit(s)/Hr (21 mL/Hr) IV Continuous <Continuous>  lactated ringers. 1000 milliLiter(s) (100 mL/Hr) IV Continuous <Continuous>  lactated ringers. 1000 milliLiter(s) (75 mL/Hr) IV Continuous <Continuous>  loperamide 4 milliGRAM(s) Oral every 6 hours  meropenem Injectable 1000 milliGRAM(s) IV Push every 8 hours  metoprolol tartrate 25 milliGRAM(s) Oral every 6 hours  midodrine 10 milliGRAM(s) Oral every 8 hours  phytonadione   Solution 5 milliGRAM(s) Oral daily  saccharomyces boulardii 250 milliGRAM(s) Oral two times a day  tamsulosin 0.4 milliGRAM(s) Oral at bedtime    MEDICATIONS  (PRN):  heparin   Injectable 6500 Unit(s) IV Push every 6 hours PRN For aPTT less than 40  heparin   Injectable 3000 Unit(s) IV Push every 6 hours PRN For aPTT between 40 - 57  metoprolol tartrate Injectable 5 milliGRAM(s) IV Push every 6 hours PRN HR > 130      Allergies  No Known Allergies    Vital Signs Last 24 Hrs  T(C): 36.1 (03 Dec 2022 13:00), Max: 36.7 (02 Dec 2022 16:00)  T(F): 97 (03 Dec 2022 13:00), Max: 98 (02 Dec 2022 16:00)  HR: 134 (03 Dec 2022 14:00) (112 - 155)  BP: 100/65 (03 Dec 2022 13:00) (81/52 - 143/69)  BP(mean): 77 (03 Dec 2022 13:00) (62 - 108)  RR: 30 (03 Dec 2022 14:00) (13 - 41)  SpO2: 91% (03 Dec 2022 14:00) (88% - 98%)    Parameters below as of 03 Dec 2022 12:00  Patient On (Oxygen Delivery Method): room air        Physical Exam:  Constitutional: NAD, AAOx3  Cardiovascular: +S1S2 tachycardic, irregular  Pulmonary: CTA b/l, unlabored  GI: soft NTND +BS  Extremities: no pedal edema, +distal pulses b/l  Neuro: non focal, MEZA x4    LABS:                        11.2   12.92 )-----------( 344      ( 03 Dec 2022 03:00 )             32.1     12-    139  |  103  |  13.6  ----------------------------<  96  3.9   |  24.0  |  0.81    Ca    9.1      03 Dec 2022 03:00  Phos  3.5     12  Mg     1.6     12    TPro  5.1<L>  /  Alb  2.4<L>  /  TBili  0.5  /  DBili  x   /  AST  56<H>  /  ALT  71<H>  /  AlkPhos  76  1202    PTT - ( 03 Dec 2022 03:00 )  PTT:55.1 sec  Urinalysis Basic - ( 03 Dec 2022 13:15 )    Color: Latanya / Appearance: very cloudy / S.015 / pH: x  Gluc: x / Ketone: Small  / Bili: Negative / Urobili: Negative mg/dL   Blood: x / Protein: 30 mg/dL / Nitrite: Negative   Leuk Esterase: Small / RBC: x / WBC x   Sq Epi: x / Non Sq Epi: x / Bacteria: x        RADIOLOGY & ADDITIONAL TESTS:  < from: LILIAN Echo Doppler (22 @ 14:52) >    Summary:   1. Left ventricular ejection fraction, by visual estimation, is 55 to 60%.   2. Normal global left ventricular systolic function.   3. The mitral in-flow pattern reveals no discernable A-wave, therefore   no comment on diastolic function can be made.   4. Normal right ventricular size and function, inadequate estimation of   RVSP.   5. Mildly enlarged left atrium.   6. Moderate mitral valve regurgitation, jet is eccentric posteriorly   directed.   7. Thickening of bileaflet mitral valve with subcentimeter filamentous  mobile lesions suggestive of vegetations.   8. Color flow doppler and intravenous injection of agitated saline   demonstrates the presence of an intact intra atrial septum.   9. No left atrial appendage thrombus and normal left atrial appendage   velocities.  10. Structurally normal tricuspid valve, with normal leaflet excursion.  11. Heavily calcified noncoronary cusp with moderate aortic valve   stenosis, ELDON (by 2D planimetry 1.34 cm2), peak velocity 2.8 m/s and mean   gradient of 15 mmHg.  12. Mild to moderate aortic regurgitation.  13. There is mild echolucency and thickening of the sinus of Valsalva   inbetween the left and non-coronary cusp, cannot exclude early root abscess.  14. Mild simple atheromas at the aortic arch/descending aorta.  15. There is no evidence of pericardial effusion.  16. No prior LILIAN study is available for comparison. 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 Cardioversion   deferred due to the presence of underlying infection/endocarditis.    John Frost DO Electronically signed on 2022 at 3:39:01 PM  < end of copied text >

## 2022-12-03 NOTE — PROGRESS NOTE ADULT - ASSESSMENT
77 year old male with PMH of HTN, hyperlipidemia, BPH, moderate aortic stenosis with ELDON 1.1 cm, and recent COVID infection 10/6/22. Recent admission 11/2-7/22 for streptococcus anginosus bacteremia and norovirus infections. He was discharged home with a PICC line for 4 weeks of IV abx as per ID. He presented to [facility] ED on 11/27/22 following syncopal episode, found to be in septic shock and in new onset AFib with rapid rates. 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 Cardioversion CANCELLED. Care transferred to CT surgery team. Pending further workup in preparation for OR with CT surgery. MRI head performed on 12/1 as part of w/u revealed acute ischemia; embolic phenomenon is suggested, with underlying septic emboli not excluded. Neuro following.     Recommendations:  1. Atrial fibrillation; new onset  - Cardioversion deferred due to endocarditis w/ possible need for open heart surgery; rate control strategy for now.    - suspect rapid rates are exacerbated by volume depletion given ongoing copious diarrhea. Will give 1L LR now & should have generous ongoing fluid resuscitation while diarrhea persists.   - Continue Metoprolol 25mg every 6 hours with lopressor 5mg IV q 6 hrs PRN.  Would accept rates up to 120bpm given ongoing illness, endocarditis, likely volume depletion.   - Can continue digoxin for now, but would proceed cautiously given narrow therapeutic window and high risk for toxicity in this acutely ill elderly gentleman, as well as concern for impact on conduction system in the setting of possible aortic root abscess.   - [id number]= 3; age, HTN. Remains on therapeutic heparin; maintain therapeutic PTT. Transition to PO a/c pending CTSx plans.       2. 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 Cath scheduled for Monday.   - as above, would proceed cautiously with digoxin given concern for impact on conduction system in the setting of possible aortic root abscess.   - If going to cardiac surgery, then advise to consider AF surgical ablation and ESTEFANY clip at that time.   - Abx per ID    D/W Dr. Bain - further recs to follow.

## 2022-12-03 NOTE — PROGRESS NOTE ADULT - SUBJECTIVE AND OBJECTIVE BOX
Subjective: no complaints, Pt states "I feel ok", denies CP, palpitations, SOB, cough, fever, chills, itchiness/rash, diaphoresis, vision changes, HA, dizziness/lightheadedness, numbness/tingling, abd pain, N/V or any other complaints     Labs:                        11.6   16.03 )-----------( 325      ( 02 Dec 2022 05:30 )             34.1   12-02    138  |  106  |  15.4  ----------------------------<  91  4.0   |  22.0  |  0.87    Ca    9.1      02 Dec 2022 05:30  Phos  3.5     12-02  Mg     1.7     12-02    TPro  5.1<L>  /  Alb  2.4<L>  /  TBili  0.5  /  DBili  x   /  AST  56<H>  /  ALT  71<H>  /  AlkPhos  76  12-02    I&O's Summary    01 Dec 2022 07:01  -  02 Dec 2022 07:00  --------------------------------------------------------  IN: 3884 mL / OUT: 2090 mL / NET: 1794 mL    02 Dec 2022 07:01  -  03 Dec 2022 01:20  --------------------------------------------------------  IN: 1557 mL / OUT: 200 mL / NET: 1357 mL          Physical Exam  Gen: NAD  Neuro: A&Ox3 non focal stutter (chronic per pt)  Pulm: CTA b/l no wheezing  CV: S1S2 irregular, tachy  Abd: soft NT ND  Extrem/MS: no edema/cyanosis, MEZA  rectal tube in place, [place] condom cath in place

## 2022-12-03 NOTE — PROGRESS NOTE ADULT - PROBLEM SELECTOR PLAN 4
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 CTS DOHERTY in progress. If going to cardiac surgery, then advise to consider AF surgical ablation and ESTEFANY clip at that time.   Continue heparin GTT.   LHC scheduled for Monday.   NPO Sunday night.

## 2022-12-03 NOTE — PROGRESS NOTE ADULT - SUBJECTIVE AND OBJECTIVE BOX
[facility]                                                         CARDIOLOGY AT [place] [address]                                                         Telephone: [phone]. Fax:[phone]                                                                             PROGRESS NOTE    Reason for follow up:   :   Afib with RVR, Sepsis, Stroke, endocarditis  Update:   Sitting up in chair in CTICU to pressors, heparin infusing.    Review of symptoms:   Cardiac:  No chest pain. No dyspnea. No palpitations.  Respiratory: no cough. No dyspnea  Gastrointestinal: No diarrhea. No abdominal pain. No bleeding.   Neuro: No focal neuro complaints.    Vitals:  T(C): 36.2 (12-03-22 @ 08:00), Max: 36.7 (12-02-22 @ 16:00)  HR: 127 (12-03-22 @ 10:00) (119 - 155)  BP: 137/70 (12-03-22 @ 10:00) (81/52 - 143/69)  RR: 33 (12-03-22 @ 10:00) (13 - 41)  SpO2: 95% (12-03-22 @ 10:00) (88% - 98%)    I&O's Summary  02 Dec 2022 07:01  -  03 Dec 2022 07:00  --------------------------------------------------------  IN: 2883 mL / OUT: 1810 mL / NET: 1073 mL  03 Dec 2022 07:01  -  03 Dec 2022 10:29  --------------------------------------------------------  IN: 419 mL / OUT: 620 mL / NET: -201 mL  Weight (kg): 76.8 (11-30 @ 14:29)    PHYSICAL EXAM:  Appearance: Comfortable. No acute distress  HEENT:  Atraumatic. Normocephalic.  Normal oral mucosa  Neurologic: A & O x 3, no gross focal deficits.  Cardiovascular: RRR S1 S2, No murmur, no rubs/gallops. No JVD  Respiratory: Lungs clear to auscultation, unlabored   Gastrointestinal:  Soft, Non-tender, + BS  Lower Extremities: 2+ Peripheral Pulses, No clubbing, cyanosis, or edema  Psychiatry: Patient is calm. No agitation.   Skin: warm and dry.    CURRENT CARDIAC MEDICATIONS:  digoxin     Tablet 250 MICROGram(s) Oral daily  metoprolol tartrate 25 milliGRAM(s) Oral every 6 hours  metoprolol tartrate Injectable 5 milliGRAM(s) IV Push every 6 hours PRN  midodrine 10 milliGRAM(s) Oral every 8 hours    CURRENT OTHER MEDICATIONS:  meropenem Injectable 1000 milliGRAM(s) IV Push every 8 hours  loperamide 4 milliGRAM(s) Oral every 6 hours  cholestyramine Powder (Sugar-Free) 4 Gram(s) Oral two times a day  aspirin  chewable 81 milliGRAM(s) Oral daily  chlorhexidine 2% Cloths 1 Application(s) Topical daily  heparin   Injectable 6500 Unit(s) IV Push every 6 hours PRN For aPTT less than 40  heparin   Injectable 3000 Unit(s) IV Push every 6 hours PRN For aPTT between 40 - 57  heparin  Infusion. 2100 Unit(s)/Hr (21 mL/Hr) IV Continuous <Continuous>  lactated ringers. 1000 milliLiter(s) (100 mL/Hr) IV Continuous <Continuous>  phytonadione   Solution 5 milliGRAM(s) Oral daily    LABS:	 	  ( 28 Nov 2022 13:50 )  Troponin T  0.10<H>,  CPK  2194<H>, CKMB  X    , BNP X        , ( 27 Nov 2022 09:05 )  Troponin T  0.16<H>,  CPK  5542<H>, CKMB  X    , BNP X        , ( 27 Nov 2022 00:04 )  Troponin T  0.17<H>,  CPK  X    , CKMB  X    , BNP X                           11.2   12.92 )-----------( 344      ( 03 Dec 2022 03:00 )             32.1     12-03    139  |  103  |  13.6  ----------------------------<  96  3.9   |  24.0  |  0.81    Ca    9.1      03 Dec 2022 03:00  Phos  3.5     12-02  Mg     1.6     12-03    TPro  5.1<L>  /  Alb  2.4<L>  /  TBili  0.5  /  DBili  x   /  AST  56<H>  /  ALT  71<H>  /  AlkPhos  76  12-02    PT/INR/PTT ( 03 Dec 2022 03:00 )                       :                       :      X            :       55.1                  .        .                   .              .           .       X           .                                       TELEMETRY:   Afib -160's                                                                [facility]                                                         CARDIOLOGY AT [place] [address]                                                         Telephone: [phone]. Fax:[phone]                                                                             PROGRESS NOTE    Reason for follow up:   :   Afib with RVR, Sepsis, Stroke, endocarditis  Update:   Sitting up in chair in CTICU  heparin infusing.    Review of symptoms:   Cardiac:  No chest pain. No dyspnea. No palpitations.  Respiratory: no cough. No dyspnea  Gastrointestinal: No diarrhea. No abdominal pain. No bleeding.   Neuro: No focal neuro complaints.    Vitals:  T(C): 36.2 (12-03-22 @ 08:00), Max: 36.7 (12-02-22 @ 16:00)  HR: 127 (12-03-22 @ 10:00) (119 - 155)  BP: 137/70 (12-03-22 @ 10:00) (81/52 - 143/69)  RR: 33 (12-03-22 @ 10:00) (13 - 41)  SpO2: 95% (12-03-22 @ 10:00) (88% - 98%)    I&O's Summary  02 Dec 2022 07:01  -  03 Dec 2022 07:00  --------------------------------------------------------  IN: 2883 mL / OUT: 1810 mL / NET: 1073 mL  03 Dec 2022 07:01  -  03 Dec 2022 10:29  --------------------------------------------------------  IN: 419 mL / OUT: 620 mL / NET: -201 mL  Weight (kg): 76.8 (11-30 @ 14:29)    PHYSICAL EXAM:  Appearance: Comfortable. No acute distress  HEENT:  Atraumatic. Normocephalic.  Normal oral mucosa  Neurologic: A & O x 3, no gross focal deficits.  Cardiovascular: RRR S1 S2, No murmur, no rubs/gallops. No JVD  Respiratory: Lungs clear to auscultation, unlabored   Gastrointestinal:  Soft, Non-tender, + BS  Lower Extremities: 2+ Peripheral Pulses, No clubbing, cyanosis, or edema  Psychiatry: Patient is calm. No agitation.   Skin: warm and dry.    CURRENT CARDIAC MEDICATIONS:  digoxin     Tablet 250 MICROGram(s) Oral daily  metoprolol tartrate 25 milliGRAM(s) Oral every 6 hours  metoprolol tartrate Injectable 5 milliGRAM(s) IV Push every 6 hours PRN  midodrine 10 milliGRAM(s) Oral every 8 hours    CURRENT OTHER MEDICATIONS:  meropenem Injectable 1000 milliGRAM(s) IV Push every 8 hours  loperamide 4 milliGRAM(s) Oral every 6 hours  cholestyramine Powder (Sugar-Free) 4 Gram(s) Oral two times a day  aspirin  chewable 81 milliGRAM(s) Oral daily  chlorhexidine 2% Cloths 1 Application(s) Topical daily  heparin   Injectable 6500 Unit(s) IV Push every 6 hours PRN For aPTT less than 40  heparin   Injectable 3000 Unit(s) IV Push every 6 hours PRN For aPTT between 40 - 57  heparin  Infusion. 2100 Unit(s)/Hr (21 mL/Hr) IV Continuous <Continuous>  lactated ringers. 1000 milliLiter(s) (100 mL/Hr) IV Continuous <Continuous>  phytonadione   Solution 5 milliGRAM(s) Oral daily    LABS:	 	  ( 28 Nov 2022 13:50 )  Troponin T  0.10<H>,  CPK  2194<H>, CKMB  X    , BNP X        , ( 27 Nov 2022 09:05 )  Troponin T  0.16<H>,  CPK  5542<H>, CKMB  X    , BNP X        , ( 27 Nov 2022 00:04 )  Troponin T  0.17<H>,  CPK  X    , CKMB  X    , BNP X                           11.2   12.92 )-----------( 344      ( 03 Dec 2022 03:00 )             32.1     12-03    139  |  103  |  13.6  ----------------------------<  96  3.9   |  24.0  |  0.81    Ca    9.1      03 Dec 2022 03:00  Phos  3.5     12-02  Mg     1.6     12-03    TPro  5.1<L>  /  Alb  2.4<L>  /  TBili  0.5  /  DBili  x   /  AST  56<H>  /  ALT  71<H>  /  AlkPhos  76  12-02    PT/INR/PTT ( 03 Dec 2022 03:00 )                       :                       :      X            :       55.1                  .        .                   .              .           .       X           .                                       TELEMETRY:   Afib -160's

## 2022-12-03 NOTE — PROGRESS NOTE ADULT - PROBLEM SELECTOR PLAN 1
Atrial fibrillation; new onset: Unable to cardiovert due to MV vegetation; rate control strategy for now.   Rates continue to be elevated 130-160's overnight   Metoprolol to 25mg every 6 hours - as EP.     [id number]= 3; age, HTN. Cont IV hep.  Also given IV digoxin by ICU staff without relief in rates.   digoxin level 0.5.

## 2022-12-03 NOTE — PROGRESS NOTE ADULT - PROBLEM SELECTOR PLAN 1
mitral valve vegetations on LILIAN 11/30 11/27 blood cultures NGTD  CT C/A/P with IV contrast to eval for ischemia and septic emboli, F/u official read  d/w Dr. Richardson, rec MRI stroke protocol to assess for stroke, then consult neuro IR to determine if cerebral angio necessary for OR clearance  indium scan per ID to eval for original source of bacteremia   c/w meropenem per ID   carotids without significant stenosis   possible OR next week

## 2022-12-03 NOTE — CHART NOTE - NSCHARTNOTEFT_GEN_A_CORE
Source: Patient [ ]  Family [ ]   other [x ] EMR, RN    Current Diet: Diet, DASH/TLC:   Sodium & Cholesterol Restricted  High Fiber (HIFIBER) (12-01-22 @ 09:31)    Patient reports [ ] nausea  [ ] vomiting [ ] diarrhea [ ] constipation  [ ]chewing problems [ ] swallowing issues  [ ] other:     PO intake:  < 50% [ ]   50-75%  [x ]   %  [ ]  other :    Current Weight:   (12/3) 192.2 lbs  (11/29) 195.4 lbs  ? accuracy of weights, no edema documented, continue to trend    Pertinent Medications: MEDICATIONS  (STANDING):  aspirin  chewable 81 milliGRAM(s) Oral daily  chlorhexidine 2% Cloths 1 Application(s) Topical daily  cholestyramine Powder (Sugar-Free) 4 Gram(s) Oral two times a day  digoxin     Tablet 250 MICROGram(s) Oral daily  heparin  Infusion. 2100 Unit(s)/Hr (21 mL/Hr) IV Continuous <Continuous>  lactated ringers. 1000 milliLiter(s) (100 mL/Hr) IV Continuous <Continuous>  loperamide 4 milliGRAM(s) Oral every 6 hours  magnesium sulfate  IVPB 2 Gram(s) IV Intermittent once  meropenem Injectable 1000 milliGRAM(s) IV Push every 8 hours  metoprolol tartrate 25 milliGRAM(s) Oral every 6 hours  midodrine 10 milliGRAM(s) Oral every 8 hours  phytonadione   Solution 5 milliGRAM(s) Oral daily  saccharomyces boulardii 250 milliGRAM(s) Oral two times a day    MEDICATIONS  (PRN):  heparin   Injectable 6500 Unit(s) IV Push every 6 hours PRN For aPTT less than 40  heparin   Injectable 3000 Unit(s) IV Push every 6 hours PRN For aPTT between 40 - 57  metoprolol tartrate Injectable 5 milliGRAM(s) IV Push every 6 hours PRN HR > 130    Pertinent Labs: CBC Full  -  ( 03 Dec 2022 03:00 )  WBC Count : 12.92 K/uL  RBC Count : 3.53 M/uL  Hemoglobin : 11.2 g/dL  Hematocrit : 32.1 %  Platelet Count - Automated : 344 K/uL  Mean Cell Volume : 90.9 fl  Mean Cell Hemoglobin : 31.7 pg  Mean Cell Hemoglobin Concentration : 34.9 gm/dL  Auto Neutrophil # : x  Auto Lymphocyte # : x  Auto Monocyte # : x  Auto Eosinophil # : x  Auto Basophil # : x  Auto Neutrophil % : x  Auto Lymphocyte % : x  Auto Monocyte % : x  Auto Eosinophil % : x  Auto Basophil % : x    12-03 Na139 mmol/L Glu 96 mg/dL K+ 3.9 mmol/L Cr  0.81 mg/dL BUN 13.6 mg/dL Phos n/a   Alb n/a   PAB n/a       Skin: No pressure injuries documented  Jackson: 17     Nutrition focused physical exam previously conducted - found signs of malnutrition [ ]absent [ x]present    Subcutaneous fat loss: [ x] Orbital fat pads region, [x ]Buccal fat region, [ ]Triceps region,  [ ]Ribs region    Muscle wasting: [ x]Temples region, [ x]Clavicle region, [ x]Shoulder region, [ ]Scapula region, [ ]Interosseous region,  [ ]thigh region, [ ]Calf region    Estimated Needs:   [x ] no change since previous assessment  [ ] recalculated:     Current Nutrition Diagnosis: Pt remains at high nutrition risk secondary to malnutrition (severe, chronic) related to inability to meet sufficient protein-energy in setting of multiple comorbidities, recent COVID, norovirus, and 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 Pt remains with suboptimal po intake. Noted high fiber added to diet order which may be exacerbating diarrhea. Rectal tube in place but aware to be discontinued. Per RN, pt continues with severe diarrhea, noted imodium and cholestyramine in place. RD to follow up.     Recommendations:   1) Change to low fiber to potentially limit bowel movements and help ease diarrhea.  2) Add Ensure Enlive TID to optimize po intake and provide an additional 350 kcal, 20g protein per serving.  3) Add Banatrol TID to add bulk to stool.  4) Rx: MVI daily.  5) Continue Florastor to support gut integrity.  6) Encourage po intake, monitor diet tolerance, and provide assistance at meals as needed.  7) Monitor electrolytes and replete as needed.  8) Obtain daily weights to monitor trends.     Monitoring and Evaluation:   [x ] PO intake [x ] Tolerance to diet prescription [X] Weights  [X] Follow up per protocol [X] Labs Source: Patient [ ]  Family [ ]   other [x ] EMR, RN    Current Diet: Diet, DASH/TLC:   Sodium & Cholesterol Restricted  High Fiber (HIFIBER) (12-01-22 @ 09:31)    Patient reports [ ] nausea  [ ] vomiting [ ] diarrhea [ ] constipation  [ ]chewing problems [ ] swallowing issues  [ ] other:     PO intake:  < 50% [ ]   50-75%  [x ]   %  [ ]  other :    Current Weight:   (12/3) 192.2 lbs  (11/29) 195.4 lbs  (11/27) 169.7 lbs  ? accuracy of weights, no edema documented, continue to trend    Pertinent Medications: MEDICATIONS  (STANDING):  aspirin  chewable 81 milliGRAM(s) Oral daily  chlorhexidine 2% Cloths 1 Application(s) Topical daily  cholestyramine Powder (Sugar-Free) 4 Gram(s) Oral two times a day  digoxin     Tablet 250 MICROGram(s) Oral daily  heparin  Infusion. 2100 Unit(s)/Hr (21 mL/Hr) IV Continuous <Continuous>  lactated ringers. 1000 milliLiter(s) (100 mL/Hr) IV Continuous <Continuous>  loperamide 4 milliGRAM(s) Oral every 6 hours  magnesium sulfate  IVPB 2 Gram(s) IV Intermittent once  meropenem Injectable 1000 milliGRAM(s) IV Push every 8 hours  metoprolol tartrate 25 milliGRAM(s) Oral every 6 hours  midodrine 10 milliGRAM(s) Oral every 8 hours  phytonadione   Solution 5 milliGRAM(s) Oral daily  saccharomyces boulardii 250 milliGRAM(s) Oral two times a day    MEDICATIONS  (PRN):  heparin   Injectable 6500 Unit(s) IV Push every 6 hours PRN For aPTT less than 40  heparin   Injectable 3000 Unit(s) IV Push every 6 hours PRN For aPTT between 40 - 57  metoprolol tartrate Injectable 5 milliGRAM(s) IV Push every 6 hours PRN HR > 130    Pertinent Labs: CBC Full  -  ( 03 Dec 2022 03:00 )  WBC Count : 12.92 K/uL  RBC Count : 3.53 M/uL  Hemoglobin : 11.2 g/dL  Hematocrit : 32.1 %  Platelet Count - Automated : 344 K/uL  Mean Cell Volume : 90.9 fl  Mean Cell Hemoglobin : 31.7 pg  Mean Cell Hemoglobin Concentration : 34.9 gm/dL  Auto Neutrophil # : x  Auto Lymphocyte # : x  Auto Monocyte # : x  Auto Eosinophil # : x  Auto Basophil # : x  Auto Neutrophil % : x  Auto Lymphocyte % : x  Auto Monocyte % : x  Auto Eosinophil % : x  Auto Basophil % : x    12-03 Na139 mmol/L Glu 96 mg/dL K+ 3.9 mmol/L Cr  0.81 mg/dL BUN 13.6 mg/dL Phos n/a   Alb n/a   PAB n/a       Skin: No pressure injuries documented  Jackson: 17     Nutrition focused physical exam previously conducted - found signs of malnutrition [ ]absent [ x]present    Subcutaneous fat loss: [ x] Orbital fat pads region, [x ]Buccal fat region, [ ]Triceps region,  [ ]Ribs region    Muscle wasting: [ x]Temples region, [ x]Clavicle region, [ x]Shoulder region, [ ]Scapula region, [ ]Interosseous region,  [ ]thigh region, [ ]Calf region    Estimated Needs:   [x ] no change since previous assessment  [ ] recalculated:     Current Nutrition Diagnosis: Pt remains at high nutrition risk secondary to malnutrition (severe, chronic) related to inability to meet sufficient protein-energy in setting of multiple comorbidities, recent COVID, norovirus, and 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 Pt remains with suboptimal po intake. Noted high fiber added to diet order which may be exacerbating diarrhea. Rectal tube in place but aware to be discontinued. Per RN, pt continues with severe diarrhea, noted imodium and cholestyramine in place. RD to follow up.     Recommendations:   1) Change to low fiber to potentially limit bowel movements and help ease diarrhea.  2) Add Ensure Enlive TID to optimize po intake and provide an additional 350 kcal, 20g protein per serving.  3) Add Banatrol TID to add bulk to stool.  4) Rx: MVI daily.  5) Continue Florastor to support gut integrity.  6) Encourage po intake, monitor diet tolerance, and provide assistance at meals as needed.  7) Monitor electrolytes and replete as needed.  8) Obtain daily weights to monitor trends.     Monitoring and Evaluation:   [x ] PO intake [x ] Tolerance to diet prescription [X] Weights  [X] Follow up per protocol [X] Labs Source: Patient [ ]  Family [ ]   other [x ] EMR, RN    Current Diet: Diet, DASH/TLC:   Sodium & Cholesterol Restricted  High Fiber (HIFIBER) (12-01-22 @ 09:31)    Patient reports [ ] nausea  [ ] vomiting [ ] diarrhea [ ] constipation  [ ]chewing problems [ ] swallowing issues  [ ] other:     PO intake:  < 50% [ ]   50-75%  [x ]   %  [ ]  other :    Current Weight:   (12/3) 192.2 lbs  (11/29) 195.4 lbs  (11/27) 169.7 lbs  ? accuracy of weights, no edema documented, continue to trend    Pertinent Medications: MEDICATIONS  (STANDING):  aspirin  chewable 81 milliGRAM(s) Oral daily  chlorhexidine 2% Cloths 1 Application(s) Topical daily  cholestyramine Powder (Sugar-Free) 4 Gram(s) Oral two times a day  digoxin     Tablet 250 MICROGram(s) Oral daily  heparin  Infusion. 2100 Unit(s)/Hr (21 mL/Hr) IV Continuous <Continuous>  lactated ringers. 1000 milliLiter(s) (100 mL/Hr) IV Continuous <Continuous>  loperamide 4 milliGRAM(s) Oral every 6 hours  magnesium sulfate  IVPB 2 Gram(s) IV Intermittent once  meropenem Injectable 1000 milliGRAM(s) IV Push every 8 hours  metoprolol tartrate 25 milliGRAM(s) Oral every 6 hours  midodrine 10 milliGRAM(s) Oral every 8 hours  phytonadione   Solution 5 milliGRAM(s) Oral daily  saccharomyces boulardii 250 milliGRAM(s) Oral two times a day    MEDICATIONS  (PRN):  heparin   Injectable 6500 Unit(s) IV Push every 6 hours PRN For aPTT less than 40  heparin   Injectable 3000 Unit(s) IV Push every 6 hours PRN For aPTT between 40 - 57  metoprolol tartrate Injectable 5 milliGRAM(s) IV Push every 6 hours PRN HR > 130    Pertinent Labs: CBC Full  -  ( 03 Dec 2022 03:00 )  WBC Count : 12.92 K/uL  RBC Count : 3.53 M/uL  Hemoglobin : 11.2 g/dL  Hematocrit : 32.1 %  Platelet Count - Automated : 344 K/uL  Mean Cell Volume : 90.9 fl  Mean Cell Hemoglobin : 31.7 pg  Mean Cell Hemoglobin Concentration : 34.9 gm/dL  Auto Neutrophil # : x  Auto Lymphocyte # : x  Auto Monocyte # : x  Auto Eosinophil # : x  Auto Basophil # : x  Auto Neutrophil % : x  Auto Lymphocyte % : x  Auto Monocyte % : x  Auto Eosinophil % : x  Auto Basophil % : x    12-03 Na139 mmol/L Glu 96 mg/dL K+ 3.9 mmol/L Cr  0.81 mg/dL BUN 13.6 mg/dL Phos n/a   Alb n/a   PAB n/a       Skin: No pressure injuries documented  Jackson: 17     Nutrition focused physical exam previously conducted - found signs of malnutrition [ ]absent [ x]present    Subcutaneous fat loss: [ x] Orbital fat pads region, [x ]Buccal fat region, [ ]Triceps region,  [ ]Ribs region    Muscle wasting: [ x]Temples region, [ x]Clavicle region, [ x]Shoulder region, [ ]Scapula region, [ ]Interosseous region,  [ ]thigh region, [ ]Calf region    Estimated Needs:   [x ] no change since previous assessment  [ ] recalculated:     Current Nutrition Diagnosis: Pt remains at high nutrition risk secondary to malnutrition (severe, chronic) related to inability to meet sufficient protein-energy in setting of multiple comorbidities, recent COVID, norovirus, and 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     Pt remains with suboptimal po intake. Noted high fiber added to diet order which may be exacerbating diarrhea. Rectal tube in place but aware to be discontinued. Per RN, pt continues with severe diarrhea, noted imodium and cholestyramine in place. RD to follow up.     Recommendations:   1) Change to low fiber to potentially limit bowel movements and help ease diarrhea.  2) Add Ensure Enlive TID to optimize po intake and provide an additional 350 kcal, 20g protein per serving.  3) Add Banatrol TID to add bulk to stool.  4) Rx: MVI daily.  5) Continue Florastor to support gut integrity.  6) Encourage po intake, monitor diet tolerance, and provide assistance at meals as needed.  7) Monitor electrolytes and replete as needed.  8) Obtain daily weights to monitor trends.     Monitoring and Evaluation:   [x ] PO intake [x ] Tolerance to diet prescription [X] Weights  [X] Follow up per protocol [X] Labs Source: Patient [ ]  Family [ ]   other [x ] EMR, RN    Current Diet: Diet, DASH/TLC:   Sodium & Cholesterol Restricted  High Fiber (HIFIBER) (12-01-22 @ 09:31)    Patient reports [ ] nausea  [ ] vomiting [ ] diarrhea [ ] constipation  [ ]chewing problems [ ] swallowing issues  [ ] other:     PO intake:  < 50% [ ]   50-75%  [x ]   %  [ ]  other :    Current Weight:   (12/3) 192.2 lbs  (11/29) 195.4 lbs  (11/27) 169.7 lbs  ? accuracy of weights, no edema documented, continue to trend    Pertinent Medications: MEDICATIONS  (STANDING):  aspirin  chewable 81 milliGRAM(s) Oral daily  chlorhexidine 2% Cloths 1 Application(s) Topical daily  cholestyramine Powder (Sugar-Free) 4 Gram(s) Oral two times a day  digoxin     Tablet 250 MICROGram(s) Oral daily  heparin  Infusion. 2100 Unit(s)/Hr (21 mL/Hr) IV Continuous <Continuous>  lactated ringers. 1000 milliLiter(s) (100 mL/Hr) IV Continuous <Continuous>  loperamide 4 milliGRAM(s) Oral every 6 hours  magnesium sulfate  IVPB 2 Gram(s) IV Intermittent once  meropenem Injectable 1000 milliGRAM(s) IV Push every 8 hours  metoprolol tartrate 25 milliGRAM(s) Oral every 6 hours  midodrine 10 milliGRAM(s) Oral every 8 hours  phytonadione   Solution 5 milliGRAM(s) Oral daily  saccharomyces boulardii 250 milliGRAM(s) Oral two times a day    MEDICATIONS  (PRN):  heparin   Injectable 6500 Unit(s) IV Push every 6 hours PRN For aPTT less than 40  heparin   Injectable 3000 Unit(s) IV Push every 6 hours PRN For aPTT between 40 - 57  metoprolol tartrate Injectable 5 milliGRAM(s) IV Push every 6 hours PRN HR > 130    Pertinent Labs: CBC Full  -  ( 03 Dec 2022 03:00 )  WBC Count : 12.92 K/uL  RBC Count : 3.53 M/uL  Hemoglobin : 11.2 g/dL  Hematocrit : 32.1 %  Platelet Count - Automated : 344 K/uL  Mean Cell Volume : 90.9 fl  Mean Cell Hemoglobin : 31.7 pg  Mean Cell Hemoglobin Concentration : 34.9 gm/dL  Auto Neutrophil # : x  Auto Lymphocyte # : x  Auto Monocyte # : x  Auto Eosinophil # : x  Auto Basophil # : x  Auto Neutrophil % : x  Auto Lymphocyte % : x  Auto Monocyte % : x  Auto Eosinophil % : x  Auto Basophil % : x    12-03 Na139 mmol/L Glu 96 mg/dL K+ 3.9 mmol/L Cr  0.81 mg/dL BUN 13.6 mg/dL Phos n/a   Alb n/a   PAB n/a       Skin: No pressure injuries documented  Jackson: 17     Nutrition focused physical exam previously conducted - found signs of malnutrition [ ]absent [ x]present    Subcutaneous fat loss: [ x] Orbital fat pads region, [x ]Buccal fat region, [ ]Triceps region,  [ ]Ribs region    Muscle wasting: [ x]Temples region, [ x]Clavicle region, [ x]Shoulder region, [ ]Scapula region, [ ]Interosseous region,  [ ]thigh region, [ ]Calf region    Estimated Needs:   [x ] no change since previous assessment  [ ] recalculated:     Current Nutrition Diagnosis: Pt remains at high nutrition risk secondary to malnutrition (severe, chronic) related to inability to meet sufficient protein-energy in setting of multiple comorbidities, recent COVID, norovirus, endocarditis, and 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     Pt remains with suboptimal po intake. Noted high fiber added to diet order which may be exacerbating diarrhea. Rectal tube in place but aware to be discontinued. Per RN, pt continues with severe diarrhea, noted imodium and cholestyramine in place. RD to follow up.     Recommendations:   1) Change to low fiber to potentially limit bowel movements and help ease diarrhea.  2) Add Ensure Enlive TID to optimize po intake and provide an additional 350 kcal, 20g protein per serving.  3) Add Banatrol TID to add bulk to stool.  4) Rx: MVI daily.  5) Continue Florastor to support gut integrity.  6) Encourage po intake, monitor diet tolerance, and provide assistance at meals as needed.  7) Monitor electrolytes and replete as needed.  8) Obtain daily weights to monitor trends.     Monitoring and Evaluation:   [x ] PO intake [x ] Tolerance to diet prescription [X] Weights  [X] Follow up per protocol [X] Labs

## 2022-12-03 NOTE — PROGRESS NOTE ADULT - PROBLEM SELECTOR PLAN 2
Resolved.    h/o strep anginosus bacteremia,    Normal Indium- scan done yesterday; negative for acute infection.  VS stable, no pressors noted.    ID following

## 2022-12-03 NOTE — PROGRESS NOTE ADULT - NS ATTEND AMEND GEN_ALL_CORE FT
1) Septic shock:  h/o strep anginosus bacteremia.  has been on abx for several weeks via PICC at home. Blood cx negative to date, unclear source  TTE unchanged from previous, no vegetation or echodensity noted.   2) Atrial fibrillation; new onset: Unable to cardiovert due to MV vegetation; rate control strategy for now.    Increase Metoprolol to 25mg every 6 hours. Would accept rates up to 120bpm given current medical illness. [id number]= 3; age, HTN. Cont IV hep.  Remains in AF RVR with HR 120s despite digoxin load. Continue Metoprolol 25mg every 6 hours with lopressor 5mg IV q 6 hrs PRN.  Would accept rates up to 120bpm given ongoing illness, endocarditis, likely volume depletion.   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   Remains on therapeutic heparin; maintain therapeutic PTT.  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 CTS DOHERTY in progress. If going to cardiac surgery, then advise to consider AF surgical ablation and ESTEFANY clip at that time. Plan: Continue heparin GTT. LHC scheduled for Monday. NPO sunday night.-  4) Septic shock:  h/o strep anginosus bacteremia,  refused LILIAN, discharged on 11/7/22 with PICC/ceftriaxone, has been on abx for several weeks via PICC at home.Blood cx negative to date, unclear source. Septic shock secondary to suspected bacterial endocarditis.  Hypotension on IVF to replace ongoing GI losses. IV vasopressor as necessary to maintain a MAP > 65.continue midodrine 10mg TID  5) MRI on 12/1/22 revealed scattered bilateral parieto-occipital acute infarcts.  Carotid US without evidence of Etiology likely cardioembolic in the setting of atrial fibrillation and endocarditis. MRA Head w/o and Neck w/contrast if no contraindication , patient will likely need cerebral angiogram to rule out mycotic aneurysm prior to surgical intervention. ASA 81mg daily. Heparin gtt/ PTT goal 50-70 for now as to minimize risk of hemorrhagic transformation in setting of acute cerebral infarction, avoid bolus if feasible.   6) Continues to have significant diarrhea, .    1) Septic shock:  h/o strep anginosus bacteremia.  has been on abx for several weeks via PICC at home. Blood cx negative to date, unclear source  TTE unchanged from previous, no vegetation or echodensity noted.   2) Atrial fibrillation; new onset: Unable to cardiovert due to MV vegetation; rate control strategy for now.    Increase Metoprolol to 25mg every 6 hours. Would accept rates up to 120bpm given current medical illness. [id number]= 3; age, HTN. Cont IV hep.  Remains in AF RVR with HR 120s despite digoxin load. Continue Metoprolol 25mg every 6 hours with lopressor 5mg IV q 6 hrs PRN.  Would accept rates up to 120bpm given ongoing illness, endocarditis, likely volume depletion.   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   Remains on therapeutic heparin; maintain therapeutic PTT.  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 CTS DOHERTY in progress. If going to cardiac surgery, then advise to consider AF surgical ablation and ESTEFANY clip at that time. Plan: Continue heparin GTT. LHC scheduled for Monday. NPO sunday night.-  4) Septic shock:  h/o strep anginosus bacteremia,  refused LILIAN, discharged on 11/7/22 with PICC/ceftriaxone, has been on abx for several weeks via PICC at home.Blood cx negative to date, unclear source. Septic shock secondary to suspected bacterial endocarditis.  Hypotension on IVF to replace ongoing GI losses. IV vasopressor as necessary to maintain a MAP > 65.continue midodrine 10mg TID  5) MRI on 12/1/22 revealed scattered bilateral parieto-occipital acute infarcts.  Carotid US without evidence of Etiology likely cardioembolic in the setting of atrial fibrillation and endocarditis. MRA Head w/o and Neck w/contrast if no contraindication , patient will likely need cerebral angiogram to rule out mycotic aneurysm prior to surgical intervention. ASA 81mg daily. Heparin gtt/ PTT goal 50-70 for now as to minimize risk of hemorrhagic transformation in setting of acute cerebral infarction, avoid bolus if feasible.   6) Continues to have significant diarrhea,

## 2022-12-03 NOTE — PROGRESS NOTE ADULT - NS ATTEND AMEND GEN_ALL_CORE FT
Patient with RVR but significant diarrhea. Rapid rates may reflect hypotension. Recommend IVF resuscitation. Continue Metoprolol. Continue digoxin now but check level Monday.    Discussed with CTICU team.    Trenton Bain MD  Clinical Cardiac Electrophysiology

## 2022-12-04 DIAGNOSIS — N17.9 ACUTE KIDNEY FAILURE, UNSPECIFIED: ICD-10-CM

## 2022-12-04 LAB
ANION GAP SERPL CALC-SCNC: 10 MMOL/L — SIGNIFICANT CHANGE UP (ref 5–17)
APTT BLD: 104.8 SEC — HIGH (ref 27.5–35.5)
APTT BLD: 66.1 SEC — HIGH (ref 27.5–35.5)
APTT BLD: 70.9 SEC — HIGH (ref 27.5–35.5)
BUN SERPL-MCNC: 14.4 MG/DL — SIGNIFICANT CHANGE UP (ref 8–20)
CALCIUM SERPL-MCNC: 8.7 MG/DL — SIGNIFICANT CHANGE UP (ref 8.4–10.5)
CHLORIDE SERPL-SCNC: 101 MMOL/L — SIGNIFICANT CHANGE UP (ref 96–108)
CO2 SERPL-SCNC: 23 MMOL/L — SIGNIFICANT CHANGE UP (ref 22–29)
CREAT SERPL-MCNC: 1.4 MG/DL — HIGH (ref 0.5–1.3)
EGFR: 51 ML/MIN/1.73M2 — LOW
GLUCOSE SERPL-MCNC: 91 MG/DL — SIGNIFICANT CHANGE UP (ref 70–99)
HCT VFR BLD CALC: 29.3 % — LOW (ref 39–50)
HCT VFR BLD CALC: 37.1 % — LOW (ref 39–50)
HGB BLD-MCNC: 10.1 G/DL — LOW (ref 13–17)
HGB BLD-MCNC: 12.6 G/DL — LOW (ref 13–17)
MAGNESIUM SERPL-MCNC: 1.8 MG/DL — SIGNIFICANT CHANGE UP (ref 1.6–2.6)
MCHC RBC-ENTMCNC: 31.5 PG — SIGNIFICANT CHANGE UP (ref 27–34)
MCHC RBC-ENTMCNC: 31.7 PG — SIGNIFICANT CHANGE UP (ref 27–34)
MCHC RBC-ENTMCNC: 34 GM/DL — SIGNIFICANT CHANGE UP (ref 32–36)
MCHC RBC-ENTMCNC: 34.5 GM/DL — SIGNIFICANT CHANGE UP (ref 32–36)
MCV RBC AUTO: 91.8 FL — SIGNIFICANT CHANGE UP (ref 80–100)
MCV RBC AUTO: 92.8 FL — SIGNIFICANT CHANGE UP (ref 80–100)
PLATELET # BLD AUTO: 295 K/UL — SIGNIFICANT CHANGE UP (ref 150–400)
PLATELET # BLD AUTO: 375 K/UL — SIGNIFICANT CHANGE UP (ref 150–400)
POTASSIUM SERPL-MCNC: 4.2 MMOL/L — SIGNIFICANT CHANGE UP (ref 3.5–5.3)
POTASSIUM SERPL-SCNC: 4.2 MMOL/L — SIGNIFICANT CHANGE UP (ref 3.5–5.3)
RBC # BLD: 3.19 M/UL — LOW (ref 4.2–5.8)
RBC # BLD: 4 M/UL — LOW (ref 4.2–5.8)
RBC # FLD: 13.1 % — SIGNIFICANT CHANGE UP (ref 10.3–14.5)
RBC # FLD: 13.3 % — SIGNIFICANT CHANGE UP (ref 10.3–14.5)
SODIUM SERPL-SCNC: 134 MMOL/L — LOW (ref 135–145)
WBC # BLD: 10.39 K/UL — SIGNIFICANT CHANGE UP (ref 3.8–10.5)
WBC # BLD: 11.4 K/UL — HIGH (ref 3.8–10.5)
WBC # FLD AUTO: 10.39 K/UL — SIGNIFICANT CHANGE UP (ref 3.8–10.5)
WBC # FLD AUTO: 11.4 K/UL — HIGH (ref 3.8–10.5)

## 2022-12-04 PROCEDURE — 99232 SBSQ HOSP IP/OBS MODERATE 35: CPT

## 2022-12-04 PROCEDURE — 71045 X-RAY EXAM CHEST 1 VIEW: CPT | Mod: 26

## 2022-12-04 RX ORDER — MAGNESIUM SULFATE 500 MG/ML
2 VIAL (ML) INJECTION ONCE
Refills: 0 | Status: COMPLETED | OUTPATIENT
Start: 2022-12-04 | End: 2022-12-04

## 2022-12-04 RX ORDER — HEPARIN SODIUM 5000 [USP'U]/ML
4700 INJECTION INTRAVENOUS; SUBCUTANEOUS EVERY 6 HOURS
Refills: 0 | Status: DISCONTINUED | OUTPATIENT
Start: 2022-12-04 | End: 2022-12-06

## 2022-12-04 RX ORDER — SODIUM CHLORIDE 9 MG/ML
1000 INJECTION, SOLUTION INTRAVENOUS
Refills: 0 | Status: DISCONTINUED | OUTPATIENT
Start: 2022-12-04 | End: 2022-12-06

## 2022-12-04 RX ORDER — HEPARIN SODIUM 5000 [USP'U]/ML
1900 INJECTION INTRAVENOUS; SUBCUTANEOUS
Qty: 25000 | Refills: 0 | Status: DISCONTINUED | OUTPATIENT
Start: 2022-12-04 | End: 2022-12-05

## 2022-12-04 RX ADMIN — MIDODRINE HYDROCHLORIDE 10 MILLIGRAM(S): 2.5 TABLET ORAL at 13:21

## 2022-12-04 RX ADMIN — Medication 250 MILLIGRAM(S): at 06:09

## 2022-12-04 RX ADMIN — HEPARIN SODIUM 2300 UNIT(S)/HR: 5000 INJECTION INTRAVENOUS; SUBCUTANEOUS at 01:25

## 2022-12-04 RX ADMIN — MEROPENEM 1000 MILLIGRAM(S): 1 INJECTION INTRAVENOUS at 13:21

## 2022-12-04 RX ADMIN — Medication 4 MILLIGRAM(S): at 11:52

## 2022-12-04 RX ADMIN — Medication 81 MILLIGRAM(S): at 11:52

## 2022-12-04 RX ADMIN — Medication 4 MILLIGRAM(S): at 06:09

## 2022-12-04 RX ADMIN — CHOLESTYRAMINE 4 GRAM(S): 4 POWDER, FOR SUSPENSION ORAL at 20:46

## 2022-12-04 RX ADMIN — CHLORHEXIDINE GLUCONATE 1 APPLICATION(S): 213 SOLUTION TOPICAL at 12:00

## 2022-12-04 RX ADMIN — MEROPENEM 1000 MILLIGRAM(S): 1 INJECTION INTRAVENOUS at 21:51

## 2022-12-04 RX ADMIN — HEPARIN SODIUM 1900 UNIT(S)/HR: 5000 INJECTION INTRAVENOUS; SUBCUTANEOUS at 08:00

## 2022-12-04 RX ADMIN — TAMSULOSIN HYDROCHLORIDE 0.4 MILLIGRAM(S): 0.4 CAPSULE ORAL at 21:52

## 2022-12-04 RX ADMIN — MEROPENEM 1000 MILLIGRAM(S): 1 INJECTION INTRAVENOUS at 06:09

## 2022-12-04 RX ADMIN — MIDODRINE HYDROCHLORIDE 10 MILLIGRAM(S): 2.5 TABLET ORAL at 21:52

## 2022-12-04 RX ADMIN — Medication 25 MILLIGRAM(S): at 18:16

## 2022-12-04 RX ADMIN — CHOLESTYRAMINE 4 GRAM(S): 4 POWDER, FOR SUSPENSION ORAL at 09:50

## 2022-12-04 RX ADMIN — MIDODRINE HYDROCHLORIDE 10 MILLIGRAM(S): 2.5 TABLET ORAL at 06:09

## 2022-12-04 RX ADMIN — Medication 4 MILLIGRAM(S): at 00:48

## 2022-12-04 RX ADMIN — SODIUM CHLORIDE 75 MILLILITER(S): 9 INJECTION, SOLUTION INTRAVENOUS at 13:20

## 2022-12-04 RX ADMIN — Medication 25 MILLIGRAM(S): at 06:09

## 2022-12-04 RX ADMIN — Medication 25 GRAM(S): at 04:31

## 2022-12-04 RX ADMIN — HEPARIN SODIUM 1900 UNIT(S)/HR: 5000 INJECTION INTRAVENOUS; SUBCUTANEOUS at 11:58

## 2022-12-04 RX ADMIN — HEPARIN SODIUM 1900 UNIT(S)/HR: 5000 INJECTION INTRAVENOUS; SUBCUTANEOUS at 16:00

## 2022-12-04 RX ADMIN — Medication 250 MICROGRAM(S): at 06:09

## 2022-12-04 RX ADMIN — Medication 25 MILLIGRAM(S): at 00:25

## 2022-12-04 RX ADMIN — Medication 25 MILLIGRAM(S): at 11:52

## 2022-12-04 RX ADMIN — Medication 4 MILLIGRAM(S): at 18:15

## 2022-12-04 RX ADMIN — Medication 5 MILLIGRAM(S): at 16:28

## 2022-12-04 RX ADMIN — Medication 5 MILLIGRAM(S): at 11:55

## 2022-12-04 RX ADMIN — Medication 250 MILLIGRAM(S): at 18:16

## 2022-12-04 NOTE — PROGRESS NOTE ADULT - ASSESSMENT
77 year old male with PMHx of COVID (10/6/22), HTN, B12 deficiency, Aortic stenosis presenting to the ED with new onset Afib with RVR and sepsis. in atrial fib with moderate ventricular rate 77 year old male with PMHx of COVID (10/6/22), HTN, B12 deficiency, Aortic stenosis presenting to the ED with new onset Afib with RVR and sepsis.

## 2022-12-04 NOTE — PROGRESS NOTE ADULT - PROBLEM SELECTOR PLAN 1
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

## 2022-12-04 NOTE — PROGRESS NOTE ADULT - PROBLEM SELECTOR PLAN 1
.  - Unable to cardiovert due to MV vegetation  - Appreciate EP input  - Rates better controlled overnight   - cont metoprolol    - [id number]= 3; age, HTN. Cont IV hep.  - RADHA on labs this am, would discontinue digoxin

## 2022-12-04 NOTE — PROGRESS NOTE ADULT - SUBJECTIVE AND OBJECTIVE BOX
[facility]                                                         CARDIOLOGY AT [place] [address]                                                         Telephone: [phone]. Fax:[phone]                                                                             PROGRESS NOTE    Reason for follow up: Afib RVR, sepsis, endocarditis, CVA  Update: Sitting up in chair. Awake, alert. Hepatin ingusing  discussed plan for [facility] tomorrow, pending AM labs. today renal function worsening. Cr. 1.40       Review of symptoms:   Cardiac:  No chest pain. No dyspnea. No palpitations.  Respiratory: no cough. No dyspnea  Gastrointestinal: No diarrhea. No abdominal pain. No bleeding.   Neuro: No focal neuro complaints.      Vitals:  T(C): 37 (12-04-22 @ 00:00), Max: 37 (12-04-22 @ 00:00)  HR: 107 (12-04-22 @ 09:00) (102 - 134)  BP: 102/67 (12-04-22 @ 09:00) (99/59 - 145/70)  RR: 18 (12-04-22 @ 09:00) (10 - 36)  SpO2: 98% (12-04-22 @ 09:00) (91% - 98%)        I&O's Summary    03 Dec 2022 07:01  -  04 Dec 2022 07:00  --------------------------------------------------------  IN: 4293 mL / OUT: 2570 mL / NET: 1723 mL    04 Dec 2022 07:01  -  04 Dec 2022 09:57  --------------------------------------------------------  IN: 353 mL / OUT: 0 mL / NET: 353 mL        Weight (kg): 76.8 (11-30 @ 14:29)        PHYSICAL EXAM:  Appearance: Comfortable. No acute distress  HEENT:  Atraumatic. Normocephalic.  Normal oral mucosa  Neurologic: A & O x 3, no gross focal deficits.  Cardiovascular: RRR S1 S2, No murmur, no rubs/gallops. No JVD  Respiratory: Lungs clear to auscultation, unlabored   Gastrointestinal:  Soft, Non-tender, + BS  Lower Extremities: No edema  Psychiatry: Patient is calm. No agitation.   Skin: warm and dry.        CURRENT CARDIAC MEDICATIONS:  digoxin Tablet 250 MICROGram(s) Oral daily  metoprolol tartrate 25 milliGRAM(s) Oral every 6 hours  metoprolol tartrate Injectable 5 milliGRAM(s) IV Push every 6 hours PRN  midodrine 10 milliGRAM(s) Oral every 8 hours        CURRENT OTHER MEDICATIONS:  meropenem Injectable 1000 milliGRAM(s) IV Push every 8 hours  loperamide 4 milliGRAM(s) Oral every 6 hours  cholestyramine Powder (Sugar-Free) 4 Gram(s) Oral two times a day  aspirin  chewable 81 milliGRAM(s) Oral daily  chlorhexidine 2% Cloths 1 Application(s) Topical daily  heparin   Injectable 4700 Unit(s) IV Push every 6 hours PRN For aPTT less than 40  heparin  Infusion. 1900 Unit(s)/Hr (19 mL/Hr) IV Continuous <Continuous>  phytonadione   Solution 5 milliGRAM(s) Oral daily  tamsulosin 0.4 milliGRAM(s) Oral at bedtime        LABS:	 	  ( 28 Nov 2022 13:50 )  Troponin T  0.10<H>,  CPK  2194<H>, CKMB  X    , BNP X      , ( 27 Nov 2022 09:05 )  Troponin T  0.16<H>,  CPK  5542<H>, CKMB  X    , BNP X      , ( 27 Nov 2022 00:04 )  Troponin T  0.17<H>,  CPK  X    , CKMB  X    , BNP X                                10.1   10.39 )-----------( 295      ( 04 Dec 2022 03:15 )             29.3     12-04    134<L>  |  101  |  14.4  ----------------------------<  91  4.2   |  23.0  |  1.40<H>    Ca    8.7      04 Dec 2022 03:15  Mg     1.8     12-04      PT/INR/PTT ( 04 Dec 2022 03:15 )                       :                       :      X            :       104.8                 .        .                   .              .           .       X           .                                           TELEMETRY:   ECG:      DIAGNOSTIC TESTING:  [ ] Echocardiogram:   [ ]  Catheterization:  [ ] Stress Test:    OTHER: 	                                                                [facility]                                                         CARDIOLOGY AT [place] [address]                                                         Telephone: [phone]. Fax:[phone]                                                                             PROGRESS NOTE    Reason for follow up: Afib RVR, sepsis, endocarditis, CVA  Update: Sitting up in chair. Awake, alert. Hepatin ingusing  discussed plan for [facility] tomorrow, pending AM labs. today renal function worsening. Cr. 1.40       Review of symptoms:   Cardiac:  No chest pain. No dyspnea. No palpitations.  Respiratory: no cough. No dyspnea  Gastrointestinal: No diarrhea. No abdominal pain. No bleeding.   Neuro: No focal neuro complaints.      Vitals:  T(C): 37 (12-04-22 @ 00:00), Max: 37 (12-04-22 @ 00:00)  HR: 107 (12-04-22 @ 09:00) (102 - 134)  BP: 102/67 (12-04-22 @ 09:00) (99/59 - 145/70)  RR: 18 (12-04-22 @ 09:00) (10 - 36)  SpO2: 98% (12-04-22 @ 09:00) (91% - 98%)        I&O's Summary    03 Dec 2022 07:01  -  04 Dec 2022 07:00  --------------------------------------------------------  IN: 4293 mL / OUT: 2570 mL / NET: 1723 mL    04 Dec 2022 07:01  -  04 Dec 2022 09:57  --------------------------------------------------------  IN: 353 mL / OUT: 0 mL / NET: 353 mL        Weight (kg): 76.8 (11-30 @ 14:29)        PHYSICAL EXAM:  Appearance: Comfortable. No acute distress  HEENT:  Atraumatic. Normocephalic.  Normal oral mucosa  Neurologic: A & O x 3, no gross focal deficits.  Cardiovascular: RRR S1 S2, No murmur, no rubs/gallops. No JVD  Respiratory: Lungs clear to auscultation, unlabored   Gastrointestinal:  Soft, Non-tender, + BS  Lower Extremities: No edema  Psychiatry: Patient is calm. No agitation.   Skin: warm and dry.        CURRENT CARDIAC MEDICATIONS:  digoxin Tablet 250 MICROGram(s) Oral daily  metoprolol tartrate 25 milliGRAM(s) Oral every 6 hours  metoprolol tartrate Injectable 5 milliGRAM(s) IV Push every 6 hours PRN  midodrine 10 milliGRAM(s) Oral every 8 hours        CURRENT OTHER MEDICATIONS:  meropenem Injectable 1000 milliGRAM(s) IV Push every 8 hours  loperamide 4 milliGRAM(s) Oral every 6 hours  cholestyramine Powder (Sugar-Free) 4 Gram(s) Oral two times a day  aspirin  chewable 81 milliGRAM(s) Oral daily  chlorhexidine 2% Cloths 1 Application(s) Topical daily  heparin   Injectable 4700 Unit(s) IV Push every 6 hours PRN For aPTT less than 40  heparin  Infusion. 1900 Unit(s)/Hr (19 mL/Hr) IV Continuous <Continuous>  phytonadione   Solution 5 milliGRAM(s) Oral daily  tamsulosin 0.4 milliGRAM(s) Oral at bedtime        LABS:	 	  ( 28 Nov 2022 13:50 )  Troponin T  0.10<H>,  CPK  2194<H>, CKMB  X    , BNP X      , ( 27 Nov 2022 09:05 )  Troponin T  0.16<H>,  CPK  5542<H>, CKMB  X    , BNP X      , ( 27 Nov 2022 00:04 )  Troponin T  0.17<H>,  CPK  X    , CKMB  X    , BNP X                                10.1   10.39 )-----------( 295      ( 04 Dec 2022 03:15 )             29.3     12-04    134<L>  |  101  |  14.4  ----------------------------<  91  4.2   |  23.0  |  1.40<H>    Ca    8.7      04 Dec 2022 03:15  Mg     1.8     12-04      PT/INR/PTT ( 04 Dec 2022 03:15 )                       :                       :      X            :       104.8                 .        .                   .              .           .       X           .                                           TELEMETRY: Afib        DIAGNOSTIC TESTING:  [ ] Echocardiogram:   < from: LILIAN Echo Doppler (11.30.22 @ 14:52) >    Summary:   1. Left ventricular ejection fraction, by visual estimation, is 55 to   60%.   2. Normal global left ventricular systolic function.   3. The mitral in-flow pattern reveals no discernable A-wave, therefore   no comment on diastolic function can be made.   4. Normal right ventricular size and function, inadequate estimation of   RVSP.   5. Mildly enlarged left atrium.   6. Moderate mitral valve regurgitation, jet is eccentric posteriorly   directed.   7. Thickening of bileaflet mitral valve with subcentimeter filamentous  mobile lesions suggestive of vegetations.   8. Color flow doppler and intravenous injection of agitated saline   demonstrates the presence of an intact intra atrial septum.   9. No left atrial appendage thrombus and normal left atrial appendage   velocities.  10. Structurally normal tricuspid valve, with normal leaflet excursion.  11. Heavily calcified noncoronary cusp with moderate aortic valve   stenosis, ELDON (by 2D planimetry 1.34 cm2), peak velocity 2.8 m/s and mean   gradient of 15 mmHg.  12. Mild to moderate aortic regurgitation.  13. There is mild echolucency and thickening of the sinus of Valsalva   inbetween the left and non-coronary cusp, cannot exclude early root   abscess.  14. Mild simple atheromas at the aortic arch/descendingaorta.  15. There is no evidence of pericardial effusion.  16. No prior LILIAN study is available for comparison. 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 Cardioversion   deferred due to the presence of underlying infection/endocarditis.    John Frost DO Electronically signed on 11/30/2022 at 3:39:01 PM    < end of copied text >    < from: CT Angio Head w/ IV Cont (12.02.22 @ 23:22) >    INTERPRETATION:  Clinical indication: Bacteremia of unknown source,   endocarditis evaluated for ischemia, sepsis          After the intravenous power injection of 90 cc of Omnipaque 350 using a   bolus mario timing run serial thin sections were obtained through the   neck from the thoracic inlet through the intracranial circulation   centered at the [id number] on a  multislice CT scanner reformatted   with coronal and sagittal 2 D-MIP projections, including 3 D   reconstructions using a separate 3D [employer] software workstation.A total   of  90 cc of Omnipaque wereintravenously injected.  10 cc were discarded.    There is calcification along the aortic arch. The origins of the carotid   and vertebral arteries are normal. The left vertebral artery is dominant.   Calcifications at the carotid bifurcations are identified without   stenosis.    The distal vertebral arteries are well identified as are the   posterior-inferior cerebellar arteries bilaterally. The region of the   vertebral basilar junction is normal. The basilar artery is normal. The   posterior cerebral and superior cerebellar arteries are normal.    Evaluation of the carotid arteries demonstrate normal appearance to the   distal cervical, petrous, cavernous and supraclinoid internal carotid   arteries. The anterior cerebral arteries and anterior communicating   artery and middle cerebral arteries are normal.     The normal intracranial venous circulation is identified. The left   transverse sinus is dominant. The superior sagittal sinus, internal   cerebral veins, vein of Kenneth, straight sinus, transverse sinuses,   sigmoid sinuses and internal jugular veins are normal. Cortical veins are   normal.      The visualized portion of the soft tissues of the neck are within normal   limits.      IMPRESSION: Normal CTA of the head and neck.  < from: MR Head No Cont (12.01.22 @ 21:35) >  FINDINGS:  Brain parenchyma: Scattered foci of diffusion restriction are present in   the bilateral parieto-occipital lobes with corresponding T2/flair   hyperintensity. Additional mild confluent areas of periventricular   T-2/FLAIR hyperintensity are present. There is no evidence of   intracranial hemorrhage or mass effect. Midline sagittal structures are   unremarkable.    Extra-axial compartment, ventricles, and cisterns: There is no evidence   of extra-axial collection or hemorrhage. There is no hydrocephalus. There   is no cisternal effacement.    Calvarium, paranasal sinuses, and orbits: Sinuses are clear. Calvarium is   unremarkable. The orbits are unremarkable.    IMPRESSION:  Scattered foci of restricted diffusion in the bilateral parieto-occipital   lobes, suggestive of acute ischemia. No intracranial hemorrhage or mass   effect.    Given the bilateral distribution of involvement, embolic phenomenon is   suggested, with underlying septic emboli not excluded. Further evaluation   by contrast-enhanced MRI may provide further characterization.    < end of copied text >

## 2022-12-04 NOTE — PROGRESS NOTE ADULT - SUBJECTIVE AND OBJECTIVE BOX
Subjective:  Pt states "I don't want to be in this hospital bed anymore, I would like a real bed".  Pt denies any CP, palpitations, SOB, cough, fever, chills, itchiness/rash, diaphoresis, vision changes, HA, dizziness/lightheadedness, numbness/tingling, abd pain, N/V or any other complaints     Labs:                                     11.2   12.92 )-----------( 344      ( 03 Dec 2022 03:00 )             32.1   12-03    139  |  103  |  13.6  ----------------------------<  96  3.9   |  24.0  |  0.81    Ca    9.1      03 Dec 2022 03:00  Phos  3.5     12-02  Mg     1.6     12-03    TPro  5.1<L>  /  Alb  2.4<L>  /  TBili  0.5  /  DBili  x   /  AST  56<H>  /  ALT  71<H>  /  AlkPhos  76  12-02      I&O's Summary      02 Dec 2022 07:01  -  03 Dec 2022 07:00  --------------------------------------------------------  IN: 2883 mL / OUT: 1810 mL / NET: 1073 mL    03 Dec 2022 07:01  -  04 Dec 2022 00:48  --------------------------------------------------------  IN: 3051 mL / OUT: 1370 mL / NET: 1681 mL            Physical Exam  Neuro: A&Ox3 non focal stutter (chronic per pt)  Pulm: CTA b/l no wheezing  CV: S1S2 irregular, tachy  Abd: soft NT ND  Extrem/MS: no edema/cyanosis, MEZA  [place] condom cath in place

## 2022-12-04 NOTE — PROGRESS NOTE ADULT - SUBJECTIVE AND OBJECTIVE BOX
Electrophysiology Attending Follow Up Note    Subjective: Rectal tube removed yesterday. Stooling improved significantly.    TELE: AF with rates ~100-110 bpm    MEDICATIONS  (STANDING):  aspirin  chewable 81 milliGRAM(s) Oral daily  chlorhexidine 2% Cloths 1 Application(s) Topical daily  cholestyramine Powder (Sugar-Free) 4 Gram(s) Oral two times a day  digoxin     Tablet 250 MICROGram(s) Oral daily  heparin  Infusion. 1900 Unit(s)/Hr (19 mL/Hr) IV Continuous <Continuous>  lactated ringers. 1000 milliLiter(s) (75 mL/Hr) IV Continuous <Continuous>  loperamide 4 milliGRAM(s) Oral every 6 hours  meropenem Injectable 1000 milliGRAM(s) IV Push every 8 hours  metoprolol tartrate 25 milliGRAM(s) Oral every 6 hours  midodrine 10 milliGRAM(s) Oral every 8 hours  phytonadione   Solution 5 milliGRAM(s) Oral daily  saccharomyces boulardii 250 milliGRAM(s) Oral two times a day  tamsulosin 0.4 milliGRAM(s) Oral at bedtime    MEDICATIONS  (PRN):  heparin   Injectable 4700 Unit(s) IV Push every 6 hours PRN For aPTT less than 40  metoprolol tartrate Injectable 5 milliGRAM(s) IV Push every 6 hours PRN HR > 130      Allergies    No Known Allergies    Intolerances        Vital Signs Last 24 Hrs  T(C): 36.1 (04 Dec 2022 12:00), Max: 37 (04 Dec 2022 00:00)  T(F): 97 (04 Dec 2022 12:00), Max: 98.6 (04 Dec 2022 00:00)  HR: 94 (04 Dec 2022 14:00) (94 - 127)  BP: 102/60 (04 Dec 2022 14:00) (99/59 - 145/70)  BP(mean): 76 (04 Dec 2022 14:00) (73 - 115)  RR: 13 (04 Dec 2022 14:00) (10 - 36)  SpO2: 97% (04 Dec 2022 14:00) (92% - 99%)    Parameters below as of 04 Dec 2022 14:00  Patient On (Oxygen Delivery Method): room air        Physical Exam:  Constitutional: Well-developed, well nourished, in no acute distress  Head: normocephalic atraumatic   Eyes:  extraocular movements intact, sclera anicteric  ENT: mucous membranes moist, pharynx no erythema or swelling  john: effort normal  Cardiac: No edema.  Musculoskeletal: full range of motion all extremities with no pain, tenderness, swelling, or erythema    Neuro: Alert and oriented x 3, motor & sensation grossly in tact  Skin: color normal, no rashes or injury  Psych: mood calm    LABS:                        10.1   10.39 )-----------( 295      ( 04 Dec 2022 03:15 )             29.3     12-04    134<L>  |  101  |  14.4  ----------------------------<  91  4.2   |  23.0  |  1.40<H>    Ca    8.7      04 Dec 2022 03:15  Mg     1.8     12-04      PTT - ( 04 Dec 2022 10:00 )  PTT:66.1 sec  Urinalysis Basic - ( 03 Dec 2022 13:15 )    Color: Latanya / Appearance: very cloudy / S.015 / pH: x  Gluc: x / Ketone: Small  / Bili: Negative / Urobili: Negative mg/dL   Blood: x / Protein: 30 mg/dL / Nitrite: Negative   Leuk Esterase: Small / RBC: >50 /HPF / WBC 6-10 /HPF   Sq Epi: x / Non Sq Epi: Occasional / Bacteria: Few

## 2022-12-04 NOTE — PROGRESS NOTE ADULT - ASSESSMENT
77 year old male with PMH of HTN, hyperlipidemia, BPH, moderate aortic stenosis with ELDON 1.1 cm, and recent COVID infection 10/6/22. Recent admission 11/2-7/22 for streptococcus anginosus bacteremia and norovirus infections. He was discharged home with a PICC line for 4 weeks of IV abx as per ID. He presented to [facility] ED on 11/27/22 following syncopal episode, found to be in septic shock and in new onset AFib with rapid rates. 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 Cardioversion CANCELLED. Care transferred to CT surgery team. Pending further workup in preparation for OR with CT surgery. MRI head performed on 12/1 as part of w/u revealed acute ischemia; embolic phenomenon is suggested, with underlying septic emboli not excluded. Neuro following.     #AF, new onset:  - Better rate controlled  - Continue Metoprolol 25 q6h  - Continue Digoxin for now, check level tomorrow  - If Cr worsens, discontinue Digoxin  - Consider IVF given prior diarrhea which may precipitate RVR    #Endocarditis:  - Possible root abscess on LILIAN; but no findings on nuclear medicine scan  - ID & cardiology following  - Possible surgical intervention next week    #RADHA:  - Possibly related to hypovolemia from diarrhea  - Possible emboli from endocarditis  - Consider IVF, follow up Cr  - As above, stop Digoxin if Cr gets worse    Discussed with CTICU team.    Trenton Bain MD  Clinical Cardiac Electrophysiology

## 2022-12-04 NOTE — PROGRESS NOTE ADULT - PROBLEM SELECTOR PLAN 4
prior norovirus + 11/3  Rectal tube removed  GI consult appreciated  neg c diff ? antibiotics associated  cont imodium and cholestyramine

## 2022-12-04 NOTE — PROGRESS NOTE ADULT - PROBLEM SELECTOR PLAN 4
.  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  - CTS DOHERTY in progress  - LILIAN 11/30: LVEF 55-60.Moderate MR, Thickening of bileaflet mitral valve with subcentimeter filamentous mobile lesions suggestive of vegetation. Moderate AS, Ml-Mod AR, There is mild echolucency and thickening of the sinus of Valsalva Mitral valve vegetations and possible early aortic root abscess present, recommend clinical correlation for endocarditis.  No cardioversion was performed.

## 2022-12-04 NOTE — PROGRESS NOTE ADULT - NS ATTEND AMEND GEN_ALL_CORE FT
New onset Afib with RVR and sepsis: Atrial fibrillation, new onset.  Unable to cardiovert due to MV vegetation. Appreciate EP input  Acute CVA: likely cardioembolic in the setting of atrial fibrillation and endocarditis. as per neurology "will likely need cerebral angiogram to rule out mycotic aneurysm prior to surgical intervention". 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  CTS DOHERTY in progress.

## 2022-12-04 NOTE — PROGRESS NOTE ADULT - PROBLEM SELECTOR PLAN 2
-   - h/o strep anginosus bacteremia,    - Normal Indium- scan done yesterday; negative for acute infection.  - ID following.

## 2022-12-05 LAB
ANION GAP SERPL CALC-SCNC: 7 MMOL/L — SIGNIFICANT CHANGE UP (ref 5–17)
APTT BLD: 70.5 SEC — HIGH (ref 27.5–35.5)
BUN SERPL-MCNC: 19.1 MG/DL — SIGNIFICANT CHANGE UP (ref 8–20)
CALCIUM SERPL-MCNC: 9 MG/DL — SIGNIFICANT CHANGE UP (ref 8.4–10.5)
CHLORIDE SERPL-SCNC: 101 MMOL/L — SIGNIFICANT CHANGE UP (ref 96–108)
CO2 SERPL-SCNC: 26 MMOL/L — SIGNIFICANT CHANGE UP (ref 22–29)
CREAT SERPL-MCNC: 1.49 MG/DL — HIGH (ref 0.5–1.3)
DIGOXIN SERPL-MCNC: 1.1 NG/ML — SIGNIFICANT CHANGE UP (ref 0.8–2)
EGFR: 48 ML/MIN/1.73M2 — LOW
GLUCOSE SERPL-MCNC: 82 MG/DL — SIGNIFICANT CHANGE UP (ref 70–99)
HCT VFR BLD CALC: 34.2 % — LOW (ref 39–50)
HGB BLD-MCNC: 11.8 G/DL — LOW (ref 13–17)
MAGNESIUM SERPL-MCNC: 2 MG/DL — SIGNIFICANT CHANGE UP (ref 1.6–2.6)
MCHC RBC-ENTMCNC: 31.9 PG — SIGNIFICANT CHANGE UP (ref 27–34)
MCHC RBC-ENTMCNC: 34.5 GM/DL — SIGNIFICANT CHANGE UP (ref 32–36)
MCV RBC AUTO: 92.4 FL — SIGNIFICANT CHANGE UP (ref 80–100)
PLATELET # BLD AUTO: 335 K/UL — SIGNIFICANT CHANGE UP (ref 150–400)
POTASSIUM SERPL-MCNC: 4.5 MMOL/L — SIGNIFICANT CHANGE UP (ref 3.5–5.3)
POTASSIUM SERPL-SCNC: 4.5 MMOL/L — SIGNIFICANT CHANGE UP (ref 3.5–5.3)
RBC # BLD: 3.7 M/UL — LOW (ref 4.2–5.8)
RBC # FLD: 13.2 % — SIGNIFICANT CHANGE UP (ref 10.3–14.5)
SARS-COV-2 RNA SPEC QL NAA+PROBE: SIGNIFICANT CHANGE UP
SODIUM SERPL-SCNC: 134 MMOL/L — LOW (ref 135–145)
WBC # BLD: 9.66 K/UL — SIGNIFICANT CHANGE UP (ref 3.8–10.5)
WBC # FLD AUTO: 9.66 K/UL — SIGNIFICANT CHANGE UP (ref 3.8–10.5)

## 2022-12-05 PROCEDURE — 71045 X-RAY EXAM CHEST 1 VIEW: CPT | Mod: 26

## 2022-12-05 PROCEDURE — 99291 CRITICAL CARE FIRST HOUR: CPT

## 2022-12-05 PROCEDURE — 99232 SBSQ HOSP IP/OBS MODERATE 35: CPT

## 2022-12-05 PROCEDURE — 99292 CRITICAL CARE ADDL 30 MIN: CPT

## 2022-12-05 PROCEDURE — 93458 L HRT ARTERY/VENTRICLE ANGIO: CPT | Mod: 26

## 2022-12-05 PROCEDURE — 99152 MOD SED SAME PHYS/QHP 5/>YRS: CPT

## 2022-12-05 RX ORDER — HEPARIN SODIUM 5000 [USP'U]/ML
1500 INJECTION INTRAVENOUS; SUBCUTANEOUS
Qty: 25000 | Refills: 0 | Status: DISCONTINUED | OUTPATIENT
Start: 2022-12-05 | End: 2022-12-06

## 2022-12-05 RX ORDER — MEROPENEM 1 G/30ML
1000 INJECTION INTRAVENOUS EVERY 8 HOURS
Refills: 0 | Status: DISCONTINUED | OUTPATIENT
Start: 2022-12-05 | End: 2022-12-05

## 2022-12-05 RX ORDER — MEROPENEM 1 G/30ML
1000 INJECTION INTRAVENOUS EVERY 12 HOURS
Refills: 0 | Status: DISCONTINUED | OUTPATIENT
Start: 2022-12-05 | End: 2022-12-09

## 2022-12-05 RX ORDER — ALBUMIN HUMAN 25 %
250 VIAL (ML) INTRAVENOUS ONCE
Refills: 0 | Status: COMPLETED | OUTPATIENT
Start: 2022-12-05 | End: 2022-12-05

## 2022-12-05 RX ORDER — SODIUM CHLORIDE 9 MG/ML
250 INJECTION INTRAMUSCULAR; INTRAVENOUS; SUBCUTANEOUS ONCE
Refills: 0 | Status: COMPLETED | OUTPATIENT
Start: 2022-12-05 | End: 2022-12-05

## 2022-12-05 RX ORDER — METOPROLOL TARTRATE 50 MG
25 TABLET ORAL ONCE
Refills: 0 | Status: COMPLETED | OUTPATIENT
Start: 2022-12-05 | End: 2022-12-05

## 2022-12-05 RX ORDER — METOPROLOL TARTRATE 50 MG
50 TABLET ORAL EVERY 6 HOURS
Refills: 0 | Status: DISCONTINUED | OUTPATIENT
Start: 2022-12-05 | End: 2022-12-10

## 2022-12-05 RX ORDER — DIGOXIN 250 MCG
125 TABLET ORAL DAILY
Refills: 0 | Status: DISCONTINUED | OUTPATIENT
Start: 2022-12-06 | End: 2022-12-06

## 2022-12-05 RX ADMIN — Medication 25 MILLIGRAM(S): at 05:25

## 2022-12-05 RX ADMIN — MEROPENEM 1000 MILLIGRAM(S): 1 INJECTION INTRAVENOUS at 05:34

## 2022-12-05 RX ADMIN — Medication 4 MILLIGRAM(S): at 00:11

## 2022-12-05 RX ADMIN — Medication 250 MICROGRAM(S): at 05:25

## 2022-12-05 RX ADMIN — Medication 25 MILLIGRAM(S): at 09:58

## 2022-12-05 RX ADMIN — HEPARIN SODIUM 1900 UNIT(S)/HR: 5000 INJECTION INTRAVENOUS; SUBCUTANEOUS at 05:42

## 2022-12-05 RX ADMIN — Medication 4 MILLIGRAM(S): at 05:25

## 2022-12-05 RX ADMIN — Medication 4 MILLIGRAM(S): at 23:47

## 2022-12-05 RX ADMIN — SODIUM CHLORIDE 250 MILLILITER(S): 9 INJECTION INTRAMUSCULAR; INTRAVENOUS; SUBCUTANEOUS at 18:00

## 2022-12-05 RX ADMIN — MEROPENEM 1000 MILLIGRAM(S): 1 INJECTION INTRAVENOUS at 21:13

## 2022-12-05 RX ADMIN — Medication 25 MILLIGRAM(S): at 00:10

## 2022-12-05 RX ADMIN — MIDODRINE HYDROCHLORIDE 10 MILLIGRAM(S): 2.5 TABLET ORAL at 05:24

## 2022-12-05 RX ADMIN — TAMSULOSIN HYDROCHLORIDE 0.4 MILLIGRAM(S): 0.4 CAPSULE ORAL at 21:17

## 2022-12-05 RX ADMIN — Medication 4 MILLIGRAM(S): at 21:13

## 2022-12-05 RX ADMIN — MIDODRINE HYDROCHLORIDE 10 MILLIGRAM(S): 2.5 TABLET ORAL at 21:14

## 2022-12-05 RX ADMIN — Medication 250 MILLIGRAM(S): at 05:25

## 2022-12-05 RX ADMIN — Medication 50 MILLIGRAM(S): at 11:55

## 2022-12-05 RX ADMIN — Medication 81 MILLIGRAM(S): at 11:50

## 2022-12-05 RX ADMIN — HEPARIN SODIUM 1500 UNIT(S)/HR: 5000 INJECTION INTRAVENOUS; SUBCUTANEOUS at 23:47

## 2022-12-05 RX ADMIN — Medication 5 MILLIGRAM(S): at 05:37

## 2022-12-05 RX ADMIN — Medication 50 MILLIGRAM(S): at 21:17

## 2022-12-05 RX ADMIN — Medication 4 MILLIGRAM(S): at 11:50

## 2022-12-05 RX ADMIN — SODIUM CHLORIDE 75 MILLILITER(S): 9 INJECTION, SOLUTION INTRAVENOUS at 21:15

## 2022-12-05 RX ADMIN — CHLORHEXIDINE GLUCONATE 1 APPLICATION(S): 213 SOLUTION TOPICAL at 21:52

## 2022-12-05 RX ADMIN — Medication 50 MILLIGRAM(S): at 23:47

## 2022-12-05 RX ADMIN — Medication 125 MILLILITER(S): at 11:49

## 2022-12-05 RX ADMIN — CHOLESTYRAMINE 4 GRAM(S): 4 POWDER, FOR SUSPENSION ORAL at 21:13

## 2022-12-05 NOTE — CONSULT NOTE ADULT - SUBJECTIVE AND OBJECTIVE BOX
[facility]                                              INTERVENTIONAL CARDIOLOGY AT [facility]                                                   [address], [place] [address]                                             Telephone: [phone]. Fax:[phone]                                                       INTERVENTIONAL CARDIOLOGY CONSULTATION NOTE                                                                                             History obtained by: Patient and medical record  Community Cardiologist:   Reason for Consultation: Evaluation for cardiac catheterization  Available pt records reviewed: Yes [ x ] No [  ]    Chief complaint:    Patient is a 77y old  Male who presents with a chief complaint of septic shock unknown source (05 Dec 2022 12:14)      HPI:  77 year old male with PMHx of recent  COVID  on 10/6/22, HTN , Vit B12 deficiency, presenting to the ED on  with body aches, fatigue and fever (Tmax 102) at home for 12 days found to have streptococcus bacteremia and admitted with unclear source, no recent dental work, skin infections, no respiratory symptoms. Pan scan was negative at that time ( LILIAN was recommended but pt refused) He was treated for Bacteremia ( Blood cultures + streptococcus anginosis pansensitive ) and norovirus with supportive care and contact isolation . He was discharged home on  with IV Rocephin via PICC line. Recommendation was to continue ABX  daily via pic end 22    Tonight he presented to ER after and episode of syncope and collapse, found to be septic, hypotensive, hypoxic and febrile in the ER. He is unsure of mechanism of fall but remembers being on the ground no reported LOC . He was found face-down on bathroom floor buy family with left leg wedged under cabinet. On my interview he is alert and oriented to person place and time, he has a baseline studder in his speech pattern but otherwise neurologically intcact without defecit.  In responose to hypotension he failed to repsoond to Inital sepsis fluid protocol in ER and now is requiring IV pressor in form of levophed to maintain MAP greater than 60 -65. In the ER he was febrile with inital labs signifacnt for WBC of 22.4 , ProCal of 21.2 first lactate 4.9 via abg with repeat post fluids of 2.2 venous sample.        (2022 02:26)      Anginal Class:        Angina (Class): na       Ischemic Symptoms:     Heart Failure:        Systolic/Diastolic/Combined: na       NYHA Class (within 2 weeks):       PAST MEDICAL HISTORY  Hypertension    Aortic stenosis        Associated Risk Factors:        Frailty Assessment: (none       Cerebrovascular Disease: N/A       Chronic Lung Disease: N/A       Peripheral Arterial Disease: N/A       Chronic Kidney Disease (if yes, what is GFR): N/A       Uncontrolled Diabetes (if yes, what is HgbA1C or FBS): N/A       Poorly Controlled Hypertension (if yes, what is SBP): N/A       Morbid Obesity (if yes, what is BMI): N/A       History of Recent Ventricular Arrhythmia: N/A       Inability to Ambulate Safely: N/A       Need for Therapeutic Anticoagulation: N/A       Antiplatelet or Contrast Allergy: N/A      PAST SURGICAL HISTORY  H/O inguinal hernia repair    S/P laparoscopic colectomy      FAMILY HISTORY:  FH: heart disease (Father, Mother)      Family History of Premature Cardiovascular Disease:  Yes [  ] No [  ]    HOME MEDICATIONS:  aspirin 81 mg oral delayed release tablet: 1 tab(s) orally once a day (2022 10:46)  cefTRIAXone 2 g injection: 2 gram(s) injectable once a day till 12/3 (2022 10:46)  metoprolol succinate 25 mg oral tablet, extended release: 1 tab(s) orally once a day (2022 10:46)  valsartan 80 mg oral capsule: 1 tab(s) orally once a day (2022 10:46)      CURRENT CARDIAC MEDICATIONS:  digoxin     Tablet 125 MICROGram(s) Oral daily  metoprolol tartrate 50 milliGRAM(s) Oral every 6 hours  midodrine 10 milliGRAM(s) Oral every 8 hours      Antianginal Therapies:        Beta Blockers:  yes       Calcium Channel Blockers:        Long Acting Nitrates:        Ranexa:     ALLERGIES:   No Known Allergies      REVIEW OF SYMPTOMS:   CONSTITUTIONAL: + fever, no chills, no weight loss, no weight gain, + fatigue   CARDIOVASCULAR: irreg/irreg  RESPIRATORY: no Shortness of breath, no cough, no wheezing  : No dysuria, no hematuria   GI: No dark color stool, no nausea, no diarrhea, no constipation, no abdominal pain   NEURO: No headache, no slurred speech   ALL OTHER REVIEW OF SYSTEMS ARE NEGATIVE.    VITAL SIGNS:  T(C): 36.5 (22 @ 12:11), Max: 36.6 (22 @ 16:00)  T(F): 97.7 (22 @ 12:11), Max: 97.9 (22 @ 21:00)  HR: 109 (22 @ 12:11) (90 - 124)  BP: 117/68 (22 @ 12:11) (98/71 - 146/82)  RR: 20 (22 @ 12:11) (13 - 28)  SpO2: 97% (22 @ 12:11) (93% - 98%)    INTAKE AND OUTPUT:      @ 07:01  -   @ 07:00  --------------------------------------------------------  IN: 2682 mL / OUT: 1675 mL / NET: 1007 mL     @ 07:01  -   @ 12:29  --------------------------------------------------------  IN: 188 mL / OUT: 150 mL / NET: 38 mL        PHYSICAL EXAM:  Constitutional: Comfortable . No acute distress.   HEENT: Atraumatic and normocephalic , neck is supple . no JVD. No carotid bruit.  CNS: A&Ox3. No focal deficits.   Respiratory: CTAB, unlabored   Cardiovascular: Irreg/irreg.  Gastrointestinal: Soft, non-tender. +Bowel sounds.   Extremities: 1+ Peripheral Pulses, + pedal edema  Psychiatric: Calm . no agitation.   Skin: Warm and dry, no ulcers on extremities     LABS:  ( 2022 13:50 )  Troponin T  0.10<H>,  CPK  2194<H>, CKMB  X    , BNP X        , ( 2022 09:05 )  Troponin T  0.16<H>,  CPK  5542<H>, CKMB  X    , BNP X        , ( 2022 00:04 )  Troponin T  0.17<H>,  CPK  X    , CKMB  X    , BNP X                                  11.8   9.66  )-----------( 335      ( 05 Dec 2022 03:30 )             34.2     12-05    134<L>  |  101  |  19.1  ----------------------------<  82  4.5   |  26.0  |  1.49<H>    Ca    9.0      05 Dec 2022 03:30  Mg     2.0     12-05      PTT - ( 05 Dec 2022 03:30 )  PTT:70.5 sec  Urinalysis Basic - ( 03 Dec 2022 13:15 )    Color: Latanya / Appearance: very cloudy / S.015 / pH: x  Gluc: x / Ketone: Small  / Bili: Negative / Urobili: Negative mg/dL   Blood: x / Protein: 30 mg/dL / Nitrite: Negative   Leuk Esterase: Small / RBC: >50 /HPF / WBC 6-10 /HPF   Sq Epi: x / Non Sq Epi: Occasional / Bacteria: Few                ECG:   Prior ECG: Yes [  ] No [  ]    CARDIAC TESTING   ECHO:  < from: LILIAN Echo Doppler (22 @ 14:52) >  Summary:   1. Left ventricular ejection fraction, by visual estimation, is 55 to   60%.   2. Normal global left ventricular systolic function.   3. The mitral in-flow pattern reveals no discernable A-wave, therefore   no comment on diastolic function can be made.   4. Normal right ventricular size and function, inadequate estimation of   RVSP.   5. Mildly enlarged left atrium.   6. Moderate mitral valve regurgitation, jet is eccentric posteriorly   directed.   7. Thickening of bileaflet mitral valve with subcentimeter filamentous  mobile lesions suggestive of vegetations.   8. Color flow doppler and intravenous injection of agitated saline   demonstrates the presence of an intact intra atrial septum.   9. No left atrial appendage thrombus and normal left atrial appendage   velocities.  10. Structurally normal tricuspid valve, with normal leaflet excursion.  11. Heavily calcified noncoronary cusp with moderate aortic valve   stenosis, ELDON (by 2D planimetry 1.34 cm2), peak velocity 2.8 m/s and mean   gradient of 15 mmHg.  12. Mild to moderate aortic regurgitation.  13. There is mild echolucency and thickening of the sinus of Valsalva   inbetween the left and non-coronary cusp, cannot exclude early root   abscess.  14. Mild simple atheromas at the aortic arch/descendingaorta.  15. There is no evidence of pericardial effusion.  16. No prior LILIAN study is available for comparison. 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 Cardioversion   deferred due to the presence of underlying infection/endocarditis.    < end of copied text >    STRESS: na    Cardiac Interventions: na    CATH: na      Cardiac Cath Risk Assessments:  ASA: 2  Mallampati: 2  Bleeding Risk: 2.3%  Creatinine: 1.49  GFR: 48

## 2022-12-05 NOTE — CONSULT NOTE ADULT - SUBJECTIVE AND OBJECTIVE BOX
[facility]                                              INTERVENTIONAL CARDIOLOGY AT [facility]                                                   [address], [place] [address]                                             Telephone: [phone]. Fax:[phone]                                                       INTERVENTIONAL CARDIOLOGY CONSULTATION NOTE                                                                                             History obtained by: Patient and medical record  Community Cardiologist:   Reason for Consultation: Evaluation for cardiac catheterization  Available pt records reviewed: Yes [ x ] No [  ]    Chief complaint:    Patient is a 77y old  Male who presents with a chief complaint of septic shock unknown source (05 Dec 2022 09:39)      HPI:  77 year old male with PMHx of recent  COVID  on 10/6/22, HTN , Vit B12 deficiency, presenting to the ED on  with body aches, fatigue and fever (Tmax 102) at home for 12 days found to have streptococcus bacteremia and admitted with unclear source, no recent dental work, skin infections, no respiratory symptoms. Pan scan was negative at that time ( LILIAN was recommended but pt refused) He was treated for Bacteremia ( Blood cultures + streptococcus anginosis pansensitive ) and norovirus with supportive care and contact isolation . He was discharged home on  with IV Rocephin via PICC line. Recommendation was to continue ABX  daily via pic end 22    Tonight he presented to ER after and episode of syncope and collapse, found to be septic, hypotensive, hypoxic and febrile in the ER. He is unsure of mechanism of fall but remembers being on the ground no reported LOC . He was found face-down on bathroom floor buy family with left leg wedged under cabinet. On my interview he is alert and oriented to person place and time, he has a baseline studder in his speech pattern but otherwise neurologically intcact without defecit.  In responose to hypotension he failed to repsoond to Inital sepsis fluid protocol in ER and now is requiring IV pressor in form of levophed to maintain MAP greater than 60 -65. In the ER he was febrile with inital labs signifacnt for WBC of 22.4 , ProCal of 21.2 first lactate 4.9 via abg with repeat post fluids of 2.2 venous sample.        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      Anginal Class:        Angina (Class):  N       Ischemic Symptoms:     Heart Failure:        Systolic/Diastolic/Combined: N       NYHA Class (within 2 weeks):       PAST MEDICAL HISTORY  Hypertension    Aortic stenosis        Associated Risk Factors:        Frailty Assessment: (none/mild/mod/severe): Mod       Cerebrovascular Disease: N       Chronic Lung Disease: N       Peripheral Arterial Disease: N       Chronic Kidney Disease (if yes, what is GFR): N       Uncontrolled Diabetes (if yes, what is HgbA1C or FBS): N       Poorly Controlled Hypertension (if yes, what is SBP): N       Morbid Obesity (if yes, what is BMI): N       History of Recent Ventricular Arrhythmia: N       Inability to Ambulate Safely: N       Need for Therapeutic Anticoagulation: y       Antiplatelet or Contrast Allergy: N      PAST SURGICAL HISTORY  H/O inguinal hernia repair    S/P laparoscopic colectomy        SOCIAL HISTORY:  ***    FAMILY HISTORY:  FH: heart disease (Father, Mother)      Family History of Premature Cardiovascular Disease:  Yes [  ] No [  ]    HOME MEDICATIONS:  aspirin 81 mg oral delayed release tablet: 1 tab(s) orally once a day (2022 10:46)  cefTRIAXone 2 g injection: 2 gram(s) injectable once a day till 12/3 (2022 10:46)  metoprolol succinate 25 mg oral tablet, extended release: 1 tab(s) orally once a day (2022 10:46)  valsartan 80 mg oral capsule: 1 tab(s) orally once a day (2022 10:46)      CURRENT CARDIAC MEDICATIONS:  digoxin     Tablet 125 MICROGram(s) Oral daily  metoprolol tartrate 50 milliGRAM(s) Oral every 6 hours  midodrine 10 milliGRAM(s) Oral every 8 hours      Antianginal Therapies:        Beta Blockers:         Calcium Channel Blockers:        Long Acting Nitrates:        Ranexa:     ALLERGIES:   No Known Allergies      REVIEW OF SYMPTOMS:   CONSTITUTIONAL: o fever, no chills, no weight loss, no weight gain, no fatigue   CARDIOVASCULAR: ***  RESPIRATORY: no Shortness of breath, no cough, no wheezing  : No dysuria, no hematuria   GI: No dark color stool, no nausea, no diarrhea, no constipation, no abdominal pain   NEURO: No headache, no slurred speech   ALL OTHER REVIEW OF SYSTEMS ARE NEGATIVE.    VITAL SIGNS:  T(C): 36.6 (22 @ 00:00), Max: 36.6 (22 @ 16:00)  T(F): 97.9 (22 @ 00:00), Max: 97.9 (22 @ 21:00)  HR: 123 (22 @ 08:00) (90 - 124)  BP: 98/71 (22 @ 08:00) (98/71 - 146/82)  RR: 19 (22 @ 08:00) (13 - 28)  SpO2: 93% (22 @ 08:00) (93% - 99%)    INTAKE AND OUTPUT:     :01  -   @ 07:00  --------------------------------------------------------  IN: 2682 mL / OUT: 1675 mL / NET: 1007 mL     @ 07:01  -   @ 11:39  --------------------------------------------------------  IN: 188 mL / OUT: 150 mL / NET: 38 mL        PHYSICAL EXAM:  Constitutional: Comfortable . No acute distress.   HEENT: Atraumatic and normocephalic , neck is supple . no JVD. No carotid bruit.  CNS: A&Ox3. No focal deficits.   Respiratory: CTAB, unlabored   Cardiovascular: RRR normal s1 s2. No murmur. No rubs or gallop.  Gastrointestinal: Soft, non-tender. +Bowel sounds.   Extremities: 2+ Peripheral Pulses, No clubbing, cyanosis, or edema  Psychiatric: Calm . no agitation.   Skin: Warm and dry, no ulcers on extremities     LABS:  ( 2022 13:50 )  Troponin T  0.10<H>,  CPK  2194<H>, CKMB  X    , BNP X        , ( 2022 09:05 )  Troponin T  0.16<H>,  CPK  5542<H>, CKMB  X    , BNP X        , ( 2022 00:04 )  Troponin T  0.17<H>,  CPK  X    , CKMB  X    , BNP X                                  11.8   9.66  )-----------( 335      ( 05 Dec 2022 03:30 )             34.2     12-    134<L>  |  101  |  19.1  ----------------------------<  82  4.5   |  26.0  |  1.49<H>    Ca    9.0      05 Dec 2022 03:30  Mg     2.0     12-05      PTT - ( 05 Dec 2022 03:30 )  PTT:70.5 sec  Urinalysis Basic - ( 03 Dec 2022 13:15 )    Color: Latanya / Appearance: very cloudy / S.015 / pH: x  Gluc: x / Ketone: Small  / Bili: Negative / Urobili: Negative mg/dL   Blood: x / Protein: 30 mg/dL / Nitrite: Negative   Leuk Esterase: Small / RBC: >50 /HPF / WBC 6-10 /HPF   Sq Epi: x / Non Sq Epi: Occasional / Bacteria: Few                ECG:   Prior ECG: Yes [  ] No [  ]    CARDIAC TESTING   ECHO:    STRESS:    Cardiac Interventions:    CATH:     ELECTROPHYSIOLOGY:     Cardiac Cath Risk Assessments:  ASA:  Mallampati:  Bleeding Risk:  Creatinine:  GFR:

## 2022-12-05 NOTE — PROGRESS NOTE ADULT - SUBJECTIVE AND OBJECTIVE BOX
[facility]                                                         CARDIOLOGY AT [place] [address]                                                         Telephone: [phone]. Fax:[phone]                                                                             PROGRESS NOTE    Reason for follow up: Afib RVR, sepsis, endocarditis, CVA  Update: awake up and alert sitting in chair, plan for [facility] today      Review of symptoms:   Cardiac:  No chest pain. No dyspnea. No palpitations.  Respiratory: no cough. No dyspnea  Gastrointestinal: No diarrhea. No abdominal pain. No bleeding.   Neuro: No focal neuro complaints.    Vitals:  T(C): 36.6 (12-05-22 @ 00:00), Max: 36.6 (12-04-22 @ 16:00)  HR: 123 (12-05-22 @ 08:00) (90 - 124)  BP: 98/71 (12-05-22 @ 08:00) (98/71 - 146/82)  RR: 19 (12-05-22 @ 08:00) (13 - 28)  SpO2: 93% (12-05-22 @ 08:00) (93% - 99%)  Wt(kg): --  I&O's Summary    04 Dec 2022 07:01  -  05 Dec 2022 07:00  --------------------------------------------------------  IN: 2682 mL / OUT: 1675 mL / NET: 1007 mL    05 Dec 2022 07:01  -  05 Dec 2022 09:33  --------------------------------------------------------  IN: 188 mL / OUT: 150 mL / NET: 38 mL      Weight (kg): 76.8 (11-30 @ 14:29)    PHYSICAL EXAM:  Appearance: Comfortable. No acute distress  HEENT:  Atraumatic. Normocephalic.  Normal oral mucosa  Neurologic: A & O x 3, no gross focal deficits.  Cardiovascular: irreg irregular No murmur, no rubs/gallops. No JVD  Respiratory: Lungs clear to auscultation, unlabored   Gastrointestinal:  Soft, Non-tender, + BS  Lower Extremities: 2+ Peripheral Pulses, No clubbing, cyanosis, or edema  Psychiatry: Patient is calm. No agitation.   Skin: warm and dry.    CURRENT CARDIAC MEDICATIONS:  digoxin     Tablet 250 MICROGram(s) Oral daily  metoprolol tartrate 25 milliGRAM(s) Oral every 6 hours  metoprolol tartrate Injectable 5 milliGRAM(s) IV Push every 6 hours PRN  midodrine 10 milliGRAM(s) Oral every 8 hours      CURRENT OTHER MEDICATIONS:  meropenem Injectable 1000 milliGRAM(s) IV Push every 12 hours  loperamide 4 milliGRAM(s) Oral every 6 hours  cholestyramine Powder (Sugar-Free) 4 Gram(s) Oral two times a day  aspirin  chewable 81 milliGRAM(s) Oral daily  chlorhexidine 2% Cloths 1 Application(s) Topical daily  heparin   Injectable 4700 Unit(s) IV Push every 6 hours PRN For aPTT less than 40  heparin  Infusion. 1900 Unit(s)/Hr (19 mL/Hr) IV Continuous <Continuous>  lactated ringers. 1000 milliLiter(s) (75 mL/Hr) IV Continuous <Continuous>  phytonadione   Solution 5 milliGRAM(s) Oral daily  tamsulosin 0.4 milliGRAM(s) Oral at bedtime      LABS:	 	  ( 28 Nov 2022 13:50 )  Troponin T  0.10<H>,  CPK  2194<H>, CKMB  X    , BNP X        , ( 27 Nov 2022 09:05 )  Troponin T  0.16<H>,  CPK  5542<H>, CKMB  X    , BNP X        , ( 27 Nov 2022 00:04 )  Troponin T  0.17<H>,  CPK  X    , CKMB  X    , BNP X                                  11.8   9.66  )-----------( 335      ( 05 Dec 2022 03:30 )             34.2     12-05    134<L>  |  101  |  19.1  ----------------------------<  82  4.5   |  26.0  |  1.49<H>    Ca    9.0      05 Dec 2022 03:30  Mg     2.0     12-05      PT/INR/PTT ( 05 Dec 2022 03:30 )                       :                       :      X            :       70.5                  .        .                   .              .           .       X           .                                       Lipid Profile:   HgA1c:   TSH:     TELEMETRY: Afib with RVR to 120s  ECG:    DIAGNOSTIC TESTING:  [ ] Echocardiogram:   [ ]  Catheterization:  [ ] Stress Test:    OTHER: 	                                                                [facility]                                                         CARDIOLOGY AT [place] [address]                                                         Telephone: [phone]. Fax:[phone]                                                                             PROGRESS NOTE    Reason for follow up: Afib RVR, sepsis, endocarditis, CVA  Update: awake up and alert sitting in chair, plan for [facility] today      Review of symptoms:   Cardiac:  No chest pain. No dyspnea. No palpitations.  Respiratory: no cough. No dyspnea  Gastrointestinal: No diarrhea. No abdominal pain. No bleeding.   Neuro: No focal neuro complaints.    Vitals:  T(C): 36.6 (12-05-22 @ 00:00), Max: 36.6 (12-04-22 @ 16:00)  HR: 123 (12-05-22 @ 08:00) (90 - 124)  BP: 98/71 (12-05-22 @ 08:00) (98/71 - 146/82)  RR: 19 (12-05-22 @ 08:00) (13 - 28)  SpO2: 93% (12-05-22 @ 08:00) (93% - 99%)  Wt(kg): --  I&O's Summary    04 Dec 2022 07:01  -  05 Dec 2022 07:00  --------------------------------------------------------  IN: 2682 mL / OUT: 1675 mL / NET: 1007 mL    05 Dec 2022 07:01  -  05 Dec 2022 09:33  --------------------------------------------------------  IN: 188 mL / OUT: 150 mL / NET: 38 mL      Weight (kg): 76.8 (11-30 @ 14:29)    PHYSICAL EXAM:  Appearance: Comfortable. No acute distress  HEENT:  Atraumatic. Normocephalic.  Normal oral mucosa  Neurologic: A & O x 3, no gross focal deficits.  Cardiovascular: irreg irregular No murmur, no rubs/gallops. No JVD  Respiratory: Lungs clear to auscultation, unlabored   Gastrointestinal:  Soft, Non-tender, + BS  Lower Extremities: 2+ Peripheral Pulses, No clubbing, cyanosis, or edema  Psychiatry: Patient is calm. No agitation.   Skin: warm and dry.    CURRENT CARDIAC MEDICATIONS:  digoxin     Tablet 250 MICROGram(s) Oral daily  metoprolol tartrate 25 milliGRAM(s) Oral every 6 hours  metoprolol tartrate Injectable 5 milliGRAM(s) IV Push every 6 hours PRN  midodrine 10 milliGRAM(s) Oral every 8 hours      CURRENT OTHER MEDICATIONS:  meropenem Injectable 1000 milliGRAM(s) IV Push every 12 hours  loperamide 4 milliGRAM(s) Oral every 6 hours  cholestyramine Powder (Sugar-Free) 4 Gram(s) Oral two times a day  aspirin  chewable 81 milliGRAM(s) Oral daily  chlorhexidine 2% Cloths 1 Application(s) Topical daily  heparin   Injectable 4700 Unit(s) IV Push every 6 hours PRN For aPTT less than 40  heparin  Infusion. 1900 Unit(s)/Hr (19 mL/Hr) IV Continuous <Continuous>  lactated ringers. 1000 milliLiter(s) (75 mL/Hr) IV Continuous <Continuous>  phytonadione   Solution 5 milliGRAM(s) Oral daily  tamsulosin 0.4 milliGRAM(s) Oral at bedtime      LABS:	 	  ( 28 Nov 2022 13:50 )  Troponin T  0.10<H>,  CPK  2194<H>, CKMB  X    , BNP X        , ( 27 Nov 2022 09:05 )  Troponin T  0.16<H>,  CPK  5542<H>, CKMB  X    , BNP X        , ( 27 Nov 2022 00:04 )  Troponin T  0.17<H>,  CPK  X    , CKMB  X    , BNP X                                  11.8   9.66  )-----------( 335      ( 05 Dec 2022 03:30 )             34.2     12-05    134<L>  |  101  |  19.1  ----------------------------<  82  4.5   |  26.0  |  1.49<H>    Ca    9.0      05 Dec 2022 03:30  Mg     2.0     12-05      PT/INR/PTT ( 05 Dec 2022 03:30 )                       :                       :      X            :       70.5                  .        .                   .              .           .       X           .                                       Lipid Profile:   HgA1c:   TSH:     TELEMETRY: Afib with RVR to 120s  ECG:    DIAGNOSTIC TESTING:  [ ] Echocardiogram:   < from: LILIAN Echo Doppler (11.30.22 @ 14:52) >  PHYSICIAN INTERPRETATION:  Left Ventricle:  Global LV systolic function was normal. Left ventricular ejection   fraction, by visual estimation, is 55 to 60%. The mitral in-flow pattern   reveals no discernable A-wave, therefore no comment on diastolic function   can be made.  Right Ventricle: Normal right ventricular size and function.  Left Atrium: Mildly enlarged left atrium. No left atrial appendage   thrombus is seen and normal left atrial appendage velocities. Color flow   doppler and intravenous injection of agitated saline demonstrates the   presence of an intact intra atrial septum.  Pericardium: There is no evidence of pericardial effusion.  Mitral Valve: Moderate mitral valve regurgitation is seen. Thickening of   bileaflet mitral valve with subcentimeter filamentous mobile lesions   suggestive of vegetations.  Tricuspid Valve: Structurally normal tricuspid valve, with normal leaflet   excursion. Trivial tricuspid regurgitation is visualized.  Aortic Valve: Moderate aortic stenosis is present. Mild to moderate   aortic valve regurgitation is seen.  Shunts: Agitated saline contrast was given intravenously to evaluate for   intracardiac shunting.  In comparison to the previous echocardiogram(s): No prior LILIAN study is   available for comparison. 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 Cardioversion deferred due to the   presence of underlying infection/endocarditis.      Summary:   1. Left ventricular ejection fraction, by visual estimation, is 55 to   60%.   2. Normal global left ventricular systolic function.   3. The mitral in-flow pattern reveals no discernable A-wave, therefore   no comment on diastolic function can be made.   4. Normal right ventricular size and function, inadequate estimation of   RVSP.   5. Mildly enlarged left atrium.   6. Moderate mitral valve regurgitation, jet is eccentric posteriorly   directed.   7. Thickening of bileaflet mitral valve with subcentimeter filamentous  mobile lesions suggestive of vegetations.   8. Color flow doppler and intravenous injection of agitated saline   demonstrates the presence of an intact intra atrial septum.   9. No left atrial appendage thrombus and normal left atrial appendage   velocities.  10. Structurally normal tricuspid valve, with normal leaflet excursion.  11. Heavily calcified noncoronary cusp with moderate aortic valve   stenosis, ELDON (by 2D planimetry 1.34 cm2), peak velocity 2.8 m/s and mean   gradient of 15 mmHg.  12. Mild to moderate aortic regurgitation.  13. There is mild echolucency and thickening of the sinus of Valsalva   inbetween the left and non-coronary cusp, cannot exclude early root   abscess.  14. Mild simple atheromas at the aortic arch/descendingaorta.  15. There is no evidence of pericardial effusion.  16. No prior LILIAN study is available for comparison. 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 Cardioversion   deferred due to the presence of underlying infection/endocarditis.    John Frost DO Electronically signed on 11/30/2022 at 3:39:01 PM      < end of copied text >  [ ]  Catheterization:  [ ] Stress Test:    OTHER: 	  < from: CT Angio Head w/ IV Cont (12.02.22 @ 23:22) >    INTERPRETATION:  Clinical indication: Bacteremia of unknown source,   endocarditis evaluated for ischemia, sepsis          After the intravenous power injection of 90 cc of Omnipaque 350 using a   bolus mario timing run serial thin sections were obtained through the   neck from the thoracic inlet through the intracranial circulation   centered at the gculrh-zo-Alebge on a  multislice CT scanner reformatted   with coronal and sagittal 2 D-MIP projections, including 3 D   reconstructions using a separate 3D [employer] software workstation.A total   of  90 cc of Omnipaque wereintravenously injected.  10 cc were discarded.    There is calcification along the aortic arch. The origins of the carotid   and vertebral arteries are normal. The left vertebral artery is dominant.   Calcifications at the carotid bifurcations are identified without   stenosis.    The distal vertebral arteries are well identified as are the   posterior-inferior cerebellar arteries bilaterally. The region of the   vertebral basilar junction is normal. The basilar artery is normal. The   posterior cerebral and superior cerebellar arteries are normal.    Evaluation of the carotid arteries demonstrate normal appearance to the   distal cervical, petrous, cavernous and supraclinoid internal carotid   arteries. The anterior cerebral arteries and anterior communicating   artery and middle cerebral arteries are normal.     The normal intracranial venous circulation is identified. The left   transverse sinus is dominant. The superior sagittal sinus, internal   cerebral veins, vein of Kenneth, straight sinus, transverse sinuses,   sigmoid sinuses and internal jugular veins are normal. Cortical veins are   normal.      The visualized portion of the soft tissues of the neck are within normal   limits.      IMPRESSION: Normal CTA of the head and neck.  < from: MR Head No Cont (12.01.22 @ 21:35) >  FINDINGS:  Brain parenchyma: Scattered foci of diffusion restriction are present in   the bilateral parieto-occipital lobes with corresponding T2/flair   hyperintensity. Additional mild confluent areas of periventricular   T-2/FLAIR hyperintensity are present. There is no evidence of   intracranial hemorrhage or mass effect. Midline sagittal structures are   unremarkable.    Extra-axial compartment, ventricles, and cisterns: There is no evidence   of extra-axial collection or hemorrhage. There is no hydrocephalus. There   is no cisternal effacement.    Calvarium, paranasal sinuses, and orbits: Sinuses are clear. Calvarium is   unremarkable. The orbits are unremarkable.    IMPRESSION:  Scattered foci of restricted diffusion in the bilateral parieto-occipital   lobes, suggestive of acute ischemia. No intracranial hemorrhage or mass   effect.    Given the bilateral distribution of involvement, embolic phenomenon is   suggested, with underlying septic emboli not excluded. Further evaluation   by contrast-enhanced MRI may provide further characterization.    < end of copied text >

## 2022-12-05 NOTE — PROGRESS NOTE ADULT - SUBJECTIVE AND OBJECTIVE BOX
GEORGE HOLDSWORTH  MRN#: [id number]  Subjective:  Patient was seen and evaluate on AM rounds offering no specific complaints at this time still preop for AV/MV endocarditis.    OBJECTIVE:  ICU Vital Signs Last 24 Hrs  T(C): 36.6 (05 Dec 2022 00:00), Max: 36.6 (04 Dec 2022 16:00)  T(F): 97.9 (05 Dec 2022 00:00), Max: 97.9 (04 Dec 2022 21:00)  HR: 123 (05 Dec 2022 08:00) (90 - 124)  BP: 98/71 (05 Dec 2022 08:00) (98/71 - 146/82)  BP(mean): 81 (05 Dec 2022 08:00) (74 - 107)  ABP: --  ABP(mean): --  RR: 19 (05 Dec 2022 08:00) (13 - 28)  SpO2: 93% (05 Dec 2022 08:00) (93% - 98%)    O2 Parameters below as of 05 Dec 2022 08:00  Patient On (Oxygen Delivery Method): room air     @ 07:  -   @ 07:00  --------------------------------------------------------  IN: 2682 mL / OUT: 1675 mL / NET: 1007 mL     @ 07:01  -  05 @ 12:14  --------------------------------------------------------  IN: 188 mL / OUT: 150 mL / NET: 38 mL    PHYSICAL EXAM:Daily     Daily Weight in k (05 Dec 2022 04:37)  General: WN/WD NAD  As per PA:  HEENT:     + NCAT  + EOMI  - Conjuctival edema   - Icterus   - Thrush   - ETT  - NGT/OGT  Neck:         + FROM    + JVD     - Nodes     - Masses    + Mid-line trachea   - Tracheostomy  Chest:         - Sternal click  - Sternal drainage - Pacing wires  - Chest tubes  - SubQ emphysema  Lungs:          + CTA   - Rhonchi    - Rales    - Wheezing     - Decreased BS   - Dullness R L  Cardiac:       + S1 + S2    - RRR   + Irregular   - S3  - S4    - Murmurs   - Rub   - Hamman’s sign   Abdomen:    + BS     + Soft    + Non-tender     - Distended    - Organomegaly  - PEG  Extremities:   - Cyanosis U/L   - Clubbing  U/L  - LE/UE Edema   + Capillary refill    + Pulses   Neuro:        + Awake   +  Alert   - Confused   - Lethargic   - Sedated   - Generalized Weakness  Skin:        - Rashes    - Erythema   - Normal incisions   + IV sites intact  - Sacral decubitus    HOSPITAL MEDICATIONS: All mediciations reviewed and analyzed  MEDICATIONS  (STANDING):  aspirin  chewable 81 milliGRAM(s) Oral daily  chlorhexidine 2% Cloths 1 Application(s) Topical daily  cholestyramine Powder (Sugar-Free) 4 Gram(s) Oral two times a day  digoxin     Tablet 125 MICROGram(s) Oral daily  heparin  Infusion. 1900 Unit(s)/Hr (19 mL/Hr) IV Continuous <Continuous>  lactated ringers. 1000 milliLiter(s) (75 mL/Hr) IV Continuous <Continuous>  loperamide 4 milliGRAM(s) Oral every 6 hours  meropenem Injectable 1000 milliGRAM(s) IV Push every 12 hours  metoprolol tartrate 50 milliGRAM(s) Oral every 6 hours  midodrine 10 milliGRAM(s) Oral every 8 hours  tamsulosin 0.4 milliGRAM(s) Oral at bedtime    MEDICATIONS  (PRN):  heparin   Injectable 4700 Unit(s) IV Push every 6 hours PRN For aPTT less than 40    LABS: All Lab data reviewed and analyzed                        11.8   9.66  )-----------( 335      ( 05 Dec 2022 03:30 )             34.2    12-05    134<L>  |  101  |  19.1  ----------------------------<  82  4.5   |  26.0  |  1.49<H>    Ca    9.0      05 Dec 2022 03:30  Mg     2.0     12-05      PTT - ( 05 Dec 2022 03:30 )  PTT:70.5 sec   RADIOLOGY: - Reviewed and analyzed     Assessment: Endocardiits    Plan:    IV antibiotics continued for endocarditis  Rapid atrial fibrillation required increased enteral Lopressor dosing, modified Digoxin dosing and volume expansion with 5% albumin   Continued mobilization as tolerated  Addressed analgesic regimen to optimize function  Lovenox continued for VTE prophylaxis in addition to Venodyne boots  Protonix maintained for GI bleeding prophylaxis  Patient will require a CT angiogram of the head preoperatively to evaluate for mycotic aneurysms as per Neuro  Patient is awaiting an cardiac catheretization for preoperative assessment of coronary arterites  Continued Midodrine to prevent-actively treat hypotension hypotension

## 2022-12-05 NOTE — PROGRESS NOTE ADULT - SUBJECTIVE AND OBJECTIVE BOX
Subjective:  Pt states "I'm ok today".  Pt denies any CP, palpitations, SOB, cough, fever, chills, itchiness/rash, diaphoresis, vision changes, HA, dizziness/lightheadedness, numbness/tingling, abd pain, N/V or any other complaints     Labs:                                                12.6   11.40 )-----------( 375      ( 04 Dec 2022 16:00 )             37.1   12-04    134<L>  |  101  |  14.4  ----------------------------<  91  4.2   |  23.0  |  1.40<H>    Ca    8.7      04 Dec 2022 03:15  Mg     1.8     12-04        I&O's Summary    03 Dec 2022 07:01  -  04 Dec 2022 07:00  --------------------------------------------------------  IN: 4293 mL / OUT: 2570 mL / NET: 1723 mL    04 Dec 2022 07:01  -  05 Dec 2022 00:25  --------------------------------------------------------  IN: 2024 mL / OUT: 1075 mL / NET: 949 mL              Physical Exam  Neuro: A&Ox3 non focal stutter (chronic per pt)  Pulm: CTA b/l no wheezing  CV: S1S2 irregular, tachy  Abd: soft NT ND  Extrem/MS: no edema/cyanosis, MEZA  [place] condom cath in place

## 2022-12-05 NOTE — PROGRESS NOTE ADULT - SUBJECTIVE AND OBJECTIVE BOX
Subjective: Pt seen sitting comfortable in chair, denies any complaints. Telemetry and labs reviewed. Remain in AF, rates remain ~120bpm. Digoxin level 1.1; Cr increasing (Cr 1.49 today). Rectal tube removed, stooling improved. [facility] scheduled for today.     TELE: AF w/ ~-120bpm     MEDICATIONS  (STANDING):  aspirin  chewable 81 milliGRAM(s) Oral daily  chlorhexidine 2% Cloths 1 Application(s) Topical daily  cholestyramine Powder (Sugar-Free) 4 Gram(s) Oral two times a day  digoxin     Tablet 250 MICROGram(s) Oral daily  heparin  Infusion. 1900 Unit(s)/Hr (19 mL/Hr) IV Continuous <Continuous>  lactated ringers. 1000 milliLiter(s) (75 mL/Hr) IV Continuous <Continuous>  loperamide 4 milliGRAM(s) Oral every 6 hours  meropenem Injectable 1000 milliGRAM(s) IV Push every 12 hours  metoprolol tartrate 25 milliGRAM(s) Oral every 6 hours  midodrine 10 milliGRAM(s) Oral every 8 hours  phytonadione   Solution 5 milliGRAM(s) Oral daily  saccharomyces boulardii 250 milliGRAM(s) Oral two times a day  tamsulosin 0.4 milliGRAM(s) Oral at bedtime    MEDICATIONS  (PRN):  heparin   Injectable 4700 Unit(s) IV Push every 6 hours PRN For aPTT less than 40  metoprolol tartrate Injectable 5 milliGRAM(s) IV Push every 6 hours PRN HR > 130      Allergies  No Known Allergies      Vital Signs Last 24 Hrs  T(C): 36.6 (05 Dec 2022 00:00), Max: 36.6 (04 Dec 2022 16:00)  T(F): 97.9 (05 Dec 2022 00:00), Max: 97.9 (04 Dec 2022 21:00)  HR: 123 (05 Dec 2022 08:00) (90 - 124)  BP: 98/71 (05 Dec 2022 08:00) (98/71 - 146/82)  BP(mean): 81 (05 Dec 2022 08:00) (74 - 107)  RR: 19 (05 Dec 2022 08:00) (13 - 28)  SpO2: 93% (05 Dec 2022 08:00) (93% - 99%)    Parameters below as of 05 Dec 2022 08:00  Patient On (Oxygen Delivery Method): room air        Physical Exam:  Constitutional: NAD, Awake, alert  Cardiovascular: +S1S2 Irregular slightly tachycardic   Pulmonary: CTA b/l, unlabored  GI: soft NTND  Extremities: no pedal edema  Neuro: non focal, MEZA x4    LABS:                        11.8   9.66  )-----------( 335      ( 05 Dec 2022 03:30 )             34.2     12-    134<L>  |  101  |  19.1  ----------------------------<  82  4.5   |  26.0  |  1.49<H>    Ca    9.0      05 Dec 2022 03:30  Mg     2.0     12-05      PTT - ( 05 Dec 2022 03:30 )  PTT:70.5 sec  Urinalysis Basic - ( 03 Dec 2022 13:15 )    Color: Latanya / Appearance: very cloudy / S.015 / pH: x  Gluc: x / Ketone: Small  / Bili: Negative / Urobili: Negative mg/dL   Blood: x / Protein: 30 mg/dL / Nitrite: Negative   Leuk Esterase: Small / RBC: >50 /HPF / WBC 6-10 /HPF   Sq Epi: x / Non Sq Epi: Occasional / Bacteria: Few        RADIOLOGY & ADDITIONAL TESTS:      TTE 2022  Summary:   1. Technically difficult study.   2. Patient noted to be tachycardic throughout exam.   3. Normal global left ventricular systolic function.   4. Left ventricular ejection fraction, by visual estimation, is 55 to   60%.   5. The mitral in-flow pattern reveals no discernable A-wave,therefore   no comment on diastolic function can be made.   6. Mild thickening of the anterior and posterior mitral valve leaflets.   7. Sclerotic aortic valve with decreased opening, gradients not obtained   to evaluate degree of stenosis. Mild to moderate regurgitation. Heavily   calacified valve.   8. Mild to moderate mitral valve regurgitation.   9. Estimated pulmonary artery systolic pressure is 40.2 mmHg assuming a   right atrial pressure of 15 mmHg, which is consistent with mild pulmonary   hypertension.  10. Mild pulmonic valve regurgitation.  11. No evidence of vegetation however cannot exclude, consider LILIAN if   high clinical suspicion.      LILIAN 2022  Summary:   1. Left ventricular ejection fraction, by visual estimation, is 55 to   60%.   2. Normal global left ventricular systolic function.   3. The mitral in-flow pattern reveals no discernable A-wave, therefore   no comment on diastolic function can be made.   4. Normal right ventricular size and function, inadequate estimation of   RVSP.   5. Mildly enlarged left atrium.   6. Moderate mitral valve regurgitation, jet is eccentric posteriorly   directed.   7. Thickening of bileaflet mitral valve with subcentimeter filamentous  mobile lesions suggestive of vegetations.   8. Color flow doppler and intravenous injection of agitated saline   demonstrates the presence of an intact intra atrial septum.   9. No left atrial appendage thrombus and normal left atrial appendage   velocities.  10. Structurally normal tricuspid valve, with normal leaflet excursion.  11. Heavily calcified noncoronary cusp with moderate aortic valve   stenosis, ELDON (by 2D planimetry 1.34 cm2), peak velocity 2.8 m/s and mean   gradient of 15 mmHg.  12. Mild to moderate aortic regurgitation.  13. There is mild echolucency and thickening of the sinus of Valsalva   in between the left and non-coronary cusp, cannot exclude early root   abscess.  14. Mild simple atheromas at the aortic arch/descending aorta.  15. There is no evidence of pericardial effusion.  16. No prior LILIAN study is available for comparison. 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 Cardioversion   deferred due to the presence of underlying infection/endocarditis.

## 2022-12-05 NOTE — PROGRESS NOTE ADULT - PROBLEM SELECTOR PLAN 4
prior norovirus + 11/3  GI consult appreciated  neg c diff ? antibiotics associated  cont imodium and cholestyramine

## 2022-12-05 NOTE — PHARMACOTHERAPY INTERVENTION NOTE - INTERVENTION TYPE RECOOMEND
Dose Adjustment - Renal Dose
Therapy Recommended - Med Rec related
Therapy Recommended - Drug indicated but not ordered

## 2022-12-05 NOTE — PROGRESS NOTE ADULT - SUBJECTIVE AND OBJECTIVE BOX
Now s/p LHC via right radial with radial band in place . Procedure performed by Dr. Weiss.  Pt arrived to recovery in NAD and HDS.  RRA access site stable, no bleed/hematoma, distal pulse +.    Procedure results: S/P LHC which revealed non obs CAD.      Medication received during procedure:  Versed: 1mg  Fentanyl: 50 mcg  Heparin: 3000 u      Exam:   Neuro: A&O X.  MEZA=  CV: RRR  Lungs: CTA  Ext: + palp pulses.  Vascular access: Right radial band in place.  Site stable. No bleeding/hematoma/ecchymosis.  + cap refill     Plan:  -Formal cath report pending  -Post procedure management/monitoring per protocol  -Access site precautions  -Radial compression band removal at 1830  -Resume heparin drip at 2200   -Repeat ECG if any clinical indication or change on tele  -NS 250mL bolus post cath  -Transfer back to bed when criteria met

## 2022-12-05 NOTE — PROGRESS NOTE ADULT - PROBLEM SELECTOR PLAN 1
mitral valve vegetations on LILIAN 11/30 11/27 blood cultures NGTD   c/w meropenem per ID    possible OR this week

## 2022-12-05 NOTE — PROGRESS NOTE ADULT - ASSESSMENT
77 year old male with PMH of HTN, hyperlipidemia, BPH, moderate aortic stenosis with ELDON 1.1 cm, and recent COVID infection 10/6/22. Recent admission 11/2-7/22 for streptococcus anginosus bacteremia and norovirus infections. He was discharged home with a PICC line for 4 weeks of IV abx as per ID. He presented to [facility] ED on 11/27/22 following syncopal episode, found to be in septic shock and in new onset AFib with rapid rates. 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 Cardioversion CANCELLED. Care transferred to CT surgery team. Pending further workup in preparation for OR with CT surgery. MRI head performed on 12/1 as part of w/u revealed acute ischemia; embolic phenomenon is suggested, with underlying septic emboli not excluded. Neuro following.     #AF, new onset:  - HR ~110-120bpm which are acceptable given current medical condition.  - Continue Metoprolol 25 q6h  - Would discontinue Digoxin given worsening renal function.   - [id number]= 3; age, HTN. Remains on therapeutic heparin; maintain therapeutic PTT    #Endocarditis:  - Possible root abscess on LILIAN; but no findings on nuclear medicine scan  - ID & cardiology following  - Possible surgical intervention this week.  If undergoes cardiac surgery, recommend considering AF surgical ablation and ESTEFANY clip at that time.     #RADHA:  - Possibly related to hypovolemia from diarrhea  - Possible emboli from endocarditis  - Consider IVF, follow up Cr     77 year old male with PMH of HTN, hyperlipidemia, BPH, moderate aortic stenosis with ELDON 1.1 cm, and recent COVID infection 10/6/22. Recent admission 11/2-7/22 for streptococcus anginosus bacteremia and norovirus infections. He was discharged home with a PICC line for 4 weeks of IV abx as per ID. He presented to [facility] ED on 11/27/22 following syncopal episode, found to be in septic shock and in new onset AFib with rapid rates. 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 Cardioversion CANCELLED. Care transferred to CT surgery team. Pending further workup in preparation for OR with CT surgery. MRI head performed on 12/1 as part of w/u revealed acute ischemia; embolic phenomenon is suggested, with underlying septic emboli not excluded. Neuro following.     #AF, new onset:  - HR ~110-120bpm which are acceptable given current medical condition.  - Continue Metoprolol 25 q6h  - Would discontinue Digoxin given worsening renal function.   - [id number]= 3; age, HTN. Remains on therapeutic heparin; maintain therapeutic PTT    #Endocarditis:  - Possible root abscess on LILIAN; but no findings on nuclear medicine scan  - ID & cardiology following  - Possible surgical intervention this week.  If undergoes cardiac surgery, recommend considering AF surgical ablation and ESTEFANY clip at that time.     #RADHA:  - Possibly related to hypovolemia from diarrhea  - Consider IVF, follow up Cr

## 2022-12-05 NOTE — PROGRESS NOTE ADULT - NS ATTEND AMEND GEN_ALL_CORE FT
76 y/o M was initially admitted 11/2 with streptococcus bacteremia; refused LILIAN and was discharged on IV Rocephin via PICC line. Now readmitted with syncope; found to have new onset AF, sepsis. 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 Now admitted to CTS, undergoing workup for possible surgical intervention.   -AF with HR ~120 on telemetry   -Unable to undergo DCCV due to LILIAN findings   -Dc digoxin due to worsening kidney function  -Continue metoprolol as above  -Continue midodrine, taper as tolerated   -Consider surgical ablation, ESTEFANY clip if undergoing cardiac surgery per EP    -Planned for [facility] today

## 2022-12-05 NOTE — PROGRESS NOTE ADULT - CRITICAL CARE SERVICES
35 75 ACTIVITY: Ambulate as Tolerated; spoke with JACQUELYN Tyson prior to OK for PT consult/OOB activity/yes

## 2022-12-05 NOTE — CONSULT NOTE ADULT - ASSESSMENT
77 year male with mitral valve vegetation for LHC prior to surgical intervention    -aspirin taken this AM  -pt on hydration no need for additional hydration  -heparin off on call to procedure

## 2022-12-05 NOTE — PROGRESS NOTE ADULT - PROBLEM SELECTOR PLAN 2
-   - h/o strep anginosus bacteremia,    - Normal Indium- negative for acute infection.  - ID following.

## 2022-12-05 NOTE — PROGRESS NOTE ADULT - ASSESSMENT
77 year old male with PMHx of COVID (10/6/22), HTN, B12 deficiency, Aortic stenosis presenting to the ED with new onset Afib with RVR and sepsis.

## 2022-12-05 NOTE — PROGRESS NOTE ADULT - PROBLEM SELECTOR PLAN 1
.  - [id number]= 3; age, HTN.   - DCCV  deferred due to MV vegetation  - Cont IV hep  - rates better controlled  - will recc d/c dig due to worsening Cr  - continue metoprolol, increased to 50mg PO q6H  - continue midodrine for hypotension

## 2022-12-06 LAB
ANION GAP SERPL CALC-SCNC: 10 MMOL/L — SIGNIFICANT CHANGE UP (ref 5–17)
APPEARANCE UR: ABNORMAL
APTT BLD: 46.5 SEC — HIGH (ref 27.5–35.5)
APTT BLD: 88.8 SEC — HIGH (ref 27.5–35.5)
BACTERIA # UR AUTO: ABNORMAL
BILIRUB UR-MCNC: NEGATIVE — SIGNIFICANT CHANGE UP
BUN SERPL-MCNC: 23.4 MG/DL — HIGH (ref 8–20)
CALCIUM SERPL-MCNC: 8.8 MG/DL — SIGNIFICANT CHANGE UP (ref 8.4–10.5)
CHLORIDE SERPL-SCNC: 102 MMOL/L — SIGNIFICANT CHANGE UP (ref 96–108)
CK SERPL-CCNC: 88 U/L — SIGNIFICANT CHANGE UP (ref 30–200)
CO2 SERPL-SCNC: 25 MMOL/L — SIGNIFICANT CHANGE UP (ref 22–29)
COLOR SPEC: YELLOW — SIGNIFICANT CHANGE UP
COMMENT - URINE: SIGNIFICANT CHANGE UP
CREAT ?TM UR-MCNC: 67 MG/DL — SIGNIFICANT CHANGE UP
CREAT ?TM UR-MCNC: 68 MG/DL — SIGNIFICANT CHANGE UP
CREAT SERPL-MCNC: 1.96 MG/DL — HIGH (ref 0.5–1.3)
DIFF PNL FLD: ABNORMAL
EGFR: 34 ML/MIN/1.73M2 — LOW
EPI CELLS # UR: SIGNIFICANT CHANGE UP
GLUCOSE SERPL-MCNC: 80 MG/DL — SIGNIFICANT CHANGE UP (ref 70–99)
GLUCOSE UR QL: NEGATIVE — SIGNIFICANT CHANGE UP
HCT VFR BLD CALC: 30.6 % — LOW (ref 39–50)
HGB BLD-MCNC: 10.2 G/DL — LOW (ref 13–17)
INR BLD: 1.13 RATIO — SIGNIFICANT CHANGE UP (ref 0.88–1.16)
KETONES UR-MCNC: ABNORMAL
LEUKOCYTE ESTERASE UR-ACNC: ABNORMAL
MAGNESIUM SERPL-MCNC: 2.1 MG/DL — SIGNIFICANT CHANGE UP (ref 1.6–2.6)
MCHC RBC-ENTMCNC: 30.9 PG — SIGNIFICANT CHANGE UP (ref 27–34)
MCHC RBC-ENTMCNC: 33.3 GM/DL — SIGNIFICANT CHANGE UP (ref 32–36)
MCV RBC AUTO: 92.7 FL — SIGNIFICANT CHANGE UP (ref 80–100)
NITRITE UR-MCNC: POSITIVE
PH UR: 6.5 — SIGNIFICANT CHANGE UP (ref 5–8)
PLATELET # BLD AUTO: 323 K/UL — SIGNIFICANT CHANGE UP (ref 150–400)
POTASSIUM SERPL-MCNC: 4.6 MMOL/L — SIGNIFICANT CHANGE UP (ref 3.5–5.3)
POTASSIUM SERPL-SCNC: 4.6 MMOL/L — SIGNIFICANT CHANGE UP (ref 3.5–5.3)
PROT ?TM UR-MCNC: 51 MG/DL — HIGH (ref 0–12)
PROT UR-MCNC: 100
PROT/CREAT UR-RTO: 0.8 RATIO — HIGH
PROTHROM AB SERPL-ACNC: 13.1 SEC — SIGNIFICANT CHANGE UP (ref 10.5–13.4)
RBC # BLD: 3.3 M/UL — LOW (ref 4.2–5.8)
RBC # FLD: 13.2 % — SIGNIFICANT CHANGE UP (ref 10.3–14.5)
RBC CASTS # UR COMP ASSIST: ABNORMAL /HPF (ref 0–4)
SODIUM SERPL-SCNC: 137 MMOL/L — SIGNIFICANT CHANGE UP (ref 135–145)
SODIUM UR-SCNC: <30 MMOL/L — SIGNIFICANT CHANGE UP
SP GR SPEC: 1.01 — SIGNIFICANT CHANGE UP (ref 1.01–1.02)
TROPONIN T SERPL-MCNC: 0.07 NG/ML — HIGH (ref 0–0.06)
TROPONIN T SERPL-MCNC: 0.08 NG/ML — HIGH (ref 0–0.06)
UFH PPP CHRO-ACNC: 0.14 U/ML — LOW (ref 0.3–0.7)
UROBILINOGEN FLD QL: 4
WBC # BLD: 9.56 K/UL — SIGNIFICANT CHANGE UP (ref 3.8–10.5)
WBC # FLD AUTO: 9.56 K/UL — SIGNIFICANT CHANGE UP (ref 3.8–10.5)
WBC UR QL: SIGNIFICANT CHANGE UP /HPF (ref 0–5)

## 2022-12-06 PROCEDURE — 93010 ELECTROCARDIOGRAM REPORT: CPT

## 2022-12-06 PROCEDURE — 76775 US EXAM ABDO BACK WALL LIM: CPT | Mod: 26

## 2022-12-06 PROCEDURE — 99232 SBSQ HOSP IP/OBS MODERATE 35: CPT

## 2022-12-06 PROCEDURE — 99233 SBSQ HOSP IP/OBS HIGH 50: CPT

## 2022-12-06 PROCEDURE — 99223 1ST HOSP IP/OBS HIGH 75: CPT

## 2022-12-06 PROCEDURE — 71045 X-RAY EXAM CHEST 1 VIEW: CPT | Mod: 26

## 2022-12-06 PROCEDURE — 99291 CRITICAL CARE FIRST HOUR: CPT

## 2022-12-06 RX ORDER — PHYTONADIONE (VIT K1) 5 MG
5 TABLET ORAL DAILY
Refills: 0 | Status: DISCONTINUED | OUTPATIENT
Start: 2022-12-06 | End: 2022-12-07

## 2022-12-06 RX ORDER — SODIUM CHLORIDE 9 MG/ML
1000 INJECTION, SOLUTION INTRAVENOUS
Refills: 0 | Status: DISCONTINUED | OUTPATIENT
Start: 2022-12-06 | End: 2022-12-09

## 2022-12-06 RX ORDER — HEPARIN SODIUM 5000 [USP'U]/ML
1350 INJECTION INTRAVENOUS; SUBCUTANEOUS
Qty: 25000 | Refills: 0 | Status: DISCONTINUED | OUTPATIENT
Start: 2022-12-06 | End: 2022-12-10

## 2022-12-06 RX ADMIN — CHOLESTYRAMINE 4 GRAM(S): 4 POWDER, FOR SUSPENSION ORAL at 09:24

## 2022-12-06 RX ADMIN — MEROPENEM 1000 MILLIGRAM(S): 1 INJECTION INTRAVENOUS at 21:49

## 2022-12-06 RX ADMIN — Medication 4 MILLIGRAM(S): at 11:43

## 2022-12-06 RX ADMIN — Medication 50 MILLIGRAM(S): at 17:07

## 2022-12-06 RX ADMIN — HEPARIN SODIUM 0 UNIT(S)/HR: 5000 INJECTION INTRAVENOUS; SUBCUTANEOUS at 11:42

## 2022-12-06 RX ADMIN — MIDODRINE HYDROCHLORIDE 10 MILLIGRAM(S): 2.5 TABLET ORAL at 13:04

## 2022-12-06 RX ADMIN — Medication 50 MILLIGRAM(S): at 06:05

## 2022-12-06 RX ADMIN — Medication 4 MILLIGRAM(S): at 06:06

## 2022-12-06 RX ADMIN — TAMSULOSIN HYDROCHLORIDE 0.4 MILLIGRAM(S): 0.4 CAPSULE ORAL at 21:49

## 2022-12-06 RX ADMIN — HEPARIN SODIUM 0 UNIT(S)/HR: 5000 INJECTION INTRAVENOUS; SUBCUTANEOUS at 06:19

## 2022-12-06 RX ADMIN — MIDODRINE HYDROCHLORIDE 10 MILLIGRAM(S): 2.5 TABLET ORAL at 21:49

## 2022-12-06 RX ADMIN — CHLORHEXIDINE GLUCONATE 1 APPLICATION(S): 213 SOLUTION TOPICAL at 11:51

## 2022-12-06 RX ADMIN — HEPARIN SODIUM 0 UNIT(S)/HR: 5000 INJECTION INTRAVENOUS; SUBCUTANEOUS at 16:25

## 2022-12-06 RX ADMIN — MIDODRINE HYDROCHLORIDE 10 MILLIGRAM(S): 2.5 TABLET ORAL at 06:05

## 2022-12-06 RX ADMIN — Medication 4 MILLIGRAM(S): at 17:08

## 2022-12-06 RX ADMIN — HEPARIN SODIUM 14 UNIT(S)/HR: 5000 INJECTION INTRAVENOUS; SUBCUTANEOUS at 18:44

## 2022-12-06 RX ADMIN — Medication 50 MILLIGRAM(S): at 23:19

## 2022-12-06 RX ADMIN — Medication 81 MILLIGRAM(S): at 11:44

## 2022-12-06 RX ADMIN — Medication 50 MILLIGRAM(S): at 11:44

## 2022-12-06 RX ADMIN — CHOLESTYRAMINE 4 GRAM(S): 4 POWDER, FOR SUSPENSION ORAL at 21:49

## 2022-12-06 RX ADMIN — Medication 101 MILLIGRAM(S): at 11:43

## 2022-12-06 RX ADMIN — Medication 125 MICROGRAM(S): at 06:05

## 2022-12-06 RX ADMIN — Medication 4 MILLIGRAM(S): at 23:19

## 2022-12-06 RX ADMIN — MEROPENEM 1000 MILLIGRAM(S): 1 INJECTION INTRAVENOUS at 09:24

## 2022-12-06 NOTE — PROGRESS NOTE ADULT - PROBLEM SELECTOR PLAN 1
mitral valve vegetations on LILIAN 11/30 11/27 blood cultures NGTD   c/w meropenem per ID    f/u cath report  possible OR this week    d/w attending on AM rounds

## 2022-12-06 NOTE — PROGRESS NOTE ADULT - ASSESSMENT
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 CAth today offical report pending.

## 2022-12-06 NOTE — PROGRESS NOTE ADULT - SUBJECTIVE AND OBJECTIVE BOX
Patient seen today in chair OOB. Reports that he continues to have loose BM and diarrhea. Poor appetite and frustrated he hasn't been able to walk at all since his admission. Otherwise denies any palpitations. Unaware of his AFib.   No complaints regarding LHC    TELE: AFib with rates in the mid to high 90s while sleeping or at rest. Brief non sustained salvos with rates to the 130s and 140s which moving or agitated     MEDICATIONS  (STANDING):  aspirin  chewable 81 milliGRAM(s) Oral daily  chlorhexidine 2% Cloths 1 Application(s) Topical daily  cholestyramine Powder (Sugar-Free) 4 Gram(s) Oral two times a day  heparin  Infusion. 1500 Unit(s)/Hr (15 mL/Hr) IV Continuous <Continuous>  lactated ringers. 1000 milliLiter(s) (50 mL/Hr) IV Continuous <Continuous>  loperamide 4 milliGRAM(s) Oral every 6 hours  meropenem Injectable 1000 milliGRAM(s) IV Push every 12 hours  metoprolol tartrate 50 milliGRAM(s) Oral every 6 hours  midodrine 10 milliGRAM(s) Oral every 8 hours  phytonadione  IVPB 5 milliGRAM(s) IV Intermittent daily  tamsulosin 0.4 milliGRAM(s) Oral at bedtime    MEDICATIONS  (PRN):  heparin   Injectable 4700 Unit(s) IV Push every 6 hours PRN For aPTT less than 40    Allergies  No Known Allergies    PAST MEDICAL & SURGICAL HISTORY:  Hypertension  Aortic stenosis  H/O inguinal hernia repair  B/L 2013  S/P laparoscopic colectomy  right colectomy with ileotransverse anastomosis    Vital Signs Last 24 Hrs  T(C): 36.8 (06 Dec 2022 08:00), Max: 36.8 (06 Dec 2022 08:00)  T(F): 98.2 (06 Dec 2022 08:00), Max: 98.2 (06 Dec 2022 08:00)  HR: 95 (06 Dec 2022 08:00) (95 - 127)  BP: 108/63 (06 Dec 2022 08:00) (94/56 - 141/71)  BP(mean): 81 (06 Dec 2022 08:00) (68 - 99)  RR: 21 (06 Dec 2022 08:00) (16 - 27)  SpO2: 95% (06 Dec 2022 08:00) (92% - 99%)    Physical Exam:  Constitutional: NAD, AAOx3, hard of hearing  Cardiovascular: +S1S2 IRIR. AFib at time of exam. 2/6 RAJENDRA at RSB  Pulmonary: mostly CTA b/l, unlabored  GI: soft NTND +BS  Extremities: no pedal edema,   Neuro: non focal, MEZA x4  Psych: appropriate but easily gets agitated     LABS:                        10.2   9.56  )-----------( 323      ( 06 Dec 2022 02:30 )             30.6     137  |  102  |  23.4<H>  ----------------------------<  80  4.6   |  25.0  |  1.96<H>  Ca    8.8      06 Dec 2022 02:30  Mg     2.1     12-06  PT/INR - ( 06 Dec 2022 04:45 )   PT: 13.1 sec;   INR: 1.13 ratio    PTT - ( 06 Dec 2022 04:45 )  PTT:88.8 sec    CXR 12/5/22  Heart size and the mediastinum cannot be accurately evaluated on this   projection.  The lungs are clear.  No pleural effusion or pneumothorax is seen.  There is osteoarthritic degenerative change of the spine.  IMPRESSION:  Clear lungs.    A/P  77 year old male with a history of HTN, HLD, BPH, moderate aortic stenosis with ELDON 1.1 cm, COVID infection 10/6/22, streptococcus anginosus bacteremia with Norovirus s/p discharge with PICC line, who is readmitted with syncope, septic shock with endocarditis (mitral valve vegetation and possible aortic root abscess), and RADHA. EP following this patient for new onset rapid atrial fibrillation.     HR appear mostly unchanged compared to yesterday  CHADSVASC: 3 (age, HTN)  Unclear why Troponin level were checked today.   RADHA worsening. Cr now 1.96    - Rate control strategy for now. Rhythm control limited by MV vegetation  - Lenient rate control for goal HR <120bpm. Brief salvos with RVR are acceptable and not unexpected.    - Would continue to avoid Digoxin use in this patient - worsening RADHA.   - Continue Lopressor 50mg q6h. BP tolerating thus far.   - Continue Heparin gtt and maintain therapeutic PTT  - If undergoes surgery this week would consider surgical AF ablation and ESTEFANY clip.   - Full recommendations to follow.

## 2022-12-06 NOTE — PROGRESS NOTE ADULT - SUBJECTIVE AND OBJECTIVE BOX
Preliminary note, offical recommendations pending attending review/signature   HPI:  77 year old male with PMHx of recent  COVID  on 10/6/22, HTN , Vit B12 deficiency, presenting to the ED on 11/2 with body aches, fatigue and fever (Tmax 102) at home for 12 days found to have streptococcus bacteremia and admitted with unclear source, no recent dental work, skin infections, no respiratory symptoms. Pan scan was negative at that time ( LILINA was recommended but pt refused) He was treated for Bacteremia ( Blood cultures + streptococcus anginosis pansensitive ) and norovirus with supportive care and contact isolation . He was discharged home on 11/7 with IV Rocephin via PICC line. Recommendation was to continue ABX  daily via pic end 11/30/22 11/27/22 he presented to ER after and episode of syncope and collapse, found to be septic, hypotensive, hypoxic and febrile in the ER. He is unsure of mechanism of fall but remembers being on the ground no reported LOC . He was found face-down on bathroom floor buy family with left leg wedged under cabinet.  Called to evaluate patient for stroke.     SUBJECTIVE: No events overnight.  No new neurologic complaints.  ROS reported negative unless otherwise noted.    aspirin  chewable 81 milliGRAM(s) Oral daily  chlorhexidine 2% Cloths 1 Application(s) Topical daily  cholestyramine Powder (Sugar-Free) 4 Gram(s) Oral two times a day  heparin   Injectable 4700 Unit(s) IV Push every 6 hours PRN  heparin  Infusion. 1500 Unit(s)/Hr IV Continuous <Continuous>  lactated ringers. 1000 milliLiter(s) IV Continuous <Continuous>  loperamide 4 milliGRAM(s) Oral every 6 hours  meropenem Injectable 1000 milliGRAM(s) IV Push every 12 hours  metoprolol tartrate 50 milliGRAM(s) Oral every 6 hours  midodrine 10 milliGRAM(s) Oral every 8 hours  phytonadione  IVPB 5 milliGRAM(s) IV Intermittent daily  tamsulosin 0.4 milliGRAM(s) Oral at bedtime      PHYSICAL EXAM:   Vital Signs Last 24 Hrs  T(C): 36.8 (06 Dec 2022 08:00), Max: 36.8 (06 Dec 2022 08:00)  T(F): 98.2 (06 Dec 2022 08:00), Max: 98.2 (06 Dec 2022 08:00)  HR: 106 (06 Dec 2022 11:00) (95 - 122)  BP: 94/61 (06 Dec 2022 11:00) (91/65 - 141/71)  BP(mean): 72 (06 Dec 2022 11:00) (70 - 99)  RR: 10 (06 Dec 2022 11:00) (10 - 26)  SpO2: 95% (06 Dec 2022 11:00) (92% - 99%)    Parameters below as of 06 Dec 2022 08:00  Patient On (Oxygen Delivery Method): room air        General: No acute distress    NEUROLOGICAL EXAM:  Mental status: Awake, alert, oriented x3, speech fluent, follows commands, able to ID objects, repeat, no neglect, normal memory   Cranial Nerves: left lower lip depression, no nystagmus, no dysarthria,  tongue midline  Motor exam: Normal tone, RUE/RLE/LUE/LLE move against gravity without drift (reports soreness throughout limiting full motor exam)  Sensation: Intact to light touch   Coordination/ Gait: No dysmetria, gait not tested    LABS:                        10.2   9.56  )-----------( 323      ( 06 Dec 2022 02:30 )             30.6    12-06    137  |  102  |  23.4<H>  ----------------------------<  80  4.6   |  25.0  |  1.96<H>    Ca    8.8      06 Dec 2022 02:30  Mg     2.1     12-06    PT/INR - ( 06 Dec 2022 04:45 )   PT: 13.1 sec;   INR: 1.13 ratio         PTT - ( 06 Dec 2022 04:45 )  PTT:88.8 sec      IMAGING: Reviewed by me.       MR Head No Cont (12.01.22 @ 21:35)   Scattered foci of restricted diffusion in the bilateral parieto-occipital   lobes, suggestive of acute ischemia. No intracranial hemorrhage or mass   effect.    Given the bilateral distribution of involvement, embolic phenomenon is   suggested, with underlying septic emboli not excluded. Further evaluation   by contrast-enhanced MRI may provide further characterization.    CT Head No Cont (11.27.22 @ 03:35)   Left frontoparietal scalp soft tissue swelling. No acute   intracranial hemorrhage.    US Duplex Carotid Arteries Complete, Bilateral (11.30.22 @ 21:30)   No significant hemodynamic stenosis of either carotid artery.    TTE Echo Limited or F/U (11.29.22 @ 09:46)    1. Technically difficult study.   2. Patient noted to be tachycardic throughout exam.   3. Normal global left ventricular systolic function.   4. Left ventricular ejection fraction, by visual estimation, is 55 to   60%.   5. The mitral in-flow pattern reveals no discernable A-wave,therefore   no comment on diastolic function can be made.   6. Mild thickening of the anterior and posterior mitral valve leaflets.   7. Sclerotic aortic valve with decreased opening, gradients not obtained   to evaluate degree of stenosis. Mild to moderate regurgitation. Heavily   calacified valve.   8. Mild to moderate mitral valve regurgitation.   9. Estimated pulmonary artery systolic pressure is 40.2 mmHg assuming a   right atrial pressure of 15 mmHg, which is consistent with mild pulmonary   hypertension.  10. Mild pulmonic valve regurgitation.  11. No evidence of vegetation however cannot exclude, consider LILIAN if   high clinical suspicion.    LILIAN Echo Doppler (11.30.22 @ 14:52)    1. Left ventricular ejection fraction, by visual estimation, is 55 to   60%.   2. Normal global left ventricular systolic function.   3. The mitral in-flow pattern reveals no discernable A-wave, therefore   no comment on diastolic function can be made.   4. Normal right ventricular size and function, inadequate estimation of   RVSP.   5. Mildly enlarged left atrium.   6. Moderate mitral valve regurgitation, jet is eccentric posteriorly   directed.   7. Thickening of bileaflet mitral valve with subcentimeter filamentous  mobile lesions suggestive of vegetations.   8. Color flow doppler and intravenous injection of agitated saline   demonstrates the presence of an intact intra atrial septum.   9. No left atrial appendage thrombus and normal left atrial appendage   velocities.  10. Structurally normal tricuspid valve, with normal leaflet excursion.  11. Heavily calcified noncoronary cusp with moderate aortic valve   stenosis, ELDON (by 2D planimetry 1.34 cm2), peak velocity 2.8 m/s and mean   gradient of 15 mmHg.  12. Mild to moderate aortic regurgitation.  13. There is mild echolucency and thickening of the sinus of Valsalva   inbetween the left and non-coronary cusp, cannot exclude early root   abscess.  14. Mild simple atheromas at the aortic arch/descendingaorta.  15. There is no evidence of pericardial effusion.  16. No prior LILIAN study is available for comparison. 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 Cardioversion   deferred due to the presence of underlying infection/endocarditis.           Preliminary note, offical recommendations pending attending review/signature   HPI:  77 year old male with PMHx of recent  COVID  on 10/6/22, HTN , Vit B12 deficiency, presenting to the ED on 11/2 with body aches, fatigue and fever (Tmax 102) at home for 12 days found to have streptococcus bacteremia and admitted with unclear source, no recent dental work, skin infections, no respiratory symptoms. Pan scan was negative at that time ( LILIAN was recommended but pt refused) He was treated for Bacteremia ( Blood cultures + streptococcus anginosis pansensitive ) and norovirus with supportive care and contact isolation . He was discharged home on 11/7 with IV Rocephin via PICC line. Recommendation was to continue ABX  daily via pic end 11/30/22 11/27/22 he presented to ER after and episode of syncope and collapse, found to be septic, hypotensive, hypoxic and febrile in the ER. He is unsure of mechanism of fall but remembers being on the ground no reported LOC . He was found face-down on bathroom floor buy family with left leg wedged under cabinet.  Called to evaluate patient for stroke.     SUBJECTIVE: No events overnight.  No new neurologic complaints.  ROS reported negative unless otherwise noted.    aspirin  chewable 81 milliGRAM(s) Oral daily  chlorhexidine 2% Cloths 1 Application(s) Topical daily  cholestyramine Powder (Sugar-Free) 4 Gram(s) Oral two times a day  heparin   Injectable 4700 Unit(s) IV Push every 6 hours PRN  heparin  Infusion. 1500 Unit(s)/Hr IV Continuous <Continuous>  lactated ringers. 1000 milliLiter(s) IV Continuous <Continuous>  loperamide 4 milliGRAM(s) Oral every 6 hours  meropenem Injectable 1000 milliGRAM(s) IV Push every 12 hours  metoprolol tartrate 50 milliGRAM(s) Oral every 6 hours  midodrine 10 milliGRAM(s) Oral every 8 hours  phytonadione  IVPB 5 milliGRAM(s) IV Intermittent daily  tamsulosin 0.4 milliGRAM(s) Oral at bedtime      PHYSICAL EXAM:   Vital Signs Last 24 Hrs  T(C): 36.8 (06 Dec 2022 08:00), Max: 36.8 (06 Dec 2022 08:00)  T(F): 98.2 (06 Dec 2022 08:00), Max: 98.2 (06 Dec 2022 08:00)  HR: 106 (06 Dec 2022 11:00) (95 - 122)  BP: 94/61 (06 Dec 2022 11:00) (91/65 - 141/71)  BP(mean): 72 (06 Dec 2022 11:00) (70 - 99)  RR: 10 (06 Dec 2022 11:00) (10 - 26)  SpO2: 95% (06 Dec 2022 11:00) (92% - 99%)    Parameters below as of 06 Dec 2022 08:00  Patient On (Oxygen Delivery Method): room air        General: No acute distress    NEUROLOGICAL EXAM:  Mental status: Awake, alert, oriented x3, speech fluent, follows commands, able to ID objects, repeat, no neglect, normal memory   Cranial Nerves: left lower lip depression, no nystagmus, no dysarthria,  tongue midline  Motor exam: Normal tone, RUE/RLE/LUE/LLE move against gravity without drift (reports soreness throughout limiting full motor exam)  Sensation: Intact to light touch   Coordination/ Gait: No dysmetria, gait not tested    LABS:                        10.2   9.56  )-----------( 323      ( 06 Dec 2022 02:30 )             30.6    12-06    137  |  102  |  23.4<H>  ----------------------------<  80  4.6   |  25.0  |  1.96<H>    Ca    8.8      06 Dec 2022 02:30  Mg     2.1     12-06    PT/INR - ( 06 Dec 2022 04:45 )   PT: 13.1 sec;   INR: 1.13 ratio         PTT - ( 06 Dec 2022 04:45 )  PTT:88.8 sec      IMAGING: Reviewed by me.   CT Angio Head w/ IV Cont (12.02.22 @ 23:22)   IMPRESSION: Normal CTA of the head and neck.    MR Head No Cont (12.01.22 @ 21:35)   Scattered foci of restricted diffusion in the bilateral parieto-occipital   lobes, suggestive of acute ischemia. No intracranial hemorrhage or mass   effect.    Given the bilateral distribution of involvement, embolic phenomenon is   suggested, with underlying septic emboli not excluded. Further evaluation   by contrast-enhanced MRI may provide further characterization.    CT Head No Cont (11.27.22 @ 03:35)   Left frontoparietal scalp soft tissue swelling. No acute   intracranial hemorrhage.    US Duplex Carotid Arteries Complete, Bilateral (11.30.22 @ 21:30)   No significant hemodynamic stenosis of either carotid artery.    TTE Echo Limited or F/U (11.29.22 @ 09:46)    1. Technically difficult study.   2. Patient noted to be tachycardic throughout exam.   3. Normal global left ventricular systolic function.   4. Left ventricular ejection fraction, by visual estimation, is 55 to   60%.   5. The mitral in-flow pattern reveals no discernable A-wave,therefore   no comment on diastolic function can be made.   6. Mild thickening of the anterior and posterior mitral valve leaflets.   7. Sclerotic aortic valve with decreased opening, gradients not obtained   to evaluate degree of stenosis. Mild to moderate regurgitation. Heavily   calacified valve.   8. Mild to moderate mitral valve regurgitation.   9. Estimated pulmonary artery systolic pressure is 40.2 mmHg assuming a   right atrial pressure of 15 mmHg, which is consistent with mild pulmonary   hypertension.  10. Mild pulmonic valve regurgitation.  11. No evidence of vegetation however cannot exclude, consider LILIAN if   high clinical suspicion.    LILIAN Echo Doppler (11.30.22 @ 14:52)    1. Left ventricular ejection fraction, by visual estimation, is 55 to   60%.   2. Normal global left ventricular systolic function.   3. The mitral in-flow pattern reveals no discernable A-wave, therefore   no comment on diastolic function can be made.   4. Normal right ventricular size and function, inadequate estimation of   RVSP.   5. Mildly enlarged left atrium.   6. Moderate mitral valve regurgitation, jet is eccentric posteriorly   directed.   7. Thickening of bileaflet mitral valve with subcentimeter filamentous  mobile lesions suggestive of vegetations.   8. Color flow doppler and intravenous injection of agitated saline   demonstrates the presence of an intact intra atrial septum.   9. No left atrial appendage thrombus and normal left atrial appendage   velocities.  10. Structurally normal tricuspid valve, with normal leaflet excursion.  11. Heavily calcified noncoronary cusp with moderate aortic valve   stenosis, ELDON (by 2D planimetry 1.34 cm2), peak velocity 2.8 m/s and mean   gradient of 15 mmHg.  12. Mild to moderate aortic regurgitation.  13. There is mild echolucency and thickening of the sinus of Valsalva   inbetween the left and non-coronary cusp, cannot exclude early root   abscess.  14. Mild simple atheromas at the aortic arch/descendingaorta.  15. There is no evidence of pericardial effusion.  16. No prior LILIAN study is available for comparison. 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 Cardioversion   deferred due to the presence of underlying infection/endocarditis.

## 2022-12-06 NOTE — CONSULT NOTE ADULT - SUBJECTIVE AND OBJECTIVE BOX
77 year old male with PMH of HTN, Vit B12 deficiency with recent hospitalization for Streptococcus bacteremia and Norovirus discharged on IV Rocephin via PICC line who returned with syncope. Admitted for septic shock of unclear etiology. Hospital course is notable for Afib with RVR,       PAST MEDICAL & SURGICAL HISTORY:  Hypertension  Aortic stenosis  H/O inguinal hernia repair  B/L 2013  S/P laparoscopic colectomy  right colectomy with ileotransverse anastomosis    Allergies  No Known Allergies  Intolerances    Home Medications:  aspirin 81 mg oral delayed release tablet: 1 tab(s) orally once a day (28 Nov 2022 10:46)  cefTRIAXone 2 g injection: 2 gram(s) injectable once a day till 12/3 (28 Nov 2022 10:46)  metoprolol succinate 25 mg oral tablet, extended release: 1 tab(s) orally once a day (28 Nov 2022 10:46)  valsartan 80 mg oral capsule: 1 tab(s) orally once a day (28 Nov 2022 10:46)         77 year old male with PMH of HTN and Vit B12 deficiency with recent hospitalization for Streptococcus bacteremia and Norovirus discharged home on IV Rocephin via PICC line returned to [facility] after a syncopal episode. Admitted for septic shock; source of septic shock is unclear. LILIAN showed       Imdium scan was negative. Pan scan with CT with IV contrast and CTA head/neck have been negative as well. Hospital course is notable for Afib with RVR and RADHA. Nephrology is consulted for RADHA.       PAST MEDICAL & SURGICAL HISTORY:  Hypertension  Aortic stenosis  H/O inguinal hernia repair  B/L 2013  S/P laparoscopic colectomy  right colectomy with ileotransverse anastomosis    Allergies  No Known Allergies  Intolerances    Home Medications:  aspirin 81 mg oral delayed release tablet: 1 tab(s) orally once a day (28 Nov 2022 10:46)  cefTRIAXone 2 g injection: 2 gram(s) injectable once a day till 12/3 (28 Nov 2022 10:46)  metoprolol succinate 25 mg oral tablet, extended release: 1 tab(s) orally once a day (28 Nov 2022 10:46)  valsartan 80 mg oral capsule: 1 tab(s) orally once a day (28 Nov 2022 10:46)         77 year old male with PMH of HTN and Vit B12 deficiency with recent hospitalization for Streptococcus bacteremia and Norovirus discharged home on IV Rocephin via PICC line [place] [facility] [place]. Admitted for septic shock; source of septic shock is unclear. LILIAN showed mobile lesions at mitral valve and   suggestive of vegetations.      Imdium scan was negative. Pan scan with CT with IV contrast and CTA head/neck have been negative as well. Hospital course is notable for Afib with RVR and RADHA. Nephrology is consulted for RADHA.       PAST MEDICAL & SURGICAL HISTORY:  Hypertension  Aortic stenosis  H/O inguinal hernia repair  B/L 2013  S/P laparoscopic colectomy  right colectomy with ileotransverse anastomosis    Allergies  No Known Allergies  Intolerances    Home Medications:  aspirin 81 mg oral delayed release tablet: 1 tab(s) orally once a day (28 Nov 2022 10:46)  cefTRIAXone 2 g injection: 2 gram(s) injectable once a day till 12/3 (28 Nov 2022 10:46)  metoprolol succinate 25 mg oral tablet, extended release: 1 tab(s) orally once a day (28 Nov 2022 10:46)  valsartan 80 mg oral capsule: 1 tab(s) orally once a day (28 Nov 2022 10:46)         77 year old male with PMH of HTN and Vit B12 deficiency with recent hospitalization for Streptococcus bacteremia and Norovirus discharged home on IV Rocephin via PICC line [place] [facility] [place]. Admitted for septic shock; source of septic shock is unclear. 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 Imdium scan was negative. Pan scan with CT with IV contrast and CTA head/neck have been negative as well. Hospital course is notable for Afib with RVR and RADHA. Nephrology is consulted for RADHA.     PAST MEDICAL & SURGICAL HISTORY:  Hypertension  Aortic stenosis  H/O inguinal hernia repair  B/L 2013  S/P laparoscopic colectomy  right colectomy with ileotransverse anastomosis    Allergies  No Known Allergies  Intolerances    Home Medications:  aspirin 81 mg oral delayed release tablet: 1 tab(s) orally once a day (28 Nov 2022 10:46)  cefTRIAXone 2 g injection: 2 gram(s) injectable once a day till 12/3 (28 Nov 2022 10:46)  metoprolol succinate 25 mg oral tablet, extended release: 1 tab(s) orally once a day (28 Nov 2022 10:46)  valsartan 80 mg oral capsule: 1 tab(s) orally once a day (28 Nov 2022 10:46)         77 year old male with PMH of HTN and Vit B12 deficiency with recent hospitalization for Streptococcus bacteremia and Norovirus discharged home on IV Rocephin via PICC line returned after a syncopal episode. Admitted for septic shock; source of septic shock is unclear. Repeat blood cultures have been negative. 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 Imdium scan was negative. Pan scan with CT with IV contrast and CTA head/neck have been negative as well. Hospital course is notable for Afib with RVR and RADHA. Nephrology is consulted for RADHA.     PAST MEDICAL & SURGICAL HISTORY:  Hypertension  Aortic stenosis  H/O inguinal hernia repair  B/L 2013  S/P laparoscopic colectomy  right colectomy with ileotransverse anastomosis    Allergies  No Known Allergies  Intolerances    Home Medications:  aspirin 81 mg oral delayed release tablet: 1 tab(s) orally once a day (28 Nov 2022 10:46)  cefTRIAXone 2 g injection: 2 gram(s) injectable once a day till 12/3 (28 Nov 2022 10:46)  metoprolol succinate 25 mg oral tablet, extended release: 1 tab(s) orally once a day (28 Nov 2022 10:46)  valsartan 80 mg oral capsule: 1 tab(s) orally once a day (28 Nov 2022 10:46)    Social History:  lives home with family (27 Nov 2022 02:26)    FAMILY HISTORY:  FH: heart disease (Father, Mother)    ROS: Denied SOB    Vital Signs Last 24 Hrs  T(C): 36.4 (06 Dec 2022 16:06), Max: 36.8 (06 Dec 2022 08:00)  T(F): 97.6 (06 Dec 2022 16:06), Max: 98.2 (06 Dec 2022 08:00)  HR: 105 (06 Dec 2022 16:06) (95 - 122)  BP: 122/79 (06 Dec 2022 16:06) (91/65 - 141/71)  BP(mean): 72 (06 Dec 2022 11:00) (72 - 99)  RR: 18 (06 Dec 2022 16:06) (10 - 26)  SpO2: 95% (06 Dec 2022 16:06) (92% - 98%)    Parameters below as of 06 Dec 2022 16:06  Patient On (Oxygen Delivery Method): room air    I&O's Summary    05 Dec 2022 07:01  -  06 Dec 2022 07:00  --------------------------------------------------------  IN: 1830 mL / OUT: 1225 mL / NET: 605 mL    06 Dec 2022 07:01  -  06 Dec 2022 19:25  --------------------------------------------------------  IN: 279 mL / OUT: 800 mL / NET: -521 mL    Physical Exam  General: WDWN male in NAD  Cardiac: S1S2 RRR  Respiratory: CTAB  Abdomen: Soft, NT  Extremities: No appreciable edema  Neuro: AAOx3    12-06    137  |  102  |  23.4<H>  ----------------------------<  80  4.6   |  25.0  |  1.96<H>    Ca    8.8      06 Dec 2022 02:30  Mg     2.1     12-06                        10.2   9.56  )-----------( 323      ( 06 Dec 2022 02:30 )             30.6     MEDICATIONS  (STANDING):  aspirin  chewable 81 milliGRAM(s) Oral daily  chlorhexidine 2% Cloths 1 Application(s) Topical daily  cholestyramine Powder (Sugar-Free) 4 Gram(s) Oral two times a day  heparin  Infusion 1350 Unit(s)/Hr (14 mL/Hr) IV Continuous <Continuous>  lactated ringers. 1000 milliLiter(s) (50 mL/Hr) IV Continuous <Continuous>  loperamide 4 milliGRAM(s) Oral every 6 hours  meropenem Injectable 1000 milliGRAM(s) IV Push every 12 hours  metoprolol tartrate 50 milliGRAM(s) Oral every 6 hours  midodrine 10 milliGRAM(s) Oral every 8 hours  phytonadione  IVPB 5 milliGRAM(s) IV Intermittent daily  tamsulosin 0.4 milliGRAM(s) Oral at bedtime    MEDICATIONS  (PRN):

## 2022-12-06 NOTE — PROGRESS NOTE ADULT - SUBJECTIVE AND OBJECTIVE BOX
INFECTIOUS DISEASES AND INTERNAL MEDICINE at [facility]  =======================================================  Flip Pederson MD  Diplomates [employer] Board of Internal Medicine and Infectious Diseases  Telephone [phone]  Fax            [phone]  =======================================================    GEORGE [id number]      ID f/u- fever, endocarditis  still with diarrhea but better  was able to get up and walk today feels stronger  c diff and gi pcr (-)  cr uptrending      ANTIBIOTICS  meropenem  IVPB 1000 milliGRAM(s) IV Intermittent every 12 hours      Allergies    No Known Allergies    Intolerances        SOCIAL HISTORY:     FAMILY HX   FAMILY HISTORY:  FH: heart disease (Father, Mother)        ICU Vital Signs Last 24 Hrs  Vital Signs Last 24 Hrs  T(C): 36.4 (06 Dec 2022 16:06), Max: 36.8 (06 Dec 2022 08:00)  T(F): 97.6 (06 Dec 2022 16:06), Max: 98.2 (06 Dec 2022 08:00)  HR: 105 (06 Dec 2022 16:06) (95 - 122)  BP: 122/79 (06 Dec 2022 16:06) (91/65 - 141/71)  BP(mean): 72 (06 Dec 2022 11:00) (72 - 99)  RR: 18 (06 Dec 2022 16:06) (10 - 26)  SpO2: 95% (06 Dec 2022 16:06) (92% - 98%)    Parameters below as of 06 Dec 2022 16:06  Patient On (Oxygen Delivery Method): room air            REVIEW OF SYSTEMS:    CONSTITUTIONAL:  As per HPI.    HEENT:  Eyes:  No diplopia or blurred vision. ENT:  No earache, sore throat or runny nose.    CARDIOVASCULAR:  No pressure, squeezing, strangling, tightness, heaviness or aching about the chest, neck, axilla or epigastrium.    RESPIRATORY:  No cough, shortness of breath, PND or orthopnea.    GASTROINTESTINAL:  No nausea, vomiting or diarrhea.    GENITOURINARY:  No dysuria, frequency or urgency.    MUSCULOSKELETAL:  As per HPI.    SKIN:  No change in skin, hair or nails.    NEUROLOGIC:    weakness.                  PHYSICAL EXAMINATION:    GENERAL: nad    HEENT: Head is normocephalic and atraumatic.  ANICTERIC left upper scalp ecchymoses  NECK: Supple. No carotid bruits.  No lymphadenopathy or thyromegaly.    LUNGS :clear BREATH SOUNDS    HEART: Regular rate and rhythm without murmur.    ABDOMEN: Soft, nontender, and nondistended.  Positive bowel sounds.  No hepatosplenomegaly was noted. NO REBOUND NO GUARDING    EXTREMITIES: NO EDEMA NO ERYTHEMA    NEUROLOGIC: NON FOCAL      SKIN: No ulceration or induration present. NO RASH picc rue            LABS:                                   11.6   16.03 )-----------( 325      ( 02 Dec 2022 05:30 )             34.1                             10.2   9.56  )-----------( 323      ( 06 Dec 2022 02:30 )             30.6       12-06    137  |  102  |  23.4<H>  ----------------------------<  80  4.6   |  25.0  |  1.96<H>    Ca    8.8      06 Dec 2022 02:30  Mg     2.1     12-06                    PT/INR - ( 06 Dec 2022 04:45 )   PT: 13.1 sec;   INR: 1.13 ratio         PTT - ( 06 Dec 2022 17:15 )  PTT:46.5 sec    CARDIAC MARKERS ( 06 Dec 2022 17:25 )  x     / 0.08 ng/mL / x     / x     / x      CARDIAC MARKERS ( 06 Dec 2022 06:55 )  x     / 0.07 ng/mL / 88 U/L / x     / x            CAPILLARY BLOOD GLUCOSE                          Color: Yellow / Appearance: Clear / S.010 / pH: x  Gluc: x / Ketone: Negative  / Bili: Negative / Urobili: Negative mg/dL   Blood: x / Protein: 30 mg/dL / Nitrite: Negative   Leuk Esterase: Negative / RBC: 6-10 /HPF / WBC 0-2 /HPF   Sq Epi: x / Non Sq Epi: Occasional / Bacteria: Few        RADIOLOGY & ADDITIONAL STUDIES:  < from: CT Chest No Cont (22 @ 03:46) >  IMPRESSION:  No pneumonia.  No acute CT findings in the abdomen or pelvis.    VERTEBRAL BODY ANALYSIS: Low bone density as described above, consider   further workup for osteoporosis.        --- End of Report ---        < end of copied text >        < from: LILIAN Echo Doppler (22 @ 14:52) >  Summary:   1. Left ventricular ejection fraction, by visual estimation, is 55 to   60%.   2. Normal global left ventricular systolic function.   3. The mitral in-flow pattern reveals no discernable A-wave, therefore   no comment on diastolic function can be made.   4. Normal right ventricular size and function, inadequate estimation of   RVSP.   5. Mildly enlarged left atrium.   6. Moderate mitral valve regurgitation, jet is eccentric posteriorly   directed.   7. Thickening of bileaflet mitral valve with subcentimeter filamentous  mobile lesions suggestive of vegetations.   8. Color flow doppler and intravenous injection of agitated saline   demonstrates the presence of an intact intra atrial septum.   9. No left atrial appendage thrombus and normal left atrial appendage   velocities.  10. Structurally normal tricuspid valve, with normal leaflet excursion.  11. Heavily calcified noncoronary cusp with moderate aortic valve   stenosis, ELDON (by 2D planimetry 1.34 cm2), peak velocity 2.8 m/s and mean   gradient of 15 mmHg.  12. Mild to moderate aortic regurgitation.  13. There is mild echolucency and thickening of the sinus of Valsalva   inbetween the left and non-coronary cusp, cannot exclude early root   abscess.  14. Mild simple atheromas at the aortic arch/descendingaorta.  15. There is no evidence of pericardial effusion.  16. No prior LILIAN study is available for comparison. 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 Cardioversion   deferred due to the presence of underlying infection/endocarditis.    John Frost DO Electronically signed on 2022 at 3:39:01 PM    < end of copied text >      < from: MR Head No Cont (22 @ 21:35) >  IMPRESSION:  Scattered foci of restricted diffusion in the bilateral parieto-occipital   lobes, suggestive of acute ischemia. No intracranial hemorrhage or mass   effect.    Given the bilateral distribution of involvement, embolic phenomenon is   suggested, with underlying septic emboli not excluded. Further evaluation   by contrast-enhanced MRI may provide further characterization.      --- End of Report ---    < end of copied text >    < from: NM Inflammatory Loc Wholebody WBC, Single Day (22 @ 11:02) >  IMPRESSION: Normal Indium-111 labeled leukocyte scan. No scan evidence of   infection.    --- End of Report ---        < end of copied text >        ASSESSMENT/PLAN    77 year old male with PMHx of recent  COVID  on 10/6/22, HTN , Vit B12 deficiency, presenting to the ED on  with body aches, fatigue and fever (Tmax 102) at home for 12 days found to have streptococcus bacteremia and admitted with unclear source, no recent dental work, skin infections, no respiratory symptoms. Pan scan was negative at that time ( LILIAN was recommended but pt refused) He was treated for Bacteremia ( Blood cultures + streptococcus anginosis pansensitive ) and norovirus with supportive care and contact isolation . He was discharged home on  with IV Rocephin via PICC line. Recommendation was to continue ABX  daily via pic end 22    he  presented to ER after and episode of syncope and collapse, found to be septic, hypotensive, hypoxic and febrile in the ER. He is unsure of mechanism of fall but remembers being on the ground no reported LOC . He was found face-down on bathroom floor buy family with left leg wedged under cabinet. On my interview he is alert and oriented to person place and time, he has a baseline studder in his speech pattern but otherwise neurologically intact without deficit  In response to hypotension he failed to respond to Inital sepsis fluid protocol in ER is required IV pressor in form of levophed to maintain MAP greater than 60 -65. In the ER he was febrile with initial labs significant for WBC of 22.4 , ProCal of 21.2 first lactate 4.9 via abg with repeat post fluids of 2.2 venous sample.   PT admitted to [facility] with septic shock unclear source, now off pressors. Found to have new onset A fib and on amio. 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  monitor HR now on lopessor  monitor diarrhea, c/w imodium  prior h/o norovirus-on contact iso  cr elevated-meropenem dose adjusted for renal function        will f/u

## 2022-12-06 NOTE — PROGRESS NOTE ADULT - ASSESSMENT
ASSESSMENT: 77 year old male with PMHx of recent  COVID  on 10/6/22, HTN , Vit B12 deficiency, presenting to the ED on 11/2 with body aches, fatigue and fever (Tmax 102) at home for 12 days found to have streptococcus bacteremia.  He was discharged home on 11/7 with IV Rocephin via PICC line. Recommendation was to continue ABX  daily via pic end 11/30/22.  On 11/27/22 he presented to ER after and episode of syncope and collapse, found to be septic, hypotensive, hypoxic and febrile in the ER.  Patient was being monitored in ICU followed by ID and CT surgery for noted endocarditis.  MRI on 12/1/22 revealed scattered bilateral parieto-occipital acute infarcts.  Carotid US without evidence of significant flow limiting stenosis.  Etiology likely cardioembolic in the setting of atrial fibrillation and endocarditis.       NEURO: neurologically without acute changes appreciated, Continue close monitoring for neurologic deterioration,  titrate statin to LDL goal less than 70 once LFT improve if applicable,  MR imaging as noted, CT angiogram head/neck reviewed: no evidence of aneurysms - given patients worsening renal function post catheterization and low suspicion for mycotic aneurysm along with no evidence of hemorrhage at this time as d/w Dr. Hernandes risks of angiogram outweighs benefits, no absolute neurological contraindication to proceed with surgical intervention permitting repeat CTH completed prior to confirm stability and no hemorrhagic transformation prior.   Physical therapy/OT/Speech eval/treatment.     ANTITHROMBOTIC THERAPY: ASA 81mg per cardiology, Heparin gtt per ICU- avoid supratherapeutic levels as to minimize risk of hemorrhage.             ASSESSMENT: 77 year old male with PMHx of recent  COVID  on 10/6/22, HTN , Vit B12 deficiency, presenting to the ED on 11/2 with body aches, fatigue and fever (Tmax 102) at home for 12 days found to have streptococcus bacteremia.  He was discharged home on 11/7 with IV Rocephin via PICC line. Recommendation was to continue ABX  daily via pic end 11/30/22.  On 11/27/22 he presented to ER after and episode of syncope and collapse, found to be septic, hypotensive, hypoxic and febrile in the ER.  Patient was being monitored in ICU followed by ID and CT surgery for noted endocarditis.  MRI on 12/1/22 revealed scattered bilateral parieto-occipital acute infarcts.  Carotid US without evidence of significant flow limiting stenosis.  Etiology likely cardioembolic in the setting of atrial fibrillation and endocarditis.       NEURO: neurologically without acute changes appreciated, minimize hypotension, Continue close monitoring for neurologic deterioration,  titrate statin to LDL goal less than 70 once LFT improve if applicable,  MR imaging as noted, CT angiogram head/neck reviewed: no evidence of aneurysms - given patients worsening renal function post catheterization and low suspicion for mycotic aneurysm along with no evidence of hemorrhage at this time as d/w Dr. Hernandes risks of angiogram outweighs benefits, no absolute neurological contraindication to proceed with surgical intervention permitting repeat CTH completed prior to confirm stability and no hemorrhagic transformation prior.   Physical therapy/OT/Speech eval/treatment.     ANTITHROMBOTIC THERAPY: ASA 81mg per cardiology, Heparin gtt per ICU- avoid supratherapeutic levels as to minimize risk of hemorrhage.             ASSESSMENT: 77 year old male with PMHx of recent  COVID  on 10/6/22, HTN , Vit B12 deficiency, presenting to the ED on 11/2 with body aches, fatigue and fever (Tmax 102) at home for 12 days found to have streptococcus bacteremia.  He was discharged home on 11/7 with IV Rocephin via PICC line. Recommendation was to continue ABX  daily via pic end 11/30/22.  On 11/27/22 he presented to ER after and episode of syncope and collapse, found to be septic, hypotensive, hypoxic and febrile in the ER.  Patient was being monitored in ICU followed by ID and CT surgery for noted endocarditis.  MRI on 12/1/22 revealed scattered bilateral parieto-occipital acute infarcts.  Carotid US without evidence of significant flow limiting stenosis.  Etiology likely cardioembolic in the setting of atrial fibrillation and endocarditis.       NEURO: neurologically without acute changes appreciated, minimize hypotension, Continue close monitoring for neurologic deterioration,  titrate statin to LDL goal less than 70 once LFT improve if applicable,  MR imaging as noted, CT angiogram head/neck reviewed: no evidence of aneurysms - given patients worsening renal function post catheterization and low suspicion for mycotic aneurysm along with no evidence of hemorrhage at this time as d/w Dr. Hernandes risks of angiogram outweighs benefits, no absolute neurological contraindication to proceed with surgical intervention permitting repeat CTH completed prior to confirm stability and no hemorrhagic transformation prior.   Physical therapy/OT/Speech eval/treatment.     ANTITHROMBOTIC THERAPY: ASA 81mg per cardiology, Heparin gtt per ICU- avoid supratherapeutic levels as to minimize risk of hemorrhage.            NEUROINTERVENTIONAL ATTENDING ADDENDUM:  All relevant medical records and neuroradiological imaging reviewed.   Few, tiny embolic appearing acute ischemic infarcts on MRI head.   NO hemorrhage discovered by MRI/SWI/GRE.   CTA head and neck personally reviewed. No evidence of intracranial aneurysm.   Given lack of any intracranial hemorrhage, and no abnormal CTA findings, and the setting of acute kidney injury from recent contrast load from cardiac catheterization, diagnostic cerebral angiogram is deferred as risk of further renal injury would likely outweigh the benefit of procedure.   Strokes are of very small size without significant neurological clinical deficit.   There is no national or international guideline for neurological clearance or risk of assessment for undergoing general anesthesia or major cardiac surgery.   There is no absolute neurological contraindication for surgery or general anesthesia though special precaution should be taken to avoid any prolonged hypotension that could potentially exacerbate existing ischemic infarct.

## 2022-12-06 NOTE — CONSULT NOTE ADULT - ASSESSMENT
77 year old male with PMH of HTN and Vit B12 deficiency with recent hospitalization for Streptococcus bacteremia and Norovirus discharged home on IV Rocephin via PICC line returned after a syncopal episode. Admitted for septic shock; source of septic shock is unclear. Repeat blood cultures have been negative. 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 Imdium scan was negative. Pan scan with CT with IV contrast and CTA head/neck have been negative as well. Hospital course is notable for Afib with RVR and RADHA. Nephrology is consulted for RADHA.     -RADHA is likely secondary to contrast associated nephropathy   -Baseline creatinine is 0.8-0.9mg/dL  -Latest creatinine have up trended to 1.96mg/dL today  -BP soft today - on LR at this time   -Will check UA, UPCR, UACR, urine electrolytes and renal ultrasound for completeness   -Afib - agree with discontinuation of digoxin; currently on metoprolol    Mat Broderick DO  Nephrology  [facility]  [address] [place] [address]  Office Number [phone]

## 2022-12-06 NOTE — CHART NOTE - NSCHARTNOTEFT_GEN_A_CORE
S/p LHC via RRA, non obstructive CAD.  CATH SITE: right wrist benign s/p cath.  No bleeding, no ecchymosis, no hematoma. Extremity Warm to touch, with palpable distal pulses, and brisk capillary refill.  Continue heparin GTT. Gentle hydration.  Primary management per CT surgery team. Will follow up after surgery.

## 2022-12-06 NOTE — PROGRESS NOTE ADULT - SUBJECTIVE AND OBJECTIVE BOX
Patient seen and examined.  Denies CP, SOB, N/V.    T(C): 36.4 (12-06-22 @ 00:47)  T(F): 97.5 (12-06-22 @ 00:47)  HR: 96 (12-06-22 @ 01:00)  BP: 121/74 (12-06-22 @ 01:00)  BP(mean): 90 (12-06-22 @ 01:00)  ABP: --  ABP(mean): --  RR: 20 (12-06-22 @ 01:00)  SpO2: 96% (12-06-22 @ 01:00)  Wt(kg): --  CVP(mm Hg): --  CI: --  PA: --  PA(mean): --  PA(direct): --  SVRI: --      Physical Exam:  Gen: A&Ox3  Pulm:  CTA b/l, no r/r/w  CV:  irregularly irregular V/VI systolic murmur  Abd: +BS, soft, NT, ND  Ext:  +DP b/l, no c/c 2+ pitting edema b/l      I&O's Detail    04 Dec 2022 07:01  -  05 Dec 2022 07:00  --------------------------------------------------------  IN:    Heparin Infusion: 437 mL    Lactated Ringers: 75 mL    Lactated Ringers: 1350 mL    Oral Fluid: 820 mL  Total IN: 2682 mL    OUT:    Voided (mL): 1675 mL  Total OUT: 1675 mL    Total NET: 1007 mL      05 Dec 2022 07:01  -  06 Dec 2022 02:16  --------------------------------------------------------  IN:    Albumin 5%  - 250 mL: 250 mL    Heparin Infusion: 95 mL    Heparin Infusion: 30 mL    Lactated Ringers: 675 mL    Oral Fluid: 240 mL  Total IN: 1290 mL    OUT:    Voided (mL): 800 mL  Total OUT: 800 mL    Total NET: 490 mL                              11.8   9.66  )-----------( 335      ( 05 Dec 2022 03:30 )             34.2   12-05    134<L>  |  101  |  19.1  ----------------------------<  82  4.5   |  26.0  |  1.49<H>    Ca    9.0      05 Dec 2022 03:30  Mg     2.0     12-05    aPTT: 70.5 sec; PT: x    ; INR: x      12-05-22 @ 03:30         CAPILLARY BLOOD GLUCOSE            Medications:  aspirin  chewable 81 milliGRAM(s) Oral daily  chlorhexidine 2% Cloths 1 Application(s) Topical daily  cholestyramine Powder (Sugar-Free) 4 Gram(s) Oral two times a day  digoxin     Tablet 125 MICROGram(s) Oral daily  heparin   Injectable 4700 Unit(s) IV Push every 6 hours PRN  heparin  Infusion. 1500 Unit(s)/Hr IV Continuous <Continuous>  lactated ringers. 1000 milliLiter(s) IV Continuous <Continuous>  loperamide 4 milliGRAM(s) Oral every 6 hours  meropenem Injectable 1000 milliGRAM(s) IV Push every 12 hours  metoprolol tartrate 50 milliGRAM(s) Oral every 6 hours  midodrine 10 milliGRAM(s) Oral every 8 hours  tamsulosin 0.4 milliGRAM(s) Oral at bedtime      CXR: pending

## 2022-12-06 NOTE — PROGRESS NOTE ADULT - PROBLEM SELECTOR PLAN 4
prior norovirus + 11/3  GI consult appreciated  neg c diff ? antibiotics associated  cont imodium and cholestyramine  improved

## 2022-12-07 LAB
ALBUMIN, RANDOM URINE: 16.7 MG/DL — SIGNIFICANT CHANGE UP
ALBUMIN/CREATININE RATIO (ACR): 255 MG/G — HIGH (ref 0–30)
ANION GAP SERPL CALC-SCNC: 12 MMOL/L — SIGNIFICANT CHANGE UP (ref 5–17)
ANION GAP SERPL CALC-SCNC: 9 MMOL/L — SIGNIFICANT CHANGE UP (ref 5–17)
APTT BLD: 45.8 SEC — HIGH (ref 27.5–35.5)
APTT BLD: 48.6 SEC — HIGH (ref 27.5–35.5)
APTT BLD: 50.8 SEC — HIGH (ref 27.5–35.5)
APTT BLD: 59.6 SEC — HIGH (ref 27.5–35.5)
BUN SERPL-MCNC: 26.1 MG/DL — HIGH (ref 8–20)
BUN SERPL-MCNC: 28.5 MG/DL — HIGH (ref 8–20)
CALCIUM SERPL-MCNC: 8.6 MG/DL — SIGNIFICANT CHANGE UP (ref 8.4–10.5)
CALCIUM SERPL-MCNC: 8.8 MG/DL — SIGNIFICANT CHANGE UP (ref 8.4–10.5)
CHLORIDE SERPL-SCNC: 100 MMOL/L — SIGNIFICANT CHANGE UP (ref 96–108)
CHLORIDE SERPL-SCNC: 100 MMOL/L — SIGNIFICANT CHANGE UP (ref 96–108)
CO2 SERPL-SCNC: 23 MMOL/L — SIGNIFICANT CHANGE UP (ref 22–29)
CO2 SERPL-SCNC: 24 MMOL/L — SIGNIFICANT CHANGE UP (ref 22–29)
CREAT ?TM UR-MCNC: 65 MG/DL — SIGNIFICANT CHANGE UP
CREAT SERPL-MCNC: 2.15 MG/DL — HIGH (ref 0.5–1.3)
CREAT SERPL-MCNC: 2.31 MG/DL — HIGH (ref 0.5–1.3)
DIGOXIN SERPL-MCNC: 1.3 NG/ML — SIGNIFICANT CHANGE UP (ref 0.8–2)
EGFR: 28 ML/MIN/1.73M2 — LOW
EGFR: 31 ML/MIN/1.73M2 — LOW
GLUCOSE SERPL-MCNC: 103 MG/DL — HIGH (ref 70–99)
GLUCOSE SERPL-MCNC: 82 MG/DL — SIGNIFICANT CHANGE UP (ref 70–99)
HCT VFR BLD CALC: 29 % — LOW (ref 39–50)
HGB BLD-MCNC: 9.7 G/DL — LOW (ref 13–17)
MAGNESIUM SERPL-MCNC: 1.9 MG/DL — SIGNIFICANT CHANGE UP (ref 1.6–2.6)
MCHC RBC-ENTMCNC: 31.1 PG — SIGNIFICANT CHANGE UP (ref 27–34)
MCHC RBC-ENTMCNC: 33.4 GM/DL — SIGNIFICANT CHANGE UP (ref 32–36)
MCV RBC AUTO: 92.9 FL — SIGNIFICANT CHANGE UP (ref 80–100)
PLATELET # BLD AUTO: 301 K/UL — SIGNIFICANT CHANGE UP (ref 150–400)
POTASSIUM SERPL-MCNC: 4.5 MMOL/L — SIGNIFICANT CHANGE UP (ref 3.5–5.3)
POTASSIUM SERPL-MCNC: 4.7 MMOL/L — SIGNIFICANT CHANGE UP (ref 3.5–5.3)
POTASSIUM SERPL-SCNC: 4.5 MMOL/L — SIGNIFICANT CHANGE UP (ref 3.5–5.3)
POTASSIUM SERPL-SCNC: 4.7 MMOL/L — SIGNIFICANT CHANGE UP (ref 3.5–5.3)
RBC # BLD: 3.12 M/UL — LOW (ref 4.2–5.8)
RBC # FLD: 13.2 % — SIGNIFICANT CHANGE UP (ref 10.3–14.5)
SODIUM SERPL-SCNC: 132 MMOL/L — LOW (ref 135–145)
SODIUM SERPL-SCNC: 136 MMOL/L — SIGNIFICANT CHANGE UP (ref 135–145)
TROPONIN T SERPL-MCNC: 0.08 NG/ML — HIGH (ref 0–0.06)
WBC # BLD: 10.3 K/UL — SIGNIFICANT CHANGE UP (ref 3.8–10.5)
WBC # FLD AUTO: 10.3 K/UL — SIGNIFICANT CHANGE UP (ref 3.8–10.5)

## 2022-12-07 PROCEDURE — 99233 SBSQ HOSP IP/OBS HIGH 50: CPT

## 2022-12-07 PROCEDURE — 71045 X-RAY EXAM CHEST 1 VIEW: CPT | Mod: 26

## 2022-12-07 PROCEDURE — 99232 SBSQ HOSP IP/OBS MODERATE 35: CPT

## 2022-12-07 PROCEDURE — 99231 SBSQ HOSP IP/OBS SF/LOW 25: CPT

## 2022-12-07 PROCEDURE — 70450 CT HEAD/BRAIN W/O DYE: CPT | Mod: 26

## 2022-12-07 RX ORDER — PHYTONADIONE (VIT K1) 5 MG
5 TABLET ORAL DAILY
Refills: 0 | Status: DISCONTINUED | OUTPATIENT
Start: 2022-12-07 | End: 2022-12-13

## 2022-12-07 RX ORDER — FERROUS SULFATE 325(65) MG
325 TABLET ORAL DAILY
Refills: 0 | Status: DISCONTINUED | OUTPATIENT
Start: 2022-12-07 | End: 2022-12-13

## 2022-12-07 RX ORDER — MAGNESIUM SULFATE 500 MG/ML
2 VIAL (ML) INJECTION ONCE
Refills: 0 | Status: COMPLETED | OUTPATIENT
Start: 2022-12-07 | End: 2022-12-07

## 2022-12-07 RX ORDER — FOLIC ACID 0.8 MG
1 TABLET ORAL DAILY
Refills: 0 | Status: DISCONTINUED | OUTPATIENT
Start: 2022-12-07 | End: 2022-12-13

## 2022-12-07 RX ORDER — ASPIRIN/CALCIUM CARB/MAGNESIUM 324 MG
81 TABLET ORAL DAILY
Refills: 0 | Status: DISCONTINUED | OUTPATIENT
Start: 2022-12-07 | End: 2022-12-13

## 2022-12-07 RX ORDER — ASCORBIC ACID 60 MG
500 TABLET,CHEWABLE ORAL DAILY
Refills: 0 | Status: DISCONTINUED | OUTPATIENT
Start: 2022-12-07 | End: 2022-12-13

## 2022-12-07 RX ADMIN — MIDODRINE HYDROCHLORIDE 10 MILLIGRAM(S): 2.5 TABLET ORAL at 05:37

## 2022-12-07 RX ADMIN — MIDODRINE HYDROCHLORIDE 10 MILLIGRAM(S): 2.5 TABLET ORAL at 12:10

## 2022-12-07 RX ADMIN — MEROPENEM 1000 MILLIGRAM(S): 1 INJECTION INTRAVENOUS at 17:08

## 2022-12-07 RX ADMIN — Medication 50 MILLIGRAM(S): at 23:23

## 2022-12-07 RX ADMIN — Medication 325 MILLIGRAM(S): at 09:21

## 2022-12-07 RX ADMIN — MEROPENEM 1000 MILLIGRAM(S): 1 INJECTION INTRAVENOUS at 05:37

## 2022-12-07 RX ADMIN — Medication 5 MILLIGRAM(S): at 11:14

## 2022-12-07 RX ADMIN — Medication 50 MILLIGRAM(S): at 11:16

## 2022-12-07 RX ADMIN — Medication 50 MILLIGRAM(S): at 05:37

## 2022-12-07 RX ADMIN — TAMSULOSIN HYDROCHLORIDE 0.4 MILLIGRAM(S): 0.4 CAPSULE ORAL at 21:25

## 2022-12-07 RX ADMIN — Medication 4 MILLIGRAM(S): at 11:15

## 2022-12-07 RX ADMIN — Medication 25 GRAM(S): at 09:22

## 2022-12-07 RX ADMIN — Medication 4 MILLIGRAM(S): at 23:23

## 2022-12-07 RX ADMIN — CHOLESTYRAMINE 4 GRAM(S): 4 POWDER, FOR SUSPENSION ORAL at 20:51

## 2022-12-07 RX ADMIN — Medication 1 MILLIGRAM(S): at 13:15

## 2022-12-07 RX ADMIN — Medication 4 MILLIGRAM(S): at 05:37

## 2022-12-07 RX ADMIN — HEPARIN SODIUM 16 UNIT(S)/HR: 5000 INJECTION INTRAVENOUS; SUBCUTANEOUS at 04:00

## 2022-12-07 RX ADMIN — Medication 81 MILLIGRAM(S): at 08:55

## 2022-12-07 RX ADMIN — CHOLESTYRAMINE 4 GRAM(S): 4 POWDER, FOR SUSPENSION ORAL at 08:55

## 2022-12-07 RX ADMIN — Medication 500 MILLIGRAM(S): at 09:21

## 2022-12-07 RX ADMIN — Medication 50 MILLIGRAM(S): at 17:08

## 2022-12-07 RX ADMIN — Medication 4 MILLIGRAM(S): at 17:08

## 2022-12-07 RX ADMIN — CHLORHEXIDINE GLUCONATE 1 APPLICATION(S): 213 SOLUTION TOPICAL at 09:24

## 2022-12-07 RX ADMIN — MIDODRINE HYDROCHLORIDE 10 MILLIGRAM(S): 2.5 TABLET ORAL at 21:25

## 2022-12-07 NOTE — PROGRESS NOTE ADULT - ASSESSMENT
77 year old male with a history of HTN, HLD, BPH, moderate aortic stenosis with ELDON 1.1 cm, COVID infection 10/6/22, streptococcus anginosus bacteremia with Norovirus s/p discharge with PICC line, who is readmitted with syncope, septic shock with endocarditis (mitral valve vegetation and possible aortic root abscess), and RADHA. EP following this patient for new onset rapid atrial fibrillation.     HR mostly controlled   CHADSVASC: 3 (age, HTN)  RADHA worsening. Cr now 2.15    - Rate control strategy for now. Rhythm control limited by MV vegetation.  - Lenient rate control for goal HR <120bpm. Brief salvos with RVR are acceptable and not unexpected.    - Would continue to avoid Digoxin use in this patient.  - Continue Lopressor 50mg q6h. BP tolerating thus far.   - Continue Heparin gtt and maintain therapeutic PTT  - If undergoes surgery this week would consider surgical AF ablation and ESTEFANY clip.   - Full recommendations to follow.

## 2022-12-07 NOTE — PROGRESS NOTE ADULT - PROBLEM SELECTOR PLAN 1
mitral valve vegetations on LILIAN 11/30 and possible aortic root abscess  11/27 blood cultures NGTD   c/w meropenem per ID    LHC with clean coronaries  no cerebral angio needed per neurosurgery, however will need repeat CT Head (ordered and pending)    OR date pending  Plan to be discussed with Dr. Flanagan and CTS team in AM rounds

## 2022-12-07 NOTE — PROGRESS NOTE ADULT - ASSESSMENT
76 YO M w/ recent hospitalization for Streptococcus bacteremia and Norovirus discharged home on IV Rocephin via PICC line returned after a syncopal episode. Admitted for septic shock; source of septic shock is unclear. Repeat blood cultures have been negative. 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 Pan scan with CT with IV contrast and CTA head/neck have been negative as well. Hospital course is notable for Afib with RVR and RADHA. Nephrology is consulted for RADHA.     1. Acute kidney Injury  -Baseline creatinine is 0.8-0.9mg/dL  -Latest creatinine have up trended to 2.3 mg/dL today  -UA bland,   -Renal US Mild fullness of the renal pelves/hydronephrosis, possibly secondary to a distended urinary bladder  -RADHA is likely secondary to contrast associated nephropathy   -Recommend placing Barrios catheter, continue tamsulosin  -Scr likely to worsen more before plateauing, will follow closely.    2. Hypertension  -BP soft w/ some improvement today - on LR & midodrine, will follow    3. Afib - rate controlled; currently on metoprolol

## 2022-12-07 NOTE — PROGRESS NOTE ADULT - SUBJECTIVE AND OBJECTIVE BOX
Subjective: Patient seen and assessed with double valve endocarditis. Patient states "I'll do what I have to do" At time of exam, Pt denies chest pain, palpitations, dizziness, headache, shortness of breath, abdominal pain or N/V/D/C.    Pertinent events of the past 24 hours: Uneventful, recovering as expected    VITAL SIGNS  Vital Signs Last 24 Hrs  T(C): 36.7 (12-06-22 @ 23:18), Max: 36.8 (12-06-22 @ 08:00)  T(F): 98 (12-06-22 @ 23:18), Max: 98.2 (12-06-22 @ 08:00)  HR: 72 (12-06-22 @ 23:18) (68 - 122)  BP: 121/71 (12-06-22 @ 23:18) (91/65 - 127/68)  RR: 18 (12-06-22 @ 23:18) (10 - 26)  SpO2: 96% (12-06-22 @ 23:18) (92% - 98%)  on (O2)              Telemetry/Alarms:  AF 90s    MEDICATIONS  aspirin  chewable 81 milliGRAM(s) Oral daily  chlorhexidine 2% Cloths 1 Application(s) Topical daily  cholestyramine Powder (Sugar-Free) 4 Gram(s) Oral two times a day  heparin  Infusion 1350 Unit(s)/Hr IV Continuous <Continuous>  lactated ringers. 1000 milliLiter(s) IV Continuous <Continuous>  loperamide 4 milliGRAM(s) Oral every 6 hours  meropenem Injectable 1000 milliGRAM(s) IV Push every 12 hours  metoprolol tartrate 50 milliGRAM(s) Oral every 6 hours  midodrine 10 milliGRAM(s) Oral every 8 hours  phytonadione  IVPB 5 milliGRAM(s) IV Intermittent daily  tamsulosin 0.4 milliGRAM(s) Oral at bedtime    PHYSICAL EXAM  Constitutional: NAD  Neuro: A+O x 3, non-focal, speech clear and intact  CV: irregular rhythm, regular rate, +S1S2, +systolic murmur  Pulm/chest: lung sounds CTA and equal bilaterally, no accessory muscle use noted  Abd: +BS, soft, NT, ND  Ext: MEZA x 4, no C/C/E, +pedal pulses   Skin: warm, well perfused  Psych: calm, appropriate affect    Intake and Output  12-05 @ 07:01  -  12-06 @ 07:00  --------------------------------------------------------  IN: 1830 mL / OUT: 1225 mL / NET: 605 mL    12-06 @ 07:01  -  12-07 @ 02:23  --------------------------------------------------------  IN: 839 mL / OUT: 1000 mL / NET: -161 mL    Weights:  Daily     Daily   Admit Wt: Drug Dosing Weight  Height (cm): 167.6 (30 Nov 2022 14:29)  Weight (kg): 76.8 (30 Nov 2022 14:29)  BMI (kg/m2): 27.3 (30 Nov 2022 14:29)  BSA (m2): 1.86 (30 Nov 2022 14:29)    All laboratory results, radiology and medications reviewed.    LABS  12-06    137  |  102  |  23.4<H>  ----------------------------<  80  4.6   |  25.0  |  1.96<H>    Ca    8.8      06 Dec 2022 02:30  Mg     2.1     12-06                                   10.2   9.56  )-----------( 323      ( 06 Dec 2022 02:30 )             30.6          PT/INR - ( 06 Dec 2022 04:45 )   PT: 13.1 sec;   INR: 1.13 ratio         PTT - ( 06 Dec 2022 17:15 )  PTT:46.5 sec    < from: Xray Chest 1 View- PORTABLE-Routine (Xray Chest 1 View- PORTABLE-Routine in AM.) (12.06.22 @ 06:25) >    IMPRESSION:   No radiographic evidence of active chest disease.    < end of copied text >    < from: US Duplex Venous Upper Ext Ltd, Left (12.03.22 @ 20:50) >    IMPRESSION:  No evidence of left upper extremity deep venous thrombosis.  There is thrombosis of the left cephalic vein at the antecubital fossa.    < end of copied text >    < from: CT Abdomen and Pelvis w/ Oral Cont and w/ IV Cont (12.02.22 @ 23:34) >    IMPRESSION:  Trace bilateral pleural effusions.  Fluid in the rectum. No bowel obstruction.    VERTEBRAL BODY ANALYSIS: Low bone density as described above, consider   further workup for osteoporosis.    < end of copied text >      < from: CT Angio Neck w/ IV Cont (12.02.22 @ 23:15) >      IMPRESSION: Normal CTA of the head and neck.    < end of copied text >    < from: NM SPECT Inflammaory Loc, Single Area Single Day (12.02.22 @ 11:02) >    IMPRESSION: Normal Indium-111 labeled leukocyte scan. No scan evidence of   infection.    < end of copied text >    < from: MR Head No Cont (12.01.22 @ 21:35) >    IMPRESSION:  Scattered foci of restricted diffusion in the bilateral parieto-occipital   lobes, suggestive of acute ischemia. No intracranial hemorrhage or mass   effect.    Given the bilateral distribution of involvement, embolic phenomenon is   suggested, with underlying septic emboli not excluded. Further evaluation   by contrast-enhanced MRI may provide further characterization.    < end of copied text >

## 2022-12-07 NOTE — PROGRESS NOTE ADULT - SUBJECTIVE AND OBJECTIVE BOX
=======================================================  Vital Signs Last 24 Hrs  T(C): 36.7 (07 Dec 2022 15:52), Max: 37 (07 Dec 2022 05:34)  T(F): 98 (07 Dec 2022 15:52), Max: 98.6 (07 Dec 2022 05:34)  HR: 93 (07 Dec 2022 15:52) (68 - 100)  BP: 112/70 (07 Dec 2022 15:52) (98/64 - 127/65)  BP(mean): --  RR: 18 (07 Dec 2022 11:00) (18 - 18)  SpO2: 95% (07 Dec 2022 15:52) (93% - 100%)    Parameters below as of 07 Dec 2022 15:52  Patient On (Oxygen Delivery Method): room air      I&O's Summary    06 Dec 2022 07:01  -  07 Dec 2022 07:00  --------------------------------------------------------  IN: 1227 mL / OUT: 1600 mL / NET: -373 mL    07 Dec 2022 07:01  -  07 Dec 2022 17:21  --------------------------------------------------------  IN: 0 mL / OUT: 300 mL / NET: -300 mL      =======================================================  Current Antibiotics:  meropenem Injectable 1000 milliGRAM(s) IV Push every 12 hours    Other medications:  ascorbic acid 500 milliGRAM(s) Oral daily  aspirin enteric coated 81 milliGRAM(s) Oral daily  chlorhexidine 2% Cloths 1 Application(s) Topical daily  cholestyramine Powder (Sugar-Free) 4 Gram(s) Oral two times a day  ferrous    sulfate 325 milliGRAM(s) Oral daily  folic acid 1 milliGRAM(s) Oral daily  heparin  Infusion 1350 Unit(s)/Hr IV Continuous <Continuous>  lactated ringers. 1000 milliLiter(s) IV Continuous <Continuous>  loperamide 4 milliGRAM(s) Oral every 6 hours  metoprolol tartrate 50 milliGRAM(s) Oral every 6 hours  midodrine 10 milliGRAM(s) Oral every 8 hours  phytonadione   Solution 5 milliGRAM(s) Oral daily  tamsulosin 0.4 milliGRAM(s) Oral at bedtime    =======================================================      132<L>  |  100  |  28.5<H>  ----------------------------<  103<H>  4.7   |  23.0  |  2.31<H>    Ca    8.6      07 Dec 2022 16:00  Mg     1.9           Creatinine, Serum: 2.31 mg/dL (22 @ 16:00)  Creatinine, Serum: 2.15 mg/dL (22 @ 02:26)  Creatinine, Serum: 1.96 mg/dL (22 @ 02:30)  Creatinine, Serum: 1.49 mg/dL (22 @ 03:30)  Creatinine, Serum: 1.40 mg/dL (22 @ 03:15)  Creatinine, Serum: 0.81 mg/dL (22 @ 03:00)    Urinalysis Basic - ( 06 Dec 2022 21:00 )    Color: Yellow / Appearance: very cloudy / S.010 / pH: x  Gluc: x / Ketone: Moderate  / Bili: Negative / Urobili: 4   Blood: x / Protein: 100 / Nitrite: Positive   Leuk Esterase: Small / RBC: 6-10 /HPF / WBC 3-5 /HPF   Sq Epi: x / Non Sq Epi: Occasional / Bacteria: Few      ======================================================= Reason for visit:     Subjective: No acute overnight event. Patient denied any cardiac or urinary complains. No fever/chills.     ROS: All systems were reviewed in detail pertinent positive and negative mentioned above, rest are negative.    Physical Exam:  Gen: no acute distress  MS: alert, conversing normally  Eyes: EOMI, no icterus  HENT: NCAT, MMM  CV: rhythm reg reg, rate normal  Chest: CTAB, no w/r/r,  Abd: soft, NT, ND  Extremities: No edema    =======================================================  Vital Signs Last 24 Hrs  T(C): 36.7 (07 Dec 2022 15:52), Max: 37 (07 Dec 2022 05:34)  T(F): 98 (07 Dec 2022 15:52), Max: 98.6 (07 Dec 2022 05:34)  HR: 93 (07 Dec 2022 15:52) (68 - 100)  BP: 112/70 (07 Dec 2022 15:52) (98/64 - 127/65)  BP(mean): --  RR: 18 (07 Dec 2022 11:00) (18 - 18)  SpO2: 95% (07 Dec 2022 15:52) (93% - 100%)    Parameters below as of 07 Dec 2022 15:52  Patient On (Oxygen Delivery Method): room air      I&O's Summary    06 Dec 2022 07:  -  07 Dec 2022 07:00  --------------------------------------------------------  IN: 1227 mL / OUT: 1600 mL / NET: -373 mL    07 Dec 2022 07:01  -  07 Dec 2022 17:21  --------------------------------------------------------  IN: 0 mL / OUT: 300 mL / NET: -300 mL      =======================================================  Current Antibiotics:  meropenem Injectable 1000 milliGRAM(s) IV Push every 12 hours    Other medications:  ascorbic acid 500 milliGRAM(s) Oral daily  aspirin enteric coated 81 milliGRAM(s) Oral daily  chlorhexidine 2% Cloths 1 Application(s) Topical daily  cholestyramine Powder (Sugar-Free) 4 Gram(s) Oral two times a day  ferrous    sulfate 325 milliGRAM(s) Oral daily  folic acid 1 milliGRAM(s) Oral daily  heparin  Infusion 1350 Unit(s)/Hr IV Continuous <Continuous>  lactated ringers. 1000 milliLiter(s) IV Continuous <Continuous>  loperamide 4 milliGRAM(s) Oral every 6 hours  metoprolol tartrate 50 milliGRAM(s) Oral every 6 hours  midodrine 10 milliGRAM(s) Oral every 8 hours  phytonadione   Solution 5 milliGRAM(s) Oral daily  tamsulosin 0.4 milliGRAM(s) Oral at bedtime    =======================================================      132<L>  |  100  |  28.5<H>  ----------------------------<  103<H>  4.7   |  23.0  |  2.31<H>    Ca    8.6      07 Dec 2022 16:00  Mg     1.9           Creatinine, Serum: 2.31 mg/dL (22 @ 16:00)  Creatinine, Serum: 2.15 mg/dL (22 @ 02:26)  Creatinine, Serum: 1.96 mg/dL (22 @ 02:30)  Creatinine, Serum: 1.49 mg/dL (22 @ 03:30)  Creatinine, Serum: 1.40 mg/dL (22 @ 03:15)  Creatinine, Serum: 0.81 mg/dL (22 @ 03:00)    Urinalysis Basic - ( 06 Dec 2022 21:00 )    Color: Yellow / Appearance: very cloudy / S.010 / pH: x  Gluc: x / Ketone: Moderate  / Bili: Negative / Urobili: 4   Blood: x / Protein: 100 / Nitrite: Positive   Leuk Esterase: Small / RBC: 6-10 /HPF / WBC 3-5 /HPF   Sq Epi: x / Non Sq Epi: Occasional / Bacteria: Few      =======================================================

## 2022-12-07 NOTE — PROGRESS NOTE ADULT - PROBLEM SELECTOR PLAN 4
Problem: Patient Care Overview  Goal: Plan of Care Review  Outcome: Ongoing (interventions implemented as appropriate)   09/01/18 1111   Coping/Psychosocial   Plan of Care Reviewed With patient   OTHER   Outcome Summary [place] is [place] [facility] Despite Max cueing pt still demo unsafe tech and at high fall risk and concern pt will twist. Pain, weakness limit attempt to walk to BR. Ed pt and Rn on recommendation for SNU at d/c. Pt may benefit from further skilled acute care OT to increase safety and independence with ADL and BR mobility.           RADHA noted  renal following for optimization  Continue gentle hydration  avoid nephrotoxic drugs  UA positive, UC pending

## 2022-12-07 NOTE — PROGRESS NOTE ADULT - SUBJECTIVE AND OBJECTIVE BOX
Subjective: Pt seen and examined, reports feeling well denies any complaints. Morning labs and telemetry reviewed, Cr increasing (2.15 today). Off Digoxin, nephrology following. Reports diarrhea improving. Plan for OR (possibly tomorrow) with CT surgery.     TELE: AF, mostly rate controlled     MEDICATIONS  (STANDING):  ascorbic acid 500 milliGRAM(s) Oral daily  aspirin enteric coated 81 milliGRAM(s) Oral daily  chlorhexidine 2% Cloths 1 Application(s) Topical daily  cholestyramine Powder (Sugar-Free) 4 Gram(s) Oral two times a day  ferrous    sulfate 325 milliGRAM(s) Oral daily  folic acid 1 milliGRAM(s) Oral daily  heparin  Infusion 1350 Unit(s)/Hr (16 mL/Hr) IV Continuous <Continuous>  lactated ringers. 1000 milliLiter(s) (50 mL/Hr) IV Continuous <Continuous>  loperamide 4 milliGRAM(s) Oral every 6 hours  meropenem Injectable 1000 milliGRAM(s) IV Push every 12 hours  metoprolol tartrate 50 milliGRAM(s) Oral every 6 hours  midodrine 10 milliGRAM(s) Oral every 8 hours  phytonadione   Solution 5 milliGRAM(s) Oral daily  tamsulosin 0.4 milliGRAM(s) Oral at bedtime      Allergies  No Known Allergies      Vital Signs Last 24 Hrs  T(C): 36.6 (07 Dec 2022 08:46), Max: 37 (07 Dec 2022 05:34)  T(F): 97.8 (07 Dec 2022 08:46), Max: 98.6 (07 Dec 2022 05:34)  HR: 90 (07 Dec 2022 08:46) (68 - 106)  BP: 102/64 (07 Dec 2022 08:46) (94/61 - 127/65)  BP(mean): 72 (06 Dec 2022 11:00) (72 - 72)  RR: 18 (07 Dec 2022 08:46) (10 - 18)  SpO2: 100% (07 Dec 2022 08:46) (95% - 100%)    Parameters below as of 07 Dec 2022 08:46  Patient On (Oxygen Delivery Method): room air        Physical Exam:  Constitutional: NAD, AAOx3  Cardiovascular: +S1S2 irregular + systolic murmur   Pulmonary: CTA b/l, unlabored  GI: soft NTND +BS  Extremities: no pedal edema  Neuro: non focal, MEZA x4    LABS:                        9.7    10.30 )-----------( 301      ( 07 Dec 2022 02:26 )             29.0     12-07    136  |  100  |  26.1<H>  ----------------------------<  82  4.5   |  24.0  |  2.15<H>    Ca    8.8      07 Dec 2022 02:26  Mg     1.9     12-07      PT/INR - ( 06 Dec 2022 04:45 )   PT: 13.1 sec;   INR: 1.13 ratio         PTT - ( 07 Dec 2022 05:53 )  PTT:48.6 sec  Urinalysis Basic - ( 06 Dec 2022 21:00 )    Color: Yellow / Appearance: very cloudy / S.010 / pH: x  Gluc: x / Ketone: Moderate  / Bili: Negative / Urobili: 4   Blood: x / Protein: 100 / Nitrite: Positive   Leuk Esterase: Small / RBC: 6-10 /HPF / WBC 3-5 /HPF   Sq Epi: x / Non Sq Epi: Occasional / Bacteria: Few        RADIOLOGY & ADDITIONAL TESTS:  TTE 2022  Summary:   1. Technically difficult study.   2. Patient noted to be tachycardic throughout exam.   3. Normal global left ventricular systolic function.   4. Left ventricular ejection fraction, by visual estimation, is 55 to   60%.   5. The mitral in-flow pattern reveals no discernable A-wave,therefore   no comment on diastolic function can be made.   6. Mild thickening of the anterior and posterior mitral valve leaflets.   7. Sclerotic aortic valve with decreased opening, gradients not obtained   to evaluate degree of stenosis. Mild to moderate regurgitation. Heavily   calacified valve.   8. Mild to moderate mitral valve regurgitation.   9. Estimated pulmonary artery systolic pressure is 40.2 mmHg assuming a   right atrial pressure of 15 mmHg, which is consistent with mild pulmonary   hypertension.  10. Mild pulmonic valve regurgitation.  11. No evidence of vegetation however cannot exclude, consider LILIAN if   high clinical suspicion.      LILIAN 2022  Summary:   1. Left ventricular ejection fraction, by visual estimation, is 55 to   60%.   2. Normal global left ventricular systolic function.   3. The mitral in-flow pattern reveals no discernable A-wave, therefore   no comment on diastolic function can be made.   4. Normal right ventricular size and function, inadequate estimation of   RVSP.   5. Mildly enlarged left atrium.   6. Moderate mitral valve regurgitation, jet is eccentric posteriorly   directed.   7. Thickening of bileaflet mitral valve with subcentimeter filamentous  mobile lesions suggestive of vegetations.   8. Color flow doppler and intravenous injection of agitated saline   demonstrates the presence of an intact intra atrial septum.   9. No left atrial appendage thrombus and normal left atrial appendage   velocities.  10. Structurally normal tricuspid valve, with normal leaflet excursion.  11. Heavily calcified noncoronary cusp with moderate aortic valve   stenosis, ELDON (by 2D planimetry 1.34 cm2), peak velocity 2.8 m/s and mean   gradient of 15 mmHg.  12. Mild to moderate aortic regurgitation.  13. There is mild echolucency and thickening of the sinus of Valsalva   in between the left and non-coronary cusp, cannot exclude early root   abscess.  14. Mild simple atheromas at the aortic arch/descending aorta.  15. There is no evidence of pericardial effusion.  16. No prior LILIAN study is available for comparison. 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 Cardioversion   deferred due to the presence of underlying infection/endocarditis.

## 2022-12-07 NOTE — PROGRESS NOTE ADULT - ASSESSMENT
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 LHC 12/5 with clean coronaries. Cleared by neurosurgery for OR. RADHA noted (renal following for opimization)

## 2022-12-08 LAB
ALBUMIN SERPL ELPH-MCNC: 2.4 G/DL — LOW (ref 3.3–5.2)
ALP SERPL-CCNC: 65 U/L — SIGNIFICANT CHANGE UP (ref 40–120)
ALT FLD-CCNC: 24 U/L — SIGNIFICANT CHANGE UP
ANION GAP SERPL CALC-SCNC: 11 MMOL/L — SIGNIFICANT CHANGE UP (ref 5–17)
ANION GAP SERPL CALC-SCNC: 11 MMOL/L — SIGNIFICANT CHANGE UP (ref 5–17)
APPEARANCE UR: ABNORMAL
APTT BLD: 54 SEC — HIGH (ref 27.5–35.5)
AST SERPL-CCNC: 19 U/L — SIGNIFICANT CHANGE UP
BACTERIA # UR AUTO: ABNORMAL
BILIRUB SERPL-MCNC: 0.6 MG/DL — SIGNIFICANT CHANGE UP (ref 0.4–2)
BILIRUB UR-MCNC: NEGATIVE — SIGNIFICANT CHANGE UP
BUN SERPL-MCNC: 28.9 MG/DL — HIGH (ref 8–20)
BUN SERPL-MCNC: 29.6 MG/DL — HIGH (ref 8–20)
CALCIUM SERPL-MCNC: 8.6 MG/DL — SIGNIFICANT CHANGE UP (ref 8.4–10.5)
CALCIUM SERPL-MCNC: 8.8 MG/DL — SIGNIFICANT CHANGE UP (ref 8.4–10.5)
CHLORIDE SERPL-SCNC: 101 MMOL/L — SIGNIFICANT CHANGE UP (ref 96–108)
CHLORIDE SERPL-SCNC: 103 MMOL/L — SIGNIFICANT CHANGE UP (ref 96–108)
CO2 SERPL-SCNC: 22 MMOL/L — SIGNIFICANT CHANGE UP (ref 22–29)
CO2 SERPL-SCNC: 25 MMOL/L — SIGNIFICANT CHANGE UP (ref 22–29)
COLOR SPEC: ABNORMAL
CREAT ?TM UR-MCNC: 52 MG/DL — SIGNIFICANT CHANGE UP
CREAT SERPL-MCNC: 2.15 MG/DL — HIGH (ref 0.5–1.3)
CREAT SERPL-MCNC: 2.53 MG/DL — HIGH (ref 0.5–1.3)
DIFF PNL FLD: ABNORMAL
EGFR: 25 ML/MIN/1.73M2 — LOW
EGFR: 31 ML/MIN/1.73M2 — LOW
EPI CELLS # UR: SIGNIFICANT CHANGE UP
GLUCOSE SERPL-MCNC: 84 MG/DL — SIGNIFICANT CHANGE UP (ref 70–99)
GLUCOSE SERPL-MCNC: 86 MG/DL — SIGNIFICANT CHANGE UP (ref 70–99)
GLUCOSE UR QL: NEGATIVE MG/DL — SIGNIFICANT CHANGE UP
HCT VFR BLD CALC: 28.4 % — LOW (ref 39–50)
HGB BLD-MCNC: 9.7 G/DL — LOW (ref 13–17)
KETONES UR-MCNC: ABNORMAL
LEUKOCYTE ESTERASE UR-ACNC: ABNORMAL
MAGNESIUM SERPL-MCNC: 2.2 MG/DL — SIGNIFICANT CHANGE UP (ref 1.6–2.6)
MCHC RBC-ENTMCNC: 31.1 PG — SIGNIFICANT CHANGE UP (ref 27–34)
MCHC RBC-ENTMCNC: 34.2 GM/DL — SIGNIFICANT CHANGE UP (ref 32–36)
MCV RBC AUTO: 91 FL — SIGNIFICANT CHANGE UP (ref 80–100)
NITRITE UR-MCNC: NEGATIVE — SIGNIFICANT CHANGE UP
OSMOLALITY UR: 156 MOSM/KG — LOW (ref 300–1000)
PH UR: 5 — SIGNIFICANT CHANGE UP (ref 5–8)
PLATELET # BLD AUTO: 346 K/UL — SIGNIFICANT CHANGE UP (ref 150–400)
POTASSIUM SERPL-MCNC: 4.5 MMOL/L — SIGNIFICANT CHANGE UP (ref 3.5–5.3)
POTASSIUM SERPL-MCNC: 4.6 MMOL/L — SIGNIFICANT CHANGE UP (ref 3.5–5.3)
POTASSIUM SERPL-SCNC: 4.5 MMOL/L — SIGNIFICANT CHANGE UP (ref 3.5–5.3)
POTASSIUM SERPL-SCNC: 4.6 MMOL/L — SIGNIFICANT CHANGE UP (ref 3.5–5.3)
POTASSIUM UR-SCNC: 11 MMOL/L — SIGNIFICANT CHANGE UP
PROT ?TM UR-MCNC: 21 MG/DL — HIGH (ref 0–12)
PROT SERPL-MCNC: 5.4 G/DL — LOW (ref 6.6–8.7)
PROT UR-MCNC: 15
RBC # BLD: 3.12 M/UL — LOW (ref 4.2–5.8)
RBC # FLD: 13 % — SIGNIFICANT CHANGE UP (ref 10.3–14.5)
RBC CASTS # UR COMP ASSIST: ABNORMAL /HPF (ref 0–4)
SODIUM SERPL-SCNC: 136 MMOL/L — SIGNIFICANT CHANGE UP (ref 135–145)
SODIUM SERPL-SCNC: 137 MMOL/L — SIGNIFICANT CHANGE UP (ref 135–145)
SODIUM UR-SCNC: 30 MMOL/L — SIGNIFICANT CHANGE UP
SP GR SPEC: 1.01 — SIGNIFICANT CHANGE UP (ref 1.01–1.02)
UROBILINOGEN FLD QL: NEGATIVE MG/DL — SIGNIFICANT CHANGE UP
WBC # BLD: 11.67 K/UL — HIGH (ref 3.8–10.5)
WBC # FLD AUTO: 11.67 K/UL — HIGH (ref 3.8–10.5)
WBC UR QL: SIGNIFICANT CHANGE UP /HPF (ref 0–5)

## 2022-12-08 PROCEDURE — 99232 SBSQ HOSP IP/OBS MODERATE 35: CPT

## 2022-12-08 PROCEDURE — 99231 SBSQ HOSP IP/OBS SF/LOW 25: CPT

## 2022-12-08 PROCEDURE — 99233 SBSQ HOSP IP/OBS HIGH 50: CPT

## 2022-12-08 PROCEDURE — 71045 X-RAY EXAM CHEST 1 VIEW: CPT | Mod: 26

## 2022-12-08 RX ADMIN — MEROPENEM 1000 MILLIGRAM(S): 1 INJECTION INTRAVENOUS at 17:13

## 2022-12-08 RX ADMIN — MEROPENEM 1000 MILLIGRAM(S): 1 INJECTION INTRAVENOUS at 06:35

## 2022-12-08 RX ADMIN — Medication 4 MILLIGRAM(S): at 17:14

## 2022-12-08 RX ADMIN — MIDODRINE HYDROCHLORIDE 10 MILLIGRAM(S): 2.5 TABLET ORAL at 14:56

## 2022-12-08 RX ADMIN — MIDODRINE HYDROCHLORIDE 10 MILLIGRAM(S): 2.5 TABLET ORAL at 21:48

## 2022-12-08 RX ADMIN — Medication 50 MILLIGRAM(S): at 06:35

## 2022-12-08 RX ADMIN — TAMSULOSIN HYDROCHLORIDE 0.4 MILLIGRAM(S): 0.4 CAPSULE ORAL at 21:49

## 2022-12-08 RX ADMIN — Medication 50 MILLIGRAM(S): at 12:55

## 2022-12-08 RX ADMIN — CHOLESTYRAMINE 4 GRAM(S): 4 POWDER, FOR SUSPENSION ORAL at 21:48

## 2022-12-08 RX ADMIN — HEPARIN SODIUM 16 UNIT(S)/HR: 5000 INJECTION INTRAVENOUS; SUBCUTANEOUS at 20:22

## 2022-12-08 RX ADMIN — Medication 4 MILLIGRAM(S): at 06:35

## 2022-12-08 RX ADMIN — Medication 500 MILLIGRAM(S): at 10:47

## 2022-12-08 RX ADMIN — CHOLESTYRAMINE 4 GRAM(S): 4 POWDER, FOR SUSPENSION ORAL at 10:48

## 2022-12-08 RX ADMIN — Medication 81 MILLIGRAM(S): at 10:47

## 2022-12-08 RX ADMIN — MIDODRINE HYDROCHLORIDE 10 MILLIGRAM(S): 2.5 TABLET ORAL at 06:35

## 2022-12-08 RX ADMIN — CHLORHEXIDINE GLUCONATE 1 APPLICATION(S): 213 SOLUTION TOPICAL at 10:49

## 2022-12-08 RX ADMIN — Medication 5 MILLIGRAM(S): at 10:48

## 2022-12-08 RX ADMIN — Medication 50 MILLIGRAM(S): at 17:13

## 2022-12-08 RX ADMIN — Medication 1 MILLIGRAM(S): at 10:47

## 2022-12-08 RX ADMIN — Medication 325 MILLIGRAM(S): at 10:47

## 2022-12-08 NOTE — PROGRESS NOTE ADULT - PROBLEM SELECTOR PLAN 4
RADHA noted  renal following for optimization  Continue gentle hydration  avoid nephrotoxic drugs  UA positive, UC pending

## 2022-12-08 NOTE — PROGRESS NOTE ADULT - SUBJECTIVE AND OBJECTIVE BOX
INFECTIOUS DISEASES AND INTERNAL MEDICINE at [place]  =======================================================  Flip Pederson MD  Diplomates [employer] Board of Internal Medicine and Infectious Diseases  Telephone [phone]  Fax            [phone]  =======================================================    GEORGE [id number]      ID f/u- fever, endocarditis  feels better  c diff and gi pcr (-)  cr uptrending-ram placed 2l output      ANTIBIOTICS  meropenem  IVPB 1000 milliGRAM(s) IV Intermittent every 12 hours      Allergies    No Known Allergies    Intolerances        SOCIAL HISTORY:     FAMILY HX   FAMILY HISTORY:  FH: heart disease (Father, Mother)        ICU Vital Signs Last 24 Hrs  Vital Signs Last 24 Hrs  T(C): 36.8 (08 Dec 2022 16:05), Max: 36.9 (07 Dec 2022 21:20)  T(F): 98.2 (08 Dec 2022 16:05), Max: 98.5 (08 Dec 2022 08:00)  HR: 103 (08 Dec 2022 16:05) (76 - 103)  BP: 101/62 (08 Dec 2022 16:05) (101/62 - 129/75)  BP(mean): --  RR: 18 (08 Dec 2022 16:05) (16 - 18)  SpO2: 99% (08 Dec 2022 16:05) (95% - 99%)    Parameters below as of 08 Dec 2022 16:05  Patient On (Oxygen Delivery Method): room air        Parameters below as of 06 Dec 2022 16:06  Patient On (Oxygen Delivery Method): room air            REVIEW OF SYSTEMS:    CONSTITUTIONAL:  As per HPI.    HEENT:  Eyes:  No diplopia or blurred vision. ENT:  No earache, sore throat or runny nose.    CARDIOVASCULAR:  No pressure, squeezing, strangling, tightness, heaviness or aching about the chest, neck, axilla or epigastrium.    RESPIRATORY:  No cough, shortness of breath, PND or orthopnea.    GASTROINTESTINAL:  No nausea, vomiting or diarrhea.    GENITOURINARY:  No dysuria, frequency or urgency.    MUSCULOSKELETAL:  As per HPI.    SKIN:  No change in skin, hair or nails.    NEUROLOGIC:    weakness.                  PHYSICAL EXAMINATION:    GENERAL: nad    HEENT: Head is normocephalic and atraumatic.  ANICTERIC left upper scalp ecchymoses  NECK: Supple. No carotid bruits.  No lymphadenopathy or thyromegaly.    LUNGS :clear BREATH SOUNDS    HEART: Regular rate and rhythm without murmur.    ABDOMEN: Soft, nontender, and nondistended.  Positive bowel sounds.  No hepatosplenomegaly was noted. NO REBOUND NO GUARDING +ram    EXTREMITIES: NO EDEMA NO ERYTHEMA    NEUROLOGIC: NON FOCAL      SKIN: No ulceration or induration present. NO RASH             LABS:                                                  9.7    11.67 )-----------( 346      ( 08 Dec 2022 05:52 )             28.4       12-    136  |  103  |  29.6<H>  ----------------------------<  86  4.5   |  22.0  |  2.53<H>    Ca    8.6      08 Dec 2022 05:52  Mg     2.2     12-    TPro  5.4<L>  /  Alb  2.4<L>  /  TBili  0.6  /  DBili  x   /  AST  19  /  ALT  24  /  AlkPhos  65  12-08              Urinalysis Basic - ( 08 Dec 2022 11:15 )    Color: Latanya / Appearance: Slightly Turbid / S.010 / pH: x  Gluc: x / Ketone: Trace  / Bili: Negative / Urobili: Negative mg/dL   Blood: x / Protein: 15 / Nitrite: Negative   Leuk Esterase: Trace / RBC: 11-25 /HPF / WBC 0-2 /HPF   Sq Epi: x / Non Sq Epi: Few / Bacteria: Few        PTT - ( 08 Dec 2022 05:52 )  PTT:54.0 sec    CARDIAC MARKERS ( 06 Dec 2022 23:50 )  x     / 0.08 ng/mL / x     / x     / x      CARDIAC MARKERS ( 06 Dec 2022 17:25 )  x     / 0.08 ng/mL / x     / x     / x            CAPILLARY BLOOD GLUCOSE                          PT/INR - ( 06 Dec 2022 04:45 )   PT: 13.1 sec;   INR: 1.13 ratio         PTT - ( 06 Dec 2022 17:15 )  PTT:46.5 sec    CARDIAC MARKERS ( 06 Dec 2022 17:25 )  x     / 0.08 ng/mL / x     / x     / x      CARDIAC MARKERS ( 06 Dec 2022 06:55 )  x     / 0.07 ng/mL / 88 U/L / x     / x            CAPILLARY BLOOD GLUCOSE                          Color: Yellow / Appearance: Clear / S.010 / pH: x  Gluc: x / Ketone: Negative  / Bili: Negative / Urobili: Negative mg/dL   Blood: x / Protein: 30 mg/dL / Nitrite: Negative   Leuk Esterase: Negative / RBC: 6-10 /HPF / WBC 0-2 /HPF   Sq Epi: x / Non Sq Epi: Occasional / Bacteria: Few        RADIOLOGY & ADDITIONAL STUDIES:  < from: CT Chest No Cont (22 @ 03:46) >  IMPRESSION:  No pneumonia.  No acute CT findings in the abdomen or pelvis.    VERTEBRAL BODY ANALYSIS: Low bone density as described above, consider   further workup for osteoporosis.        --- End of Report ---        < end of copied text >        < from: LILIAN Echo Doppler (22 @ 14:52) >  Summary:   1. Left ventricular ejection fraction, by visual estimation, is 55 to   60%.   2. Normal global left ventricular systolic function.   3. The mitral in-flow pattern reveals no discernable A-wave, therefore   no comment on diastolic function can be made.   4. Normal right ventricular size and function, inadequate estimation of   RVSP.   5. Mildly enlarged left atrium.   6. Moderate mitral valve regurgitation, jet is eccentric posteriorly   directed.   7. Thickening of bileaflet mitral valve with subcentimeter filamentous  mobile lesions suggestive of vegetations.   8. Color flow doppler and intravenous injection of agitated saline   demonstrates the presence of an intact intra atrial septum.   9. No left atrial appendage thrombus and normal left atrial appendage   velocities.  10. Structurally normal tricuspid valve, with normal leaflet excursion.  11. Heavily calcified noncoronary cusp with moderate aortic valve   stenosis, ELDON (by 2D planimetry 1.34 cm2), peak velocity 2.8 m/s and mean   gradient of 15 mmHg.  12. Mild to moderate aortic regurgitation.  13. There is mild echolucency and thickening of the sinus of Valsalva   inbetween the left and non-coronary cusp, cannot exclude early root   abscess.  14. Mild simple atheromas at the aortic arch/descendingaorta.  15. There is no evidence of pericardial effusion.  16. No prior LILIAN study is available for comparison. 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 Cardioversion   deferred due to the presence of underlying infection/endocarditis.    John Frost DO Electronically signed on 2022 at 3:39:01 PM    < end of copied text >      < from: MR Head No Cont (22 @ 21:35) >  IMPRESSION:  Scattered foci of restricted diffusion in the bilateral parieto-occipital   lobes, suggestive of acute ischemia. No intracranial hemorrhage or mass   effect.    Given the bilateral distribution of involvement, embolic phenomenon is   suggested, with underlying septic emboli not excluded. Further evaluation   by contrast-enhanced MRI may provide further characterization.      --- End of Report ---    < end of copied text >    < from: NM Inflammatory Loc Wholebody WBC, Single Day (22 @ 11:02) >  IMPRESSION: Normal Indium-111 labeled leukocyte scan. No scan evidence of   infection.    --- End of Report ---        < end of copied text >        ASSESSMENT/PLAN    77 year old male with PMHx of recent  COVID  on 10/6/22, HTN , Vit B12 deficiency, presenting to the ED on  with body aches, fatigue and fever (Tmax 102) at home for 12 days found to have streptococcus bacteremia and admitted with unclear source, no recent dental work, skin infections, no respiratory symptoms. Pan scan was negative at that time ( LILIAN was recommended but pt refused) He was treated for Bacteremia ( Blood cultures + streptococcus anginosis pansensitive ) and norovirus with supportive care and contact isolation . He was discharged home on  with IV Rocephin via PICC line. Recommendation was to continue ABX  daily via pic end 22    he  presented to ER after and episode of syncope and collapse, found to be septic, hypotensive, hypoxic and febrile in the ER. He is unsure of mechanism of fall but remembers being on the ground no reported LOC . He was found face-down on bathroom floor buy family with left leg wedged under cabinet. On my interview he is alert and oriented to person place and time, he has a baseline studder in his speech pattern but otherwise neurologically intact without deficit  In response to hypotension he failed to respond to Inital sepsis fluid protocol in ER is required IV pressor in form of levophed to maintain MAP greater than 60 -65. In the ER he was febrile with initial labs significant for WBC of 22.4 , ProCal of 21.2 first lactate 4.9 via abg with repeat post fluids of 2.2 venous sample.   PT admitted to [facility] with septic shock unclear source, now off pressors. Found to have new onset A fib and on amio. 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 ram placed today for retention. if Cr continues to uptrend will need to adjust meropenem further  plan for OR -possible next week    d/w team  will f/u

## 2022-12-08 NOTE — PROGRESS NOTE ADULT - ASSESSMENT
77 year old male with PMH of HTN and Vit B12 deficiency with recent hospitalization for Streptococcus bacteremia and Norovirus discharged home on IV Rocephin via PICC line returned after a syncopal episode. Admitted for septic shock; source of septic shock is unclear. Repeat blood cultures have been negative. 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 Imdium scan was negative. Pan scan with CT with IV contrast and CTA head/neck have been negative as well. Hospital course is notable for Afib with RVR and RADHA. Nephrology is consulted for RADHA.     -RADHA secondary to contrast associated nephropathy + obstructive uropathy   -Baseline creatinine is 0.8-0.9mg/dL  -Latest creatinine have up trended to 2.5g/dL today  -UA 15 protein, large blood, trace leukocyte esterase, few bacteria  -Renal ultrasound - R kidney 12.0 cm with mild fullness of the renal pelvis/hydronephrosis  + L kidney 13.4 cm with mild fullness of the renal pelvis/hydronephrosis; urinary bladder is distended   -Bladder scan today showed > 1300cc   -s/p ram placement today   -Anticipate improvement to renal function after ram placement; however if no improvement to renal function with a.m. labs, then will pursue GN work up (given hematuria)   -BP acceptable   -Maintain strict I/O's     Mat Broderick DO  Nephrology  [facility]  [address] [place] [address]  Office Number [phone]

## 2022-12-08 NOTE — CHART NOTE - NSCHARTNOTEFT_GEN_A_CORE
Tele reviewed, mostly rate controlled AF. No change to our plan from yesterday. d/w CTS team, OR date deferred due to RADHA

## 2022-12-08 NOTE — PROGRESS NOTE ADULT - SUBJECTIVE AND OBJECTIVE BOX
Cr continues to uptrend. Found to have urinary retention on bladder scan today.     Vital Signs Last 24 Hrs  T(C): 36.4 (08 Dec 2022 12:25), Max: 36.9 (07 Dec 2022 21:20)  T(F): 97.6 (08 Dec 2022 12:25), Max: 98.5 (08 Dec 2022 08:00)  HR: 101 (08 Dec 2022 12:25) (76 - 101)  BP: 122/79 (08 Dec 2022 12:25) (112/70 - 129/75)  BP(mean): --  RR: 18 (08 Dec 2022 12:25) (16 - 18)  SpO2: 97% (08 Dec 2022 12:25) (95% - 97%)    Parameters below as of 08 Dec 2022 12:25  Patient On (Oxygen Delivery Method): room air    I&O's Summary    07 Dec 2022 07:01  -  08 Dec 2022 07:00  --------------------------------------------------------  IN: 1338 mL / OUT: 1850 mL / NET: -512 mL    08 Dec 2022 07:01  -  08 Dec 2022 15:08  --------------------------------------------------------  IN: 264 mL / OUT: 1350 mL / NET: -1086 mL    Physical Exam  General: WDWN male in NAD  Cardiac: S1S2 RRR  Respiratory: CTAB  Abdomen: Soft, NT  Extremities: No appreciable edema  Neuro: AAOx3    12-08    136  |  103  |  29.6<H>  ----------------------------<  86  4.5   |  22.0  |  2.53<H>    Ca    8.6      08 Dec 2022 05:52  Mg     2.2     12-08    TPro  5.4<L>  /  Alb  2.4<L>  /  TBili  0.6  /  DBili  x   /  AST  19  /  ALT  24  /  AlkPhos  65  12-08                        9.7    11.67 )-----------( 346      ( 08 Dec 2022 05:52 )             28.4     MEDICATIONS  (STANDING):  ascorbic acid 500 milliGRAM(s) Oral daily  aspirin enteric coated 81 milliGRAM(s) Oral daily  chlorhexidine 2% Cloths 1 Application(s) Topical daily  cholestyramine Powder (Sugar-Free) 4 Gram(s) Oral two times a day  ferrous    sulfate 325 milliGRAM(s) Oral daily  folic acid 1 milliGRAM(s) Oral daily  heparin  Infusion 1350 Unit(s)/Hr (16 mL/Hr) IV Continuous <Continuous>  lactated ringers. 1000 milliLiter(s) (50 mL/Hr) IV Continuous <Continuous>  loperamide 4 milliGRAM(s) Oral every 6 hours  meropenem Injectable 1000 milliGRAM(s) IV Push every 12 hours  metoprolol tartrate 50 milliGRAM(s) Oral every 6 hours  midodrine 10 milliGRAM(s) Oral every 8 hours  phytonadione   Solution 5 milliGRAM(s) Oral daily  tamsulosin 0.4 milliGRAM(s) Oral at bedtime    MEDICATIONS  (PRN):   Cr continues to uptrend. Found to have urinary retention on bladder scan today.     Vital Signs Last 24 Hrs  T(C): 36.4 (08 Dec 2022 12:25), Max: 36.9 (07 Dec 2022 21:20)  T(F): 97.6 (08 Dec 2022 12:25), Max: 98.5 (08 Dec 2022 08:00)  HR: 101 (08 Dec 2022 12:25) (76 - 101)  BP: 122/79 (08 Dec 2022 12:25) (112/70 - 129/75)  BP(mean): --  RR: 18 (08 Dec 2022 12:25) (16 - 18)  SpO2: 97% (08 Dec 2022 12:25) (95% - 97%)    Parameters below as of 08 Dec 2022 12:25  Patient On (Oxygen Delivery Method): room air    I&O's Summary    07 Dec 2022 07:01  -  08 Dec 2022 07:00  --------------------------------------------------------  IN: 1338 mL / OUT: 1850 mL / NET: -512 mL    08 Dec 2022 07:01  -  08 Dec 2022 15:08  --------------------------------------------------------  IN: 264 mL / OUT: 1350 mL / NET: -1086 mL    Physical Exam  General: WDWN male in NAD  Cardiac: S1S2 RRR  Respiratory: CTAB  Abdomen: Soft, NT  Extremities: 2+ pitting edema of b/l LE  Neuro: AAOx3    12-08    136  |  103  |  29.6<H>  ----------------------------<  86  4.5   |  22.0  |  2.53<H>    Ca    8.6      08 Dec 2022 05:52  Mg     2.2     12-08    TPro  5.4<L>  /  Alb  2.4<L>  /  TBili  0.6  /  DBili  x   /  AST  19  /  ALT  24  /  AlkPhos  65  12-08                        9.7    11.67 )-----------( 346      ( 08 Dec 2022 05:52 )             28.4     MEDICATIONS  (STANDING):  ascorbic acid 500 milliGRAM(s) Oral daily  aspirin enteric coated 81 milliGRAM(s) Oral daily  chlorhexidine 2% Cloths 1 Application(s) Topical daily  cholestyramine Powder (Sugar-Free) 4 Gram(s) Oral two times a day  ferrous    sulfate 325 milliGRAM(s) Oral daily  folic acid 1 milliGRAM(s) Oral daily  heparin  Infusion 1350 Unit(s)/Hr (16 mL/Hr) IV Continuous <Continuous>  lactated ringers. 1000 milliLiter(s) (50 mL/Hr) IV Continuous <Continuous>  loperamide 4 milliGRAM(s) Oral every 6 hours  meropenem Injectable 1000 milliGRAM(s) IV Push every 12 hours  metoprolol tartrate 50 milliGRAM(s) Oral every 6 hours  midodrine 10 milliGRAM(s) Oral every 8 hours  phytonadione   Solution 5 milliGRAM(s) Oral daily  tamsulosin 0.4 milliGRAM(s) Oral at bedtime    MEDICATIONS  (PRN):

## 2022-12-08 NOTE — PROGRESS NOTE ADULT - SUBJECTIVE AND OBJECTIVE BOX
Subjective: Patient seen and assessed pre operatively for avr/mvr. Patient states " " At time of exam, Pt denies chest pain, palpitations, dizziness, headache, shortness of breath, abdominal pain or N/V/D/C.    Pertinent events of the past 24 hours: Uneventful, recovering as expected    VITAL SIGNS  Vital Signs Last 24 Hrs  T(C): 36.9 (22 @ 21:20), Max: 37 (22 @ 05:34)  T(F): 98.4 (22 @ 21:20), Max: 98.6 (22 @ 05:34)  HR: 76 (22 @ 23:20) (72 - 100)  BP: 124/76 (22 @ 23:20) (98/64 - 129/75)  RR: 18 (22 @ 23:20) (18 - 18)  SpO2: 96% (22 @ 23:20) (93% - 100%)  on (O2)              Telemetry/Alarms:  AF 90s    MEDICATIONS  ascorbic acid 500 milliGRAM(s) Oral daily  aspirin enteric coated 81 milliGRAM(s) Oral daily  chlorhexidine 2% Cloths 1 Application(s) Topical daily  cholestyramine Powder (Sugar-Free) 4 Gram(s) Oral two times a day  ferrous    sulfate 325 milliGRAM(s) Oral daily  folic acid 1 milliGRAM(s) Oral daily  heparin  Infusion 1350 Unit(s)/Hr IV Continuous <Continuous>  lactated ringers. 1000 milliLiter(s) IV Continuous <Continuous>  loperamide 4 milliGRAM(s) Oral every 6 hours  meropenem Injectable 1000 milliGRAM(s) IV Push every 12 hours  metoprolol tartrate 50 milliGRAM(s) Oral every 6 hours  midodrine 10 milliGRAM(s) Oral every 8 hours  phytonadione   Solution 5 milliGRAM(s) Oral daily  tamsulosin 0.4 milliGRAM(s) Oral at bedtime    PHYSICAL EXAM  Constitutional: NAD  Neuro: A+O x 3, non-focal, speech clear and intact  CV: irregular rhythm, regular rate, +S1S2, +systolic murmur  Pulm/chest: lung sounds CTA and equal bilaterally, no accessory muscle use noted  Abd: +BS, soft, NT, ND  Ext: MEZA x 4, no C/C/E, +pedal pulses   Skin: warm, well perfused  Psych: calm, appropriate affect    Intake and Output   @ :  -   @ 07:00  --------------------------------------------------------  IN: 1227 mL / OUT: 1600 mL / NET: -373 mL     @ 07:  -   @ 03:14  --------------------------------------------------------  IN: 702 mL / OUT: 800 mL / NET: -98 mL    Weights:  Daily     Daily Weight in k.9 (07 Dec 2022 06:07)  Admit Wt: Drug Dosing Weight  Height (cm): 167.6 (2022 14:29)  Weight (kg): 76.8 (2022 14:29)  BMI (kg/m2): 27.3 (2022 14:29)  BSA (m2): 1.86 (2022 14:29)    All laboratory results, radiology and medications reviewed.    LABS      132<L>  |  100  |  28.5<H>  ----------------------------<  103<H>  4.7   |  23.0  |  2.31<H>    Ca    8.6      07 Dec 2022 16:00  Mg     1.9                                        9.7    10.30 )-----------( 301      ( 07 Dec 2022 02:26 )             29.0          PT/INR - ( 06 Dec 2022 04:45 )   PT: 13.1 sec;   INR: 1.13 ratio         PTT - ( 07 Dec 2022 16:00 )  PTT:59.6 sec    < from: US Renal (22 @ 21:53) >    IMPRESSION:    Mild fullness of the renal pelves/hydronephrosis, possibly secondary to a   distended urinary bladder.    Bilateral ureteral jets seen.    Patient unable to void at the time of the study.    < end of copied text >    < from: Xray Chest 1 View- PORTABLE-Routine (Xray Chest 1 View- PORTABLE-Routine in AM.) (22 @ 06:25) >    IMPRESSION:   No radiographic evidence of active chest disease.    < end of copied text >    < from: LILIAN Echo Doppler (22 @ 14:52) >    Summary:   1. Left ventricular ejection fraction, by visual estimation, is 55 to   60%.   2. Normal global left ventricular systolic function.   3. The mitral in-flow pattern reveals no discernable A-wave, therefore   no comment on diastolic function can be made.   4. Normal right ventricular size and function, inadequate estimation of   RVSP.   5. Mildly enlarged left atrium.   6. Moderate mitral valve regurgitation, jet is eccentric posteriorly   directed.   7. Thickening of bileaflet mitral valve with subcentimeter filamentous  mobile lesions suggestive of vegetations.   8. Color flow doppler and intravenous injection of agitated saline   demonstrates the presence of an intact intra atrial septum.   9. No left atrial appendage thrombus and normal left atrial appendage   velocities.  10. Structurally normal tricuspid valve, with normal leaflet excursion.  11. Heavily calcified noncoronary cusp with moderate aortic valve   stenosis, ELDON (by 2D planimetry 1.34 cm2), peak velocity 2.8 m/s and mean   gradient of 15 mmHg.  12. Mild to moderate aortic regurgitation.  13. There is mild echolucency and thickening of the sinus of Valsalva   inbetween the left and non-coronary cusp, cannot exclude early root   abscess.  14. Mild simple atheromas at the aortic arch/descendingaorta.  15. There is no evidence of pericardial effusion.  16. No prior LILIAN study is available for comparison. 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 Cardioversion   deferred due to the presence of underlying infection/endocarditis.    < end of copied text >    < from: 12 Lead ECG (22 @ 03:54) >    Ventricular Rate 138 BPM    Atrial Rate 150 BPM    QRS Duration 102 ms    Q-T Interval 320 ms    QTC Calculation(Bazett) 484 ms    R Axis 23 degrees    T Axis 253 degrees    Diagnosis Line Atrial fibrillation with rapid ventricular response  Septalinfarct , age undetermined  Abnormal ECG    < end of copied text >         Subjective: Patient seen and assessed pre operatively for avr/mvr. Patient asks "whats my creatinine " At time of exam, Pt denies chest pain, palpitations, dizziness, headache, shortness of breath, abdominal pain or N/V/D/C.    Pertinent events of the past 24 hours: Uneventful, recovering as expected    VITAL SIGNS  Vital Signs Last 24 Hrs  T(C): 36.9 (22 @ 21:20), Max: 37 (22 @ 05:34)  T(F): 98.4 (22 @ 21:20), Max: 98.6 (22 @ 05:34)  HR: 76 (22 @ 23:20) (72 - 100)  BP: 124/76 (22 @ 23:20) (98/64 - 129/75)  RR: 18 (22 @ 23:20) (18 - 18)  SpO2: 96% (22 @ 23:20) (93% - 100%)  on (O2)              Telemetry/Alarms:  AF 90s    MEDICATIONS  ascorbic acid 500 milliGRAM(s) Oral daily  aspirin enteric coated 81 milliGRAM(s) Oral daily  chlorhexidine 2% Cloths 1 Application(s) Topical daily  cholestyramine Powder (Sugar-Free) 4 Gram(s) Oral two times a day  ferrous    sulfate 325 milliGRAM(s) Oral daily  folic acid 1 milliGRAM(s) Oral daily  heparin  Infusion 1350 Unit(s)/Hr IV Continuous <Continuous>  lactated ringers. 1000 milliLiter(s) IV Continuous <Continuous>  loperamide 4 milliGRAM(s) Oral every 6 hours  meropenem Injectable 1000 milliGRAM(s) IV Push every 12 hours  metoprolol tartrate 50 milliGRAM(s) Oral every 6 hours  midodrine 10 milliGRAM(s) Oral every 8 hours  phytonadione   Solution 5 milliGRAM(s) Oral daily  tamsulosin 0.4 milliGRAM(s) Oral at bedtime    PHYSICAL EXAM  Constitutional: NAD  Neuro: A+O x 3, non-focal, speech clear and intact  CV: irregular rhythm, regular rate, +S1S2, +systolic murmur  Pulm/chest: lung sounds CTA and equal bilaterally, no accessory muscle use noted  Abd: +BS, soft, NT, ND  Ext: MEZA x 4, no C/C/E, +pedal pulses   Skin: warm, well perfused  Psych: calm, appropriate affect    Intake and Output   @ 07:  -   @ 07:00  --------------------------------------------------------  IN: 1227 mL / OUT: 1600 mL / NET: -373 mL     @ 07:01  -   @ 03:14  --------------------------------------------------------  IN: 702 mL / OUT: 800 mL / NET: -98 mL    Weights:  Daily     Daily Weight in k.9 (07 Dec 2022 06:07)  Admit Wt: Drug Dosing Weight  Height (cm): 167.6 (2022 14:29)  Weight (kg): 76.8 (2022 14:29)  BMI (kg/m2): 27.3 (2022 14:29)  BSA (m2): 1.86 (2022 14:29)    All laboratory results, radiology and medications reviewed.    LABS      132<L>  |  100  |  28.5<H>  ----------------------------<  103<H>  4.7   |  23.0  |  2.31<H>    Ca    8.6      07 Dec 2022 16:00  Mg     1.9                                        9.7    10.30 )-----------( 301      ( 07 Dec 2022 02:26 )             29.0          PT/INR - ( 06 Dec 2022 04:45 )   PT: 13.1 sec;   INR: 1.13 ratio         PTT - ( 07 Dec 2022 16:00 )  PTT:59.6 sec    < from: US Renal (22 @ 21:53) >    IMPRESSION:    Mild fullness of the renal pelves/hydronephrosis, possibly secondary to a   distended urinary bladder.    Bilateral ureteral jets seen.    Patient unable to void at the time of the study.    < end of copied text >    < from: Xray Chest 1 View- PORTABLE-Routine (Xray Chest 1 View- PORTABLE-Routine in AM.) (22 @ 06:25) >    IMPRESSION:   No radiographic evidence of active chest disease.    < end of copied text >    < from: LILIAN Echo Doppler (22 @ 14:52) >    Summary:   1. Left ventricular ejection fraction, by visual estimation, is 55 to   60%.   2. Normal global left ventricular systolic function.   3. The mitral in-flow pattern reveals no discernable A-wave, therefore   no comment on diastolic function can be made.   4. Normal right ventricular size and function, inadequate estimation of   RVSP.   5. Mildly enlarged left atrium.   6. Moderate mitral valve regurgitation, jet is eccentric posteriorly   directed.   7. Thickening of bileaflet mitral valve with subcentimeter filamentous  mobile lesions suggestive of vegetations.   8. Color flow doppler and intravenous injection of agitated saline   demonstrates the presence of an intact intra atrial septum.   9. No left atrial appendage thrombus and normal left atrial appendage   velocities.  10. Structurally normal tricuspid valve, with normal leaflet excursion.  11. Heavily calcified noncoronary cusp with moderate aortic valve   stenosis, ELDON (by 2D planimetry 1.34 cm2), peak velocity 2.8 m/s and mean   gradient of 15 mmHg.  12. Mild to moderate aortic regurgitation.  13. There is mild echolucency and thickening of the sinus of Valsalva   inbetween the left and non-coronary cusp, cannot exclude early root   abscess.  14. Mild simple atheromas at the aortic arch/descendingaorta.  15. There is no evidence of pericardial effusion.  16. No prior LILIAN study is available for comparison. 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 Cardioversion   deferred due to the presence of underlying infection/endocarditis.    < end of copied text >    < from: 12 Lead ECG (22 @ 03:54) >    Ventricular Rate 138 BPM    Atrial Rate 150 BPM    QRS Duration 102 ms    Q-T Interval 320 ms    QTC Calculation(Bazett) 484 ms    R Axis 23 degrees    T Axis 253 degrees    Diagnosis Line Atrial fibrillation with rapid ventricular response  Septalinfarct , age undetermined  Abnormal ECG    < end of copied text >

## 2022-12-09 DIAGNOSIS — R33.9 RETENTION OF URINE, UNSPECIFIED: ICD-10-CM

## 2022-12-09 LAB
ALBUMIN SERPL ELPH-MCNC: 2.6 G/DL — LOW (ref 3.3–5.2)
ALP SERPL-CCNC: 67 U/L — SIGNIFICANT CHANGE UP (ref 40–120)
ALT FLD-CCNC: 23 U/L — SIGNIFICANT CHANGE UP
ANION GAP SERPL CALC-SCNC: 11 MMOL/L — SIGNIFICANT CHANGE UP (ref 5–17)
APTT BLD: 48.6 SEC — HIGH (ref 27.5–35.5)
APTT BLD: 49 SEC — HIGH (ref 27.5–35.5)
APTT BLD: 63.7 SEC — HIGH (ref 27.5–35.5)
AST SERPL-CCNC: 21 U/L — SIGNIFICANT CHANGE UP
BILIRUB SERPL-MCNC: 0.7 MG/DL — SIGNIFICANT CHANGE UP (ref 0.4–2)
BUN SERPL-MCNC: 25.3 MG/DL — HIGH (ref 8–20)
CALCIUM SERPL-MCNC: 9.1 MG/DL — SIGNIFICANT CHANGE UP (ref 8.4–10.5)
CHLORIDE SERPL-SCNC: 105 MMOL/L — SIGNIFICANT CHANGE UP (ref 96–108)
CO2 SERPL-SCNC: 26 MMOL/L — SIGNIFICANT CHANGE UP (ref 22–29)
CREAT SERPL-MCNC: 1.34 MG/DL — HIGH (ref 0.5–1.3)
CULTURE RESULTS: NO GROWTH — SIGNIFICANT CHANGE UP
CULTURE RESULTS: NO GROWTH — SIGNIFICANT CHANGE UP
EGFR: 54 ML/MIN/1.73M2 — LOW
GLUCOSE SERPL-MCNC: 87 MG/DL — SIGNIFICANT CHANGE UP (ref 70–99)
HCT VFR BLD CALC: 30.3 % — LOW (ref 39–50)
HGB BLD-MCNC: 9.8 G/DL — LOW (ref 13–17)
MAGNESIUM SERPL-MCNC: 1.6 MG/DL — LOW (ref 1.8–2.6)
MCHC RBC-ENTMCNC: 30.4 PG — SIGNIFICANT CHANGE UP (ref 27–34)
MCHC RBC-ENTMCNC: 32.3 GM/DL — SIGNIFICANT CHANGE UP (ref 32–36)
MCV RBC AUTO: 94.1 FL — SIGNIFICANT CHANGE UP (ref 80–100)
PLATELET # BLD AUTO: 391 K/UL — SIGNIFICANT CHANGE UP (ref 150–400)
POTASSIUM SERPL-MCNC: 4.4 MMOL/L — SIGNIFICANT CHANGE UP (ref 3.5–5.3)
POTASSIUM SERPL-SCNC: 4.4 MMOL/L — SIGNIFICANT CHANGE UP (ref 3.5–5.3)
PROT SERPL-MCNC: 5.7 G/DL — LOW (ref 6.6–8.7)
RBC # BLD: 3.22 M/UL — LOW (ref 4.2–5.8)
RBC # FLD: 13.2 % — SIGNIFICANT CHANGE UP (ref 10.3–14.5)
SODIUM SERPL-SCNC: 141 MMOL/L — SIGNIFICANT CHANGE UP (ref 135–145)
SPECIMEN SOURCE: SIGNIFICANT CHANGE UP
SPECIMEN SOURCE: SIGNIFICANT CHANGE UP
WBC # BLD: 8.46 K/UL — SIGNIFICANT CHANGE UP (ref 3.8–10.5)
WBC # FLD AUTO: 8.46 K/UL — SIGNIFICANT CHANGE UP (ref 3.8–10.5)

## 2022-12-09 PROCEDURE — 99233 SBSQ HOSP IP/OBS HIGH 50: CPT

## 2022-12-09 PROCEDURE — 71045 X-RAY EXAM CHEST 1 VIEW: CPT | Mod: 26

## 2022-12-09 PROCEDURE — 99232 SBSQ HOSP IP/OBS MODERATE 35: CPT

## 2022-12-09 RX ORDER — MEROPENEM 1 G/30ML
1000 INJECTION INTRAVENOUS EVERY 8 HOURS
Refills: 0 | Status: DISCONTINUED | OUTPATIENT
Start: 2022-12-09 | End: 2022-12-09

## 2022-12-09 RX ORDER — SODIUM CHLORIDE 9 MG/ML
1000 INJECTION, SOLUTION INTRAVENOUS
Refills: 0 | Status: DISCONTINUED | OUTPATIENT
Start: 2022-12-09 | End: 2022-12-10

## 2022-12-09 RX ORDER — MEROPENEM 1 G/30ML
1000 INJECTION INTRAVENOUS EVERY 8 HOURS
Refills: 0 | Status: DISCONTINUED | OUTPATIENT
Start: 2022-12-09 | End: 2022-12-13

## 2022-12-09 RX ORDER — MAGNESIUM SULFATE 500 MG/ML
2 VIAL (ML) INJECTION
Refills: 0 | Status: COMPLETED | OUTPATIENT
Start: 2022-12-09 | End: 2022-12-09

## 2022-12-09 RX ORDER — SACCHAROMYCES BOULARDII 250 MG
250 POWDER IN PACKET (EA) ORAL
Refills: 0 | Status: DISCONTINUED | OUTPATIENT
Start: 2022-12-09 | End: 2022-12-13

## 2022-12-09 RX ADMIN — Medication 81 MILLIGRAM(S): at 09:16

## 2022-12-09 RX ADMIN — TAMSULOSIN HYDROCHLORIDE 0.4 MILLIGRAM(S): 0.4 CAPSULE ORAL at 22:07

## 2022-12-09 RX ADMIN — CHOLESTYRAMINE 4 GRAM(S): 4 POWDER, FOR SUSPENSION ORAL at 22:06

## 2022-12-09 RX ADMIN — Medication 500 MILLIGRAM(S): at 09:16

## 2022-12-09 RX ADMIN — Medication 4 MILLIGRAM(S): at 16:09

## 2022-12-09 RX ADMIN — SODIUM CHLORIDE 50 MILLILITER(S): 9 INJECTION, SOLUTION INTRAVENOUS at 12:29

## 2022-12-09 RX ADMIN — Medication 50 MILLIGRAM(S): at 05:52

## 2022-12-09 RX ADMIN — MIDODRINE HYDROCHLORIDE 10 MILLIGRAM(S): 2.5 TABLET ORAL at 22:07

## 2022-12-09 RX ADMIN — HEPARIN SODIUM 17 UNIT(S)/HR: 5000 INJECTION INTRAVENOUS; SUBCUTANEOUS at 12:30

## 2022-12-09 RX ADMIN — Medication 5 MILLIGRAM(S): at 09:17

## 2022-12-09 RX ADMIN — HEPARIN SODIUM 18 UNIT(S)/HR: 5000 INJECTION INTRAVENOUS; SUBCUTANEOUS at 16:10

## 2022-12-09 RX ADMIN — MIDODRINE HYDROCHLORIDE 10 MILLIGRAM(S): 2.5 TABLET ORAL at 12:29

## 2022-12-09 RX ADMIN — Medication 40 MILLIGRAM(S): at 15:18

## 2022-12-09 RX ADMIN — Medication 50 MILLIGRAM(S): at 16:09

## 2022-12-09 RX ADMIN — MEROPENEM 1000 MILLIGRAM(S): 1 INJECTION INTRAVENOUS at 05:52

## 2022-12-09 RX ADMIN — Medication 325 MILLIGRAM(S): at 09:17

## 2022-12-09 RX ADMIN — Medication 25 GRAM(S): at 16:09

## 2022-12-09 RX ADMIN — MIDODRINE HYDROCHLORIDE 10 MILLIGRAM(S): 2.5 TABLET ORAL at 05:52

## 2022-12-09 RX ADMIN — Medication 1 MILLIGRAM(S): at 09:17

## 2022-12-09 RX ADMIN — Medication 25 GRAM(S): at 14:06

## 2022-12-09 RX ADMIN — Medication 250 MILLIGRAM(S): at 16:09

## 2022-12-09 RX ADMIN — Medication 4 MILLIGRAM(S): at 06:03

## 2022-12-09 RX ADMIN — Medication 50 MILLIGRAM(S): at 11:09

## 2022-12-09 RX ADMIN — CHOLESTYRAMINE 4 GRAM(S): 4 POWDER, FOR SUSPENSION ORAL at 09:17

## 2022-12-09 RX ADMIN — MEROPENEM 1000 MILLIGRAM(S): 1 INJECTION INTRAVENOUS at 22:06

## 2022-12-09 RX ADMIN — CHLORHEXIDINE GLUCONATE 1 APPLICATION(S): 213 SOLUTION TOPICAL at 09:20

## 2022-12-09 RX ADMIN — Medication 50 MILLIGRAM(S): at 00:50

## 2022-12-09 RX ADMIN — SODIUM CHLORIDE 75 MILLILITER(S): 9 INJECTION, SOLUTION INTRAVENOUS at 17:32

## 2022-12-09 RX ADMIN — Medication 4 MILLIGRAM(S): at 00:50

## 2022-12-09 RX ADMIN — Medication 4 MILLIGRAM(S): at 11:10

## 2022-12-09 NOTE — PROGRESS NOTE ADULT - SUBJECTIVE AND OBJECTIVE BOX
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    PAST MEDICAL & SURGICAL HISTORY:  Hypertension  Aortic stenosis  H/O inguinal hernia repair, B/L   S/P laparoscopic colectomy, right colectomy with ileotransverse anastomosis    FAMILY HISTORY:  FH: heart disease (Father, Mother)    Brief Hospital Course: 77M, pmhx HTN, moderate AS, recent Covid infection 10/6/2022 per chart, recent admission  for SIRS, found to have strep anginosus bactermia, norovirus, TTE neg for endocarditis, refused LILIAN that admission, was started on ceftriaxone to complete abx , however pt presented  with syncope, found to have AF RVR, sepsis, copious diarrhea requiring rectal tube, now s/p LILIAN with 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 LHC  with clean coronaries. Cleared by neurosurgery for OR. RADHA noted (renal following for optimization).    Significant recent/past 24 hr events: RADHA post cath noted with urinary retention. Ram placed  for >1L residual urine in bladder.     Subjective: Patient lying in bed in NAD. +Tolerating diet. +Passing gas. No pain elicited at this time. Denies fevers, chills, lightheadedness, dizziness, HA, CP, palpitations, SOB, cough, abdominal pain, N/V, diarrhea, numbness/tingling in extremities, or any other acute complaints. ROS negative x 10 systems except as noted above.    MEDICATIONS  (STANDING):  ascorbic acid 500 milliGRAM(s) Oral daily  aspirin enteric coated 81 milliGRAM(s) Oral daily  chlorhexidine 2% Cloths 1 Application(s) Topical daily  cholestyramine Powder (Sugar-Free) 4 Gram(s) Oral two times a day  ferrous    sulfate 325 milliGRAM(s) Oral daily  folic acid 1 milliGRAM(s) Oral daily  heparin  Infusion 1350 Unit(s)/Hr (16 mL/Hr) IV Continuous   lactated ringers. 1000 milliLiter(s) (50 mL/Hr) IV Continuous   loperamide 4 milliGRAM(s) Oral every 6 hours  meropenem Injectable 1000 milliGRAM(s) IV Push every 12 hours  metoprolol tartrate 50 milliGRAM(s) Oral every 6 hours  midodrine 10 milliGRAM(s) Oral every 8 hours  phytonadione   Solution 5 milliGRAM(s) Oral daily  tamsulosin 0.4 milliGRAM(s) Oral at bedtime    Allergies: No Known Allergies    Vitals   T(C): 36.5 (09 Dec 2022 00:48), Max: 37 (08 Dec 2022 21:14)  T(F): 97.7 (09 Dec 2022 00:48), Max: 98.6 (08 Dec 2022 21:14)  HR: 95 (09 Dec 2022 00:48) (80 - 103)  BP: 122/74 (09 Dec 2022 00:48) (96/59 - 122/79)  RR: 18 (09 Dec 2022 00:48) (16 - 18)  SpO2: 95% (09 Dec 2022 00:48) (95% - 100%)  O2 Parameters below as of 09 Dec 2022 00:48  Patient On (Oxygen Delivery Method): room air    I&O's Detail    07 Dec 2022 07:01  -  08 Dec 2022 07:00  --------------------------------------------------------  IN:    Heparin: 208 mL    Lactated Ringers: 650 mL    Oral Fluid: 480 mL  Total IN: 1338 mL    OUT:    Voided (mL): 1850 mL  Total OUT: 1850 mL    Total NET: -512 mL      08 Dec 2022 07:01  -  09 Dec 2022 03:43  --------------------------------------------------------  IN:    Heparin: 352 mL    IV PiggyBack: 100 mL    Lactated Ringers: 1100 mL    Oral Fluid: 440 mL  Total IN: 1992 mL    OUT:    Indwelling Catheter - Urethral (mL): 4150 mL  Total OUT: 4150 mL    Total NET: -2158 mL    PHYSICAL EXAM  Constitutional: NAD  Neuro: A+O x 3, non-focal, speech clear and intact  HEENT:  NCAT, No conjuctival edema or icterus, no thrush.    Neck:  Supple, trachea midline  CV: irregular rhythm, regular rate, +S1S2, +systolic murmur  Pulm/chest: lung sounds CTA and equal bilaterally, no accessory muscle use noted  Abd: +BS, soft, NT, ND  : ram in situ to bedside drainage  Ext: MEZA x 4, no edema, no cyanosis or clubbing, distal motor/neuro/circ intact  Skin: warm, perfused  Psych: calm, appropriate affect    LABS                        9.7    11.67 )-----------( 346      ( 08 Dec 2022 05:52 )             28.4     12-    137  |  101  |  28.9<H>  ----------------------------<  84  4.6   |  25.0  |  2.15<H>    Ca    8.8      08 Dec 2022 16:25  Mg     2.2     12-08    TPro  5.4<L>  /  Alb  2.4<L>  /  TBili  0.6  /  DBili  x   /  AST  19  /  ALT  24  /  AlkPhos  65  12-08    PTT - ( 08 Dec 2022 05:52 )  PTT:54.0 sec    Urinalysis Basic - ( 08 Dec 2022 11:15 )  Color: Latanya / Appearance: Slightly Turbid / S.010 / pH: x  Gluc: x / Ketone: Trace  / Bili: Negative / Urobili: Negative mg/dL   Blood: x / Protein: 15 / Nitrite: Negative   Leuk Esterase: Trace / RBC: 11-25 /HPF / WBC 0-2 /HPF   Sq Epi: x / Non Sq Epi: Few / Bacteria: Few    Last CXR:  < from: Xray Chest 1 View- PORTABLE-Routine (Xray Chest 1 View- PORTABLE-Routine in AM.) (22 @ 05:27) >  INTERPRETATION:  Heart size and the mediastinum cannot be accurately evaluated on this projection. Low lung volumes.  There are new bilateral lower lung hazy opacities. No pneumothorax is seen.  IMPRESSION:  Low lung volumes with new bilateral lower lung hazy opacities on the most recent image, possibly attributable to subsegmental atelectasis or small layering pleural effusions with associated passive atelectasis.  < end of copied text >    Cardiac Cath:  < from: Cardiac Catheterization (22 @ 16:47) >  Diagnostic Findings:   Coronary Angiography   LM   Left main artery: The segment is visually normal in size and structure.  LAD   Left anterior descending artery: Angiography shows mild atherosclerosis.  CX   Circumflex: The segment is large, dominant. Angiography shows mild atherosclerosis.  RCA   Right coronary artery: Angiography shows mild atherosclerosis.    < end of copied text >    Renal US:  < from: US Renal (22 @ 21:53) >  IMPRESSION:  Mild fullness of the renal pelves/hydronephrosis, possibly secondary to a distended urinary bladder.  Bilateral ureteral jets seen.  Patient unable to void at the time of the study.  < end of copied text >    LILIAN:  < from: LILIAN Echo Doppler (22 @ 14:52) >  Summary:   1. Left ventricular ejection fraction, by visual estimation, is 55 to 60%.   2. Normal global left ventricular systolic function.   3. The mitral in-flow pattern reveals no discernable A-wave, therefore no comment on diastolic function can be made.   4. Normal right ventricular size and function, inadequate estimation of RVSP.   5. Mildly enlarged left atrium.   6. Moderate mitral valve regurgitation, jet is eccentric posteriorly directed.   7. Thickening of bileaflet mitral valve with subcentimeter filamentous mobile lesions suggestive of vegetations.   8. Color flow doppler and intravenous injection of agitated saline demonstrates the presence of an intact intra atrial septum.   9. No left atrial appendage thrombus and normal left atrial appendage velocities.  10. Structurally normal tricuspid valve, with normal leaflet excursion.  11. Heavily calcified noncoronary cusp with moderate aortic valve stenosis, ELDON (by 2D planimetry 1.34 cm2), peak velocity 2.8 m/s and mean gradient of 15 mmHg.  12. Mild to moderate aortic regurgitation.  13. There is mild echolucency and thickening of the sinus of Valsalva in between the left and non-coronary cusp, cannot exclude early root abscess.  14. Mild simple atheromas at the aortic arch/descending aorta.  15. There is no evidence of pericardial effusion.  16. No prior LILIAN study is available for comparison. 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 Cardioversion deferred due to the presence of underlying infection/endocarditis.  < end of copied text >

## 2022-12-09 NOTE — PROGRESS NOTE ADULT - ASSESSMENT
77 year old male with PMH of HTN and Vit B12 deficiency with recent hospitalization for Streptococcus bacteremia and Norovirus discharged home on IV Rocephin via PICC line returned after a syncopal episode. Admitted for septic shock; source of septic shock is unclear. Repeat blood cultures have been negative. 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 Imdium scan was negative. Pan scan with CT with IV contrast and CTA head/neck have been negative as well. Hospital course is notable for Afib with RVR and RADHA. Nephrology is consulted for RADHA.     RADHA secondary to obstructive uropathy / ? component of BRY    -Baseline creatinine is 0.8-0.9mg/dL  -SCr peaked at 2.5, now improving to 1.3  -UA 15 protein, large blood, trace leukocyte esterase, few bacteria  -Renal ultrasound - R kidney 12.0 cm with mild fullness of the renal pelvis/hydronephrosis  + L kidney 13.4 cm with mild fullness of the renal pelvis/hydronephrosis; urinary bladder is distended . 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 Admitted for septic shock; source of septic shock is unclear. Repeat blood cultures have been negative. 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 Imdium scan was negative. Pan scan with CT with IV contrast and CTA head/neck have been negative as well. Hospital course is notable for Afib with RVR and RADHA. Nephrology is consulted for RADHA.     RADHA secondary to obstructive uropathy / ? component of BRY    -Baseline creatinine is 0.8-0.9mg/dL  -SCr peaked at 2.5, now improving to 1.3  -UA 15 protein, large blood, trace leukocyte esterase, few bacteria  -Renal ultrasound - R kidney 12.0 cm with mild fullness of the renal pelvis/hydronephrosis  + L kidney 13.4 cm with mild fullness of the renal pelvis/hydronephrosis; urinary bladder is distended . 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

## 2022-12-09 NOTE — CHART NOTE - NSCHARTNOTEFT_GEN_A_CORE
Source: Patient [ x ]  Family [ ]   other [ ]    Current Diet: Diet, DASH/TLC:   Sodium & Cholesterol Restricted  High Fiber (HIFIBER) (12-01-22 @ 09:31)    PO intake:  < 50% [ ]   50-75%  [ ]   %  [ x ]      Source for PO intake [ x ] Patient [ x ] family [ x ] chart [ ] staff [ ] other    Weight History:  (12/9) 168.4lbs  (12/8) 192lbs  (12/7) 185.9lbs  (12/6) 195.3lbs  (12/5) 189.5lbs  (12/4) 189.9lbs  (12/3) 192.2lbs  (11/29) 195.4lbs  *wt changes observed; will monitored    Pertinent Medications: MEDICATIONS  (STANDING):  ascorbic acid 500 milliGRAM(s) Oral daily  aspirin enteric coated 81 milliGRAM(s) Oral daily  chlorhexidine 2% Cloths 1 Application(s) Topical daily  cholestyramine Powder (Sugar-Free) 4 Gram(s) Oral two times a day  ferrous    sulfate 325 milliGRAM(s) Oral daily  folic acid 1 milliGRAM(s) Oral daily  heparin  Infusion 1350 Unit(s)/Hr (18 mL/Hr) IV Continuous <Continuous>  lactated ringers. 1000 milliLiter(s) (50 mL/Hr) IV Continuous <Continuous>  loperamide 4 milliGRAM(s) Oral every 6 hours  meropenem Injectable 1000 milliGRAM(s) IV Push every 12 hours  metoprolol tartrate 50 milliGRAM(s) Oral every 6 hours  midodrine 10 milliGRAM(s) Oral every 8 hours  phytonadione   Solution 5 milliGRAM(s) Oral daily  tamsulosin 0.4 milliGRAM(s) Oral at bedtime    MEDICATIONS  (PRN):    Pertinent Labs: CBC Full  -  ( 09 Dec 2022 06:01 )  WBC Count : 8.46 K/uL  RBC Count : 3.22 M/uL  Hemoglobin : 9.8 g/dL  Hematocrit : 30.3 %  Platelet Count - Automated : 391 K/uL  Mean Cell Volume : 94.1 fl  Mean Cell Hemoglobin : 30.4 pg  Mean Cell Hemoglobin Concentration : 32.3 gm/dL    Nutrition Related Labs: 12-09 Na141 mmol/L Glu 87 mg/dL K+ 4.4 mmol/L Cr  1.34 mg/dL<H> BUN 25.3 mg/dL<H> Phos n/a   Alb 2.6 g/dL<L> PAB n/a       Skin: no edema noted    Nutrition focused physical exam previously conducted - found signs of malnutrition [ ]absent [ x]present    Subcutaneous fat loss: [ x] Orbital fat pads region, [x ]Buccal fat region, [ ]Triceps region,  [ ]Ribs region    Muscle wasting: [ x]Temples region, [ x]Clavicle region, [ x]Shoulder region, [ ]Scapula region, [ ]Interosseous region,  [ ]thigh region, [ ]Calf region    Estimated Needs:   [x ] no change since previous assessment  [ ] recalculated:     Current Nutrition Diagnosis: Pt remains at high nutrition risk secondary to malnutrition (severe, chronic) related to inability to meet sufficient protein-energy in setting of multiple comorbidities, recent COVID, norovirus, endocarditis, and 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 Patient reports improvement in BM, resolved diarrhea per patient and RN. d/c rectal tube. Patient reports sudden wt loss ~2 years ago 2/2 decreased PO intake. States he lives with his wife and both cook often, has family nearby who provide assistance. Recommended nutrition supplementations for home. Patient in understanding. RD to remain available.     Recommendations:   1) Consider LOW FIBER diet therapy to better meet nutrient needs  2) RX: MVI daily; continue florastor; continue supplementations  3) Encourage HBV proteins; Monitor PO intake and wts  4) Monitor nutrition related labs and hydration status    Monitoring and Evaluation:   [ x ] PO intake [ ] Tolerance to diet prescription [X] Weights  [X] Follow up per protocol [X] Labs:

## 2022-12-09 NOTE — PROGRESS NOTE ADULT - PROBLEM SELECTOR PLAN 4
RADHA noted.  Renal following for optimization.  Continue gentle hydration.  Avoid nephrotoxic drugs.  UA positive, UCx pending.

## 2022-12-09 NOTE — PROGRESS NOTE ADULT - SUBJECTIVE AND OBJECTIVE BOX
INFECTIOUS DISEASES AND INTERNAL MEDICINE at [place]  =======================================================  Flip Pederson MD  Diplomates American Board of Internal Medicine and Infectious Diseases  Telephone [phone]  Fax            [phone]  =======================================================    GEORGE [id number]      ID f/u- fever, endocarditis  feels better  C diff and GI PCR (-)  ram placed and cr downtrending 1.34 today      ANTIBIOTICS  meropenem  IVPB 1000 milliGRAM(s) IV Intermittent every 12 hours      Allergies    No Known Allergies    Intolerances      ICU Vital Signs Last 24 Hrs  Vital Signs Last 24 Hrs  T(C): 36.6 (09 Dec 2022 11:00), Max: 37 (08 Dec 2022 21:14)  T(F): 97.8 (09 Dec 2022 11:00), Max: 98.6 (08 Dec 2022 21:14)  HR: 94 (09 Dec 2022 12:00) (87 - 103)  BP: 104/64 (09 Dec 2022 12:00) (96/59 - 122/74)  BP(mean): --  RR: 18 (09 Dec 2022 11:00) (17 - 18)  SpO2: 94% (09 Dec 2022 11:00) (94% - 100%)    Parameters below as of 09 Dec 2022 11:00  Patient On (Oxygen Delivery Method): room air          REVIEW OF SYSTEMS:    CONSTITUTIONAL:  As per HPI.    HEENT:  Eyes:  No diplopia or blurred vision. ENT:  No earache, sore throat or runny nose.    CARDIOVASCULAR:  No pressure, squeezing, strangling, tightness, heaviness or aching about the chest, neck, axilla or epigastrium.    RESPIRATORY:  No cough, shortness of breath, PND or orthopnea.    GASTROINTESTINAL:  No nausea, vomiting or diarrhea.    GENITOURINARY:  No dysuria, frequency or urgency.    MUSCULOSKELETAL:  As per HPI.    SKIN:  No change in skin, hair or nails.    NEUROLOGIC: as per hpi        PHYSICAL EXAMINATION:    GENERAL: nad    HEENT: Head is normocephalic and atraumatic.  ANICTERIC   NECK: Supple. No carotid bruits.  No lymphadenopathy or thyromegaly.    LUNGS :clear BREATH SOUNDS    HEART: Regular rate and rhythm without murmur.    ABDOMEN: Soft, nontender, and nondistended.  Positive bowel sounds.  No hepatosplenomegaly was noted. NO REBOUND NO GUARDING + ram dark urine     EXTREMITIES: NO EDEMA NO ERYTHEMA    NEUROLOGIC: NON FOCAL      SKIN: No ulceration or induration present. NO RASH             LABS:                                          9.8    8.46  )-----------( 391      ( 09 Dec 2022 06:01 )             30.3           141  |  105  |  25.3<H>  ----------------------------<  87  4.4   |  26.0  |  1.34<H>    Ca    9.1      09 Dec 2022 06:01  Mg     1.6         TPro  5.7<L>  /  Alb  2.6<L>  /  TBili  0.7  /  DBili  x   /  AST  21  /  ALT  23  /  AlkPhos  67                  Color: Yellow / Appearance: Clear / S.010 / pH: x  Gluc: x / Ketone: Negative  / Bili: Negative / Urobili: Negative mg/dL   Blood: x / Protein: 30 mg/dL / Nitrite: Negative   Leuk Esterase: Negative / RBC: 6-10 /HPF / WBC 0-2 /HPF   Sq Epi: x / Non Sq Epi: Occasional / Bacteria: Few        RADIOLOGY & ADDITIONAL STUDIES:  < from: CT Chest No Cont (22 @ 03:46) >  IMPRESSION:  No pneumonia.  No acute CT findings in the abdomen or pelvis.    VERTEBRAL BODY ANALYSIS: Low bone density as described above, consider   further workup for osteoporosis.        --- End of Report ---        < end of copied text >        < from: LILIAN Echo Doppler (22 @ 14:52) >  Summary:   1. Left ventricular ejection fraction, by visual estimation, is 55 to   60%.   2. Normal global left ventricular systolic function.   3. The mitral in-flow pattern reveals no discernable A-wave, therefore   no comment on diastolic function can be made.   4. Normal right ventricular size and function, inadequate estimation of   RVSP.   5. Mildly enlarged left atrium.   6. Moderate mitral valve regurgitation, jet is eccentric posteriorly   directed.   7. Thickening of bileaflet mitral valve with subcentimeter filamentous  mobile lesions suggestive of vegetations.   8. Color flow doppler and intravenous injection of agitated saline   demonstrates the presence of an intact intra atrial septum.   9. No left atrial appendage thrombus and normal left atrial appendage   velocities.  10. Structurally normal tricuspid valve, with normal leaflet excursion.  11. Heavily calcified noncoronary cusp with moderate aortic valve   stenosis, ELDON (by 2D planimetry 1.34 cm2), peak velocity 2.8 m/s and mean   gradient of 15 mmHg.  12. Mild to moderate aortic regurgitation.  13. There is mild echolucency and thickening of the sinus of Valsalva   inbetween the left and non-coronary cusp, cannot exclude early root   abscess.  14. Mild simple atheromas at the aortic arch/descendingaorta.  15. There is no evidence of pericardial effusion.  16. No prior LILIAN study is available for comparison. 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 Cardioversion   deferred due to the presence of underlying infection/endocarditis.    John Frost DO Electronically signed on 2022 at 3:39:01 PM    < end of copied text >      < from: MR Head No Cont (22 @ 21:35) >  IMPRESSION:  Scattered foci of restricted diffusion in the bilateral parieto-occipital   lobes, suggestive of acute ischemia. No intracranial hemorrhage or mass   effect.    Given the bilateral distribution of involvement, embolic phenomenon is   suggested, with underlying septic emboli not excluded. Further evaluation   by contrast-enhanced MRI may provide further characterization.      --- End of Report ---    < end of copied text >    < from: NM Inflammatory Loc Wholebody WBC, Single Day (22 @ 11:02) >  IMPRESSION: Normal Indium-111 labeled leukocyte scan. No scan evidence of   infection.    --- End of Report ---        < end of copied text >        < from: MR Head No Cont (22 @ 21:35) >  IMPRESSION:  Scattered foci of restricted diffusion in the bilateral parieto-occipital   lobes, suggestive of acute ischemia. No intracranial hemorrhage or mass   effect.    Given the bilateral distribution of involvement, embolic phenomenon is   suggested, with underlying septic emboli not excluded. Further evaluation   by contrast-enhanced MRI may provide further characterization.    < end of copied text >          ASSESSMENT/PLAN    77 year old male with PMHx of recent  COVID  on 10/6/22, HTN , Vit B12 deficiency, presenting to the ED on  with body aches, fatigue and fever (Tmax 102) at home for 12 days found to have streptococcus bacteremia and admitted with unclear source, no recent dental work, skin infections, no respiratory symptoms. Pan scan was negative at that time ( LILIAN was recommended but pt refused) He was treated for Bacteremia ( Blood cultures + streptococcus anginosis pansensitive ) and norovirus with supportive care and contact isolation . He was discharged home on  with IV Rocephin via PICC line. Recommendation was to continue ABX  daily via pic end 22    he  presented to ER after and episode of syncope and collapse, found to be septic, hypotensive, hypoxic and febrile in the ER. He is unsure of mechanism of fall but remembers being on the ground no reported LOC . He was found face-down on bathroom floor buy family with left leg wedged under cabinet. On my interview he is alert and oriented to person place and time, he has a baseline studder in his speech pattern but otherwise neurologically intact without deficit  In response to hypotension he failed to respond to Inital sepsis fluid protocol in ER is required IV pressor in form of levophed to maintain MAP greater than 60 -65. In the ER he was febrile with initial labs significant for WBC of 22.4 , ProCal of 21.2 first lactate 4.9 via abg with repeat post fluids of 2.2 venous sample.   PT admitted to [facility] with septic shock unclear source, now off pressors. Found to have new onset A fib and on amio. 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

## 2022-12-09 NOTE — PROGRESS NOTE ADULT - ASSESSMENT
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 LHC 12/5 with clean coronaries. Cleared by neurosurgery for OR. RADHA noted (renal following for optimization).

## 2022-12-09 NOTE — CHART NOTE - NSCHARTNOTEFT_GEN_A_CORE
Tele reviewed. Remains in AF w/ VR 80s-90s bpm, rarely >105 bpm.   Recs:   - Continue rate control strategy for now. Rhythm control limited by need for surgical intervention requiring interruption in a/c.   - Rate control for goal HR <120bpm. Brief salvos with RVR are acceptable and not unexpected.    - Continue Lopressor 50mg q6h.   - Continue Heparin gtt to achieve and maintain target PTT 60-80.   - Continue to avoid Digoxin.  - Renal function improving. Anticipate surgery early next week.     D/W Dr. Monterroso.

## 2022-12-09 NOTE — PROGRESS NOTE ADULT - PROBLEM SELECTOR PLAN 1
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  Preoperative workup for AVR, MVR, ESTEFANY clipping, Ablation next week underway.   Blood cultures 11/27 remain NGTD.  Continue Merrem per ID.    [facility] with clean coronaries  No cerebral angio needed per neurosurgery. Cleared for OR,  Plan to be discussed with Dr. Flanagan and CTS team in AM rounds.

## 2022-12-09 NOTE — PROGRESS NOTE ADULT - SUBJECTIVE AND OBJECTIVE BOX
[facility] DIVISION OF KIDNEY DISEASES AND HYPERTENSION -- FOLLOW UP NOTE  --------------------------------------------------------------------------------  Chief Complaint: Arturo    24 hour events/subjective:  s/p yo yesterday- UOP~ 4.7 L over 24 jpits      PAST HISTORY  --------------------------------------------------------------------------------  No significant changes to PMH, PSH, FHx, SHx, unless otherwise noted    ALLERGIES & MEDICATIONS  --------------------------------------------------------------------------------  Allergies  No Known Allergies      Standing Inpatient Medications  ascorbic acid 500 milliGRAM(s) Oral daily  aspirin enteric coated 81 milliGRAM(s) Oral daily  chlorhexidine 2% Cloths 1 Application(s) Topical daily  cholestyramine Powder (Sugar-Free) 4 Gram(s) Oral two times a day  ferrous    sulfate 325 milliGRAM(s) Oral daily  folic acid 1 milliGRAM(s) Oral daily  heparin  Infusion 1350 Unit(s)/Hr IV Continuous <Continuous>  lactated ringers. 1000 milliLiter(s) IV Continuous <Continuous>  loperamide 4 milliGRAM(s) Oral every 6 hours  meropenem Injectable 1000 milliGRAM(s) IV Push every 12 hours  metoprolol tartrate 50 milliGRAM(s) Oral every 6 hours  midodrine 10 milliGRAM(s) Oral every 8 hours  phytonadione   Solution 5 milliGRAM(s) Oral daily  tamsulosin 0.4 milliGRAM(s) Oral at bedtime    PRN Inpatient Medications      REVIEW OF SYSTEMS  --------------------------------------------------------------------------------  Gen: No weight changes, fatigue, fevers/chills, weakness  Skin: No rashes  Head/Eyes/Ears/Mouth: No headache; Normal hearing; Normal vision w/o blurriness; No sinus pain/discomfort, sore throat  Respiratory: No dyspnea, cough, wheezing, hemoptysis  CV: No chest pain, PND, orthopnea  GI: No abdominal pain, diarrhea, constipation, nausea, vomiting, melena, hematochezia  : No increased frequency, dysuria, hematuria+  MSK: No joint pain/swelling; no back pain; LE edema+  Neuro: No dizziness/lightheadedness, weakness, seizures, numbness, tingling  Heme: No easy bruising or bleeding  Endo: No heat/cold intolerance  Psych: No significant nervousness, anxiety, stress, depression    All other systems were reviewed and are negative, except as noted.    VITALS/PHYSICAL EXAM  --------------------------------------------------------------------------------  T(C): 36.4 (12-09-22 @ 08:00), Max: 37 (12-08-22 @ 21:14)  HR: 99 (12-09-22 @ 08:00) (87 - 103)  BP: 102/63 (12-09-22 @ 08:00) (96/59 - 122/79)  RR: 17 (12-09-22 @ 08:00) (17 - 18)  SpO2: 97% (12-09-22 @ 08:00) (94% - 100%)  Wt(kg): --        12-08-22 @ 07:01  -  12-09-22 @ 07:00  --------------------------------------------------------  IN: 2530 mL / OUT: 4750 mL / NET: -2220 mL      Physical Exam:  	Gen: NAD,  	HEENT:Supple neck, clear oropharynx  	Pulm: CTA B/L  	CV: RRR, S1S2; no rub  	Abd: +BS, soft, nontender/nondistended  	: ram with red urine  	UE: Warm,  	LE: Warm, edema  	Neuro: No focal deficit  	Psych: Normal affect and mood  	Skin: Warm    LABS/STUDIES  --------------------------------------------------------------------------------              9.8    8.46  >-----------<  391      [12-09-22 @ 06:01]              30.3     141  |  105  |  25.3  ----------------------------<  87      [12-09-22 @ 06:01]  4.4   |  26.0  |  1.34        Ca     9.1     [12-09-22 @ 06:01]      Mg     1.6     [12-09-22 @ 06:01]    TPro  5.7  /  Alb  2.6  /  TBili  0.7  /  DBili  x   /  AST  21  /  ALT  23  /  AlkPhos  67  [12-09-22 @ 06:01]      PTT: 49.0       [12-09-22 @ 06:01]      Creatinine Trend:  SCr 1.34 [12-09 @ 06:01]  SCr 2.15 [12-08 @ 16:25]  SCr 2.53 [12-08 @ 05:52]  SCr 2.31 [12-07 @ 16:00]  SCr 2.15 [12-07 @ 02:26]    Urinalysis - [12-08-22 @ 11:15]      Color Latanya / Appearance Slightly Turbid / SG 1.010 / pH 5.0      Gluc Negative / Ketone Trace  / Bili Negative / Urobili Negative       Blood Large / Protein 15 / Leuk Est Trace / Nitrite Negative      RBC 11-25 / WBC 0-2 / Hyaline  / Gran  / Sq Epi  / Non Sq Epi Few / Bacteria Few    Urine Creatinine 52      [12-08-22 @ 11:30]  Urine Protein 21.0      [12-08-22 @ 11:30]  Urine Sodium 30      [12-08-22 @ 11:30]  Urine Potassium 11      [12-08-22 @ 11:30]  Urine Osmolality 156      [12-08-22 @ 11:30]    Vitamin D (25OH) 74.5      [11-03-22 @ 02:10]  HbA1c 5.3      [06-17-19 @ 08:30]  TSH 1.95      [11-03-22 @ 02:10]       [facility] DIVISION OF KIDNEY DISEASES AND HYPERTENSION -- FOLLOW UP NOTE  --------------------------------------------------------------------------------  Chief Complaint: Arturo    24 hour events/subjective:  s/p yo yesterday- UOP~ 4.7 L over 24 hours      PAST HISTORY  --------------------------------------------------------------------------------  No significant changes to PMH, PSH, FHx, SHx, unless otherwise noted    ALLERGIES & MEDICATIONS  --------------------------------------------------------------------------------  Allergies  No Known Allergies      Standing Inpatient Medications  ascorbic acid 500 milliGRAM(s) Oral daily  aspirin enteric coated 81 milliGRAM(s) Oral daily  chlorhexidine 2% Cloths 1 Application(s) Topical daily  cholestyramine Powder (Sugar-Free) 4 Gram(s) Oral two times a day  ferrous    sulfate 325 milliGRAM(s) Oral daily  folic acid 1 milliGRAM(s) Oral daily  heparin  Infusion 1350 Unit(s)/Hr IV Continuous <Continuous>  lactated ringers. 1000 milliLiter(s) IV Continuous <Continuous>  loperamide 4 milliGRAM(s) Oral every 6 hours  meropenem Injectable 1000 milliGRAM(s) IV Push every 12 hours  metoprolol tartrate 50 milliGRAM(s) Oral every 6 hours  midodrine 10 milliGRAM(s) Oral every 8 hours  phytonadione   Solution 5 milliGRAM(s) Oral daily  tamsulosin 0.4 milliGRAM(s) Oral at bedtime    PRN Inpatient Medications      REVIEW OF SYSTEMS  --------------------------------------------------------------------------------  Gen: No weight changes, fatigue, fevers/chills, weakness  Skin: No rashes  Head/Eyes/Ears/Mouth: No headache; Normal hearing; Normal vision w/o blurriness; No sinus pain/discomfort, sore throat  Respiratory: No dyspnea, cough, wheezing, hemoptysis  CV: No chest pain, PND, orthopnea  GI: No abdominal pain, diarrhea, constipation, nausea, vomiting, melena, hematochezia  : No increased frequency, dysuria, hematuria+  MSK: No joint pain/swelling; no back pain; LE edema+  Neuro: No dizziness/lightheadedness, weakness, seizures, numbness, tingling  Heme: No easy bruising or bleeding  Endo: No heat/cold intolerance  Psych: No significant nervousness, anxiety, stress, depression    All other systems were reviewed and are negative, except as noted.    VITALS/PHYSICAL EXAM  --------------------------------------------------------------------------------  T(C): 36.4 (12-09-22 @ 08:00), Max: 37 (12-08-22 @ 21:14)  HR: 99 (12-09-22 @ 08:00) (87 - 103)  BP: 102/63 (12-09-22 @ 08:00) (96/59 - 122/79)  RR: 17 (12-09-22 @ 08:00) (17 - 18)  SpO2: 97% (12-09-22 @ 08:00) (94% - 100%)  Wt(kg): --        12-08-22 @ 07:01  -  12-09-22 @ 07:00  --------------------------------------------------------  IN: 2530 mL / OUT: 4750 mL / NET: -2220 mL      Physical Exam:  	Gen: NAD,  	HEENT:Supple neck, clear oropharynx  	Pulm: CTA B/L  	CV: RRR, S1S2; no rub  	Abd: +BS, soft, nontender/nondistended  	: ram with red urine  	UE: Warm,  	LE: Warm, edema  	Neuro: No focal deficit  	Psych: Normal affect and mood  	Skin: Warm    LABS/STUDIES  --------------------------------------------------------------------------------              9.8    8.46  >-----------<  391      [12-09-22 @ 06:01]              30.3     141  |  105  |  25.3  ----------------------------<  87      [12-09-22 @ 06:01]  4.4   |  26.0  |  1.34        Ca     9.1     [12-09-22 @ 06:01]      Mg     1.6     [12-09-22 @ 06:01]    TPro  5.7  /  Alb  2.6  /  TBili  0.7  /  DBili  x   /  AST  21  /  ALT  23  /  AlkPhos  67  [12-09-22 @ 06:01]      PTT: 49.0       [12-09-22 @ 06:01]      Creatinine Trend:  SCr 1.34 [12-09 @ 06:01]  SCr 2.15 [12-08 @ 16:25]  SCr 2.53 [12-08 @ 05:52]  SCr 2.31 [12-07 @ 16:00]  SCr 2.15 [12-07 @ 02:26]    Urinalysis - [12-08-22 @ 11:15]      Color Latanya / Appearance Slightly Turbid / SG 1.010 / pH 5.0      Gluc Negative / Ketone Trace  / Bili Negative / Urobili Negative       Blood Large / Protein 15 / Leuk Est Trace / Nitrite Negative      RBC 11-25 / WBC 0-2 / Hyaline  / Gran  / Sq Epi  / Non Sq Epi Few / Bacteria Few    Urine Creatinine 52      [12-08-22 @ 11:30]  Urine Protein 21.0      [12-08-22 @ 11:30]  Urine Sodium 30      [12-08-22 @ 11:30]  Urine Potassium 11      [12-08-22 @ 11:30]  Urine Osmolality 156      [12-08-22 @ 11:30]    Vitamin D (25OH) 74.5      [11-03-22 @ 02:10]  HbA1c 5.3      [06-17-19 @ 08:30]  TSH 1.95      [11-03-22 @ 02:10]

## 2022-12-10 LAB
ANION GAP SERPL CALC-SCNC: 14 MMOL/L — SIGNIFICANT CHANGE UP (ref 5–17)
APTT BLD: 66.6 SEC — HIGH (ref 27.5–35.5)
APTT BLD: 69.6 SEC — HIGH (ref 27.5–35.5)
BUN SERPL-MCNC: 21.4 MG/DL — HIGH (ref 8–20)
CALCIUM SERPL-MCNC: 9.3 MG/DL — SIGNIFICANT CHANGE UP (ref 8.4–10.5)
CHLORIDE SERPL-SCNC: 99 MMOL/L — SIGNIFICANT CHANGE UP (ref 96–108)
CO2 SERPL-SCNC: 29 MMOL/L — SIGNIFICANT CHANGE UP (ref 22–29)
CREAT SERPL-MCNC: 1.13 MG/DL — SIGNIFICANT CHANGE UP (ref 0.5–1.3)
EGFR: 67 ML/MIN/1.73M2 — SIGNIFICANT CHANGE UP
GLUCOSE SERPL-MCNC: 99 MG/DL — SIGNIFICANT CHANGE UP (ref 70–99)
HCT VFR BLD CALC: 31.9 % — LOW (ref 39–50)
HCT VFR BLD CALC: 32.2 % — LOW (ref 39–50)
HGB BLD-MCNC: 10.3 G/DL — LOW (ref 13–17)
HGB BLD-MCNC: 10.4 G/DL — LOW (ref 13–17)
MAGNESIUM SERPL-MCNC: 1.6 MG/DL — SIGNIFICANT CHANGE UP (ref 1.6–2.6)
MCHC RBC-ENTMCNC: 30.3 PG — SIGNIFICANT CHANGE UP (ref 27–34)
MCHC RBC-ENTMCNC: 30.5 PG — SIGNIFICANT CHANGE UP (ref 27–34)
MCHC RBC-ENTMCNC: 32 GM/DL — SIGNIFICANT CHANGE UP (ref 32–36)
MCHC RBC-ENTMCNC: 32.6 GM/DL — SIGNIFICANT CHANGE UP (ref 32–36)
MCV RBC AUTO: 93.5 FL — SIGNIFICANT CHANGE UP (ref 80–100)
MCV RBC AUTO: 94.7 FL — SIGNIFICANT CHANGE UP (ref 80–100)
PLATELET # BLD AUTO: 371 K/UL — SIGNIFICANT CHANGE UP (ref 150–400)
PLATELET # BLD AUTO: 398 K/UL — SIGNIFICANT CHANGE UP (ref 150–400)
POTASSIUM SERPL-MCNC: 3.9 MMOL/L — SIGNIFICANT CHANGE UP (ref 3.5–5.3)
POTASSIUM SERPL-SCNC: 3.9 MMOL/L — SIGNIFICANT CHANGE UP (ref 3.5–5.3)
RBC # BLD: 3.4 M/UL — LOW (ref 4.2–5.8)
RBC # BLD: 3.41 M/UL — LOW (ref 4.2–5.8)
RBC # FLD: 13.2 % — SIGNIFICANT CHANGE UP (ref 10.3–14.5)
RBC # FLD: 13.2 % — SIGNIFICANT CHANGE UP (ref 10.3–14.5)
SODIUM SERPL-SCNC: 142 MMOL/L — SIGNIFICANT CHANGE UP (ref 135–145)
WBC # BLD: 11.94 K/UL — HIGH (ref 3.8–10.5)
WBC # BLD: 8.94 K/UL — SIGNIFICANT CHANGE UP (ref 3.8–10.5)
WBC # FLD AUTO: 11.94 K/UL — HIGH (ref 3.8–10.5)
WBC # FLD AUTO: 8.94 K/UL — SIGNIFICANT CHANGE UP (ref 3.8–10.5)

## 2022-12-10 PROCEDURE — 99232 SBSQ HOSP IP/OBS MODERATE 35: CPT | Mod: 24

## 2022-12-10 PROCEDURE — 99232 SBSQ HOSP IP/OBS MODERATE 35: CPT

## 2022-12-10 RX ORDER — POTASSIUM CHLORIDE 20 MEQ
40 PACKET (EA) ORAL ONCE
Refills: 0 | Status: COMPLETED | OUTPATIENT
Start: 2022-12-10 | End: 2022-12-10

## 2022-12-10 RX ORDER — MAGNESIUM SULFATE 500 MG/ML
2 VIAL (ML) INJECTION ONCE
Refills: 0 | Status: DISCONTINUED | OUTPATIENT
Start: 2022-12-10 | End: 2022-12-10

## 2022-12-10 RX ORDER — MAGNESIUM SULFATE 500 MG/ML
2 VIAL (ML) INJECTION ONCE
Refills: 0 | Status: COMPLETED | OUTPATIENT
Start: 2022-12-10 | End: 2022-12-10

## 2022-12-10 RX ORDER — DIGOXIN 250 MCG
250 TABLET ORAL DAILY
Refills: 0 | Status: DISCONTINUED | OUTPATIENT
Start: 2022-12-11 | End: 2022-12-13

## 2022-12-10 RX ORDER — MIDODRINE HYDROCHLORIDE 2.5 MG/1
15 TABLET ORAL THREE TIMES A DAY
Refills: 0 | Status: DISCONTINUED | OUTPATIENT
Start: 2022-12-10 | End: 2022-12-13

## 2022-12-10 RX ORDER — MIDODRINE HYDROCHLORIDE 2.5 MG/1
5 TABLET ORAL ONCE
Refills: 0 | Status: COMPLETED | OUTPATIENT
Start: 2022-12-10 | End: 2022-12-10

## 2022-12-10 RX ORDER — DIGOXIN 250 MCG
250 TABLET ORAL ONCE
Refills: 0 | Status: COMPLETED | OUTPATIENT
Start: 2022-12-10 | End: 2022-12-10

## 2022-12-10 RX ORDER — METOPROLOL TARTRATE 50 MG
25 TABLET ORAL EVERY 6 HOURS
Refills: 0 | Status: DISCONTINUED | OUTPATIENT
Start: 2022-12-10 | End: 2022-12-11

## 2022-12-10 RX ADMIN — Medication 250 MILLIGRAM(S): at 17:44

## 2022-12-10 RX ADMIN — CHOLESTYRAMINE 4 GRAM(S): 4 POWDER, FOR SUSPENSION ORAL at 12:28

## 2022-12-10 RX ADMIN — Medication 500 MILLIGRAM(S): at 12:27

## 2022-12-10 RX ADMIN — Medication 4 MILLIGRAM(S): at 12:27

## 2022-12-10 RX ADMIN — Medication 50 MILLIGRAM(S): at 06:01

## 2022-12-10 RX ADMIN — MIDODRINE HYDROCHLORIDE 5 MILLIGRAM(S): 2.5 TABLET ORAL at 17:45

## 2022-12-10 RX ADMIN — MEROPENEM 1000 MILLIGRAM(S): 1 INJECTION INTRAVENOUS at 14:36

## 2022-12-10 RX ADMIN — MIDODRINE HYDROCHLORIDE 10 MILLIGRAM(S): 2.5 TABLET ORAL at 05:57

## 2022-12-10 RX ADMIN — Medication 5 MILLIGRAM(S): at 12:44

## 2022-12-10 RX ADMIN — Medication 81 MILLIGRAM(S): at 12:26

## 2022-12-10 RX ADMIN — CHOLESTYRAMINE 4 GRAM(S): 4 POWDER, FOR SUSPENSION ORAL at 21:08

## 2022-12-10 RX ADMIN — Medication 50 MILLIGRAM(S): at 01:25

## 2022-12-10 RX ADMIN — Medication 4 MILLIGRAM(S): at 17:45

## 2022-12-10 RX ADMIN — Medication 250 MICROGRAM(S): at 17:45

## 2022-12-10 RX ADMIN — Medication 25 GRAM(S): at 06:43

## 2022-12-10 RX ADMIN — MIDODRINE HYDROCHLORIDE 15 MILLIGRAM(S): 2.5 TABLET ORAL at 21:08

## 2022-12-10 RX ADMIN — Medication 325 MILLIGRAM(S): at 12:28

## 2022-12-10 RX ADMIN — TAMSULOSIN HYDROCHLORIDE 0.4 MILLIGRAM(S): 0.4 CAPSULE ORAL at 21:09

## 2022-12-10 RX ADMIN — CHLORHEXIDINE GLUCONATE 1 APPLICATION(S): 213 SOLUTION TOPICAL at 12:28

## 2022-12-10 RX ADMIN — MEROPENEM 1000 MILLIGRAM(S): 1 INJECTION INTRAVENOUS at 05:56

## 2022-12-10 RX ADMIN — MIDODRINE HYDROCHLORIDE 10 MILLIGRAM(S): 2.5 TABLET ORAL at 12:41

## 2022-12-10 RX ADMIN — Medication 250 MILLIGRAM(S): at 05:57

## 2022-12-10 RX ADMIN — MEROPENEM 1000 MILLIGRAM(S): 1 INJECTION INTRAVENOUS at 21:08

## 2022-12-10 RX ADMIN — Medication 4 MILLIGRAM(S): at 01:25

## 2022-12-10 RX ADMIN — Medication 4 MILLIGRAM(S): at 06:01

## 2022-12-10 RX ADMIN — Medication 40 MILLIEQUIVALENT(S): at 12:27

## 2022-12-10 RX ADMIN — Medication 1 MILLIGRAM(S): at 12:28

## 2022-12-10 NOTE — CONSULT NOTE ADULT - PROVIDER SPECIALTY LIST ADULT
Gastroenterology
Electrophysiology
Infectious Disease
Cardiology
Intervent Cardiology
Nephrology
Neurology
Intervent Cardiology
CT Surgery
Urology

## 2022-12-10 NOTE — PROGRESS NOTE ADULT - ASSESSMENT
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 LHC 12/5 with clean coronaries. Cleared by neurosurgery for OR. Urinary retention and RADHA post cath now resolving since ram placed 12/8.

## 2022-12-10 NOTE — PROGRESS NOTE ADULT - PROBLEM SELECTOR PLAN 1
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  Preoperative workup for AVR, MVR, ESTEFANY clipping underway.   Blood cultures 11/27 remain NGTD.  Continue Merrem per ID.    [facility] with clean coronaries  No cerebral angio needed per neurosurgery. Cleared for OR.  OR for AVR, MVR, ESTEFANY clipping scheduled for Tuesday 12/13.  Plan to be discussed with Dr. Flanagan and CTS team in AM rounds.

## 2022-12-10 NOTE — PROGRESS NOTE ADULT - SUBJECTIVE AND OBJECTIVE BOX
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    PAST MEDICAL & SURGICAL HISTORY:  Hypertension  Aortic stenosis  H/O inguinal hernia repair, B/L   S/P laparoscopic colectomy, right colectomy with ileotransverse anastomosis    FAMILY HISTORY:  FH: heart disease (Father, Mother)    Brief Hospital Course: 77M, pmhx HTN, moderate AS, recent Covid infection 10/6/2022 per chart, recent admission  for SIRS, found to have strep anginosus bactermia, norovirus, TTE neg for endocarditis, refused LILIAN that admission, was started on ceftriaxone to complete abx , however pt presented  with syncope, found to have AF RVR, sepsis, copious diarrhea requiring rectal tube, now s/p LILIAN with 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 LHC  with clean coronaries. Cleared by neurosurgery for OR. RADHA noted (renal following for optimization).    Significant recent/past 24 hr events: Urinary retention and RADHA post cath now resolving since ram placed .     Subjective: Patient lying in bed in NAD. +Tolerating diet. +Passing gas. No pain elicited at this time. Denies fevers, chills, lightheadedness, dizziness, HA, CP, palpitations, SOB, cough, abdominal pain, N/V, diarrhea, numbness/tingling in extremities, or any other acute complaints. ROS negative x 10 systems except as noted above.    MEDICATIONS  (STANDING):  ascorbic acid 500 milliGRAM(s) Oral daily  aspirin enteric coated 81 milliGRAM(s) Oral daily  chlorhexidine 2% Cloths 1 Application(s) Topical daily  cholestyramine Powder (Sugar-Free) 4 Gram(s) Oral two times a day  ferrous    sulfate 325 milliGRAM(s) Oral daily  folic acid 1 milliGRAM(s) Oral daily  heparin  Infusion 1350 Unit(s)/Hr (18 mL/Hr) IV Continuous   loperamide 4 milliGRAM(s) Oral every 6 hours  meropenem Injectable 1000 milliGRAM(s) IV Push every 8 hours  metoprolol tartrate 50 milliGRAM(s) Oral every 6 hours  midodrine 10 milliGRAM(s) Oral every 8 hours  phytonadione   Solution 5 milliGRAM(s) Oral daily  saccharomyces boulardii 250 milliGRAM(s) Oral two times a day  sodium chloride 0.45%. 1000 milliLiter(s) (75 mL/Hr) IV Continuous  tamsulosin 0.4 milliGRAM(s) Oral at bedtime    Allergies: No Known Allergies    Vitals   T(C): 36.6 (10 Dec 2022 01:20), Max: 36.7 (09 Dec 2022 16:36)  T(F): 97.8 (10 Dec 2022 01:20), Max: 98.1 (09 Dec 2022 16:36)  HR: 107 (10 Dec 2022 01:20) (84 - 107)  BP: 102/66 (10 Dec 2022 01:) (102/63 - 115/71)  RR: 18 (10 Dec 2022 01:) (17 - 18)  SpO2: 96% (10 Dec 2022 01:20) (94% - 98%)  O2 Parameters below as of 10 Dec 2022 01:20  Patient On (Oxygen Delivery Method): room air    I&O's Detail    08 Dec 2022 07:01  -  09 Dec 2022 07:00  --------------------------------------------------------  IN:    Heparin: 400 mL    IV PiggyBack: 100 mL    Lactated Ringers: 1250 mL    Oral Fluid: 780 mL  Total IN: 2530 mL    OUT:    Indwelling Catheter - Urethral (mL): 4150 mL    Voided (mL): 600 mL  Total OUT: 4750 mL    Total NET: -2220 mL      09 Dec 2022 07:01  -  10 Dec 2022 03:47  --------------------------------------------------------  IN:    Heparin: 162 mL    Oral Fluid: 200 mL    sodium chloride 0.45%: 675 mL  Total IN: 1037 mL    OUT:    Indwelling Catheter - Urethral (mL): 5700 mL  Total OUT: 5700 mL    Total NET: -4663 mL    PHYSICAL EXAM  Constitutional: NAD  Neuro: A+O x 3, non-focal, speech clear and intact  HEENT:  NCAT, No conjuctival edema or icterus, no thrush.    Neck:  Supple, trachea midline  CV: irregular rhythm, regular rate, +S1S2, +systolic murmur  Pulm/chest: lung sounds CTA and equal bilaterally, no accessory muscle use noted  Abd: +BS, soft, NT, ND  : ram in situ to bedside drainage  Ext: MEZA x 4, no edema, no cyanosis or clubbing, distal motor/neuro/circ intact  Skin: warm, perfused  Psych: calm, appropriate affect    LABS                        9.8    8.46  )-----------( 391      ( 09 Dec 2022 06:01 )             30.3         141  |  105  |  25.3<H>  ----------------------------<  87  4.4   |  26.0  |  1.34<H>    Ca    9.1      09 Dec 2022 06:01  Mg     1.6         TPro  5.7<L>  /  Alb  2.6<L>  /  TBili  0.7  /  DBili  x   /  AST  21  /  ALT  23  /  AlkPhos  67  12    PTT - ( 10 Dec 2022 00:44 )  PTT:69.6 sec    Urinalysis Basic - ( 08 Dec 2022 11:15 )  Color: Latanya / Appearance: Slightly Turbid / S.010 / pH: x  Gluc: x / Ketone: Trace  / Bili: Negative / Urobili: Negative mg/dL   Blood: x / Protein: 15 / Nitrite: Negative   Leuk Esterase: Trace / RBC: 11-25 /HPF / WBC 0-2 /HPF   Sq Epi: x / Non Sq Epi: Few / Bacteria: Few      Last CXR:  < from: Xray Chest 1 View- PORTABLE-Routine (Xray Chest 1 View- PORTABLE-Routine in AM.) (22 @ 05:11) >  FINDINGS:  CATHETERS AND TUBES: None  PULMONARY: The visualized lungs are clear of airspace consolidations or pleural effusions. No pneumothorax.  HEART/VASCULAR: The heart and mediastinum size and configuration are within normal limits.  BONES: Visualized osseous structures are intact.  IMPRESSION: No radiographic evidence of active chest disease.  < end of copied text >    Cardiac Cath:  < from: Cardiac Catheterization (22 @ 16:47) >  Diagnostic Findings:   Coronary Angiography   LM   Left main artery: The segment is visually normal in size and structure.  LAD   Left anterior descending artery: Angiography shows mild atherosclerosis.  CX   Circumflex: The segment is large, dominant. Angiography shows mild atherosclerosis.  RCA   Right coronary artery: Angiography shows mild atherosclerosis.    < end of copied text >    Renal US:  < from: US Renal (22 @ 21:53) >  IMPRESSION:  Mild fullness of the renal pelves/hydronephrosis, possibly secondary to a distended urinary bladder.  Bilateral ureteral jets seen.  Patient unable to void at the time of the study.  < end of copied text >    LILIAN:  < from: LILIAN Echo Doppler (22 @ 14:52) >  Summary:   1. Left ventricular ejection fraction, by visual estimation, is 55 to 60%.   2. Normal global left ventricular systolic function.   3. The mitral in-flow pattern reveals no discernable A-wave, therefore no comment on diastolic function can be made.   4. Normal right ventricular size and function, inadequate estimation of RVSP.   5. Mildly enlarged left atrium.   6. Moderate mitral valve regurgitation, jet is eccentric posteriorly directed.   7. Thickening of bileaflet mitral valve with subcentimeter filamentous mobile lesions suggestive of vegetations.   8. Color flow doppler and intravenous injection of agitated saline demonstrates the presence of an intact intra atrial septum.   9. No left atrial appendage thrombus and normal left atrial appendage velocities.  10. Structurally normal tricuspid valve, with normal leaflet excursion.  11. Heavily calcified noncoronary cusp with moderate aortic valve stenosis, ELDON (by 2D planimetry 1.34 cm2), peak velocity 2.8 m/s and mean gradient of 15 mmHg.  12. Mild to moderate aortic regurgitation.  13. There is mild echolucency and thickening of the sinus of Valsalva in between the left and non-coronary cusp, cannot exclude early root abscess.  14. Mild simple atheromas at the aortic arch/descending aorta.  15. There is no evidence of pericardial effusion.  16. No prior LILIAN study is available for comparison. 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 Cardioversion deferred due to the presence of underlying infection/endocarditis.  < end of copied text >    Indium Scan:  < from: NM Inflammatory Loc Wholebody WBC, Single Day (22 @ 11:02) >  IMPRESSION: Normal Indium-111 labeled leukocyte scan. No scan evidence of   infection.  --- End of Report ---  < end of copied text >    MRI Head:  < from: MR Head No Cont (22 @ 21:35) >  IMPRESSION:  Scattered foci of restricted diffusion in the bilateral parieto-occipital lobes, suggestive of acute ischemia. No intracranial hemorrhage or mass effect.  Given the bilateral distribution of involvement, embolic phenomenon is suggested, with underlying septic emboli not excluded. Further evaluation by contrast-enhanced MRI may provide further characterization.  < end of copied text >

## 2022-12-10 NOTE — PROGRESS NOTE ADULT - ASSESSMENT
78 YO M w/ recent hospitalization for Streptococcus bacteremia and Norovirus discharged home on IV Rocephin via PICC line returned after a syncopal episode. Admitted for septic shock; source of septic shock is unclear. Repeat blood cultures have been negative. 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 Pan scan with CT with IV contrast and CTA head/neck have been negative as well. Hospital course is notable for Afib with RVR and RADHA. Nephrology is consulted for RADHA.     1. Acute kidney Injury  -Baseline creatinine is 0.8-0.9mg/dL  -UA bland,   -Renal US Mild fullness of the renal pelves/hydronephrosis, possibly secondary to a distended urinary bladder  -RADHA is likely secondary to contrast associated nephropathy now resolved back to baseline    2. Hypertension  -WNL    3. Afib - rate controlled; currently on metoprolol    Nephrology will sign off, thanks for the consult.

## 2022-12-10 NOTE — CONSULT NOTE ADULT - CONSULT REASON
Evaluation of atrial fibrillation
Cardiac cath
Diarrhea
RADHA
Stroke evaluation
Cardiac Cath
SHOCK   ON HOME IV ABX
Hematuria
Afib RVR, Bacteremia
MV endocarditis, aortic root abscess

## 2022-12-10 NOTE — PROGRESS NOTE ADULT - PROBLEM SELECTOR PLAN 4
RADHA noted post cath with urinary retention, now resolving after Barrios placed 12/8.   Renal following for optimization.  Continue gentle hydration.  Avoid nephrotoxic drugs.  UCx negative 12/8.

## 2022-12-10 NOTE — CONSULT NOTE ADULT - ASSESSMENT
77M with hematuria    - as discussed with CT Surg NP, hematuria can be both prolonged bladder overdistention followed by contraction after ram placement as well as the possibility of ram trauma last pm while pt. was sleeping all while on therapeutic Heparin gtt  - no acute surgical intervention required  - no need to exchange ram at this time either  - agree with stopping heparin for now  - irrigate ram prn if clots become larger and block ram  - Monitor H/H, currently uptrending despite the hematuria  - CT Surgery NP and bedside RN instructed to call Urology with any problems  - will follow

## 2022-12-10 NOTE — PROGRESS NOTE ADULT - SUBJECTIVE AND OBJECTIVE BOX
Reason for visit:     Subjective: No acute overnight event. Patient denied any cardiac or urinary complains. No fever/chills.     ROS: All systems were reviewed in detail pertinent positive and negative mentioned above, rest are negative.    Physical Exam:  Gen: no acute distress  MS: alert, conversing normally  Eyes: EOMI, no icterus  HENT: NCAT, MMM  CV: rhythm reg reg, rate normal  Chest: CTAB, no w/r/r,  Abd: soft, NT, ND  Extremities: No edema    =======================================================  Vital Signs Last 24 Hrs  T(C): 36.4 (10 Dec 2022 15:32), Max: 36.7 (09 Dec 2022 21:27)  T(F): 97.6 (10 Dec 2022 15:32), Max: 98.1 (09 Dec 2022 21:27)  HR: 122 (10 Dec 2022 15:32) (84 - 122)  BP: 90/64 (10 Dec 2022 15:32) (87/60 - 115/71)  BP(mean): --  RR: 18 (10 Dec 2022 15:32) (17 - 18)  SpO2: 95% (10 Dec 2022 15:32) (95% - 98%)    Parameters below as of 10 Dec 2022 15:32  Patient On (Oxygen Delivery Method): room air      I&O's Summary    09 Dec 2022 07:01  -  10 Dec 2022 07:00  --------------------------------------------------------  IN: 1157 mL / OUT: 7700 mL / NET: -6543 mL      =======================================================  Current Antibiotics:  meropenem Injectable 1000 milliGRAM(s) IV Push every 8 hours    Other medications:  ascorbic acid 500 milliGRAM(s) Oral daily  aspirin enteric coated 81 milliGRAM(s) Oral daily  chlorhexidine 2% Cloths 1 Application(s) Topical daily  cholestyramine Powder (Sugar-Free) 4 Gram(s) Oral two times a day  ferrous    sulfate 325 milliGRAM(s) Oral daily  folic acid 1 milliGRAM(s) Oral daily  loperamide 4 milliGRAM(s) Oral every 6 hours  midodrine 10 milliGRAM(s) Oral every 8 hours  phytonadione   Solution 5 milliGRAM(s) Oral daily  saccharomyces boulardii 250 milliGRAM(s) Oral two times a day  tamsulosin 0.4 milliGRAM(s) Oral at bedtime    =======================================================  12-10    142  |  99  |  21.4<H>  ----------------------------<  99  3.9   |  29.0  |  1.13    Ca    9.3      10 Dec 2022 06:20  Mg     1.6     12-10    TPro  5.7<L>  /  Alb  2.6<L>  /  TBili  0.7  /  DBili  x   /  AST  21  /  ALT  23  /  AlkPhos  67  12-09    Creatinine, Serum: 1.13 mg/dL (12-10-22 @ 06:20)  Creatinine, Serum: 1.34 mg/dL (12-09-22 @ 06:01)  Creatinine, Serum: 2.15 mg/dL (12-08-22 @ 16:25)  Creatinine, Serum: 2.53 mg/dL (12-08-22 @ 05:52)  Creatinine, Serum: 2.31 mg/dL (12-07-22 @ 16:00)  Creatinine, Serum: 2.15 mg/dL (12-07-22 @ 02:26)  Creatinine, Serum: 1.96 mg/dL (12-06-22 @ 02:30)      =======================================================

## 2022-12-10 NOTE — CONSULT NOTE ADULT - NS ATTEND AMEND GEN_ALL_CORE FT
As above  monitor urine output  trend labs  maintain ram  irrigate prn
Agree with above  pt with moderate AS, AF, and recent bacteremia. Admitted with postural dizziness and low BP with NSR. Went into AF with RVR.   Suggest to start oral metoprolol at low dose q 6 hours PO, plus 5 mg iv prn every 4-6 hours for HR above 125 bpm > 2 hours. Given concerns about active infection and soft BP on admission, no need for strict HR control. Given suboptimal anticoagulation (PTT low), then suggest to hold amiodarone till LILIAN is done and no other invasive tests/procedures are planned for at least 3-4 weeks. If any concerns about IE, then should NOT do DCCV and c/w rate control.    will follow up
Patient seen and examined at bedside. Agree with above. He had a colonoscopy in 2019 requiring right hemicolectomy for TVA. Stool work up so far negative. May start imodium 2mg (max dose 16mg per day) and cholestyramine 4gm BID. Continue care per ICU team.
Patient was seen and examined by me. History and exam as documented above by PA/NP was confirmed by me.  Agree with plan as outlined above.
1) Septic shock:  h/o strep anginosus bacteremia.  has been on abx for several weeks via PICC at home. Blood cx negative to date, unclear source  TTE unchanged from previous, no vegetation or echodensity noted.  Keep NPO after midnight for LILIAN tomorrow patient.  2) Atrial fibrillation, new onset: h RVR to 130s.  [id number] 3 (age, HTN).  TTE preserved EF 55-60%, mod MR, mod AS, unchanged from previous  normal exercise stress test in 2021. On IBV hep and IV amio. Unable to add further GDMT, hypotensive requiring midodrine, will add as BP tolerates  EP consult requested for rate control and rhythm management.

## 2022-12-10 NOTE — CONSULT NOTE ADULT - CONSULT REQUESTED BY NAME
Dr Flanagan
Dr. Flanagan
Dr. Grove
ICU
DR MERCEDES
Dr. Colmenares
Dr. Young
Will
CT Surgery
Dr. Abreu

## 2022-12-10 NOTE — CONSULT NOTE ADULT - SUBJECTIVE AND OBJECTIVE BOX
HPI: 77M PMHx HTN, AS, Recent Covid, who was originally admitted and found with Strep Bacteremia.  Pt now with valvular damage and scheduled for surgucal repair on 12/13.  Spoke with CT Surgery NP and was told that 2 days ago pt. was discovered to have urinary retention with post void residual of 1500cc on bladder scan so ram was inserted.  Initial output was dark red/brown in color with sediment but that cleared up and urine changed to clear/yellow.  Now, as of last night, pt with hematuria, small clots.  Had been on heparin gtt for afib, now stopped.  Has been clearing somewhat throughout the day but still persistent.  Pt. seen and states that he believes "I slept on it wrong", does remember some pain overnight from the ram and may have pulled on it accidentally.  Denies any pain or discomfort at this time.      ROS: negative except for HPI      PMHx: HTN, AS  PSHx: b/l inguinal hernia repair, Lap R colectomy  FamHx: non-contributory  Allergies: NKDA      MEDICATIONS  (STANDING):  ascorbic acid 500 milliGRAM(s) Oral daily  aspirin enteric coated 81 milliGRAM(s) Oral daily  chlorhexidine 2% Cloths 1 Application(s) Topical daily  cholestyramine Powder (Sugar-Free) 4 Gram(s) Oral two times a day  ferrous    sulfate 325 milliGRAM(s) Oral daily  folic acid 1 milliGRAM(s) Oral daily  loperamide 4 milliGRAM(s) Oral every 6 hours  meropenem Injectable 1000 milliGRAM(s) IV Push every 8 hours  metoprolol tartrate 50 milliGRAM(s) Oral every 6 hours  midodrine 10 milliGRAM(s) Oral every 8 hours  phytonadione   Solution 5 milliGRAM(s) Oral daily  saccharomyces boulardii 250 milliGRAM(s) Oral two times a day  tamsulosin 0.4 milliGRAM(s) Oral at bedtime    MEDICATIONS  (PRN):      Vital Signs Last 24 Hrs  T(C): 36.7 (10 Dec 2022 12:34), Max: 36.7 (09 Dec 2022 16:36)  T(F): 98 (10 Dec 2022 12:34), Max: 98.1 (09 Dec 2022 16:36)  HR: 118 (10 Dec 2022 12:34) (84 - 118)  BP: 87/60 (10 Dec 2022 12:34) (87/60 - 115/71)  BP(mean): --  RR: 18 (10 Dec 2022 12:34) (17 - 18)  SpO2: 96% (10 Dec 2022 12:34) (96% - 98%)    Parameters below as of 10 Dec 2022 12:34  Patient On (Oxygen Delivery Method): room air        PHYSICAL EXAM:      Constitutional: NAD    Respiratory: no accessory muscle use or conversational dyspnea    Cardiovascular: RRR    Gastrointestinal: soft, NT/ND    Genitourinary: normal appearing external genitalia,  ram in place draining clear, red urine with occasional small clots    Extremities: MEZA          I&O's Detail    09 Dec 2022 07:01  -  10 Dec 2022 07:00  --------------------------------------------------------  IN:    Heparin: 162 mL    Oral Fluid: 320 mL    sodium chloride 0.45%: 675 mL  Total IN: 1157 mL    OUT:    Indwelling Catheter - Urethral (mL): 7700 mL  Total OUT: 7700 mL    Total NET: -6543 mL          LABS:                        10.3   11.94 )-----------( 371      ( 10 Dec 2022 13:20 )             32.2     12-10    142  |  99  |  21.4<H>  ----------------------------<  99  3.9   |  29.0  |  1.13    Ca    9.3      10 Dec 2022 06:20  Mg     1.6     12-10    TPro  5.7<L>  /  Alb  2.6<L>  /  TBili  0.7  /  DBili  x   /  AST  21  /  ALT  23  /  AlkPhos  67  12-09    PTT - ( 10 Dec 2022 06:30 )  PTT:66.6 sec      RADIOLOGY & ADDITIONAL STUDIES:

## 2022-12-11 DIAGNOSIS — R31.9 HEMATURIA, UNSPECIFIED: ICD-10-CM

## 2022-12-11 LAB
ALBUMIN SERPL ELPH-MCNC: 2.7 G/DL — LOW (ref 3.3–5.2)
ALP SERPL-CCNC: 67 U/L — SIGNIFICANT CHANGE UP (ref 40–120)
ALT FLD-CCNC: 21 U/L — SIGNIFICANT CHANGE UP
ANION GAP SERPL CALC-SCNC: 8 MMOL/L — SIGNIFICANT CHANGE UP (ref 5–17)
AST SERPL-CCNC: 21 U/L — SIGNIFICANT CHANGE UP
BILIRUB SERPL-MCNC: 0.7 MG/DL — SIGNIFICANT CHANGE UP (ref 0.4–2)
BUN SERPL-MCNC: 19.9 MG/DL — SIGNIFICANT CHANGE UP (ref 8–20)
CALCIUM SERPL-MCNC: 8.8 MG/DL — SIGNIFICANT CHANGE UP (ref 8.4–10.5)
CHLORIDE SERPL-SCNC: 102 MMOL/L — SIGNIFICANT CHANGE UP (ref 96–108)
CO2 SERPL-SCNC: 30 MMOL/L — HIGH (ref 22–29)
CREAT SERPL-MCNC: 1.05 MG/DL — SIGNIFICANT CHANGE UP (ref 0.5–1.3)
EGFR: 73 ML/MIN/1.73M2 — SIGNIFICANT CHANGE UP
GLUCOSE SERPL-MCNC: 75 MG/DL — SIGNIFICANT CHANGE UP (ref 70–99)
HCT VFR BLD CALC: 29 % — LOW (ref 39–50)
HGB BLD-MCNC: 9.6 G/DL — LOW (ref 13–17)
MAGNESIUM SERPL-MCNC: 1.4 MG/DL — LOW (ref 1.6–2.6)
MCHC RBC-ENTMCNC: 30.6 PG — SIGNIFICANT CHANGE UP (ref 27–34)
MCHC RBC-ENTMCNC: 33.1 GM/DL — SIGNIFICANT CHANGE UP (ref 32–36)
MCV RBC AUTO: 92.4 FL — SIGNIFICANT CHANGE UP (ref 80–100)
PLATELET # BLD AUTO: 393 K/UL — SIGNIFICANT CHANGE UP (ref 150–400)
POTASSIUM SERPL-MCNC: 4.5 MMOL/L — SIGNIFICANT CHANGE UP (ref 3.5–5.3)
POTASSIUM SERPL-SCNC: 4.5 MMOL/L — SIGNIFICANT CHANGE UP (ref 3.5–5.3)
PROT SERPL-MCNC: 5.5 G/DL — LOW (ref 6.6–8.7)
RBC # BLD: 3.14 M/UL — LOW (ref 4.2–5.8)
RBC # FLD: 13 % — SIGNIFICANT CHANGE UP (ref 10.3–14.5)
SODIUM SERPL-SCNC: 140 MMOL/L — SIGNIFICANT CHANGE UP (ref 135–145)
WBC # BLD: 8.84 K/UL — SIGNIFICANT CHANGE UP (ref 3.8–10.5)
WBC # FLD AUTO: 8.84 K/UL — SIGNIFICANT CHANGE UP (ref 3.8–10.5)

## 2022-12-11 PROCEDURE — 99232 SBSQ HOSP IP/OBS MODERATE 35: CPT

## 2022-12-11 RX ORDER — MAGNESIUM SULFATE 500 MG/ML
2 VIAL (ML) INJECTION
Refills: 0 | Status: COMPLETED | OUTPATIENT
Start: 2022-12-11 | End: 2022-12-11

## 2022-12-11 RX ORDER — METOPROLOL TARTRATE 50 MG
25 TABLET ORAL EVERY 6 HOURS
Refills: 0 | Status: DISCONTINUED | OUTPATIENT
Start: 2022-12-11 | End: 2022-12-13

## 2022-12-11 RX ORDER — CHLORHEXIDINE GLUCONATE 213 G/1000ML
1 SOLUTION TOPICAL
Refills: 0 | Status: DISCONTINUED | OUTPATIENT
Start: 2022-12-12 | End: 2022-12-13

## 2022-12-11 RX ORDER — CHLORHEXIDINE GLUCONATE 213 G/1000ML
15 SOLUTION TOPICAL
Refills: 0 | Status: DISCONTINUED | OUTPATIENT
Start: 2022-12-11 | End: 2022-12-13

## 2022-12-11 RX ADMIN — CHLORHEXIDINE GLUCONATE 15 MILLILITER(S): 213 SOLUTION TOPICAL at 18:21

## 2022-12-11 RX ADMIN — Medication 250 MICROGRAM(S): at 05:39

## 2022-12-11 RX ADMIN — Medication 325 MILLIGRAM(S): at 12:57

## 2022-12-11 RX ADMIN — Medication 81 MILLIGRAM(S): at 12:56

## 2022-12-11 RX ADMIN — MEROPENEM 1000 MILLIGRAM(S): 1 INJECTION INTRAVENOUS at 05:38

## 2022-12-11 RX ADMIN — MIDODRINE HYDROCHLORIDE 15 MILLIGRAM(S): 2.5 TABLET ORAL at 18:21

## 2022-12-11 RX ADMIN — Medication 4 MILLIGRAM(S): at 00:27

## 2022-12-11 RX ADMIN — CHOLESTYRAMINE 4 GRAM(S): 4 POWDER, FOR SUSPENSION ORAL at 21:13

## 2022-12-11 RX ADMIN — Medication 25 GRAM(S): at 12:27

## 2022-12-11 RX ADMIN — Medication 25 MILLIGRAM(S): at 18:25

## 2022-12-11 RX ADMIN — Medication 250 MILLIGRAM(S): at 18:21

## 2022-12-11 RX ADMIN — MIDODRINE HYDROCHLORIDE 15 MILLIGRAM(S): 2.5 TABLET ORAL at 05:38

## 2022-12-11 RX ADMIN — Medication 4 MILLIGRAM(S): at 05:38

## 2022-12-11 RX ADMIN — Medication 25 MILLIGRAM(S): at 00:26

## 2022-12-11 RX ADMIN — CHOLESTYRAMINE 4 GRAM(S): 4 POWDER, FOR SUSPENSION ORAL at 09:07

## 2022-12-11 RX ADMIN — Medication 500 MILLIGRAM(S): at 12:57

## 2022-12-11 RX ADMIN — Medication 25 GRAM(S): at 09:15

## 2022-12-11 RX ADMIN — MIDODRINE HYDROCHLORIDE 15 MILLIGRAM(S): 2.5 TABLET ORAL at 12:57

## 2022-12-11 RX ADMIN — CHLORHEXIDINE GLUCONATE 1 APPLICATION(S): 213 SOLUTION TOPICAL at 12:58

## 2022-12-11 RX ADMIN — Medication 1 MILLIGRAM(S): at 12:57

## 2022-12-11 RX ADMIN — MEROPENEM 1000 MILLIGRAM(S): 1 INJECTION INTRAVENOUS at 21:13

## 2022-12-11 RX ADMIN — Medication 250 MILLIGRAM(S): at 05:38

## 2022-12-11 RX ADMIN — TAMSULOSIN HYDROCHLORIDE 0.4 MILLIGRAM(S): 0.4 CAPSULE ORAL at 21:13

## 2022-12-11 RX ADMIN — Medication 25 MILLIGRAM(S): at 09:07

## 2022-12-11 RX ADMIN — Medication 5 MILLIGRAM(S): at 15:45

## 2022-12-11 RX ADMIN — MEROPENEM 1000 MILLIGRAM(S): 1 INJECTION INTRAVENOUS at 15:45

## 2022-12-11 NOTE — PROGRESS NOTE ADULT - PROBLEM SELECTOR PLAN 4
RADHA noted post cath with urinary retention, now resolving after Barrios placed 12/8.   Renal following for optimization.  Avoid nephrotoxic drugs.  UCx negative 12/8.

## 2022-12-11 NOTE — PROGRESS NOTE ADULT - ASSESSMENT
77M with Hmeaturia    - continue to monitor urine output  - irrigate ram PRN  - d/w CT Surgery NP, would keep ram until after planned Surgery  - TOV post op when appropriate  - continue flomax

## 2022-12-11 NOTE — PROGRESS NOTE ADULT - SUBJECTIVE AND OBJECTIVE BOX
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    PAST MEDICAL & SURGICAL HISTORY:  Hypertension  Aortic stenosis  H/O inguinal hernia repair, B/L 2013  S/P laparoscopic colectomy, right colectomy with ileotransverse anastomosis    FAMILY HISTORY:  FH: heart disease (Father, Mother)    Brief Hospital Course: 77M, pmhx HTN, moderate AS, recent Covid infection 10/6/2022 per chart, recent admission 11/1 for SIRS, found to have strep anginosus bactermia, norovirus, TTE neg for endocarditis, refused LILIAN that admission, was started on ceftriaxone to complete abx 11/20, however pt presented 11/27 with syncope, found to have AF RVR, sepsis, copious diarrhea requiring rectal tube, now s/p LILIAN with 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 LHC 12/5 with clean coronaries. Cleared by neurosurgery for OR. RADHA noted (renal following for optimization).    Significant recent/past 24 hr events: Urinary retention and RADHA post cath now resolving since ram placed 12/8.     Subjective: Patient lying in bed in NAD. +Tolerating diet. +Passing BMs. No pain elicited at this time. Denies fevers, chills, lightheadedness, dizziness, HA, CP, palpitations, SOB, cough, abdominal pain, N/V, diarrhea, numbness/tingling in extremities, or any other acute complaints. ROS negative x 10 systems except as noted above.    MEDICATIONS  (STANDING):  ascorbic acid 500 milliGRAM(s) Oral daily  aspirin enteric coated 81 milliGRAM(s) Oral daily  chlorhexidine 2% Cloths 1 Application(s) Topical daily  cholestyramine Powder (Sugar-Free) 4 Gram(s) Oral two times a day  digoxin     Tablet 250 MICROGram(s) Oral daily  ferrous    sulfate 325 milliGRAM(s) Oral daily  folic acid 1 milliGRAM(s) Oral daily  loperamide 4 milliGRAM(s) Oral every 6 hours  meropenem Injectable 1000 milliGRAM(s) IV Push every 8 hours  metoprolol tartrate 25 milliGRAM(s) Oral every 6 hours  midodrine. 15 milliGRAM(s) Oral three times a day  phytonadione   Solution 5 milliGRAM(s) Oral daily  saccharomyces boulardii 250 milliGRAM(s) Oral two times a day  tamsulosin 0.4 milliGRAM(s) Oral at bedtime    Allergies: No Known Allergies    Vitals   T(C): 36.9 (11 Dec 2022 00:32), Max: 36.9 (11 Dec 2022 00:32)  T(F): 98.5 (11 Dec 2022 00:32), Max: 98.5 (11 Dec 2022 00:32)  HR: 93 (11 Dec 2022 00:32) (88 - 122)  BP: 109/65 (11 Dec 2022 00:32) (87/60 - 109/65)  RR: 17 (11 Dec 2022 00:32) (17 - 18)  SpO2: 94% (11 Dec 2022 00:32) (94% - 98%)  O2 Parameters below as of 11 Dec 2022 00:32  Patient On (Oxygen Delivery Method): room air    I&O's Detail    09 Dec 2022 07:01  -  10 Dec 2022 07:00  --------------------------------------------------------  IN:    Heparin: 162 mL    Oral Fluid: 320 mL    sodium chloride 0.45%: 675 mL  Total IN: 1157 mL    OUT:    Indwelling Catheter - Urethral (mL): 7700 mL  Total OUT: 7700 mL    Total NET: -6543 mL      10 Dec 2022 07:01  -  11 Dec 2022 02:32  --------------------------------------------------------  IN:    Oral Fluid: 120 mL  Total IN: 120 mL    OUT:    Indwelling Catheter - Urethral (mL): 1000 mL  Total OUT: 1000 mL    Total NET: -880 mL    PHYSICAL EXAM  Constitutional: NAD  Neuro: A+O x 3, non-focal, speech clear and intact  HEENT:  NCAT, No conjuctival edema or icterus, no thrush.    Neck:  Supple, trachea midline  CV: irregular rhythm, regular rate, +S1S2, +systolic murmur  Pulm/chest: lung sounds CTA and equal bilaterally, no accessory muscle use noted  Abd: +BS, soft, NT, ND  : ram in situ to bedside drainage, +hematuria  Ext: MEZA x 4, no edema, no cyanosis or clubbing, distal motor/neuro/circ intact  Skin: warm, perfused  Psych: calm, appropriate affect    LABS                        10.3   11.94 )-----------( 371      ( 10 Dec 2022 13:20 )             32.2     12-10    142  |  99  |  21.4<H>  ----------------------------<  99  3.9   |  29.0  |  1.13    Ca    9.3      10 Dec 2022 06:20  Mg     1.6     12-10    TPro  5.7<L>  /  Alb  2.6<L>  /  TBili  0.7  /  DBili  x   /  AST  21  /  ALT  23  /  AlkPhos  67  12-09    PTT - ( 10 Dec 2022 06:30 )  PTT:66.6 sec    Last CXR:  < from: Xray Chest 1 View- PORTABLE-Routine (Xray Chest 1 View- PORTABLE-Routine in AM.) (12.09.22 @ 05:11) >  FINDINGS:  CATHETERS AND TUBES: None  PULMONARY: The visualized lungs are clear of airspace consolidations or pleural effusions. No pneumothorax.  HEART/VASCULAR: The heart and mediastinum size and configuration are within normal limits.  BONES: Visualized osseous structures are intact.  IMPRESSION: No radiographic evidence of active chest disease.  < end of copied text >    Cardiac Cath:  < from: Cardiac Catheterization (12.05.22 @ 16:47) >  Diagnostic Findings:   Coronary Angiography   LM   Left main artery: The segment is visually normal in size and structure.  LAD   Left anterior descending artery: Angiography shows mild atherosclerosis.  CX   Circumflex: The segment is large, dominant. Angiography shows mild atherosclerosis.  RCA   Right coronary artery: Angiography shows mild atherosclerosis.    < end of copied text >    Renal US:  < from: US Renal (12.06.22 @ 21:53) >  IMPRESSION:  Mild fullness of the renal pelves/hydronephrosis, possibly secondary to a distended urinary bladder.  Bilateral ureteral jets seen.  Patient unable to void at the time of the study.  < end of copied text >    LILIAN:  < from: LILIAN Echo Doppler (11.30.22 @ 14:52) >  Summary:   1. Left ventricular ejection fraction, by visual estimation, is 55 to 60%.   2. Normal global left ventricular systolic function.   3. The mitral in-flow pattern reveals no discernable A-wave, therefore no comment on diastolic function can be made.   4. Normal right ventricular size and function, inadequate estimation of RVSP.   5. Mildly enlarged left atrium.   6. Moderate mitral valve regurgitation, jet is eccentric posteriorly directed.   7. Thickening of bileaflet mitral valve with subcentimeter filamentous mobile lesions suggestive of vegetations.   8. Color flow doppler and intravenous injection of agitated saline demonstrates the presence of an intact intra atrial septum.   9. No left atrial appendage thrombus and normal left atrial appendage velocities.  10. Structurally normal tricuspid valve, with normal leaflet excursion.  11. Heavily calcified noncoronary cusp with moderate aortic valve stenosis, ELDON (by 2D planimetry 1.34 cm2), peak velocity 2.8 m/s and mean gradient of 15 mmHg.  12. Mild to moderate aortic regurgitation.  13. There is mild echolucency and thickening of the sinus of Valsalva in between the left and non-coronary cusp, cannot exclude early root abscess.  14. Mild simple atheromas at the aortic arch/descending aorta.  15. There is no evidence of pericardial effusion.  16. No prior LILIAN study is available for comparison. 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 Cardioversion deferred due to the presence of underlying infection/endocarditis.  < end of copied text >    Indium Scan:  < from: NM Inflammatory Loc Wholebody WBC, Single Day (12.02.22 @ 11:02) >  IMPRESSION: Normal Indium-111 labeled leukocyte scan. No scan evidence of   infection.  --- End of Report ---  < end of copied text >    MRI Head:  < from: MR Head No Cont (12.01.22 @ 21:35) >  IMPRESSION:  Scattered foci of restricted diffusion in the bilateral parieto-occipital lobes, suggestive of acute ischemia. No intracranial hemorrhage or mass effect.  Given the bilateral distribution of involvement, embolic phenomenon is suggested, with underlying septic emboli not excluded. Further evaluation by contrast-enhanced MRI may provide further characterization.  < end of copied text >

## 2022-12-11 NOTE — PROGRESS NOTE ADULT - PROBLEM SELECTOR PLAN 1
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  Preoperative workup for AVR, MVR, ESTEFANY clipping underway.   Blood cultures 11/27 remain NGTD.  Continue Merrem per ID.    [facility] with clean coronaries  No cerebral angio needed per neurosurgery. Cleared for OR.  OR for AVR, MVR, ESTEFANY clipping scheduled for Tuesday 12/13.  Plan to be discussed with Dr. Flanagan and CTS team in AM rounds.

## 2022-12-11 NOTE — PROGRESS NOTE ADULT - SUBJECTIVE AND OBJECTIVE BOX
Seen earlier this am with Dr. Ruiz    INTERVAL HPI/OVERNIGHT EVENTS: no new complaints, Urine clearing      MEDICATIONS  (STANDING):  ascorbic acid 500 milliGRAM(s) Oral daily  aspirin enteric coated 81 milliGRAM(s) Oral daily  chlorhexidine 0.12% Liquid 15 milliLiter(s) Oral Mucosa two times a day  chlorhexidine 2% Cloths 1 Application(s) Topical daily  cholestyramine Powder (Sugar-Free) 4 Gram(s) Oral two times a day  digoxin     Tablet 250 MICROGram(s) Oral daily  ferrous    sulfate 325 milliGRAM(s) Oral daily  folic acid 1 milliGRAM(s) Oral daily  loperamide 4 milliGRAM(s) Oral every 6 hours  meropenem Injectable 1000 milliGRAM(s) IV Push every 8 hours  metoprolol tartrate 25 milliGRAM(s) Oral every 6 hours  midodrine. 15 milliGRAM(s) Oral three times a day  phytonadione   Solution 5 milliGRAM(s) Oral daily  saccharomyces boulardii 250 milliGRAM(s) Oral two times a day  tamsulosin 0.4 milliGRAM(s) Oral at bedtime    MEDICATIONS  (PRN):      Vital Signs Last 24 Hrs  T(C): 36.7 (11 Dec 2022 13:00), Max: 36.9 (11 Dec 2022 00:32)  T(F): 98 (11 Dec 2022 13:00), Max: 98.5 (11 Dec 2022 00:32)  HR: 103 (11 Dec 2022 13:00) (90 - 115)  BP: 88/51 (11 Dec 2022 13:00) (88/51 - 109/65)  BP(mean): --  RR: 17 (11 Dec 2022 13:00) (17 - 17)  SpO2: 99% (11 Dec 2022 13:00) (94% - 99%)    Parameters below as of 11 Dec 2022 13:00  Patient On (Oxygen Delivery Method): room air        PHYSICAL EXAM:      Constitutional: NAD    Respiratory: no accessory muscle use or conversational dyspnea    Cardiovascular: RRR    Gastrointestinal: soft, NT/ND    Genitourinary: Barrios in place draining light red, clear urine with minimal small clots          I&O's Detail    10 Dec 2022 07:01  -  11 Dec 2022 07:00  --------------------------------------------------------  IN:    Oral Fluid: 600 mL  Total IN: 600 mL    OUT:    Indwelling Catheter - Urethral (mL): 2500 mL  Total OUT: 2500 mL    Total NET: -1900 mL          LABS:                        9.6    8.84  )-----------( 393      ( 11 Dec 2022 05:47 )             29.0     12-11    140  |  102  |  19.9  ----------------------------<  75  4.5   |  30.0<H>  |  1.05    Ca    8.8      11 Dec 2022 05:47  Mg     1.4     12-11    TPro  5.5<L>  /  Alb  2.7<L>  /  TBili  0.7  /  DBili  x   /  AST  21  /  ALT  21  /  AlkPhos  67  12-11    PTT - ( 10 Dec 2022 06:30 )  PTT:66.6 sec      RADIOLOGY & ADDITIONAL STUDIES:

## 2022-12-12 ENCOUNTER — APPOINTMENT (OUTPATIENT)
Dept: INTERNAL MEDICINE | Facility: CLINIC | Age: 77
End: 2022-12-12

## 2022-12-12 ENCOUNTER — TRANSCRIPTION ENCOUNTER (OUTPATIENT)
Age: 77
End: 2022-12-12

## 2022-12-12 LAB
ALBUMIN SERPL ELPH-MCNC: 2.6 G/DL — LOW (ref 3.3–5.2)
ALP SERPL-CCNC: 73 U/L — SIGNIFICANT CHANGE UP (ref 40–120)
ALT FLD-CCNC: 23 U/L — SIGNIFICANT CHANGE UP
ANION GAP SERPL CALC-SCNC: 9 MMOL/L — SIGNIFICANT CHANGE UP (ref 5–17)
AST SERPL-CCNC: 23 U/L — SIGNIFICANT CHANGE UP
BILIRUB SERPL-MCNC: 0.6 MG/DL — SIGNIFICANT CHANGE UP (ref 0.4–2)
BLD GP AB SCN SERPL QL: SIGNIFICANT CHANGE UP
BUN SERPL-MCNC: 17.3 MG/DL — SIGNIFICANT CHANGE UP (ref 8–20)
CALCIUM SERPL-MCNC: 8.7 MG/DL — SIGNIFICANT CHANGE UP (ref 8.4–10.5)
CHLORIDE SERPL-SCNC: 100 MMOL/L — SIGNIFICANT CHANGE UP (ref 96–108)
CO2 SERPL-SCNC: 28 MMOL/L — SIGNIFICANT CHANGE UP (ref 22–29)
CREAT SERPL-MCNC: 0.94 MG/DL — SIGNIFICANT CHANGE UP (ref 0.5–1.3)
EGFR: 83 ML/MIN/1.73M2 — SIGNIFICANT CHANGE UP
GLUCOSE BLDC GLUCOMTR-MCNC: 113 MG/DL — HIGH (ref 70–99)
GLUCOSE SERPL-MCNC: 73 MG/DL — SIGNIFICANT CHANGE UP (ref 70–99)
HCT VFR BLD CALC: 27.7 % — LOW (ref 39–50)
HGB BLD-MCNC: 8.9 G/DL — LOW (ref 13–17)
MAGNESIUM SERPL-MCNC: 1.7 MG/DL — SIGNIFICANT CHANGE UP (ref 1.6–2.6)
MCHC RBC-ENTMCNC: 30.1 PG — SIGNIFICANT CHANGE UP (ref 27–34)
MCHC RBC-ENTMCNC: 32.1 GM/DL — SIGNIFICANT CHANGE UP (ref 32–36)
MCV RBC AUTO: 93.6 FL — SIGNIFICANT CHANGE UP (ref 80–100)
PLATELET # BLD AUTO: 334 K/UL — SIGNIFICANT CHANGE UP (ref 150–400)
POTASSIUM SERPL-MCNC: 4.4 MMOL/L — SIGNIFICANT CHANGE UP (ref 3.5–5.3)
POTASSIUM SERPL-SCNC: 4.4 MMOL/L — SIGNIFICANT CHANGE UP (ref 3.5–5.3)
PROT SERPL-MCNC: 5.3 G/DL — LOW (ref 6.6–8.7)
RBC # BLD: 2.96 M/UL — LOW (ref 4.2–5.8)
RBC # FLD: 13.1 % — SIGNIFICANT CHANGE UP (ref 10.3–14.5)
SARS-COV-2 RNA SPEC QL NAA+PROBE: SIGNIFICANT CHANGE UP
SODIUM SERPL-SCNC: 137 MMOL/L — SIGNIFICANT CHANGE UP (ref 135–145)
WBC # BLD: 8.64 K/UL — SIGNIFICANT CHANGE UP (ref 3.8–10.5)
WBC # FLD AUTO: 8.64 K/UL — SIGNIFICANT CHANGE UP (ref 3.8–10.5)

## 2022-12-12 PROCEDURE — 71045 X-RAY EXAM CHEST 1 VIEW: CPT | Mod: 26

## 2022-12-12 PROCEDURE — 99233 SBSQ HOSP IP/OBS HIGH 50: CPT

## 2022-12-12 PROCEDURE — 93010 ELECTROCARDIOGRAM REPORT: CPT

## 2022-12-12 RX ORDER — MAGNESIUM SULFATE 500 MG/ML
2 VIAL (ML) INJECTION ONCE
Refills: 0 | Status: COMPLETED | OUTPATIENT
Start: 2022-12-12 | End: 2022-12-12

## 2022-12-12 RX ORDER — VANCOMYCIN HCL 1 G
1000 VIAL (EA) INTRAVENOUS ONCE
Refills: 0 | Status: DISCONTINUED | OUTPATIENT
Start: 2022-12-13 | End: 2022-12-13

## 2022-12-12 RX ORDER — CEFUROXIME AXETIL 250 MG
1500 TABLET ORAL ONCE
Refills: 0 | Status: DISCONTINUED | OUTPATIENT
Start: 2022-12-13 | End: 2022-12-13

## 2022-12-12 RX ADMIN — Medication 25 MILLIGRAM(S): at 05:25

## 2022-12-12 RX ADMIN — TAMSULOSIN HYDROCHLORIDE 0.4 MILLIGRAM(S): 0.4 CAPSULE ORAL at 21:12

## 2022-12-12 RX ADMIN — MIDODRINE HYDROCHLORIDE 15 MILLIGRAM(S): 2.5 TABLET ORAL at 17:15

## 2022-12-12 RX ADMIN — MIDODRINE HYDROCHLORIDE 15 MILLIGRAM(S): 2.5 TABLET ORAL at 12:38

## 2022-12-12 RX ADMIN — Medication 250 MILLIGRAM(S): at 05:24

## 2022-12-12 RX ADMIN — MEROPENEM 1000 MILLIGRAM(S): 1 INJECTION INTRAVENOUS at 15:26

## 2022-12-12 RX ADMIN — CHLORHEXIDINE GLUCONATE 1 APPLICATION(S): 213 SOLUTION TOPICAL at 09:32

## 2022-12-12 RX ADMIN — Medication 81 MILLIGRAM(S): at 09:31

## 2022-12-12 RX ADMIN — Medication 25 MILLIGRAM(S): at 00:04

## 2022-12-12 RX ADMIN — CHLORHEXIDINE GLUCONATE 15 MILLILITER(S): 213 SOLUTION TOPICAL at 17:15

## 2022-12-12 RX ADMIN — Medication 500 MILLIGRAM(S): at 09:32

## 2022-12-12 RX ADMIN — MIDODRINE HYDROCHLORIDE 15 MILLIGRAM(S): 2.5 TABLET ORAL at 05:24

## 2022-12-12 RX ADMIN — Medication 1 MILLIGRAM(S): at 09:31

## 2022-12-12 RX ADMIN — MEROPENEM 1000 MILLIGRAM(S): 1 INJECTION INTRAVENOUS at 05:24

## 2022-12-12 RX ADMIN — Medication 5 MILLIGRAM(S): at 17:15

## 2022-12-12 RX ADMIN — CHLORHEXIDINE GLUCONATE 1 APPLICATION(S): 213 SOLUTION TOPICAL at 05:22

## 2022-12-12 RX ADMIN — Medication 325 MILLIGRAM(S): at 17:18

## 2022-12-12 RX ADMIN — Medication 250 MICROGRAM(S): at 05:24

## 2022-12-12 RX ADMIN — CHLORHEXIDINE GLUCONATE 1 APPLICATION(S): 213 SOLUTION TOPICAL at 17:18

## 2022-12-12 RX ADMIN — CHLORHEXIDINE GLUCONATE 15 MILLILITER(S): 213 SOLUTION TOPICAL at 05:24

## 2022-12-12 RX ADMIN — Medication 25 MILLIGRAM(S): at 23:22

## 2022-12-12 RX ADMIN — Medication 25 GRAM(S): at 09:31

## 2022-12-12 RX ADMIN — MEROPENEM 1000 MILLIGRAM(S): 1 INJECTION INTRAVENOUS at 21:10

## 2022-12-12 RX ADMIN — Medication 25 MILLIGRAM(S): at 17:15

## 2022-12-12 RX ADMIN — Medication 250 MILLIGRAM(S): at 17:15

## 2022-12-12 NOTE — PROGRESS NOTE ADULT - PROBLEM SELECTOR PLAN 1
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  Preoperative for AVR, MVR, ESTEFANY clipping, work up complete  Blood cultures 11/27 remain NGTD.  Continue Merrem per ID.    [facility] with clean coronaries  No cerebral angio needed per neurosurgery. Cleared for OR.  OR for AVR, MVR, ESTEFANY clipping scheduled for Tuesday 12/13.  Plan to be discussed with Dr. Flanagan and CTS team in AM rounds.

## 2022-12-12 NOTE — PROGRESS NOTE ADULT - ASSESSMENT
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 LHC 12/5 with clean coronaries. Cleared by neurosurgery for OR. Urinary retention and RADHA post cath now resolving since ram placed 12/8.  12/10 with hematuria, heparin d/c'd

## 2022-12-12 NOTE — PROGRESS NOTE ADULT - SUBJECTIVE AND OBJECTIVE BOX
Subjective: no complaints denies CP, palpitations, SOB, cough, fever, chills, itchiness/rash, diaphoresis, vision changes, HA, dizziness/lightheadedness, numbness/tingling, abd pain, N/V    T(C): 36.9 (12-12-22 @ 00:04), Max: 36.9 (12-11-22 @ 00:32)  HR: 76 (12-12-22 @ 00:04) (76 - 115)  BP: 128/66 (12-12-22 @ 00:04) (82/54 - 128/66)  RR: 17 (12-12-22 @ 00:04) (17 - 18)  SpO2: 96% (12-12-22 @ 00:04) (94% - 99%)    12-11    140  |  102  |  19.9  ----------------------------<  75  4.5   |  30.0<H>  |  1.05    Ca    8.8      11 Dec 2022 05:47  Mg     1.4     12-11    TPro  5.5<L>  /  Alb  2.7<L>  /  TBili  0.7  /  DBili  x   /  AST  21  /  ALT  21  /  AlkPhos  67  12-11                               9.6    8.84  )-----------( 393      ( 11 Dec 2022 05:47 )             29.0        PTT - ( 10 Dec 2022 06:30 )  PTT:66.6 sec    I&O's Detail    10 Dec 2022 07:01  -  11 Dec 2022 07:00  --------------------------------------------------------  IN:    Oral Fluid: 600 mL  Total IN: 600 mL    OUT:    Indwelling Catheter - Urethral (mL): 2500 mL  Total OUT: 2500 mL  Total NET: -1900 mL    11 Dec 2022 07:01  -  12 Dec 2022 00:26  --------------------------------------------------------  IN:    Oral Fluid: 1240 mL  Total IN: 1240 mL    OUT:    Indwelling Catheter - Urethral (mL): 1350 mL  Total OUT: 1350 mL  Total NET: -110 mL    Drug Dosing Weight  Height (cm): 167.6 (30 Nov 2022 14:29)  Weight (kg): 76.8 (30 Nov 2022 14:29)  BMI (kg/m2): 27.3 (30 Nov 2022 14:29)  BSA (m2): 1.86 (30 Nov 2022 14:29)    MEDICATIONS  (STANDING):  ascorbic acid 500 milliGRAM(s) Oral daily  aspirin enteric coated 81 milliGRAM(s) Oral daily  chlorhexidine 0.12% Liquid 15 milliLiter(s) Oral Mucosa two times a day  chlorhexidine 2% Cloths 1 Application(s) Topical daily  chlorhexidine 4% Liquid 1 Application(s) Topical two times a day  cholestyramine Powder (Sugar-Free) 4 Gram(s) Oral two times a day  digoxin     Tablet 250 MICROGram(s) Oral daily  ferrous    sulfate 325 milliGRAM(s) Oral daily  folic acid 1 milliGRAM(s) Oral daily  loperamide 4 milliGRAM(s) Oral every 6 hours  meropenem Injectable 1000 milliGRAM(s) IV Push every 8 hours  metoprolol tartrate 25 milliGRAM(s) Oral every 6 hours  midodrine. 15 milliGRAM(s) Oral three times a day  phytonadione   Solution 5 milliGRAM(s) Oral daily  saccharomyces boulardii 250 milliGRAM(s) Oral two times a day  tamsulosin 0.4 milliGRAM(s) Oral at bedtime    Physical Exam  Gen: NAD  Neuro: A&Ox3 non focal speech clear and intact  Pulm: CTA b/l no wheezing  CV: S1S2 RRR no murmurs  Abd: +BS soft NT ND  Extrem/MS: mild LE edema, no cyanosis, MEZA, CHAITANYA ram

## 2022-12-12 NOTE — PROGRESS NOTE ADULT - SUBJECTIVE AND OBJECTIVE BOX
INFECTIOUS DISEASES AND INTERNAL MEDICINE at [facility]  =======================================================  Flip Pederson MD  Diplomates American Board of Internal Medicine and Infectious Diseases  Telephone [phone]  Fax            [phone]  =======================================================    GEORGE [id number]      ID f/u- fever, endocarditis  no complaints    cr improved      ANTIBIOTICS  meropenem  IVPB 1000 milliGRAM(s) IV Intermittent every 8 hours      Allergies    No Known Allergies    Intolerances      ICU Vital Signs Last 24 Hrs  Vital Signs Last 24 Hrs  T(C): 36.8 (12 Dec 2022 17:06), Max: 36.9 (12 Dec 2022 00:04)  T(F): 98.2 (12 Dec 2022 17:06), Max: 98.4 (12 Dec 2022 00:04)  HR: 76 (12 Dec 2022 17:12) (76 - 105)  BP: 102/68 (12 Dec 2022 17:12) (90/56 - 128/66)  BP(mean): --  RR: 18 (12 Dec 2022 17:12) (17 - 18)  SpO2: 100% (12 Dec 2022 17:12) (96% - 100%)    Parameters below as of 12 Dec 2022 17:12  Patient On (Oxygen Delivery Method): room air        REVIEW OF SYSTEMS:    CONSTITUTIONAL:  As per HPI.    HEENT:  Eyes:  No diplopia or blurred vision. ENT:  No earache, sore throat or runny nose.    CARDIOVASCULAR:  No pressure, squeezing, strangling, tightness, heaviness or aching about the chest, neck, axilla or epigastrium.    RESPIRATORY:  No cough, shortness of breath, PND or orthopnea.    GASTROINTESTINAL:  No nausea, vomiting or diarrhea.    GENITOURINARY:  No dysuria, frequency or urgency.    MUSCULOSKELETAL:  As per HPI.    SKIN:  No change in skin, hair or nails.    NEUROLOGIC: as per hpi        PHYSICAL EXAMINATION:    GENERAL: nad    HEENT: Head is normocephalic and atraumatic.  ANICTERIC   NECK: Supple. No carotid bruits.  No lymphadenopathy or thyromegaly.    LUNGS :clear BREATH SOUNDS    HEART: Regular rate and rhythm without murmur.    ABDOMEN: Soft, nontender, and nondistended.  Positive bowel sounds.  No hepatosplenomegaly was noted. NO REBOUND NO GUARDING + ram dark urine     EXTREMITIES: NO EDEMA NO ERYTHEMA    NEUROLOGIC: NON FOCAL      SKIN: No ulceration or induration present. NO RASH             LABS:                                                       8.9    8.64  )-----------( 334      ( 12 Dec 2022 05:40 )             27.7           137  |  100  |  17.3  ----------------------------<  73  4.4   |  28.0  |  0.94    Ca    8.7      12 Dec 2022 05:40  Mg     1.7         TPro  5.3<L>  /  Alb  2.6<L>  /  TBili  0.6  /  DBili  x   /  AST  23  /  ALT  23  /  AlkPhos  73  12-12                            CAPILLARY BLOOD GLUCOSE                    Color: Yellow / Appearance: Clear / S.010 / pH: x  Gluc: x / Ketone: Negative  / Bili: Negative / Urobili: Negative mg/dL   Blood: x / Protein: 30 mg/dL / Nitrite: Negative   Leuk Esterase: Negative / RBC: 6-10 /HPF / WBC 0-2 /HPF   Sq Epi: x / Non Sq Epi: Occasional / Bacteria: Few        RADIOLOGY & ADDITIONAL STUDIES:  < from: CT Chest No Cont (22 @ 03:46) >  IMPRESSION:  No pneumonia.  No acute CT findings in the abdomen or pelvis.    VERTEBRAL BODY ANALYSIS: Low bone density as described above, consider   further workup for osteoporosis.        --- End of Report ---        < end of copied text >        < from: LILIAN Echo Doppler (22 @ 14:52) >  Summary:   1. Left ventricular ejection fraction, by visual estimation, is 55 to   60%.   2. Normal global left ventricular systolic function.   3. The mitral in-flow pattern reveals no discernable A-wave, therefore   no comment on diastolic function can be made.   4. Normal right ventricular size and function, inadequate estimation of   RVSP.   5. Mildly enlarged left atrium.   6. Moderate mitral valve regurgitation, jet is eccentric posteriorly   directed.   7. Thickening of bileaflet mitral valve with subcentimeter filamentous  mobile lesions suggestive of vegetations.   8. Color flow doppler and intravenous injection of agitated saline   demonstrates the presence of an intact intra atrial septum.   9. No left atrial appendage thrombus and normal left atrial appendage   velocities.  10. Structurally normal tricuspid valve, with normal leaflet excursion.  11. Heavily calcified noncoronary cusp with moderate aortic valve   stenosis, ELDON (by 2D planimetry 1.34 cm2), peak velocity 2.8 m/s and mean   gradient of 15 mmHg.  12. Mild to moderate aortic regurgitation.  13. There is mild echolucency and thickening of the sinus of Valsalva   inbetween the left and non-coronary cusp, cannot exclude early root   abscess.  14. Mild simple atheromas at the aortic arch/descendingaorta.  15. There is no evidence of pericardial effusion.  16. No prior LILIAN study is available for comparison. 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 Cardioversion   deferred due to the presence of underlying infection/endocarditis.    John Frost DO Electronically signed on 2022 at 3:39:01 PM    < end of copied text >      < from: MR Head No Cont (22 @ 21:35) >  IMPRESSION:  Scattered foci of restricted diffusion in the bilateral parieto-occipital   lobes, suggestive of acute ischemia. No intracranial hemorrhage or mass   effect.    Given the bilateral distribution of involvement, embolic phenomenon is   suggested, with underlying septic emboli not excluded. Further evaluation   by contrast-enhanced MRI may provide further characterization.      --- End of Report ---    < end of copied text >    < from: NM Inflammatory Loc Wholebody WBC, Single Day (22 @ 11:02) >  IMPRESSION: Normal Indium-111 labeled leukocyte scan. No scan evidence of   infection.    --- End of Report ---        < end of copied text >        < from: MR Head No Cont (22 @ 21:35) >  IMPRESSION:  Scattered foci of restricted diffusion in the bilateral parieto-occipital   lobes, suggestive of acute ischemia. No intracranial hemorrhage or mass   effect.    Given the bilateral distribution of involvement, embolic phenomenon is   suggested, with underlying septic emboli not excluded. Further evaluation   by contrast-enhanced MRI may provide further characterization.    < end of copied text >          ASSESSMENT/PLAN    77 year old male with PMHx of recent  COVID  on 10/6/22, HTN , Vit B12 deficiency, presenting to the ED on  with body aches, fatigue and fever (Tmax 102) at home for 12 days found to have streptococcus bacteremia and admitted with unclear source, no recent dental work, skin infections, no respiratory symptoms. Pan scan was negative at that time ( LILIAN was recommended but pt refused) He was treated for Bacteremia ( Blood cultures + streptococcus anginosis pansensitive ) and norovirus with supportive care and contact isolation . He was discharged home on  with IV Rocephin via PICC line. Recommendation was to continue ABX  daily via pic end 22    he  presented to ER after and episode of syncope and collapse, found to be septic, hypotensive, hypoxic and febrile in the ER. He is unsure of mechanism of fall but remembers being on the ground no reported LOC . He was found face-down on bathroom floor buy family with left leg wedged under cabinet. On my interview he is alert and oriented to person place and time, he has a baseline studder in his speech pattern but otherwise neurologically intact without deficit  In response to hypotension he failed to respond to Inital sepsis fluid protocol in ER is required IV pressor in form of levophed to maintain MAP greater than 60 -65. In the ER he was febrile with initial labs significant for WBC of 22.4 , ProCal of 21.2 first lactate 4.9 via abg with repeat post fluids of 2.2 venous sample.   PT admitted to [facility] with septic shock unclear source, now off pressors. Found to have new onset A fib and on amio. 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        will f/u

## 2022-12-13 ENCOUNTER — RESULT REVIEW (OUTPATIENT)
Age: 77
End: 2022-12-13

## 2022-12-13 ENCOUNTER — APPOINTMENT (OUTPATIENT)
Dept: CARDIOTHORACIC SURGERY | Facility: HOSPITAL | Age: 77
End: 2022-12-13

## 2022-12-13 LAB
ALBUMIN SERPL ELPH-MCNC: 2.3 G/DL — LOW (ref 3.3–5.2)
ALP SERPL-CCNC: 57 U/L — SIGNIFICANT CHANGE UP (ref 40–120)
ALT FLD-CCNC: 24 U/L — SIGNIFICANT CHANGE UP
ANION GAP SERPL CALC-SCNC: 10 MMOL/L — SIGNIFICANT CHANGE UP (ref 5–17)
ANION GAP SERPL CALC-SCNC: 11 MMOL/L — SIGNIFICANT CHANGE UP (ref 5–17)
APTT BLD: 32.1 SEC — SIGNIFICANT CHANGE UP (ref 27.5–35.5)
AST SERPL-CCNC: 59 U/L — HIGH
BASE EXCESS BLDA CALC-SCNC: -0.3 MMOL/L — SIGNIFICANT CHANGE UP (ref -2–3)
BASE EXCESS BLDA CALC-SCNC: -0.3 MMOL/L — SIGNIFICANT CHANGE UP (ref -2–3)
BASE EXCESS BLDA CALC-SCNC: -1.1 MMOL/L — SIGNIFICANT CHANGE UP (ref -2–3)
BASE EXCESS BLDA CALC-SCNC: -1.9 MMOL/L — SIGNIFICANT CHANGE UP (ref -2–3)
BASE EXCESS BLDA CALC-SCNC: -2 MMOL/L — SIGNIFICANT CHANGE UP (ref -2–3)
BASE EXCESS BLDA CALC-SCNC: -2.6 MMOL/L — LOW (ref -2–3)
BASE EXCESS BLDA CALC-SCNC: -2.8 MMOL/L — LOW (ref -2–3)
BASE EXCESS BLDA CALC-SCNC: -3.4 MMOL/L — LOW (ref -2–3)
BASE EXCESS BLDA CALC-SCNC: 0.3 MMOL/L — SIGNIFICANT CHANGE UP (ref -2–3)
BASE EXCESS BLDA CALC-SCNC: 0.3 MMOL/L — SIGNIFICANT CHANGE UP (ref -2–3)
BASE EXCESS BLDA CALC-SCNC: 6.1 MMOL/L — HIGH (ref -2–3)
BASE EXCESS BLDA CALC-SCNC: 7.7 MMOL/L — HIGH (ref -2–3)
BASE EXCESS BLDV CALC-SCNC: -0.5 MMOL/L — SIGNIFICANT CHANGE UP (ref -2–3)
BASE EXCESS BLDV CALC-SCNC: -0.5 MMOL/L — SIGNIFICANT CHANGE UP (ref -2–3)
BASE EXCESS BLDV CALC-SCNC: -0.6 MMOL/L — SIGNIFICANT CHANGE UP (ref -2–3)
BASE EXCESS BLDV CALC-SCNC: -1.4 MMOL/L — SIGNIFICANT CHANGE UP (ref -2–3)
BASE EXCESS BLDV CALC-SCNC: -1.4 MMOL/L — SIGNIFICANT CHANGE UP (ref -2–3)
BASE EXCESS BLDV CALC-SCNC: -3.3 MMOL/L — LOW (ref -2–3)
BASE EXCESS BLDV CALC-SCNC: 0.1 MMOL/L — SIGNIFICANT CHANGE UP (ref -2–3)
BASE EXCESS BLDV CALC-SCNC: 1 MMOL/L — SIGNIFICANT CHANGE UP (ref -2–3)
BASE EXCESS BLDV CALC-SCNC: 1 MMOL/L — SIGNIFICANT CHANGE UP (ref -2–3)
BILIRUB SERPL-MCNC: 1.5 MG/DL — SIGNIFICANT CHANGE UP (ref 0.4–2)
BUN SERPL-MCNC: 14.7 MG/DL — SIGNIFICANT CHANGE UP (ref 8–20)
BUN SERPL-MCNC: 14.8 MG/DL — SIGNIFICANT CHANGE UP (ref 8–20)
CA-I BLDA-SCNC: 1.03 MMOL/L — LOW (ref 1.15–1.33)
CA-I BLDA-SCNC: 1.05 MMOL/L — LOW (ref 1.15–1.33)
CA-I BLDA-SCNC: 1.05 MMOL/L — LOW (ref 1.15–1.33)
CA-I BLDA-SCNC: 1.06 MMOL/L — LOW (ref 1.15–1.33)
CA-I BLDA-SCNC: 1.11 MMOL/L — LOW (ref 1.15–1.33)
CA-I BLDA-SCNC: 1.13 MMOL/L — LOW (ref 1.15–1.33)
CA-I BLDA-SCNC: 1.17 MMOL/L — SIGNIFICANT CHANGE UP (ref 1.15–1.33)
CA-I BLDA-SCNC: 1.2 MMOL/L — SIGNIFICANT CHANGE UP (ref 1.15–1.33)
CA-I BLDA-SCNC: 1.26 MMOL/L — SIGNIFICANT CHANGE UP (ref 1.15–1.33)
CA-I BLDA-SCNC: 1.37 MMOL/L — HIGH (ref 1.15–1.33)
CA-I SERPL-SCNC: 1.02 MMOL/L — LOW (ref 1.15–1.33)
CA-I SERPL-SCNC: 1.02 MMOL/L — LOW (ref 1.15–1.33)
CA-I SERPL-SCNC: 1.03 MMOL/L — LOW (ref 1.15–1.33)
CA-I SERPL-SCNC: 1.03 MMOL/L — LOW (ref 1.15–1.33)
CA-I SERPL-SCNC: 1.06 MMOL/L — LOW (ref 1.15–1.33)
CA-I SERPL-SCNC: 1.06 MMOL/L — LOW (ref 1.15–1.33)
CA-I SERPL-SCNC: 1.07 MMOL/L — LOW (ref 1.15–1.33)
CA-I SERPL-SCNC: 1.08 MMOL/L — LOW (ref 1.15–1.33)
CA-I SERPL-SCNC: 1.18 MMOL/L — SIGNIFICANT CHANGE UP (ref 1.15–1.33)
CALCIUM SERPL-MCNC: 8.2 MG/DL — LOW (ref 8.4–10.5)
CALCIUM SERPL-MCNC: 9.2 MG/DL — SIGNIFICANT CHANGE UP (ref 8.4–10.5)
CHLORIDE BLDA-SCNC: 102 MMOL/L — SIGNIFICANT CHANGE UP (ref 96–108)
CHLORIDE BLDA-SCNC: 103 MMOL/L — SIGNIFICANT CHANGE UP (ref 96–108)
CHLORIDE BLDA-SCNC: 103 MMOL/L — SIGNIFICANT CHANGE UP (ref 96–108)
CHLORIDE BLDA-SCNC: 104 MMOL/L — SIGNIFICANT CHANGE UP (ref 96–108)
CHLORIDE BLDA-SCNC: 104 MMOL/L — SIGNIFICANT CHANGE UP (ref 96–108)
CHLORIDE BLDA-SCNC: 105 MMOL/L — SIGNIFICANT CHANGE UP (ref 96–108)
CHLORIDE BLDA-SCNC: 106 MMOL/L — SIGNIFICANT CHANGE UP (ref 96–108)
CHLORIDE BLDA-SCNC: 107 MMOL/L — SIGNIFICANT CHANGE UP (ref 96–108)
CHLORIDE BLDV-SCNC: 104 MMOL/L — SIGNIFICANT CHANGE UP (ref 96–108)
CHLORIDE BLDV-SCNC: 105 MMOL/L — SIGNIFICANT CHANGE UP (ref 96–108)
CHLORIDE BLDV-SCNC: 106 MMOL/L — SIGNIFICANT CHANGE UP (ref 96–108)
CHLORIDE SERPL-SCNC: 101 MMOL/L — SIGNIFICANT CHANGE UP (ref 96–108)
CHLORIDE SERPL-SCNC: 107 MMOL/L — SIGNIFICANT CHANGE UP (ref 96–108)
CK MB CFR SERPL CALC: 59.9 NG/ML — HIGH (ref 0–6.7)
CK SERPL-CCNC: 391 U/L — HIGH (ref 30–200)
CO2 SERPL-SCNC: 22 MMOL/L — SIGNIFICANT CHANGE UP (ref 22–29)
CO2 SERPL-SCNC: 28 MMOL/L — SIGNIFICANT CHANGE UP (ref 22–29)
COHGB MFR BLDA: 1.1 % — SIGNIFICANT CHANGE UP
COHGB MFR BLDA: 1.3 % — SIGNIFICANT CHANGE UP
COHGB MFR BLDA: 1.4 % — SIGNIFICANT CHANGE UP
COHGB MFR BLDA: 1.4 % — SIGNIFICANT CHANGE UP
COHGB MFR BLDA: 1.5 % — SIGNIFICANT CHANGE UP
COHGB MFR BLDA: 1.6 % — SIGNIFICANT CHANGE UP
COHGB MFR BLDA: 1.6 % — SIGNIFICANT CHANGE UP
COHGB MFR BLDA: 1.7 % — SIGNIFICANT CHANGE UP
COHGB MFR BLDA: 1.8 % — SIGNIFICANT CHANGE UP
COHGB MFR BLDA: 1.8 % — SIGNIFICANT CHANGE UP
COHGB MFR BLDA: 2 % — SIGNIFICANT CHANGE UP
COHGB MFR BLDA: 2.1 % — SIGNIFICANT CHANGE UP
COHGB MFR BLDV: 1.4 % — SIGNIFICANT CHANGE UP
COHGB MFR BLDV: 1.4 % — SIGNIFICANT CHANGE UP
COHGB MFR BLDV: 1.6 % — SIGNIFICANT CHANGE UP
COHGB MFR BLDV: 1.7 % — SIGNIFICANT CHANGE UP
COHGB MFR BLDV: 1.7 % — SIGNIFICANT CHANGE UP
COHGB MFR BLDV: 2.1 % — SIGNIFICANT CHANGE UP
CREAT SERPL-MCNC: 0.69 MG/DL — SIGNIFICANT CHANGE UP (ref 0.5–1.3)
CREAT SERPL-MCNC: 0.83 MG/DL — SIGNIFICANT CHANGE UP (ref 0.5–1.3)
EGFR: 90 ML/MIN/1.73M2 — SIGNIFICANT CHANGE UP
EGFR: 95 ML/MIN/1.73M2 — SIGNIFICANT CHANGE UP
GAS PNL BLDA: SIGNIFICANT CHANGE UP
GAS PNL BLDV: 134 MMOL/L — LOW (ref 136–145)
GAS PNL BLDV: 135 MMOL/L — LOW (ref 136–145)
GAS PNL BLDV: 136 MMOL/L — SIGNIFICANT CHANGE UP (ref 136–145)
GAS PNL BLDV: SIGNIFICANT CHANGE UP
GLUCOSE BLDA-MCNC: 134 MG/DL — HIGH (ref 70–99)
GLUCOSE BLDA-MCNC: 136 MG/DL — HIGH (ref 70–99)
GLUCOSE BLDA-MCNC: 138 MG/DL — HIGH (ref 70–99)
GLUCOSE BLDA-MCNC: 144 MG/DL — HIGH (ref 70–99)
GLUCOSE BLDA-MCNC: 152 MG/DL — HIGH (ref 70–99)
GLUCOSE BLDA-MCNC: 153 MG/DL — HIGH (ref 70–99)
GLUCOSE BLDA-MCNC: 163 MG/DL — HIGH (ref 70–99)
GLUCOSE BLDA-MCNC: 168 MG/DL — HIGH (ref 70–99)
GLUCOSE BLDA-MCNC: 175 MG/DL — HIGH (ref 70–99)
GLUCOSE BLDA-MCNC: 183 MG/DL — HIGH (ref 70–99)
GLUCOSE BLDA-MCNC: 194 MG/DL — HIGH (ref 70–99)
GLUCOSE BLDA-MCNC: 90 MG/DL — SIGNIFICANT CHANGE UP (ref 70–99)
GLUCOSE BLDC GLUCOMTR-MCNC: 144 MG/DL — HIGH (ref 70–99)
GLUCOSE BLDC GLUCOMTR-MCNC: 155 MG/DL — HIGH (ref 70–99)
GLUCOSE BLDC GLUCOMTR-MCNC: 155 MG/DL — HIGH (ref 70–99)
GLUCOSE BLDC GLUCOMTR-MCNC: 178 MG/DL — HIGH (ref 70–99)
GLUCOSE BLDC GLUCOMTR-MCNC: 185 MG/DL — HIGH (ref 70–99)
GLUCOSE BLDC GLUCOMTR-MCNC: 185 MG/DL — HIGH (ref 70–99)
GLUCOSE BLDC GLUCOMTR-MCNC: 191 MG/DL — HIGH (ref 70–99)
GLUCOSE BLDV-MCNC: 136 MG/DL — HIGH (ref 70–99)
GLUCOSE BLDV-MCNC: 137 MG/DL — HIGH (ref 70–99)
GLUCOSE BLDV-MCNC: 149 MG/DL — HIGH (ref 70–99)
GLUCOSE BLDV-MCNC: 163 MG/DL — HIGH (ref 70–99)
GLUCOSE BLDV-MCNC: 164 MG/DL — HIGH (ref 70–99)
GLUCOSE BLDV-MCNC: 166 MG/DL — HIGH (ref 70–99)
GLUCOSE BLDV-MCNC: 175 MG/DL — HIGH (ref 70–99)
GLUCOSE BLDV-MCNC: 183 MG/DL — HIGH (ref 70–99)
GLUCOSE BLDV-MCNC: 189 MG/DL — HIGH (ref 70–99)
GLUCOSE SERPL-MCNC: 157 MG/DL — HIGH (ref 70–99)
GLUCOSE SERPL-MCNC: 81 MG/DL — SIGNIFICANT CHANGE UP (ref 70–99)
HCO3 BLDA-SCNC: 22 MMOL/L — SIGNIFICANT CHANGE UP (ref 21–28)
HCO3 BLDA-SCNC: 23 MMOL/L — SIGNIFICANT CHANGE UP (ref 21–28)
HCO3 BLDA-SCNC: 24 MMOL/L — SIGNIFICANT CHANGE UP (ref 21–28)
HCO3 BLDA-SCNC: 25 MMOL/L — SIGNIFICANT CHANGE UP (ref 21–28)
HCO3 BLDA-SCNC: 30 MMOL/L — HIGH (ref 21–28)
HCO3 BLDA-SCNC: 32 MMOL/L — HIGH (ref 21–28)
HCO3 BLDV-SCNC: 23 MMOL/L — SIGNIFICANT CHANGE UP (ref 22–29)
HCO3 BLDV-SCNC: 24 MMOL/L — SIGNIFICANT CHANGE UP (ref 22–29)
HCO3 BLDV-SCNC: 25 MMOL/L — SIGNIFICANT CHANGE UP (ref 22–29)
HCT VFR BLD CALC: 27 % — LOW (ref 39–50)
HCT VFR BLD CALC: 31.9 % — LOW (ref 39–50)
HCT VFR BLDA CALC: 25 % — SIGNIFICANT CHANGE UP
HCT VFR BLDA CALC: 25 % — SIGNIFICANT CHANGE UP
HCT VFR BLDA CALC: 26 % — SIGNIFICANT CHANGE UP
HCT VFR BLDA CALC: 27 % — SIGNIFICANT CHANGE UP
HCT VFR BLDA CALC: 29 % — SIGNIFICANT CHANGE UP
HCT VFR BLDA CALC: 30 % — SIGNIFICANT CHANGE UP
HCT VFR BLDA CALC: 31 % — SIGNIFICANT CHANGE UP
HCT VFR BLDA CALC: 32 % — SIGNIFICANT CHANGE UP
HGB BLD CALC-MCNC: 10 G/DL — LOW (ref 12.6–17.4)
HGB BLD CALC-MCNC: 8.4 G/DL — LOW (ref 12.6–17.4)
HGB BLD CALC-MCNC: 8.4 G/DL — LOW (ref 12.6–17.4)
HGB BLD CALC-MCNC: 8.9 G/DL — LOW (ref 12.6–17.4)
HGB BLD CALC-MCNC: 9 G/DL — LOW (ref 12.6–17.4)
HGB BLD CALC-MCNC: 9.1 G/DL — LOW (ref 12.6–17.4)
HGB BLD CALC-MCNC: 9.5 G/DL — LOW (ref 12.6–17.4)
HGB BLD CALC-MCNC: 9.7 G/DL — LOW (ref 12.6–17.4)
HGB BLD CALC-MCNC: 9.8 G/DL — LOW (ref 12.6–17.4)
HGB BLD-MCNC: 10.3 G/DL — LOW (ref 13–17)
HGB BLD-MCNC: 9 G/DL — LOW (ref 13–17)
HGB BLDA-MCNC: 10 G/DL — LOW (ref 12.6–17.4)
HGB BLDA-MCNC: 10 G/DL — LOW (ref 12.6–17.4)
HGB BLDA-MCNC: 10.1 G/DL — LOW (ref 12.6–17.4)
HGB BLDA-MCNC: 10.2 G/DL — LOW (ref 12.6–17.4)
HGB BLDA-MCNC: 10.8 G/DL — LOW (ref 12.6–17.4)
HGB BLDA-MCNC: 8.6 G/DL — LOW (ref 12.6–17.4)
HGB BLDA-MCNC: 9 G/DL — LOW (ref 12.6–17.4)
HGB BLDA-MCNC: 9.1 G/DL — LOW (ref 12.6–17.4)
HGB BLDA-MCNC: 9.5 G/DL — LOW (ref 12.6–17.4)
HGB BLDA-MCNC: 9.5 G/DL — LOW (ref 12.6–17.4)
HGB BLDA-MCNC: 9.7 G/DL — LOW (ref 12.6–17.4)
HGB BLDA-MCNC: 9.7 G/DL — LOW (ref 12.6–17.4)
HOROWITZ INDEX BLDV+IHG-RTO: 60 — SIGNIFICANT CHANGE UP
INR BLD: 0.89 RATIO — SIGNIFICANT CHANGE UP (ref 0.88–1.16)
LACTATE BLDA-MCNC: 0.8 MMOL/L — SIGNIFICANT CHANGE UP (ref 0.5–2)
LACTATE BLDA-MCNC: 1 MMOL/L — SIGNIFICANT CHANGE UP (ref 0.5–2)
LACTATE BLDA-MCNC: 1.2 MMOL/L — SIGNIFICANT CHANGE UP (ref 0.5–2)
LACTATE BLDA-MCNC: 1.3 MMOL/L — SIGNIFICANT CHANGE UP (ref 0.5–2)
LACTATE BLDA-MCNC: 1.4 MMOL/L — SIGNIFICANT CHANGE UP (ref 0.5–2)
LACTATE BLDA-MCNC: 1.9 MMOL/L — SIGNIFICANT CHANGE UP (ref 0.5–2)
LACTATE BLDA-MCNC: 2.1 MMOL/L — HIGH (ref 0.5–2)
LACTATE BLDA-MCNC: 2.6 MMOL/L — HIGH (ref 0.5–2)
LACTATE BLDA-MCNC: 2.7 MMOL/L — HIGH (ref 0.5–2)
LACTATE BLDV-MCNC: 1 MMOL/L — SIGNIFICANT CHANGE UP (ref 0.5–2)
LACTATE BLDV-MCNC: 1.2 MMOL/L — SIGNIFICANT CHANGE UP (ref 0.5–2)
LACTATE BLDV-MCNC: 1.3 MMOL/L — SIGNIFICANT CHANGE UP (ref 0.5–2)
LACTATE BLDV-MCNC: 1.3 MMOL/L — SIGNIFICANT CHANGE UP (ref 0.5–2)
LACTATE BLDV-MCNC: 1.4 MMOL/L — SIGNIFICANT CHANGE UP (ref 0.5–2)
LACTATE BLDV-MCNC: 2 MMOL/L — SIGNIFICANT CHANGE UP (ref 0.5–2)
LACTATE BLDV-MCNC: 2.2 MMOL/L — HIGH (ref 0.5–2)
MAGNESIUM SERPL-MCNC: 1.7 MG/DL — SIGNIFICANT CHANGE UP (ref 1.6–2.6)
MAGNESIUM SERPL-MCNC: 2.8 MG/DL — HIGH (ref 1.8–2.6)
MCHC RBC-ENTMCNC: 29.9 PG — SIGNIFICANT CHANGE UP (ref 27–34)
MCHC RBC-ENTMCNC: 30 PG — SIGNIFICANT CHANGE UP (ref 27–34)
MCHC RBC-ENTMCNC: 32.3 GM/DL — SIGNIFICANT CHANGE UP (ref 32–36)
MCHC RBC-ENTMCNC: 33.3 GM/DL — SIGNIFICANT CHANGE UP (ref 32–36)
MCV RBC AUTO: 89.7 FL — SIGNIFICANT CHANGE UP (ref 80–100)
MCV RBC AUTO: 93 FL — SIGNIFICANT CHANGE UP (ref 80–100)
METHGB MFR BLDA: 0.4 % — SIGNIFICANT CHANGE UP
METHGB MFR BLDA: 0.5 % — SIGNIFICANT CHANGE UP
METHGB MFR BLDA: 0.5 % — SIGNIFICANT CHANGE UP
METHGB MFR BLDA: 0.6 % — SIGNIFICANT CHANGE UP
METHGB MFR BLDA: 0.6 % — SIGNIFICANT CHANGE UP
METHGB MFR BLDA: 0.7 % — SIGNIFICANT CHANGE UP
METHGB MFR BLDA: 1 % — SIGNIFICANT CHANGE UP
METHGB MFR BLDA: 1 % — SIGNIFICANT CHANGE UP
METHGB MFR BLDA: 1.2 % — SIGNIFICANT CHANGE UP
METHGB MFR BLDV: 0.2 % — SIGNIFICANT CHANGE UP
METHGB MFR BLDV: 0.5 % — SIGNIFICANT CHANGE UP
METHGB MFR BLDV: 0.5 % — SIGNIFICANT CHANGE UP
METHGB MFR BLDV: 0.8 % — SIGNIFICANT CHANGE UP
METHGB MFR BLDV: 0.9 % — SIGNIFICANT CHANGE UP
METHGB MFR BLDV: 1.1 % — SIGNIFICANT CHANGE UP
METHGB MFR BLDV: 1.2 % — SIGNIFICANT CHANGE UP
METHGB MFR BLDV: 1.2 % — SIGNIFICANT CHANGE UP
OXYHGB MFR BLDA: 97 % — HIGH (ref 90–95)
OXYHGB MFR BLDA: 98 % — HIGH (ref 90–95)
PCO2 BLDA: 33 MMHG — LOW (ref 35–48)
PCO2 BLDA: 35 MMHG — SIGNIFICANT CHANGE UP (ref 35–48)
PCO2 BLDA: 37 MMHG — SIGNIFICANT CHANGE UP (ref 35–48)
PCO2 BLDA: 37 MMHG — SIGNIFICANT CHANGE UP (ref 35–48)
PCO2 BLDA: 38 MMHG — SIGNIFICANT CHANGE UP (ref 35–48)
PCO2 BLDA: 39 MMHG — SIGNIFICANT CHANGE UP (ref 35–48)
PCO2 BLDA: 42 MMHG — SIGNIFICANT CHANGE UP (ref 35–48)
PCO2 BLDA: 43 MMHG — SIGNIFICANT CHANGE UP (ref 35–48)
PCO2 BLDA: 44 MMHG — SIGNIFICANT CHANGE UP (ref 35–48)
PCO2 BLDA: 51 MMHG — HIGH (ref 35–48)
PCO2 BLDV: 36 MMHG — LOW (ref 42–55)
PCO2 BLDV: 36 MMHG — LOW (ref 42–55)
PCO2 BLDV: 38 MMHG — LOW (ref 42–55)
PCO2 BLDV: 38 MMHG — LOW (ref 42–55)
PCO2 BLDV: 39 MMHG — LOW (ref 42–55)
PCO2 BLDV: 44 MMHG — SIGNIFICANT CHANGE UP (ref 42–55)
PCO2 BLDV: 47 MMHG — SIGNIFICANT CHANGE UP (ref 42–55)
PH BLDA: 7.32 — LOW (ref 7.35–7.45)
PH BLDA: 7.34 — LOW (ref 7.35–7.45)
PH BLDA: 7.37 — SIGNIFICANT CHANGE UP (ref 7.35–7.45)
PH BLDA: 7.39 — SIGNIFICANT CHANGE UP (ref 7.35–7.45)
PH BLDA: 7.4 — SIGNIFICANT CHANGE UP (ref 7.35–7.45)
PH BLDA: 7.41 — SIGNIFICANT CHANGE UP (ref 7.35–7.45)
PH BLDA: 7.43 — SIGNIFICANT CHANGE UP (ref 7.35–7.45)
PH BLDA: 7.43 — SIGNIFICANT CHANGE UP (ref 7.35–7.45)
PH BLDA: 7.46 — HIGH (ref 7.35–7.45)
PH BLDA: 7.47 — HIGH (ref 7.35–7.45)
PH BLDV: 7.3 — LOW (ref 7.32–7.43)
PH BLDV: 7.36 — SIGNIFICANT CHANGE UP (ref 7.32–7.43)
PH BLDV: 7.39 — SIGNIFICANT CHANGE UP (ref 7.32–7.43)
PH BLDV: 7.39 — SIGNIFICANT CHANGE UP (ref 7.32–7.43)
PH BLDV: 7.4 — SIGNIFICANT CHANGE UP (ref 7.32–7.43)
PH BLDV: 7.41 — SIGNIFICANT CHANGE UP (ref 7.32–7.43)
PH BLDV: 7.42 — SIGNIFICANT CHANGE UP (ref 7.32–7.43)
PH BLDV: 7.45 — HIGH (ref 7.32–7.43)
PH BLDV: 7.45 — HIGH (ref 7.32–7.43)
PLATELET # BLD AUTO: 278 K/UL — SIGNIFICANT CHANGE UP (ref 150–400)
PLATELET # BLD AUTO: 382 K/UL — SIGNIFICANT CHANGE UP (ref 150–400)
PO2 BLDA: 132 MMHG — HIGH (ref 83–108)
PO2 BLDA: 303 MMHG — HIGH (ref 83–108)
PO2 BLDA: 358 MMHG — HIGH (ref 83–108)
PO2 BLDA: 360 MMHG — HIGH (ref 83–108)
PO2 BLDA: 490 MMHG — HIGH (ref 83–108)
PO2 BLDA: >496 MMHG — HIGH (ref 83–108)
PO2 BLDV: 175 MMHG — HIGH (ref 25–45)
PO2 BLDV: 198 MMHG — HIGH (ref 25–45)
PO2 BLDV: 235 MMHG — HIGH (ref 25–45)
PO2 BLDV: 245 MMHG — HIGH (ref 25–45)
PO2 BLDV: 55 MMHG — HIGH (ref 25–45)
PO2 BLDV: 58 MMHG — HIGH (ref 25–45)
PO2 BLDV: 89 MMHG — HIGH (ref 25–45)
PO2 BLDV: 97 MMHG — HIGH (ref 25–45)
PO2 BLDV: <42 MMHG — SIGNIFICANT CHANGE UP (ref 25–45)
POTASSIUM BLDA-SCNC: 4.3 MMOL/L — SIGNIFICANT CHANGE UP (ref 3.5–5.1)
POTASSIUM BLDA-SCNC: 4.6 MMOL/L — SIGNIFICANT CHANGE UP (ref 3.5–5.1)
POTASSIUM BLDA-SCNC: 4.8 MMOL/L — SIGNIFICANT CHANGE UP (ref 3.5–5.1)
POTASSIUM BLDA-SCNC: 5.1 MMOL/L — SIGNIFICANT CHANGE UP (ref 3.5–5.1)
POTASSIUM BLDA-SCNC: 5.3 MMOL/L — HIGH (ref 3.5–5.1)
POTASSIUM BLDA-SCNC: 5.3 MMOL/L — HIGH (ref 3.5–5.1)
POTASSIUM BLDA-SCNC: 5.8 MMOL/L — HIGH (ref 3.5–5.1)
POTASSIUM BLDA-SCNC: 5.9 MMOL/L — HIGH (ref 3.5–5.1)
POTASSIUM BLDA-SCNC: 5.9 MMOL/L — HIGH (ref 3.5–5.1)
POTASSIUM BLDA-SCNC: 6.1 MMOL/L — HIGH (ref 3.5–5.1)
POTASSIUM BLDA-SCNC: 6.2 MMOL/L — CRITICAL HIGH (ref 3.5–5.1)
POTASSIUM BLDA-SCNC: 6.4 MMOL/L — CRITICAL HIGH (ref 3.5–5.1)
POTASSIUM BLDV-SCNC: 4.7 MMOL/L — SIGNIFICANT CHANGE UP (ref 3.5–5.1)
POTASSIUM BLDV-SCNC: 5 MMOL/L — SIGNIFICANT CHANGE UP (ref 3.5–5.1)
POTASSIUM BLDV-SCNC: 5.2 MMOL/L — HIGH (ref 3.5–5.1)
POTASSIUM BLDV-SCNC: 5.7 MMOL/L — HIGH (ref 3.5–5.1)
POTASSIUM BLDV-SCNC: 5.7 MMOL/L — HIGH (ref 3.5–5.1)
POTASSIUM BLDV-SCNC: 5.8 MMOL/L — HIGH (ref 3.5–5.1)
POTASSIUM BLDV-SCNC: 6 MMOL/L — HIGH (ref 3.5–5.1)
POTASSIUM BLDV-SCNC: 6.1 MMOL/L — HIGH (ref 3.5–5.1)
POTASSIUM BLDV-SCNC: 6.2 MMOL/L — CRITICAL HIGH (ref 3.5–5.1)
POTASSIUM SERPL-MCNC: 4.6 MMOL/L — SIGNIFICANT CHANGE UP (ref 3.5–5.3)
POTASSIUM SERPL-MCNC: 5.4 MMOL/L — HIGH (ref 3.5–5.3)
POTASSIUM SERPL-SCNC: 4.6 MMOL/L — SIGNIFICANT CHANGE UP (ref 3.5–5.3)
POTASSIUM SERPL-SCNC: 5.4 MMOL/L — HIGH (ref 3.5–5.3)
PROT SERPL-MCNC: 4.5 G/DL — LOW (ref 6.6–8.7)
PROTHROM AB SERPL-ACNC: 10.3 SEC — LOW (ref 10.5–13.4)
RBC # BLD: 3.01 M/UL — LOW (ref 4.2–5.8)
RBC # BLD: 3.43 M/UL — LOW (ref 4.2–5.8)
RBC # FLD: 13.8 % — SIGNIFICANT CHANGE UP (ref 10.3–14.5)
RBC # FLD: 14.4 % — SIGNIFICANT CHANGE UP (ref 10.3–14.5)
SAO2 % BLDA: 100 % — HIGH (ref 94–98)
SAO2 % BLDA: 99.8 % — HIGH (ref 94–98)
SAO2 % BLDA: 99.9 % — HIGH (ref 94–98)
SAO2 % BLDV: 100 % — HIGH (ref 67–88)
SAO2 % BLDV: 61.2 % — SIGNIFICANT CHANGE UP
SAO2 % BLDV: 89.9 % — HIGH (ref 67–88)
SAO2 % BLDV: 91.2 % — HIGH (ref 67–88)
SAO2 % BLDV: 98.9 % — HIGH (ref 67–88)
SAO2 % BLDV: 99.6 % — HIGH (ref 67–88)
SAO2 % BLDV: 99.8 % — HIGH (ref 67–88)
SODIUM BLDA-SCNC: 133 MMOL/L — LOW (ref 136–145)
SODIUM BLDA-SCNC: 134 MMOL/L — LOW (ref 136–145)
SODIUM BLDA-SCNC: 135 MMOL/L — LOW (ref 136–145)
SODIUM BLDA-SCNC: 136 MMOL/L — SIGNIFICANT CHANGE UP (ref 136–145)
SODIUM BLDA-SCNC: 137 MMOL/L — SIGNIFICANT CHANGE UP (ref 136–145)
SODIUM SERPL-SCNC: 138 MMOL/L — SIGNIFICANT CHANGE UP (ref 135–145)
SODIUM SERPL-SCNC: 140 MMOL/L — SIGNIFICANT CHANGE UP (ref 135–145)
TROPONIN T SERPL-MCNC: 1.07 NG/ML — HIGH (ref 0–0.06)
WBC # BLD: 11.08 K/UL — HIGH (ref 3.8–10.5)
WBC # BLD: 28.22 K/UL — HIGH (ref 3.8–10.5)
WBC # FLD AUTO: 11.08 K/UL — HIGH (ref 3.8–10.5)
WBC # FLD AUTO: 28.22 K/UL — HIGH (ref 3.8–10.5)

## 2022-12-13 PROCEDURE — 33430 REPLACEMENT OF MITRAL VALVE: CPT | Mod: AS

## 2022-12-13 PROCEDURE — 33411 REPLACEMENT OF AORTIC VALVE: CPT | Mod: AS,59

## 2022-12-13 PROCEDURE — 99291 CRITICAL CARE FIRST HOUR: CPT | Mod: 24

## 2022-12-13 PROCEDURE — 33414 REPAIR OF AORTIC VALVE: CPT | Mod: AS

## 2022-12-13 PROCEDURE — 88311 DECALCIFY TISSUE: CPT | Mod: 26

## 2022-12-13 PROCEDURE — 93010 ELECTROCARDIOGRAM REPORT: CPT

## 2022-12-13 PROCEDURE — 33430 REPLACEMENT OF MITRAL VALVE: CPT

## 2022-12-13 PROCEDURE — 33414 REPAIR OF AORTIC VALVE: CPT

## 2022-12-13 PROCEDURE — 33411 REPLACEMENT OF AORTIC VALVE: CPT | Mod: 59

## 2022-12-13 PROCEDURE — 88305 TISSUE EXAM BY PATHOLOGIST: CPT | Mod: 26

## 2022-12-13 PROCEDURE — 71045 X-RAY EXAM CHEST 1 VIEW: CPT | Mod: 26,76

## 2022-12-13 DEVICE — IMPLANTABLE DEVICE: Type: IMPLANTABLE DEVICE | Status: FUNCTIONAL

## 2022-12-13 DEVICE — CATH THERMAL OXI SWAN GANZ DILUTION 7.5FR: Type: IMPLANTABLE DEVICE | Status: FUNCTIONAL

## 2022-12-13 DEVICE — KIT A-LINE 1LUM 20G X 12CM SAFE KIT: Type: IMPLANTABLE DEVICE | Status: FUNCTIONAL

## 2022-12-13 DEVICE — CANNULA VENOUS 1 STAGE RIGHT ANGLE 28FR X 3/8": Type: IMPLANTABLE DEVICE | Status: FUNCTIONAL

## 2022-12-13 DEVICE — CANNULA VENOUS 1 STAGE STRAIGHT 32FR X 3/8": Type: IMPLANTABLE DEVICE | Status: FUNCTIONAL

## 2022-12-13 DEVICE — COR-KNOT MINI DEVICE COMBO KIT: Type: IMPLANTABLE DEVICE | Status: FUNCTIONAL

## 2022-12-13 DEVICE — PACING WIRE ORANGE M-25 WINGED WIRE 37MM X 89MM: Type: IMPLANTABLE DEVICE | Status: FUNCTIONAL

## 2022-12-13 DEVICE — BIOGLUE 10ML SYR: Type: IMPLANTABLE DEVICE | Status: FUNCTIONAL

## 2022-12-13 DEVICE — VALVE AORTIC INSPIRIS RESILIA 27MM: Type: IMPLANTABLE DEVICE | Status: FUNCTIONAL

## 2022-12-13 DEVICE — PATCH PERI-GUARD RPR 6 CM X 8 CM: Type: IMPLANTABLE DEVICE | Status: FUNCTIONAL

## 2022-12-13 DEVICE — CATH VENTRICULAR PVC 2 POS LSP 18FR: Type: IMPLANTABLE DEVICE | Status: FUNCTIONAL

## 2022-12-13 DEVICE — CANNULA CORONARY SILICONE OSTIAL 15FR: Type: IMPLANTABLE DEVICE | Status: FUNCTIONAL

## 2022-12-13 DEVICE — SURGICEL NU-KNIT 6 X 9": Type: IMPLANTABLE DEVICE | Status: FUNCTIONAL

## 2022-12-13 DEVICE — CANNULA ARTERIAL SOFT-FLOW 21FR EXTENDED VENTED: Type: IMPLANTABLE DEVICE | Status: FUNCTIONAL

## 2022-12-13 DEVICE — KIT A-LINE 1LUM 18GAX16CM: Type: IMPLANTABLE DEVICE | Status: FUNCTIONAL

## 2022-12-13 DEVICE — CANNULA RETROGRADE CARDIOPLEGIA SELF-INFLATING 14FR PRE-SHAPED STYLET/HANDLE: Type: IMPLANTABLE DEVICE | Status: FUNCTIONAL

## 2022-12-13 DEVICE — CANNULA AORTIC ROOT 14G X 14CM FLANGED: Type: IMPLANTABLE DEVICE | Status: FUNCTIONAL

## 2022-12-13 DEVICE — LIGATING CLIPS WECK HORIZON LARGE (ORANGE) 6: Type: IMPLANTABLE DEVICE | Status: FUNCTIONAL

## 2022-12-13 DEVICE — KIT CVC 2LUM MAC 9FR CHG: Type: IMPLANTABLE DEVICE | Status: FUNCTIONAL

## 2022-12-13 DEVICE — FLOSEAL FAST PREP 10ML: Type: IMPLANTABLE DEVICE | Status: FUNCTIONAL

## 2022-12-13 DEVICE — CHEST DRAIN PLEUR-EVAC 28FR STRAIGHT: Type: IMPLANTABLE DEVICE | Status: FUNCTIONAL

## 2022-12-13 DEVICE — MEDIASTINAL CATH DRAIN 9MM: Type: IMPLANTABLE DEVICE | Status: FUNCTIONAL

## 2022-12-13 DEVICE — PATCH  GUARD REPAIR PERI - 10CM X 16CM: Type: IMPLANTABLE DEVICE | Status: FUNCTIONAL

## 2022-12-13 DEVICE — SURGICEL FIBRILLAR 4 X 4": Type: IMPLANTABLE DEVICE | Status: FUNCTIONAL

## 2022-12-13 RX ORDER — PANTOPRAZOLE SODIUM 20 MG/1
40 TABLET, DELAYED RELEASE ORAL ONCE
Refills: 0 | Status: COMPLETED | OUTPATIENT
Start: 2022-12-13 | End: 2022-12-13

## 2022-12-13 RX ORDER — ASPIRIN/CALCIUM CARB/MAGNESIUM 324 MG
81 TABLET ORAL DAILY
Refills: 0 | Status: DISCONTINUED | OUTPATIENT
Start: 2022-12-14 | End: 2023-01-03

## 2022-12-13 RX ORDER — CHLORHEXIDINE GLUCONATE 213 G/1000ML
15 SOLUTION TOPICAL EVERY 12 HOURS
Refills: 0 | Status: DISCONTINUED | OUTPATIENT
Start: 2022-12-13 | End: 2022-12-14

## 2022-12-13 RX ORDER — PANTOPRAZOLE SODIUM 20 MG/1
40 TABLET, DELAYED RELEASE ORAL DAILY
Refills: 0 | Status: DISCONTINUED | OUTPATIENT
Start: 2022-12-14 | End: 2023-01-03

## 2022-12-13 RX ORDER — ACETAMINOPHEN 500 MG
1000 TABLET ORAL ONCE
Refills: 0 | Status: COMPLETED | OUTPATIENT
Start: 2022-12-13 | End: 2022-12-14

## 2022-12-13 RX ORDER — FENTANYL CITRATE 50 UG/ML
50 INJECTION INTRAVENOUS EVERY 4 HOURS
Refills: 0 | Status: DISCONTINUED | OUTPATIENT
Start: 2022-12-13 | End: 2022-12-14

## 2022-12-13 RX ORDER — DEXTROSE 50 % IN WATER 50 %
25 SYRINGE (ML) INTRAVENOUS
Refills: 0 | Status: DISCONTINUED | OUTPATIENT
Start: 2022-12-13 | End: 2022-12-14

## 2022-12-13 RX ORDER — SODIUM CHLORIDE 9 MG/ML
1000 INJECTION INTRAMUSCULAR; INTRAVENOUS; SUBCUTANEOUS
Refills: 0 | Status: DISCONTINUED | OUTPATIENT
Start: 2022-12-13 | End: 2022-12-16

## 2022-12-13 RX ORDER — MEROPENEM 1 G/30ML
INJECTION INTRAVENOUS
Refills: 0 | Status: DISCONTINUED | OUTPATIENT
Start: 2022-12-13 | End: 2022-12-14

## 2022-12-13 RX ORDER — MEROPENEM 1 G/30ML
1000 INJECTION INTRAVENOUS ONCE
Refills: 0 | Status: COMPLETED | OUTPATIENT
Start: 2022-12-13 | End: 2022-12-13

## 2022-12-13 RX ORDER — POTASSIUM CHLORIDE 20 MEQ
10 PACKET (EA) ORAL
Refills: 0 | Status: DISCONTINUED | OUTPATIENT
Start: 2022-12-13 | End: 2022-12-14

## 2022-12-13 RX ORDER — INSULIN HUMAN 100 [IU]/ML
2 INJECTION, SOLUTION SUBCUTANEOUS
Qty: 50 | Refills: 0 | Status: DISCONTINUED | OUTPATIENT
Start: 2022-12-13 | End: 2022-12-14

## 2022-12-13 RX ORDER — SODIUM CHLORIDE 9 MG/ML
1000 INJECTION INTRAMUSCULAR; INTRAVENOUS; SUBCUTANEOUS
Refills: 0 | Status: DISCONTINUED | OUTPATIENT
Start: 2022-12-13 | End: 2022-12-19

## 2022-12-13 RX ORDER — NOREPINEPHRINE BITARTRATE/D5W 8 MG/250ML
0.03 PLASTIC BAG, INJECTION (ML) INTRAVENOUS
Qty: 8 | Refills: 0 | Status: DISCONTINUED | OUTPATIENT
Start: 2022-12-13 | End: 2022-12-15

## 2022-12-13 RX ORDER — MEROPENEM 1 G/30ML
1000 INJECTION INTRAVENOUS EVERY 8 HOURS
Refills: 0 | Status: DISCONTINUED | OUTPATIENT
Start: 2022-12-14 | End: 2022-12-14

## 2022-12-13 RX ORDER — ASPIRIN/CALCIUM CARB/MAGNESIUM 324 MG
81 TABLET ORAL ONCE
Refills: 0 | Status: COMPLETED | OUTPATIENT
Start: 2022-12-13 | End: 2022-12-14

## 2022-12-13 RX ORDER — ONDANSETRON 8 MG/1
4 TABLET, FILM COATED ORAL ONCE
Refills: 0 | Status: DISCONTINUED | OUTPATIENT
Start: 2022-12-13 | End: 2023-01-03

## 2022-12-13 RX ORDER — SENNA PLUS 8.6 MG/1
2 TABLET ORAL AT BEDTIME
Refills: 0 | Status: DISCONTINUED | OUTPATIENT
Start: 2022-12-14 | End: 2022-12-31

## 2022-12-13 RX ORDER — VANCOMYCIN HCL 1 G
1000 VIAL (EA) INTRAVENOUS EVERY 12 HOURS
Refills: 0 | Status: DISCONTINUED | OUTPATIENT
Start: 2022-12-13 | End: 2022-12-14

## 2022-12-13 RX ORDER — NICARDIPINE HYDROCHLORIDE 30 MG/1
5 CAPSULE, EXTENDED RELEASE ORAL
Qty: 40 | Refills: 0 | Status: DISCONTINUED | OUTPATIENT
Start: 2022-12-13 | End: 2022-12-15

## 2022-12-13 RX ORDER — PROPOFOL 10 MG/ML
20 INJECTION, EMULSION INTRAVENOUS
Qty: 1000 | Refills: 0 | Status: DISCONTINUED | OUTPATIENT
Start: 2022-12-13 | End: 2022-12-14

## 2022-12-13 RX ORDER — POLYETHYLENE GLYCOL 3350 17 G/17G
17 POWDER, FOR SOLUTION ORAL DAILY
Refills: 0 | Status: DISCONTINUED | OUTPATIENT
Start: 2022-12-14 | End: 2022-12-31

## 2022-12-13 RX ORDER — DEXTROSE 50 % IN WATER 50 %
50 SYRINGE (ML) INTRAVENOUS
Refills: 0 | Status: DISCONTINUED | OUTPATIENT
Start: 2022-12-13 | End: 2022-12-14

## 2022-12-13 RX ORDER — ALBUMIN HUMAN 25 %
250 VIAL (ML) INTRAVENOUS
Refills: 0 | Status: DISCONTINUED | OUTPATIENT
Start: 2022-12-13 | End: 2022-12-15

## 2022-12-13 RX ORDER — CHLORHEXIDINE GLUCONATE 213 G/1000ML
1 SOLUTION TOPICAL DAILY
Refills: 0 | Status: DISCONTINUED | OUTPATIENT
Start: 2022-12-13 | End: 2023-01-03

## 2022-12-13 RX ORDER — SODIUM CHLORIDE 9 MG/ML
500 INJECTION, SOLUTION INTRAVENOUS
Refills: 0 | Status: DISCONTINUED | OUTPATIENT
Start: 2022-12-13 | End: 2022-12-15

## 2022-12-13 RX ORDER — MEROPENEM 1 G/30ML
INJECTION INTRAVENOUS
Refills: 0 | Status: DISCONTINUED | OUTPATIENT
Start: 2022-12-13 | End: 2022-12-13

## 2022-12-13 RX ADMIN — MEROPENEM 1000 MILLIGRAM(S): 1 INJECTION INTRAVENOUS at 05:26

## 2022-12-13 RX ADMIN — CHLORHEXIDINE GLUCONATE 1 APPLICATION(S): 213 SOLUTION TOPICAL at 05:27

## 2022-12-13 RX ADMIN — Medication 250 MICROGRAM(S): at 05:20

## 2022-12-13 RX ADMIN — PANTOPRAZOLE SODIUM 40 MILLIGRAM(S): 20 TABLET, DELAYED RELEASE ORAL at 19:35

## 2022-12-13 RX ADMIN — CHLORHEXIDINE GLUCONATE 15 MILLILITER(S): 213 SOLUTION TOPICAL at 19:35

## 2022-12-13 RX ADMIN — MEROPENEM 1000 MILLIGRAM(S): 1 INJECTION INTRAVENOUS at 21:15

## 2022-12-13 RX ADMIN — Medication 250 MILLIGRAM(S): at 20:25

## 2022-12-13 RX ADMIN — SODIUM CHLORIDE 2000 MILLILITER(S): 9 INJECTION, SOLUTION INTRAVENOUS at 20:27

## 2022-12-13 RX ADMIN — Medication 250 MILLIGRAM(S): at 05:20

## 2022-12-13 RX ADMIN — MIDODRINE HYDROCHLORIDE 15 MILLIGRAM(S): 2.5 TABLET ORAL at 05:20

## 2022-12-13 RX ADMIN — INSULIN HUMAN 2 UNIT(S)/HR: 100 INJECTION, SOLUTION SUBCUTANEOUS at 19:35

## 2022-12-13 RX ADMIN — Medication 125 MILLILITER(S): at 21:38

## 2022-12-13 RX ADMIN — Medication 4.32 MICROGRAM(S)/KG/MIN: at 19:35

## 2022-12-13 RX ADMIN — Medication 25 MILLIGRAM(S): at 05:20

## 2022-12-13 RX ADMIN — CHLORHEXIDINE GLUCONATE 15 MILLILITER(S): 213 SOLUTION TOPICAL at 05:20

## 2022-12-13 NOTE — BRIEF OPERATIVE NOTE - COMMENTS
No qualified resident was available to assist in this case. I have personally first assisted the Cardiothoracic Surgeon listed in this brief op note throughout the entirety of this case.   unable to calculate blood loss due to use of cell saver.

## 2022-12-13 NOTE — BRIEF OPERATIVE NOTE - NSICDXBRIEFPROCEDURE_GEN_ALL_CORE_FT
PROCEDURES:  Valve replacement, aortic and mitral 13-Dec-2022 16:10:50 AVR 27 mm inspiris, 29 mm mitral valve, Patch repair aortic root Diamond Correa

## 2022-12-13 NOTE — PROGRESS NOTE ADULT - SUBJECTIVE AND OBJECTIVE BOX
HOLDSWORTH, GEORGE  MRN-[id number]    HPI:  77 year old male with PMHx of recent  COVID  on 10/6/22, HTN , Vit B12 deficiency, presenting to the ED on 11/2 with body aches, fatigue and fever (Tmax 102) at home for 12 days found to have streptococcus bacteremia and admitted with unclear source, no recent dental work, skin infections, no respiratory symptoms. Pan scan was negative at that time ( LILIAN was recommended but pt refused) He was treated for Bacteremia ( Blood cultures + streptococcus anginosis pansensitive ) and norovirus with supportive care and contact isolation . He was discharged home on 11/7 with IV Rocephin via PICC line. Recommendation was to continue ABX  daily via pic end 11/30/22    Tonight he presented to ER after and episode of syncope and collapse, found to be septic, hypotensive, hypoxic and febrile in the ER. He is unsure of mechanism of fall but remembers being on the ground no reported LOC . He was found face-down on bathroom floor buy family with left leg wedged under cabinet. On my interview he is alert and oriented to person place and time, he has a baseline studder in his speech pattern but otherwise neurologically intcact without defecit.  In responose to hypotension he failed to repsoond to Inital sepsis fluid protocol in ER and now is requiring IV pressor in form of levophed to maintain MAP greater than 60 -65. In the ER he was febrile with inital labs signifacnt for WBC of 22.4 , ProCal of 21.2 first lactate 4.9 via abg with repeat post fluids of 2.2 venous sample.   (27 Nov 2022 02:26)      Surgery/Hospital Course:  ·  PRE-OP DIAGNOSIS:  Aortic valve endocarditis   ·  POST-OP DIAGNOSIS:  Aortic valve endocarditis   ·  PROCEDURES:  Valve replacement, aortic and mitral 13-Dec-2022   AVR 27 mm inspiris, 29 mm mitral valve, Patch repair aortic root   Today:  No acute events     ICU Vital Signs Last 24 Hrs  T(C): 36.2 (13 Dec 2022 17:30), Max: 37.3 (13 Dec 2022 06:00)  T(F): 97.2 (13 Dec 2022 17:30), Max: 99.1 (13 Dec 2022 06:00)  HR: 80 (13 Dec 2022 18:15) (80 - 98)  BP: 115/79 (13 Dec 2022 06:00) (101/55 - 115/79)  BP(mean): 74 (12 Dec 2022 21:02) (74 - 74)  ABP: 132/53 (13 Dec 2022 18:15) (85/47 - 148/59)  ABP(mean): 67 (13 Dec 2022 18:15) (59 - 87)  RR: 12 (13 Dec 2022 18:15) (12 - 18)  SpO2: 99% (13 Dec 2022 18:15) (97% - 100%)    O2 Parameters below as of 13 Dec 2022 05:17  Patient On (Oxygen Delivery Method): room air            Physical Exam:  Gen: sedated  CNS: non focal 	  Neck: no JVD  RES : clear , no wheezing              CVS: Regular  rhythm. Normal S1/S2  Abd: Soft, non-distended. Bowel sounds present.  Skin: No rash.  Ext:  no edema    ============================I/O===========================   I&O's Detail    12 Dec 2022 07:01  -  13 Dec 2022 07:00  --------------------------------------------------------  IN:    Oral Fluid: 720 mL  Total IN: 720 mL    OUT:    Indwelling Catheter - Urethral (mL): 1400 mL    Voided (mL): 1600 mL  Total OUT: 3000 mL    Total NET: -2280 mL      13 Dec 2022 07:01  -  13 Dec 2022 19:10  --------------------------------------------------------  IN:    Cryoprecipitate: 225 mL    Platelets - Single Donor: 220 mL    sodium chloride 0.9%: 160 mL    sodium chloride 0.9%: 10 mL  Total IN: 615 mL    OUT:    Chest Tube (mL): 55 mL    Chest Tube (mL): 190 mL    Chest Tube (mL): 0 mL    Chest Tube (mL): 150 mL    Indwelling Catheter - Urethral (mL): 235 mL  Total OUT: 630 mL    Total NET: -15 mL        ============================ LABS =========================                        9.0    28.22 )-----------( 278      ( 13 Dec 2022 16:00 )             27.0     12-13    140  |  107  |  14.8  ----------------------------<  157<H>  5.4<H>   |  22.0  |  0.69    Ca    8.2<L>      13 Dec 2022 16:00  Mg     2.8     12-13    TPro  4.5<L>  /  Alb  2.3<L>  /  TBili  1.5  /  DBili  x   /  AST  59<H>  /  ALT  24  /  AlkPhos  57  12-13    LIVER FUNCTIONS - ( 13 Dec 2022 16:00 )  Alb: 2.3 g/dL / Pro: 4.5 g/dL / ALK PHOS: 57 U/L / ALT: 24 U/L / AST: 59 U/L / GGT: x           PT/INR - ( 13 Dec 2022 16:00 )   PT: 10.3 sec;   INR: 0.89 ratio         PTT - ( 13 Dec 2022 16:00 )  PTT:32.1 sec  ABG - ( 13 Dec 2022 16:07 )  pH, Arterial: 7.410 pH, Blood: x     /  pCO2: 37    /  pO2: 249   / HCO3: 24    / Base Excess: -1.1  /  SaO2: 100.0               ======================Micro/Rad/Cardio=================  Culture: Reviewed   CXR: Reviewed  Echo:Reviewed  ======================================================  PAST MEDICAL & SURGICAL HISTORY:  Hypertension      Aortic stenosis      H/O inguinal hernia repair  B/L 2013      S/P laparoscopic colectomy  right colectomy with ileotransverse anastomosis        ====================ASSESSMENT ==============  77 year old male with PMHx of recent  COVID  on 10/6/22, HTN , Vit B12 deficiency, presenting to the ED on 11/2 with body aches, fatigue and fever (Tmax 102) at home for 12 days found to have streptococcus bacteremia and admitted with unclear source, no recent dental work, skin infections, no respiratory symptoms. Pan scan was negative at that time ( LILIAN was recommended but pt refused) He was treated for Bacteremia ( Blood cultures + streptococcus anginosis pansensitive ) and norovirus with supportive care and contact isolation . He was discharged home on 11/7 with IV Rocephin via PICC line. Recommendation was to continue ABX  daily via pic end 11/30/22  Tonight he presented to ER after and episode of syncope and collapse, found to be septic, hypotensive, hypoxic and febrile in the ER. He is unsure of mechanism of fall but remembers being on the ground no reported LOC . He was found face-down on bathroom floor buy family with left leg wedged under cabinet. On my interview he is alert and oriented to person place and time, he has a baseline studder in his speech pattern but otherwise neurologically intcact without defecit.  In responose to hypotension he failed to repsoond to Inital sepsis fluid protocol in ER and now is requiring IV pressor in form of levophed to maintain MAP greater than 60 -65. In the ER he was febrile with inital labs signifacnt for WBC of 22.4 , ProCal of 21.2 first lactate 4.9 via abg with repeat post fluids of 2.2 venous sample.   (27 Nov 2022 02:26)    Aortic valve endocarditis   SP Valve replacement, aortic and mitral 13-Dec-2022   SP AVR 27 mm inspiris, 29 mm mitral valve, Patch repair aortic root   Hypertension  Aortic stenosis  H/O inguinal hernia repair  S/P laparoscopic colectomy  right colectomy with ileotransverse anastomosis  Post op Hypovolemic shock  Post op respiratory insufficiency       Plan:  ====================== NEUROLOGY=====================  acetaminophen   IVPB .. 1000 milliGRAM(s) IV Intermittent once PRN Temp greater or equal to 38C (100.4F), Mild Pain (1 - 3), Moderate Pain (4 - 6), Severe Pain (7 - 10)  fentaNYL    Injectable 50 MICROGram(s) IV Push every 4 hours PRN Moderate Pain (4 - 6)  ondansetron Injectable 4 milliGRAM(s) IV Push once PRN Nausea and/or Vomiting  propofol Infusion 20 MICROgram(s)/kG/Min (9.22 mL/Hr) IV Continuous <Continuous>    ==================== RESPIRATORY======================  Post op respiratory insufficiency  Mechanical Ventilation:  Mode: AC/ CMV (Assist Control/ Continuous Mandatory Ventilation)  RR (machine): 12  TV (machine): 700  FiO2: 40  PEEP: 5  MAP: 8  PIP: 20      ====================CARDIOVASCULAR==================  Post op Hypovolemia  niCARdipine Infusion 5 mG/Hr (25 mL/Hr) IV Continuous <Continuous>  norepinephrine Infusion 0.03 MICROgram(s)/kG/Min (4.32 mL/Hr) IV Continuous <Continuous>    ===================HEMATOLOGIC/ONC ===================  Monitor H&H/Plts    aspirin  chewable 81 milliGRAM(s) Oral once    ===================== RENAL =========================  Continue monitoring urine output, I&OS, BUN/Cr     ==================== GASTROINTESTINAL===================  albumin human  5% IVPB 250 milliLiter(s) IV Intermittent every 2 hours PRN Hypovolemia  lactated ringers Bolus 500 milliLiter(s) IV Bolus every 15 minutes PRN Hypovolemia  pantoprazole  Injectable 40 milliGRAM(s) IV Push once  potassium chloride  10 mEq/50 mL IVPB 10 milliEquivalent(s) IV Intermittent every 1 hour  potassium chloride  10 mEq/50 mL IVPB 10 milliEquivalent(s) IV Intermittent every 1 hour  potassium chloride  10 mEq/50 mL IVPB 10 milliEquivalent(s) IV Intermittent every 1 hour  sodium chloride 0.9%. 1000 milliLiter(s) (5 mL/Hr) IV Continuous <Continuous>  sodium chloride 0.9%. 1000 milliLiter(s) (10 mL/Hr) IV Continuous <Continuous>    =======================    ENDOCRINE  =====================  dextrose 50% Injectable 50 milliLiter(s) IV Push every 15 minutes  dextrose 50% Injectable 25 milliLiter(s) IV Push every 15 minutes  insulin regular Infusion 2 Unit(s)/Hr (2 mL/Hr) IV Continuous <Continuous>    ========================INFECTIOUS DISEASE================  meropenem  IVPB      vancomycin  IVPB 1000 milliGRAM(s) IV Intermittent every 12 hours      -Close hemodynamic , ventilatory and drain monitoring and management per post op routine .  -Monitor Neurologic status ,   -Head of the bed should remain elevated to 45 degrees,  -Monitor adequacy of oxygenation and ventilation and attempt to wean oxygen ,  -Monitor for arrhythmias and monitor parameters for organ perfusion,  -Glycemic control is satisfactory,  -Nutritional goals will be met using po eventually , insure adequate caloric intake and monitor the same ,  -Electrolytes have been repleted as necessary , pain control has been achieved  and wound care has been carried out ,  -Stress ulcer and VTE prophylaxis will be achieved,  -Agressive PT and early mobility and ambulation goals will be met,  -The family was updated about the course and plan .      I have spent 35 minutes providing acute care for this critically ill patient     Patient requires continuous monitoring with bedside rhythm monitoring, pulse ox monitoring, and intermittent blood gas analysis. Care plan discussed with ICU care team. Patient remained critical and at risk for life threatening decompensation.

## 2022-12-14 DIAGNOSIS — I25.10 ATHEROSCLEROTIC HEART DISEASE OF NATIVE CORONARY ARTERY WITHOUT ANGINA PECTORIS: ICD-10-CM

## 2022-12-14 LAB
A1C WITH ESTIMATED AVERAGE GLUCOSE RESULT: 5.1 % — SIGNIFICANT CHANGE UP (ref 4–5.6)
ACANTHOCYTES BLD QL SMEAR: SLIGHT — SIGNIFICANT CHANGE UP
ALBUMIN SERPL ELPH-MCNC: 3 G/DL — LOW (ref 3.3–5.2)
ALP SERPL-CCNC: 59 U/L — SIGNIFICANT CHANGE UP (ref 40–120)
ALT FLD-CCNC: 26 U/L — SIGNIFICANT CHANGE UP
ANION GAP SERPL CALC-SCNC: 13 MMOL/L — SIGNIFICANT CHANGE UP (ref 5–17)
ANION GAP SERPL CALC-SCNC: 13 MMOL/L — SIGNIFICANT CHANGE UP (ref 5–17)
ANION GAP SERPL CALC-SCNC: 14 MMOL/L — SIGNIFICANT CHANGE UP (ref 5–17)
ANION GAP SERPL CALC-SCNC: 15 MMOL/L — SIGNIFICANT CHANGE UP (ref 5–17)
ANISOCYTOSIS BLD QL: SLIGHT — SIGNIFICANT CHANGE UP
APTT BLD: 29.8 SEC — SIGNIFICANT CHANGE UP (ref 27.5–35.5)
AST SERPL-CCNC: 67 U/L — HIGH
BASE EXCESS BLDV CALC-SCNC: 0.9 MMOL/L — SIGNIFICANT CHANGE UP (ref -2–3)
BASOPHILS # BLD AUTO: 0.23 K/UL — HIGH (ref 0–0.2)
BASOPHILS NFR BLD AUTO: 0 % — SIGNIFICANT CHANGE UP (ref 0–2)
BILIRUB SERPL-MCNC: 1 MG/DL — SIGNIFICANT CHANGE UP (ref 0.4–2)
BLASTS # FLD: 0.9 % — HIGH (ref 0–0)
BUN SERPL-MCNC: 22.4 MG/DL — HIGH (ref 8–20)
BUN SERPL-MCNC: 24.9 MG/DL — HIGH (ref 8–20)
BUN SERPL-MCNC: 28.1 MG/DL — HIGH (ref 8–20)
BUN SERPL-MCNC: 30.2 MG/DL — HIGH (ref 8–20)
BURR CELLS BLD QL SMEAR: PRESENT — SIGNIFICANT CHANGE UP
CA-I SERPL-SCNC: 1.19 MMOL/L — SIGNIFICANT CHANGE UP (ref 1.15–1.33)
CALCIUM SERPL-MCNC: 8.1 MG/DL — LOW (ref 8.4–10.5)
CALCIUM SERPL-MCNC: 8.3 MG/DL — LOW (ref 8.4–10.5)
CALCIUM SERPL-MCNC: 8.3 MG/DL — LOW (ref 8.4–10.5)
CALCIUM SERPL-MCNC: 8.5 MG/DL — SIGNIFICANT CHANGE UP (ref 8.4–10.5)
CHLORIDE BLDV-SCNC: 107 MMOL/L — SIGNIFICANT CHANGE UP (ref 96–108)
CHLORIDE SERPL-SCNC: 100 MMOL/L — SIGNIFICANT CHANGE UP (ref 96–108)
CHLORIDE SERPL-SCNC: 101 MMOL/L — SIGNIFICANT CHANGE UP (ref 96–108)
CHLORIDE SERPL-SCNC: 106 MMOL/L — SIGNIFICANT CHANGE UP (ref 96–108)
CHLORIDE SERPL-SCNC: 99 MMOL/L — SIGNIFICANT CHANGE UP (ref 96–108)
CK MB CFR SERPL CALC: 36.5 NG/ML — HIGH (ref 0–6.7)
CK SERPL-CCNC: 388 U/L — HIGH (ref 30–200)
CO2 SERPL-SCNC: 21 MMOL/L — LOW (ref 22–29)
CO2 SERPL-SCNC: 22 MMOL/L — SIGNIFICANT CHANGE UP (ref 22–29)
CO2 SERPL-SCNC: 23 MMOL/L — SIGNIFICANT CHANGE UP (ref 22–29)
CO2 SERPL-SCNC: 24 MMOL/L — SIGNIFICANT CHANGE UP (ref 22–29)
CREAT SERPL-MCNC: 1.21 MG/DL — SIGNIFICANT CHANGE UP (ref 0.5–1.3)
CREAT SERPL-MCNC: 1.42 MG/DL — HIGH (ref 0.5–1.3)
CREAT SERPL-MCNC: 1.52 MG/DL — HIGH (ref 0.5–1.3)
CREAT SERPL-MCNC: 1.53 MG/DL — HIGH (ref 0.5–1.3)
EGFR: 46 ML/MIN/1.73M2 — LOW
EGFR: 47 ML/MIN/1.73M2 — LOW
EGFR: 51 ML/MIN/1.73M2 — LOW
EGFR: 61 ML/MIN/1.73M2 — SIGNIFICANT CHANGE UP
ELLIPTOCYTES BLD QL SMEAR: SLIGHT — SIGNIFICANT CHANGE UP
EOSINOPHIL # BLD AUTO: 0 K/UL — SIGNIFICANT CHANGE UP (ref 0–0.5)
EOSINOPHIL NFR BLD AUTO: 0 % — SIGNIFICANT CHANGE UP (ref 0–6)
ESTIMATED AVERAGE GLUCOSE: 100 MG/DL — SIGNIFICANT CHANGE UP (ref 68–114)
GAS PNL BLDA: SIGNIFICANT CHANGE UP
GAS PNL BLDV: 138 MMOL/L — SIGNIFICANT CHANGE UP (ref 136–145)
GAS PNL BLDV: SIGNIFICANT CHANGE UP
GAS PNL BLDV: SIGNIFICANT CHANGE UP
GIANT PLATELETS BLD QL SMEAR: PRESENT — SIGNIFICANT CHANGE UP
GLUCOSE BLDC GLUCOMTR-MCNC: 111 MG/DL — HIGH (ref 70–99)
GLUCOSE BLDC GLUCOMTR-MCNC: 128 MG/DL — HIGH (ref 70–99)
GLUCOSE BLDC GLUCOMTR-MCNC: 132 MG/DL — HIGH (ref 70–99)
GLUCOSE BLDC GLUCOMTR-MCNC: 143 MG/DL — HIGH (ref 70–99)
GLUCOSE BLDC GLUCOMTR-MCNC: 149 MG/DL — HIGH (ref 70–99)
GLUCOSE BLDC GLUCOMTR-MCNC: 149 MG/DL — HIGH (ref 70–99)
GLUCOSE BLDC GLUCOMTR-MCNC: 151 MG/DL — HIGH (ref 70–99)
GLUCOSE BLDC GLUCOMTR-MCNC: 213 MG/DL — HIGH (ref 70–99)
GLUCOSE BLDC GLUCOMTR-MCNC: 88 MG/DL — SIGNIFICANT CHANGE UP (ref 70–99)
GLUCOSE BLDV-MCNC: 120 MG/DL — HIGH (ref 70–99)
GLUCOSE SERPL-MCNC: 113 MG/DL — HIGH (ref 70–99)
GLUCOSE SERPL-MCNC: 129 MG/DL — HIGH (ref 70–99)
GLUCOSE SERPL-MCNC: 143 MG/DL — HIGH (ref 70–99)
GLUCOSE SERPL-MCNC: 209 MG/DL — HIGH (ref 70–99)
GRAM STN FLD: SIGNIFICANT CHANGE UP
HCO3 BLDV-SCNC: 26 MMOL/L — SIGNIFICANT CHANGE UP (ref 22–29)
HCT VFR BLD CALC: 25.7 % — LOW (ref 39–50)
HCT VFR BLD CALC: 26.2 % — LOW (ref 39–50)
HCT VFR BLDA CALC: 26 % — SIGNIFICANT CHANGE UP
HGB BLD CALC-MCNC: 8.7 G/DL — LOW (ref 12.6–17.4)
HGB BLD-MCNC: 8.6 G/DL — LOW (ref 13–17)
HGB BLD-MCNC: 8.8 G/DL — LOW (ref 13–17)
HOROWITZ INDEX BLDV+IHG-RTO: 40 — SIGNIFICANT CHANGE UP
INR BLD: 1.09 RATIO — SIGNIFICANT CHANGE UP (ref 0.88–1.16)
LACTATE BLDV-MCNC: 2.7 MMOL/L — HIGH (ref 0.5–2)
LACTATE SERPL-SCNC: 1.6 MMOL/L — SIGNIFICANT CHANGE UP (ref 0.5–2)
LACTATE SERPL-SCNC: 2.5 MMOL/L — HIGH (ref 0.5–2)
LACTATE SERPL-SCNC: 3.7 MMOL/L — HIGH (ref 0.5–2)
LACTATE SERPL-SCNC: 3.8 MMOL/L — HIGH (ref 0.5–2)
LACTATE SERPL-SCNC: 3.9 MMOL/L — HIGH (ref 0.5–2)
LYMPHOCYTES # BLD AUTO: 0.73 K/UL — LOW (ref 1–3.3)
LYMPHOCYTES # BLD AUTO: 2.6 % — LOW (ref 13–44)
MAGNESIUM SERPL-MCNC: 2.4 MG/DL — SIGNIFICANT CHANGE UP (ref 1.6–2.6)
MAGNESIUM SERPL-MCNC: 2.5 MG/DL — SIGNIFICANT CHANGE UP (ref 1.6–2.6)
MANUAL SMEAR VERIFICATION: SIGNIFICANT CHANGE UP
MCHC RBC-ENTMCNC: 30.3 PG — SIGNIFICANT CHANGE UP (ref 27–34)
MCHC RBC-ENTMCNC: 30.4 PG — SIGNIFICANT CHANGE UP (ref 27–34)
MCHC RBC-ENTMCNC: 33.5 GM/DL — SIGNIFICANT CHANGE UP (ref 32–36)
MCHC RBC-ENTMCNC: 33.6 GM/DL — SIGNIFICANT CHANGE UP (ref 32–36)
MCV RBC AUTO: 90.3 FL — SIGNIFICANT CHANGE UP (ref 80–100)
MCV RBC AUTO: 90.8 FL — SIGNIFICANT CHANGE UP (ref 80–100)
MICROCYTES BLD QL: SLIGHT — SIGNIFICANT CHANGE UP
MONOCYTES # BLD AUTO: 0.73 K/UL — SIGNIFICANT CHANGE UP (ref 0–0.9)
MONOCYTES NFR BLD AUTO: 2.6 % — SIGNIFICANT CHANGE UP (ref 2–14)
NEUTROPHILS # BLD AUTO: 26.02 K/UL — HIGH (ref 1.8–7.4)
NEUTROPHILS NFR BLD AUTO: 91.3 % — HIGH (ref 43–77)
NEUTS BAND # BLD: 0.9 % — SIGNIFICANT CHANGE UP (ref 0–8)
OVALOCYTES BLD QL SMEAR: SLIGHT — SIGNIFICANT CHANGE UP
PCO2 BLDV: 44 MMHG — SIGNIFICANT CHANGE UP (ref 42–55)
PH BLDV: 7.38 — SIGNIFICANT CHANGE UP (ref 7.32–7.43)
PLAT MORPH BLD: NORMAL — SIGNIFICANT CHANGE UP
PLATELET # BLD AUTO: 244 K/UL — SIGNIFICANT CHANGE UP (ref 150–400)
PLATELET # BLD AUTO: 307 K/UL — SIGNIFICANT CHANGE UP (ref 150–400)
PO2 BLDV: <42 MMHG — SIGNIFICANT CHANGE UP (ref 25–45)
POIKILOCYTOSIS BLD QL AUTO: SLIGHT — SIGNIFICANT CHANGE UP
POLYCHROMASIA BLD QL SMEAR: SLIGHT — SIGNIFICANT CHANGE UP
POTASSIUM BLDV-SCNC: 5.8 MMOL/L — HIGH (ref 3.5–5.1)
POTASSIUM SERPL-MCNC: 5.4 MMOL/L — HIGH (ref 3.5–5.3)
POTASSIUM SERPL-MCNC: 5.6 MMOL/L — HIGH (ref 3.5–5.3)
POTASSIUM SERPL-MCNC: 5.7 MMOL/L — HIGH (ref 3.5–5.3)
POTASSIUM SERPL-MCNC: 6 MMOL/L — HIGH (ref 3.5–5.3)
POTASSIUM SERPL-SCNC: 5.4 MMOL/L — HIGH (ref 3.5–5.3)
POTASSIUM SERPL-SCNC: 5.6 MMOL/L — HIGH (ref 3.5–5.3)
POTASSIUM SERPL-SCNC: 5.7 MMOL/L — HIGH (ref 3.5–5.3)
POTASSIUM SERPL-SCNC: 6 MMOL/L — HIGH (ref 3.5–5.3)
PROT SERPL-MCNC: 5.5 G/DL — LOW (ref 6.6–8.7)
PROTHROM AB SERPL-ACNC: 12.7 SEC — SIGNIFICANT CHANGE UP (ref 10.5–13.4)
RBC # BLD: 2.83 M/UL — LOW (ref 4.2–5.8)
RBC # BLD: 2.9 M/UL — LOW (ref 4.2–5.8)
RBC # FLD: 14.4 % — SIGNIFICANT CHANGE UP (ref 10.3–14.5)
RBC # FLD: 14.6 % — HIGH (ref 10.3–14.5)
RBC BLD AUTO: ABNORMAL
SAO2 % BLDV: 72 % — SIGNIFICANT CHANGE UP
SODIUM SERPL-SCNC: 136 MMOL/L — SIGNIFICANT CHANGE UP (ref 135–145)
SODIUM SERPL-SCNC: 136 MMOL/L — SIGNIFICANT CHANGE UP (ref 135–145)
SODIUM SERPL-SCNC: 137 MMOL/L — SIGNIFICANT CHANGE UP (ref 135–145)
SODIUM SERPL-SCNC: 142 MMOL/L — SIGNIFICANT CHANGE UP (ref 135–145)
SPECIMEN SOURCE: SIGNIFICANT CHANGE UP
TARGETS BLD QL SMEAR: SLIGHT — SIGNIFICANT CHANGE UP
TROPONIN T SERPL-MCNC: 1.21 NG/ML — HIGH (ref 0–0.06)
VARIANT LYMPHS # BLD: 0.9 % — SIGNIFICANT CHANGE UP (ref 0–6)
WBC # BLD: 19.41 K/UL — HIGH (ref 3.8–10.5)
WBC # BLD: 20.95 K/UL — HIGH (ref 3.8–10.5)
WBC # FLD AUTO: 19.41 K/UL — HIGH (ref 3.8–10.5)
WBC # FLD AUTO: 20.95 K/UL — HIGH (ref 3.8–10.5)

## 2022-12-14 PROCEDURE — 99232 SBSQ HOSP IP/OBS MODERATE 35: CPT

## 2022-12-14 PROCEDURE — 99232 SBSQ HOSP IP/OBS MODERATE 35: CPT | Mod: 25

## 2022-12-14 PROCEDURE — 99024 POSTOP FOLLOW-UP VISIT: CPT

## 2022-12-14 PROCEDURE — 99291 CRITICAL CARE FIRST HOUR: CPT

## 2022-12-14 PROCEDURE — 99233 SBSQ HOSP IP/OBS HIGH 50: CPT

## 2022-12-14 PROCEDURE — 71045 X-RAY EXAM CHEST 1 VIEW: CPT | Mod: 26

## 2022-12-14 RX ORDER — OXYCODONE HYDROCHLORIDE 5 MG/1
5 TABLET ORAL EVERY 4 HOURS
Refills: 0 | Status: DISCONTINUED | OUTPATIENT
Start: 2022-12-14 | End: 2022-12-14

## 2022-12-14 RX ORDER — DEXTROSE 50 % IN WATER 50 %
25 SYRINGE (ML) INTRAVENOUS ONCE
Refills: 0 | Status: DISCONTINUED | OUTPATIENT
Start: 2022-12-14 | End: 2022-12-18

## 2022-12-14 RX ORDER — OXYCODONE HYDROCHLORIDE 5 MG/1
10 TABLET ORAL EVERY 6 HOURS
Refills: 0 | Status: DISCONTINUED | OUTPATIENT
Start: 2022-12-14 | End: 2022-12-16

## 2022-12-14 RX ORDER — DEXTROSE 50 % IN WATER 50 %
25 SYRINGE (ML) INTRAVENOUS ONCE
Refills: 0 | Status: DISCONTINUED | OUTPATIENT
Start: 2022-12-14 | End: 2022-12-14

## 2022-12-14 RX ORDER — SODIUM BICARBONATE 1 MEQ/ML
50 SYRINGE (ML) INTRAVENOUS ONCE
Refills: 0 | Status: COMPLETED | OUTPATIENT
Start: 2022-12-14 | End: 2022-12-14

## 2022-12-14 RX ORDER — DEXTROSE 50 % IN WATER 50 %
50 SYRINGE (ML) INTRAVENOUS ONCE
Refills: 0 | Status: COMPLETED | OUTPATIENT
Start: 2022-12-14 | End: 2022-12-14

## 2022-12-14 RX ORDER — GLUCAGON INJECTION, SOLUTION 0.5 MG/.1ML
1 INJECTION, SOLUTION SUBCUTANEOUS ONCE
Refills: 0 | Status: DISCONTINUED | OUTPATIENT
Start: 2022-12-14 | End: 2022-12-22

## 2022-12-14 RX ORDER — MEROPENEM 1 G/30ML
1000 INJECTION INTRAVENOUS EVERY 12 HOURS
Refills: 0 | Status: DISCONTINUED | OUTPATIENT
Start: 2022-12-14 | End: 2022-12-15

## 2022-12-14 RX ORDER — DEXTROSE 50 % IN WATER 50 %
15 SYRINGE (ML) INTRAVENOUS ONCE
Refills: 0 | Status: DISCONTINUED | OUTPATIENT
Start: 2022-12-14 | End: 2022-12-18

## 2022-12-14 RX ORDER — DOPAMINE HYDROCHLORIDE 40 MG/ML
1.5 INJECTION, SOLUTION, CONCENTRATE INTRAVENOUS
Qty: 400 | Refills: 0 | Status: DISCONTINUED | OUTPATIENT
Start: 2022-12-14 | End: 2022-12-18

## 2022-12-14 RX ORDER — SODIUM ZIRCONIUM CYCLOSILICATE 10 G/10G
5 POWDER, FOR SUSPENSION ORAL ONCE
Refills: 0 | Status: COMPLETED | OUTPATIENT
Start: 2022-12-14 | End: 2022-12-14

## 2022-12-14 RX ORDER — SODIUM CHLORIDE 9 MG/ML
1000 INJECTION, SOLUTION INTRAVENOUS
Refills: 0 | Status: DISCONTINUED | OUTPATIENT
Start: 2022-12-14 | End: 2022-12-15

## 2022-12-14 RX ORDER — HYDROMORPHONE HYDROCHLORIDE 2 MG/ML
0.5 INJECTION INTRAMUSCULAR; INTRAVENOUS; SUBCUTANEOUS ONCE
Refills: 0 | Status: DISCONTINUED | OUTPATIENT
Start: 2022-12-14 | End: 2022-12-14

## 2022-12-14 RX ORDER — INSULIN HUMAN 100 [IU]/ML
10 INJECTION, SOLUTION SUBCUTANEOUS ONCE
Refills: 0 | Status: COMPLETED | OUTPATIENT
Start: 2022-12-14 | End: 2022-12-14

## 2022-12-14 RX ORDER — FUROSEMIDE 40 MG
40 TABLET ORAL ONCE
Refills: 0 | Status: COMPLETED | OUTPATIENT
Start: 2022-12-14 | End: 2022-12-14

## 2022-12-14 RX ORDER — INSULIN LISPRO 100/ML
2 VIAL (ML) SUBCUTANEOUS
Refills: 0 | Status: DISCONTINUED | OUTPATIENT
Start: 2022-12-14 | End: 2022-12-14

## 2022-12-14 RX ORDER — DEXTROSE 50 % IN WATER 50 %
15 SYRINGE (ML) INTRAVENOUS ONCE
Refills: 0 | Status: DISCONTINUED | OUTPATIENT
Start: 2022-12-14 | End: 2022-12-14

## 2022-12-14 RX ORDER — HEPARIN SODIUM 5000 [USP'U]/ML
5000 INJECTION INTRAVENOUS; SUBCUTANEOUS EVERY 12 HOURS
Refills: 0 | Status: DISCONTINUED | OUTPATIENT
Start: 2022-12-14 | End: 2022-12-16

## 2022-12-14 RX ORDER — INSULIN LISPRO 100/ML
VIAL (ML) SUBCUTANEOUS
Refills: 0 | Status: DISCONTINUED | OUTPATIENT
Start: 2022-12-14 | End: 2022-12-18

## 2022-12-14 RX ORDER — VANCOMYCIN HCL 1 G
1000 VIAL (EA) INTRAVENOUS ONCE
Refills: 0 | Status: COMPLETED | OUTPATIENT
Start: 2022-12-14 | End: 2022-12-14

## 2022-12-14 RX ORDER — SODIUM BICARBONATE 1 MEQ/ML
50 SYRINGE (ML) INTRAVENOUS
Refills: 0 | Status: COMPLETED | OUTPATIENT
Start: 2022-12-14 | End: 2022-12-14

## 2022-12-14 RX ORDER — DEXTROSE 50 % IN WATER 50 %
12.5 SYRINGE (ML) INTRAVENOUS ONCE
Refills: 0 | Status: DISCONTINUED | OUTPATIENT
Start: 2022-12-14 | End: 2022-12-14

## 2022-12-14 RX ORDER — OXYCODONE HYDROCHLORIDE 5 MG/1
5 TABLET ORAL EVERY 6 HOURS
Refills: 0 | Status: DISCONTINUED | OUTPATIENT
Start: 2022-12-14 | End: 2022-12-14

## 2022-12-14 RX ORDER — DEXTROSE 50 % IN WATER 50 %
12.5 SYRINGE (ML) INTRAVENOUS ONCE
Refills: 0 | Status: DISCONTINUED | OUTPATIENT
Start: 2022-12-14 | End: 2022-12-18

## 2022-12-14 RX ADMIN — Medication 81 MILLIGRAM(S): at 02:18

## 2022-12-14 RX ADMIN — SENNA PLUS 2 TABLET(S): 8.6 TABLET ORAL at 21:07

## 2022-12-14 RX ADMIN — POLYETHYLENE GLYCOL 3350 17 GRAM(S): 17 POWDER, FOR SOLUTION ORAL at 11:45

## 2022-12-14 RX ADMIN — PANTOPRAZOLE SODIUM 40 MILLIGRAM(S): 20 TABLET, DELAYED RELEASE ORAL at 11:44

## 2022-12-14 RX ADMIN — INSULIN HUMAN 10 UNIT(S): 100 INJECTION, SOLUTION SUBCUTANEOUS at 04:48

## 2022-12-14 RX ADMIN — Medication 400 MILLIGRAM(S): at 05:30

## 2022-12-14 RX ADMIN — Medication 2 UNIT(S): at 12:02

## 2022-12-14 RX ADMIN — Medication 50 MILLIEQUIVALENT(S): at 14:20

## 2022-12-14 RX ADMIN — Medication 250 MILLIGRAM(S): at 20:40

## 2022-12-14 RX ADMIN — Medication 50 MILLIEQUIVALENT(S): at 07:01

## 2022-12-14 RX ADMIN — HYDROMORPHONE HYDROCHLORIDE 0.5 MILLIGRAM(S): 2 INJECTION INTRAMUSCULAR; INTRAVENOUS; SUBCUTANEOUS at 17:15

## 2022-12-14 RX ADMIN — HEPARIN SODIUM 5000 UNIT(S): 5000 INJECTION INTRAVENOUS; SUBCUTANEOUS at 17:03

## 2022-12-14 RX ADMIN — Medication 50 MILLILITER(S): at 04:46

## 2022-12-14 RX ADMIN — Medication 2 UNIT(S): at 17:03

## 2022-12-14 RX ADMIN — HYDROMORPHONE HYDROCHLORIDE 0.5 MILLIGRAM(S): 2 INJECTION INTRAMUSCULAR; INTRAVENOUS; SUBCUTANEOUS at 17:02

## 2022-12-14 RX ADMIN — Medication 250 MILLIGRAM(S): at 08:15

## 2022-12-14 RX ADMIN — Medication 50 MILLIEQUIVALENT(S): at 14:02

## 2022-12-14 RX ADMIN — MEROPENEM 1000 MILLIGRAM(S): 1 INJECTION INTRAVENOUS at 17:03

## 2022-12-14 RX ADMIN — MEROPENEM 1000 MILLIGRAM(S): 1 INJECTION INTRAVENOUS at 05:43

## 2022-12-14 RX ADMIN — SODIUM ZIRCONIUM CYCLOSILICATE 5 GRAM(S): 10 POWDER, FOR SUSPENSION ORAL at 14:28

## 2022-12-14 RX ADMIN — Medication 81 MILLIGRAM(S): at 11:44

## 2022-12-14 RX ADMIN — Medication 1000 MILLIGRAM(S): at 05:45

## 2022-12-14 RX ADMIN — Medication 40 MILLIGRAM(S): at 14:16

## 2022-12-14 NOTE — PROGRESS NOTE ADULT - ASSESSMENT
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 LHC 12/5 with clean coronaries. Cleared by neurosurgery for OR. Urinary retention and RADHA post cath resolving since ram placed 12/8.  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

## 2022-12-14 NOTE — PROGRESS NOTE ADULT - PROBLEM SELECTOR PLAN 1
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  Blood cultures 11/27 remain NGTD.  Continue Merrem per ID & luis-op vanco    remains with labile BP back and forth on Levo and Cardene  Wean pressor as tolerated  Wean IV anti-hypertensive agents as tolerated  Paced at 80 with Epicardial PW with no escape rhythm underneath pacer so Beta blocker contraindicated at this time   start Aspirin   Encourage PO intake  Encourage OOB to chair and ambulation with PT  Encourage deep breathing exercised and coughing  Chest PT and IS use with bedside nurse

## 2022-12-14 NOTE — PROGRESS NOTE ADULT - SUBJECTIVE AND OBJECTIVE BOX
Pt seen and evaluated at bedside.  Pt states "I'm glad to have the tube out of my throat".  Pt denies any CP, Palp, SOB, N/V or other symptoms    ICU Vital Signs Last 24 Hrs  T(C): 37.4 (14 Dec 2022 01:00), Max: 37.4 (13 Dec 2022 23:00)  T(F): 99.3 (14 Dec 2022 01:00), Max: 99.3 (13 Dec 2022 23:00)  HR: 80 (14 Dec 2022 01:45) (80 - 98)  BP: 97/58 (14 Dec 2022 01:45) (77/55 - 115/79)  BP(mean): 72 (14 Dec 2022 01:45) (60 - 79)  ABP: 131/47 (14 Dec 2022 01:45) (66/38 - 148/59)  ABP(mean): 62 (14 Dec 2022 01:45) (49 - 87)  RR: 31 (14 Dec 2022 01:45) (11 - 31)  SpO2: 95% (14 Dec 2022 01:45) (95% - 100%)    O2 Parameters below as of 14 Dec 2022 01:42      O2 Concentration (%): 0.4    labs                          9.0    28.22 )-----------( 278      ( 13 Dec 2022 16:00 )             27.0     12-13    140  |  107  |  14.8  ----------------------------<  157<H>  5.4<H>   |  22.0  |  0.69    Ca    8.2<L>      13 Dec 2022 16:00  Mg     2.8     12-13    TPro  4.5<L>  /  Alb  2.3<L>  /  TBili  1.5  /  DBili  x   /  AST  59<H>  /  ALT  24  /  AlkPhos  57  12-13    I&O's Summary    12 Dec 2022 07:01  -  13 Dec 2022 07:00  --------------------------------------------------------  IN: 720 mL / OUT: 3000 mL / NET: -2280 mL    13 Dec 2022 07:01  -  14 Dec 2022 02:20  --------------------------------------------------------  IN: 2778.5 mL / OUT: 1145 mL / NET: 1633.5 mL    Physical Exam    Neuro: A+O x 3, non-focal, speech clear and intact  CV: regular rate, regular rhythm, +S1S2, no murmurs or rub  Pulm/chest: lung sounds CTA and equal bilaterally, no accessory muscle use noted  Abd: soft, NT, ND  Ext: MEZA x 4, no C/C/E  Skin: warm, well perfused  Incision: MSI dressing CDI, Chest tubes in place with no Airleak

## 2022-12-14 NOTE — PROGRESS NOTE ADULT - SUBJECTIVE AND OBJECTIVE BOX
HOLDSWORTH, GEORGE  MRN-[id number]    HPI:  77 year old male with PMHx of recent  COVID  on 10/6/22, HTN , Vit B12 deficiency, presenting to the ED on 11/2 with body aches, fatigue and fever (Tmax 102) at home for 12 days found to have streptococcus bacteremia and admitted with unclear source, no recent dental work, skin infections, no respiratory symptoms. Pan scan was negative at that time ( LILIAN was recommended but pt refused) He was treated for Bacteremia ( Blood cultures + streptococcus anginosis pansensitive ) and norovirus with supportive care and contact isolation . He was discharged home on 11/7 with IV Rocephin via PICC line. Recommendation was to continue ABX  daily via pic end 11/30/22  Tonight he presented to ER after and episode of syncope and collapse, found to be septic, hypotensive, hypoxic and febrile in the ER. He is unsure of mechanism of fall but remembers being on the ground no reported LOC . He was found face-down on bathroom floor buy family with left leg wedged under cabinet. On my interview he is alert and oriented to person place and time, he has a baseline studder in his speech pattern but otherwise neurologically intcact without defecit.  In responose to hypotension he failed to repsoond to Inital sepsis fluid protocol in ER and now is requiring IV pressor in form of levophed to maintain MAP greater than 60 -65. In the ER he was febrile with inital labs signifacnt for WBC of 22.4 , ProCal of 21.2 first lactate 4.9 via abg with repeat post fluids of 2.2 venous sample.   (27 Nov 2022 02:26)    Surgery/Hospital Course:  ·  PRE-OP DIAGNOSIS:  Aortic valve endocarditis   ·  POST-OP DIAGNOSIS:  Aortic valve endocarditis   ·  PROCEDURES:  Valve replacement, aortic and mitral 13-Dec-2022   AVR 27 mm inspiris, 29 mm mitral valve, Patch repair aortic root   Today:  No acute events   Paced , 5 L NC ,   xfuse 1 U PRBC  wean off Levo 3 cc  DC Fem. a. line  no BB , no Dig , no Amio            ICU Vital Signs Last 24 Hrs  T(C): 36.3 (14 Dec 2022 11:18), Max: 37.5 (14 Dec 2022 04:15)  T(F): 97.4 (14 Dec 2022 11:18), Max: 99.5 (14 Dec 2022 04:15)  HR: 79 (14 Dec 2022 10:00) (79 - 80)  BP: 108/61 (14 Dec 2022 10:00) (77/55 - 120/60)  BP(mean): 77 (14 Dec 2022 10:00) (60 - 86)  ABP: 121/43 (14 Dec 2022 10:00) (44/43 - 148/59)  ABP(mean): 57 (14 Dec 2022 10:00) (46 - 87)  RR: 20 (14 Dec 2022 10:00) (11 - 42)  SpO2: 100% (14 Dec 2022 10:00) (92% - 100%)    O2 Parameters below as of 14 Dec 2022 04:15  Patient On (Oxygen Delivery Method): nasal cannula  O2 Flow (L/min): 6          Physical Exam:  Gen: A&O   CNS: non focal 	  Neck: no JVD  RES : clear , no wheezing              CVS: Regular  rhythm. Normal S1/S2  Abd: Soft, non-distended. Bowel sounds present.  Skin: No rash.  Ext:  no edema    ============================I/O===========================   I&O's Detail    13 Dec 2022 07:01  -  14 Dec 2022 07:00  --------------------------------------------------------  IN:    Albumin 5%  - 250 mL: 250 mL    Cryoprecipitate: 225 mL    Insulin: 27.5 mL    IV PiggyBack: 250 mL    Lactated Ringers Bolus: 1500 mL    Norepinephrine: 31 mL    Platelets - Single Donor: 220 mL    sodium chloride 0.9%: 260 mL    sodium chloride 0.9%: 75 mL  Total IN: 2838.5 mL    OUT:    Chest Tube (mL): 210 mL    Chest Tube (mL): 170 mL    Chest Tube (mL): 310 mL    Chest Tube (mL): 250 mL    Indwelling Catheter - Urethral (mL): 720 mL  Total OUT: 1660 mL    Total NET: 1178.5 mL      14 Dec 2022 07:01  -  14 Dec 2022 11:25  --------------------------------------------------------  IN:    IV PiggyBack: 250 mL    sodium chloride 0.9%: 15 mL  Total IN: 265 mL    OUT:    Chest Tube (mL): 30 mL    Chest Tube (mL): 0 mL    Chest Tube (mL): 20 mL    Chest Tube (mL): 40 mL    Indwelling Catheter - Urethral (mL): 140 mL  Total OUT: 230 mL    Total NET: 35 mL        ============================ LABS =========================                        8.8    19.41 )-----------( 307      ( 14 Dec 2022 02:12 )             26.2     12-14    137  |  100  |  24.9<H>  ----------------------------<  209<H>  5.4<H>   |  22.0  |  1.42<H>    Ca    8.3<L>      14 Dec 2022 05:45  Mg     2.5     12-14    TPro  5.5<L>  /  Alb  3.0<L>  /  TBili  1.0  /  DBili  x   /  AST  67<H>  /  ALT  26  /  AlkPhos  59  12-14    LIVER FUNCTIONS - ( 14 Dec 2022 02:12 )  Alb: 3.0 g/dL / Pro: 5.5 g/dL / ALK PHOS: 59 U/L / ALT: 26 U/L / AST: 67 U/L / GGT: x           PT/INR - ( 14 Dec 2022 02:12 )   PT: 12.7 sec;   INR: 1.09 ratio         PTT - ( 14 Dec 2022 02:12 )  PTT:29.8 sec  ABG - ( 14 Dec 2022 01:28 )  pH, Arterial: 7.410 pH, Blood: x     /  pCO2: 38    /  pO2: 125   / HCO3: 24    / Base Excess: -0.5  /  SaO2: 100.0               ======================Micro/Rad/Cardio=================  Culture: Reviewed   CXR: Reviewed  Echo:Reviewed  ======================================================  PAST MEDICAL & SURGICAL HISTORY:  Hypertension      Aortic stenosis      H/O inguinal hernia repair  B/L 2013      S/P laparoscopic colectomy  right colectomy with ileotransverse anastomosis        ====================ASSESSMENT ==============  77M, pmhx HTN, moderate AS, recent Covid infection 10/6/2022 per chart, recent admission 11/1 for SIRS, 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 C 12/5 with clean coronaries. Cleared by neurosurgery for OR. Urinary retention and RADHA post cath resolving since ram placed 12/8.  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    --- Endocarditis.   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  --- Abscess of aortic root.   --- Atrial fibrillation, new onset.   ---RADHA (acute kidney injury).   ---Hematuria.   --- Urinary retention.   --- Diarrhea.   ---Prior norovirus + 11/3.  ---Post op Hypovolemia  ---Post op respiratory insufficiency       Plan:  -Continue Merrem per ID & luis-op vanco    -Wean pressor as tolerated  -Paced at 80 with Epicardial PW with no escape rhythm underneath pacer   -start Aspirin   -Chest PT and IS use with bedside nurse.  -Avoid nephrotoxic drugs.  - Ram placed 12/8 for >1L residual urine in bladder.  -restart Flomax.     ====================== NEUROLOGY=====================  ondansetron Injectable 4 milliGRAM(s) IV Push once PRN Nausea and/or Vomiting    ==================== RESPIRATORY======================  Post op respiratory insufficiency    ====================CARDIOVASCULAR==================  Post op Hypovolemia  niCARdipine Infusion 5 mG/Hr (25 mL/Hr) IV Continuous <Continuous>  norepinephrine Infusion 0.03 MICROgram(s)/kG/Min (4.32 mL/Hr) IV Continuous <Continuous>    ===================HEMATOLOGIC/ONC ===================  Monitor H&H/Plts    aspirin enteric coated 81 milliGRAM(s) Oral daily    ===================== RENAL =========================  Continue monitoring urine output, I&OS, BUN/Cr     ==================== GASTROINTESTINAL===================  albumin human  5% IVPB 250 milliLiter(s) IV Intermittent every 2 hours PRN Hypovolemia  lactated ringers Bolus 500 milliLiter(s) IV Bolus every 15 minutes PRN Hypovolemia  pantoprazole    Tablet 40 milliGRAM(s) Oral daily  polyethylene glycol 3350 17 Gram(s) Oral daily  potassium chloride  10 mEq/50 mL IVPB 10 milliEquivalent(s) IV Intermittent every 1 hour  potassium chloride  10 mEq/50 mL IVPB 10 milliEquivalent(s) IV Intermittent every 1 hour  potassium chloride  10 mEq/50 mL IVPB 10 milliEquivalent(s) IV Intermittent every 1 hour  senna 2 Tablet(s) Oral at bedtime  sodium chloride 0.9%. 1000 milliLiter(s) (10 mL/Hr) IV Continuous <Continuous>  sodium chloride 0.9%. 1000 milliLiter(s) (5 mL/Hr) IV Continuous <Continuous>    =======================    ENDOCRINE  =====================  dextrose 50% Injectable 50 milliLiter(s) IV Push every 15 minutes  dextrose 50% Injectable 25 milliLiter(s) IV Push every 15 minutes  dextrose 50% Injectable 25 Gram(s) IV Push once  dextrose 50% Injectable 12.5 Gram(s) IV Push once  dextrose 50% Injectable 25 Gram(s) IV Push once  dextrose Oral Gel 15 Gram(s) Oral once  glucagon  Injectable 1 milliGRAM(s) IntraMuscular once  insulin lispro Injectable (ADMELOG) 2 Unit(s) SubCutaneous three times a day before meals  insulin regular Infusion 2 Unit(s)/Hr (2 mL/Hr) IV Continuous <Continuous>    ========================INFECTIOUS DISEASE================  meropenem Injectable 1000 milliGRAM(s) IV Push every 12 hours  vancomycin  IVPB 1000 milliGRAM(s) IV Intermittent once      -Close hemodynamic , ventilatory and drain monitoring and management per post op routine .  -Monitor Neurologic status ,   -Head of the bed should remain elevated to 45 degrees,  -Monitor adequacy of oxygenation and ventilation and attempt to wean oxygen ,  -Monitor for arrhythmias and monitor parameters for organ perfusion,  -Glycemic control is satisfactory,  -Nutritional goals will be met using po eventually , insure adequate caloric intake and monitor the same ,  -Electrolytes have been repleted as necessary , pain control has been achieved  and wound care has been carried out ,  -Stress ulcer and VTE prophylaxis will be achieved,  -Agressive PT and early mobility and ambulation goals will be met,  -The family was updated about the course and plan .      I have spent 35 minutes providing acute care for this critically ill patient     Patient requires continuous monitoring with bedside rhythm monitoring, pulse ox monitoring, and intermittent blood gas analysis. Care plan discussed with ICU care team. Patient remained critical and at risk for life threatening decompensation.            HOLDSWORTH, GEORGE  MRN-[id number]    HPI:  77 year old male with PMHx of recent  COVID  on 10/6/22, HTN , Vit B12 deficiency, presenting to the ED on 11/2 with body aches, fatigue and fever (Tmax 102) at home for 12 days found to have streptococcus bacteremia and admitted with unclear source, no recent dental work, skin infections, no respiratory symptoms. Pan scan was negative at that time ( LILIAN was recommended but pt refused) He was treated for Bacteremia ( Blood cultures + streptococcus anginosis pansensitive ) and norovirus with supportive care and contact isolation . He was discharged home on 11/7 with IV Rocephin via PICC line. Recommendation was to continue ABX  daily via pic end 11/30/22  Tonight he presented to ER after and episode of syncope and collapse, found to be septic, hypotensive, hypoxic and febrile in the ER. He is unsure of mechanism of fall but remembers being on the ground no reported LOC . He was found face-down on bathroom floor buy family with left leg wedged under cabinet. On my interview he is alert and oriented to person place and time, he has a baseline studder in his speech pattern but otherwise neurologically intcact without defecit.  In responose to hypotension he failed to repsoond to Inital sepsis fluid protocol in ER and now is requiring IV pressor in form of levophed to maintain MAP greater than 60 -65. In the ER he was febrile with inital labs signifacnt for WBC of 22.4 , ProCal of 21.2 first lactate 4.9 via abg with repeat post fluids of 2.2 venous sample.   (27 Nov 2022 02:26)    Surgery/Hospital Course:  ·  PRE-OP DIAGNOSIS:  Aortic valve endocarditis   ·  POST-OP DIAGNOSIS:  Aortic valve endocarditis   ·  PROCEDURES:  Valve replacement, aortic and mitral 13-Dec-2022   AVR 27 mm inspiris, 29 mm mitral valve, Patch repair aortic root   Today:  No acute events   Paced , 5 L NC ,   xfuse 1 U PRBC  wean off Levo 3 cc  DC Fem. a. line  no BB , no Dig , no Amio            ICU Vital Signs Last 24 Hrs  T(C): 36.3 (14 Dec 2022 11:18), Max: 37.5 (14 Dec 2022 04:15)  T(F): 97.4 (14 Dec 2022 11:18), Max: 99.5 (14 Dec 2022 04:15)  HR: 79 (14 Dec 2022 10:00) (79 - 80)  BP: 108/61 (14 Dec 2022 10:00) (77/55 - 120/60)  BP(mean): 77 (14 Dec 2022 10:00) (60 - 86)  ABP: 121/43 (14 Dec 2022 10:00) (44/43 - 148/59)  ABP(mean): 57 (14 Dec 2022 10:00) (46 - 87)  RR: 20 (14 Dec 2022 10:00) (11 - 42)  SpO2: 100% (14 Dec 2022 10:00) (92% - 100%)    O2 Parameters below as of 14 Dec 2022 04:15  Patient On (Oxygen Delivery Method): nasal cannula  O2 Flow (L/min): 6          Physical Exam:  Gen: A&O   CNS: non focal 	  Neck: no JVD  RES : clear , no wheezing              CVS: Regular  rhythm. Normal S1/S2  Abd: Soft, non-distended. Bowel sounds present.  Skin: No rash.  Ext:  no edema    ============================I/O===========================   I&O's Detail    13 Dec 2022 07:01  -  14 Dec 2022 07:00  --------------------------------------------------------  IN:    Albumin 5%  - 250 mL: 250 mL    Cryoprecipitate: 225 mL    Insulin: 27.5 mL    IV PiggyBack: 250 mL    Lactated Ringers Bolus: 1500 mL    Norepinephrine: 31 mL    Platelets - Single Donor: 220 mL    sodium chloride 0.9%: 260 mL    sodium chloride 0.9%: 75 mL  Total IN: 2838.5 mL    OUT:    Chest Tube (mL): 210 mL    Chest Tube (mL): 170 mL    Chest Tube (mL): 310 mL    Chest Tube (mL): 250 mL    Indwelling Catheter - Urethral (mL): 720 mL  Total OUT: 1660 mL    Total NET: 1178.5 mL      14 Dec 2022 07:01  -  14 Dec 2022 11:25  --------------------------------------------------------  IN:    IV PiggyBack: 250 mL    sodium chloride 0.9%: 15 mL  Total IN: 265 mL    OUT:    Chest Tube (mL): 30 mL    Chest Tube (mL): 0 mL    Chest Tube (mL): 20 mL    Chest Tube (mL): 40 mL    Indwelling Catheter - Urethral (mL): 140 mL  Total OUT: 230 mL    Total NET: 35 mL        ============================ LABS =========================                        8.8    19.41 )-----------( 307      ( 14 Dec 2022 02:12 )             26.2     12-14    137  |  100  |  24.9<H>  ----------------------------<  209<H>  5.4<H>   |  22.0  |  1.42<H>    Ca    8.3<L>      14 Dec 2022 05:45  Mg     2.5     12-14    TPro  5.5<L>  /  Alb  3.0<L>  /  TBili  1.0  /  DBili  x   /  AST  67<H>  /  ALT  26  /  AlkPhos  59  12-14    LIVER FUNCTIONS - ( 14 Dec 2022 02:12 )  Alb: 3.0 g/dL / Pro: 5.5 g/dL / ALK PHOS: 59 U/L / ALT: 26 U/L / AST: 67 U/L / GGT: x           PT/INR - ( 14 Dec 2022 02:12 )   PT: 12.7 sec;   INR: 1.09 ratio         PTT - ( 14 Dec 2022 02:12 )  PTT:29.8 sec  ABG - ( 14 Dec 2022 01:28 )  pH, Arterial: 7.410 pH, Blood: x     /  pCO2: 38    /  pO2: 125   / HCO3: 24    / Base Excess: -0.5  /  SaO2: 100.0               ======================Micro/Rad/Cardio=================  Culture: Reviewed   CXR: Reviewed  Echo:Reviewed  ======================================================  PAST MEDICAL & SURGICAL HISTORY:  Hypertension      Aortic stenosis      H/O inguinal hernia repair  B/L 2013      S/P laparoscopic colectomy  right colectomy with ileotransverse anastomosis        ====================ASSESSMENT ==============  77M, pmhx HTN, moderate AS, recent Covid infection 10/6/2022 per chart, recent admission 11/1 for SIRS, 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 C 12/5 with clean coronaries. Cleared by neurosurgery for OR. Urinary retention and RADHA post cath resolving since ram placed 12/8.  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    --- Endocarditis.   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  --- Abscess of aortic root.   --- Atrial fibrillation, new onset.   ---RADHA (acute kidney injury).   ---Hematuria.   --- Urinary retention.   --- Diarrhea.   ---Prior norovirus + 11/3.  ---Post op Hypovolemic shock   ---Post op respiratory insufficiency       Plan:  -Continue Merrem per ID & luis-op vanco    -Wean pressor as tolerated  -Paced at 80 with Epicardial PW with no escape rhythm underneath pacer   -start Aspirin   -Chest PT and IS use with bedside nurse.  -Avoid nephrotoxic drugs.  - Ram placed 12/8 for >1L residual urine in bladder.  -restart Flomax.     ====================== NEUROLOGY=====================  ondansetron Injectable 4 milliGRAM(s) IV Push once PRN Nausea and/or Vomiting    ==================== RESPIRATORY======================  Post op respiratory insufficiency    ====================CARDIOVASCULAR==================  Post op Hypovolemia  niCARdipine Infusion 5 mG/Hr (25 mL/Hr) IV Continuous <Continuous>  norepinephrine Infusion 0.03 MICROgram(s)/kG/Min (4.32 mL/Hr) IV Continuous <Continuous>    ===================HEMATOLOGIC/ONC ===================  Monitor H&H/Plts    aspirin enteric coated 81 milliGRAM(s) Oral daily    ===================== RENAL =========================  Continue monitoring urine output, I&OS, BUN/Cr     ==================== GASTROINTESTINAL===================  albumin human  5% IVPB 250 milliLiter(s) IV Intermittent every 2 hours PRN Hypovolemia  lactated ringers Bolus 500 milliLiter(s) IV Bolus every 15 minutes PRN Hypovolemia  pantoprazole    Tablet 40 milliGRAM(s) Oral daily  polyethylene glycol 3350 17 Gram(s) Oral daily  potassium chloride  10 mEq/50 mL IVPB 10 milliEquivalent(s) IV Intermittent every 1 hour  potassium chloride  10 mEq/50 mL IVPB 10 milliEquivalent(s) IV Intermittent every 1 hour  potassium chloride  10 mEq/50 mL IVPB 10 milliEquivalent(s) IV Intermittent every 1 hour  senna 2 Tablet(s) Oral at bedtime  sodium chloride 0.9%. 1000 milliLiter(s) (10 mL/Hr) IV Continuous <Continuous>  sodium chloride 0.9%. 1000 milliLiter(s) (5 mL/Hr) IV Continuous <Continuous>    =======================    ENDOCRINE  =====================  dextrose 50% Injectable 50 milliLiter(s) IV Push every 15 minutes  dextrose 50% Injectable 25 milliLiter(s) IV Push every 15 minutes  dextrose 50% Injectable 25 Gram(s) IV Push once  dextrose 50% Injectable 12.5 Gram(s) IV Push once  dextrose 50% Injectable 25 Gram(s) IV Push once  dextrose Oral Gel 15 Gram(s) Oral once  glucagon  Injectable 1 milliGRAM(s) IntraMuscular once  insulin lispro Injectable (ADMELOG) 2 Unit(s) SubCutaneous three times a day before meals  insulin regular Infusion 2 Unit(s)/Hr (2 mL/Hr) IV Continuous <Continuous>    ========================INFECTIOUS DISEASE================  meropenem Injectable 1000 milliGRAM(s) IV Push every 12 hours  vancomycin  IVPB 1000 milliGRAM(s) IV Intermittent once      -Close hemodynamic , ventilatory and drain monitoring and management per post op routine .  -Monitor Neurologic status ,   -Head of the bed should remain elevated to 45 degrees,  -Monitor adequacy of oxygenation and ventilation and attempt to wean oxygen ,  -Monitor for arrhythmias and monitor parameters for organ perfusion,  -Glycemic control is satisfactory,  -Nutritional goals will be met using po eventually , insure adequate caloric intake and monitor the same ,  -Electrolytes have been repleted as necessary , pain control has been achieved  and wound care has been carried out ,  -Stress ulcer and VTE prophylaxis will be achieved,  -Agressive PT and early mobility and ambulation goals will be met,  -The family was updated about the course and plan .      I have spent 35 minutes providing acute care for this critically ill patient     Patient requires continuous monitoring with bedside rhythm monitoring, pulse ox monitoring, and intermittent blood gas analysis. Care plan discussed with ICU care team. Patient remained critical and at risk for life threatening decompensation.            normal...

## 2022-12-14 NOTE — PROGRESS NOTE ADULT - SUBJECTIVE AND OBJECTIVE BOX
INFECTIOUS DISEASES AND INTERNAL MEDICINE at [place]  =======================================================  Flip Pederson MD  Diplomates American Board of Internal Medicine and Infectious Diseases  Telephone [phone]  Fax            [phone]  =======================================================    HANNAH [id number]      ID f/u- fever, endocarditis    s/p AVR, MVR and patch repair of aortic root on   extubated and doing well  c/o pain at surgical sites  post op leukocytosis and radha  pt with complete heart block      ANTIBIOTICS  meropenem  IVPB 1000 milliGRAM(s) IV Intermittent every 8 hours      Allergies    No Known Allergies    Intolerances      Vital Signs Last 24 Hrs  T(C): 36.6 (14 Dec 2022 20:00), Max: 37.5 (14 Dec 2022 04:15)  T(F): 97.9 (14 Dec 2022 20:00), Max: 99.5 (14 Dec 2022 04:15)  HR: 60 (14 Dec 2022 21:00) (59 - 80)  BP: 111/54 (14 Dec 2022 20:00) (79/54 - 124/72)  BP(mean): 78 (14 Dec 2022 20:00) (62 - 96)  RR: 12 (14 Dec 2022 21:00) (11 - 42)  SpO2: 97% (14 Dec 2022 21:00) (92% - 100%)    Parameters below as of 14 Dec 2022 21:00  Patient On (Oxygen Delivery Method): nasal cannula  O2 Flow (L/min): 4    Parameters below as of 12 Dec 2022 17:12  Patient On (Oxygen Delivery Method): room air        REVIEW OF SYSTEMS:    CONSTITUTIONAL:  As per HPI.    HEENT:  Eyes:  No diplopia or blurred vision. ENT:  No earache, sore throat or runny nose.    CARDIOVASCULAR:  No pressure, squeezing, strangling, tightness, heaviness or aching about the chest, neck, axilla or epigastrium.    RESPIRATORY:  No cough, shortness of breath, PND or orthopnea.    GASTROINTESTINAL:  No nausea, vomiting or diarrhea.    GENITOURINARY:  No dysuria, frequency or urgency.    MUSCULOSKELETAL:  As per HPI.    SKIN:  No change in skin, hair or nails.    NEUROLOGIC: as per hpi        PHYSICAL EXAMINATION:    GENERAL: nad    HEENT: Head is normocephalic and atraumatic.  ANICTERIC  rt ij  NECK: Supple. No carotid bruits.  No lymphadenopathy or thyromegaly.    LUNGS :clear BREATH SOUNDS    HEART: Regular rate and rhythm without murmur. dressing c/di, chest tubes in place    ABDOMEN: Soft, nontender, and nondistended.  Positive bowel sounds.  No hepatosplenomegaly was noted. NO REBOUND NO GUARDING + ram dark urine     EXTREMITIES: NO EDEMA NO ERYTHEMA    NEUROLOGIC: NON FOCAL      SKIN: No ulceration or induration present. NO RASH             LABS:                                           8.6    20.95 )-----------( 244      ( 14 Dec 2022 11:40 )             25.7           136  |  99  |  30.2<H>  ----------------------------<  129<H>  5.6<H>   |  24.0  |  1.52<H>    Ca    8.3<L>      14 Dec 2022 18:00  Mg     2.4         TPro  5.5<L>  /  Alb  3.0<L>  /  TBili  1.0  /  DBili  x   /  AST  67<H>  /  ALT  26  /  AlkPhos  59  -          ABG - ( 14 Dec 2022 01:28 )  pH, Arterial: 7.410 pH, Blood: x     /  pCO2: 38    /  pO2: 125   / HCO3: 24    / Base Excess: -0.5  /  SaO2: 100.0                   PT/INR - ( 14 Dec 2022 02:12 )   PT: 12.7 sec;   INR: 1.09 ratio         PTT - ( 14 Dec 2022 02:12 )  PTT:29.8 sec    CARDIAC MARKERS ( 14 Dec 2022 02:12 )  x     / 1.21 ng/mL / 388 U/L / x     / 36.5 ng/mL  CARDIAC MARKERS ( 13 Dec 2022 16:00 )  x     / 1.07 ng/mL / 391 U/L / x     / 59.9 ng/mL        CAPILLARY BLOOD GLUCOSE      POCT Blood Glucose.: 143 mg/dL (14 Dec 2022 16:49)                          CAPILLARY BLOOD GLUCOSE                    Color: Yellow / Appearance: Clear / S.010 / pH: x  Gluc: x / Ketone: Negative  / Bili: Negative / Urobili: Negative mg/dL   Blood: x / Protein: 30 mg/dL / Nitrite: Negative   Leuk Esterase: Negative / RBC: 6-10 /HPF / WBC 0-2 /HPF   Sq Epi: x / Non Sq Epi: Occasional / Bacteria: Few        RADIOLOGY & ADDITIONAL STUDIES:  < from: CT Chest No Cont (22 @ 03:46) >  IMPRESSION:  No pneumonia.  No acute CT findings in the abdomen or pelvis.    VERTEBRAL BODY ANALYSIS: Low bone density as described above, consider   further workup for osteoporosis.        --- End of Report ---        < end of copied text >        < from: LILIAN Echo Doppler (22 @ 14:52) >  Summary:   1. Left ventricular ejection fraction, by visual estimation, is 55 to   60%.   2. Normal global left ventricular systolic function.   3. The mitral in-flow pattern reveals no discernable A-wave, therefore   no comment on diastolic function can be made.   4. Normal right ventricular size and function, inadequate estimation of   RVSP.   5. Mildly enlarged left atrium.   6. Moderate mitral valve regurgitation, jet is eccentric posteriorly   directed.   7. Thickening of bileaflet mitral valve with subcentimeter filamentous  mobile lesions suggestive of vegetations.   8. Color flow doppler and intravenous injection of agitated saline   demonstrates the presence of an intact intra atrial septum.   9. No left atrial appendage thrombus and normal left atrial appendage   velocities.  10. Structurally normal tricuspid valve, with normal leaflet excursion.  11. Heavily calcified noncoronary cusp with moderate aortic valve   stenosis, ELDON (by 2D planimetry 1.34 cm2), peak velocity 2.8 m/s and mean   gradient of 15 mmHg.  12. Mild to moderate aortic regurgitation.  13. There is mild echolucency and thickening of the sinus of Valsalva   inbetween the left and non-coronary cusp, cannot exclude early root   abscess.  14. Mild simple atheromas at the aortic arch/descendingaorta.  15. There is no evidence of pericardial effusion.  16. No prior LILIAN study is available for comparison. 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 Cardioversion   deferred due to the presence of underlying infection/endocarditis.    John Frost DO Electronically signed on 2022 at 3:39:01 PM    < end of copied text >      < from: MR Head No Cont (22 @ 21:35) >  IMPRESSION:  Scattered foci of restricted diffusion in the bilateral parieto-occipital   lobes, suggestive of acute ischemia. No intracranial hemorrhage or mass   effect.    Given the bilateral distribution of involvement, embolic phenomenon is   suggested, with underlying septic emboli not excluded. Further evaluation   by contrast-enhanced MRI may provide further characterization.      --- End of Report ---    < end of copied text >    < from: NM Inflammatory Loc Wholebody WBC, Single Day (22 @ 11:02) >  IMPRESSION: Normal Indium-111 labeled leukocyte scan. No scan evidence of   infection.    --- End of Report ---        < end of copied text >        < from: MR Head No Cont (22 @ 21:35) >  IMPRESSION:  Scattered foci of restricted diffusion in the bilateral parieto-occipital   lobes, suggestive of acute ischemia. No intracranial hemorrhage or mass   effect.    Given the bilateral distribution of involvement, embolic phenomenon is   suggested, with underlying septic emboli not excluded. Further evaluation   by contrast-enhanced MRI may provide further characterization.    < end of copied text >          ASSESSMENT/PLAN    77 year old male with PMHx of recent  COVID  on 10/6/22, HTN , Vit B12 deficiency, presenting to the ED on  with body aches, fatigue and fever (Tmax 102) at home for 12 days found to have streptococcus bacteremia and admitted with unclear source, no recent dental work, skin infections, no respiratory symptoms. Pan scan was negative at that time ( LILIAN was recommended but pt refused) He was treated for Bacteremia ( Blood cultures + streptococcus anginosis pansensitive ) and norovirus with supportive care and contact isolation . He was discharged home on  with IV Rocephin via PICC line. Recommendation was to continue ABX  daily via pic end 22    he  presented to ER after and episode of syncope and collapse, found to be septic, hypotensive, hypoxic and febrile in the ER. He is unsure of mechanism of fall but remembers being on the ground no reported LOC . He was found face-down on bathroom floor buy family with left leg wedged under cabinet. On my interview he is alert and oriented to person place and time, he has a baseline studder in his speech pattern but otherwise neurologically intact without deficit  In response to hypotension he failed to respond to Inital sepsis fluid protocol in ER is required IV pressor in form of levophed to maintain MAP greater than 60 -65. In the ER he was febrile with initial labs significant for WBC of 22.4 , ProCal of 21.2 first lactate 4.9 via abg with repeat post fluids of 2.2 venous sample.   PT admitted to [facility] with septic shock unclear source, now off pressors. Found to have new onset A fib and on amio. 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 Blood cultures are negative. IF pacemaker is needed now, leadless pacemaker is preferred    d/w cticu, EP, pharmacy  will f/u

## 2022-12-14 NOTE — PROGRESS NOTE ADULT - NS ATTEND AMEND GEN_ALL_CORE FT
Patient seen and examined by me.  Discussed with Dr Marlys Horvath, Patient is stable from Cardiology perspective  Will sign off  I have discussed my recommendation with the PA which are outlined above.

## 2022-12-14 NOTE — PHARMACOTHERAPY INTERVENTION NOTE - NSPHARMCOMMASP
ASP - Renal dose adjustment Virtual Regular Visit    Problem List Items Addressed This Visit    None       Reason for visit is   Chief Complaint   Patient presents with    OCD    Depression     Encounter provider Collette Clark, PhD    Provider located at [address] [place] [address]  [address]  [place] [address]  [phone]    Recent Visits  No visits were found meeting these conditions  Showing recent visits within past 7 days and meeting all other requirements  Today's Visits  Date Type Provider Dept   07/13/22 Telemedicine Collette Clark, PhD [facility]   Showing today's visits and meeting all other requirements  Future Appointments  No visits were found meeting these conditions  Showing future appointments within next 150 days and meeting all other requirements       After connecting through [employer] the patient was identified by name and date of birth  Junior Wheeler was informed that this is a telemedicine visit and that the visit is being conducted through [address] [facility] and patient was informed that this is a secure, HIPAA-compliant platform  He agrees to proceed  which may not be secure and therefore, might not be HIPAA-compliant  My office door was closed  No one else was in the room  He acknowledged consent and understanding of privacy and security of the video platform  The patient has agreed to participate and understands they can discontinue the visit at any time  SUBJECTIVE:    Junior Wheeler is a 22 y o  male with a history of OCD, anxiety, depression seen for medication management and mood assessment  Trung Anaya reports he is doing well  Looking to graduate in two years (skipped one year due to taking courses in ) and will apply [employer] and [employer]  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  This cuts his tuition and meals to 5,000 for the year  He has a group of good friends who are social and devout catholics   He is active at school and his Temple  Davonte Fagan moved to [place] with his father and he is happier there  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  He reports having found out about his mother, something he did not want to talk about  He reports coping  He is eating and sleeping  Occasional intrusive thought  Denies compulsions  Takes medication as prescribed and it is effective  HPI ROS Appetite Changes and Sleep: normal appetite and normal energy level    Review Of Systems:     Mood Normal   Behavior Normal    Thought Content Normal   General Normal    Personality Normal   Other Psych Symptoms OCD   Constitutional As Noted in HPI   ENT As Noted in HPI   Cardiovascular As Noted in HPI   Respiratory As Noted in HPI   Gastrointestinal As Noted in HPI   Genitourinary As Noted in HPI   Musculoskeletal As Noted in HPI   Integumentary As Noted in HPI   Neurological As Noted in HPI   Endocrine Normal    Other Symptoms Normal        Substance Abuse History:    Social History     Substance and Sexual Activity   Drug Use Never       Family Psychiatric History:     History reviewed  No pertinent family history      Social History     Socioeconomic History    Marital status: Single     Spouse name: Not on file    Number of children: Not on file    Years of education: Not on file    Highest education level: Not on file   Occupational History    Not on file   Tobacco Use    Smoking status: Never Smoker    Smokeless tobacco: Never Used   Substance and Sexual Activity    Alcohol use: Never    Drug use: Never    Sexual activity: Not on file   Other Topics Concern    Not on file   Social History Narrative    ** Merged History Encounter **          Social Determinants of Health     Financial Resource Strain: Not on file   Food Insecurity: Not on file   Transportation Needs: Not on file   Physical Activity: Not on file   Stress: Not on file   Social Connections: Not on file   Intimate Partner Violence: Not on file   Housing Stability: Not on file       Past Medical History:   Diagnosis Date    Allergic rhinitis     Anxiety     Asthma     Seasonal allergies        Past Surgical History:   Procedure Laterality Date    NO PAST SURGERIES         Current Outpatient Medications   Medication Sig Dispense Refill    albuterol (PROVENTIL HFA,VENTOLIN HFA) 90 mcg/act inhaler Inhale 2 puffs every 6 (six) hours as needed for wheezing   diphenhydrAMINE (BENADRYL) 50 MG tablet Take 50 mg by mouth every 6 (six) hours as needed for itching   EPINEPHrine (EPIPEN) 0 3 mg/0 3 mL SOAJ       fluticasone (FLONASE) 50 mcg/act nasal spray 1 spray into each nostril daily as needed for rhinitis      Loratadine (CLARITIN PO) Take by mouth daily as needed       MELATONIN PO Take by mouth      montelukast (SINGULAIR) 10 mg tablet Take 10 mg by mouth daily at bedtime   Multiple Vitamins-Minerals (Multivitamin Adult) CHEW Chew daily      sertraline (ZOLOFT) 100 mg tablet TAKE 2 TABLETS BY MOUTH EVERY  tablet 0     No current facility-administered medications for this visit          Allergies   Allergen Reactions    Nuts - Food Allergy Anaphylaxis     Tree nuts and peanuts    Pollen Extract Nasal Congestion       The following portions of the patient's history were reviewed and updated as appropriate: allergies, current medications, past family history, past medical history, past social history, past surgical history and problem list     OBJECTIVE:     Mental Status Examination:    Appearance calm and cooperative , adequate hygiene and grooming and good eye contact    Mood euthymic   Affect affect appropriate    Speech a normal rate   Thought Processes normal thought processes   Hallucinations no hallucinations present    Thought Content no delusions   Abnormal Thoughts no suicidal thoughts  and no homicidal thoughts    Orientation  oriented to person and place and time   Remote Memory short term memory intact and long term memory intact   Attention Span concentration intact   Intellect Appears to be Above Average Intelligence   Insight Insight intact   Judgement judgment was intact   Muscle Strength Muscle strength and tone were normal   Language no difficulty naming common objects   Fund of Knowledge displays adequate knowledge of current events   Pain none   Pain Scale 0       Laboratory Results: No results found for this or any previous visit  Assessment/Plan:       There are no diagnoses linked to this encounter  Treatment Recommendations- Risks Benefits      Immediate Medical/Psychiatric/Psychotherapy Treatments and Any Precautions:  reviewed medication continue with treatment plan, discussed coping with intrusive thoughts and issues regarding his mother  Positive reinforcement provided for his accomplishments      Risks, Benefits And Possible Side Effects Of Medications:  {PSYCH RISK, BENEFITS AND POSSIBLE SIDE EFFECTS (Optional):49567       Psychotherapy Provided: 30 min  Supportive therapy  Medication evaluation  Mood assessment  Coping skills with intrusive thoughts and family issues    Goals discussed in session: maintain stable mood       Treatment Plan:    Completed and signed during the session: Yes - Treatment Plan done but not signed at time of office visit due to:  Plan reviewed by video and verbal consent given due to [address] distancing enacted August 4, 2020, updated November 11, 2020, June 23, 2021, December 22, 2021, July 13, 2022    Cleveland Hammond, PhD 07/13/22

## 2022-12-14 NOTE — CHART NOTE - NSCHARTNOTEFT_GEN_A_CORE
well known to us. Now post valve surgery with CHB and no escpae.   - Will likely need PPM, pls get ID clearance about timing. If confirmed only one epicardial wire with pacing threshold at 6Ma, then will likely need PPM this week. Above d/w ICU. No procedure planned for today. Will follow up

## 2022-12-14 NOTE — PROGRESS NOTE ADULT - SUBJECTIVE AND OBJECTIVE BOX
[facility] PHYSIC[facility]                                                         CARDIOLOGY AT [place] [address]                                                         Telephone: [phone]. Fax:[phone]                                                                             PROGRESS NOTE    Reason for follow up: afib  Update: 4 sec pause on tele      Review of symptoms:   Cardiac:  No chest pain. No dyspnea. No palpitations.  Respiratory: no cough. No dyspnea  Gastrointestinal: No diarrhea. No abdominal pain. No bleeding.   Neuro: No focal neuro complaints.    Vitals:  T(C): 36.3 (12-14-22 @ 11:18), Max: 37.5 (12-14-22 @ 04:15)  HR: 79 (12-14-22 @ 11:00) (79 - 80)  BP: 108/61 (12-14-22 @ 10:00) (77/55 - 120/60)  RR: 20 (12-14-22 @ 11:00) (11 - 42)  SpO2: 100% (12-14-22 @ 11:00) (92% - 100%)  Wt(kg): --  I&O's Summary    13 Dec 2022 07:01  -  14 Dec 2022 07:00  --------------------------------------------------------  IN: 2838.5 mL / OUT: 1660 mL / NET: 1178.5 mL    14 Dec 2022 07:01  -  14 Dec 2022 11:36  --------------------------------------------------------  IN: 265 mL / OUT: 230 mL / NET: 35 mL      Weight (kg): 76.8 (12-13 @ 06:00)    PHYSICAL EXAM:  Appearance: Comfortable. No acute distress  HEENT:  Atraumatic. Normocephalic.  Normal oral mucosa  Neurologic: A & O x 3, no gross focal deficits.  Cardiovascular: S1 S2, No murmur, no rubs/gallops. No JVD  Respiratory: Lungs clear to auscultation, unlabored   Gastrointestinal:  Soft, Non-tender, + BS  Lower Extremities: 2+ Peripheral Pulses, No clubbing, cyanosis, or edema  Psychiatry: Patient is calm. No agitation.   Skin: warm and dry.    CURRENT CARDIAC MEDICATIONS:  niCARdipine Infusion 5 mG/Hr IV Continuous <Continuous>  norepinephrine Infusion 0.03 MICROgram(s)/kG/Min IV Continuous <Continuous>      CURRENT OTHER MEDICATIONS:  meropenem Injectable 1000 milliGRAM(s) IV Push every 12 hours  vancomycin  IVPB 1000 milliGRAM(s) IV Intermittent once  ondansetron Injectable 4 milliGRAM(s) IV Push once PRN Nausea and/or Vomiting  pantoprazole    Tablet 40 milliGRAM(s) Oral daily  polyethylene glycol 3350 17 Gram(s) Oral daily  senna 2 Tablet(s) Oral at bedtime  dextrose 50% Injectable 50 milliLiter(s) IV Push every 15 minutes, Stop order after: 1 Doses  dextrose 50% Injectable 25 milliLiter(s) IV Push every 15 minutes, Stop order after: 1 Doses  dextrose 50% Injectable 25 Gram(s) IV Push once, Stop order after: 1 Doses  dextrose 50% Injectable 12.5 Gram(s) IV Push once, Stop order after: 1 Doses  dextrose 50% Injectable 25 Gram(s) IV Push once, Stop order after: 1 Doses  dextrose Oral Gel 15 Gram(s) Oral once, Stop order after: 1 Doses  glucagon  Injectable 1 milliGRAM(s) IntraMuscular once, Stop order after: 1 Doses  insulin lispro Injectable (ADMELOG) 2 Unit(s) SubCutaneous three times a day before meals  insulin regular Infusion 2 Unit(s)/Hr (2 mL/Hr) IV Continuous <Continuous>  albumin human  5% IVPB 250 milliLiter(s) IV Intermittent every 2 hours, Stop order after: 2 Doses PRN Hypovolemia  aspirin enteric coated 81 milliGRAM(s) Oral daily  chlorhexidine 2% Cloths 1 Application(s) Topical daily  lactated ringers Bolus 500 milliLiter(s) IV Bolus every 15 minutes, Stop order after: 4 Doses PRN Hypovolemia  potassium chloride  10 mEq/50 mL IVPB 10 milliEquivalent(s) IV Intermittent every 1 hour, Stop order after: 3 Doses  potassium chloride  10 mEq/50 mL IVPB 10 milliEquivalent(s) IV Intermittent every 1 hour, Stop order after: 2 Doses  potassium chloride  10 mEq/50 mL IVPB 10 milliEquivalent(s) IV Intermittent every 1 hour, Stop order after: 1 Doses  sodium chloride 0.9%. 1000 milliLiter(s) (10 mL/Hr) IV Continuous <Continuous>  sodium chloride 0.9%. 1000 milliLiter(s) (5 mL/Hr) IV Continuous <Continuous>      LABS:	 	  ( 14 Dec 2022 02:12 )  Troponin T  1.21<H>,  CPK  388<H>, CKMB  X    , BNP X        , ( 13 Dec 2022 16:00 )  Troponin T  1.07<H>,  CPK  391<H>, CKMB  X    , BNP X        , ( 06 Dec 2022 23:50 )  Troponin T  0.08<H>,  CPK  X    , CKMB  X    , BNP X                                  8.8    19.41 )-----------( 307      ( 14 Dec 2022 02:12 )             26.2     12-14    137  |  100  |  24.9<H>  ----------------------------<  209<H>  5.4<H>   |  22.0  |  1.42<H>    Ca    8.3<L>      14 Dec 2022 05:45  Mg     2.5     12-14    TPro  5.5<L>  /  Alb  3.0<L>  /  TBili  1.0  /  DBili  x   /  AST  67<H>  /  ALT  26  /  AlkPhos  59  12-14    PT/INR/PTT ( 14 Dec 2022 02:12 )                       :                       :      12.7         :       29.8                  .        .                   .              .           .       1.09        .                                       Lipid Profile:   HgA1c:   TSH:     TELEMETRY: V- paced  ECG:  < from: 12 Lead ECG (12.13.22 @ 16:02) >  unctional rhythm  Left bundle branch block  Abnormal ECG    < end of copied text >    DIAGNOSTIC TESTING:  [ ] Echocardiogram:   < from: LILIAN Echo Doppler (11.30.22 @ 14:52) >  PHYSICIAN INTERPRETATION:  Left Ventricle:  Global LV systolic function was normal. Left ventricular ejection   fraction, by visual estimation, is 55 to 60%. The mitral in-flow pattern   reveals no discernable A-wave, therefore no comment on diastolic function   can be made.  Right Ventricle: Normal right ventricular size and function.  Left Atrium: Mildly enlarged left atrium. No left atrial appendage   thrombus is seen and normal left atrial appendage velocities. Color flow   doppler and intravenous injection of agitated saline demonstrates the   presence of an intact intra atrial septum.  Pericardium: There is no evidence of pericardial effusion.  Mitral Valve: Moderate mitral valve regurgitation is seen. Thickening of   bileaflet mitral valve with subcentimeter filamentous mobile lesions   suggestive of vegetations.  Tricuspid Valve: Structurally normal tricuspid valve, with normal leaflet   excursion. Trivial tricuspid regurgitation is visualized.  Aortic Valve: Moderate aortic stenosis is present. Mild to moderate   aortic valve regurgitation is seen.  Shunts: Agitated saline contrast was given intravenously to evaluate for   intracardiac shunting.  In comparison to the previous echocardiogram(s): No prior LILIAN study is   available for comparison. 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 Cardioversion deferred due to the   presence of underlying infection/endocarditis.    < end of copied text >    [ ]  Catheterization:  < from: Cardiac Catheterization (12.05.22 @ 16:47) >  Diagnostic Conclusions:     Mild CAD   Mildly elevated left ventricular filling pressure     < end of copied text >

## 2022-12-14 NOTE — PROGRESS NOTE ADULT - ASSESSMENT
76 y/o M was initially admitted 11/2 with streptococcus bacteremia; refused LILIAN and was discharged on IV Rocephin via PICC line. Now readmitted with syncope; found to have new onset AF, sepsis. 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 S/p AVR/MVR. [facility] non obstructive

## 2022-12-14 NOTE — PROGRESS NOTE ADULT - SUBJECTIVE AND OBJECTIVE BOX
Reason for visit: RADHA, hyperkalemia    Subjective: Reconsulted for RADHA  He si now s/p AVR, MVR and patch repair of aortic root on 12/13  extubated   c/o pain at surgical sites  post op leukocytosis, hyperkalemia and radha  Also w/ complete heart block now  paced  On NC 5L, levo weaning  No fever/chills.     ROS: All systems were reviewed in detail pertinent positive and negative mentioned above, rest are negative.    Physical Exam:  Gen: no acute distress  MS: alert, conversing normally  Eyes: EOMI, no icterus  HENT: NCAT, MMM  CV: rhythm reg reg, rate normal  Chest: CTAB, no w/r/r,  Abd: soft, NT, ND  Extremities: No edema    =======================================================  Vital Signs Last 24 Hrs  T(C): 36.6 (14 Dec 2022 20:00), Max: 37.5 (14 Dec 2022 04:15)  T(F): 97.9 (14 Dec 2022 20:00), Max: 99.5 (14 Dec 2022 04:15)  HR: 60 (14 Dec 2022 22:00) (59 - 80)  BP: 128/60 (14 Dec 2022 22:00) (79/54 - 128/60)  BP(mean): 87 (14 Dec 2022 22:00) (62 - 96)  RR: 22 (14 Dec 2022 22:00) (12 - 42)  SpO2: 96% (14 Dec 2022 22:00) (92% - 100%)    Parameters below as of 14 Dec 2022 21:00  Patient On (Oxygen Delivery Method): nasal cannula  O2 Flow (L/min): 4    I&O's Summary    13 Dec 2022 07:01  -  14 Dec 2022 07:00  --------------------------------------------------------  IN: 2838.5 mL / OUT: 1660 mL / NET: 1178.5 mL    14 Dec 2022 07:01  -  14 Dec 2022 23:08  --------------------------------------------------------  IN: 897.4 mL / OUT: 1445 mL / NET: -547.6 mL      =======================================================  Current Antibiotics:  meropenem Injectable 1000 milliGRAM(s) IV Push every 12 hours    Other medications:  aspirin enteric coated 81 milliGRAM(s) Oral daily  chlorhexidine 2% Cloths 1 Application(s) Topical daily  dextrose 5%. 1000 milliLiter(s) IV Continuous <Continuous>  dextrose 5%. 1000 milliLiter(s) IV Continuous <Continuous>  dextrose 50% Injectable 25 Gram(s) IV Push once  dextrose 50% Injectable 12.5 Gram(s) IV Push once  dextrose 50% Injectable 25 Gram(s) IV Push once  DOPamine Infusion 3 MICROgram(s)/kG/Min IV Continuous <Continuous>  glucagon  Injectable 1 milliGRAM(s) IntraMuscular once  glucagon  Injectable 1 milliGRAM(s) IntraMuscular once  heparin   Injectable 5000 Unit(s) SubCutaneous every 12 hours  insulin lispro (ADMELOG) corrective regimen sliding scale   SubCutaneous Before meals and at bedtime  niCARdipine Infusion 5 mG/Hr IV Continuous <Continuous>  norepinephrine Infusion 0.03 MICROgram(s)/kG/Min IV Continuous <Continuous>  pantoprazole    Tablet 40 milliGRAM(s) Oral daily  polyethylene glycol 3350 17 Gram(s) Oral daily  senna 2 Tablet(s) Oral at bedtime  sodium chloride 0.9%. 1000 milliLiter(s) IV Continuous <Continuous>  sodium chloride 0.9%. 1000 milliLiter(s) IV Continuous <Continuous>    =======================================================  12-14    136  |  99  |  30.2<H>  ----------------------------<  129<H>  5.6<H>   |  24.0  |  1.52<H>    Ca    8.3<L>      14 Dec 2022 18:00  Mg     2.4     12-14    TPro  5.5<L>  /  Alb  3.0<L>  /  TBili  1.0  /  DBili  x   /  AST  67<H>  /  ALT  26  /  AlkPhos  59  12-14    Creatinine, Serum: 1.52 mg/dL (12-14-22 @ 18:00)  Creatinine, Serum: 1.53 mg/dL (12-14-22 @ 11:40)  Creatinine, Serum: 1.42 mg/dL (12-14-22 @ 05:45)  Creatinine, Serum: 1.21 mg/dL (12-14-22 @ 02:12)  Creatinine, Serum: 0.69 mg/dL (12-13-22 @ 16:00)  Creatinine, Serum: 0.83 mg/dL (12-13-22 @ 05:32)  Creatinine, Serum: 0.94 mg/dL (12-12-22 @ 05:40)  Creatinine, Serum: 1.05 mg/dL (12-11-22 @ 05:47)  Creatinine, Serum: 1.13 mg/dL (12-10-22 @ 06:20)      =======================================================

## 2022-12-14 NOTE — PROGRESS NOTE ADULT - NS ATTEND OPT1A GEN_ALL_CORE
Medical decision making
History/Exam/Medical decision making

## 2022-12-14 NOTE — PROGRESS NOTE ADULT - NSPROGADDITIONALINFOA_GEN_ALL_CORE
Signing off
Assessment and recommendations are final when note is signed by the attending.
Assessment and recommendations are final when note is signed by the attending.

## 2022-12-14 NOTE — PROGRESS NOTE ADULT - ASSESSMENT
76 YO M w/ recent hospitalization for Streptococcus bacteremia and Norovirus discharged home on IV Rocephin via PICC line returned after a syncopal episode. Admitted for septic shock; endocarditis. Nw s/p AVR, MVR and patch repair of aortic root on 12/13. Post op leukocytosis, hyperK, RADHA, CHB temp pacer. Nephrology is consulted for RADHA.     1. Acute kidney Injury  -Baseline creatinine is 0.8-0.9mg/dL  -UA bland,   -Renal US (06 Dec) mild fullness of the renal pelves/hydronephrosis, possibly secondary to a distended urinary bladder  -RADHA is likely hemodynamically mediated in setting of CHB, valvular Sx  -Making decent UOP, now paced, expecting SCR to improve soon if CO/MAP can be maintained    2. Hyperkalemia  -Recommend no more LR boluses  -Borderline high, K 5.6. Lokelma 10 mg x 1 can be used if worsens further on next lab chack.    3. CHB-temporary paced.

## 2022-12-15 LAB
ANION GAP SERPL CALC-SCNC: 11 MMOL/L — SIGNIFICANT CHANGE UP (ref 5–17)
BUN SERPL-MCNC: 30.5 MG/DL — HIGH (ref 8–20)
CALCIUM SERPL-MCNC: 8.1 MG/DL — LOW (ref 8.4–10.5)
CHLORIDE SERPL-SCNC: 99 MMOL/L — SIGNIFICANT CHANGE UP (ref 96–108)
CO2 SERPL-SCNC: 26 MMOL/L — SIGNIFICANT CHANGE UP (ref 22–29)
CREAT SERPL-MCNC: 1.2 MG/DL — SIGNIFICANT CHANGE UP (ref 0.5–1.3)
EGFR: 62 ML/MIN/1.73M2 — SIGNIFICANT CHANGE UP
GLUCOSE BLDC GLUCOMTR-MCNC: 101 MG/DL — HIGH (ref 70–99)
GLUCOSE BLDC GLUCOMTR-MCNC: 103 MG/DL — HIGH (ref 70–99)
GLUCOSE BLDC GLUCOMTR-MCNC: 113 MG/DL — HIGH (ref 70–99)
GLUCOSE BLDC GLUCOMTR-MCNC: 127 MG/DL — HIGH (ref 70–99)
GLUCOSE BLDC GLUCOMTR-MCNC: 89 MG/DL — SIGNIFICANT CHANGE UP (ref 70–99)
GLUCOSE SERPL-MCNC: 107 MG/DL — HIGH (ref 70–99)
HCT VFR BLD CALC: 25.3 % — LOW (ref 39–50)
HCT VFR BLD CALC: 29 % — LOW (ref 39–50)
HGB BLD-MCNC: 8.4 G/DL — LOW (ref 13–17)
HGB BLD-MCNC: 9.9 G/DL — LOW (ref 13–17)
MAGNESIUM SERPL-MCNC: 2.3 MG/DL — SIGNIFICANT CHANGE UP (ref 1.6–2.6)
MCHC RBC-ENTMCNC: 30.1 PG — SIGNIFICANT CHANGE UP (ref 27–34)
MCHC RBC-ENTMCNC: 30.6 PG — SIGNIFICANT CHANGE UP (ref 27–34)
MCHC RBC-ENTMCNC: 33.2 GM/DL — SIGNIFICANT CHANGE UP (ref 32–36)
MCHC RBC-ENTMCNC: 34.1 GM/DL — SIGNIFICANT CHANGE UP (ref 32–36)
MCV RBC AUTO: 89.5 FL — SIGNIFICANT CHANGE UP (ref 80–100)
MCV RBC AUTO: 90.7 FL — SIGNIFICANT CHANGE UP (ref 80–100)
PLATELET # BLD AUTO: 217 K/UL — SIGNIFICANT CHANGE UP (ref 150–400)
PLATELET # BLD AUTO: 228 K/UL — SIGNIFICANT CHANGE UP (ref 150–400)
POTASSIUM SERPL-MCNC: 4.9 MMOL/L — SIGNIFICANT CHANGE UP (ref 3.5–5.3)
POTASSIUM SERPL-SCNC: 4.9 MMOL/L — SIGNIFICANT CHANGE UP (ref 3.5–5.3)
RBC # BLD: 2.79 M/UL — LOW (ref 4.2–5.8)
RBC # BLD: 3.24 M/UL — LOW (ref 4.2–5.8)
RBC # FLD: 14 % — SIGNIFICANT CHANGE UP (ref 10.3–14.5)
RBC # FLD: 14.4 % — SIGNIFICANT CHANGE UP (ref 10.3–14.5)
SODIUM SERPL-SCNC: 136 MMOL/L — SIGNIFICANT CHANGE UP (ref 135–145)
WBC # BLD: 18.57 K/UL — HIGH (ref 3.8–10.5)
WBC # BLD: 22.25 K/UL — HIGH (ref 3.8–10.5)
WBC # FLD AUTO: 18.57 K/UL — HIGH (ref 3.8–10.5)
WBC # FLD AUTO: 22.25 K/UL — HIGH (ref 3.8–10.5)

## 2022-12-15 PROCEDURE — 99291 CRITICAL CARE FIRST HOUR: CPT

## 2022-12-15 PROCEDURE — 99233 SBSQ HOSP IP/OBS HIGH 50: CPT

## 2022-12-15 PROCEDURE — 93010 ELECTROCARDIOGRAM REPORT: CPT

## 2022-12-15 PROCEDURE — 71045 X-RAY EXAM CHEST 1 VIEW: CPT | Mod: 26

## 2022-12-15 RX ORDER — FUROSEMIDE 40 MG
40 TABLET ORAL ONCE
Refills: 0 | Status: COMPLETED | OUTPATIENT
Start: 2022-12-15 | End: 2022-12-15

## 2022-12-15 RX ORDER — MEROPENEM 1 G/30ML
1000 INJECTION INTRAVENOUS EVERY 8 HOURS
Refills: 0 | Status: DISCONTINUED | OUTPATIENT
Start: 2022-12-15 | End: 2022-12-25

## 2022-12-15 RX ADMIN — SENNA PLUS 2 TABLET(S): 8.6 TABLET ORAL at 21:49

## 2022-12-15 RX ADMIN — MEROPENEM 1000 MILLIGRAM(S): 1 INJECTION INTRAVENOUS at 15:27

## 2022-12-15 RX ADMIN — Medication 81 MILLIGRAM(S): at 11:32

## 2022-12-15 RX ADMIN — HEPARIN SODIUM 5000 UNIT(S): 5000 INJECTION INTRAVENOUS; SUBCUTANEOUS at 17:22

## 2022-12-15 RX ADMIN — POLYETHYLENE GLYCOL 3350 17 GRAM(S): 17 POWDER, FOR SOLUTION ORAL at 11:32

## 2022-12-15 RX ADMIN — Medication 40 MILLIGRAM(S): at 11:31

## 2022-12-15 RX ADMIN — MEROPENEM 1000 MILLIGRAM(S): 1 INJECTION INTRAVENOUS at 21:49

## 2022-12-15 RX ADMIN — PANTOPRAZOLE SODIUM 40 MILLIGRAM(S): 20 TABLET, DELAYED RELEASE ORAL at 11:31

## 2022-12-15 RX ADMIN — CHLORHEXIDINE GLUCONATE 1 APPLICATION(S): 213 SOLUTION TOPICAL at 16:00

## 2022-12-15 RX ADMIN — HEPARIN SODIUM 5000 UNIT(S): 5000 INJECTION INTRAVENOUS; SUBCUTANEOUS at 06:45

## 2022-12-15 RX ADMIN — DOPAMINE HYDROCHLORIDE 8.64 MICROGRAM(S)/KG/MIN: 40 INJECTION, SOLUTION, CONCENTRATE INTRAVENOUS at 17:22

## 2022-12-15 RX ADMIN — MEROPENEM 1000 MILLIGRAM(S): 1 INJECTION INTRAVENOUS at 08:29

## 2022-12-15 NOTE — PROGRESS NOTE ADULT - NS ATTEND AMEND GEN_ALL_CORE FT
Seen and examined, agree with above unless specified below. Discussed with Dr. Flanagan, extensive surgery and intra-op findings suggestive of endocarditis. Nonetheless, he did not need to do extensive debridement at the region of conduction system. Extensive biatrial surgery, plus t-AVR and t-MVR and aortic root repair, see Op report. Case was also briefly discussed with Dr. Laws from ID yesterday    Given improvement in conduction system, would ideally avoid any PPM. If conduction worsened, then will r/a need and type of pacing support.  AF preop, now in SR, will r/a need for long term AC as o/p, post op AC management to be deferred to CTS for now.     Will follow up.

## 2022-12-15 NOTE — PHYSICAL THERAPY INITIAL EVALUATION ADULT - GENERAL OBSERVATIONS, REHAB EVAL
Pt. OOB; +monitor, O2 nasal canula, right IJ central, radial a-line, chest tubes, ram, pacer box
Pt. in bed; +monitor, yo, IV

## 2022-12-15 NOTE — PHYSICAL THERAPY INITIAL EVALUATION ADULT - DIAGNOSIS, PT EVAL
Decreased mobility/function; generalized weakness; decondtioned
Decreased mobility/function s/p AVR/MVR

## 2022-12-15 NOTE — PHYSICAL THERAPY INITIAL EVALUATION ADULT - ADDITIONAL COMMENTS
Pt. states he lives in a private home with 1 step to enter and full flight to bedroom.
Pt. states he lives in a private home with 1 step to enter and full flight to bedroom.

## 2022-12-15 NOTE — PHYSICAL THERAPY INITIAL EVALUATION ADULT - IMPAIRMENTS FOUND, PT EVAL
aerobic capacity/endurance/gait, locomotion, and balance
aerobic capacity/endurance/gait, locomotion, and balance

## 2022-12-15 NOTE — PROGRESS NOTE ADULT - PROBLEM SELECTOR PLAN 1
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  Blood cultures 11/27 remain NGTD.  Continue Merrem per ID & luis-op vanco    stable off levophed  Dopamine started for chronotropy  Paced at 60 with Epicardial PW with no escape rhythm underneath pacer so Beta blocker contraindicated at this time.  EP consult apprecaited. Plan for PPM this admission, pending date.    start Aspirin   Encourage PO intake  Encourage OOB to chair and ambulation with PT  Encourage deep breathing exercised and coughing  Chest PT and IS use with bedside nurse

## 2022-12-15 NOTE — PROGRESS NOTE ADULT - PROBLEM SELECTOR PLAN 4
RADHA noted post cath with urinary retention, now resolving after Barrios placed 12/8.   Additionally, postop radha, improving  Renal following for optimization.  Avoid nephrotoxic drugs.  UCx negative 12/8.

## 2022-12-15 NOTE — PROGRESS NOTE ADULT - SUBJECTIVE AND OBJECTIVE BOX
Subjective - patient seen and evaluated bedside. Sitting comfortably in bed. Denies CP, SOB, HA, dizziness, n/v/d    Review of Systems: negative x 10 systems except as noted above    Brief summary:  77yMale POD# 2 AVR/MVR aortic root patch repair    Significant/Ujen05am events: PRBC given. Dopamine started for chronotropy. Remains pacer dependent      PAST MEDICAL & SURGICAL HISTORY:  Hypertension      Aortic stenosis      H/O inguinal hernia repair  B/L 2013      S/P laparoscopic colectomy  right colectomy with ileotransverse anastomosis            albumin human  5% IVPB 250 milliLiter(s) IV Intermittent every 2 hours PRN  aspirin enteric coated 81 milliGRAM(s) Oral daily  chlorhexidine 2% Cloths 1 Application(s) Topical daily  dextrose 5%. 1000 milliLiter(s) IV Continuous <Continuous>  dextrose 5%. 1000 milliLiter(s) IV Continuous <Continuous>  dextrose 50% Injectable 25 Gram(s) IV Push once  dextrose 50% Injectable 12.5 Gram(s) IV Push once  dextrose 50% Injectable 25 Gram(s) IV Push once  dextrose Oral Gel 15 Gram(s) Oral once PRN  DOPamine Infusion 3 MICROgram(s)/kG/Min IV Continuous <Continuous>  glucagon  Injectable 1 milliGRAM(s) IntraMuscular once  glucagon  Injectable 1 milliGRAM(s) IntraMuscular once  heparin   Injectable 5000 Unit(s) SubCutaneous every 12 hours  insulin lispro (ADMELOG) corrective regimen sliding scale   SubCutaneous Before meals and at bedtime  lactated ringers Bolus 500 milliLiter(s) IV Bolus every 15 minutes PRN  meropenem Injectable 1000 milliGRAM(s) IV Push every 12 hours  niCARdipine Infusion 5 mG/Hr IV Continuous <Continuous>  norepinephrine Infusion 0.03 MICROgram(s)/kG/Min IV Continuous <Continuous>  ondansetron Injectable 4 milliGRAM(s) IV Push once PRN  oxyCODONE    IR 10 milliGRAM(s) Oral every 6 hours PRN  oxyCODONE    IR 5 milliGRAM(s) Oral every 6 hours PRN  pantoprazole    Tablet 40 milliGRAM(s) Oral daily  polyethylene glycol 3350 17 Gram(s) Oral daily  senna 2 Tablet(s) Oral at bedtime  sodium chloride 0.9%. 1000 milliLiter(s) IV Continuous <Continuous>  sodium chloride 0.9%. 1000 milliLiter(s) IV Continuous <Continuous>  MEDICATIONS  (PRN):  albumin human  5% IVPB 250 milliLiter(s) IV Intermittent every 2 hours PRN Hypovolemia  dextrose Oral Gel 15 Gram(s) Oral once PRN Blood Glucose LESS THAN 70 milliGRAM(s)/deciliter  lactated ringers Bolus 500 milliLiter(s) IV Bolus every 15 minutes PRN Hypovolemia  ondansetron Injectable 4 milliGRAM(s) IV Push once PRN Nausea and/or Vomiting  oxyCODONE    IR 10 milliGRAM(s) Oral every 6 hours PRN Severe Pain (7 - 10)  oxyCODONE    IR 5 milliGRAM(s) Oral every 6 hours PRN Moderate Pain (4 - 6)      Daily     Daily       ABG - ( 14 Dec 2022 01:28 )  pH, Arterial: 7.410 pH, Blood: x     /  pCO2: 38    /  pO2: 125   / HCO3: 24    / Base Excess: -0.5  /  SaO2: 100.0                                   8.4    18.57 )-----------( 217      ( 15 Dec 2022 02:35 )             25.3   12-15    136  |  99  |  30.5<H>  ----------------------------<  107<H>  4.9   |  26.0  |  1.20    Ca    8.1<L>      15 Dec 2022 02:35  Mg     2.3     12-15    TPro  5.5<L>  /  Alb  3.0<L>  /  TBili  1.0  /  DBili  x   /  AST  67<H>  /  ALT  26  /  AlkPhos  59  12-14    CARDIAC MARKERS ( 14 Dec 2022 02:12 )  x     / 1.21 ng/mL / 388 U/L / x     / 36.5 ng/mL  CARDIAC MARKERS ( 13 Dec 2022 16:00 )  x     / 1.07 ng/mL / 391 U/L / x     / 59.9 ng/mL    PT/INR - ( 14 Dec 2022 02:12 )   PT: 12.7 sec;   INR: 1.09 ratio         PTT - ( 14 Dec 2022 02:12 )  PTT:29.8 sec      Objective:  T(C): 36.7 (12-15-22 @ 00:00), Max: 37.5 (12-14-22 @ 04:15)  HR: 60 (12-15-22 @ 02:00) (59 - 80)  BP: 119/58 (12-15-22 @ 02:00) (96/57 - 128/60)  RR: 22 (12-15-22 @ 02:00) (12 - 35)  SpO2: 95% (12-15-22 @ 02:00) (92% - 100%)  Wt(kg): --CAPILLARY BLOOD GLUCOSE      POCT Blood Glucose.: 127 mg/dL (15 Dec 2022 00:12)  POCT Blood Glucose.: 143 mg/dL (14 Dec 2022 16:49)  POCT Blood Glucose.: 149 mg/dL (14 Dec 2022 11:53)  POCT Blood Glucose.: 151 mg/dL (14 Dec 2022 08:00)  POCT Blood Glucose.: 213 mg/dL (14 Dec 2022 05:48)  POCT Blood Glucose.: 149 mg/dL (14 Dec 2022 04:45)  I&O's Summary    13 Dec 2022 07:01  -  14 Dec 2022 07:00  --------------------------------------------------------  IN: 2838.5 mL / OUT: 1660 mL / NET: 1178.5 mL    14 Dec 2022 07:01  -  15 Dec 2022 04:06  --------------------------------------------------------  IN: 924.6 mL / OUT: 1595 mL / NET: -670.4 mL        Physical Exam  General: NAD  Neuro: A+O x 3, non-focal, speech clear and intact  Psych: Appropriate affect  HEENT:  NCAT, No conjuctival edema or icterus, no thrush.  Pulm: CTA, equal bilaterally  CV: RRR,  +S1S2  Abd: soft, NT, ND, +BS  Ext: +DP Pulses b/l, no edema  Skin: Warm, dry, intact  Inc: MSI C/D/I/stable w/ dressing,   Chest tubes: mediastinal and pleural CT to suction, draining appropriately, no AL        Imaging:      < from: Xray Chest 1 View AP/PA. (12.13.22 @ 16:04) >  IMPRESSION:  Status post cardiac surgery.  No evidence of active chest pathology.  Catheters and/or tubes in place    < end of copied text >

## 2022-12-15 NOTE — CHART NOTE - NSCHARTNOTEFT_GEN_A_CORE
Source: Patient [x ]  Family [ ]   other [x ] EMR     Current Diet: Diet, Regular:   Consistent Carbohydrate {No Snacks} (CSTCHO)  DASH/TLC {Sodium & Cholesterol Restricted} (DASH) (12-14-22 @ 02:21)  Diet, Clear Liquid:   Consistent Carbohydrate {No Snacks} (CSTCHO)  DASH/TLC {Sodium & Cholesterol Restricted} (DASH) (12-13-22 @ 11:04)    PO intake:  < 50% [ x]   50-75%  [ ]   %  [ ]  other :    Source for PO intake [x ] Patient [ ] family [ ] chart [ ] staff [ ] other    Current Weight:   (12/15) 208.12 lbs   (12/9) 168.4lbs  (12/8) 192lbs  (12/7) 185.9lbs  (12/6) 195.3lbs  (12/5) 189.5lbs  (12/4) 189.9lbs  (12/3) 192.2lbs  (11/29) 195.4lbs  (Generalized edema)     % Weight Change: Unsure of accuracy of weights secondary to inconsistency; will continue to monitor weights for trends.    Pertinent Medications: MEDICATIONS  (STANDING):  aspirin enteric coated 81 milliGRAM(s) Oral daily  chlorhexidine 2% Cloths 1 Application(s) Topical daily  dextrose 5%. 1000 milliLiter(s) (50 mL/Hr) IV Continuous <Continuous>  dextrose 5%. 1000 milliLiter(s) (100 mL/Hr) IV Continuous <Continuous>  dextrose 50% Injectable 25 Gram(s) IV Push once  dextrose 50% Injectable 12.5 Gram(s) IV Push once  dextrose 50% Injectable 25 Gram(s) IV Push once  DOPamine Infusion 3 MICROgram(s)/kG/Min (8.64 mL/Hr) IV Continuous <Continuous>  furosemide   Injectable 40 milliGRAM(s) IV Push once  glucagon  Injectable 1 milliGRAM(s) IntraMuscular once  glucagon  Injectable 1 milliGRAM(s) IntraMuscular once  heparin   Injectable 5000 Unit(s) SubCutaneous every 12 hours  insulin lispro (ADMELOG) corrective regimen sliding scale   SubCutaneous Before meals and at bedtime  meropenem Injectable 1000 milliGRAM(s) IV Push every 8 hours  niCARdipine Infusion 5 mG/Hr (25 mL/Hr) IV Continuous <Continuous>  norepinephrine Infusion 0.03 MICROgram(s)/kG/Min (4.32 mL/Hr) IV Continuous <Continuous>  pantoprazole    Tablet 40 milliGRAM(s) Oral daily  polyethylene glycol 3350 17 Gram(s) Oral daily  senna 2 Tablet(s) Oral at bedtime  sodium chloride 0.9%. 1000 milliLiter(s) (10 mL/Hr) IV Continuous <Continuous>  sodium chloride 0.9%. 1000 milliLiter(s) (5 mL/Hr) IV Continuous <Continuous>    MEDICATIONS  (PRN):  albumin human  5% IVPB 250 milliLiter(s) IV Intermittent every 2 hours PRN Hypovolemia  dextrose Oral Gel 15 Gram(s) Oral once PRN Blood Glucose LESS THAN 70 milliGRAM(s)/deciliter  lactated ringers Bolus 500 milliLiter(s) IV Bolus every 15 minutes PRN Hypovolemia  ondansetron Injectable 4 milliGRAM(s) IV Push once PRN Nausea and/or Vomiting  oxyCODONE    IR 5 milliGRAM(s) Oral every 6 hours PRN Moderate Pain (4 - 6)  oxyCODONE    IR 10 milliGRAM(s) Oral every 6 hours PRN Severe Pain (7 - 10)    Pertinent Labs: CBC Full  -  ( 15 Dec 2022 02:35 )  WBC Count : 18.57 K/uL  RBC Count : 2.79 M/uL  Hemoglobin : 8.4 g/dL  Hematocrit : 25.3 %  Platelet Count - Automated : 217 K/uL  Mean Cell Volume : 90.7 fl  Mean Cell Hemoglobin : 30.1 pg  Mean Cell Hemoglobin Concentration : 33.2 gm/dL  Auto Neutrophil # : x  Auto Lymphocyte # : x  Auto Monocyte # : x  Auto Eosinophil # : x  Auto Basophil # : x  Auto Neutrophil % : x  Auto Lymphocyte % : x  Auto Monocyte % : x  Auto Eosinophil % : x  Auto Basophil % : x        Skin: Skin lesion R thigh, midsternal incision     Nutrition focused physical exam previously conducted - found signs of malnutrition [ ]absent [ x]present    Subcutaneous fat loss: [ x] Orbital fat pads region, [x ]Buccal fat region, [ ]Triceps region,  [ ]Ribs region    Muscle wasting: [ x]Temples region, [ x]Clavicle region, [ x]Shoulder region, [ ]Scapula region, [ ]Interosseous region,  [ ]thigh region, [ ]Calf region      Estimated Needs:   [x ] no change since previous assessment  [ ] recalculated:     Brief Hospital Course:   Pt is a 77 year old M, pmhx HTN, moderate AS, recent Covid infection 10/6/2022 per chart, recent admission 11/1 for SIRS, 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 LHC 12/5 with clean coronaries. Cleared by neurosurgery for OR. Urinary retention and RADHA post cath resolving since ram placed 12/8.  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 Pt is now s/p AVR/MVR; pt reports taking and tolerating liquids well post-op. Pt reports having a poor appetite; only was able to consume fruit this morning. Reviewed importance of HBV protein foods to promote healing; pt reports not liking or eating too much meat. Reviewed meatless options; prefernces obtained. Pt agreeable to Ensure supplements. Recommendations below:     Recommendations:   1. RX: MVI and Vit. C daily (500mg).   2. Check weight daily to monitor trends   3. Ensure TID   4. Encourage with meals.     Monitoring and Evaluation:   [x ] PO intake [x ] Tolerance to diet prescription [X] Weights  [X] Follow up per protocol [X] Labs:

## 2022-12-15 NOTE — PROGRESS NOTE ADULT - SUBJECTIVE AND OBJECTIVE BOX
GEORGE HOLDSWORTH  MRN#: [id number]  Subjective: The patient was in the CTICU in critical condition at risk for imminent decompensation and was seen and evaluate on AM rounds with the entire multidisciplinary team.     OBJECTIVE:  ICU Vital Signs Last 24 Hrs  T(C): 36.9 (15 Dec 2022 12:00), Max: 36.9 (15 Dec 2022 08:00)  T(F): 98.5 (15 Dec 2022 12:00), Max: 98.5 (15 Dec 2022 12:00)  HR: 61 (15 Dec 2022 12:00) (49 - 61)  BP: 120/57 (15 Dec 2022 11:05) (106/55 - 128/60)  BP(mean): 82 (15 Dec 2022 11:05) (76 - 93)  ABP: 121/54 (15 Dec 2022 12:00) (105/47 - 142/58)  ABP(mean): 76 (15 Dec 2022 12:00) (61 - 82)  RR: 36 (15 Dec 2022 12:00) (12 - 38)  SpO2: 98% (15 Dec 2022 12:00) (91% - 99%)    O2 Parameters below as of 15 Dec 2022 02:00  Patient On (Oxygen Delivery Method): nasal cannula  O2 Flow (L/min): 4           @ :  -  12-15 @ 07:00  --------------------------------------------------------  IN: 1006.2 mL / OUT: 2260 mL / NET: -1253.8 mL    12-15 @ 07:  -  12-15 @ 14:36  --------------------------------------------------------  IN: 188 mL / OUT: 500 mL / NET: -312 mL        PHYSICAL EXAM:Daily Height in cm: 167.6 (15 Dec 2022 04:06)    Daily Weight in k.6 (15 Dec 2022 04:47)  General: WN/WD NAD    HEENT:     + NCAT  + EOMI  - Conjuctival edema   - Icterus   - Thrush   - ETT  - NGT/OGT  Neck:         + FROM    + JVD     - Nodes     - Masses    + Mid-line trachea   - Tracheostomy  Chest:         - Sternal click  - Sternal drainage  + Pacing wires  + Chest tubes  - SubQ emphysema  Lungs:          + CTA   - Rhonchi    - Rales    - Wheezing     - Decreased BS   - Dullness R L  Cardiac:       + S1 + S2    + RRR   - Irregular   - S3  - S4    - Murmurs   - Rub   - Hamman’s sign   Abdomen:    + BS     + Soft    + Non-tender     - Distended    - Organomegaly  - PEG  Extremities:   - Cyanosis U/L   - Clubbing  U/L  - LE/UE Edema   + Capillary refill    + Pulses   Neuro:        + Awake   +  Alert   - Confused   - Lethargic   - Sedated   - Generalized Weakness  Skin:        - Rashes    - Erythema   + Normal incisions   + IV sites intact  - Sacral decubitus    HOSPITAL MEDICATIONS: All mediciations reviewed and analyzed  MEDICATIONS  (STANDING):  aspirin enteric coated 81 milliGRAM(s) Oral daily  chlorhexidine 2% Cloths 1 Application(s) Topical daily  dextrose 50% Injectable 25 Gram(s) IV Push once  dextrose 50% Injectable 12.5 Gram(s) IV Push once  dextrose 50% Injectable 25 Gram(s) IV Push once  DOPamine Infusion 3 MICROgram(s)/kG/Min (8.64 mL/Hr) IV Continuous <Continuous>  glucagon  Injectable 1 milliGRAM(s) IntraMuscular once  glucagon  Injectable 1 milliGRAM(s) IntraMuscular once  heparin   Injectable 5000 Unit(s) SubCutaneous every 12 hours  insulin lispro (ADMELOG) corrective regimen sliding scale   SubCutaneous Before meals and at bedtime  meropenem Injectable 1000 milliGRAM(s) IV Push every 8 hours  pantoprazole    Tablet 40 milliGRAM(s) Oral daily  polyethylene glycol 3350 17 Gram(s) Oral daily  senna 2 Tablet(s) Oral at bedtime  sodium chloride 0.9%. 1000 milliLiter(s) (10 mL/Hr) IV Continuous <Continuous>  sodium chloride 0.9%. 1000 milliLiter(s) (5 mL/Hr) IV Continuous <Continuous>    MEDICATIONS  (PRN):  dextrose Oral Gel 15 Gram(s) Oral once PRN Blood Glucose LESS THAN 70 milliGRAM(s)/deciliter  ondansetron Injectable 4 milliGRAM(s) IV Push once PRN Nausea and/or Vomiting  oxyCODONE    IR 10 milliGRAM(s) Oral every 6 hours PRN Severe Pain (7 - 10)  oxyCODONE    IR 5 milliGRAM(s) Oral every 6 hours PRN Moderate Pain (4 - 6)    LABS: All Lab data reviewed and analyzed                        8.4    18.57 )-----------( 217      ( 15 Dec 2022 02:35 )             25.3    12-15    136  |  99  |  30.5<H>  ----------------------------<  107<H>  4.9   |  26.0  |  1.20    Ca    8.1<L>      15 Dec 2022 02:35  Mg     2.3     -15    TPro  5.5<L>  /  Alb  3.0<L>  /  TBili  1.0  /  DBili  x   /  AST  67<H>  /  ALT  26  /  AlkPhos  59  12-14    PT/INR - ( 14 Dec 2022 02:12 )   PT: 12.7 sec;   INR: 1.09 ratio         PTT - ( 14 Dec 2022 02:12 )  PTT:29.8 sec LIVER FUNCTIONS - ( 14 Dec 2022 02:12 )  Alb: 3.0 g/dL / Pro: 5.5 g/dL / ALK PHOS: 59 U/L / ALT: 26 U/L / AST: 67 U/L / GGT: x           RADIOLOGY: - Reviewed and analyzed     Assessment: Respiratory insufficieny, bradycardia, hypovolemia, S/P AVR/MVR     Plan:   Respiratory status required supplemental oxygen the following of continuous pulse oximetry for support & to prevent decompensation  Continued early mobilization as tolerated  Addressed analgesic regimen to optimize function  Volume resuscitated with fluid and PRBCs  IV Dopamine drip and VVI for hemodynamically significant bradycardia  ASA continued for graft occlusion-thromboembolism prophylaxis  Lipitor for long term graft patency  SQ Heparin continued for VTE prophylaxis in addition to Venodyne boots  Protonix maintained for GI bleeding prophylaxis  No Lopressor for atrial fibrillation prophylaxis secondary to pacing   Metabolic stability & infection prophylaxis required review and adjustment of regular Insulin sliding scale and gylcemic regimen while following serial glucose levels to help achieve & maintain euglycemia  Reviewed & addressed surgical site infection prophylaxis regimen    Patient required critical care management and is at risk for life threatening decompensation  I provided 30 minutes of non-continuous care to the patient.

## 2022-12-15 NOTE — PROGRESS NOTE ADULT - SUBJECTIVE AND OBJECTIVE BOX
EP follow up     No overnight events.   Pt is currently conducting on their own. Sinus rhythm w/ 1:1conduction, prolonged AV delay, LBBB.  Epicardial temp set at VVI 40/7mA. Threshold performed bedside @ VVI 80, threshold is 2.5mA.    MEDICATIONS  (STANDING):  aspirin enteric coated 81 milliGRAM(s) Oral daily  chlorhexidine 2% Cloths 1 Application(s) Topical daily  dextrose 50% Injectable 25 Gram(s) IV Push once  dextrose 50% Injectable 12.5 Gram(s) IV Push once  dextrose 50% Injectable 25 Gram(s) IV Push once  DOPamine Infusion 3 MICROgram(s)/kG/Min (8.64 mL/Hr) IV Continuous <Continuous>  glucagon  Injectable 1 milliGRAM(s) IntraMuscular once  glucagon  Injectable 1 milliGRAM(s) IntraMuscular once  heparin   Injectable 5000 Unit(s) SubCutaneous every 12 hours  insulin lispro (ADMELOG) corrective regimen sliding scale   SubCutaneous Before meals and at bedtime  meropenem Injectable 1000 milliGRAM(s) IV Push every 8 hours  pantoprazole    Tablet 40 milliGRAM(s) Oral daily  polyethylene glycol 3350 17 Gram(s) Oral daily  senna 2 Tablet(s) Oral at bedtime  sodium chloride 0.9%. 1000 milliLiter(s) (10 mL/Hr) IV Continuous <Continuous>  sodium chloride 0.9%. 1000 milliLiter(s) (5 mL/Hr) IV Continuous <Continuous>    MEDICATIONS  (PRN):  dextrose Oral Gel 15 Gram(s) Oral once PRN Blood Glucose LESS THAN 70 milliGRAM(s)/deciliter  ondansetron Injectable 4 milliGRAM(s) IV Push once PRN Nausea and/or Vomiting  oxyCODONE    IR 10 milliGRAM(s) Oral every 6 hours PRN Severe Pain (7 - 10)  oxyCODONE    IR 5 milliGRAM(s) Oral every 6 hours PRN Moderate Pain (4 - 6)    Vital Signs Last 24 Hrs  T(C): 36.9 (15 Dec 2022 12:00), Max: 36.9 (15 Dec 2022 08:00)  T(F): 98.5 (15 Dec 2022 12:00), Max: 98.5 (15 Dec 2022 12:00)  HR: 61 (15 Dec 2022 12:00) (49 - 80)  BP: 120/57 (15 Dec 2022 11:05) (106/55 - 128/60)  BP(mean): 82 (15 Dec 2022 11:05) (76 - 96)  RR: 36 (15 Dec 2022 12:00) (12 - 38)  SpO2: 98% (15 Dec 2022 12:00) (91% - 100%)    Parameters below as of 15 Dec 2022 02:00  Patient On (Oxygen Delivery Method): nasal cannula  O2 Flow (L/min): 4    Physical Exam:  Constitutional: NAD, AAOx3, sitting comfortably in bed  Cardiovascular: +S1S2 RRR, midsternal dsg CDI  Pulmonary: CTA b/l, unlabored  GI: soft NTND +BS  Neuro: non focal, MEZA x4  Barrios to BSG  right TLC  chest tubes x 4    LABS:                        8.4    18.57 )-----------( 217      ( 15 Dec 2022 02:35 )             25.3     12-15    136  |  99  |  30.5<H>  ----------------------------<  107<H>  4.9   |  26.0  |  1.20    Ca    8.1<L>      15 Dec 2022 02:35  Mg     2.3     12-15    TPro  5.5<L>  /  Alb  3.0<L>  /  TBili  1.0  /  DBili  x   /  AST  67<H>  /  ALT  26  /  AlkPhos  59  12-14    PT/INR - ( 14 Dec 2022 02:12 )   PT: 12.7 sec;   INR: 1.09 ratio    PTT - ( 14 Dec 2022 02:12 )  PTT:29.8 sec    RADIOLOGY & ADDITIONAL TESTS:      A/P: 77 year old male with a history ofvit b12 def,  HTN, HLD, BPH, moderate aortic stenosis with ELDON 1.1 cm, COVID infection 10/6/22, recent admission 11/2/22 for streptococcus anginosus bacteremia     with Norovirus s/p discharge with PICC line, who is readmitted with syncope, septic shock with endocarditis (mitral valve vegetation and possible aortic root abscess), and RADHA. EP following this patient for new onset rapid atrial fibrillation.           77 year old male with PMHx of recent  COVID  on 10/6/22, HTN , Vit B12 deficiency, presenting to the ED on 11/2 with body aches, fatigue and fever (Tmax 102) at home for 12 days found to have streptococcus bacteremia and admitted with unclear source, no recent dental work, skin infections, no respiratory symptoms. Pan scan was negative at that time ( LILIAN was recommended but pt refused) He was treated for Bacteremia ( Blood cultures + streptococcus anginosis pansensitive ) and norovirus with supportive care and contact isolation . He was discharged home on 11/7 with IV Rocephin via PICC line. Recommendation was to continue ABX  daily via pic end 11/30/22    he  presented to ER after and episode of syncope and collapse, found to be septic, hypotensive, hypoxic and febrile in the ER. He is unsure of mechanism of fall but remembers being on the ground no reported LOC . He was found face-down on bathroom floor buy family with left leg wedged under cabinet. On my interview he is alert and oriented to person place and time, he has a baseline studder in his speech pattern but otherwise neurologically intact without deficit  In response to hypotension he failed to respond to Inital sepsis fluid protocol in ER is required IV pressor in form of levophed to maintain MAP greater than 60 -65. In the ER he was febrile with initial labs significant for WBC of 22.4 , ProCal of 21.2 first lactate 4.9 via abg with repeat post fluids of 2.2 venous sample.   PT admitted to [facility] with septic shock unclear source, now off pressors. Found to have new onset A fib and on amio. 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 Blood cultures are negative. IF pacemaker is needed now, leadless pacemaker is preferred             EP follow up     No overnight events.   Pt is currently conducting on their own. Sinus rhythm w/ 1:1conduction, prolonged AV delay, LBBB.  Epicardial temp set at VVI 40/7mA. Threshold performed bedside @ VVI 80, threshold is 2.5mA.    MEDICATIONS  (STANDING):  aspirin enteric coated 81 milliGRAM(s) Oral daily  chlorhexidine 2% Cloths 1 Application(s) Topical daily  dextrose 50% Injectable 25 Gram(s) IV Push once  dextrose 50% Injectable 12.5 Gram(s) IV Push once  dextrose 50% Injectable 25 Gram(s) IV Push once  DOPamine Infusion 3 MICROgram(s)/kG/Min (8.64 mL/Hr) IV Continuous <Continuous>  glucagon  Injectable 1 milliGRAM(s) IntraMuscular once  glucagon  Injectable 1 milliGRAM(s) IntraMuscular once  heparin   Injectable 5000 Unit(s) SubCutaneous every 12 hours  insulin lispro (ADMELOG) corrective regimen sliding scale   SubCutaneous Before meals and at bedtime  meropenem Injectable 1000 milliGRAM(s) IV Push every 8 hours  pantoprazole    Tablet 40 milliGRAM(s) Oral daily  polyethylene glycol 3350 17 Gram(s) Oral daily  senna 2 Tablet(s) Oral at bedtime  sodium chloride 0.9%. 1000 milliLiter(s) (10 mL/Hr) IV Continuous <Continuous>  sodium chloride 0.9%. 1000 milliLiter(s) (5 mL/Hr) IV Continuous <Continuous>    MEDICATIONS  (PRN):  dextrose Oral Gel 15 Gram(s) Oral once PRN Blood Glucose LESS THAN 70 milliGRAM(s)/deciliter  ondansetron Injectable 4 milliGRAM(s) IV Push once PRN Nausea and/or Vomiting  oxyCODONE    IR 10 milliGRAM(s) Oral every 6 hours PRN Severe Pain (7 - 10)  oxyCODONE    IR 5 milliGRAM(s) Oral every 6 hours PRN Moderate Pain (4 - 6)    Vital Signs Last 24 Hrs  T(C): 36.9 (15 Dec 2022 12:00), Max: 36.9 (15 Dec 2022 08:00)  T(F): 98.5 (15 Dec 2022 12:00), Max: 98.5 (15 Dec 2022 12:00)  HR: 61 (15 Dec 2022 12:00) (49 - 80)  BP: 120/57 (15 Dec 2022 11:05) (106/55 - 128/60)  BP(mean): 82 (15 Dec 2022 11:05) (76 - 96)  RR: 36 (15 Dec 2022 12:00) (12 - 38)  SpO2: 98% (15 Dec 2022 12:00) (91% - 100%)    Parameters below as of 15 Dec 2022 02:00  Patient On (Oxygen Delivery Method): nasal cannula  O2 Flow (L/min): 4    Physical Exam:  Constitutional: NAD, AAOx3, sitting comfortably in bed  Cardiovascular: +S1S2 RRR, midsternal dsg CDI  Pulmonary: CTA b/l, unlabored  GI: soft NTND +BS  Neuro: non focal, MEZA x4  Barrios to BSG  right TLC  chest tubes x 4    LABS:                        8.4    18.57 )-----------( 217      ( 15 Dec 2022 02:35 )             25.3     12-15    136  |  99  |  30.5<H>  ----------------------------<  107<H>  4.9   |  26.0  |  1.20    Ca    8.1<L>      15 Dec 2022 02:35  Mg     2.3     12-15    TPro  5.5<L>  /  Alb  3.0<L>  /  TBili  1.0  /  DBili  x   /  AST  67<H>  /  ALT  26  /  AlkPhos  59  12-14    PT/INR - ( 14 Dec 2022 02:12 )   PT: 12.7 sec;   INR: 1.09 ratio    PTT - ( 14 Dec 2022 02:12 )  PTT:29.8 sec    RADIOLOGY & ADDITIONAL TESTS:  [facility] 12/5/22: nml cors    < from: TTE Echo Limited or F/U (11.29.22 @ 09:46) >  Summary:   1. Technically difficult study.   2. Patient noted to be tachycardic throughout exam.   3. Normal global left ventricular systolic function.   4. Left ventricular ejection fraction, by visual estimation, is 55 to 60%.   5. The mitral in-flow pattern reveals no discernable A-wave,therefore no comment on diastolic function can be made.   6. Mild thickening of the anterior and posterior mitral valve leaflets.   7. Sclerotic aortic valve with decreased opening, gradients not obtained to evaluate degree of stenosis. Mild to moderate regurgitation. Heavily calacified valve.   8. Mild to moderate mitral valve regurgitation.   9. Estimated pulmonary artery systolic pressure is 40.2 mmHg assuming a right atrial pressure of 15 mmHg, which is consistent with mild pulmonary hypertension.  10. Mild pulmonic valve regurgitation.  11. No evidence of vegetation however cannot exclude, consider LILIAN if high clinical suspicion.  MD Kay Electronically signed on 11/29/2022 at 12:12:23 PM  *** Final ***  < end of copied text >      A/P: 77 year old male with a history of vit b12 def, HTN, HLD, BPH, moderate aortic stenosis with ELDON 1.1 cm, COVID infection 10/6/22, recent admission 11/2/22 for streptococcus anginosus bacteremia admitted with unclear source (no recent dental work, skin infections, no respiratory symptoms. Pan scan was negative at that time LILIAN was recommended but pt refused). He was treated for Bacteremia (Blood cultures + streptococcus anginosis pansensitive) and norovirus and was discharged 11/7/22 with PICC line/IV Rocephin (recommended through 11/30/22). 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   Pt was admitted to MICU with septic shock and new onset A fib. 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 Pt now POD #2 s/p AVR/MVR aortic root patch repair (12/13/22/Dr Flanagan). Initially post op pt was ventricular paced at 60bpm with no underlying escape rhythm. Today pt conducting on their own. Sinus rhythm w/ 1:1conduction, prolonged AV delay, LBBB. EP following for atrial fibrillation and bradyarrhythmias.     1. post op asystole  - currently Sinus rhythm w/ 1:1conduction, prolonged AV delay, LBBB. On low dose dopamine.  - epicardial threshold today 2.5mA  - not on AV moreno blocking agents, would avoid if possible. no CAD, nml EF, but preop had AF w/ RVR  - continue tele, ideally would like to avoid pacemaker implant until cleared from ID as pt would likely benefit from atrial lead and conduction system pacing    2. newly diagnosed atrial fibrillation with RVR in setting of septic shock, currently in SR  - OR report reviewed, MAZE and ESTEFANY clip not perfomed  - will monitor tele, holding AV moreno blocking agents at this time secondary to bradyarrhythmias  - will discuss need for anticoagulation with Dr Monterroso (CHADSVASc 3 HTN, age)    3. septic shock, endocarditis   - ID follow up appreciated   - bcx 11/27 ngtd, indium negative, MRI head cannot r/o septic emboli  - s/p OR 12/13/22  for AVR, MVR, aortic root patch repair 12/13  - c/w meropenem adjusted for renal function  - pt will need 6 weeks of IV antibiotics from date of surgery  - If pacemaker is needed now, leadless pacemaker is preferred      will NURIS Monterroso, full recommendations to follow        EP follow up     No overnight events.   Pt is currently conducting on their own. Sinus rhythm w/ 1:1conduction, prolonged AV delay, LBBB.  Epicardial temp set at VVI 40/7mA. Threshold performed bedside @ VVI 80, threshold is 2.5mA.    MEDICATIONS  (STANDING):  aspirin enteric coated 81 milliGRAM(s) Oral daily  chlorhexidine 2% Cloths 1 Application(s) Topical daily  dextrose 50% Injectable 25 Gram(s) IV Push once  dextrose 50% Injectable 12.5 Gram(s) IV Push once  dextrose 50% Injectable 25 Gram(s) IV Push once  DOPamine Infusion 3 MICROgram(s)/kG/Min (8.64 mL/Hr) IV Continuous <Continuous>  glucagon  Injectable 1 milliGRAM(s) IntraMuscular once  glucagon  Injectable 1 milliGRAM(s) IntraMuscular once  heparin   Injectable 5000 Unit(s) SubCutaneous every 12 hours  insulin lispro (ADMELOG) corrective regimen sliding scale   SubCutaneous Before meals and at bedtime  meropenem Injectable 1000 milliGRAM(s) IV Push every 8 hours  pantoprazole    Tablet 40 milliGRAM(s) Oral daily  polyethylene glycol 3350 17 Gram(s) Oral daily  senna 2 Tablet(s) Oral at bedtime  sodium chloride 0.9%. 1000 milliLiter(s) (10 mL/Hr) IV Continuous <Continuous>  sodium chloride 0.9%. 1000 milliLiter(s) (5 mL/Hr) IV Continuous <Continuous>    MEDICATIONS  (PRN):  dextrose Oral Gel 15 Gram(s) Oral once PRN Blood Glucose LESS THAN 70 milliGRAM(s)/deciliter  ondansetron Injectable 4 milliGRAM(s) IV Push once PRN Nausea and/or Vomiting  oxyCODONE    IR 10 milliGRAM(s) Oral every 6 hours PRN Severe Pain (7 - 10)  oxyCODONE    IR 5 milliGRAM(s) Oral every 6 hours PRN Moderate Pain (4 - 6)    Vital Signs Last 24 Hrs  T(C): 36.9 (15 Dec 2022 12:00), Max: 36.9 (15 Dec 2022 08:00)  T(F): 98.5 (15 Dec 2022 12:00), Max: 98.5 (15 Dec 2022 12:00)  HR: 61 (15 Dec 2022 12:00) (49 - 80)  BP: 120/57 (15 Dec 2022 11:05) (106/55 - 128/60)  BP(mean): 82 (15 Dec 2022 11:05) (76 - 96)  RR: 36 (15 Dec 2022 12:00) (12 - 38)  SpO2: 98% (15 Dec 2022 12:00) (91% - 100%)    Parameters below as of 15 Dec 2022 02:00  Patient On (Oxygen Delivery Method): nasal cannula  O2 Flow (L/min): 4    Physical Exam:  Constitutional: NAD, AAOx3, sitting comfortably in bed  Cardiovascular: +S1S2 RRR, midsternal dsg CDI  Pulmonary: CTA b/l, unlabored  GI: soft NTND +BS  Neuro: non focal, MEZA x4  Barrios to BSG  right TLC  chest tubes x 4    LABS:                        8.4    18.57 )-----------( 217      ( 15 Dec 2022 02:35 )             25.3     12-15    136  |  99  |  30.5<H>  ----------------------------<  107<H>  4.9   |  26.0  |  1.20    Ca    8.1<L>      15 Dec 2022 02:35  Mg     2.3     12-15    TPro  5.5<L>  /  Alb  3.0<L>  /  TBili  1.0  /  DBili  x   /  AST  67<H>  /  ALT  26  /  AlkPhos  59  12-14    PT/INR - ( 14 Dec 2022 02:12 )   PT: 12.7 sec;   INR: 1.09 ratio    PTT - ( 14 Dec 2022 02:12 )  PTT:29.8 sec    RADIOLOGY & ADDITIONAL TESTS:  [facility] 12/5/22: nml cors    < from: TTE Echo Limited or F/U (11.29.22 @ 09:46) >  Summary:   1. Technically difficult study.   2. Patient noted to be tachycardic throughout exam.   3. Normal global left ventricular systolic function.   4. Left ventricular ejection fraction, by visual estimation, is 55 to 60%.   5. The mitral in-flow pattern reveals no discernable A-wave,therefore no comment on diastolic function can be made.   6. Mild thickening of the anterior and posterior mitral valve leaflets.   7. Sclerotic aortic valve with decreased opening, gradients not obtained to evaluate degree of stenosis. Mild to moderate regurgitation. Heavily calacified valve.   8. Mild to moderate mitral valve regurgitation.   9. Estimated pulmonary artery systolic pressure is 40.2 mmHg assuming a right atrial pressure of 15 mmHg, which is consistent with mild pulmonary hypertension.  10. Mild pulmonic valve regurgitation.  11. No evidence of vegetation however cannot exclude, consider LILIAN if high clinical suspicion.  MD Kay Electronically signed on 11/29/2022 at 12:12:23 PM  *** Final ***  < end of copied text >      A/P: 77 year old male with a history of vit b12 def, HTN, HLD, BPH, moderate aortic stenosis with ELDON 1.1 cm, COVID infection 10/6/22, recent admission 11/2/22 for streptococcus anginosus bacteremia admitted with unclear source (no recent dental work, skin infections, no respiratory symptoms. Pan scan was negative at that time LILIAN was recommended but pt refused). He was treated for Bacteremia (Blood cultures + streptococcus anginosis pansensitive) and norovirus and was discharged 11/7/22 with PICC line/IV Rocephin (recommended through 11/30/22). 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   Pt was admitted to MICU with septic shock and new onset A fib. 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 Pt now POD #2 s/p AVR/MVR aortic root patch repair (12/13/22/Dr Flanagan). Initially post op pt was ventricular paced at 60bpm with no underlying escape rhythm. Today pt conducting on their own. Sinus rhythm w/ 1:1conduction, prolonged AV delay, LBBB. EP following for atrial fibrillation and bradyarrhythmias.     1. post op asystole  - currently Sinus rhythm w/ 1:1conduction, prolonged AV delay, LBBB. On low dose dopamine.  - epicardial threshold today 2.5mA  - not on AV moreno blocking agents, would avoid if possible. no CAD, nml EF, but preop had AF w/ RVR  - continue tele, ideally would like to avoid pacemaker implant until cleared from ID as pt would likely benefit from atrial lead and conduction system pacing if needed at all    2. newly diagnosed atrial fibrillation with RVR in setting of septic shock, currently in SR  - OR report reviewed, MAZE and ESTEFANY clip not perfomed  - will monitor tele, holding AV moreno blocking agents at this time secondary to bradyarrhythmias  - will discuss need for anticoagulation with Dr Monterroso (CHADSVASc 3 HTN, age)    3. septic shock, endocarditis   - ID follow up appreciated   - bcx 11/27 ngtd, indium negative, MRI head cannot r/o septic emboli  - s/p OR 12/13/22  for AVR, MVR, aortic root patch repair 12/13  - c/w meropenem adjusted for renal function  - pt will need 6 weeks of IV antibiotics from date of surgery  - If pacemaker is needed now, leadless pacemaker is preferred      will NURIS Monterroso, full recommendations to follow

## 2022-12-16 LAB
ANION GAP SERPL CALC-SCNC: 8 MMOL/L — SIGNIFICANT CHANGE UP (ref 5–17)
BUN SERPL-MCNC: 29.3 MG/DL — HIGH (ref 8–20)
CALCIUM SERPL-MCNC: 8.5 MG/DL — SIGNIFICANT CHANGE UP (ref 8.4–10.5)
CHLORIDE SERPL-SCNC: 101 MMOL/L — SIGNIFICANT CHANGE UP (ref 96–108)
CO2 SERPL-SCNC: 28 MMOL/L — SIGNIFICANT CHANGE UP (ref 22–29)
CREAT SERPL-MCNC: 0.8 MG/DL — SIGNIFICANT CHANGE UP (ref 0.5–1.3)
EGFR: 91 ML/MIN/1.73M2 — SIGNIFICANT CHANGE UP
GLUCOSE BLDC GLUCOMTR-MCNC: 100 MG/DL — HIGH (ref 70–99)
GLUCOSE BLDC GLUCOMTR-MCNC: 114 MG/DL — HIGH (ref 70–99)
GLUCOSE BLDC GLUCOMTR-MCNC: 123 MG/DL — HIGH (ref 70–99)
GLUCOSE BLDC GLUCOMTR-MCNC: 99 MG/DL — SIGNIFICANT CHANGE UP (ref 70–99)
GLUCOSE SERPL-MCNC: 104 MG/DL — HIGH (ref 70–99)
HCT VFR BLD CALC: 28 % — LOW (ref 39–50)
HGB BLD-MCNC: 9.4 G/DL — LOW (ref 13–17)
MAGNESIUM SERPL-MCNC: 1.9 MG/DL — SIGNIFICANT CHANGE UP (ref 1.6–2.6)
MCHC RBC-ENTMCNC: 30.3 PG — SIGNIFICANT CHANGE UP (ref 27–34)
MCHC RBC-ENTMCNC: 33.6 GM/DL — SIGNIFICANT CHANGE UP (ref 32–36)
MCV RBC AUTO: 90.3 FL — SIGNIFICANT CHANGE UP (ref 80–100)
PLATELET # BLD AUTO: 189 K/UL — SIGNIFICANT CHANGE UP (ref 150–400)
POTASSIUM SERPL-MCNC: 4.6 MMOL/L — SIGNIFICANT CHANGE UP (ref 3.5–5.3)
POTASSIUM SERPL-SCNC: 4.6 MMOL/L — SIGNIFICANT CHANGE UP (ref 3.5–5.3)
RBC # BLD: 3.1 M/UL — LOW (ref 4.2–5.8)
RBC # FLD: 14.1 % — SIGNIFICANT CHANGE UP (ref 10.3–14.5)
SODIUM SERPL-SCNC: 137 MMOL/L — SIGNIFICANT CHANGE UP (ref 135–145)
WBC # BLD: 20.14 K/UL — HIGH (ref 3.8–10.5)
WBC # FLD AUTO: 20.14 K/UL — HIGH (ref 3.8–10.5)

## 2022-12-16 PROCEDURE — 99232 SBSQ HOSP IP/OBS MODERATE 35: CPT

## 2022-12-16 PROCEDURE — 99024 POSTOP FOLLOW-UP VISIT: CPT

## 2022-12-16 PROCEDURE — 71045 X-RAY EXAM CHEST 1 VIEW: CPT | Mod: 26

## 2022-12-16 PROCEDURE — 99291 CRITICAL CARE FIRST HOUR: CPT

## 2022-12-16 PROCEDURE — 99233 SBSQ HOSP IP/OBS HIGH 50: CPT

## 2022-12-16 RX ORDER — TAMSULOSIN HYDROCHLORIDE 0.4 MG/1
0.4 CAPSULE ORAL AT BEDTIME
Refills: 0 | Status: DISCONTINUED | OUTPATIENT
Start: 2022-12-16 | End: 2022-12-16

## 2022-12-16 RX ORDER — LACTULOSE 10 G/15ML
20 SOLUTION ORAL ONCE
Refills: 0 | Status: COMPLETED | OUTPATIENT
Start: 2022-12-16 | End: 2022-12-16

## 2022-12-16 RX ORDER — MAGNESIUM SULFATE 500 MG/ML
2 VIAL (ML) INJECTION ONCE
Refills: 0 | Status: COMPLETED | OUTPATIENT
Start: 2022-12-16 | End: 2022-12-16

## 2022-12-16 RX ORDER — HEPARIN SODIUM 5000 [USP'U]/ML
5000 INJECTION INTRAVENOUS; SUBCUTANEOUS EVERY 8 HOURS
Refills: 0 | Status: DISCONTINUED | OUTPATIENT
Start: 2022-12-16 | End: 2022-12-20

## 2022-12-16 RX ORDER — FUROSEMIDE 40 MG
40 TABLET ORAL DAILY
Refills: 0 | Status: DISCONTINUED | OUTPATIENT
Start: 2022-12-16 | End: 2022-12-19

## 2022-12-16 RX ADMIN — CHLORHEXIDINE GLUCONATE 1 APPLICATION(S): 213 SOLUTION TOPICAL at 22:00

## 2022-12-16 RX ADMIN — POLYETHYLENE GLYCOL 3350 17 GRAM(S): 17 POWDER, FOR SOLUTION ORAL at 12:03

## 2022-12-16 RX ADMIN — OXYCODONE HYDROCHLORIDE 10 MILLIGRAM(S): 5 TABLET ORAL at 01:52

## 2022-12-16 RX ADMIN — MEROPENEM 1000 MILLIGRAM(S): 1 INJECTION INTRAVENOUS at 21:49

## 2022-12-16 RX ADMIN — HEPARIN SODIUM 5000 UNIT(S): 5000 INJECTION INTRAVENOUS; SUBCUTANEOUS at 15:19

## 2022-12-16 RX ADMIN — Medication 40 MILLIGRAM(S): at 08:04

## 2022-12-16 RX ADMIN — HEPARIN SODIUM 5000 UNIT(S): 5000 INJECTION INTRAVENOUS; SUBCUTANEOUS at 21:49

## 2022-12-16 RX ADMIN — PANTOPRAZOLE SODIUM 40 MILLIGRAM(S): 20 TABLET, DELAYED RELEASE ORAL at 07:25

## 2022-12-16 RX ADMIN — Medication 81 MILLIGRAM(S): at 12:03

## 2022-12-16 RX ADMIN — MEROPENEM 1000 MILLIGRAM(S): 1 INJECTION INTRAVENOUS at 05:47

## 2022-12-16 RX ADMIN — HEPARIN SODIUM 5000 UNIT(S): 5000 INJECTION INTRAVENOUS; SUBCUTANEOUS at 05:47

## 2022-12-16 RX ADMIN — MEROPENEM 1000 MILLIGRAM(S): 1 INJECTION INTRAVENOUS at 15:34

## 2022-12-16 RX ADMIN — Medication 25 GRAM(S): at 04:47

## 2022-12-16 RX ADMIN — OXYCODONE HYDROCHLORIDE 10 MILLIGRAM(S): 5 TABLET ORAL at 00:52

## 2022-12-16 RX ADMIN — LACTULOSE 20 GRAM(S): 10 SOLUTION ORAL at 08:05

## 2022-12-16 RX ADMIN — SENNA PLUS 2 TABLET(S): 8.6 TABLET ORAL at 21:49

## 2022-12-16 NOTE — PROGRESS NOTE ADULT - SUBJECTIVE AND OBJECTIVE BOX
Patient seen today in bed sleeping. Awoken. Agitated upon waking up. Wants to have right IJ triple lumen removed.    TELE: SR with long first degree AV block with LBBB. No significant overnight pacing burden.     MEDICATIONS  (STANDING):  aspirin enteric coated 81 milliGRAM(s) Oral daily  chlorhexidine 2% Cloths 1 Application(s) Topical daily  dextrose 50% Injectable 25 Gram(s) IV Push once  dextrose 50% Injectable 12.5 Gram(s) IV Push once  dextrose 50% Injectable 25 Gram(s) IV Push once  DOPamine Infusion 3 MICROgram(s)/kG/Min (8.64 mL/Hr) IV Continuous <Continuous>  furosemide   Injectable 40 milliGRAM(s) IV Push daily  glucagon  Injectable 1 milliGRAM(s) IntraMuscular once  glucagon  Injectable 1 milliGRAM(s) IntraMuscular once  heparin   Injectable 5000 Unit(s) SubCutaneous every 8 hours  insulin lispro (ADMELOG) corrective regimen sliding scale   SubCutaneous Before meals and at bedtime  meropenem Injectable 1000 milliGRAM(s) IV Push every 8 hours  pantoprazole    Tablet 40 milliGRAM(s) Oral daily  polyethylene glycol 3350 17 Gram(s) Oral daily  senna 2 Tablet(s) Oral at bedtime  sodium chloride 0.9%. 1000 milliLiter(s) (10 mL/Hr) IV Continuous <Continuous>    MEDICATIONS  (PRN):  dextrose Oral Gel 15 Gram(s) Oral once PRN Blood Glucose LESS THAN 70 milliGRAM(s)/deciliter  ondansetron Injectable 4 milliGRAM(s) IV Push once PRN Nausea and/or Vomiting  oxyCODONE    IR 10 milliGRAM(s) Oral every 6 hours PRN Severe Pain (7 - 10)  oxyCODONE    IR 5 milliGRAM(s) Oral every 6 hours PRN Moderate Pain (4 - 6)    Allergies  No Known Allergies    PAST MEDICAL & SURGICAL HISTORY:  Hypertension  Aortic stenosis  H/O inguinal hernia repair  B/L 2013  S/P laparoscopic colectomy  right colectomy with ileotransverse anastomosis    Vital Signs Last 24 Hrs  T(C): 36.6 (16 Dec 2022 10:00), Max: 36.9 (15 Dec 2022 12:00)  T(F): 97.8 (16 Dec 2022 10:00), Max: 98.5 (15 Dec 2022 12:00)  HR: 60 (16 Dec 2022 09:00) (51 - 62)  RR: 22 (16 Dec 2022 09:00) (18 - 36)  SpO2: 99% (16 Dec 2022 09:00) (87% - 100%)    Physical Exam:  Constitutional: NAD, AAOx3, sitting comfortably in bed. Right IJ triple lumen  Cardiovascular: +S1S2 RRR, midsternal dressing CDI  Pulmonary: Coarse breath sounds; unlabored.   GI: soft NTND +BS  Neuro: non focal, MEZA x4  Barrios to BSG  Psych: easily aggitated.     LABS:                        9.4    20.14 )-----------( 189      ( 16 Dec 2022 02:30 )             28.0     137  |  101  |  29.3<H>  ----------------------------<  104<H>  4.6   |  28.0  |  0.80  Ca    8.5      16 Dec 2022 02:30  Mg     1.9     12-16    A/P  77 year old male with a history of vit b12 def, HTN, HLD, BPH, moderate aortic stenosis with ELDON 1.1 cm, COVID infection 10/6/22, recent admission 11/2/22 for streptococcus anginosus bacteremia admitted with unclear source (no recent dental work, skin infections, no respiratory symptoms. Pan scan was negative at that time LILIAN was recommended but pt refused). He was treated for Bacteremia (Blood cultures + streptococcus anginosis pansensitive) and norovirus and was discharged 11/7/22 with PICC line/IV Rocephin (recommended through 11/30/22). 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   Admitted to MICU with septic shock and new onset A fib. 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 The patient is now POD #3 s/p AVR/MVR aortic root patch repair (12/13/22/Dr Flanagan). Initially post op pt was ventricular paced at 60bpm with no underlying escape rhythm.     Continues to conduct with LBBB with prolonged NC interval  Still on Dopamine gtt    - Avoid AV moreno blocking agents  - Attempt to wean off Dopamine gtt  - Hopeful to avoid pacemaker. If he does require any device a leadless pacemaker is preferred due to infectious risk  - May require anticoagulation at some point with newly diagnosed AFib with CHADSVASC:3. No intra-op MAZE or ESTEFANY clip performed. Will defer decision for anticoagulation to CTS.

## 2022-12-16 NOTE — PROGRESS NOTE ADULT - SUBJECTIVE AND OBJECTIVE BOX
Subjective - patient seen and evaluated bedside. Sitting comfortably in bed. Denies CP, SOB, HA, dizziness, n/v/d    Review of Systems: negative x 10 systems except as noted above    Brief summary:  77yMale POD# 3 AVR/MVR, aortic root patch repair    Significant/[id number] events: SS CT removed. Regained underlying rhythm - sinus gurpreet 50s.       PAST MEDICAL & SURGICAL HISTORY:  Hypertension      Aortic stenosis      H/O inguinal hernia repair  B/L       S/P laparoscopic colectomy  right colectomy with ileotransverse anastomosis            aspirin enteric coated 81 milliGRAM(s) Oral daily  chlorhexidine 2% Cloths 1 Application(s) Topical daily  dextrose 50% Injectable 25 Gram(s) IV Push once  dextrose 50% Injectable 12.5 Gram(s) IV Push once  dextrose 50% Injectable 25 Gram(s) IV Push once  dextrose Oral Gel 15 Gram(s) Oral once PRN  DOPamine Infusion 3 MICROgram(s)/kG/Min IV Continuous <Continuous>  glucagon  Injectable 1 milliGRAM(s) IntraMuscular once  glucagon  Injectable 1 milliGRAM(s) IntraMuscular once  heparin   Injectable 5000 Unit(s) SubCutaneous every 12 hours  insulin lispro (ADMELOG) corrective regimen sliding scale   SubCutaneous Before meals and at bedtime  meropenem Injectable 1000 milliGRAM(s) IV Push every 8 hours  ondansetron Injectable 4 milliGRAM(s) IV Push once PRN  oxyCODONE    IR 10 milliGRAM(s) Oral every 6 hours PRN  oxyCODONE    IR 5 milliGRAM(s) Oral every 6 hours PRN  pantoprazole    Tablet 40 milliGRAM(s) Oral daily  polyethylene glycol 3350 17 Gram(s) Oral daily  senna 2 Tablet(s) Oral at bedtime  sodium chloride 0.9%. 1000 milliLiter(s) IV Continuous <Continuous>  sodium chloride 0.9%. 1000 milliLiter(s) IV Continuous <Continuous>  MEDICATIONS  (PRN):  dextrose Oral Gel 15 Gram(s) Oral once PRN Blood Glucose LESS THAN 70 milliGRAM(s)/deciliter  ondansetron Injectable 4 milliGRAM(s) IV Push once PRN Nausea and/or Vomiting  oxyCODONE    IR 10 milliGRAM(s) Oral every 6 hours PRN Severe Pain (7 - 10)  oxyCODONE    IR 5 milliGRAM(s) Oral every 6 hours PRN Moderate Pain (4 - 6)    Height (cm): 167.6 (12-15 @ 04:06)  Weight (kg): 76.8 (12-15 @ 04:06)  BMI (kg/m2): 27.3 (12-15 @ 04:06)  BSA (m2): 1.86 (12-15 @ 04:06)  Daily Height in cm: 167.6 (15 Dec 2022 04:06)    Daily Weight in k.6 (15 Dec 2022 04:47)                              9.9    22.25 )-----------( 228      ( 15 Dec 2022 15:37 )             29.0   12-15    136  |  99  |  30.5<H>  ----------------------------<  107<H>  4.9   |  26.0  |  1.20    Ca    8.1<L>      15 Dec 2022 02:35  Mg     2.3     12-15              Objective:  T(C): 36.8 (12-15-22 @ 20:00), Max: 36.9 (12-15-22 @ 08:00)  HR: 53 (22 @ 02:00) (49 - 61)  BP: 120/57 (12-15-22 @ 11:05) (109/59 - 120/57)  RR: 21 (22 @ 02:00) (18 - 38)  SpO2: 99% (22 @ 02:00) (87% - 100%)  Wt(kg): --CAPILLARY BLOOD GLUCOSE      POCT Blood Glucose.: 101 mg/dL (15 Dec 2022 21:48)  POCT Blood Glucose.: 103 mg/dL (15 Dec 2022 16:54)  POCT Blood Glucose.: 113 mg/dL (15 Dec 2022 11:55)  POCT Blood Glucose.: 89 mg/dL (15 Dec 2022 06:50)  I&O's Summary    14 Dec 2022 07:01  -  15 Dec 2022 07:00  --------------------------------------------------------  IN: 1006.2 mL / OUT: 2260 mL / NET: -1253.8 mL    15 Dec 2022 07:01  -  16 Dec 2022 02:26  --------------------------------------------------------  IN: 854 mL / OUT: 1905 mL / NET: -1051 mL        Physical Exam  General: NAD  Neuro: A+O x 3, non-focal, speech clear and intact  Psych: Appropriate affect  HEENT:  NCAT, No conjuctival edema or icterus, no thrush.  Pulm: CTA, equal bilaterally  CV: bradycardic, regular rate,+S1S2  Abd: soft, NT, ND, +BS  Ext: +DP Pulses b/l, no edema  Skin: Warm, dry, intact  Inc: MSI C/D/I/stable w/ dressing,   Chest tubes: mediastinal and pleural CT to ws, draining appropriatel,y no AL         Imaging:  < from: Xray Chest 1 View AP/PA. (22 @ 16:04) >  IMPRESSION:  Status post cardiac surgery.  No evidence of active chest pathology.  Catheters and/or tubes in place    < end of copied text >

## 2022-12-16 NOTE — PROGRESS NOTE ADULT - PROBLEM SELECTOR PLAN 1
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  Blood cultures 11/27 remain NGTD.  Continue Merrem per ID & luis-op vanco    stable off levophed  Dopamine started for chronotropy  Regained underlying rhythm now sinus bradycardia with rate 50s. Hemodynamically tolerating. PW VVI backup at 40. Beta blocker contraindicated at this time.  EP consult apprecaited. Possible PPM this admission, pending date.    Continue Aspirin   Encourage PO intake  Encourage OOB to chair and ambulation with PT  SCDs, HSQ for DVT PPX  Encourage deep breathing exercised and coughing  Chest PT and IS use with bedside nurse

## 2022-12-16 NOTE — PROGRESS NOTE ADULT - SUBJECTIVE AND OBJECTIVE BOX
GEORGE HOLDSWORTH  MRN#: [id number]  Subjective: The patient was in the CTICU in critical condition at risk for imminent decompensation and was seen and evaluate on AM rounds with the entire multidisciplinary team.  Patient wiht no specific complaints     OBJECTIVE:  ICU Vital Signs Last 24 Hrs  T(C): 36.9 (15 Dec 2022 12:00), Max: 36.9 (15 Dec 2022 08:00)  T(F): 98.5 (15 Dec 2022 12:00), Max: 98.5 (15 Dec 2022 12:00)  HR: 61 (15 Dec 2022 12:00) (49 - 61)  BP: 120/57 (15 Dec 2022 11:05) (106/55 - 128/60)  BP(mean): 82 (15 Dec 2022 11:05) (76 - 93)  ABP: 121/54 (15 Dec 2022 12:00) (105/47 - 142/58)  ABP(mean): 76 (15 Dec 2022 12:00) (61 - 82)  RR: 36 (15 Dec 2022 12:00) (12 - 38)  SpO2: 98% (15 Dec 2022 12:00) (91% - 99%)    O2 Parameters below as of 15 Dec 2022 02:00  Patient On (Oxygen Delivery Method): nasal cannula  O2 Flow (L/min): 4    14 @ 07:  -  15 @ 07:00  --------------------------------------------------------  IN: 1006.2 mL / OUT: 2260 mL / NET: -1253.8 mL    15 @ 07:01  -  15 @ 14:36  --------------------------------------------------------  IN: 188 mL / OUT: 500 mL / NET: -312 mL    PHYSICAL EXAM:Daily Height in cm: 167.6 (15 Dec 2022 04:06)    Daily Weight in k.6 (15 Dec 2022 04:47)  General: WN/WD NAD    HEENT:     + NCAT  + EOMI  - Conjuctival edema   - Icterus   - Thrush   - ETT  - NGT/OGT  Neck:         + FROM    + JVD     - Nodes     - Masses    + Mid-line trachea   - Tracheostomy  Chest:         - Sternal click  - Sternal drainage  + Pacing wires  + Chest tubes  - SubQ emphysema  Lungs:          + CTA   - Rhonchi    - Rales    - Wheezing     - Decreased BS   - Dullness R L  Cardiac:       + S1 + S2    + RRR   - Irregular   - S3  - S4    - Murmurs   - Rub   - Hamman’s sign   Abdomen:    + BS     + Soft    + Non-tender     - Distended    - Organomegaly  - PEG  Extremities:   - Cyanosis U/L   - Clubbing  U/L  - LE/UE Edema   + Capillary refill    + Pulses   Neuro:        + Awake   +  Alert   - Confused   - Lethargic   - Sedated   - Generalized Weakness  Skin:        - Rashes    - Erythema   + Normal incisions   + IV sites intact  - Sacral decubitus    HOSPITAL MEDICATIONS: All mediciations reviewed and analyzed  MEDICATIONS  (STANDING):  aspirin enteric coated 81 milliGRAM(s) Oral daily  DOPamine Infusion 3 MICROgram(s)/kG/Min (8.64 mL/Hr) IV Continuous <Continuous>  heparin   Injectable 5000 Unit(s) SubCutaneous every 12 hours  insulin lispro (ADMELOG) corrective regimen sliding scale   SubCutaneous Before meals and at bedtime  meropenem Injectable 1000 milliGRAM(s) IV Push every 8 hours  pantoprazole    Tablet 40 milliGRAM(s) Oral daily  polyethylene glycol 3350 17 Gram(s) Oral daily  senna 2 Tablet(s) Oral at bedtime      MEDICATIONS  (PRN):  dextrose Oral Gel 15 Gram(s) Oral once PRN Blood Glucose LESS THAN 70 milliGRAM(s)/deciliter  ondansetron Injectable 4 milliGRAM(s) IV Push once PRN Nausea and/or Vomiting  oxyCODONE    IR 10 milliGRAM(s) Oral every 6 hours PRN Severe Pain (7 - 10)  oxyCODONE    IR 5 milliGRAM(s) Oral every 6 hours PRN Moderate Pain (4 - 6)    LABS: All Lab data reviewed and analyzed                        8.4    18.57 )-----------( 217      ( 15 Dec 2022 02:35 )             25.3    12-15    136  |  99  |  30.5<H>  ----------------------------<  107<H>  4.9   |  26.0  |  1.20    Ca    8.1<L>      15 Dec 2022 02:35  Mg     2.3     12-15    TPro  5.5<L>  /  Alb  3.0<L>  /  TBili  1.0  /  DBili  x   /  AST  67<H>  /  ALT  26  /  AlkPhos  59  12-14    PT/INR - ( 14 Dec 2022 02:12 )   PT: 12.7 sec;   INR: 1.09 ratio         PTT - ( 14 Dec 2022 02:12 )  PTT:29.8 sec LIVER FUNCTIONS - ( 14 Dec 2022 02:12 )  Alb: 3.0 g/dL / Pro: 5.5 g/dL / ALK PHOS: 59 U/L / ALT: 26 U/L / AST: 67 U/L / GGT: x           RADIOLOGY: - Reviewed and analyzed     Assessment: Respiratory insufficieny, bradycardia, hypovolemia, S/P AVR/MVR     Plan:   Respiratory status required supplemental oxygen the following of continuous pulse oximetry for support & to prevent decompensation  Continued early mobilization as tolerated  Addressed analgesic regimen to optimize function  Needs de-resuscitation with Lasix   IV Dopamine drip and VVI back-up for hemodynamically significant bradycardia  ASA continued for thromboembolism prophylaxis  SQ Heparin continued for VTE prophylaxis in addition to Venodyne boots  Protonix maintained for GI bleeding prophylaxis  No Lopressor for atrial fibrillation prophylaxis secondary to pacing with IV Dopamine drip for chrontropic support  Metabolic stability & infection prophylaxis required review and adjustment of regular Insulin sliding scale and gylcemic regimen while following serial glucose levels to help achieve & maintain euglycemia  Reviewed & addressed surgical site infection prophylaxis regimen    Patient required critical care management and is at risk for life threatening decompensation  I provided 30 minutes of non-continuous care to the patient.     GEORGE HOLDSWORTH  MRN#: [id number]  Subjective: The patient was in the CTICU in critical condition at risk for imminent decompensation and was seen and evaluate on AM rounds with the entire multidisciplinary team.  Patient wiht no specific complaints     OBJECTIVE:  ICU Vital Signs Last 24 Hrs  T(C): 36.9 (15 Dec 2022 12:00), Max: 36.9 (15 Dec 2022 08:00)  T(F): 98.5 (15 Dec 2022 12:00), Max: 98.5 (15 Dec 2022 12:00)  HR: 61 (15 Dec 2022 12:00) (49 - 61)  BP: 120/57 (15 Dec 2022 11:05) (106/55 - 128/60)  BP(mean): 82 (15 Dec 2022 11:05) (76 - 93)  ABP: 121/54 (15 Dec 2022 12:00) (105/47 - 142/58)  ABP(mean): 76 (15 Dec 2022 12:00) (61 - 82)  RR: 36 (15 Dec 2022 12:00) (12 - 38)  SpO2: 98% (15 Dec 2022 12:00) (91% - 99%)    O2 Parameters below as of 15 Dec 2022 02:00  Patient On (Oxygen Delivery Method): nasal cannula  O2 Flow (L/min): 4    14 @ 07:  -  15 @ 07:00  --------------------------------------------------------  IN: 1006.2 mL / OUT: 2260 mL / NET: -1253.8 mL    15 @ 07:01  -  15 @ 14:36  --------------------------------------------------------  IN: 188 mL / OUT: 500 mL / NET: -312 mL    PHYSICAL EXAM:Daily Height in cm: 167.6 (15 Dec 2022 04:06)    Daily Weight in k.6 (15 Dec 2022 04:47)  General: WN/WD NAD    HEENT:     + NCAT  + EOMI  - Conjuctival edema   - Icterus   - Thrush   - ETT  - NGT/OGT  Neck:         + FROM    + JVD     - Nodes     - Masses    + Mid-line trachea   - Tracheostomy  Chest:         - Sternal click  - Sternal drainage  + Pacing wires  + Chest tubes  - SubQ emphysema  Lungs:          + CTA   - Rhonchi    - Rales    - Wheezing     - Decreased BS   - Dullness R L  Cardiac:       + S1 + S2    + RRR   - Irregular   - S3  - S4    - Murmurs   - Rub   - Hamman’s sign   Abdomen:    + BS     + Soft    + Non-tender     - Distended    - Organomegaly  - PEG  Extremities:   - Cyanosis U/L   - Clubbing  U/L  - LE/UE Edema   + Capillary refill    + Pulses   Neuro:        + Awake   +  Alert   - Confused   - Lethargic   - Sedated   - Generalized Weakness  Skin:        - Rashes    - Erythema   + Normal incisions   + IV sites intact  - Sacral decubitus    HOSPITAL MEDICATIONS: All mediciations reviewed and analyzed  MEDICATIONS  (STANDING):  aspirin enteric coated 81 milliGRAM(s) Oral daily  DOPamine Infusion 3 MICROgram(s)/kG/Min (8.64 mL/Hr) IV Continuous <Continuous>  heparin   Injectable 5000 Unit(s) SubCutaneous every 12 hours  insulin lispro (ADMELOG) corrective regimen sliding scale   SubCutaneous Before meals and at bedtime  meropenem Injectable 1000 milliGRAM(s) IV Push every 8 hours  pantoprazole    Tablet 40 milliGRAM(s) Oral daily  polyethylene glycol 3350 17 Gram(s) Oral daily  senna 2 Tablet(s) Oral at bedtime      MEDICATIONS  (PRN):  dextrose Oral Gel 15 Gram(s) Oral once PRN Blood Glucose LESS THAN 70 milliGRAM(s)/deciliter  ondansetron Injectable 4 milliGRAM(s) IV Push once PRN Nausea and/or Vomiting  oxyCODONE    IR 10 milliGRAM(s) Oral every 6 hours PRN Severe Pain (7 - 10)  oxyCODONE    IR 5 milliGRAM(s) Oral every 6 hours PRN Moderate Pain (4 - 6)    LABS: All Lab data reviewed and analyzed                        8.4    18.57 )-----------( 217      ( 15 Dec 2022 02:35 )             25.3    12-15    136  |  99  |  30.5<H>  ----------------------------<  107<H>  4.9   |  26.0  |  1.20    Ca    8.1<L>      15 Dec 2022 02:35  Mg     2.3     12-15    TPro  5.5<L>  /  Alb  3.0<L>  /  TBili  1.0  /  DBili  x   /  AST  67<H>  /  ALT  26  /  AlkPhos  59  12-14    PT/INR - ( 14 Dec 2022 02:12 )   PT: 12.7 sec;   INR: 1.09 ratio         PTT - ( 14 Dec 2022 02:12 )  PTT:29.8 sec LIVER FUNCTIONS - ( 14 Dec 2022 02:12 )  Alb: 3.0 g/dL / Pro: 5.5 g/dL / ALK PHOS: 59 U/L / ALT: 26 U/L / AST: 67 U/L / GGT: x           RADIOLOGY: - Reviewed and analyzed     Assessment: Respiratory insufficieny, bradycardia, hypovolemia, S/P AVR/MVR for endocarditis     Plan:   Respiratory status required supplemental oxygen the following of continuous pulse oximetry for support & to prevent decompensation  Continued early mobilization as tolerated  Addressed analgesic regimen to optimize function  Needs PICC for continued Meropenum for endocarditis S/P tissue AVR/MVR  Needs de-resuscitation with Lasix   IV Dopamine drip and VVI back-up for hemodynamically significant bradycardia  ASA continued for thromboembolism prophylaxis  SQ Heparin continued for VTE prophylaxis in addition to Venodyne boots  Protonix maintained for GI bleeding prophylaxis  No Lopressor for atrial fibrillation prophylaxis secondary to pacing with IV Dopamine drip for chrontropic support  Metabolic stability & infection prophylaxis required review and adjustment of regular Insulin sliding scale and gylcemic regimen while following serial glucose levels to help achieve & maintain euglycemia  Reviewed & addressed surgical site infection prophylaxis regimen    Patient required critical care management and is at risk for life threatening decompensation  I provided 30 minutes of non-continuous care to the patient.

## 2022-12-16 NOTE — PROGRESS NOTE ADULT - PROBLEM SELECTOR PLAN 4
RADHA noted post cath with urinary retention, now resolving after Barrios placed 12/8.   Additionally, postop radha, improved  Renal following for optimization.  Avoid nephrotoxic drugs.  UCx negative 12/8.

## 2022-12-16 NOTE — PROGRESS NOTE ADULT - SUBJECTIVE AND OBJECTIVE BOX
INFECTIOUS DISEASES AND INTERNAL MEDICINE at [place]  =======================================================  Flip Pederson MD  Diplomates [employer] Board of Internal Medicine and Infectious Diseases  Telephone [phone]  Fax            [phone]  =======================================================    GEORGE [id number]      ID f/u- fever, endocarditis    s/p AVR, MVR and patch repair of aortic root on         ANTIBIOTICS  meropenem  IVPB 1000 milliGRAM(s) IV Intermittent every 8 hours      Allergies    No Known Allergies    Intolerances      Vital Signs Last 24 Hrs  Vital Signs Last 24 Hrs  T(C): 36.7 (16 Dec 2022 16:01), Max: 36.8 (15 Dec 2022 20:00)  T(F): 98.1 (16 Dec 2022 16:01), Max: 98.2 (15 Dec 2022 20:00)  HR: 65 (16 Dec 2022 18:00) (51 - 65)  BP: 109/59 (16 Dec 2022 18:00) (109/59 - 129/64)  BP(mean): 77 (16 Dec 2022 18:00) (77 - 90)  RR: 28 (16 Dec 2022 18:00) (18 - 34)  SpO2: 98% (16 Dec 2022 18:00) (89% - 100%)        Parameters below as of 12 Dec 2022 17:12  Patient On (Oxygen Delivery Method): room air        REVIEW OF SYSTEMS:    CONSTITUTIONAL:  As per HPI.    HEENT:  Eyes:  No diplopia or blurred vision. ENT:  No earache, sore throat or runny nose.    CARDIOVASCULAR:  No pressure, squeezing, strangling, tightness, heaviness or aching about the chest, neck, axilla or epigastrium.    RESPIRATORY:  No cough, shortness of breath, PND or orthopnea.    GASTROINTESTINAL:  No nausea, vomiting or diarrhea.    GENITOURINARY:  No dysuria, frequency or urgency.    MUSCULOSKELETAL:  As per HPI.    SKIN:  No change in skin, hair or nails.    NEUROLOGIC: as per hpi        PHYSICAL EXAMINATION:    GENERAL: nad    HEENT: Head is normocephalic and atraumatic.  ANICTERIC  NECK: Supple. No carotid bruits.  No lymphadenopathy or thyromegaly.    LUNGS :clear BREATH SOUNDS    HEART: Regular rate and rhythm without murmur. dressing c/di, chest tubes in place    ABDOMEN: Soft, nontender, and nondistended.  Positive bowel sounds.  No hepatosplenomegaly was noted. NO REBOUND NO GUARDING + ram dark urine     EXTREMITIES: NO EDEMA NO ERYTHEMA    NEUROLOGIC: NON FOCAL      SKIN: No ulceration or induration present. NO RASH             LABS:                                                      9.4    20.14 )-----------( 189      ( 16 Dec 2022 02:30 )             28.0       12-    137  |  101  |  29.3<H>  ----------------------------<  104<H>  4.6   |  28.0  |  0.80    Ca    8.5      16 Dec 2022 02:30  Mg     1.9     -                              CAPILLARY BLOOD GLUCOSE      POCT Blood Glucose.: 123 mg/dL (16 Dec 2022 16:32)              ABG - ( 14 Dec 2022 01:28 )  pH, Arterial: 7.410 pH, Blood: x     /  pCO2: 38    /  pO2: 125   / HCO3: 24    / Base Excess: -0.5  /  SaO2: 100.0                   PT/INR - ( 14 Dec 2022 02:12 )   PT: 12.7 sec;   INR: 1.09 ratio         PTT - ( 14 Dec 2022 02:12 )  PTT:29.8 sec    CARDIAC MARKERS ( 14 Dec 2022 02:12 )  x     / 1.21 ng/mL / 388 U/L / x     / 36.5 ng/mL  CARDIAC MARKERS ( 13 Dec 2022 16:00 )  x     / 1.07 ng/mL / 391 U/L / x     / 59.9 ng/mL        CAPILLARY BLOOD GLUCOSE      POCT Blood Glucose.: 143 mg/dL (14 Dec 2022 16:49)                          CAPILLARY BLOOD GLUCOSE                    Color: Yellow / Appearance: Clear / S.010 / pH: x  Gluc: x / Ketone: Negative  / Bili: Negative / Urobili: Negative mg/dL   Blood: x / Protein: 30 mg/dL / Nitrite: Negative   Leuk Esterase: Negative / RBC: 6-10 /HPF / WBC 0-2 /HPF   Sq Epi: x / Non Sq Epi: Occasional / Bacteria: Few        RADIOLOGY & ADDITIONAL STUDIES:  < from: CT Chest No Cont (22 @ 03:46) >  IMPRESSION:  No pneumonia.  No acute CT findings in the abdomen or pelvis.    VERTEBRAL BODY ANALYSIS: Low bone density as described above, consider   further workup for osteoporosis.        --- End of Report ---        < end of copied text >        < from: LILIAN Echo Doppler (22 @ 14:52) >  Summary:   1. Left ventricular ejection fraction, by visual estimation, is 55 to   60%.   2. Normal global left ventricular systolic function.   3. The mitral in-flow pattern reveals no discernable A-wave, therefore   no comment on diastolic function can be made.   4. Normal right ventricular size and function, inadequate estimation of   RVSP.   5. Mildly enlarged left atrium.   6. Moderate mitral valve regurgitation, jet is eccentric posteriorly   directed.   7. Thickening of bileaflet mitral valve with subcentimeter filamentous  mobile lesions suggestive of vegetations.   8. Color flow doppler and intravenous injection of agitated saline   demonstrates the presence of an intact intra atrial septum.   9. No left atrial appendage thrombus and normal left atrial appendage   velocities.  10. Structurally normal tricuspid valve, with normal leaflet excursion.  11. Heavily calcified noncoronary cusp with moderate aortic valve   stenosis, ELDON (by 2D planimetry 1.34 cm2), peak velocity 2.8 m/s and mean   gradient of 15 mmHg.  12. Mild to moderate aortic regurgitation.  13. There is mild echolucency and thickening of the sinus of Valsalva   inbetween the left and non-coronary cusp, cannot exclude early root   abscess.  14. Mild simple atheromas at the aortic arch/descendingaorta.  15. There is no evidence of pericardial effusion.  16. No prior LILIAN study is available for comparison. 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 Cardioversion   deferred due to the presence of underlying infection/endocarditis.    John Frost DO Electronically signed on 2022 at 3:39:01 PM    < end of copied text >      < from: MR Head No Cont (22 @ 21:35) >  IMPRESSION:  Scattered foci of restricted diffusion in the bilateral parieto-occipital   lobes, suggestive of acute ischemia. No intracranial hemorrhage or mass   effect.    Given the bilateral distribution of involvement, embolic phenomenon is   suggested, with underlying septic emboli not excluded. Further evaluation   by contrast-enhanced MRI may provide further characterization.      --- End of Report ---    < end of copied text >    < from: NM Inflammatory Loc Wholebody WBC, Single Day (22 @ 11:02) >  IMPRESSION: Normal Indium-111 labeled leukocyte scan. No scan evidence of   infection.    --- End of Report ---        < end of copied text >        < from: MR Head No Cont (22 @ 21:35) >  IMPRESSION:  Scattered foci of restricted diffusion in the bilateral parieto-occipital   lobes, suggestive of acute ischemia. No intracranial hemorrhage or mass   effect.    Given the bilateral distribution of involvement, embolic phenomenon is   suggested, with underlying septic emboli not excluded. Further evaluation   by contrast-enhanced MRI may provide further characterization.    < end of copied text >          ASSESSMENT/PLAN    77 year old male with PMHx of recent  COVID  on 10/6/22, HTN , Vit B12 deficiency, presenting to the ED on  with body aches, fatigue and fever (Tmax 102) at home for 12 days found to have streptococcus bacteremia and admitted with unclear source, no recent dental work, skin infections, no respiratory symptoms. Pan scan was negative at that time ( LILIAN was recommended but pt refused) He was treated for Bacteremia ( Blood cultures + streptococcus anginosis pansensitive ) and norovirus with supportive care and contact isolation . He was discharged home on  with IV Rocephin via PICC line. Recommendation was to continue ABX  daily via pic end 22    he  presented to ER after and episode of syncope and collapse, found to be septic, hypotensive, hypoxic and febrile in the ER. He is unsure of mechanism of fall but remembers being on the ground no reported LOC . He was found face-down on bathroom floor buy family with left leg wedged under cabinet. On my interview he is alert and oriented to person place and time, he has a baseline studder in his speech pattern but otherwise neurologically intact without deficit  In response to hypotension he failed to respond to Inital sepsis fluid protocol in ER is required IV pressor in form of levophed to maintain MAP greater than 60 -65. In the ER he was febrile with initial labs significant for WBC of 22.4 , ProCal of 21.2 first lactate 4.9 via abg with repeat post fluids of 2.2 venous sample.   PT admitted to [facility] with septic shock unclear source, now off pressors. Found to have new onset A fib and on amio. 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 septic shock on admission was likely due to lack of source control and not antibiotic failure  picc when closer to discharge  If pacemaker is needed now, leadless pacemaker is preferred    d/w cticu, pharmacy  will f/u

## 2022-12-16 NOTE — PROGRESS NOTE ADULT - ASSESSMENT
78 YO M w/ recent hospitalization for Streptococcus bacteremia and Norovirus discharged home on IV Rocephin via PICC line returned after a syncopal episode. Admitted for septic shock; endocarditis. Nw s/p AVR, MVR and patch repair of aortic root on 12/13. Post op leukocytosis, hyperK, RADHA, CHB temp pacer. Nephrology is consulted for RADHA.     1. Acute kidney Injury  -Baseline creatinine is 0.8-0.9mg/dL  -UA bland,   -Renal US (06 Dec) mild fullness of the renal pelves/hydronephrosis, possibly secondary to a distended urinary bladder  -RADHA was hemodynamically mediated in setting of CHB, valvular Sx  -Making decent UOP, now recovered back to baseline    2. Hyperkalemia  -Resolved    Nephrology will sign off, thanks for the consult.

## 2022-12-16 NOTE — PROGRESS NOTE ADULT - SUBJECTIVE AND OBJECTIVE BOX
Reason for visit: RADHA, hyperkalemia    Subjective: No acute overnight event, no CP.  No fever/chills.     ROS: All systems were reviewed in detail pertinent positive and negative mentioned above, rest are negative.    Physical Exam:  Gen: no acute distress  MS: alert, conversing normally  Eyes: EOMI, no icterus  HENT: NCAT, MMM  CV: rhythm reg reg, rate normal  Chest: CTAB, no w/r/r,  Abd: soft, NT, ND  Extremities: No edema    =======================================================  Vital Signs Last 24 Hrs  T(C): 36.5 (16 Dec 2022 20:00), Max: 36.7 (16 Dec 2022 16:01)  T(F): 97.7 (16 Dec 2022 20:00), Max: 98.1 (16 Dec 2022 16:01)  HR: 65 (16 Dec 2022 18:00) (51 - 65)  BP: 109/59 (16 Dec 2022 18:00) (109/59 - 129/64)  BP(mean): 77 (16 Dec 2022 18:00) (77 - 90)  RR: 28 (16 Dec 2022 18:00) (18 - 34)  SpO2: 98% (16 Dec 2022 18:00) (89% - 100%)      I&O's Summary    15 Dec 2022 07:01  -  16 Dec 2022 07:00  --------------------------------------------------------  IN: 1162.8 mL / OUT: 2445 mL / NET: -1282.2 mL    16 Dec 2022 07:01  -  16 Dec 2022 21:17  --------------------------------------------------------  IN: 106 mL / OUT: 1240 mL / NET: -1134 mL      =======================================================  Current Antibiotics:  meropenem Injectable 1000 milliGRAM(s) IV Push every 8 hours    Other medications:  aspirin enteric coated 81 milliGRAM(s) Oral daily  chlorhexidine 2% Cloths 1 Application(s) Topical daily  dextrose 50% Injectable 25 Gram(s) IV Push once  dextrose 50% Injectable 12.5 Gram(s) IV Push once  dextrose 50% Injectable 25 Gram(s) IV Push once  DOPamine Infusion 3 MICROgram(s)/kG/Min IV Continuous <Continuous>  furosemide   Injectable 40 milliGRAM(s) IV Push daily  glucagon  Injectable 1 milliGRAM(s) IntraMuscular once  glucagon  Injectable 1 milliGRAM(s) IntraMuscular once  heparin   Injectable 5000 Unit(s) SubCutaneous every 8 hours  insulin lispro (ADMELOG) corrective regimen sliding scale   SubCutaneous Before meals and at bedtime  pantoprazole    Tablet 40 milliGRAM(s) Oral daily  polyethylene glycol 3350 17 Gram(s) Oral daily  senna 2 Tablet(s) Oral at bedtime  sodium chloride 0.9%. 1000 milliLiter(s) IV Continuous <Continuous>    =======================================================  12-16    137  |  101  |  29.3<H>  ----------------------------<  104<H>  4.6   |  28.0  |  0.80    Ca    8.5      16 Dec 2022 02:30  Mg     1.9     12-16      Creatinine, Serum: 0.80 mg/dL (12-16-22 @ 02:30)  Creatinine, Serum: 1.20 mg/dL (12-15-22 @ 02:35)  Creatinine, Serum: 1.52 mg/dL (12-14-22 @ 18:00)  Creatinine, Serum: 1.53 mg/dL (12-14-22 @ 11:40)  Creatinine, Serum: 1.42 mg/dL (12-14-22 @ 05:45)  Creatinine, Serum: 1.21 mg/dL (12-14-22 @ 02:12)  Creatinine, Serum: 0.69 mg/dL (12-13-22 @ 16:00)  Creatinine, Serum: 0.83 mg/dL (12-13-22 @ 05:32)  Creatinine, Serum: 0.94 mg/dL (12-12-22 @ 05:40)      =======================================================

## 2022-12-17 LAB
ANION GAP SERPL CALC-SCNC: 10 MMOL/L — SIGNIFICANT CHANGE UP (ref 5–17)
BUN SERPL-MCNC: 26.3 MG/DL — HIGH (ref 8–20)
CALCIUM SERPL-MCNC: 8.5 MG/DL — SIGNIFICANT CHANGE UP (ref 8.4–10.5)
CHLORIDE SERPL-SCNC: 96 MMOL/L — SIGNIFICANT CHANGE UP (ref 96–108)
CO2 SERPL-SCNC: 27 MMOL/L — SIGNIFICANT CHANGE UP (ref 22–29)
CREAT SERPL-MCNC: 0.64 MG/DL — SIGNIFICANT CHANGE UP (ref 0.5–1.3)
EGFR: 97 ML/MIN/1.73M2 — SIGNIFICANT CHANGE UP
GLUCOSE BLDC GLUCOMTR-MCNC: 100 MG/DL — HIGH (ref 70–99)
GLUCOSE BLDC GLUCOMTR-MCNC: 142 MG/DL — HIGH (ref 70–99)
GLUCOSE BLDC GLUCOMTR-MCNC: 98 MG/DL — SIGNIFICANT CHANGE UP (ref 70–99)
GLUCOSE BLDC GLUCOMTR-MCNC: 99 MG/DL — SIGNIFICANT CHANGE UP (ref 70–99)
GLUCOSE SERPL-MCNC: 103 MG/DL — HIGH (ref 70–99)
HCT VFR BLD CALC: 26.6 % — LOW (ref 39–50)
HGB BLD-MCNC: 9 G/DL — LOW (ref 13–17)
MAGNESIUM SERPL-MCNC: 1.8 MG/DL — SIGNIFICANT CHANGE UP (ref 1.6–2.6)
MCHC RBC-ENTMCNC: 30.5 PG — SIGNIFICANT CHANGE UP (ref 27–34)
MCHC RBC-ENTMCNC: 33.8 GM/DL — SIGNIFICANT CHANGE UP (ref 32–36)
MCV RBC AUTO: 90.2 FL — SIGNIFICANT CHANGE UP (ref 80–100)
PLATELET # BLD AUTO: 201 K/UL — SIGNIFICANT CHANGE UP (ref 150–400)
POTASSIUM SERPL-MCNC: 4.4 MMOL/L — SIGNIFICANT CHANGE UP (ref 3.5–5.3)
POTASSIUM SERPL-SCNC: 4.4 MMOL/L — SIGNIFICANT CHANGE UP (ref 3.5–5.3)
RBC # BLD: 2.95 M/UL — LOW (ref 4.2–5.8)
RBC # FLD: 13.5 % — SIGNIFICANT CHANGE UP (ref 10.3–14.5)
SODIUM SERPL-SCNC: 133 MMOL/L — LOW (ref 135–145)
WBC # BLD: 16.34 K/UL — HIGH (ref 3.8–10.5)
WBC # FLD AUTO: 16.34 K/UL — HIGH (ref 3.8–10.5)

## 2022-12-17 PROCEDURE — 99233 SBSQ HOSP IP/OBS HIGH 50: CPT

## 2022-12-17 PROCEDURE — 99024 POSTOP FOLLOW-UP VISIT: CPT

## 2022-12-17 PROCEDURE — 71045 X-RAY EXAM CHEST 1 VIEW: CPT | Mod: 26

## 2022-12-17 RX ORDER — MAGNESIUM SULFATE 500 MG/ML
2 VIAL (ML) INJECTION ONCE
Refills: 0 | Status: DISCONTINUED | OUTPATIENT
Start: 2022-12-17 | End: 2022-12-19

## 2022-12-17 RX ORDER — TAMSULOSIN HYDROCHLORIDE 0.4 MG/1
0.4 CAPSULE ORAL AT BEDTIME
Refills: 0 | Status: DISCONTINUED | OUTPATIENT
Start: 2022-12-17 | End: 2023-01-03

## 2022-12-17 RX ORDER — TAMSULOSIN HYDROCHLORIDE 0.4 MG/1
0.8 CAPSULE ORAL ONCE
Refills: 0 | Status: COMPLETED | OUTPATIENT
Start: 2022-12-17 | End: 2022-12-17

## 2022-12-17 RX ADMIN — PANTOPRAZOLE SODIUM 40 MILLIGRAM(S): 20 TABLET, DELAYED RELEASE ORAL at 06:12

## 2022-12-17 RX ADMIN — MEROPENEM 1000 MILLIGRAM(S): 1 INJECTION INTRAVENOUS at 06:12

## 2022-12-17 RX ADMIN — HEPARIN SODIUM 5000 UNIT(S): 5000 INJECTION INTRAVENOUS; SUBCUTANEOUS at 22:03

## 2022-12-17 RX ADMIN — CHLORHEXIDINE GLUCONATE 1 APPLICATION(S): 213 SOLUTION TOPICAL at 13:29

## 2022-12-17 RX ADMIN — SENNA PLUS 2 TABLET(S): 8.6 TABLET ORAL at 22:04

## 2022-12-17 RX ADMIN — DOPAMINE HYDROCHLORIDE 8.64 MICROGRAM(S)/KG/MIN: 40 INJECTION, SOLUTION, CONCENTRATE INTRAVENOUS at 03:41

## 2022-12-17 RX ADMIN — DOPAMINE HYDROCHLORIDE 8.64 MICROGRAM(S)/KG/MIN: 40 INJECTION, SOLUTION, CONCENTRATE INTRAVENOUS at 06:12

## 2022-12-17 RX ADMIN — POLYETHYLENE GLYCOL 3350 17 GRAM(S): 17 POWDER, FOR SOLUTION ORAL at 13:28

## 2022-12-17 RX ADMIN — MEROPENEM 1000 MILLIGRAM(S): 1 INJECTION INTRAVENOUS at 22:03

## 2022-12-17 RX ADMIN — TAMSULOSIN HYDROCHLORIDE 0.4 MILLIGRAM(S): 0.4 CAPSULE ORAL at 22:07

## 2022-12-17 RX ADMIN — TAMSULOSIN HYDROCHLORIDE 0.8 MILLIGRAM(S): 0.4 CAPSULE ORAL at 03:36

## 2022-12-17 RX ADMIN — Medication 81 MILLIGRAM(S): at 13:29

## 2022-12-17 RX ADMIN — HEPARIN SODIUM 5000 UNIT(S): 5000 INJECTION INTRAVENOUS; SUBCUTANEOUS at 06:12

## 2022-12-17 RX ADMIN — Medication 40 MILLIGRAM(S): at 08:55

## 2022-12-17 RX ADMIN — MEROPENEM 1000 MILLIGRAM(S): 1 INJECTION INTRAVENOUS at 13:28

## 2022-12-17 RX ADMIN — HEPARIN SODIUM 5000 UNIT(S): 5000 INJECTION INTRAVENOUS; SUBCUTANEOUS at 13:28

## 2022-12-17 NOTE — PROGRESS NOTE ADULT - ASSESSMENT
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 LHC 12/5 with clean coronaries. Cleared by neurosurgery for OR. Urinary retention and RADHA post cath resolving since ram placed 12/8.  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

## 2022-12-17 NOTE — PROGRESS NOTE ADULT - SUBJECTIVE AND OBJECTIVE BOX
Patient seen today on [facility] sitting up in chair. Eager to have tubes removed. Dopamine down to 1.5. Telemetry reviewed     TELE: SR with first degree and LBBB conduction. No pauses or pacing needs.     MEDICATIONS  (STANDING):  aspirin enteric coated 81 milliGRAM(s) Oral daily  chlorhexidine 2% Cloths 1 Application(s) Topical daily  dextrose 50% Injectable 25 Gram(s) IV Push once  dextrose 50% Injectable 12.5 Gram(s) IV Push once  dextrose 50% Injectable 25 Gram(s) IV Push once  DOPamine Infusion 1.5 MICROgram(s)/kG/Min (4.32 mL/Hr) IV Continuous <Continuous>  furosemide   Injectable 40 milliGRAM(s) IV Push daily  glucagon  Injectable 1 milliGRAM(s) IntraMuscular once  glucagon  Injectable 1 milliGRAM(s) IntraMuscular once  heparin   Injectable 5000 Unit(s) SubCutaneous every 8 hours  insulin lispro (ADMELOG) corrective regimen sliding scale   SubCutaneous Before meals and at bedtime  magnesium sulfate  IVPB 2 Gram(s) IV Intermittent once  meropenem Injectable 1000 milliGRAM(s) IV Push every 8 hours  pantoprazole    Tablet 40 milliGRAM(s) Oral daily  polyethylene glycol 3350 17 Gram(s) Oral daily  senna 2 Tablet(s) Oral at bedtime  sodium chloride 0.9%. 1000 milliLiter(s) (10 mL/Hr) IV Continuous <Continuous>  tamsulosin 0.4 milliGRAM(s) Oral at bedtime    MEDICATIONS  (PRN):  dextrose Oral Gel 15 Gram(s) Oral once PRN Blood Glucose LESS THAN 70 milliGRAM(s)/deciliter  ondansetron Injectable 4 milliGRAM(s) IV Push once PRN Nausea and/or Vomiting  oxyCODONE    IR 10 milliGRAM(s) Oral every 6 hours PRN Severe Pain (7 - 10)  oxyCODONE    IR 5 milliGRAM(s) Oral every 6 hours PRN Moderate Pain (4 - 6)    Allergies  No Known Allergies    PAST MEDICAL & SURGICAL HISTORY:  Hypertension  Aortic stenosis  H/O inguinal hernia repair  B/L 2013  S/P laparoscopic colectomy  right colectomy with ileotransverse anastomosis    Vital Signs Last 24 Hrs  T(C): 36.7 (17 Dec 2022 12:18), Max: 36.7 (17 Dec 2022 08:45)  T(F): 98.1 (17 Dec 2022 12:18), Max: 98.1 (17 Dec 2022 12:18)  HR: 69 (17 Dec 2022 12:18) (59 - 69)  BP: 99/62 (17 Dec 2022 12:18) (97/59 - 123/63)  BP(mean): 81 (17 Dec 2022 03:00) (77 - 87)  RR: 20 (17 Dec 2022 12:18) (16 - 28)  SpO2: 96% (17 Dec 2022 12:18) (91% - 100%)    Parameters below as of 17 Dec 2022 12:18  Patient On (Oxygen Delivery Method): nasal cannula  O2 Flow (L/min): 3    Physical Exam:  Constitutional: NAD, AAOx3, sitting comfortably in bed.   Cardiovascular: +S1S2 RRR, midsternal dressing CDI  Pulmonary: Coarse breath sounds; unlabored.   GI: soft NTND +BS  Neuro: non focal, MEZA x4  Barrios to BSG    LABS:                        9.0    16.34 )-----------( 201      ( 17 Dec 2022 03:00 )             26.6     133<L>  |  96  |  26.3<H>  ----------------------------<  103<H>  4.4   |  27.0  |  0.64  Ca    8.5      17 Dec 2022 03:00  Mg     1.8     12-17    A/P  77 year old male with a history of vit b12 def, HTN, HLD, BPH, moderate aortic stenosis with ELDON 1.1 cm, COVID infection 10/6/22, recent admission 11/2/22 for streptococcus anginosus bacteremia admitted with unclear source (no recent dental work, skin infections, no respiratory symptoms. Pan scan was negative at that time LILIAN was recommended but pt refused). He was treated for Bacteremia (Blood cultures + streptococcus anginosis pansensitive) and norovirus and was discharged 11/7/22 with PICC line/IV Rocephin (recommended through 11/30/22). 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   Admitted to MICU with septic shock and new onset A fib. 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 The patient is now POD #4 s/p AVR/MVR aortic root patch repair (12/13/22/Dr Flanagan). Initially post op pt was ventricular paced at 60bpm with no underlying escape rhythm.     Continues to conduct with LBBB with prolonged MS interval  Still on Dopamine gtt but continues to be reduced by CTS. Potentially off gtt by tomorrow.     - Weaning off Dopamine gtt  - Still hopeful to avoid pacemaker. No pauses or pacing overnight on telemetry.   - May require anticoagulation at some point with newly diagnosed AFib with CHADSVASC:3. No intra-op MAZE or ESTEFANY clip performed. Will defer decision for anticoagulation to CTS.

## 2022-12-17 NOTE — PROGRESS NOTE ADULT - PROBLEM SELECTOR PLAN 1
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  Blood cultures 11/27 remain NGTD.  Continue Merrem per ID    C/w Dopamine for chronotropy  PW VVI backup at 40. Beta blocker contraindicated at this time.  EP consult apprecaited. Possible PPM this admission, pending date.    Continue Aspirin   Encourage PO intake  Encourage OOB to chair and ambulation with PT  SCDs, HSQ for DVT PPX  Encourage deep breathing exercised and coughing  Chest PT and IS use with bedside nurse

## 2022-12-17 NOTE — PROGRESS NOTE ADULT - PROBLEM SELECTOR PLAN 4
RADHA noted post cath with urinary retention, now resolving after Barrios placed 12/8.   Barrios removed, Due to Void  Additionally, postop radha, improved  Renal following for optimization.  Avoid nephrotoxic drugs.  UCx negative 12/8.

## 2022-12-17 NOTE — PROGRESS NOTE ADULT - SUBJECTIVE AND OBJECTIVE BOX
Subjective - patient seen and evaluated bedside. Pt states "I feel ok today". Pt denies CP, SOB, HA, dizziness, n/v or any other issues at this time    Brief summary:  77yMale POD# 4 AVR/MVR, aortic root patch repair      ICU Vital Signs Last 24 Hrs  T(C): 36.5 (17 Dec 2022 00:04), Max: 36.7 (16 Dec 2022 16:01)  T(F): 97.7 (17 Dec 2022 00:04), Max: 98.1 (16 Dec 2022 16:01)  HR: 61 (17 Dec 2022 00:00) (53 - 65)  BP: 123/63 (17 Dec 2022 00:00) (109/59 - 129/64)  BP(mean): 87 (17 Dec 2022 00:00) (77 - 90)  ABP: 115/44 (16 Dec 2022 11:00) (106/36 - 130/48)  ABP(mean): 63 (16 Dec 2022 11:00) (59 - 72)  RR: 19 (17 Dec 2022 00:00) (16 - 31)  SpO2: 98% (17 Dec 2022 00:00) (89% - 100%)      LABS                        9.4    20.14 )-----------( 189      ( 16 Dec 2022 02:30 )             28.0   12-16    137  |  101  |  29.3<H>  ----------------------------<  104<H>  4.6   |  28.0  |  0.80    Ca    8.5      16 Dec 2022 02:30  Mg     1.9     12-16      I&O's Summary    15 Dec 2022 07:01  -  16 Dec 2022 07:00  --------------------------------------------------------  IN: 1162.8 mL / OUT: 2445 mL / NET: -1282.2 mL    16 Dec 2022 07:01  -  17 Dec 2022 02:31  --------------------------------------------------------  IN: 249 mL / OUT: 1360 mL / NET: -1111 mL          Physical Exam  General: NAD  Neuro: A+O x 3, non-focal, speech clear and intact  Pulm: CTA, equal bilaterally  CV: regular rate, Regular Rhythm+S1S2  Abd: soft, NT, ND  Ext: +DP Pulses b/l, no edema  Skin: Warm, dry, intact  Inc: MSI C/D/I/stable w/ dressing,   Chest tubes: mediastinal and pleural CT to ws, draining appropriatel,y no AL

## 2022-12-18 DIAGNOSIS — Z29.9 ENCOUNTER FOR PROPHYLACTIC MEASURES, UNSPECIFIED: ICD-10-CM

## 2022-12-18 LAB
ANION GAP SERPL CALC-SCNC: 12 MMOL/L — SIGNIFICANT CHANGE UP (ref 5–17)
BUN SERPL-MCNC: 26.2 MG/DL — HIGH (ref 8–20)
CALCIUM SERPL-MCNC: 8.4 MG/DL — SIGNIFICANT CHANGE UP (ref 8.4–10.5)
CHLORIDE SERPL-SCNC: 96 MMOL/L — SIGNIFICANT CHANGE UP (ref 96–108)
CO2 SERPL-SCNC: 25 MMOL/L — SIGNIFICANT CHANGE UP (ref 22–29)
CREAT SERPL-MCNC: 0.69 MG/DL — SIGNIFICANT CHANGE UP (ref 0.5–1.3)
CULTURE RESULTS: SIGNIFICANT CHANGE UP
EGFR: 95 ML/MIN/1.73M2 — SIGNIFICANT CHANGE UP
GLUCOSE BLDC GLUCOMTR-MCNC: 100 MG/DL — HIGH (ref 70–99)
GLUCOSE BLDC GLUCOMTR-MCNC: 121 MG/DL — HIGH (ref 70–99)
GLUCOSE BLDC GLUCOMTR-MCNC: 95 MG/DL — SIGNIFICANT CHANGE UP (ref 70–99)
GLUCOSE BLDC GLUCOMTR-MCNC: 98 MG/DL — SIGNIFICANT CHANGE UP (ref 70–99)
GLUCOSE SERPL-MCNC: 88 MG/DL — SIGNIFICANT CHANGE UP (ref 70–99)
HCT VFR BLD CALC: 28.9 % — LOW (ref 39–50)
HGB BLD-MCNC: 9.2 G/DL — LOW (ref 13–17)
MAGNESIUM SERPL-MCNC: 1.8 MG/DL — SIGNIFICANT CHANGE UP (ref 1.6–2.6)
MCHC RBC-ENTMCNC: 30.2 PG — SIGNIFICANT CHANGE UP (ref 27–34)
MCHC RBC-ENTMCNC: 31.8 GM/DL — LOW (ref 32–36)
MCV RBC AUTO: 94.8 FL — SIGNIFICANT CHANGE UP (ref 80–100)
PLATELET # BLD AUTO: 219 K/UL — SIGNIFICANT CHANGE UP (ref 150–400)
POTASSIUM SERPL-MCNC: 4.5 MMOL/L — SIGNIFICANT CHANGE UP (ref 3.5–5.3)
POTASSIUM SERPL-SCNC: 4.5 MMOL/L — SIGNIFICANT CHANGE UP (ref 3.5–5.3)
RBC # BLD: 3.05 M/UL — LOW (ref 4.2–5.8)
RBC # FLD: 13.8 % — SIGNIFICANT CHANGE UP (ref 10.3–14.5)
SODIUM SERPL-SCNC: 133 MMOL/L — LOW (ref 135–145)
SPECIMEN SOURCE: SIGNIFICANT CHANGE UP
WBC # BLD: 13.17 K/UL — HIGH (ref 3.8–10.5)
WBC # FLD AUTO: 13.17 K/UL — HIGH (ref 3.8–10.5)

## 2022-12-18 PROCEDURE — 71045 X-RAY EXAM CHEST 1 VIEW: CPT | Mod: 26

## 2022-12-18 PROCEDURE — 93010 ELECTROCARDIOGRAM REPORT: CPT | Mod: 76

## 2022-12-18 RX ORDER — COLCHICINE 0.6 MG
1.2 TABLET ORAL ONCE
Refills: 0 | Status: COMPLETED | OUTPATIENT
Start: 2022-12-18 | End: 2022-12-19

## 2022-12-18 RX ORDER — COLCHICINE 0.6 MG
0.6 TABLET ORAL EVERY 12 HOURS
Refills: 0 | Status: DISCONTINUED | OUTPATIENT
Start: 2022-12-19 | End: 2022-12-20

## 2022-12-18 RX ORDER — LACTOBACILLUS ACIDOPHILUS 100MM CELL
1 CAPSULE ORAL
Refills: 0 | Status: DISCONTINUED | OUTPATIENT
Start: 2022-12-18 | End: 2023-01-03

## 2022-12-18 RX ORDER — MAGNESIUM SULFATE 500 MG/ML
2 VIAL (ML) INJECTION ONCE
Refills: 0 | Status: COMPLETED | OUTPATIENT
Start: 2022-12-18 | End: 2022-12-18

## 2022-12-18 RX ADMIN — CHLORHEXIDINE GLUCONATE 1 APPLICATION(S): 213 SOLUTION TOPICAL at 14:22

## 2022-12-18 RX ADMIN — Medication 81 MILLIGRAM(S): at 14:21

## 2022-12-18 RX ADMIN — MEROPENEM 1000 MILLIGRAM(S): 1 INJECTION INTRAVENOUS at 21:05

## 2022-12-18 RX ADMIN — Medication 1 TABLET(S): at 17:45

## 2022-12-18 RX ADMIN — HEPARIN SODIUM 5000 UNIT(S): 5000 INJECTION INTRAVENOUS; SUBCUTANEOUS at 06:10

## 2022-12-18 RX ADMIN — POLYETHYLENE GLYCOL 3350 17 GRAM(S): 17 POWDER, FOR SOLUTION ORAL at 14:22

## 2022-12-18 RX ADMIN — PANTOPRAZOLE SODIUM 40 MILLIGRAM(S): 20 TABLET, DELAYED RELEASE ORAL at 06:11

## 2022-12-18 RX ADMIN — Medication 40 MILLIGRAM(S): at 06:13

## 2022-12-18 RX ADMIN — HEPARIN SODIUM 5000 UNIT(S): 5000 INJECTION INTRAVENOUS; SUBCUTANEOUS at 21:07

## 2022-12-18 RX ADMIN — Medication 25 GRAM(S): at 14:19

## 2022-12-18 RX ADMIN — Medication 1 TABLET(S): at 08:53

## 2022-12-18 RX ADMIN — SENNA PLUS 2 TABLET(S): 8.6 TABLET ORAL at 21:06

## 2022-12-18 RX ADMIN — MEROPENEM 1000 MILLIGRAM(S): 1 INJECTION INTRAVENOUS at 14:21

## 2022-12-18 RX ADMIN — MEROPENEM 1000 MILLIGRAM(S): 1 INJECTION INTRAVENOUS at 06:10

## 2022-12-18 RX ADMIN — TAMSULOSIN HYDROCHLORIDE 0.4 MILLIGRAM(S): 0.4 CAPSULE ORAL at 21:06

## 2022-12-18 RX ADMIN — HEPARIN SODIUM 5000 UNIT(S): 5000 INJECTION INTRAVENOUS; SUBCUTANEOUS at 14:22

## 2022-12-18 NOTE — PROGRESS NOTE ADULT - PROBLEM SELECTOR PLAN 1
S/p AVR, MVR, and patch repair of the aortic root on 12/13/22 with Dr Flanagan.  Blood cultures were cleared 11/27.   Continue Merrem per ID.    Continue to wean Dopamine, on board for chronotropy.  EPW VVI backup at 40.   Beta blocker contraindicated at this time.  Oxygenating well, wean FiO2 as tolerated, coughing and deep breathing exercises/incentive spirometry encouraged.  Follow up AM CXR.   D/c CTs once output remains minimal.   Continue diuresis with IV Lasix 40QD, monitor strict I/Os, replenish electrolytes PRN to keep K>4 and Mg>2.   Continue with PT, increase activity as tolerated.  FS AC+HS with coverage for glycemic control.  Tylenol, Oxy PRN for analgesia.  Continue bowel regimen PRN constipation.   Case and plan to be reviewed / discussed with CT Surgery attending / team during AM rounds.

## 2022-12-18 NOTE — PROGRESS NOTE ADULT - ASSESSMENT
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 Cleared by neurosurgery for OR after MRI on 12/1/22 revealed scattered bilateral parieto-occipital acute infarcts.  LHC 12/5 with clean coronaries. Post cath patient noted with RADHA and Urinary retention with ram placed 12/8. Patient now s/p AVR, MVR, and patch repair of the aortic root on 12/13/22 with Dr Flanagan. Postoperative course significant for pacing requirements due to asystole, with patient regaining rhythm 12/15 (remains on Dopamine for chronotropy).  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 Cleared by neurosurgery for OR after MRI on 12/1/22 revealed scattered bilateral parieto-occipital acute infarcts.  LHC 12/5 with clean coronaries. Post cath patient noted with RADHA and Urinary retention with ram placed 12/8. Patient now s/p AVR, MVR, and patch repair of the aortic root on 12/13/22 with Dr Flanagan. Postoperative course significant for pacing requirements due to asystole, with patient regaining rhythm 12/15 (remains on Dopamine for chronotropy).

## 2022-12-19 LAB
ANION GAP SERPL CALC-SCNC: 11 MMOL/L — SIGNIFICANT CHANGE UP (ref 5–17)
BUN SERPL-MCNC: 24.5 MG/DL — HIGH (ref 8–20)
CALCIUM SERPL-MCNC: 8.7 MG/DL — SIGNIFICANT CHANGE UP (ref 8.4–10.5)
CHLORIDE SERPL-SCNC: 94 MMOL/L — LOW (ref 96–108)
CO2 SERPL-SCNC: 30 MMOL/L — HIGH (ref 22–29)
CREAT SERPL-MCNC: 0.82 MG/DL — SIGNIFICANT CHANGE UP (ref 0.5–1.3)
EGFR: 90 ML/MIN/1.73M2 — SIGNIFICANT CHANGE UP
GLUCOSE SERPL-MCNC: 95 MG/DL — SIGNIFICANT CHANGE UP (ref 70–99)
HCT VFR BLD CALC: 27.7 % — LOW (ref 39–50)
HGB BLD-MCNC: 9.1 G/DL — LOW (ref 13–17)
MAGNESIUM SERPL-MCNC: 1.8 MG/DL — SIGNIFICANT CHANGE UP (ref 1.6–2.6)
MCHC RBC-ENTMCNC: 30.3 PG — SIGNIFICANT CHANGE UP (ref 27–34)
MCHC RBC-ENTMCNC: 32.9 GM/DL — SIGNIFICANT CHANGE UP (ref 32–36)
MCV RBC AUTO: 92.3 FL — SIGNIFICANT CHANGE UP (ref 80–100)
PLATELET # BLD AUTO: 286 K/UL — SIGNIFICANT CHANGE UP (ref 150–400)
POTASSIUM SERPL-MCNC: 4 MMOL/L — SIGNIFICANT CHANGE UP (ref 3.5–5.3)
POTASSIUM SERPL-SCNC: 4 MMOL/L — SIGNIFICANT CHANGE UP (ref 3.5–5.3)
RBC # BLD: 3 M/UL — LOW (ref 4.2–5.8)
RBC # FLD: 13.5 % — SIGNIFICANT CHANGE UP (ref 10.3–14.5)
SODIUM SERPL-SCNC: 135 MMOL/L — SIGNIFICANT CHANGE UP (ref 135–145)
WBC # BLD: 11.97 K/UL — HIGH (ref 3.8–10.5)
WBC # FLD AUTO: 11.97 K/UL — HIGH (ref 3.8–10.5)

## 2022-12-19 PROCEDURE — 93010 ELECTROCARDIOGRAM REPORT: CPT

## 2022-12-19 PROCEDURE — 99024 POSTOP FOLLOW-UP VISIT: CPT

## 2022-12-19 PROCEDURE — 93306 TTE W/DOPPLER COMPLETE: CPT | Mod: 26

## 2022-12-19 PROCEDURE — 71045 X-RAY EXAM CHEST 1 VIEW: CPT | Mod: 26

## 2022-12-19 RX ORDER — ACETAZOLAMIDE 250 MG/1
500 TABLET ORAL EVERY 12 HOURS
Refills: 0 | Status: DISCONTINUED | OUTPATIENT
Start: 2022-12-19 | End: 2022-12-21

## 2022-12-19 RX ORDER — MAGNESIUM SULFATE 500 MG/ML
2 VIAL (ML) INJECTION ONCE
Refills: 0 | Status: COMPLETED | OUTPATIENT
Start: 2022-12-19 | End: 2022-12-19

## 2022-12-19 RX ORDER — FUROSEMIDE 40 MG
40 TABLET ORAL EVERY 12 HOURS
Refills: 0 | Status: DISCONTINUED | OUTPATIENT
Start: 2022-12-19 | End: 2022-12-26

## 2022-12-19 RX ADMIN — MEROPENEM 1000 MILLIGRAM(S): 1 INJECTION INTRAVENOUS at 05:13

## 2022-12-19 RX ADMIN — Medication 0.6 MILLIGRAM(S): at 05:14

## 2022-12-19 RX ADMIN — PANTOPRAZOLE SODIUM 40 MILLIGRAM(S): 20 TABLET, DELAYED RELEASE ORAL at 05:15

## 2022-12-19 RX ADMIN — Medication 40 MILLIGRAM(S): at 05:12

## 2022-12-19 RX ADMIN — Medication 25 GRAM(S): at 09:43

## 2022-12-19 RX ADMIN — Medication 81 MILLIGRAM(S): at 08:42

## 2022-12-19 RX ADMIN — Medication 40 MILLIGRAM(S): at 17:17

## 2022-12-19 RX ADMIN — TAMSULOSIN HYDROCHLORIDE 0.4 MILLIGRAM(S): 0.4 CAPSULE ORAL at 22:49

## 2022-12-19 RX ADMIN — Medication 1 TABLET(S): at 17:22

## 2022-12-19 RX ADMIN — MEROPENEM 1000 MILLIGRAM(S): 1 INJECTION INTRAVENOUS at 12:33

## 2022-12-19 RX ADMIN — HEPARIN SODIUM 5000 UNIT(S): 5000 INJECTION INTRAVENOUS; SUBCUTANEOUS at 12:33

## 2022-12-19 RX ADMIN — Medication 1 TABLET(S): at 08:41

## 2022-12-19 RX ADMIN — HEPARIN SODIUM 5000 UNIT(S): 5000 INJECTION INTRAVENOUS; SUBCUTANEOUS at 05:12

## 2022-12-19 RX ADMIN — Medication 1.2 MILLIGRAM(S): at 00:51

## 2022-12-19 RX ADMIN — CHLORHEXIDINE GLUCONATE 1 APPLICATION(S): 213 SOLUTION TOPICAL at 08:40

## 2022-12-19 RX ADMIN — HEPARIN SODIUM 5000 UNIT(S): 5000 INJECTION INTRAVENOUS; SUBCUTANEOUS at 22:49

## 2022-12-19 RX ADMIN — MEROPENEM 1000 MILLIGRAM(S): 1 INJECTION INTRAVENOUS at 22:00

## 2022-12-19 NOTE — PROGRESS NOTE ADULT - ASSESSMENT
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 Cleared by neurosurgery for OR after MRI on 12/1/22 revealed scattered bilateral parieto-occipital acute infarcts.  LHC 12/5 with clean coronaries. Post cath patient noted with RADHA and Urinary retention with ram placed 12/8. Patient now s/p AVR, MVR, and patch repair of the aortic root on 12/13/22 with Dr Flanagan. Postoperative course significant for pacing requirements due to asystole, with patient regaining rhythm 12/15 (Dopamine for chronotropy weaned off 12/18 ).

## 2022-12-19 NOTE — PROGRESS NOTE ADULT - PROBLEM SELECTOR PLAN 4
Resolved.   Barrios remains in place TOV to be performed tonight 12/19.   Avoid nephrotoxic drugs.  UCx negative 12/8.

## 2022-12-19 NOTE — PROGRESS NOTE ADULT - PROBLEM SELECTOR PLAN 1
S/p AVR, MVR, and patch repair of the aortic root on 12/13/22 with Dr Flanagan.  Blood cultures were cleared 11/27.   Continue Merrem per ID.    Dopamine weaned off   EPW VVI backup at 50 threshold 7   discuss starting Beta blocker in AM   Oxygenating well, wean FiO2 as tolerated, coughing and deep breathing exercises/incentive spirometry encouraged.  Follow up AM CXR.   D/c CTs once output remains minimal.   Continue diuresis with IV Lasix 40QD, monitor strict I/Os, replenish electrolytes PRN to keep K>4 and Mg>2.   Continue with PT, increase activity as tolerated.  FS AC+HS with coverage for glycemic control.  Tylenol, Oxy PRN for analgesia.  Continue bowel regimen PRN constipation.   Case and plan to be reviewed / discussed with CT Surgery attending / team during AM rounds.

## 2022-12-19 NOTE — CHART NOTE - NSCHARTNOTEFT_GEN_A_CORE
Pt with high volume urinary retention (1500ml)  Ram removed last night  bladder scan this am 471ml  pt with enlarged prostate ~100g  if pt unable to void, recommend replacing ram catheter  can f/u as OP

## 2022-12-19 NOTE — PROGRESS NOTE ADULT - SUBJECTIVE AND OBJECTIVE BOX
Brief summary:  77yMale POD# 6 S/P AVR/MVR patch repair of aortic root with Dr. Flanagan     SUBJECTIVE:  Pt in bed alert and oriented, Pt states, " they taped me all up today"  Patient denies acute pain with radiating or aggravating factors.  He denies chest pain, shortness of breath, palpitations, headache, dizziness, nausea, or vomiting.      Overnight events:  no significant event overnight     PAST MEDICAL & SURGICAL HISTORY:  Hypertension      Aortic stenosis      H/O inguinal hernia repair  B/L       S/P laparoscopic colectomy  right colectomy with ileotransverse anastomosis          MEDICATIONS  aspirin enteric coated 81 milliGRAM(s) Oral daily  chlorhexidine 2% Cloths 1 Application(s) Topical daily  colchicine 0.6 milliGRAM(s) Oral every 12 hours  furosemide   Injectable 40 milliGRAM(s) IV Push daily  glucagon  Injectable 1 milliGRAM(s) IntraMuscular once  glucagon  Injectable 1 milliGRAM(s) IntraMuscular once  heparin   Injectable 5000 Unit(s) SubCutaneous every 8 hours  lactobacillus acidophilus 1 Tablet(s) Oral two times a day with meals  magnesium sulfate  IVPB 2 Gram(s) IV Intermittent once  meropenem Injectable 1000 milliGRAM(s) IV Push every 8 hours  ondansetron Injectable 4 milliGRAM(s) IV Push once PRN  oxyCODONE    IR 10 milliGRAM(s) Oral every 6 hours PRN  oxyCODONE    IR 5 milliGRAM(s) Oral every 6 hours PRN  pantoprazole    Tablet 40 milliGRAM(s) Oral daily  polyethylene glycol 3350 17 Gram(s) Oral daily  senna 2 Tablet(s) Oral at bedtime  sodium chloride 0.9%. 1000 milliLiter(s) IV Continuous <Continuous>  tamsulosin 0.4 milliGRAM(s) Oral at bedtime  MEDICATIONS  (PRN):  ondansetron Injectable 4 milliGRAM(s) IV Push once PRN Nausea and/or Vomiting  oxyCODONE    IR 10 milliGRAM(s) Oral every 6 hours PRN Severe Pain (7 - 10)  oxyCODONE    IR 5 milliGRAM(s) Oral every 6 hours PRN Moderate Pain (4 - 6)      Daily     Daily Weight in k.6 (18 Dec 2022 06:58)                              9.2    13.17 )-----------( 219      ( 18 Dec 2022 05:27 )             28.9   12-18    133<L>  |  96  |  26.2<H>  ----------------------------<  88  4.5   |  25.0  |  0.69    Ca    8.4      18 Dec 2022 05:27  Mg     1.8                   Objective:  T(C): 36.8 (22 @ 00:50), Max: 36.8 (22 @ 04:45)  HR: 64 (22 @ 00:50) (59 - 70)  BP: 114/59 (22 @ 00:50) (100/61 - 122/63)  RR: 18 (22 @ 00:50) ()  SpO2: 95% (22 @ 00:50) (92% - 95%)  Wt(kg): --CAPILLARY BLOOD GLUCOSE      POCT Blood Glucose.: 121 mg/dL (18 Dec 2022 21:11)  POCT Blood Glucose.: 95 mg/dL (18 Dec 2022 17:37)  POCT Blood Glucose.: 98 mg/dL (18 Dec 2022 12:38)  POCT Blood Glucose.: 100 mg/dL (18 Dec 2022 08:36)  I&O's Summary    17 Dec 2022 07:01  -  18 Dec 2022 07:00  --------------------------------------------------------  IN: 300 mL / OUT: 2200 mL / NET: -1900 mL    18 Dec 2022 07:01  -  19 Dec 2022 02:11  --------------------------------------------------------  IN: 150 mL / OUT: 2140 mL / NET: -1990 mL        Physical Exam  Neuro: A+O x 3, non-focal, speech clear and intact  HEENT:  NCAT, PERRL  Neck: supple, trachea midline  Pulm: CTA, good air entry, equal bilaterally, no rales/rhonchi/wheezing, no accessory muscle use noted  CV: RRR, +S1S2, no murmur or rub noted  Abd: soft, NT, ND, + BS  Ext: MEZA x 4,pitting +2 edema b/L LE, 2+ DP b/l, distal motor/neuro/circ intact.   Inc: MSI C/D/I/stable w/ dressing   Chest tubes: Left, right and luis tubes in place           < from: 12 Lead ECG (22 @ 13:46) >    Ventricular Rate 68 BPM    Atrial Rate 68 BPM    P-R Interval 276 ms    QRS Duration 142 ms    Q-T Interval 480 ms    QTC Calculation(Bazett) 510 ms    P Axis 61 degrees    R Axis 72 degrees    T Axis 28 degrees    Diagnosis Line Sinus rhythm with 1st degree A-V block  Left bundle branch block  Abnormal ECG    Confirmed by SHAN FOSTER ([id number]) on 2022 10:18:47 PM    < end of copied text >

## 2022-12-20 LAB
ANION GAP SERPL CALC-SCNC: 10 MMOL/L — SIGNIFICANT CHANGE UP (ref 5–17)
BUN SERPL-MCNC: 22.4 MG/DL — HIGH (ref 8–20)
CALCIUM SERPL-MCNC: 8.5 MG/DL — SIGNIFICANT CHANGE UP (ref 8.4–10.5)
CHLORIDE SERPL-SCNC: 97 MMOL/L — SIGNIFICANT CHANGE UP (ref 96–108)
CO2 SERPL-SCNC: 29 MMOL/L — SIGNIFICANT CHANGE UP (ref 22–29)
CREAT SERPL-MCNC: 0.78 MG/DL — SIGNIFICANT CHANGE UP (ref 0.5–1.3)
EGFR: 91 ML/MIN/1.73M2 — SIGNIFICANT CHANGE UP
GLUCOSE SERPL-MCNC: 86 MG/DL — SIGNIFICANT CHANGE UP (ref 70–99)
HCT VFR BLD CALC: 27.5 % — LOW (ref 39–50)
HGB BLD-MCNC: 9.1 G/DL — LOW (ref 13–17)
MAGNESIUM SERPL-MCNC: 1.9 MG/DL — SIGNIFICANT CHANGE UP (ref 1.8–2.6)
MCHC RBC-ENTMCNC: 30.4 PG — SIGNIFICANT CHANGE UP (ref 27–34)
MCHC RBC-ENTMCNC: 33.1 GM/DL — SIGNIFICANT CHANGE UP (ref 32–36)
MCV RBC AUTO: 92 FL — SIGNIFICANT CHANGE UP (ref 80–100)
PLATELET # BLD AUTO: 306 K/UL — SIGNIFICANT CHANGE UP (ref 150–400)
POTASSIUM SERPL-MCNC: 3.6 MMOL/L — SIGNIFICANT CHANGE UP (ref 3.5–5.3)
POTASSIUM SERPL-SCNC: 3.6 MMOL/L — SIGNIFICANT CHANGE UP (ref 3.5–5.3)
RBC # BLD: 2.99 M/UL — LOW (ref 4.2–5.8)
RBC # FLD: 13.6 % — SIGNIFICANT CHANGE UP (ref 10.3–14.5)
SODIUM SERPL-SCNC: 136 MMOL/L — SIGNIFICANT CHANGE UP (ref 135–145)
WBC # BLD: 14.28 K/UL — HIGH (ref 3.8–10.5)
WBC # FLD AUTO: 14.28 K/UL — HIGH (ref 3.8–10.5)

## 2022-12-20 PROCEDURE — 99232 SBSQ HOSP IP/OBS MODERATE 35: CPT

## 2022-12-20 PROCEDURE — 71045 X-RAY EXAM CHEST 1 VIEW: CPT | Mod: 26,76

## 2022-12-20 PROCEDURE — 99024 POSTOP FOLLOW-UP VISIT: CPT

## 2022-12-20 RX ORDER — MIDODRINE HYDROCHLORIDE 2.5 MG/1
5 TABLET ORAL THREE TIMES A DAY
Refills: 0 | Status: DISCONTINUED | OUTPATIENT
Start: 2022-12-20 | End: 2022-12-21

## 2022-12-20 RX ORDER — MAGNESIUM SULFATE 500 MG/ML
1 VIAL (ML) INJECTION ONCE
Refills: 0 | Status: COMPLETED | OUTPATIENT
Start: 2022-12-20 | End: 2022-12-20

## 2022-12-20 RX ORDER — APIXABAN 2.5 MG/1
2.5 TABLET, FILM COATED ORAL
Refills: 0 | Status: DISCONTINUED | OUTPATIENT
Start: 2022-12-20 | End: 2022-12-30

## 2022-12-20 RX ORDER — POTASSIUM CHLORIDE 20 MEQ
40 PACKET (EA) ORAL EVERY 4 HOURS
Refills: 0 | Status: COMPLETED | OUTPATIENT
Start: 2022-12-20 | End: 2022-12-20

## 2022-12-20 RX ADMIN — Medication 100 GRAM(S): at 09:38

## 2022-12-20 RX ADMIN — Medication 40 MILLIEQUIVALENT(S): at 09:38

## 2022-12-20 RX ADMIN — ACETAZOLAMIDE 500 MILLIGRAM(S): 250 TABLET ORAL at 05:34

## 2022-12-20 RX ADMIN — Medication 40 MILLIEQUIVALENT(S): at 14:56

## 2022-12-20 RX ADMIN — MIDODRINE HYDROCHLORIDE 5 MILLIGRAM(S): 2.5 TABLET ORAL at 14:56

## 2022-12-20 RX ADMIN — Medication 40 MILLIGRAM(S): at 05:34

## 2022-12-20 RX ADMIN — MIDODRINE HYDROCHLORIDE 5 MILLIGRAM(S): 2.5 TABLET ORAL at 07:41

## 2022-12-20 RX ADMIN — PANTOPRAZOLE SODIUM 40 MILLIGRAM(S): 20 TABLET, DELAYED RELEASE ORAL at 05:34

## 2022-12-20 RX ADMIN — TAMSULOSIN HYDROCHLORIDE 0.4 MILLIGRAM(S): 0.4 CAPSULE ORAL at 21:51

## 2022-12-20 RX ADMIN — HEPARIN SODIUM 5000 UNIT(S): 5000 INJECTION INTRAVENOUS; SUBCUTANEOUS at 05:34

## 2022-12-20 RX ADMIN — MEROPENEM 1000 MILLIGRAM(S): 1 INJECTION INTRAVENOUS at 21:51

## 2022-12-20 RX ADMIN — Medication 40 MILLIGRAM(S): at 18:28

## 2022-12-20 RX ADMIN — CHLORHEXIDINE GLUCONATE 1 APPLICATION(S): 213 SOLUTION TOPICAL at 15:12

## 2022-12-20 RX ADMIN — MEROPENEM 1000 MILLIGRAM(S): 1 INJECTION INTRAVENOUS at 14:56

## 2022-12-20 RX ADMIN — ACETAZOLAMIDE 500 MILLIGRAM(S): 250 TABLET ORAL at 18:28

## 2022-12-20 RX ADMIN — MEROPENEM 1000 MILLIGRAM(S): 1 INJECTION INTRAVENOUS at 05:34

## 2022-12-20 RX ADMIN — Medication 1 TABLET(S): at 09:38

## 2022-12-20 RX ADMIN — Medication 81 MILLIGRAM(S): at 09:38

## 2022-12-20 RX ADMIN — Medication 1 TABLET(S): at 18:28

## 2022-12-20 RX ADMIN — MIDODRINE HYDROCHLORIDE 5 MILLIGRAM(S): 2.5 TABLET ORAL at 18:28

## 2022-12-20 NOTE — PROGRESS NOTE ADULT - NS ATTEND AMEND GEN_ALL_CORE FT
Agree with above. AV conduction remains stable. MCOT at discharge if no further worsening of AV conduction.

## 2022-12-20 NOTE — PROGRESS NOTE ADULT - PROBLEM SELECTOR PLAN 1
S/p AVR, MVR, and patch repair of the aortic root on 12/13/22 with Dr Flanagan.  Blood cultures were cleared 11/27.   Continue Merrem per ID.    Dopamine weaned off   EPW VVI backup at 50 threshold 10   Oxygenating well, wean FiO2 as tolerated, coughing and deep breathing exercises/incentive spirometry encouraged.  Follow up AM CXR.   D/c CTs once output remains minimal.   Continue diuresis with IV Lasix 40QD, monitor strict I/Os, replenish electrolytes PRN to keep K>4 and Mg>2.   Continue with PT, increase activity as tolerated.  FS AC+HS with coverage for glycemic control.  Tylenol, Oxy PRN for analgesia.  MCOT on discharge to be set up by EP.   Case and plan to be reviewed / discussed with CT Surgery attending / team during AM rounds. S/p AVR, MVR, and patch repair of the aortic root on 12/13/22 with Dr Flanagan.  Blood cultures were cleared 11/27.   Continue Merrem per ID.    Dopamine weaned off   EPW VVI backup at 50 threshold 10   Oxygenating well, wean FiO2 as tolerated, coughing and deep breathing exercises/incentive spirometry encouraged.  Follow up AM CXR.   D/c CTs once output remains minimal.   Continue diuresis with IV Lasix 40QD, monitor strict I/Os, replenish electrolytes PRN to keep K>4 and Mg>2.   Continue with PT, increase activity as tolerated.  FS AC+HS with coverage for glycemic control.  Tylenol, Oxy PRN for analgesia.  MCOT on discharge to be set up by EP.   TTE performed 12/19 pending result   Case and plan to be reviewed / discussed with CT Surgery attending / team during AM rounds.

## 2022-12-20 NOTE — PROGRESS NOTE ADULT - SUBJECTIVE AND OBJECTIVE BOX
Patient seen and examined once again today in chair OOB. Telemetry reviewed    TELE: SR with first degree AV block and some APCs. Two episodes of inappropriate epicardial pacing due to undersensing.     MEDICATIONS  (STANDING):  acetaZOLAMIDE    Tablet 500 milliGRAM(s) Oral every 12 hours  apixaban 2.5 milliGRAM(s) Oral two times a day  aspirin enteric coated 81 milliGRAM(s) Oral daily  chlorhexidine 2% Cloths 1 Application(s) Topical daily  furosemide   Injectable 40 milliGRAM(s) IV Push every 12 hours  glucagon  Injectable 1 milliGRAM(s) IntraMuscular once  glucagon  Injectable 1 milliGRAM(s) IntraMuscular once  lactobacillus acidophilus 1 Tablet(s) Oral two times a day with meals  meropenem Injectable 1000 milliGRAM(s) IV Push every 8 hours  midodrine. 5 milliGRAM(s) Oral three times a day  pantoprazole    Tablet 40 milliGRAM(s) Oral daily  polyethylene glycol 3350 17 Gram(s) Oral daily  potassium chloride   Powder 40 milliEquivalent(s) Oral every 4 hours  senna 2 Tablet(s) Oral at bedtime  tamsulosin 0.4 milliGRAM(s) Oral at bedtime    MEDICATIONS  (PRN):  ondansetron Injectable 4 milliGRAM(s) IV Push once PRN Nausea and/or Vomiting  oxyCODONE    IR 10 milliGRAM(s) Oral every 6 hours PRN Severe Pain (7 - 10)  oxyCODONE    IR 5 milliGRAM(s) Oral every 6 hours PRN Moderate Pain (4 - 6)    Allergies  No Known Allergies    PAST MEDICAL & SURGICAL HISTORY:  Hypertension  Aortic stenosis  H/O inguinal hernia repair  B/L 2013  S/P laparoscopic colectomy  right colectomy with ileotransverse anastomosis    Vital Signs Last 24 Hrs  T(C): 36.5 (20 Dec 2022 09:25), Max: 36.7 (19 Dec 2022 20:00)  T(F): 97.7 (20 Dec 2022 09:25), Max: 98.1 (20 Dec 2022 00:00)  HR: 79 (20 Dec 2022 09:25) (63 - 79)  BP: 101/58 (20 Dec 2022 09:25) (100/61 - 132/65)  RR: 18 (20 Dec 2022 09:25) (16 - 18)  SpO2: 97% (20 Dec 2022 09:25) (97% - 100%)    Physical Exam:  Constitutional: NAD, AAOx3, sitting comfortably in bed.   Cardiovascular: +S1S2 RRR, midsternal dressing CDI  Pulmonary: Coarse breath sounds; unlabored.   GI: soft NTND +BS  Neuro: non focal, MEZA x4    LABS:                        9.1    14.28 )-----------( 306      ( 20 Dec 2022 05:48 )             27.5     136  |  97  |  22.4<H>  ----------------------------<  86  3.6   |  29.0  |  0.78  Ca    8.5      20 Dec 2022 05:48  Mg     1.9     12-20    A/P  77 year old male with a history of vit b12 def, HTN, HLD, BPH, moderate aortic stenosis with ELDON 1.1 cm, COVID infection 10/6/22, recent admission 11/2/22 for streptococcus anginosus bacteremia admitted with unclear source (no recent dental work, skin infections, no respiratory symptoms. Pan scan was negative at that time LILIAN was recommended but pt refused). He was treated for Bacteremia (Blood cultures + streptococcus anginosis pansensitive) and norovirus and was discharged 11/7/22 with PICC line/IV Rocephin (recommended through 11/30/22). 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   Admitted to MICU with septic shock and new onset A fib. 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 The patient is now POD #7 s/p AVR/MVR aortic root patch repair (12/13/22/Dr Flanagan). Initially post op pt was ventricular paced at 60bpm with no underlying escape rhythm.     Continues to conduct with LBBB, first degree AV block and now with occasional APCs  Off dopamine gtt  Inappropriate pacing due to undersensing by epicardial leads    - Would discontinue to disconnect pacer  - No evidence for pacemaker insertion  - MCOT is available for  from office (family may ) after 1400 today  - Anticoagulation deferred to CTS  - Will follow peripherally with tele checks.

## 2022-12-20 NOTE — PROGRESS NOTE ADULT - ASSESSMENT
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 Cleared by neurosurgery for OR after MRI on 12/1/22 revealed scattered bilateral parieto-occipital acute infarcts.  LHC 12/5 with clean coronaries. Post cath patient noted with RADHA and Urinary retention with ram placed 12/8. Patient now s/p AVR, MVR, and patch repair of the aortic root on 12/13/22 with Dr Flanagan. Postoperative course significant for pacing requirements due to asystole, with patient regaining rhythm 12/15 (Dopamine for chronotropy weaned off 12/18 ), and high chest tube output, colchicine given but d/c' d 2/2 to diarrhea.

## 2022-12-20 NOTE — PROGRESS NOTE ADULT - SUBJECTIVE AND OBJECTIVE BOX
Brief summary:  77yMale POD# 6 S/P AVR/MVR patch repair of aortic root with Dr. Flanagan     SUBJECTIVE:  Pt in bed alert and oriented, Pt states, " they taped me all up today"  Patient denies acute pain with radiating or aggravating factors.  He denies chest pain, shortness of breath, palpitations, headache, dizziness, nausea, or vomiting.  Overnight events:      PAST MEDICAL & SURGICAL HISTORY:  Hypertension      Aortic stenosis      H/O inguinal hernia repair  B/L       S/P laparoscopic colectomy  right colectomy with ileotransverse anastomosis          MEDICATIONS   acetaZOLAMIDE    Tablet 500 milliGRAM(s) Oral every 12 hours  aspirin enteric coated 81 milliGRAM(s) Oral daily  chlorhexidine 2% Cloths 1 Application(s) Topical daily  colchicine 0.6 milliGRAM(s) Oral every 12 hours  furosemide   Injectable 40 milliGRAM(s) IV Push every 12 hours  glucagon  Injectable 1 milliGRAM(s) IntraMuscular once  glucagon  Injectable 1 milliGRAM(s) IntraMuscular once  heparin   Injectable 5000 Unit(s) SubCutaneous every 8 hours  lactobacillus acidophilus 1 Tablet(s) Oral two times a day with meals  meropenem Injectable 1000 milliGRAM(s) IV Push every 8 hours  ondansetron Injectable 4 milliGRAM(s) IV Push once PRN  oxyCODONE    IR 10 milliGRAM(s) Oral every 6 hours PRN  oxyCODONE    IR 5 milliGRAM(s) Oral every 6 hours PRN  pantoprazole    Tablet 40 milliGRAM(s) Oral daily  polyethylene glycol 3350 17 Gram(s) Oral daily  senna 2 Tablet(s) Oral at bedtime  tamsulosin 0.4 milliGRAM(s) Oral at bedtime  MEDICATIONS  (PRN):  ondansetron Injectable 4 milliGRAM(s) IV Push once PRN Nausea and/or Vomiting  oxyCODONE    IR 10 milliGRAM(s) Oral every 6 hours PRN Severe Pain (7 - 10)  oxyCODONE    IR 5 milliGRAM(s) Oral every 6 hours PRN Moderate Pain (4 - 6)      Daily     Daily Weight in k.5 (19 Dec 2022 06:04)                              9.1    11.97 )-----------( 286      ( 19 Dec 2022 06:57 )             27.7   12-19    135  |  94<L>  |  24.5<H>  ----------------------------<  95  4.0   |  30.0<H>  |  0.82    Ca    8.7      19 Dec 2022 06:57  Mg     1.8                   Objective:  T(C): 36.7 (22 @ 00:00), Max: 36.7 (22 @ 08:38)  HR: 64 (22 @ 00:00) (59 - 69)  BP: 111/66 (22 @ 00:00) (100/61 - 132/65)  RR: 17 (22 @ 00:00) (16 - 18)  SpO2: 98% (22 @ 00:00) (94% - 100%)  Wt(kg): --CAPILLARY BLOOD GLUCOSE      I&O's Summary    18 Dec 2022 07:01  -  19 Dec 2022 07:00  --------------------------------------------------------  IN: 270 mL / OUT: 2370 mL / NET: -2100 mL    19 Dec 2022 07:01  -  20 Dec 2022 02:51  --------------------------------------------------------  IN: 600 mL / OUT: 2320 mL / NET: -1720 mL    Physical Exam  Neuro: A+O x 3, non-focal, speech clear and intact  HEENT:  NCAT, PERRL  Neck: supple, trachea midline  Pulm: CTA, good air entry, equal bilaterally, no rales/rhonchi/wheezing, no accessory muscle use noted  CV: RRR, +S1S2, no murmur or rub noted  Abd: soft, NT, ND, + BS  Ext: MEZA x 4,pitting +2 edema b/L LE, 2+ DP b/l, distal motor/neuro/circ intact.   Inc: MSI C/D/I/stable + staples ( betadine and island dressing)   Chest tubes: Left, right and luis tubes in place   +PW 50/20/.08 threshold 10       Imaging:  CXR:  < from: Xray Chest 1 View- PORTABLE-Routine (Xray Chest 1 View- PORTABLE-Routine in AM.) (22 @ 06:27) >  ACC: [id number] EXAM:  XR CHEST PORTABLE ROUTINE 1V                        ACC: [id number] EXAM:  XR CHEST PORTABLE ROUTINE 1V                        ACC: [id number] EXAM:  XR CHEST PORTABLE ROUTINE 1V                        ACC: [id number] EXAM:  XR CHESTPORTABLE ROUTINE 1V                          PROCEDURE DATE:  2022          INTERPRETATION:  Chest one view 2022 4:37 AM    HISTORY: Postop    COMPARISON STUDY: 12/15/2022    Frontal expiratory view of the chest shows the heart to be similar in   size. Right jugular sheath, bilateral chest tubes, sternal wires and   mitral valve ring are present.    The lungs show partial clearing of the left base with small left effusion   and there is no evidence of pneumothorax nor right pleural effusion.    Chest one view 2022 5:16 AM  Compared to the prior study, small left effusion is similar. No   pneumothorax.    Chest one view 2022 4:13 AM  Compared to the prior study, left pleural effusion is smaller.   Questionable small pneumothorax in the right costophrenic angle.    Chest one view 2022 4:19 AM  Compared to the prior study, there is mild basilar atelectasis. No   pneumothorax.    IMPRESSION:  Basilar atelectasis. No pneumothorax in latest image.        Thank you for the courtesy of this referral.    --- End of Report ---            EZEKIEL RUELAS MD; Attending Interventional Radiologist  This document has been electronically signed. Dec 19 2022  3:45PM    < end of copied text >      ECG:

## 2022-12-21 LAB
ANION GAP SERPL CALC-SCNC: 10 MMOL/L — SIGNIFICANT CHANGE UP (ref 5–17)
ANION GAP SERPL CALC-SCNC: 10 MMOL/L — SIGNIFICANT CHANGE UP (ref 5–17)
BUN SERPL-MCNC: 21.9 MG/DL — HIGH (ref 8–20)
BUN SERPL-MCNC: 22 MG/DL — HIGH (ref 8–20)
CALCIUM SERPL-MCNC: 8.4 MG/DL — SIGNIFICANT CHANGE UP (ref 8.4–10.5)
CALCIUM SERPL-MCNC: 8.4 MG/DL — SIGNIFICANT CHANGE UP (ref 8.4–10.5)
CHLORIDE SERPL-SCNC: 101 MMOL/L — SIGNIFICANT CHANGE UP (ref 96–108)
CHLORIDE SERPL-SCNC: 98 MMOL/L — SIGNIFICANT CHANGE UP (ref 96–108)
CO2 SERPL-SCNC: 21 MMOL/L — LOW (ref 22–29)
CO2 SERPL-SCNC: 24 MMOL/L — SIGNIFICANT CHANGE UP (ref 22–29)
CREAT SERPL-MCNC: 0.81 MG/DL — SIGNIFICANT CHANGE UP (ref 0.5–1.3)
CREAT SERPL-MCNC: 0.82 MG/DL — SIGNIFICANT CHANGE UP (ref 0.5–1.3)
EGFR: 90 ML/MIN/1.73M2 — SIGNIFICANT CHANGE UP
EGFR: 90 ML/MIN/1.73M2 — SIGNIFICANT CHANGE UP
GLUCOSE SERPL-MCNC: 86 MG/DL — SIGNIFICANT CHANGE UP (ref 70–99)
GLUCOSE SERPL-MCNC: 92 MG/DL — SIGNIFICANT CHANGE UP (ref 70–99)
HCT VFR BLD CALC: 27 % — LOW (ref 39–50)
HGB BLD-MCNC: 8.7 G/DL — LOW (ref 13–17)
MAGNESIUM SERPL-MCNC: 1.8 MG/DL — SIGNIFICANT CHANGE UP (ref 1.8–2.6)
MCHC RBC-ENTMCNC: 29.6 PG — SIGNIFICANT CHANGE UP (ref 27–34)
MCHC RBC-ENTMCNC: 32.2 GM/DL — SIGNIFICANT CHANGE UP (ref 32–36)
MCV RBC AUTO: 91.8 FL — SIGNIFICANT CHANGE UP (ref 80–100)
PLATELET # BLD AUTO: 313 K/UL — SIGNIFICANT CHANGE UP (ref 150–400)
POTASSIUM SERPL-MCNC: 3.4 MMOL/L — LOW (ref 3.5–5.3)
POTASSIUM SERPL-MCNC: 5 MMOL/L — SIGNIFICANT CHANGE UP (ref 3.5–5.3)
POTASSIUM SERPL-SCNC: 3.4 MMOL/L — LOW (ref 3.5–5.3)
POTASSIUM SERPL-SCNC: 5 MMOL/L — SIGNIFICANT CHANGE UP (ref 3.5–5.3)
RBC # BLD: 2.94 M/UL — LOW (ref 4.2–5.8)
RBC # FLD: 13.5 % — SIGNIFICANT CHANGE UP (ref 10.3–14.5)
SODIUM SERPL-SCNC: 128 MMOL/L — LOW (ref 135–145)
SODIUM SERPL-SCNC: 134 MMOL/L — LOW (ref 135–145)
WBC # BLD: 15.24 K/UL — HIGH (ref 3.8–10.5)
WBC # FLD AUTO: 15.24 K/UL — HIGH (ref 3.8–10.5)

## 2022-12-21 PROCEDURE — 99232 SBSQ HOSP IP/OBS MODERATE 35: CPT

## 2022-12-21 PROCEDURE — 71045 X-RAY EXAM CHEST 1 VIEW: CPT | Mod: 26

## 2022-12-21 RX ORDER — POTASSIUM CHLORIDE 20 MEQ
40 PACKET (EA) ORAL EVERY 4 HOURS
Refills: 0 | Status: COMPLETED | OUTPATIENT
Start: 2022-12-21 | End: 2022-12-21

## 2022-12-21 RX ORDER — MIDODRINE HYDROCHLORIDE 2.5 MG/1
5 TABLET ORAL ONCE
Refills: 0 | Status: COMPLETED | OUTPATIENT
Start: 2022-12-21 | End: 2022-12-21

## 2022-12-21 RX ORDER — MAGNESIUM SULFATE 500 MG/ML
2 VIAL (ML) INJECTION ONCE
Refills: 0 | Status: COMPLETED | OUTPATIENT
Start: 2022-12-21 | End: 2022-12-21

## 2022-12-21 RX ORDER — MIDODRINE HYDROCHLORIDE 2.5 MG/1
10 TABLET ORAL THREE TIMES A DAY
Refills: 0 | Status: DISCONTINUED | OUTPATIENT
Start: 2022-12-21 | End: 2022-12-25

## 2022-12-21 RX ADMIN — Medication 1 TABLET(S): at 10:05

## 2022-12-21 RX ADMIN — Medication 40 MILLIEQUIVALENT(S): at 14:18

## 2022-12-21 RX ADMIN — TAMSULOSIN HYDROCHLORIDE 0.4 MILLIGRAM(S): 0.4 CAPSULE ORAL at 21:12

## 2022-12-21 RX ADMIN — Medication 40 MILLIEQUIVALENT(S): at 10:04

## 2022-12-21 RX ADMIN — APIXABAN 2.5 MILLIGRAM(S): 2.5 TABLET, FILM COATED ORAL at 17:39

## 2022-12-21 RX ADMIN — Medication 25 GRAM(S): at 10:05

## 2022-12-21 RX ADMIN — MEROPENEM 1000 MILLIGRAM(S): 1 INJECTION INTRAVENOUS at 14:20

## 2022-12-21 RX ADMIN — ACETAZOLAMIDE 500 MILLIGRAM(S): 250 TABLET ORAL at 06:17

## 2022-12-21 RX ADMIN — MEROPENEM 1000 MILLIGRAM(S): 1 INJECTION INTRAVENOUS at 21:13

## 2022-12-21 RX ADMIN — MEROPENEM 1000 MILLIGRAM(S): 1 INJECTION INTRAVENOUS at 05:19

## 2022-12-21 RX ADMIN — MIDODRINE HYDROCHLORIDE 5 MILLIGRAM(S): 2.5 TABLET ORAL at 05:20

## 2022-12-21 RX ADMIN — CHLORHEXIDINE GLUCONATE 1 APPLICATION(S): 213 SOLUTION TOPICAL at 14:20

## 2022-12-21 RX ADMIN — Medication 1 TABLET(S): at 17:39

## 2022-12-21 RX ADMIN — MIDODRINE HYDROCHLORIDE 10 MILLIGRAM(S): 2.5 TABLET ORAL at 17:39

## 2022-12-21 RX ADMIN — Medication 81 MILLIGRAM(S): at 14:19

## 2022-12-21 RX ADMIN — APIXABAN 2.5 MILLIGRAM(S): 2.5 TABLET, FILM COATED ORAL at 05:20

## 2022-12-21 RX ADMIN — Medication 40 MILLIGRAM(S): at 17:40

## 2022-12-21 RX ADMIN — Medication 40 MILLIGRAM(S): at 05:20

## 2022-12-21 RX ADMIN — MIDODRINE HYDROCHLORIDE 10 MILLIGRAM(S): 2.5 TABLET ORAL at 14:19

## 2022-12-21 RX ADMIN — PANTOPRAZOLE SODIUM 40 MILLIGRAM(S): 20 TABLET, DELAYED RELEASE ORAL at 05:30

## 2022-12-21 RX ADMIN — MIDODRINE HYDROCHLORIDE 5 MILLIGRAM(S): 2.5 TABLET ORAL at 10:04

## 2022-12-21 RX ADMIN — SENNA PLUS 2 TABLET(S): 8.6 TABLET ORAL at 21:12

## 2022-12-21 NOTE — PROGRESS NOTE ADULT - PROBLEM SELECTOR PLAN 1
S/p AVR, MVR, and patch repair of the aortic root on 12/13/22 with Dr Flanagan.  Blood cultures were cleared 11/27.   Continue Merrem per ID.    Home IV antibiotics being set up by case management.   PICC line order to be placed today.   Dopamine weaned off 12/19/22.   EPWs isolated.   MCOT on discharge to be set up by EP.   Continue BP support with Midodrine as patient now requiring IV diuresis for fluid overload.   Oxygenating well, coughing and deep breathing exercises/incentive spirometry encouraged.  Follow up AM CXR.   D/c CTs once output remains minimal.   Continue diuresis with IV Lasix 40BID, monitor strict I/Os, replenish electrolytes PRN to keep K>4 and Mg>2.   Continue with PT, increase activity as tolerated.  FS AC+HS with coverage for glycemic control.  Tylenol, Oxy PRN for analgesia.  Case and plan to be reviewed / discussed with CT Surgery attending / team during AM rounds.

## 2022-12-21 NOTE — PROGRESS NOTE ADULT - PROBLEM SELECTOR PLAN 4
Resolved.   Barrios remains in place as patient failed TOV 12/19.   Avoid nephrotoxic drugs.  UCx negative 12/8.

## 2022-12-21 NOTE — PROGRESS NOTE ADULT - ASSESSMENT
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 Cleared by neurosurgery for OR after MRI on 12/1/22 revealed scattered bilateral parieto-occipital acute infarcts.  LHC 12/5 with clean coronaries. Post cath patient noted with RADHA and Urinary retention with ram placed 12/8. Patient now s/p AVR, MVR, and patch repair of the aortic root on 12/13/22 with Dr Flanagan. Postoperative course significant for pacing requirements due to asystole, with patient regaining rhythm 12/15 (Dopamine for inotropic support now weaned off, plan for MCOT monitor on discharge to be followed by EP), hypotension (on Midodrine for BP support), and persistent urinary retention (failed TOV 12/19 with Ram replaced). Patient needs PICC line and home IV antibiotics to be set up.  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 Cleared by neurosurgery for OR after MRI on 12/1/22 revealed scattered bilateral parieto-occipital acute infarcts.  LHC 12/5 with clean coronaries. Post cath patient noted with RADHA and Urinary retention with ram placed 12/8. Patient now s/p AVR, MVR, and patch repair of the aortic root on 12/13/22 with Dr Flanagan. Postoperative course significant for pacing requirements due to asystole, with patient regaining rhythm 12/15 (Dopamine for inotropic support now weaned off, plan for MCOT monitor on discharge to be followed by EP), hypotension (on Midodrine for BP support), and persistent urinary retention (failed TOV 12/19 with Ram replaced). Patient needs PICC line and home IV antibiotics to be set up.

## 2022-12-21 NOTE — PROGRESS NOTE ADULT - SUBJECTIVE AND OBJECTIVE BOX
POD #8 s/p AVR (27 mm inspiris), MVR (29 mm mitral valve), Aortic Root Patch repair    PAST MEDICAL & SURGICAL HISTORY:  Hypertension  Aortic stenosis  H/O inguinal hernia repair, B/L 2013  S/P laparoscopic colectomy, right colectomy with ileotransverse anastomosis    FAMILY HISTORY:  FH: heart disease (Father, Mother)    Brief Hospital Course: 77 year old female with a medical history of HTN, Moderate AS, recent Covid infection 10/6/2022 per chart, recent admission 11/1/22 for SIRS, found to have strep anginosus bacteremia, norovirus, TTE negative for endocarditis at the time, with patient refusing LILIAN that admission, was started on Ceftriaxone (completed 11/20/22), however patient presented 11/27 with syncope, found to have AFib RVR, sepsis, copious diarrhea requiring rectal tube, and eventual LILIAN revealed 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 Cleared by neurosurgery for OR after MRI on 12/1/22 revealed scattered bilateral parieto-occipital acute infarcts.  LHC 12/5 with clean coronaries. Post cath patient noted with RADHA and Urinary retention with ram placed 12/8. Patient now s/p AVR, MVR, and patch repair of the aortic root on 12/13/22 with Dr Flanagan. Postoperative course significant for pacing requirements due to asystole, with patient regaining rhythm 12/15 (Dopamine for inotropic support now weaned off, plan for MCOT monitor on discharge to be followed by EP), hypotension (on Midodrine for BP support), and persistent urinary retention (failed TOV 12/19 with Ram replaced). Patient needs PICC line and home IV antibiotics to be set up.     Significant recent/past 24 hr events: No overnight events reported.    Subjective: Patient lying in bed in NAD. +Tolerating diet. +Passing BMs since surgery. +Pain currently controlled. "Is the heart monitor I'm getting to bring home like a life alert?" Denies fevers, chills, lightheadedness, dizziness, HA, CP, palpitations, SOB, cough, abdominal pain, N/V, diarrhea, numbness/tingling in extremities, or any other acute complaints. ROS negative x 10 systems except as noted above.    MEDICATIONS  (STANDING):  apixaban 2.5 milliGRAM(s) Oral two times a day  aspirin enteric coated 81 milliGRAM(s) Oral daily  chlorhexidine 2% Cloths 1 Application(s) Topical daily  furosemide   Injectable 40 milliGRAM(s) IV Push every 12 hours  lactobacillus acidophilus 1 Tablet(s) Oral two times a day with meals  magnesium sulfate  IVPB 2 Gram(s) IV Intermittent once  meropenem Injectable 1000 milliGRAM(s) IV Push every 8 hours  midodrine. 5 milliGRAM(s) Oral once  midodrine. 10 milliGRAM(s) Oral three times a day  pantoprazole    Tablet 40 milliGRAM(s) Oral daily  polyethylene glycol 3350 17 Gram(s) Oral daily  potassium chloride   Powder 40 milliEquivalent(s) Oral every 4 hours  senna 2 Tablet(s) Oral at bedtime  tamsulosin 0.4 milliGRAM(s) Oral at bedtime    MEDICATIONS  (PRN):  ondansetron Injectable 4 milliGRAM(s) IV Push once PRN Nausea and/or Vomiting  oxyCODONE    IR 10 milliGRAM(s) Oral every 6 hours PRN Severe Pain (7 - 10)  oxyCODONE    IR 5 milliGRAM(s) Oral every 6 hours PRN Moderate Pain (4 - 6)    Allergies: No Known Allergies    Vitals   T(C): 36.4 (21 Dec 2022 04:05), Max: 36.6 (20 Dec 2022 20:00)  T(F): 97.5 (21 Dec 2022 04:05), Max: 97.9 (20 Dec 2022 20:00)  HR: 66 (21 Dec 2022 04:05) (64 - 97)  BP: 93/51 (21 Dec 2022 04:05) (91/55 - 108/62)  RR: 18 (21 Dec 2022 04:05) (18 - 18)  SpO2: 95% (21 Dec 2022 04:05) (95% - 97%)  O2 Parameters below as of 21 Dec 2022 04:05  Patient On (Oxygen Delivery Method): room air    I&O's Detail    20 Dec 2022 07:01  -  21 Dec 2022 07:00  --------------------------------------------------------  IN:    Oral Fluid: 240 mL  Total IN: 240 mL    OUT:    Chest Tube (mL): 40 mL    Chest Tube (mL): 50 mL    Chest Tube (mL): 240 mL    Indwelling Catheter - Urethral (mL): 2000 mL    Voided (mL): 927 mL  Total OUT: 3257 mL    Total NET: -3017 mL    Physical Exam  Neuro: A+O x 3, non-focal, speech clear and intact  HEENT:  NCAT, No conjuctival edema or icterus, no thrush.    Neck:  Supple, trachea midline  Pulm: +Diminished BSs at bases bilaterally with scattered rhonchi, no accessory muscle use noted  Chest:        mediastinal CT, and Right pleural CT with dressings intact and no air leak, no subQ emphysema       +PW (isolated)  CV: regular rate, regular rhythm, +S1S2, no murmur noted  Abd: soft, NT, ND, + BS  : ram in situ to bedside drainage  Ext: MEZA x 4, +1 LE edema, no cyanosis, distal motor/neuro/circ intact  Skin: warm, dry, perfused  Incisions: midsternal incision with island dressing C/D/I, sternum stable    LABS                        8.7    15.24 )-----------( 313      ( 21 Dec 2022 04:25 )             27.0     12-21    134<L>  |  101  |  21.9<H>  ----------------------------<  86  3.4<L>   |  24.0  |  0.82    Ca    8.4      21 Dec 2022 04:25  Mg     1.8     12-21      Last CXR:  < from: Xray Chest 1 View- PORTABLE-Urgent (Xray Chest 1 View- PORTABLE-Urgent .) (12.20.22 @ 15:49) >  Impression:  First film 12/20/2022 5:33 AM shows an enlarged heart with a prosthetic aortic and mitral valve. Bilateral chest tubes are in place.. There is a small rightapical pneumothorax. Bibasilar atelectasis. The patient has sternotomy wires and staples. Degenerative changes are seen in the bones.  Follow-up film is from 12:15 PM shows interval removal of the left chest tube without pneumothorax. Bibasilar atelectasis remains, left greater than right. Stable small right apical pneumothorax.  < end of copied text >    Last TTE:  < from: TTE Echo Complete w/ Contrast w/ Doppler (12.19.22 @ 15:45) >  Summary:   1. Technically difficult study.   2. Endocardial visualization was enhanced with intravenous echo contrast.   3. Hyperdynamic global left ventricular systolic function.   4. LV Ejection Fraction by Tucker's Method with a biplane EF of 73 %.   5. There is mild concentric left ventricular hypertrophy.   6. The left ventricular diastolic function could not be assessed in this study.   7. Mildly enlarged right ventricle.   8. Mildly enlarged left atrium.   9. Trivial pericardial effusion.  10. A bioprosthetic valve is present in the mitral position.  11. Mitral prosthesis is functioning normally.  12. Bioprosthesis in the aortic position.  < end of copied text >     POD #8 s/p AVR (27 mm inspiris), MVR (29 mm mitral valve), Aortic Root Patch repair    PAST MEDICAL & SURGICAL HISTORY:  Hypertension  Aortic stenosis  H/O inguinal hernia repair, B/L 2013  S/P laparoscopic colectomy, right colectomy with ileotransverse anastomosis    FAMILY HISTORY:  FH: heart disease (Father, Mother)    Brief Hospital Course: 77 year old male with a medical history of HTN, Moderate AS, recent Covid infection 10/6/2022 per chart, recent admission 11/1/22 for SIRS, found to have strep anginosus bacteremia, norovirus, TTE negative for endocarditis at the time, with patient refusing LILIAN that admission, was started on Ceftriaxone (completed 11/20/22), however patient presented 11/27 with syncope, found to have AFib RVR, sepsis, copious diarrhea requiring rectal tube, and eventual LILIAN revealed 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 Cleared by neurosurgery for OR after MRI on 12/1/22 revealed scattered bilateral parieto-occipital acute infarcts.  LHC 12/5 with clean coronaries. Post cath patient noted with RADHA and Urinary retention with ram placed 12/8. Patient now s/p AVR, MVR, and patch repair of the aortic root on 12/13/22 with Dr Flanagan. Postoperative course significant for pacing requirements due to asystole, with patient regaining rhythm 12/15 (Dopamine for inotropic support now weaned off, plan for MCOT monitor on discharge to be followed by EP), hypotension (on Midodrine for BP support), and persistent urinary retention (failed TOV 12/19 with Ram replaced). Patient needs PICC line and home IV antibiotics to be set up.     Significant recent/past 24 hr events: No overnight events reported.    Subjective: Patient lying in bed in NAD. +Tolerating diet. +Passing BMs since surgery. +Pain currently controlled. "Is the heart monitor I'm getting to bring home like a life alert?" Denies fevers, chills, lightheadedness, dizziness, HA, CP, palpitations, SOB, cough, abdominal pain, N/V, diarrhea, numbness/tingling in extremities, or any other acute complaints. ROS negative x 10 systems except as noted above.    MEDICATIONS  (STANDING):  apixaban 2.5 milliGRAM(s) Oral two times a day  aspirin enteric coated 81 milliGRAM(s) Oral daily  chlorhexidine 2% Cloths 1 Application(s) Topical daily  furosemide   Injectable 40 milliGRAM(s) IV Push every 12 hours  lactobacillus acidophilus 1 Tablet(s) Oral two times a day with meals  magnesium sulfate  IVPB 2 Gram(s) IV Intermittent once  meropenem Injectable 1000 milliGRAM(s) IV Push every 8 hours  midodrine. 5 milliGRAM(s) Oral once  midodrine. 10 milliGRAM(s) Oral three times a day  pantoprazole    Tablet 40 milliGRAM(s) Oral daily  polyethylene glycol 3350 17 Gram(s) Oral daily  potassium chloride   Powder 40 milliEquivalent(s) Oral every 4 hours  senna 2 Tablet(s) Oral at bedtime  tamsulosin 0.4 milliGRAM(s) Oral at bedtime    MEDICATIONS  (PRN):  ondansetron Injectable 4 milliGRAM(s) IV Push once PRN Nausea and/or Vomiting  oxyCODONE    IR 10 milliGRAM(s) Oral every 6 hours PRN Severe Pain (7 - 10)  oxyCODONE    IR 5 milliGRAM(s) Oral every 6 hours PRN Moderate Pain (4 - 6)    Allergies: No Known Allergies    Vitals   T(C): 36.4 (21 Dec 2022 04:05), Max: 36.6 (20 Dec 2022 20:00)  T(F): 97.5 (21 Dec 2022 04:05), Max: 97.9 (20 Dec 2022 20:00)  HR: 66 (21 Dec 2022 04:05) (64 - 97)  BP: 93/51 (21 Dec 2022 04:05) (91/55 - 108/62)  RR: 18 (21 Dec 2022 04:05) (18 - 18)  SpO2: 95% (21 Dec 2022 04:05) (95% - 97%)  O2 Parameters below as of 21 Dec 2022 04:05  Patient On (Oxygen Delivery Method): room air    I&O's Detail    20 Dec 2022 07:01  -  21 Dec 2022 07:00  --------------------------------------------------------  IN:    Oral Fluid: 240 mL  Total IN: 240 mL    OUT:    Chest Tube (mL): 40 mL    Chest Tube (mL): 50 mL    Chest Tube (mL): 240 mL    Indwelling Catheter - Urethral (mL): 2000 mL    Voided (mL): 927 mL  Total OUT: 3257 mL    Total NET: -3017 mL    Physical Exam  Neuro: A+O x 3, non-focal, speech clear and intact  HEENT:  NCAT, No conjuctival edema or icterus, no thrush.    Neck:  Supple, trachea midline  Pulm: +Diminished BSs at bases bilaterally with scattered rhonchi, no accessory muscle use noted  Chest:        mediastinal CT, and Right pleural CT with dressings intact and no air leak, no subQ emphysema       +PW (isolated)  CV: regular rate, regular rhythm, +S1S2, no murmur noted  Abd: soft, NT, ND, + BS  : ram in situ to bedside drainage  Ext: MEZA x 4, +1 LE edema, no cyanosis, distal motor/neuro/circ intact  Skin: warm, dry, perfused  Incisions: midsternal incision with island dressing C/D/I, sternum stable    LABS                        8.7    15.24 )-----------( 313      ( 21 Dec 2022 04:25 )             27.0     12-21    134<L>  |  101  |  21.9<H>  ----------------------------<  86  3.4<L>   |  24.0  |  0.82    Ca    8.4      21 Dec 2022 04:25  Mg     1.8     12-21      Last CXR:  < from: Xray Chest 1 View- PORTABLE-Urgent (Xray Chest 1 View- PORTABLE-Urgent .) (12.20.22 @ 15:49) >  Impression:  First film 12/20/2022 5:33 AM shows an enlarged heart with a prosthetic aortic and mitral valve. Bilateral chest tubes are in place.. There is a small rightapical pneumothorax. Bibasilar atelectasis. The patient has sternotomy wires and staples. Degenerative changes are seen in the bones.  Follow-up film is from 12:15 PM shows interval removal of the left chest tube without pneumothorax. Bibasilar atelectasis remains, left greater than right. Stable small right apical pneumothorax.  < end of copied text >    Last TTE:  < from: TTE Echo Complete w/ Contrast w/ Doppler (12.19.22 @ 15:45) >  Summary:   1. Technically difficult study.   2. Endocardial visualization was enhanced with intravenous echo contrast.   3. Hyperdynamic global left ventricular systolic function.   4. LV Ejection Fraction by Tucker's Method with a biplane EF of 73 %.   5. There is mild concentric left ventricular hypertrophy.   6. The left ventricular diastolic function could not be assessed in this study.   7. Mildly enlarged right ventricle.   8. Mildly enlarged left atrium.   9. Trivial pericardial effusion.  10. A bioprosthetic valve is present in the mitral position.  11. Mitral prosthesis is functioning normally.  12. Bioprosthesis in the aortic position.  < end of copied text >

## 2022-12-21 NOTE — PROGRESS NOTE ADULT - SUBJECTIVE AND OBJECTIVE BOX
INFECTIOUS DISEASES AND INTERNAL MEDICINE at [facility]  =======================================================  Flip Pederson MD  Diplomates [employer] Board of Internal Medicine and Infectious Diseases  Telephone [phone]  Fax            [phone]  =======================================================    HANNAH [id number]      ID f/u- fever, endocarditis    s/p AVR, MVR and patch repair of aortic root on   seen earlier today  had some diarrhea but resolved  feels well  failed TOV and ram in place      ANTIBIOTICS  meropenem  IVPB 1000 milliGRAM(s) IV Intermittent every 8 hours      Allergies    No Known Allergies    Intolerances      Vital Signs Last 24 Hrs  T(C): 36.7 (21 Dec 2022 16:29), Max: 36.7 (21 Dec 2022 16:29)  T(F): 98 (21 Dec 2022 16:29), Max: 98 (21 Dec 2022 16:29)  HR: 72 (21 Dec 2022 17:35) (57 - 72)  BP: 114/48 (21 Dec 2022 17:35) (90/50 - 114/48)  BP(mean): --  RR: 18 (21 Dec 2022 17:35) (18 - 18)  SpO2: 99% (21 Dec 2022 17:35) (95% - 99%)    Parameters below as of 21 Dec 2022 17:35  Patient On (Oxygen Delivery Method): room air          Parameters below as of 12 Dec 2022 17:12  Patient On (Oxygen Delivery Method): room air        REVIEW OF SYSTEMS:    CONSTITUTIONAL:  As per HPI.    HEENT:  Eyes:  No diplopia or blurred vision. ENT:  No earache, sore throat or runny nose.    CARDIOVASCULAR:  No pressure, squeezing, strangling, tightness, heaviness or aching about the chest, neck, axilla or epigastrium.    RESPIRATORY:  No cough, shortness of breath, PND or orthopnea.    GASTROINTESTINAL:  No nausea, vomiting or diarrhea.    GENITOURINARY:  No dysuria, frequency or urgency.    MUSCULOSKELETAL:  As per HPI.    SKIN:  No change in skin, hair or nails.    NEUROLOGIC: as per hpi        PHYSICAL EXAMINATION:    GENERAL: nad    HEENT: Head is normocephalic and atraumatic.  ANICTERIC  NECK: Supple. No carotid bruits.  No lymphadenopathy or thyromegaly.    LUNGS :clear BREATH SOUNDS    HEART: Regular rate and rhythm without murmur. dressing c/di, 2 chest tubes in place    ABDOMEN: Soft, nontender, and nondistended.  Positive bowel sounds.  No hepatosplenomegaly was noted. NO REBOUND NO GUARDING + ram dark urine     EXTREMITIES: NO EDEMA NO ERYTHEMA    NEUROLOGIC: NON FOCAL      SKIN: No ulceration or induration present. NO RASH             LABS:                                                         8.7    15.24 )-----------( 313      ( 21 Dec 2022 04:25 )             27.0       12-21    128<L>  |  98  |  22.0<H>  ----------------------------<  92  5.0   |  21.0<L>  |  0.81    Ca    8.4      21 Dec 2022 15:19  Mg     1.8     12-                              CAPILLARY BLOOD GLUCOSE                                    CAPILLARY BLOOD GLUCOSE      POCT Blood Glucose.: 123 mg/dL (16 Dec 2022 16:32)              ABG - ( 14 Dec 2022 01:28 )  pH, Arterial: 7.410 pH, Blood: x     /  pCO2: 38    /  pO2: 125   / HCO3: 24    / Base Excess: -0.5  /  SaO2: 100.0                   PT/INR - ( 14 Dec 2022 02:12 )   PT: 12.7 sec;   INR: 1.09 ratio         PTT - ( 14 Dec 2022 02:12 )  PTT:29.8 sec    CARDIAC MARKERS ( 14 Dec 2022 02:12 )  x     / 1.21 ng/mL / 388 U/L / x     / 36.5 ng/mL  CARDIAC MARKERS ( 13 Dec 2022 16:00 )  x     / 1.07 ng/mL / 391 U/L / x     / 59.9 ng/mL        CAPILLARY BLOOD GLUCOSE      POCT Blood Glucose.: 143 mg/dL (14 Dec 2022 16:49)                          CAPILLARY BLOOD GLUCOSE                    Color: Yellow / Appearance: Clear / S.010 / pH: x  Gluc: x / Ketone: Negative  / Bili: Negative / Urobili: Negative mg/dL   Blood: x / Protein: 30 mg/dL / Nitrite: Negative   Leuk Esterase: Negative / RBC: 6-10 /HPF / WBC 0-2 /HPF   Sq Epi: x / Non Sq Epi: Occasional / Bacteria: Few        RADIOLOGY & ADDITIONAL STUDIES:  < from: CT Chest No Cont (22 @ 03:46) >  IMPRESSION:  No pneumonia.  No acute CT findings in the abdomen or pelvis.    VERTEBRAL BODY ANALYSIS: Low bone density as described above, consider   further workup for osteoporosis.        --- End of Report ---        < end of copied text >        < from: LILIAN Echo Doppler (22 @ 14:52) >  Summary:   1. Left ventricular ejection fraction, by visual estimation, is 55 to   60%.   2. Normal global left ventricular systolic function.   3. The mitral in-flow pattern reveals no discernable A-wave, therefore   no comment on diastolic function can be made.   4. Normal right ventricular size and function, inadequate estimation of   RVSP.   5. Mildly enlarged left atrium.   6. Moderate mitral valve regurgitation, jet is eccentric posteriorly   directed.   7. Thickening of bileaflet mitral valve with subcentimeter filamentous  mobile lesions suggestive of vegetations.   8. Color flow doppler and intravenous injection of agitated saline   demonstrates the presence of an intact intra atrial septum.   9. No left atrial appendage thrombus and normal left atrial appendage   velocities.  10. Structurally normal tricuspid valve, with normal leaflet excursion.  11. Heavily calcified noncoronary cusp with moderate aortic valve   stenosis, ELDON (by 2D planimetry 1.34 cm2), peak velocity 2.8 m/s and mean   gradient of 15 mmHg.  12. Mild to moderate aortic regurgitation.  13. There is mild echolucency and thickening of the sinus of Valsalva   inbetween the left and non-coronary cusp, cannot exclude early root   abscess.  14. Mild simple atheromas at the aortic arch/descendingaorta.  15. There is no evidence of pericardial effusion.  16. No prior LILIAN study is available for comparison. 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 Cardioversion   deferred due to the presence of underlying infection/endocarditis.    John Frost DO Electronically signed on 2022 at 3:39:01 PM    < end of copied text >      < from: MR Head No Cont (22 @ 21:35) >  IMPRESSION:  Scattered foci of restricted diffusion in the bilateral parieto-occipital   lobes, suggestive of acute ischemia. No intracranial hemorrhage or mass   effect.    Given the bilateral distribution of involvement, embolic phenomenon is   suggested, with underlying septic emboli not excluded. Further evaluation   by contrast-enhanced MRI may provide further characterization.      --- End of Report ---    < end of copied text >    < from: NM Inflammatory Loc Wholebody WBC, Single Day (22 @ 11:02) >  IMPRESSION: Normal Indium-111 labeled leukocyte scan. No scan evidence of   infection.    --- End of Report ---        < end of copied text >        < from: MR Head No Cont (22 @ 21:35) >  IMPRESSION:  Scattered foci of restricted diffusion in the bilateral parieto-occipital   lobes, suggestive of acute ischemia. No intracranial hemorrhage or mass   effect.    Given the bilateral distribution of involvement, embolic phenomenon is   suggested, with underlying septic emboli not excluded. Further evaluation   by contrast-enhanced MRI may provide further characterization.    < end of copied text >          ASSESSMENT/PLAN    77 year old male with PMHx of recent  COVID  on 10/6/22, HTN , Vit B12 deficiency, presenting to the ED on  with body aches, fatigue and fever (Tmax 102) at home for 12 days found to have streptococcus bacteremia and admitted with unclear source, no recent dental work, skin infections, no respiratory symptoms. Pan scan was negative at that time ( LILIAN was recommended but pt refused) He was treated for Bacteremia ( Blood cultures + streptococcus anginosis pansensitive ) and norovirus with supportive care and contact isolation . He was discharged home on  with IV Rocephin via PICC line. Recommendation was to continue ABX  daily via pic end 22    he  presented to ER after and episode of syncope and collapse, found to be septic, hypotensive, hypoxic and febrile in the ER. He is unsure of mechanism of fall but remembers being on the ground no reported LOC . He was found face-down on bathroom floor buy family with left leg wedged under cabinet. On my interview he is alert and oriented to person place and time, he has a baseline studder in his speech pattern but otherwise neurologically intact without deficit  In response to hypotension he failed to respond to Inital sepsis fluid protocol in ER is required IV pressor in form of levophed to maintain MAP greater than 60 -65. In the ER he was febrile with initial labs significant for WBC of 22.4 , ProCal of 21.2 first lactate 4.9 via abg with repeat post fluids of 2.2 venous sample.   PT admitted to [facility] with septic shock unclear source, now off pressors. Found to have new onset A fib and on amio. 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 septic shock on admission was likely due to lack of source control and not antibiotic failure- will d/w  Ct surgeon  picc when closer to discharge  If pacemaker is needed now, leadless pacemaker is preferred    d/w cticu, pharmacy  will f/u

## 2022-12-22 LAB
ANION GAP SERPL CALC-SCNC: 9 MMOL/L — SIGNIFICANT CHANGE UP (ref 5–17)
BLD GP AB SCN SERPL QL: SIGNIFICANT CHANGE UP
BUN SERPL-MCNC: 19.2 MG/DL — SIGNIFICANT CHANGE UP (ref 8–20)
CALCIUM SERPL-MCNC: 8.4 MG/DL — SIGNIFICANT CHANGE UP (ref 8.4–10.5)
CHLORIDE SERPL-SCNC: 102 MMOL/L — SIGNIFICANT CHANGE UP (ref 96–108)
CO2 SERPL-SCNC: 24 MMOL/L — SIGNIFICANT CHANGE UP (ref 22–29)
CREAT SERPL-MCNC: 0.77 MG/DL — SIGNIFICANT CHANGE UP (ref 0.5–1.3)
EGFR: 92 ML/MIN/1.73M2 — SIGNIFICANT CHANGE UP
GLUCOSE SERPL-MCNC: 80 MG/DL — SIGNIFICANT CHANGE UP (ref 70–99)
HCT VFR BLD CALC: 26.3 % — LOW (ref 39–50)
HCT VFR BLD CALC: 35.8 % — LOW (ref 39–50)
HGB BLD-MCNC: 11.4 G/DL — LOW (ref 13–17)
HGB BLD-MCNC: 8.5 G/DL — LOW (ref 13–17)
MAGNESIUM SERPL-MCNC: 1.8 MG/DL — SIGNIFICANT CHANGE UP (ref 1.8–2.6)
MCHC RBC-ENTMCNC: 29.7 PG — SIGNIFICANT CHANGE UP (ref 27–34)
MCHC RBC-ENTMCNC: 29.7 PG — SIGNIFICANT CHANGE UP (ref 27–34)
MCHC RBC-ENTMCNC: 31.8 GM/DL — LOW (ref 32–36)
MCHC RBC-ENTMCNC: 32.3 GM/DL — SIGNIFICANT CHANGE UP (ref 32–36)
MCV RBC AUTO: 92 FL — SIGNIFICANT CHANGE UP (ref 80–100)
MCV RBC AUTO: 93.2 FL — SIGNIFICANT CHANGE UP (ref 80–100)
PLATELET # BLD AUTO: 329 K/UL — SIGNIFICANT CHANGE UP (ref 150–400)
PLATELET # BLD AUTO: 410 K/UL — HIGH (ref 150–400)
POTASSIUM SERPL-MCNC: 3.7 MMOL/L — SIGNIFICANT CHANGE UP (ref 3.5–5.3)
POTASSIUM SERPL-SCNC: 3.7 MMOL/L — SIGNIFICANT CHANGE UP (ref 3.5–5.3)
RBC # BLD: 2.86 M/UL — LOW (ref 4.2–5.8)
RBC # BLD: 3.84 M/UL — LOW (ref 4.2–5.8)
RBC # FLD: 13.5 % — SIGNIFICANT CHANGE UP (ref 10.3–14.5)
RBC # FLD: 14.2 % — SIGNIFICANT CHANGE UP (ref 10.3–14.5)
SODIUM SERPL-SCNC: 135 MMOL/L — SIGNIFICANT CHANGE UP (ref 135–145)
WBC # BLD: 11.77 K/UL — HIGH (ref 3.8–10.5)
WBC # BLD: 17.69 K/UL — HIGH (ref 3.8–10.5)
WBC # FLD AUTO: 11.77 K/UL — HIGH (ref 3.8–10.5)
WBC # FLD AUTO: 17.69 K/UL — HIGH (ref 3.8–10.5)

## 2022-12-22 PROCEDURE — 71045 X-RAY EXAM CHEST 1 VIEW: CPT | Mod: 26

## 2022-12-22 RX ORDER — MAGNESIUM SULFATE 500 MG/ML
2 VIAL (ML) INJECTION ONCE
Refills: 0 | Status: COMPLETED | OUTPATIENT
Start: 2022-12-22 | End: 2022-12-22

## 2022-12-22 RX ORDER — POTASSIUM CHLORIDE 20 MEQ
40 PACKET (EA) ORAL EVERY 6 HOURS
Refills: 0 | Status: COMPLETED | OUTPATIENT
Start: 2022-12-22 | End: 2022-12-22

## 2022-12-22 RX ADMIN — CHLORHEXIDINE GLUCONATE 1 APPLICATION(S): 213 SOLUTION TOPICAL at 11:30

## 2022-12-22 RX ADMIN — MEROPENEM 1000 MILLIGRAM(S): 1 INJECTION INTRAVENOUS at 05:41

## 2022-12-22 RX ADMIN — MIDODRINE HYDROCHLORIDE 10 MILLIGRAM(S): 2.5 TABLET ORAL at 05:42

## 2022-12-22 RX ADMIN — MEROPENEM 1000 MILLIGRAM(S): 1 INJECTION INTRAVENOUS at 21:47

## 2022-12-22 RX ADMIN — TAMSULOSIN HYDROCHLORIDE 0.4 MILLIGRAM(S): 0.4 CAPSULE ORAL at 21:47

## 2022-12-22 RX ADMIN — MEROPENEM 1000 MILLIGRAM(S): 1 INJECTION INTRAVENOUS at 13:52

## 2022-12-22 RX ADMIN — Medication 25 GRAM(S): at 06:57

## 2022-12-22 RX ADMIN — Medication 40 MILLIGRAM(S): at 17:49

## 2022-12-22 RX ADMIN — PANTOPRAZOLE SODIUM 40 MILLIGRAM(S): 20 TABLET, DELAYED RELEASE ORAL at 05:42

## 2022-12-22 RX ADMIN — Medication 81 MILLIGRAM(S): at 11:27

## 2022-12-22 RX ADMIN — Medication 40 MILLIEQUIVALENT(S): at 11:28

## 2022-12-22 RX ADMIN — APIXABAN 2.5 MILLIGRAM(S): 2.5 TABLET, FILM COATED ORAL at 05:42

## 2022-12-22 RX ADMIN — MIDODRINE HYDROCHLORIDE 10 MILLIGRAM(S): 2.5 TABLET ORAL at 17:52

## 2022-12-22 RX ADMIN — Medication 40 MILLIEQUIVALENT(S): at 07:54

## 2022-12-22 RX ADMIN — Medication 1 TABLET(S): at 07:54

## 2022-12-22 RX ADMIN — MIDODRINE HYDROCHLORIDE 10 MILLIGRAM(S): 2.5 TABLET ORAL at 11:28

## 2022-12-22 RX ADMIN — Medication 1 TABLET(S): at 17:47

## 2022-12-22 RX ADMIN — APIXABAN 2.5 MILLIGRAM(S): 2.5 TABLET, FILM COATED ORAL at 17:49

## 2022-12-22 RX ADMIN — Medication 40 MILLIGRAM(S): at 05:42

## 2022-12-22 NOTE — PROGRESS NOTE ADULT - PROBLEM SELECTOR PLAN 1
S/p AVR, MVR, and patch repair of the aortic root on 12/13/22 with Dr Flanagan.  Blood cultures were cleared 11/27.   Continue Merrem per ID.    Home IV antibiotics being set up by case management.   PICC line order to be placed today/tomorrow.   Dopamine weaned off 12/19/22.   EPWs isolated.   MCOT on discharge to be set up by EP.   Continue BP support with Midodrine as patient now requiring IV diuresis for fluid overload.   Oxygenating well, coughing and deep breathing exercises/incentive spirometry encouraged.  Follow up AM CXR.   D/c CTs once output remains minimal.   Continue diuresis with IV Lasix 40BID, monitor strict I/Os, replenish electrolytes PRN to keep K>4 and Mg>2.   Continue with PT, increase activity as tolerated.  Tylenol, Oxy PRN for analgesia.  Case and plan to be reviewed / discussed with CT Surgery attending / team during AM rounds.

## 2022-12-22 NOTE — PROGRESS NOTE ADULT - SUBJECTIVE AND OBJECTIVE BOX
POD #9 s/p AVR (27 mm inspiris), MVR (29 mm mitral valve), Aortic Root Patch repair    PAST MEDICAL & SURGICAL HISTORY:  Hypertension  Aortic stenosis  H/O inguinal hernia repair, B/L 2013  S/P laparoscopic colectomy, right colectomy with ileotransverse anastomosis    FAMILY HISTORY:  FH: heart disease (Father, Mother)    Brief Hospital Course: 77 year old female with a medical history of HTN, Moderate AS, recent Covid infection 10/6/2022 per chart, recent admission 11/1/22 for SIRS, found to have strep anginosus bacteremia, norovirus, TTE negative for endocarditis at the time, with patient refusing LILIAN that admission, was started on Ceftriaxone (completed 11/20/22), however patient presented 11/27 with syncope, found to have AFib RVR, sepsis, copious diarrhea requiring rectal tube, and eventual LILIAN revealed 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 Cleared by neurosurgery for OR after MRI on 12/1/22 revealed scattered bilateral parieto-occipital acute infarcts.  LHC 12/5 with clean coronaries. Post cath patient noted with RADHA and Urinary retention with ram placed 12/8. Patient now s/p AVR, MVR, and patch repair of the aortic root on 12/13/22 with Dr Flanagan. Postoperative course significant for pacing requirements due to asystole, with patient regaining rhythm 12/15 (Dopamine for inotropic support now weaned off, plan for MCOT monitor on discharge to be followed by EP), hypotension (on Midodrine for BP support), and persistent urinary retention (failed TOV 12/19 with Ram replaced). Patient needs PICC line and home IV antibiotics to be set up.     Significant recent/past 24 hr events: No overnight events reported.    Subjective: Patient lying in bed in NAD. +Tolerating diet. +Passing BMs since surgery. +Pain currently controlled. Denies fevers, chills, lightheadedness, dizziness, HA, CP, palpitations, SOB, cough, abdominal pain, N/V, diarrhea, numbness/tingling in extremities, or any other acute complaints. ROS negative x 10 systems except as noted above.    MEDICATIONS  (STANDING):  apixaban 2.5 milliGRAM(s) Oral two times a day  aspirin enteric coated 81 milliGRAM(s) Oral daily  chlorhexidine 2% Cloths 1 Application(s) Topical daily  furosemide   Injectable 40 milliGRAM(s) IV Push every 12 hours  lactobacillus acidophilus 1 Tablet(s) Oral two times a day with meals  meropenem Injectable 1000 milliGRAM(s) IV Push every 8 hours  midodrine. 10 milliGRAM(s) Oral three times a day  pantoprazole    Tablet 40 milliGRAM(s) Oral daily  polyethylene glycol 3350 17 Gram(s) Oral daily  senna 2 Tablet(s) Oral at bedtime  tamsulosin 0.4 milliGRAM(s) Oral at bedtime    MEDICATIONS  (PRN):  ondansetron Injectable 4 milliGRAM(s) IV Push once PRN Nausea and/or Vomiting    Allergies: No Known Allergies    Vitals   T(C): 36.5 (22 Dec 2022 00:00), Max: 36.7 (21 Dec 2022 16:29)  T(F): 97.7 (22 Dec 2022 00:00), Max: 98 (21 Dec 2022 16:29)  HR: 62 (22 Dec 2022 00:00) (50 - 72)  BP: 101/52 (22 Dec 2022 00:00) (90/50 - 114/48)  RR: 18 (22 Dec 2022 00:00) (18 - 18)  SpO2: 98% (22 Dec 2022 00:00) (95% - 99%)  O2 Parameters below as of 22 Dec 2022 00:00  Patient On (Oxygen Delivery Method): room air    I&O's Detail    20 Dec 2022 07:01  -  21 Dec 2022 07:00  --------------------------------------------------------  IN:    Oral Fluid: 240 mL  Total IN: 240 mL    OUT:    Chest Tube (mL): 50 mL    Chest Tube (mL): 240 mL    Chest Tube (mL): 40 mL    Indwelling Catheter - Urethral (mL): 2000 mL    Voided (mL): 927 mL  Total OUT: 3257 mL    Total NET: -3017 mL      21 Dec 2022 07:01  -  22 Dec 2022 03:18  --------------------------------------------------------  IN:    Oral Fluid: 120 mL  Total IN: 120 mL    OUT:    Chest Tube (mL): 25 mL    Chest Tube (mL): 190 mL    Indwelling Catheter - Urethral (mL): 1800 mL  Total OUT: 2015 mL    Total NET: -1895 mL    Physical Exam  Neuro: A+O x 3, non-focal, speech clear and intact  HEENT:  NCAT, No conjuctival edema or icterus, no thrush.    Neck:  Supple, trachea midline  Pulm: +Diminished BSs at bases bilaterally, no accessory muscle use noted  Chest:        mediastinal CT, and Right pleural CT with dressings intact and no air leak, no subQ emphysema       +PW (isolated)  CV: regular rate, regular rhythm, +S1S2, no murmur noted  Abd: soft, NT, ND, + BS  : ram in situ to bedside drainage  Ext: MEZA x 4, +1 LE edema, no cyanosis, distal motor/neuro/circ intact  Skin: warm, dry, perfused  Incisions: midsternal incision with island dressing C/D/I, sternum stable    LABS                        8.7    15.24 )-----------( 313      ( 21 Dec 2022 04:25 )             27.0     12-21    128<L>  |  98  |  22.0<H>  ----------------------------<  92  5.0   |  21.0<L>  |  0.81    Ca    8.4      21 Dec 2022 15:19  Mg     1.8     12-21    Last CXR:  < from: Xray Chest 1 View- PORTABLE-Routine (Xray Chest 1 View- PORTABLE-Routine in AM.) (12.21.22 @ 06:11) >  Frontal expiratory view of the chest shows the heart to be similar in size. Sternal wires, cardiac valve rings and right chest tube are again noted.  The lungs show less left base atelectasis with small right apical pneumothorax and there is no evidence of pleural effusion.  IMPRESSION:  Small right apical pneumothorax.  < end of copied text >     POD #9 s/p AVR (27 mm inspiris), MVR (29 mm mitral valve), Aortic Root Patch repair    PAST MEDICAL & SURGICAL HISTORY:  Hypertension  Aortic stenosis  H/O inguinal hernia repair, B/L 2013  S/P laparoscopic colectomy, right colectomy with ileotransverse anastomosis    FAMILY HISTORY:  FH: heart disease (Father, Mother)    Brief Hospital Course: 77 year old male with a medical history of HTN, Moderate AS, recent Covid infection 10/6/2022 per chart, recent admission 11/1/22 for SIRS, found to have strep anginosus bacteremia, norovirus, TTE negative for endocarditis at the time, with patient refusing LILIAN that admission, was started on Ceftriaxone (completed 11/20/22), however patient presented 11/27 with syncope, found to have AFib RVR, sepsis, copious diarrhea requiring rectal tube, and eventual LILIAN revealed 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 Cleared by neurosurgery for OR after MRI on 12/1/22 revealed scattered bilateral parieto-occipital acute infarcts.  LHC 12/5 with clean coronaries. Post cath patient noted with RADHA and Urinary retention with ram placed 12/8. Patient now s/p AVR, MVR, and patch repair of the aortic root on 12/13/22 with Dr Flanagan. Postoperative course significant for pacing requirements due to asystole, with patient regaining rhythm 12/15 (Dopamine for inotropic support now weaned off, plan for MCOT monitor on discharge to be followed by EP), hypotension (on Midodrine for BP support), and persistent urinary retention (failed TOV 12/19 with Ram replaced). Patient needs PICC line and home IV antibiotics to be set up.     Significant recent/past 24 hr events: No overnight events reported.    Subjective: Patient lying in bed in NAD. +Tolerating diet. +Passing BMs since surgery. +Pain currently controlled. Denies fevers, chills, lightheadedness, dizziness, HA, CP, palpitations, SOB, cough, abdominal pain, N/V, diarrhea, numbness/tingling in extremities, or any other acute complaints. ROS negative x 10 systems except as noted above.    MEDICATIONS  (STANDING):  apixaban 2.5 milliGRAM(s) Oral two times a day  aspirin enteric coated 81 milliGRAM(s) Oral daily  chlorhexidine 2% Cloths 1 Application(s) Topical daily  furosemide   Injectable 40 milliGRAM(s) IV Push every 12 hours  lactobacillus acidophilus 1 Tablet(s) Oral two times a day with meals  meropenem Injectable 1000 milliGRAM(s) IV Push every 8 hours  midodrine. 10 milliGRAM(s) Oral three times a day  pantoprazole    Tablet 40 milliGRAM(s) Oral daily  polyethylene glycol 3350 17 Gram(s) Oral daily  senna 2 Tablet(s) Oral at bedtime  tamsulosin 0.4 milliGRAM(s) Oral at bedtime    MEDICATIONS  (PRN):  ondansetron Injectable 4 milliGRAM(s) IV Push once PRN Nausea and/or Vomiting    Allergies: No Known Allergies    Vitals   T(C): 36.5 (22 Dec 2022 00:00), Max: 36.7 (21 Dec 2022 16:29)  T(F): 97.7 (22 Dec 2022 00:00), Max: 98 (21 Dec 2022 16:29)  HR: 62 (22 Dec 2022 00:00) (50 - 72)  BP: 101/52 (22 Dec 2022 00:00) (90/50 - 114/48)  RR: 18 (22 Dec 2022 00:00) (18 - 18)  SpO2: 98% (22 Dec 2022 00:00) (95% - 99%)  O2 Parameters below as of 22 Dec 2022 00:00  Patient On (Oxygen Delivery Method): room air    I&O's Detail    20 Dec 2022 07:01  -  21 Dec 2022 07:00  --------------------------------------------------------  IN:    Oral Fluid: 240 mL  Total IN: 240 mL    OUT:    Chest Tube (mL): 50 mL    Chest Tube (mL): 240 mL    Chest Tube (mL): 40 mL    Indwelling Catheter - Urethral (mL): 2000 mL    Voided (mL): 927 mL  Total OUT: 3257 mL    Total NET: -3017 mL      21 Dec 2022 07:01  -  22 Dec 2022 03:18  --------------------------------------------------------  IN:    Oral Fluid: 120 mL  Total IN: 120 mL    OUT:    Chest Tube (mL): 25 mL    Chest Tube (mL): 190 mL    Indwelling Catheter - Urethral (mL): 1800 mL  Total OUT: 2015 mL    Total NET: -1895 mL    Physical Exam  Neuro: A+O x 3, non-focal, speech clear and intact  HEENT:  NCAT, No conjuctival edema or icterus, no thrush.    Neck:  Supple, trachea midline  Pulm: +Diminished BSs at bases bilaterally, no accessory muscle use noted  Chest:        mediastinal CT, and Right pleural CT with dressings intact and no air leak, no subQ emphysema       +PW (isolated)  CV: regular rate, regular rhythm, +S1S2, no murmur noted  Abd: soft, NT, ND, + BS  : ram in situ to bedside drainage  Ext: MEZA x 4, +1 LE edema, no cyanosis, distal motor/neuro/circ intact  Skin: warm, dry, perfused  Incisions: midsternal incision with island dressing C/D/I, sternum stable    LABS                        8.7    15.24 )-----------( 313      ( 21 Dec 2022 04:25 )             27.0     12-21    128<L>  |  98  |  22.0<H>  ----------------------------<  92  5.0   |  21.0<L>  |  0.81    Ca    8.4      21 Dec 2022 15:19  Mg     1.8     12-21    Last CXR:  < from: Xray Chest 1 View- PORTABLE-Routine (Xray Chest 1 View- PORTABLE-Routine in AM.) (12.21.22 @ 06:11) >  Frontal expiratory view of the chest shows the heart to be similar in size. Sternal wires, cardiac valve rings and right chest tube are again noted.  The lungs show less left base atelectasis with small right apical pneumothorax and there is no evidence of pleural effusion.  IMPRESSION:  Small right apical pneumothorax.  < end of copied text >

## 2022-12-23 ENCOUNTER — TRANSCRIPTION ENCOUNTER (OUTPATIENT)
Age: 77
End: 2022-12-23

## 2022-12-23 LAB
ALBUMIN SERPL ELPH-MCNC: 3.7 G/DL
ALP BLD-CCNC: 70 U/L
ALT SERPL-CCNC: 49 U/L
ANION GAP SERPL CALC-SCNC: 11 MMOL/L
ANION GAP SERPL CALC-SCNC: 12 MMOL/L — SIGNIFICANT CHANGE UP (ref 5–17)
AST SERPL-CCNC: 37 U/L
BASOPHILS # BLD AUTO: 0.04 K/UL
BASOPHILS NFR BLD AUTO: 0.7 %
BILIRUB SERPL-MCNC: 0.3 MG/DL
BUN SERPL-MCNC: 12 MG/DL
BUN SERPL-MCNC: 20.1 MG/DL — HIGH (ref 8–20)
CALCIUM SERPL-MCNC: 8.9 MG/DL — SIGNIFICANT CHANGE UP (ref 8.4–10.5)
CALCIUM SERPL-MCNC: 9.8 MG/DL
CHLORIDE SERPL-SCNC: 101 MMOL/L
CHLORIDE SERPL-SCNC: 101 MMOL/L — SIGNIFICANT CHANGE UP (ref 96–108)
CO2 SERPL-SCNC: 21 MMOL/L — LOW (ref 22–29)
CO2 SERPL-SCNC: 22 MMOL/L
CREAT SERPL-MCNC: 0.81 MG/DL
CREAT SERPL-MCNC: 0.83 MG/DL — SIGNIFICANT CHANGE UP (ref 0.5–1.3)
EGFR: 90 ML/MIN/1.73M2
EGFR: 90 ML/MIN/1.73M2 — SIGNIFICANT CHANGE UP
EOSINOPHIL # BLD AUTO: 0.46 K/UL
EOSINOPHIL NFR BLD AUTO: 8.2 %
GLUCOSE SERPL-MCNC: 84 MG/DL — SIGNIFICANT CHANGE UP (ref 70–99)
GLUCOSE SERPL-MCNC: 88 MG/DL
HCT VFR BLD CALC: 34 % — LOW (ref 39–50)
HCT VFR BLD CALC: 38 %
HGB BLD-MCNC: 11.1 G/DL — LOW (ref 13–17)
HGB BLD-MCNC: 13.1 G/DL
IMM GRANULOCYTES NFR BLD AUTO: 0.4 %
LYMPHOCYTES # BLD AUTO: 1.22 K/UL
LYMPHOCYTES NFR BLD AUTO: 21.6 %
MAGNESIUM SERPL-MCNC: 2 MG/DL — SIGNIFICANT CHANGE UP (ref 1.6–2.6)
MAN DIFF?: NORMAL
MCHC RBC-ENTMCNC: 29.8 PG — SIGNIFICANT CHANGE UP (ref 27–34)
MCHC RBC-ENTMCNC: 32 PG
MCHC RBC-ENTMCNC: 32.6 GM/DL — SIGNIFICANT CHANGE UP (ref 32–36)
MCHC RBC-ENTMCNC: 34.5 GM/DL
MCV RBC AUTO: 91.4 FL — SIGNIFICANT CHANGE UP (ref 80–100)
MCV RBC AUTO: 92.7 FL
MONOCYTES # BLD AUTO: 0.68 K/UL
MONOCYTES NFR BLD AUTO: 12.1 %
NEUTROPHILS # BLD AUTO: 3.22 K/UL
NEUTROPHILS NFR BLD AUTO: 57 %
PLATELET # BLD AUTO: 396 K/UL — SIGNIFICANT CHANGE UP (ref 150–400)
PLATELET # BLD AUTO: 401 K/UL
POTASSIUM SERPL-MCNC: 4.1 MMOL/L — SIGNIFICANT CHANGE UP (ref 3.5–5.3)
POTASSIUM SERPL-SCNC: 4.1 MMOL/L — SIGNIFICANT CHANGE UP (ref 3.5–5.3)
POTASSIUM SERPL-SCNC: 4.8 MMOL/L
PROT SERPL-MCNC: 6.8 G/DL
RBC # BLD: 3.72 M/UL — LOW (ref 4.2–5.8)
RBC # BLD: 4.1 M/UL
RBC # FLD: 12.9 %
RBC # FLD: 14.1 % — SIGNIFICANT CHANGE UP (ref 10.3–14.5)
SODIUM SERPL-SCNC: 134 MMOL/L
SODIUM SERPL-SCNC: 134 MMOL/L — LOW (ref 135–145)
WBC # BLD: 13.14 K/UL — HIGH (ref 3.8–10.5)
WBC # FLD AUTO: 13.14 K/UL — HIGH (ref 3.8–10.5)
WBC # FLD AUTO: 5.64 K/UL

## 2022-12-23 PROCEDURE — 71045 X-RAY EXAM CHEST 1 VIEW: CPT | Mod: 26

## 2022-12-23 RX ADMIN — Medication 40 MILLIGRAM(S): at 18:49

## 2022-12-23 RX ADMIN — Medication 40 MILLIGRAM(S): at 22:11

## 2022-12-23 RX ADMIN — PANTOPRAZOLE SODIUM 40 MILLIGRAM(S): 20 TABLET, DELAYED RELEASE ORAL at 06:22

## 2022-12-23 RX ADMIN — Medication 40 MILLIGRAM(S): at 06:07

## 2022-12-23 RX ADMIN — MEROPENEM 1000 MILLIGRAM(S): 1 INJECTION INTRAVENOUS at 13:28

## 2022-12-23 RX ADMIN — MEROPENEM 1000 MILLIGRAM(S): 1 INJECTION INTRAVENOUS at 06:07

## 2022-12-23 RX ADMIN — Medication 40 MILLIGRAM(S): at 10:43

## 2022-12-23 RX ADMIN — MEROPENEM 1000 MILLIGRAM(S): 1 INJECTION INTRAVENOUS at 22:11

## 2022-12-23 RX ADMIN — TAMSULOSIN HYDROCHLORIDE 0.4 MILLIGRAM(S): 0.4 CAPSULE ORAL at 22:17

## 2022-12-23 RX ADMIN — CHLORHEXIDINE GLUCONATE 1 APPLICATION(S): 213 SOLUTION TOPICAL at 11:28

## 2022-12-23 RX ADMIN — MIDODRINE HYDROCHLORIDE 10 MILLIGRAM(S): 2.5 TABLET ORAL at 18:48

## 2022-12-23 RX ADMIN — APIXABAN 2.5 MILLIGRAM(S): 2.5 TABLET, FILM COATED ORAL at 06:07

## 2022-12-23 RX ADMIN — MIDODRINE HYDROCHLORIDE 10 MILLIGRAM(S): 2.5 TABLET ORAL at 11:27

## 2022-12-23 RX ADMIN — APIXABAN 2.5 MILLIGRAM(S): 2.5 TABLET, FILM COATED ORAL at 18:49

## 2022-12-23 RX ADMIN — Medication 1 TABLET(S): at 08:49

## 2022-12-23 RX ADMIN — Medication 81 MILLIGRAM(S): at 11:27

## 2022-12-23 RX ADMIN — MIDODRINE HYDROCHLORIDE 10 MILLIGRAM(S): 2.5 TABLET ORAL at 06:07

## 2022-12-23 RX ADMIN — Medication 1 TABLET(S): at 18:48

## 2022-12-23 NOTE — DISCHARGE NOTE PROVIDER - NSDCFUADDAPPT_GEN_ALL_CORE_FT
Please follow up with Dr. Flanagan on 1/20 at 1145.     Please be ~ 30 mins early on the day of your appointment to have a chest xray (script is in your folder) taken on the first floor in radiology PRIOR to going to the CT surgery office.    The cardiac surgery office is located at [facility], first floor. Take a left at the end of the lobby until the end of that dye (past the elevator bank). Make a left and the office is on your right across from the elevators.      Your Care Navigator Nurse Practitioner will be in touch to see you in your home within a few days from discharge. The Follow Your Heart program can help ensure you understand your medications, discharge instructions and answer any questions you may have at that time. They are also a great source to address concerns during the day and may be reached at [phone].    Please follow up with Infectious disease  Please follow up with your cardiologist in 1-2 weeks of discharge   Please call and follow up with EP for device check.

## 2022-12-23 NOTE — CHART NOTE - NSCHARTNOTEFT_GEN_A_CORE
Chart reviewed, orders noted.    Upon review of the chart, pt still actively receiving medical treatment.  Not appropriate for d/c based on medical care documented.  Will reeval once more medically appropriate for dc

## 2022-12-23 NOTE — DISCHARGE NOTE PROVIDER - NSDCMRMEDTOKEN_GEN_ALL_CORE_FT
aspirin 81 mg oral delayed release tablet: 1 tab(s) orally once a day  cefTRIAXone 2 g injection: 2 gram(s) injectable once a day till 12/3  metoprolol succinate 25 mg oral tablet, extended release: 1 tab(s) orally once a day  valsartan 80 mg oral capsule: 1 tab(s) orally once a day   apixaban 2.5 mg oral tablet: 1 tab(s) orally 2 times a day  aspirin 81 mg oral delayed release tablet: 1 tab(s) orally once a day  ertapenem 1 g injection: 1  injectable once a day  lactobacillus acidophilus oral capsule: 1 tab(s) orally 2 times a day   tamsulosin 0.4 mg oral capsule: 1 cap(s) orally once a day (at bedtime)

## 2022-12-23 NOTE — DISCHARGE NOTE NURSING/CASE MANAGEMENT/SOCIAL WORK - PATIENT PORTAL LINK FT
You can access the FollowMyHealth Patient Portal offered by [facility] by registering at the following website: [email or web address]. By joining [employer]’s FollowMyHealth portal, you will also be able to view your health information using other applications (apps) compatible with our system.

## 2022-12-23 NOTE — DISCHARGE NOTE PROVIDER - PROVIDER TOKENS
PROVIDER:[TOKEN:[[id number]]],PROVIDER:[TOKEN:[[id number]]],PROVIDER:[TOKEN:[[id number]]],PROVIDER:[TOKEN:[[id number]]]

## 2022-12-23 NOTE — PROGRESS NOTE ADULT - SUBJECTIVE AND OBJECTIVE BOX
POD #10 s/p AVR (27 mm inspiris), MVR (29 mm mitral valve), Aortic Root Patch repair    PAST MEDICAL & SURGICAL HISTORY:  Hypertension  Aortic stenosis  H/O inguinal hernia repair, B/L 2013  S/P laparoscopic colectomy, right colectomy with ileotransverse anastomosis    FAMILY HISTORY:  FH: heart disease (Father, Mother)    Brief Hospital Course: 77 year old female with a medical history of HTN, Moderate AS, recent Covid infection 10/6/2022 per chart, recent admission 11/1/22 for SIRS, found to have strep anginosus bacteremia, norovirus, TTE negative for endocarditis at the time, with patient refusing LILIAN that admission, was started on Ceftriaxone (completed 11/20/22), however patient presented 11/27 with syncope, found to have AFib RVR, sepsis, copious diarrhea requiring rectal tube, and eventual LILIAN revealed 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 Cleared by neurosurgery for OR after MRI on 12/1/22 revealed scattered bilateral parieto-occipital acute infarcts.  LHC 12/5 with clean coronaries. Post cath patient noted with RADHA and Urinary retention with ram placed 12/8. Patient now s/p AVR, MVR, and patch repair of the aortic root on 12/13/22 with Dr. Flanagan. Postoperative course significant for pacing requirements due to asystole, with patient regaining rhythm 12/15 (Dopamine for inotropic support now weaned off, plan for MCOT monitor on discharge to be followed by EP), hypotension (on Midodrine for BP support), persistent chest tube output (on IV diuresis, colchicine d/c'd due to diarrhea), and persistent urinary retention (failed TOV 12/19 with Ram replaced). Patient needs PICC line and home IV antibiotics to be set up.     Significant recent/past 24 hr events: No overnight events reported.    Subjective: Patient lying in bed in NAD. +Tolerating diet. +Passing BMs since surgery. +Pain currently controlled. Denies fevers, chills, lightheadedness, dizziness, HA, CP, palpitations, SOB, cough, abdominal pain, N/V, diarrhea, numbness/tingling in extremities, or any other acute complaints. ROS negative x 10 systems except as noted above.    MEDICATIONS  (STANDING):  apixaban 2.5 milliGRAM(s) Oral two times a day  aspirin enteric coated 81 milliGRAM(s) Oral daily  chlorhexidine 2% Cloths 1 Application(s) Topical daily  furosemide   Injectable 40 milliGRAM(s) IV Push every 12 hours  lactobacillus acidophilus 1 Tablet(s) Oral two times a day with meals  meropenem Injectable 1000 milliGRAM(s) IV Push every 8 hours  midodrine. 10 milliGRAM(s) Oral three times a day  pantoprazole    Tablet 40 milliGRAM(s) Oral daily  polyethylene glycol 3350 17 Gram(s) Oral daily  senna 2 Tablet(s) Oral at bedtime  tamsulosin 0.4 milliGRAM(s) Oral at bedtime    MEDICATIONS  (PRN):  ondansetron Injectable 4 milliGRAM(s) IV Push once PRN Nausea and/or Vomiting    Allergies: No Known Allergies    Vitals   T(C): 36.9 (23 Dec 2022 00:00), Max: 37.1 (22 Dec 2022 11:02)  T(F): 98.4 (23 Dec 2022 00:00), Max: 98.7 (22 Dec 2022 11:02)  HR: 67 (23 Dec 2022 00:00) (58 - 74)  BP: 105/55 (23 Dec 2022 00:00) (96/47 - 111/52)  RR: 18 (23 Dec 2022 00:00) (18 - 18)  SpO2: 97% (23 Dec 2022 00:00) (97% - 100%)  O2 Parameters below as of 23 Dec 2022 00:00  Patient On (Oxygen Delivery Method): room air    I&O's Detail    21 Dec 2022 07:01  -  22 Dec 2022 07:00  --------------------------------------------------------  IN:    Oral Fluid: 240 mL  Total IN: 240 mL    OUT:    Chest Tube (mL): 200 mL    Chest Tube (mL): 40 mL    Indwelling Catheter - Urethral (mL): 2500 mL  Total OUT: 2740 mL    Total NET: -2500 mL      22 Dec 2022 07:01  -  23 Dec 2022 03:01  --------------------------------------------------------  IN:  Total IN: 0 mL    OUT:    Chest Tube (mL): 40 mL    Chest Tube (mL): 250 mL    Indwelling Catheter - Urethral (mL): 1000 mL  Total OUT: 1290 mL    Total NET: -1290 mL    Physical Exam  Neuro: A+O x 3, non-focal, speech clear and intact  HEENT:  NCAT, No conjuctival edema or icterus, no thrush.    Neck:  Supple, trachea midline  Pulm: +Diminished BSs at bases bilaterally, no accessory muscle use noted  Chest:        mediastinal CT, and Right pleural CT with dressings intact and no air leak, no subQ emphysema       +PW (isolated)  CV: regular rate, regular rhythm, +S1S2, no murmur noted  Abd: soft, NT, ND, + BS  : ram in situ to bedside drainage  Ext: MEZA x 4, +1 LE edema, no cyanosis, distal motor/neuro/circ intact  Skin: warm, dry, perfused  Incisions: midsternal incision with island dressing C/D/I, sternum stable    LABS                        11.4   17.69 )-----------( 410      ( 22 Dec 2022 16:35 )             35.8     12-22    135  |  102  |  19.2  ----------------------------<  80  3.7   |  24.0  |  0.77    Ca    8.4      22 Dec 2022 05:14  Mg     1.8     12-22      Last CXR:  < from: Xray Chest 1 View- PORTABLE-Routine (Xray Chest 1 View- PORTABLE-Routine in AM.) (12.22.22 @ 07:06) >  IMPRESSION:  Right chest tube in place with small apical pneumothorax, unchanged.  Bilateral lower lobe areas of atelectasis, increased  < end of copied text >     POD #10 s/p AVR (27 mm inspiris), MVR (29 mm mitral valve), Aortic Root Patch repair    PAST MEDICAL & SURGICAL HISTORY:  Hypertension  Aortic stenosis  H/O inguinal hernia repair, B/L 2013  S/P laparoscopic colectomy, right colectomy with ileotransverse anastomosis    FAMILY HISTORY:  FH: heart disease (Father, Mother)    Brief Hospital Course: 77 year old male with a medical history of HTN, Moderate AS, recent Covid infection 10/6/2022 per chart, recent admission 11/1/22 for SIRS, found to have strep anginosus bacteremia, norovirus, TTE negative for endocarditis at the time, with patient refusing LILIAN that admission, was started on Ceftriaxone (completed 11/20/22), however patient presented 11/27 with syncope, found to have AFib RVR, sepsis, copious diarrhea requiring rectal tube, and eventual LILIAN revealed 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 Cleared by neurosurgery for OR after MRI on 12/1/22 revealed scattered bilateral parieto-occipital acute infarcts.  LHC 12/5 with clean coronaries. Post cath patient noted with RADHA and Urinary retention with ram placed 12/8. Patient now s/p AVR, MVR, and patch repair of the aortic root on 12/13/22 with Dr. Flanagan. Postoperative course significant for pacing requirements due to asystole, with patient regaining rhythm 12/15 (Dopamine for inotropic support now weaned off, plan for MCOT monitor on discharge to be followed by EP), hypotension (on Midodrine for BP support), persistent chest tube output (on IV diuresis, colchicine d/c'd due to diarrhea), and persistent urinary retention (failed TOV 12/19 with Ram replaced). Patient needs PICC line and home IV antibiotics to be set up.     Significant recent/past 24 hr events: No overnight events reported.    Subjective: Patient lying in bed in NAD. +Tolerating diet. +Passing BMs since surgery. +Pain currently controlled. Denies fevers, chills, lightheadedness, dizziness, HA, CP, palpitations, SOB, cough, abdominal pain, N/V, diarrhea, numbness/tingling in extremities, or any other acute complaints. ROS negative x 10 systems except as noted above.    MEDICATIONS  (STANDING):  apixaban 2.5 milliGRAM(s) Oral two times a day  aspirin enteric coated 81 milliGRAM(s) Oral daily  chlorhexidine 2% Cloths 1 Application(s) Topical daily  furosemide   Injectable 40 milliGRAM(s) IV Push every 12 hours  lactobacillus acidophilus 1 Tablet(s) Oral two times a day with meals  meropenem Injectable 1000 milliGRAM(s) IV Push every 8 hours  midodrine. 10 milliGRAM(s) Oral three times a day  pantoprazole    Tablet 40 milliGRAM(s) Oral daily  polyethylene glycol 3350 17 Gram(s) Oral daily  senna 2 Tablet(s) Oral at bedtime  tamsulosin 0.4 milliGRAM(s) Oral at bedtime    MEDICATIONS  (PRN):  ondansetron Injectable 4 milliGRAM(s) IV Push once PRN Nausea and/or Vomiting    Allergies: No Known Allergies    Vitals   T(C): 36.9 (23 Dec 2022 00:00), Max: 37.1 (22 Dec 2022 11:02)  T(F): 98.4 (23 Dec 2022 00:00), Max: 98.7 (22 Dec 2022 11:02)  HR: 67 (23 Dec 2022 00:00) (58 - 74)  BP: 105/55 (23 Dec 2022 00:00) (96/47 - 111/52)  RR: 18 (23 Dec 2022 00:00) (18 - 18)  SpO2: 97% (23 Dec 2022 00:00) (97% - 100%)  O2 Parameters below as of 23 Dec 2022 00:00  Patient On (Oxygen Delivery Method): room air    I&O's Detail    21 Dec 2022 07:01  -  22 Dec 2022 07:00  --------------------------------------------------------  IN:    Oral Fluid: 240 mL  Total IN: 240 mL    OUT:    Chest Tube (mL): 200 mL    Chest Tube (mL): 40 mL    Indwelling Catheter - Urethral (mL): 2500 mL  Total OUT: 2740 mL    Total NET: -2500 mL      22 Dec 2022 07:01  -  23 Dec 2022 03:01  --------------------------------------------------------  IN:  Total IN: 0 mL    OUT:    Chest Tube (mL): 40 mL    Chest Tube (mL): 250 mL    Indwelling Catheter - Urethral (mL): 1000 mL  Total OUT: 1290 mL    Total NET: -1290 mL    Physical Exam  Neuro: A+O x 3, non-focal, speech clear and intact  HEENT:  NCAT, No conjuctival edema or icterus, no thrush.    Neck:  Supple, trachea midline  Pulm: +Diminished BSs at bases bilaterally, no accessory muscle use noted  Chest:        mediastinal CT, and Right pleural CT with dressings intact and no air leak, no subQ emphysema       +PW (isolated)  CV: regular rate, regular rhythm, +S1S2, no murmur noted  Abd: soft, NT, ND, + BS  : rma in situ to bedside drainage  Ext: MEZA x 4, +1 LE edema, no cyanosis, distal motor/neuro/circ intact  Skin: warm, dry, perfused  Incisions: midsternal incision with island dressing C/D/I, sternum stable    LABS                        11.4   17.69 )-----------( 410      ( 22 Dec 2022 16:35 )             35.8     12-22    135  |  102  |  19.2  ----------------------------<  80  3.7   |  24.0  |  0.77    Ca    8.4      22 Dec 2022 05:14  Mg     1.8     12-22      Last CXR:  < from: Xray Chest 1 View- PORTABLE-Routine (Xray Chest 1 View- PORTABLE-Routine in AM.) (12.22.22 @ 07:06) >  IMPRESSION:  Right chest tube in place with small apical pneumothorax, unchanged.  Bilateral lower lobe areas of atelectasis, increased  < end of copied text >

## 2022-12-23 NOTE — DISCHARGE NOTE PROVIDER - CARE PROVIDER_API CALL
Nicholas Flanagan; YEFRI)  Surgery; Thoracic and Cardiac Surgery  [address] [place] [address]  Phone: [phone]  Fax: [phone]  Follow Up Time:     Mk Alaniz)  Infectious Disease; Internal Medicine  [address] [place] [address]  Phone: [phone]  Fax: [phone]  Follow Up Time:     Robb Young)  Cardiology; Internal Medicine  [address] [place] [address], [address]  [place], [place] [id number]  Phone: [phone]  Fax: [phone]  Follow Up Time:     Thai Monterroso)  Cardiac Electrophysiology; Cardiovascular Disease; Internal Medicine  [address] [place] [address]  Phone: [phone]  Fax: [phone]  Follow Up Time:    171.45

## 2022-12-23 NOTE — DISCHARGE NOTE NURSING/CASE MANAGEMENT/SOCIAL WORK - NSDCPEFALRISK_GEN_ALL_CORE
For information on Fall & Injury Prevention, visit: [email or web address] OR  [email or web address] OR  https://www.cdc.gov/steadi/patient.html

## 2022-12-23 NOTE — CHART NOTE - NSCHARTNOTEFT_GEN_A_CORE
Source: Patient [x ]  Family [ ]   other [ ]    Current Diet: Diet, DASH/TLC:   Sodium & Cholesterol Restricted  1500mL Fluid Restriction ([id number])  Supplement Feeding Modality:  Oral  Ensure Enlive Cans or Servings Per Day:  2       Frequency:  Two Times a day (12-21-22 @ 07:39)    PO intake:  < 50% [ ]   50-75%  [x ]   %  [ ]  other :    Current Weight:   (12/23) 166.6 lbs  12/15) 208.12 lbs   (12/9) 168.4lbs  (12/8) 192lbs  (12/7) 185.9lbs  (12/6) 195.3lbs  (12/5) 189.5lbs  (12/4) 189.9lbs  (12/3) 192.2lbs  (11/29) 195.4lbs    % Weight Change - wt fluctuation noted, will continue to monitor for accuracy. Aware pt with 1+ trace b/l leg edema noted per documentation.      Pertinent Medications: MEDICATIONS  (STANDING):  apixaban 2.5 milliGRAM(s) Oral two times a day  aspirin enteric coated 81 milliGRAM(s) Oral daily  chlorhexidine 2% Cloths 1 Application(s) Topical daily  furosemide   Injectable 40 milliGRAM(s) IV Push every 12 hours  lactobacillus acidophilus 1 Tablet(s) Oral two times a day with meals  meropenem Injectable 1000 milliGRAM(s) IV Push every 8 hours  methylPREDNISolone sodium succinate Injectable 40 milliGRAM(s) IV Push every 8 hours  midodrine. 10 milliGRAM(s) Oral three times a day  pantoprazole    Tablet 40 milliGRAM(s) Oral daily  polyethylene glycol 3350 17 Gram(s) Oral daily  senna 2 Tablet(s) Oral at bedtime  tamsulosin 0.4 milliGRAM(s) Oral at bedtime    MEDICATIONS  (PRN):  ondansetron Injectable 4 milliGRAM(s) IV Push once PRN Nausea and/or Vomiting    Pertinent Labs: CBC Full  -  ( 23 Dec 2022 05:10 )  WBC Count : 13.14 K/uL  RBC Count : 3.72 M/uL  Hemoglobin : 11.1 g/dL  Hematocrit : 34.0 %  Platelet Count - Automated : 396 K/uL  Mean Cell Volume : 91.4 fl  Mean Cell Hemoglobin : 29.8 pg  Mean Cell Hemoglobin Concentration : 32.6 gm/dL  12-23 Na134 mmol/L<L> Glu 84 mg/dL K+ 4.1 mmol/L Cr  0.83 mg/dL BUN 20.1 mg/dL<H> Phos n/a   Alb n/a   PAB n/a       Skin: sx MSI    Nutrition focused physical exam previously conducted - found signs of malnutrition [ ]absent [ x]present    Subcutaneous fat loss: [ x] Orbital fat pads region, [x ]Buccal fat region, [ ]Triceps region,  [ ]Ribs region    Muscle wasting: [ x]Temples region, [ x]Clavicle region, [ x]Shoulder region, [ ]Scapula region, [ ]Interosseous region,  [ ]thigh region, [ ]Calf region    Current Nutrition Diagnosis: Pt remains at nutrition risk secondary to severe protein calorie malnutrition (chronic) related to inability to meet sufficient protein-energy intake in setting of multiple comorbidities, recent COVID, norovirus, endocarditis, and 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  Pt reports improved appetite and fair/good po intake at meals.  Pt consumed ~75% at breakfast this morning per tray observation at bedside.  Pt dislikes Ensure supplements; not consuming.  Reinforced importance of consuming adequate protein intake at meals to optimize nutrition status; pt receptive.  RD to remain available.     Recommendations:   1. DC Ensure- pt not consuming  2. Encourage intake of HBV protein at meals  3. RX: MVI and Vit C 500mg daily   4. Obtain daily weight and monitor trend      Monitoring and Evaluation:   [x ] PO intake [x ] Tolerance to diet prescription [X] Weights  [X] Follow up per protocol [X] Labs:

## 2022-12-23 NOTE — DISCHARGE NOTE PROVIDER - NSDCFUADDINST_GEN_ALL_CORE_FT
Please call the Cardiothoracic Surgery office at [phone] if you are experiencing any shortness of breath, chest pain, fevers or chills, drainage from the incisions, persistent nausea, vomiting or if you have any questions about your medications. If the symptoms are severe, call 911 and go to the nearest hospital.

## 2022-12-23 NOTE — PROGRESS NOTE ADULT - PROBLEM SELECTOR PLAN 1
S/p AVR, MVR, and patch repair of the aortic root on 12/13/22 with Dr. Flanagan.  Blood cultures were cleared 11/27.   Continue Merrem per ID.    Home IV antibiotics being set up by case management.   PICC line order to be placed prior to discharge.   Dopamine weaned off 12/19/22.   EPWs isolated.   MCOT on discharge set up by EP.   Continue BP support with Midodrine as patient now requiring IV diuresis for fluid overload.   Oxygenating well, coughing and deep breathing exercises/incentive spirometry encouraged.  Follow up AM CXR.   D/c CTs once output remains minimal.   Continue diuresis with IV Lasix 40BID, monitor strict I/Os, replenish electrolytes PRN to keep K>4 and Mg>2.   Continue with PT, increase activity as tolerated.  Tylenol, Oxy PRN for analgesia.  Case and plan to be reviewed / discussed with CT Surgery attending / team during AM rounds.

## 2022-12-23 NOTE — DISCHARGE NOTE PROVIDER - HOSPITAL COURSE
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 Cleared by neurosurgery for OR after MRI on 12/1/22 revealed scattered bilateral parieto-occipital acute infarcts.  LHC 12/5 with clean coronaries. Post cath patient noted with RADHA and Urinary retention with ram placed 12/8. Patient now s/p AVR, MVR, and patch repair of the aortic root on 12/13/22 with Dr. Flanagan. Postoperative course significant for pacing requirements due to asystole, with patient regaining rhythm 12/15 (Dopamine for inotropic support now weaned off, plan for MCOT monitor on discharge to be followed by EP), hypotension (on Midodrine for BP support), persistent chest tube output (on IV diuresis, colchicine d/c'd due to diarrhea), and persistent urinary retention (failed TOV 12/19 with Ram replaced). 12/30 patient upgraded to CTICU for monitoring after 14 second pause during sleep. 12/30 s/p Micra leadless PPM. Pt advised to mail back MCOT device since no longer needed. PA/ Lateral completed and reviewed by EP. Pt now with s/p Left PICC line, CXR with noting line with the tip at the junction of the SVC and right atrium. ABX switched as per ID to Ertapenem 1gm Daily for ease of dosing (Will need weekly CBC and CMP faxed to [phone], Appointment with us in 7 to 10 days - [phone]). Plan to treat for 6 weeks of IV antibiotics from date of surgery until 1/24/23. Pt remains hemodynamically stable and determined stable to be discharged home as per attending on call Dr. Thompson with follow up appointment.            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 Cleared by neurosurgery for OR after MRI on 12/1/22 revealed scattered bilateral parieto-occipital acute infarcts.  LHC 12/5 with clean coronaries. Post cath patient noted with RADHA and Urinary retention with ram placed 12/8. Patient now s/p AVR, MVR, and patch repair of the aortic root on 12/13/22 with Dr. Flanagan. Postoperative course significant for pacing requirements due to asystole, with patient regaining rhythm 12/15 (Dopamine for inotropic support now weaned off, plan for MCOT monitor on discharge to be followed by EP), hypotension (on Midodrine for BP support), persistent chest tube output (on IV diuresis, colchicine d/c'd due to diarrhea), and persistent urinary retention (failed TOV 12/19 with Ram replaced). 12/30 patient upgraded to CTICU for monitoring after 14 second pause during sleep. 12/30 s/p Micra leadless PPM. Pt advised to mail back MCOT device since no longer needed. PA/ Lateral completed and reviewed by EP. Pt now with s/p Left PICC line, CXR with noting line with the tip at the junction of the SVC and right atrium. ABX switched as per ID to Ertapenem 1gm Daily for ease of dosing (Will need weekly CBC and CMP faxed to [phone], Appointment with us in 7 to 10 days - [phone]). Plan to treat for 6 weeks of IV antibiotics from date of surgery until 1/24/23. Pt remains hemodynamically stable and determined stable to be discharged home as per attending on call Dr. Thompson with follow up appointment.     < from: Xray Chest 1 View AP/PA. (01.03.23 @ 11:06) >      IMPRESSION:  Left PICC line with the tip at the junction of the SVC and right atrium,   new.    Small left pleural effusion with adjacent atelectasis, unchanged    --- End of Report ---            ESTEFANY BAKER DO; Attending Radiologist  This document has been electronically signed. Tariq  3 2023 11:48AM    < end of copied text >

## 2022-12-23 NOTE — DISCHARGE NOTE PROVIDER - NSDCCPTREATMENT_GEN_ALL_CORE_FT
PRINCIPAL PROCEDURE  Procedure: Valve replacement, aortic and mitral  Findings and Treatment: AVR 27 mm inspiris, 29 mm mitral valve, Patch repair aortic root      SECONDARY PROCEDURE  Procedure: Insertion, pacemaker, leadless  Findings and Treatment: s/p micra  Bruising at the groin, sometimes extending down the leg, and/or a small lump under the skin at the groin access site is normal and will resolve within 2 – 3 weeks.   - You may walk and take stairs at a regular pace.   - Do not perform any exercise more strenuous than walking for 1 week.   - Do not strain or lift heavy objects for 1 week.  - You may shower the day after the procedure.  - Do not soak in water (such as tub baths, hot tubs, swimming, etc.) for 1 week.   - You may resume all other activities the day after the procedure.  Call your doctor if:   - you notice bleeding, redness, drainage, swelling, increased tenderness or a hot sensation around the catheter insertion site.   - your temperature is greater than 100 degrees F for more than 24 hours.  - you have any questions or concerns regarding the procedure.  If significant bleeding and/or a large lump (the size of a golf ball or bigger) occurs:  - Lie flat and apply continuous direct pressure just above the puncture site for at least 10 minutes  - If the issue resolves, notify your physician immediately.    - If the bleeding cannot be controlled, please seek immediate medical attention.  If you experience increased difficulty breathing or chest pain, or if you faint or have dizzy spells, please seek immediate medical attention.

## 2022-12-23 NOTE — DISCHARGE NOTE PROVIDER - CARE PROVIDERS DIRECT ADDRESSES
,DirectAddress_Unknown,[email or web address],DirectAddress_Unknown

## 2022-12-23 NOTE — DISCHARGE NOTE NURSING/CASE MANAGEMENT/SOCIAL WORK - NSDCCRNUMBER_GEN_ALL_CORE_FT
"Janna Rhodes (Sondra)williams was seen and treated in our emergency department on 5/9/2022.  She may return with no restrictions on 05/11/2022.       Sincerely,       AFTAB" [phone]

## 2022-12-23 NOTE — DISCHARGE NOTE PROVIDER - NSDCFUSCHEDAPPT_GEN_ALL_CORE_FT
Nicholas Flanagan  [facility] Physician [facility]  CTS[facility]G [address]  Scheduled Appointment: 01/20/2023    Cher Calvillo  [facility] Physician [facility]  FAMILYMED [address]  Scheduled Appointment: 03/24/2023

## 2022-12-23 NOTE — PROGRESS NOTE ADULT - ASSESSMENT
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 Cleared by neurosurgery for OR after MRI on 12/1/22 revealed scattered bilateral parieto-occipital acute infarcts.  LHC 12/5 with clean coronaries. Post cath patient noted with RADHA and Urinary retention with ram placed 12/8. Patient now s/p AVR, MVR, and patch repair of the aortic root on 12/13/22 with Dr. Flanagan. Postoperative course significant for pacing requirements due to asystole, with patient regaining rhythm 12/15 (Dopamine for inotropic support now weaned off, plan for MCOT monitor on discharge to be followed by EP), hypotension (on Midodrine for BP support), persistent chest tube output (on IV diuresis, colchicine d/c'd due to diarrhea), and persistent urinary retention (failed TOV 12/19 with Ram replaced). Patient needs PICC line and home IV antibiotics to be set up.  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 Cleared by neurosurgery for OR after MRI on 12/1/22 revealed scattered bilateral parieto-occipital acute infarcts.  LHC 12/5 with clean coronaries. Post cath patient noted with RADHA and Urinary retention with ram placed 12/8. Patient now s/p AVR, MVR, and patch repair of the aortic root on 12/13/22 with Dr. Flanagan. Postoperative course significant for pacing requirements due to asystole, with patient regaining rhythm 12/15 (Dopamine for inotropic support now weaned off, plan for MCOT monitor on discharge to be followed by EP), hypotension (on Midodrine for BP support), persistent chest tube output (on IV diuresis, colchicine d/c'd due to diarrhea), and persistent urinary retention (failed TOV 12/19 with Ram replaced). Patient needs PICC line and home IV antibiotics to be set up.

## 2022-12-23 NOTE — DISCHARGE NOTE NURSING/CASE MANAGEMENT/SOCIAL WORK - NSDCFUADDAPPT_GEN_ALL_CORE_FT
Please follow up with you primary care physician, cardiothoracic surgeon and infectious disease physician.

## 2022-12-24 LAB
ALBUMIN SERPL ELPH-MCNC: 3.1 G/DL — LOW (ref 3.3–5.2)
ALP SERPL-CCNC: 80 U/L — SIGNIFICANT CHANGE UP (ref 40–120)
ALT FLD-CCNC: 21 U/L — SIGNIFICANT CHANGE UP
ANION GAP SERPL CALC-SCNC: 12 MMOL/L — SIGNIFICANT CHANGE UP (ref 5–17)
AST SERPL-CCNC: 21 U/L — SIGNIFICANT CHANGE UP
BILIRUB SERPL-MCNC: 0.7 MG/DL — SIGNIFICANT CHANGE UP (ref 0.4–2)
BUN SERPL-MCNC: 23.9 MG/DL — HIGH (ref 8–20)
CALCIUM SERPL-MCNC: 9.1 MG/DL — SIGNIFICANT CHANGE UP (ref 8.4–10.5)
CHLORIDE SERPL-SCNC: 97 MMOL/L — SIGNIFICANT CHANGE UP (ref 96–108)
CO2 SERPL-SCNC: 25 MMOL/L — SIGNIFICANT CHANGE UP (ref 22–29)
CREAT SERPL-MCNC: 0.77 MG/DL — SIGNIFICANT CHANGE UP (ref 0.5–1.3)
EGFR: 92 ML/MIN/1.73M2 — SIGNIFICANT CHANGE UP
GLUCOSE SERPL-MCNC: 152 MG/DL — HIGH (ref 70–99)
HCT VFR BLD CALC: 32.3 % — LOW (ref 39–50)
HGB BLD-MCNC: 10.7 G/DL — LOW (ref 13–17)
MAGNESIUM SERPL-MCNC: 1.9 MG/DL — SIGNIFICANT CHANGE UP (ref 1.6–2.6)
MCHC RBC-ENTMCNC: 29.7 PG — SIGNIFICANT CHANGE UP (ref 27–34)
MCHC RBC-ENTMCNC: 33.1 GM/DL — SIGNIFICANT CHANGE UP (ref 32–36)
MCV RBC AUTO: 89.7 FL — SIGNIFICANT CHANGE UP (ref 80–100)
PLATELET # BLD AUTO: 428 K/UL — HIGH (ref 150–400)
POTASSIUM SERPL-MCNC: 3.9 MMOL/L — SIGNIFICANT CHANGE UP (ref 3.5–5.3)
POTASSIUM SERPL-SCNC: 3.9 MMOL/L — SIGNIFICANT CHANGE UP (ref 3.5–5.3)
PROT SERPL-MCNC: 6.5 G/DL — LOW (ref 6.6–8.7)
RBC # BLD: 3.6 M/UL — LOW (ref 4.2–5.8)
RBC # FLD: 13.5 % — SIGNIFICANT CHANGE UP (ref 10.3–14.5)
SODIUM SERPL-SCNC: 134 MMOL/L — LOW (ref 135–145)
WBC # BLD: 14.31 K/UL — HIGH (ref 3.8–10.5)
WBC # FLD AUTO: 14.31 K/UL — HIGH (ref 3.8–10.5)

## 2022-12-24 PROCEDURE — 99233 SBSQ HOSP IP/OBS HIGH 50: CPT

## 2022-12-24 PROCEDURE — 71045 X-RAY EXAM CHEST 1 VIEW: CPT | Mod: 26

## 2022-12-24 PROCEDURE — 71045 X-RAY EXAM CHEST 1 VIEW: CPT | Mod: 26,77

## 2022-12-24 PROCEDURE — 93010 ELECTROCARDIOGRAM REPORT: CPT

## 2022-12-24 RX ORDER — MAGNESIUM SULFATE 500 MG/ML
2 VIAL (ML) INJECTION ONCE
Refills: 0 | Status: COMPLETED | OUTPATIENT
Start: 2022-12-24 | End: 2022-12-24

## 2022-12-24 RX ORDER — POTASSIUM CHLORIDE 20 MEQ
40 PACKET (EA) ORAL ONCE
Refills: 0 | Status: COMPLETED | OUTPATIENT
Start: 2022-12-24 | End: 2022-12-24

## 2022-12-24 RX ADMIN — Medication 1 TABLET(S): at 09:03

## 2022-12-24 RX ADMIN — MIDODRINE HYDROCHLORIDE 10 MILLIGRAM(S): 2.5 TABLET ORAL at 13:10

## 2022-12-24 RX ADMIN — APIXABAN 2.5 MILLIGRAM(S): 2.5 TABLET, FILM COATED ORAL at 17:43

## 2022-12-24 RX ADMIN — Medication 40 MILLIGRAM(S): at 06:24

## 2022-12-24 RX ADMIN — MEROPENEM 1000 MILLIGRAM(S): 1 INJECTION INTRAVENOUS at 13:10

## 2022-12-24 RX ADMIN — MEROPENEM 1000 MILLIGRAM(S): 1 INJECTION INTRAVENOUS at 06:25

## 2022-12-24 RX ADMIN — CHLORHEXIDINE GLUCONATE 1 APPLICATION(S): 213 SOLUTION TOPICAL at 11:43

## 2022-12-24 RX ADMIN — Medication 1 TABLET(S): at 17:43

## 2022-12-24 RX ADMIN — Medication 40 MILLIGRAM(S): at 17:43

## 2022-12-24 RX ADMIN — Medication 81 MILLIGRAM(S): at 13:10

## 2022-12-24 RX ADMIN — APIXABAN 2.5 MILLIGRAM(S): 2.5 TABLET, FILM COATED ORAL at 06:24

## 2022-12-24 RX ADMIN — TAMSULOSIN HYDROCHLORIDE 0.4 MILLIGRAM(S): 0.4 CAPSULE ORAL at 20:37

## 2022-12-24 RX ADMIN — MEROPENEM 1000 MILLIGRAM(S): 1 INJECTION INTRAVENOUS at 20:38

## 2022-12-24 RX ADMIN — MIDODRINE HYDROCHLORIDE 10 MILLIGRAM(S): 2.5 TABLET ORAL at 20:37

## 2022-12-24 RX ADMIN — Medication 25 GRAM(S): at 10:19

## 2022-12-24 RX ADMIN — Medication 40 MILLIEQUIVALENT(S): at 10:19

## 2022-12-24 RX ADMIN — PANTOPRAZOLE SODIUM 40 MILLIGRAM(S): 20 TABLET, DELAYED RELEASE ORAL at 13:10

## 2022-12-24 RX ADMIN — MIDODRINE HYDROCHLORIDE 10 MILLIGRAM(S): 2.5 TABLET ORAL at 06:23

## 2022-12-24 NOTE — PROGRESS NOTE ADULT - SUBJECTIVE AND OBJECTIVE BOX
INFECTIOUS DISEASES AND INTERNAL MEDICINE at [place]  =======================================================  Flip Pederson MD  Diplomates [employer] Board of Internal Medicine and Infectious Diseases  Telephone [phone]  Fax            [phone]  =======================================================    GEORGE [id number]      ID f/u- fever, endocarditis    s/p AVR, MVR and patch repair of aortic root on   seen earlier today  feels well        ANTIBIOTICS  meropenem  IVPB 1000 milliGRAM(s) IV Intermittent every 8 hours      Allergies    No Known Allergies    Intolerances      Vital Signs Last 24 Hrs  Vital Signs Last 24 Hrs  T(C): 36.3 (24 Dec 2022 16:02), Max: 36.7 (24 Dec 2022 12:45)  T(F): 97.3 (24 Dec 2022 16:02), Max: 98 (24 Dec 2022 12:45)  HR: 60 (24 Dec 2022 17:40) (48 - 64)  BP: 130/68 (24 Dec 2022 17:40) (106/41 - 130/68)  BP(mean): 62 (24 Dec 2022 01:47) (62 - 62)  RR: 18 (24 Dec 2022 16:02) (18 - 18)  SpO2: 99% (24 Dec 2022 16:02) (94% - 99%)    Parameters below as of 24 Dec 2022 16:02  Patient On (Oxygen Delivery Method): room air              Parameters below as of 12 Dec 2022 17:12  Patient On (Oxygen Delivery Method): room air        REVIEW OF SYSTEMS:    CONSTITUTIONAL:  As per HPI.    HEENT:  Eyes:  No diplopia or blurred vision. ENT:  No earache, sore throat or runny nose.    CARDIOVASCULAR:  No pressure, squeezing, strangling, tightness, heaviness or aching about the chest, neck, axilla or epigastrium.    RESPIRATORY:  No cough, shortness of breath, PND or orthopnea.    GASTROINTESTINAL:  No nausea, vomiting or diarrhea.    GENITOURINARY:  No dysuria, frequency or urgency.    MUSCULOSKELETAL:  As per HPI.    SKIN:  No change in skin, hair or nails.    NEUROLOGIC: as per hpi        PHYSICAL EXAMINATION:    GENERAL: nad    HEENT: Head is normocephalic and atraumatic.  ANICTERIC  NECK: Supple. No carotid bruits.  No lymphadenopathy or thyromegaly.    LUNGS :clear BREATH SOUNDS    HEART: Regular rate and rhythm without murmur. dressing c/di, 2 chest tubes in place    ABDOMEN: Soft, nontender, and nondistended.  Positive bowel sounds.  No hepatosplenomegaly was noted. NO REBOUND NO GUARDING + ram dark urine     EXTREMITIES: NO EDEMA NO ERYTHEMA    NEUROLOGIC: NON FOCAL      SKIN: No ulceration or induration present. NO RASH             LABS:                                                                  10.7   14.31 )-----------( 428      ( 24 Dec 2022 06:54 )             32.3       12-24    134<L>  |  97  |  23.9<H>  ----------------------------<  152<H>  3.9   |  25.0  |  0.77    Ca    9.1      24 Dec 2022 06:54  Mg     1.9     12-24    TPro  6.5<L>  /  Alb  3.1<L>  /  TBili  0.7  /  DBili  x   /  AST  21  /  ALT  21  /  AlkPhos  80  12-24                                  POCT Blood Glucose.: 123 mg/dL (16 Dec 2022 16:32)              ABG - ( 14 Dec 2022 01:28 )  pH, Arterial: 7.410 pH, Blood: x     /  pCO2: 38    /  pO2: 125   / HCO3: 24    / Base Excess: -0.5  /  SaO2: 100.0                   PT/INR - ( 14 Dec 2022 02:12 )   PT: 12.7 sec;   INR: 1.09 ratio         PTT - ( 14 Dec 2022 02:12 )  PTT:29.8 sec    CARDIAC MARKERS ( 14 Dec 2022 02:12 )  x     / 1.21 ng/mL / 388 U/L / x     / 36.5 ng/mL  CARDIAC MARKERS ( 13 Dec 2022 16:00 )  x     / 1.07 ng/mL / 391 U/L / x     / 59.9 ng/mL        CAPILLARY BLOOD GLUCOSE      POCT Blood Glucose.: 143 mg/dL (14 Dec 2022 16:49)                              Color: Yellow / Appearance: Clear / S.010 / pH: x  Gluc: x / Ketone: Negative  / Bili: Negative / Urobili: Negative mg/dL   Blood: x / Protein: 30 mg/dL / Nitrite: Negative   Leuk Esterase: Negative / RBC: 6-10 /HPF / WBC 0-2 /HPF   Sq Epi: x / Non Sq Epi: Occasional / Bacteria: Few        RADIOLOGY & ADDITIONAL STUDIES:  < from: CT Chest No Cont (22 @ 03:46) >  IMPRESSION:  No pneumonia.  No acute CT findings in the abdomen or pelvis.    VERTEBRAL BODY ANALYSIS: Low bone density as described above, consider   further workup for osteoporosis.        --- End of Report ---        < end of copied text >        < from: LILIAN Echo Doppler (22 @ 14:52) >  Summary:   1. Left ventricular ejection fraction, by visual estimation, is 55 to   60%.   2. Normal global left ventricular systolic function.   3. The mitral in-flow pattern reveals no discernable A-wave, therefore   no comment on diastolic function can be made.   4. Normal right ventricular size and function, inadequate estimation of   RVSP.   5. Mildly enlarged left atrium.   6. Moderate mitral valve regurgitation, jet is eccentric posteriorly   directed.   7. Thickening of bileaflet mitral valve with subcentimeter filamentous  mobile lesions suggestive of vegetations.   8. Color flow doppler and intravenous injection of agitated saline   demonstrates the presence of an intact intra atrial septum.   9. No left atrial appendage thrombus and normal left atrial appendage   velocities.  10. Structurally normal tricuspid valve, with normal leaflet excursion.  11. Heavily calcified noncoronary cusp with moderate aortic valve   stenosis, ELDON (by 2D planimetry 1.34 cm2), peak velocity 2.8 m/s and mean   gradient of 15 mmHg.  12. Mild to moderate aortic regurgitation.  13. There is mild echolucency and thickening of the sinus of Valsalva   inbetween the left and non-coronary cusp, cannot exclude early root   abscess.  14. Mild simple atheromas at the aortic arch/descendingaorta.  15. There is no evidence of pericardial effusion.  16. No prior LILIAN study is available for comparison. 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 Cardioversion   deferred due to the presence of underlying infection/endocarditis.    John Frost DO Electronically signed on 2022 at 3:39:01 PM    < end of copied text >      < from: MR Head No Cont (22 @ 21:35) >  IMPRESSION:  Scattered foci of restricted diffusion in the bilateral parieto-occipital   lobes, suggestive of acute ischemia. No intracranial hemorrhage or mass   effect.    Given the bilateral distribution of involvement, embolic phenomenon is   suggested, with underlying septic emboli not excluded. Further evaluation   by contrast-enhanced MRI may provide further characterization.      --- End of Report ---    < end of copied text >    < from: NM Inflammatory Loc Wholebody WBC, Single Day (22 @ 11:02) >  IMPRESSION: Normal Indium-111 labeled leukocyte scan. No scan evidence of   infection.    --- End of Report ---        < end of copied text >        < from: MR Head No Cont (22 @ 21:35) >  IMPRESSION:  Scattered foci of restricted diffusion in the bilateral parieto-occipital   lobes, suggestive of acute ischemia. No intracranial hemorrhage or mass   effect.    Given the bilateral distribution of involvement, embolic phenomenon is   suggested, with underlying septic emboli not excluded. Further evaluation   by contrast-enhanced MRI may provide further characterization.    < end of copied text >          ASSESSMENT/PLAN    77 year old male with PMHx of recent  COVID  on 10/6/22, HTN , Vit B12 deficiency, presenting to the ED on  with body aches, fatigue and fever (Tmax 102) at home for 12 days found to have streptococcus bacteremia and admitted with unclear source, no recent dental work, skin infections, no respiratory symptoms. Pan scan was negative at that time ( LILIAN was recommended but pt refused) He was treated for Bacteremia ( Blood cultures + streptococcus anginosis pansensitive ) and norovirus with supportive care and contact isolation . He was discharged home on  with IV Rocephin via PICC line. Recommendation was to continue ABX  daily via pic end 22    He presented to ER after and episode of syncope and collapse, found to be septic, hypotensive, hypoxic and febrile in the ER.   In response to hypotension he failed to respond to Inital sepsis fluid protocol in ER is required IV pressor in form of levophed to maintain MAP greater than 60 -65. In the ER he was febrile with initial labs significant for WBC of 22.4 , ProCal of 21.2 first lactate 4.9 via abg with repeat post fluids of 2.2 venous sample.   PT admitted to [facility] with septic shock unclear source, now off pressors. Found to have new onset A fib and on amio. 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 septic shock on admission was likely due to lack of source control and not antibiotic failure. no other organisms identified  picc when closer to discharge  If pacemaker is needed now, leadless pacemaker is preferred  ID f/u 4-5 weeks    script sent to [facility]

## 2022-12-24 NOTE — PROGRESS NOTE ADULT - ASSESSMENT
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 Cleared by neurosurgery for OR after MRI on 12/1/22 revealed scattered bilateral parieto-occipital acute infarcts.  LHC 12/5 with clean coronaries. Post cath patient noted with RADHA and Urinary retention with ram placed 12/8. Patient now s/p AVR, MVR, and patch repair of the aortic root on 12/13/22 with Dr. Flanagan. Postoperative course significant for pacing requirements due to asystole, with patient regaining rhythm 12/15 (Dopamine for inotropic support now weaned off, plan for MCOT monitor on discharge to be followed by EP), hypotension (on Midodrine for BP support), persistent chest tube output (on IV diuresis, colchicine d/c'd due to diarrhea), and persistent urinary retention (failed TOV 12/19 with Ram replaced). Patient needs PICC line and home IV antibiotics to be set up.  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 Cleared by neurosurgery for OR after MRI on 12/1/22 revealed scattered bilateral parieto-occipital acute infarcts.  LHC 12/5 with clean coronaries. Post cath patient noted with RADHA and Urinary retention with ram placed 12/8. Patient now s/p AVR, MVR, and patch repair of the aortic root on 12/13/22 with Dr. Flanagan. Postoperative course significant for pacing requirements due to asystole, with patient regaining rhythm 12/15 (Dopamine for inotropic support now weaned off, plan for MCOT monitor on discharge to be followed by EP), hypotension (on Midodrine for BP support), persistent chest tube output (on IV diuresis, colchicine d/c'd due to diarrhea), and persistent urinary retention (failed TOV 12/19 with Ram replaced). Patient needs PICC line and home IV antibiotics to be set up.       12/23 PICC team does not feel pt is ready for picc line at this time

## 2022-12-24 NOTE — PROGRESS NOTE ADULT - SUBJECTIVE AND OBJECTIVE BOX
Subjective: no c/o incisional pain at this time. Denies CP, SOB, palpitations, N/V, other c/o.  all other ROS negative x 10 except as noted above    T(C): 36.6 (12-24-22 @ 01:47), Max: 37.1 (12-23-22 @ 06:00)  HR: 48 (12-24-22 @ 01:47) (48 - 78)  BP: 106/41 (12-24-22 @ 01:47) (91/63 - 108/52)  ABP: --  ABP(mean): --  RR: 18 (12-24-22 @ 01:47) (18 - 18)  SpO2: 94% (12-24-22 @ 01:47) (94% - 100%)  Wt(kg): --  CVP(mm Hg): --  CO: --  CI: --  PA: --       I&O's Detail    22 Dec 2022 07:01  -  23 Dec 2022 07:00  --------------------------------------------------------  IN:    Oral Fluid: 240 mL  Total IN: 240 mL    OUT:    Chest Tube (mL): 350 mL    Chest Tube (mL): 50 mL    Indwelling Catheter - Urethral (mL): 1400 mL  Total OUT: 1800 mL    Total NET: -1560 mL      23 Dec 2022 07:01  -  24 Dec 2022 01:55  --------------------------------------------------------  IN:  Total IN: 0 mL    OUT:    Chest Tube (mL): 40 mL    Chest Tube (mL): 0 mL  Total OUT: 40 mL    Total NET: -40 mL          LABS: All Lab data reviewed and analyzed                        11.1 13.14 )-----------( 396      ( 23 Dec 2022 05:10 )             34.0     12-23    134<L>  |  101  |  20.1<H>  ----------------------------<  84  4.1   |  21.0<L>  |  0.83    Ca    8.9      23 Dec 2022 05:10  Mg     2.0     12-23              CAPILLARY BLOOD GLUCOSE               RADIOLOGY: - Reviewed and analyzed   CXR: pending    HOSPITAL MEDICATIONS: All medications reviewed and analyzed  MEDICATIONS  (STANDING):  apixaban 2.5 milliGRAM(s) Oral two times a day  aspirin enteric coated 81 milliGRAM(s) Oral daily  chlorhexidine 2% Cloths 1 Application(s) Topical daily  furosemide   Injectable 40 milliGRAM(s) IV Push every 12 hours  lactobacillus acidophilus 1 Tablet(s) Oral two times a day with meals  meropenem Injectable 1000 milliGRAM(s) IV Push every 8 hours  methylPREDNISolone sodium succinate Injectable 40 milliGRAM(s) IV Push every 8 hours  midodrine. 10 milliGRAM(s) Oral three times a day  pantoprazole    Tablet 40 milliGRAM(s) Oral daily  polyethylene glycol 3350 17 Gram(s) Oral daily  senna 2 Tablet(s) Oral at bedtime  tamsulosin 0.4 milliGRAM(s) Oral at bedtime    MEDICATIONS  (PRN):  ondansetron Injectable 4 milliGRAM(s) IV Push once PRN Nausea and/or Vomiting          Physical Exam    General: NAD  Neuro: A+O x 3, non-focal, speech clear and intact  HEENT: PERRL, EOMI, oral mucosa pink and moist  Neck: supple, no JVD  CV: regular rate, regular rhythm, +S1S2, no murmurs or rub  Pulm/chest: lung sounds CTA and equal bilaterally, no accessory muscle use noted  Abd: soft, NT, ND, +BS  : normal and skin intact   Ext: MEZA x 4, no C/C/E  Skin: warm, well perfused   mid sternal incision c/d/i         77yMale with PMH     Aortic valve replacement, tissue    Valve replacement, aortic and mitral        PAST MEDICAL & SURGICAL HISTORY:  Hypertension      Aortic stenosis      H/O inguinal hernia repair  B/L 2013      S/P laparoscopic colectomy  right colectomy with ileotransverse anastomosis

## 2022-12-25 LAB
ANION GAP SERPL CALC-SCNC: 12 MMOL/L — SIGNIFICANT CHANGE UP (ref 5–17)
BUN SERPL-MCNC: 26.9 MG/DL — HIGH (ref 8–20)
CALCIUM SERPL-MCNC: 9 MG/DL — SIGNIFICANT CHANGE UP (ref 8.4–10.5)
CHLORIDE SERPL-SCNC: 100 MMOL/L — SIGNIFICANT CHANGE UP (ref 96–108)
CO2 SERPL-SCNC: 26 MMOL/L — SIGNIFICANT CHANGE UP (ref 22–29)
CREAT SERPL-MCNC: 0.79 MG/DL — SIGNIFICANT CHANGE UP (ref 0.5–1.3)
EGFR: 91 ML/MIN/1.73M2 — SIGNIFICANT CHANGE UP
GLUCOSE SERPL-MCNC: 84 MG/DL — SIGNIFICANT CHANGE UP (ref 70–99)
HCT VFR BLD CALC: 30.8 % — LOW (ref 39–50)
HGB BLD-MCNC: 10.1 G/DL — LOW (ref 13–17)
MAGNESIUM SERPL-MCNC: 2 MG/DL — SIGNIFICANT CHANGE UP (ref 1.6–2.6)
MCHC RBC-ENTMCNC: 29.8 PG — SIGNIFICANT CHANGE UP (ref 27–34)
MCHC RBC-ENTMCNC: 32.8 GM/DL — SIGNIFICANT CHANGE UP (ref 32–36)
MCV RBC AUTO: 90.9 FL — SIGNIFICANT CHANGE UP (ref 80–100)
PLATELET # BLD AUTO: 404 K/UL — HIGH (ref 150–400)
POTASSIUM SERPL-MCNC: 3.7 MMOL/L — SIGNIFICANT CHANGE UP (ref 3.5–5.3)
POTASSIUM SERPL-SCNC: 3.7 MMOL/L — SIGNIFICANT CHANGE UP (ref 3.5–5.3)
RBC # BLD: 3.39 M/UL — LOW (ref 4.2–5.8)
RBC # FLD: 13.8 % — SIGNIFICANT CHANGE UP (ref 10.3–14.5)
SODIUM SERPL-SCNC: 138 MMOL/L — SIGNIFICANT CHANGE UP (ref 135–145)
WBC # BLD: 17.76 K/UL — HIGH (ref 3.8–10.5)
WBC # FLD AUTO: 17.76 K/UL — HIGH (ref 3.8–10.5)

## 2022-12-25 PROCEDURE — 93308 TTE F-UP OR LMTD: CPT | Mod: 26

## 2022-12-25 PROCEDURE — 71045 X-RAY EXAM CHEST 1 VIEW: CPT | Mod: 26

## 2022-12-25 RX ORDER — MIDODRINE HYDROCHLORIDE 2.5 MG/1
5 TABLET ORAL THREE TIMES A DAY
Refills: 0 | Status: DISCONTINUED | OUTPATIENT
Start: 2022-12-25 | End: 2022-12-27

## 2022-12-25 RX ORDER — CEFTRIAXONE 500 MG/1
2000 INJECTION, POWDER, FOR SOLUTION INTRAMUSCULAR; INTRAVENOUS EVERY 24 HOURS
Refills: 0 | Status: DISCONTINUED | OUTPATIENT
Start: 2022-12-25 | End: 2022-12-26

## 2022-12-25 RX ORDER — POTASSIUM CHLORIDE 20 MEQ
40 PACKET (EA) ORAL DAILY
Refills: 0 | Status: DISCONTINUED | OUTPATIENT
Start: 2022-12-25 | End: 2023-01-03

## 2022-12-25 RX ADMIN — Medication 40 MILLIEQUIVALENT(S): at 08:12

## 2022-12-25 RX ADMIN — APIXABAN 2.5 MILLIGRAM(S): 2.5 TABLET, FILM COATED ORAL at 17:01

## 2022-12-25 RX ADMIN — Medication 81 MILLIGRAM(S): at 07:58

## 2022-12-25 RX ADMIN — Medication 1 TABLET(S): at 07:59

## 2022-12-25 RX ADMIN — MIDODRINE HYDROCHLORIDE 10 MILLIGRAM(S): 2.5 TABLET ORAL at 06:09

## 2022-12-25 RX ADMIN — Medication 40 MILLIGRAM(S): at 05:14

## 2022-12-25 RX ADMIN — TAMSULOSIN HYDROCHLORIDE 0.4 MILLIGRAM(S): 0.4 CAPSULE ORAL at 21:09

## 2022-12-25 RX ADMIN — MEROPENEM 1000 MILLIGRAM(S): 1 INJECTION INTRAVENOUS at 05:13

## 2022-12-25 RX ADMIN — APIXABAN 2.5 MILLIGRAM(S): 2.5 TABLET, FILM COATED ORAL at 05:14

## 2022-12-25 RX ADMIN — Medication 40 MILLIGRAM(S): at 17:01

## 2022-12-25 RX ADMIN — MIDODRINE HYDROCHLORIDE 10 MILLIGRAM(S): 2.5 TABLET ORAL at 17:01

## 2022-12-25 RX ADMIN — PANTOPRAZOLE SODIUM 40 MILLIGRAM(S): 20 TABLET, DELAYED RELEASE ORAL at 07:58

## 2022-12-25 RX ADMIN — MIDODRINE HYDROCHLORIDE 5 MILLIGRAM(S): 2.5 TABLET ORAL at 17:02

## 2022-12-25 RX ADMIN — MIDODRINE HYDROCHLORIDE 10 MILLIGRAM(S): 2.5 TABLET ORAL at 12:06

## 2022-12-25 RX ADMIN — Medication 1 TABLET(S): at 17:02

## 2022-12-25 RX ADMIN — MEROPENEM 1000 MILLIGRAM(S): 1 INJECTION INTRAVENOUS at 12:06

## 2022-12-25 RX ADMIN — CHLORHEXIDINE GLUCONATE 1 APPLICATION(S): 213 SOLUTION TOPICAL at 07:59

## 2022-12-25 RX ADMIN — MEROPENEM 1000 MILLIGRAM(S): 1 INJECTION INTRAVENOUS at 21:09

## 2022-12-25 NOTE — PROGRESS NOTE ADULT - SUBJECTIVE AND OBJECTIVE BOX
Subjective: no c/o incisional pain at this time. Denies CP, SOB, palpitations, N/V, other c/o.  all other ROS negative x 10 except as noted above    T(C): 36.5 (12-24-22 @ 20:36), Max: 36.7 (12-24-22 @ 12:45)  HR: 62 (12-24-22 @ 20:36) (48 - 64)  BP: 129/67 (12-24-22 @ 20:36) (106/41 - 130/68)  ABP: --  ABP(mean): --  RR: 16 (12-24-22 @ 20:36) (16 - 18)  SpO2: 100% (12-24-22 @ 20:36) (94% - 100%)  Wt(kg): --  CVP(mm Hg): --  CO: --  CI: --  PA: --       I&O's Detail    23 Dec 2022 07:01  -  24 Dec 2022 07:00  --------------------------------------------------------  IN:    Oral Fluid: 240 mL  Total IN: 240 mL    OUT:    Chest Tube (mL): 10 mL    Chest Tube (mL): 70 mL    Indwelling Catheter - Urethral (mL): 2000 mL  Total OUT: 2080 mL    Total NET: -1840 mL      24 Dec 2022 07:01  -  25 Dec 2022 00:51  --------------------------------------------------------  IN:  Total IN: 0 mL    OUT:    Chest Tube (mL): 0 mL    Chest Tube (mL): 23 mL    Voided (mL): 1100 mL  Total OUT: 1123 mL    Total NET: -1123 mL          LABS: All Lab data reviewed and analyzed                        10.7   14.31 )-----------( 428      ( 24 Dec 2022 06:54 )             32.3     12-24    134<L>  |  97  |  23.9<H>  ----------------------------<  152<H>  3.9   |  25.0  |  0.77    Ca    9.1      24 Dec 2022 06:54  Mg     1.9     12-24    TPro  6.5<L>  /  Alb  3.1<L>  /  TBili  0.7  /  DBili  x   /  AST  21  /  ALT  21  /  AlkPhos  80  12-24            CAPILLARY BLOOD GLUCOSE               RADIOLOGY: - Reviewed and analyzed   CXR: pending    HOSPITAL MEDICATIONS: All medications reviewed and analyzed  MEDICATIONS  (STANDING):  apixaban 2.5 milliGRAM(s) Oral two times a day  aspirin enteric coated 81 milliGRAM(s) Oral daily  chlorhexidine 2% Cloths 1 Application(s) Topical daily  furosemide   Injectable 40 milliGRAM(s) IV Push every 12 hours  lactobacillus acidophilus 1 Tablet(s) Oral two times a day with meals  meropenem Injectable 1000 milliGRAM(s) IV Push every 8 hours  methylPREDNISolone sodium succinate Injectable 40 milliGRAM(s) IV Push every 8 hours  midodrine. 10 milliGRAM(s) Oral three times a day  pantoprazole    Tablet 40 milliGRAM(s) Oral daily  polyethylene glycol 3350 17 Gram(s) Oral daily  senna 2 Tablet(s) Oral at bedtime  tamsulosin 0.4 milliGRAM(s) Oral at bedtime    MEDICATIONS  (PRN):  ondansetron Injectable 4 milliGRAM(s) IV Push once PRN Nausea and/or Vomiting      General: NAD  Neuro: A+O x 3, non-focal, speech clear and intact  HEENT: PERRL, EOMI, oral mucosa pink and moist  Neck: supple, no JVD  CV: regular rate, regular rhythm, +S1S2, no murmurs or rub  Pulm/chest: lung sounds CTA and equal bilaterally, no accessory muscle use noted  Abd: soft, NT, ND, +BS  : normal and skin intact   Ext: MEZA x 4, no C/C/E  Skin: warm, well perfused   mid sternal incision c/d/i   +PB          77yMale with PMH     Aortic valve replacement, tissue    Valve replacement, aortic and mitral        PAST MEDICAL & SURGICAL HISTORY:  Hypertension      Aortic stenosis      H/O inguinal hernia repair  B/L 2013      S/P laparoscopic colectomy  right colectomy with ileotransverse anastomosis

## 2022-12-25 NOTE — PROGRESS NOTE ADULT - ASSESSMENT
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 Cleared by neurosurgery for OR after MRI on 12/1/22 revealed scattered bilateral parieto-occipital acute infarcts.  LHC 12/5 with clean coronaries. Post cath patient noted with RADHA and Urinary retention with ram placed 12/8. Patient now s/p AVR, MVR, and patch repair of the aortic root on 12/13/22 with Dr. Flanagan. Postoperative course significant for pacing requirements due to asystole, with patient regaining rhythm 12/15 (Dopamine for inotropic support now weaned off, plan for MCOT monitor on discharge to be followed by EP), hypotension (on Midodrine for BP support), persistent chest tube output (on IV diuresis, colchicine d/c'd due to diarrhea), and persistent urinary retention (failed TOV 12/19 with Ram replaced). Patient needs PICC line and home IV antibiotics to be set up.       12/23 PICC team does not feel pt is ready for picc line at this time   12/24  Right chest tube removed

## 2022-12-26 LAB
ALBUMIN SERPL ELPH-MCNC: 3.3 G/DL — SIGNIFICANT CHANGE UP (ref 3.3–5.2)
ALP SERPL-CCNC: 79 U/L — SIGNIFICANT CHANGE UP (ref 40–120)
ALT FLD-CCNC: 24 U/L — SIGNIFICANT CHANGE UP
ANION GAP SERPL CALC-SCNC: 11 MMOL/L — SIGNIFICANT CHANGE UP (ref 5–17)
ANION GAP SERPL CALC-SCNC: 13 MMOL/L — SIGNIFICANT CHANGE UP (ref 5–17)
APPEARANCE UR: ABNORMAL
AST SERPL-CCNC: 28 U/L — SIGNIFICANT CHANGE UP
BACTERIA # UR AUTO: ABNORMAL
BILIRUB SERPL-MCNC: 1.2 MG/DL — SIGNIFICANT CHANGE UP (ref 0.4–2)
BILIRUB UR-MCNC: NEGATIVE — SIGNIFICANT CHANGE UP
BUN SERPL-MCNC: 20.6 MG/DL — HIGH (ref 8–20)
BUN SERPL-MCNC: 22.8 MG/DL — HIGH (ref 8–20)
CALCIUM SERPL-MCNC: 8.6 MG/DL — SIGNIFICANT CHANGE UP (ref 8.4–10.5)
CALCIUM SERPL-MCNC: 9.4 MG/DL — SIGNIFICANT CHANGE UP (ref 8.4–10.5)
CHLORIDE SERPL-SCNC: 100 MMOL/L — SIGNIFICANT CHANGE UP (ref 96–108)
CHLORIDE SERPL-SCNC: 93 MMOL/L — LOW (ref 96–108)
CO2 SERPL-SCNC: 24 MMOL/L — SIGNIFICANT CHANGE UP (ref 22–29)
CO2 SERPL-SCNC: 28 MMOL/L — SIGNIFICANT CHANGE UP (ref 22–29)
COLOR SPEC: ABNORMAL
COMMENT - URINE: SIGNIFICANT CHANGE UP
CREAT SERPL-MCNC: 0.76 MG/DL — SIGNIFICANT CHANGE UP (ref 0.5–1.3)
CREAT SERPL-MCNC: 1 MG/DL — SIGNIFICANT CHANGE UP (ref 0.5–1.3)
DIFF PNL FLD: ABNORMAL
EGFR: 77 ML/MIN/1.73M2 — SIGNIFICANT CHANGE UP
EGFR: 92 ML/MIN/1.73M2 — SIGNIFICANT CHANGE UP
EPI CELLS # UR: SIGNIFICANT CHANGE UP
GAS PNL BLDA: SIGNIFICANT CHANGE UP
GLUCOSE SERPL-MCNC: 116 MG/DL — HIGH (ref 70–99)
GLUCOSE SERPL-MCNC: 99 MG/DL — SIGNIFICANT CHANGE UP (ref 70–99)
GLUCOSE UR QL: NEGATIVE MG/DL — SIGNIFICANT CHANGE UP
HCT VFR BLD CALC: 28.2 % — LOW (ref 39–50)
HCT VFR BLD CALC: 35.9 % — LOW (ref 39–50)
HGB BLD-MCNC: 11.3 G/DL — LOW (ref 13–17)
HGB BLD-MCNC: 9.4 G/DL — LOW (ref 13–17)
HYALINE CASTS # UR AUTO: ABNORMAL /LPF
KETONES UR-MCNC: ABNORMAL
LACTATE SERPL-SCNC: 2.3 MMOL/L — HIGH (ref 0.5–2)
LACTATE SERPL-SCNC: 3 MMOL/L — HIGH (ref 0.5–2)
LEUKOCYTE ESTERASE UR-ACNC: NEGATIVE — SIGNIFICANT CHANGE UP
MAGNESIUM SERPL-MCNC: 1.2 MG/DL — LOW (ref 1.6–2.6)
MAGNESIUM SERPL-MCNC: 1.8 MG/DL — SIGNIFICANT CHANGE UP (ref 1.6–2.6)
MCHC RBC-ENTMCNC: 29.3 PG — SIGNIFICANT CHANGE UP (ref 27–34)
MCHC RBC-ENTMCNC: 29.6 PG — SIGNIFICANT CHANGE UP (ref 27–34)
MCHC RBC-ENTMCNC: 31.5 GM/DL — LOW (ref 32–36)
MCHC RBC-ENTMCNC: 33.3 GM/DL — SIGNIFICANT CHANGE UP (ref 32–36)
MCV RBC AUTO: 88.7 FL — SIGNIFICANT CHANGE UP (ref 80–100)
MCV RBC AUTO: 93 FL — SIGNIFICANT CHANGE UP (ref 80–100)
NITRITE UR-MCNC: NEGATIVE — SIGNIFICANT CHANGE UP
PH UR: 7 — SIGNIFICANT CHANGE UP (ref 5–8)
PLATELET # BLD AUTO: 357 K/UL — SIGNIFICANT CHANGE UP (ref 150–400)
PLATELET # BLD AUTO: 491 K/UL — HIGH (ref 150–400)
POTASSIUM SERPL-MCNC: 4 MMOL/L — SIGNIFICANT CHANGE UP (ref 3.5–5.3)
POTASSIUM SERPL-MCNC: 4.5 MMOL/L — SIGNIFICANT CHANGE UP (ref 3.5–5.3)
POTASSIUM SERPL-SCNC: 4 MMOL/L — SIGNIFICANT CHANGE UP (ref 3.5–5.3)
POTASSIUM SERPL-SCNC: 4.5 MMOL/L — SIGNIFICANT CHANGE UP (ref 3.5–5.3)
PROCALCITONIN SERPL-MCNC: 0.71 NG/ML — HIGH (ref 0.02–0.1)
PROT SERPL-MCNC: 5.6 G/DL — LOW (ref 6.6–8.7)
PROT UR-MCNC: 500 MG/DL
RAPID RVP RESULT: SIGNIFICANT CHANGE UP
RBC # BLD: 3.18 M/UL — LOW (ref 4.2–5.8)
RBC # BLD: 3.86 M/UL — LOW (ref 4.2–5.8)
RBC # FLD: 13.4 % — SIGNIFICANT CHANGE UP (ref 10.3–14.5)
RBC # FLD: 13.5 % — SIGNIFICANT CHANGE UP (ref 10.3–14.5)
RBC CASTS # UR COMP ASSIST: >50 /HPF (ref 0–4)
SARS-COV-2 RNA SPEC QL NAA+PROBE: SIGNIFICANT CHANGE UP
SODIUM SERPL-SCNC: 130 MMOL/L — LOW (ref 135–145)
SODIUM SERPL-SCNC: 139 MMOL/L — SIGNIFICANT CHANGE UP (ref 135–145)
SP GR SPEC: 1.01 — SIGNIFICANT CHANGE UP (ref 1.01–1.02)
UROBILINOGEN FLD QL: NEGATIVE MG/DL — SIGNIFICANT CHANGE UP
WBC # BLD: 16.47 K/UL — HIGH (ref 3.8–10.5)
WBC # BLD: 35.46 K/UL — HIGH (ref 3.8–10.5)
WBC # FLD AUTO: 16.47 K/UL — HIGH (ref 3.8–10.5)
WBC # FLD AUTO: 35.46 K/UL — HIGH (ref 3.8–10.5)
WBC UR QL: SIGNIFICANT CHANGE UP /HPF (ref 0–5)

## 2022-12-26 PROCEDURE — 36620 INSERTION CATHETER ARTERY: CPT | Mod: 58

## 2022-12-26 PROCEDURE — 99291 CRITICAL CARE FIRST HOUR: CPT | Mod: 24,25

## 2022-12-26 PROCEDURE — 99232 SBSQ HOSP IP/OBS MODERATE 35: CPT | Mod: 25

## 2022-12-26 PROCEDURE — 36556 INSERT NON-TUNNEL CV CATH: CPT | Mod: 58

## 2022-12-26 PROCEDURE — 71045 X-RAY EXAM CHEST 1 VIEW: CPT | Mod: 26

## 2022-12-26 PROCEDURE — 93308 TTE F-UP OR LMTD: CPT | Mod: 26

## 2022-12-26 PROCEDURE — 99233 SBSQ HOSP IP/OBS HIGH 50: CPT

## 2022-12-26 PROCEDURE — 99292 CRITICAL CARE ADDL 30 MIN: CPT | Mod: 25

## 2022-12-26 PROCEDURE — 76937 US GUIDE VASCULAR ACCESS: CPT | Mod: 26

## 2022-12-26 PROCEDURE — 71045 X-RAY EXAM CHEST 1 VIEW: CPT | Mod: 26,77

## 2022-12-26 RX ORDER — VASOPRESSIN 20 [USP'U]/ML
0.02 INJECTION INTRAVENOUS
Qty: 40 | Refills: 0 | Status: DISCONTINUED | OUTPATIENT
Start: 2022-12-26 | End: 2022-12-28

## 2022-12-26 RX ORDER — MEROPENEM 1 G/30ML
1000 INJECTION INTRAVENOUS EVERY 8 HOURS
Refills: 0 | Status: DISCONTINUED | OUTPATIENT
Start: 2022-12-26 | End: 2023-01-03

## 2022-12-26 RX ORDER — SODIUM CHLORIDE 9 MG/ML
10 INJECTION INTRAMUSCULAR; INTRAVENOUS; SUBCUTANEOUS
Refills: 0 | Status: DISCONTINUED | OUTPATIENT
Start: 2022-12-26 | End: 2022-12-29

## 2022-12-26 RX ORDER — NOREPINEPHRINE BITARTRATE/D5W 8 MG/250ML
0.05 PLASTIC BAG, INJECTION (ML) INTRAVENOUS
Qty: 8 | Refills: 0 | Status: DISCONTINUED | OUTPATIENT
Start: 2022-12-26 | End: 2022-12-29

## 2022-12-26 RX ORDER — ACETAMINOPHEN 500 MG
650 TABLET ORAL ONCE
Refills: 0 | Status: COMPLETED | OUTPATIENT
Start: 2022-12-26 | End: 2022-12-26

## 2022-12-26 RX ORDER — ACETAMINOPHEN 500 MG
650 TABLET ORAL EVERY 6 HOURS
Refills: 0 | Status: DISCONTINUED | OUTPATIENT
Start: 2022-12-26 | End: 2023-01-03

## 2022-12-26 RX ORDER — MAGNESIUM SULFATE 500 MG/ML
2 VIAL (ML) INJECTION
Refills: 0 | Status: COMPLETED | OUTPATIENT
Start: 2022-12-26 | End: 2022-12-26

## 2022-12-26 RX ORDER — SODIUM CHLORIDE 9 MG/ML
1000 INJECTION, SOLUTION INTRAVENOUS ONCE
Refills: 0 | Status: COMPLETED | OUTPATIENT
Start: 2022-12-26 | End: 2022-12-26

## 2022-12-26 RX ORDER — PIPERACILLIN AND TAZOBACTAM 4; .5 G/20ML; G/20ML
3.38 INJECTION, POWDER, LYOPHILIZED, FOR SOLUTION INTRAVENOUS ONCE
Refills: 0 | Status: DISCONTINUED | OUTPATIENT
Start: 2022-12-26 | End: 2022-12-26

## 2022-12-26 RX ORDER — SODIUM CHLORIDE 9 MG/ML
500 INJECTION INTRAMUSCULAR; INTRAVENOUS; SUBCUTANEOUS ONCE
Refills: 0 | Status: COMPLETED | OUTPATIENT
Start: 2022-12-26 | End: 2022-12-26

## 2022-12-26 RX ORDER — PIPERACILLIN AND TAZOBACTAM 4; .5 G/20ML; G/20ML
3.38 INJECTION, POWDER, LYOPHILIZED, FOR SOLUTION INTRAVENOUS ONCE
Refills: 0 | Status: COMPLETED | OUTPATIENT
Start: 2022-12-26 | End: 2022-12-26

## 2022-12-26 RX ORDER — SODIUM CHLORIDE 9 MG/ML
10 INJECTION INTRAMUSCULAR; INTRAVENOUS; SUBCUTANEOUS
Refills: 0 | Status: DISCONTINUED | OUTPATIENT
Start: 2022-12-26 | End: 2023-01-03

## 2022-12-26 RX ORDER — VANCOMYCIN HCL 1 G
1000 VIAL (EA) INTRAVENOUS ONCE
Refills: 0 | Status: COMPLETED | OUTPATIENT
Start: 2022-12-26 | End: 2022-12-26

## 2022-12-26 RX ORDER — ALBUMIN HUMAN 25 %
250 VIAL (ML) INTRAVENOUS
Refills: 0 | Status: COMPLETED | OUTPATIENT
Start: 2022-12-26 | End: 2022-12-26

## 2022-12-26 RX ORDER — ACETAMINOPHEN 500 MG
1000 TABLET ORAL ONCE
Refills: 0 | Status: COMPLETED | OUTPATIENT
Start: 2022-12-26 | End: 2022-12-26

## 2022-12-26 RX ORDER — CHLORHEXIDINE GLUCONATE 213 G/1000ML
1 SOLUTION TOPICAL
Refills: 0 | Status: DISCONTINUED | OUTPATIENT
Start: 2022-12-26 | End: 2023-01-03

## 2022-12-26 RX ORDER — SODIUM CHLORIDE 9 MG/ML
500 INJECTION, SOLUTION INTRAVENOUS ONCE
Refills: 0 | Status: COMPLETED | OUTPATIENT
Start: 2022-12-26 | End: 2022-12-26

## 2022-12-26 RX ADMIN — Medication 400 MILLIGRAM(S): at 17:27

## 2022-12-26 RX ADMIN — Medication 40 MILLIEQUIVALENT(S): at 08:37

## 2022-12-26 RX ADMIN — APIXABAN 2.5 MILLIGRAM(S): 2.5 TABLET, FILM COATED ORAL at 17:27

## 2022-12-26 RX ADMIN — Medication 7.18 MICROGRAM(S)/KG/MIN: at 19:20

## 2022-12-26 RX ADMIN — MEROPENEM 1000 MILLIGRAM(S): 1 INJECTION INTRAVENOUS at 21:19

## 2022-12-26 RX ADMIN — Medication 250 MILLIGRAM(S): at 14:27

## 2022-12-26 RX ADMIN — SODIUM CHLORIDE 1000 MILLILITER(S): 9 INJECTION INTRAMUSCULAR; INTRAVENOUS; SUBCUTANEOUS at 13:57

## 2022-12-26 RX ADMIN — Medication 650 MILLIGRAM(S): at 12:00

## 2022-12-26 RX ADMIN — Medication 1 TABLET(S): at 08:37

## 2022-12-26 RX ADMIN — SODIUM CHLORIDE 2000 MILLILITER(S): 9 INJECTION, SOLUTION INTRAVENOUS at 22:35

## 2022-12-26 RX ADMIN — MIDODRINE HYDROCHLORIDE 5 MILLIGRAM(S): 2.5 TABLET ORAL at 11:45

## 2022-12-26 RX ADMIN — PIPERACILLIN AND TAZOBACTAM 200 GRAM(S): 4; .5 INJECTION, POWDER, LYOPHILIZED, FOR SOLUTION INTRAVENOUS at 13:57

## 2022-12-26 RX ADMIN — SODIUM CHLORIDE 1000 MILLILITER(S): 9 INJECTION, SOLUTION INTRAVENOUS at 20:01

## 2022-12-26 RX ADMIN — CHLORHEXIDINE GLUCONATE 1 APPLICATION(S): 213 SOLUTION TOPICAL at 18:42

## 2022-12-26 RX ADMIN — Medication 125 MILLILITER(S): at 18:00

## 2022-12-26 RX ADMIN — CHLORHEXIDINE GLUCONATE 1 APPLICATION(S): 213 SOLUTION TOPICAL at 08:41

## 2022-12-26 RX ADMIN — MEROPENEM 1000 MILLIGRAM(S): 1 INJECTION INTRAVENOUS at 14:27

## 2022-12-26 RX ADMIN — Medication 1 TABLET(S): at 17:26

## 2022-12-26 RX ADMIN — Medication 1000 MILLIGRAM(S): at 17:45

## 2022-12-26 RX ADMIN — Medication 25 GRAM(S): at 20:51

## 2022-12-26 RX ADMIN — MIDODRINE HYDROCHLORIDE 5 MILLIGRAM(S): 2.5 TABLET ORAL at 05:23

## 2022-12-26 RX ADMIN — Medication 125 MILLILITER(S): at 19:21

## 2022-12-26 RX ADMIN — SENNA PLUS 2 TABLET(S): 8.6 TABLET ORAL at 21:24

## 2022-12-26 RX ADMIN — PANTOPRAZOLE SODIUM 40 MILLIGRAM(S): 20 TABLET, DELAYED RELEASE ORAL at 05:24

## 2022-12-26 RX ADMIN — Medication 650 MILLIGRAM(S): at 11:09

## 2022-12-26 RX ADMIN — Medication 25 GRAM(S): at 22:34

## 2022-12-26 RX ADMIN — Medication 81 MILLIGRAM(S): at 08:37

## 2022-12-26 RX ADMIN — TAMSULOSIN HYDROCHLORIDE 0.4 MILLIGRAM(S): 0.4 CAPSULE ORAL at 21:23

## 2022-12-26 RX ADMIN — CEFTRIAXONE 2000 MILLIGRAM(S): 500 INJECTION, POWDER, FOR SOLUTION INTRAMUSCULAR; INTRAVENOUS at 05:24

## 2022-12-26 RX ADMIN — VASOPRESSIN 2.4 UNIT(S)/MIN: 20 INJECTION INTRAVENOUS at 23:04

## 2022-12-26 RX ADMIN — Medication 40 MILLIGRAM(S): at 05:24

## 2022-12-26 RX ADMIN — SODIUM CHLORIDE 10 MILLILITER(S): 9 INJECTION INTRAMUSCULAR; INTRAVENOUS; SUBCUTANEOUS at 18:00

## 2022-12-26 RX ADMIN — MIDODRINE HYDROCHLORIDE 5 MILLIGRAM(S): 2.5 TABLET ORAL at 17:26

## 2022-12-26 RX ADMIN — APIXABAN 2.5 MILLIGRAM(S): 2.5 TABLET, FILM COATED ORAL at 05:23

## 2022-12-26 NOTE — CHART NOTE - NSCHARTNOTEFT_GEN_A_CORE
Patient with temp 102.7 rectally and with rigors earlier this morning.  Blood cultures sent.  RVP/covid swab sent and negative.  Ram changed to new ram and UA sent. Tylenol given.  Despite tylenol, pt temp remains 102 and SBP 80's.   Discussed with Dr. Colmenares.  IV bolus given, as well as a dose of Vancomycin.  Per Dr. Colmenares, pt to go to [facility] for closer monitoring.

## 2022-12-26 NOTE — PROGRESS NOTE ADULT - SUBJECTIVE AND OBJECTIVE BOX
[facility]  INFECTIOUS DISEASES at [place] and [place]  =======================================================                               Jl Vaughan MD#   Mk Alaniz MD*                             Sunita Laws MD*   Leila Pederson MD*            Diplomates American Board of Internal Medicine & Infectious Diseases                # [place] Office - Appt - Tel  [phone] Fax [phone]                * [place] Office - Appt - Tel [phone] Fax [phone]                                  Hospital Consult line:  [phone]  =======================================================    JUNADELINE HANNAH [id number]    Follow up: Recalled for fever    Patient with a fever to 102.1F this AM   Reported to have chills overnight     s/p AVR, MVR and patch repair of aortic root on   Was on Ceftriaxone for endocarditis       Allergies:  No Known Allergies      REVIEW OF SYSTEMS:  CONSTITUTIONAL:  + Fever + chills  HEENT:   No diplopia or blurred vision.  No earache, sore throat or runny nose.  CARDIOVASCULAR:  No Chest Pain  RESPIRATORY:  No cough, shortness of breath  GASTROINTESTINAL:  No nausea, vomiting or diarrhea.  GENITOURINARY:  Barrios   MUSCULOSKELETAL:  no joint aches, no muscle pain  SKIN:  No change in skin, hair or nails.  NEUROLOGIC:  No Headaches, seizures   PSYCHIATRIC:  No disorder of thought or mood.  ENDOCRINE:  No heat or cold intolerance  HEMATOLOGICAL:  No easy bruising or bleeding.       Physical Exam:  GEN: NAD  HEENT: normocephalic and atraumatic. EOMI. PERRL.    NECK: Supple.   LUNGS: CTA B/L.  HEART: RRR  ABDOMEN: Soft, NT, ND.  +BS.    : Barrios   EXTREMITIES: Without  edema.  MSK: No joint swelling  NEUROLOGIC: No Focal Deficits   PSYCHIATRIC: Appropriate affect .  SKIN: No rash      Vitals:  T(F): 102 (26 Dec 2022 13:25), Max: 102.1 (26 Dec 2022 10:45)  HR: 78 (26 Dec 2022 13:25)  BP: 80/48 (26 Dec 2022 13:27)  RR: 16 (26 Dec 2022 13:25)  SpO2: 96% (26 Dec 2022 13:25) (96% - 100%)  temp max in last 48H T(F): , Max: 102.1 (22 @ 10:45)      Current Antibiotics:  cefTRIAXone Injectable. 2000 milliGRAM(s) IV Push every 24 hours    Other medications:  apixaban 2.5 milliGRAM(s) Oral two times a day  aspirin enteric coated 81 milliGRAM(s) Oral daily  chlorhexidine 2% Cloths 1 Application(s) Topical daily  lactobacillus acidophilus 1 Tablet(s) Oral two times a day with meals  midodrine. 5 milliGRAM(s) Oral three times a day  pantoprazole    Tablet 40 milliGRAM(s) Oral daily  polyethylene glycol 3350 17 Gram(s) Oral daily  potassium chloride    Tablet ER 40 milliEquivalent(s) Oral daily  senna 2 Tablet(s) Oral at bedtime  tamsulosin 0.4 milliGRAM(s) Oral at bedtime                 11.3   16.47 )-----------( 491      ( 26 Dec 2022 04:46 )             35.9         139  |  100  |  20.6<H>  ----------------------------<  99  4.0   |  28.0  |  0.76    Ca    9.4      26 Dec 2022 04:46  Mg     1.8           RECENT CULTURES:   @ 10:51    [email or web address]     @ 22:00 .Tissue Aortic Valve Leaflet     No growth at 5 days    Rare polymorphonuclear leukocytes seen per low power field  No organisms seen per oil power field    WBC Count: 16.47 K/uL (22 @ 04:46)  WBC Count: 17.76 K/uL (22 @ 05:28)  WBC Count: 14.31 K/uL (22 @ 06:54)  WBC Count: 13.14 K/uL (22 @ 05:10)  WBC Count: 17.69 K/uL (22 @ 16:35)  WBC Count: 11.77 K/uL (22 @ 05:14)    Creatinine, Serum: 0.76 mg/dL (22 @ 04:46)  Creatinine, Serum: 0.79 mg/dL (22 @ 05:28)  Creatinine, Serum: 0.77 mg/dL (22 @ 06:54)  Creatinine, Serum: 0.83 mg/dL (22 @ 05:10)  Creatinine, Serum: 0.77 mg/dL (22 @ 05:14)  Creatinine, Serum: 0.81 mg/dL (22 @ 15:19)       SARS-CoV-2: NotDetec (22 @ 10:51)  COVID-19 PCR: NotDetec (22 @ 11:34)  COVID-19 PCR: NotDetec (22 @ 09:55)  SARS-CoV-2: NotDetec (22 @ 00:25)      Urinalysis Basic - ( 26 Dec 2022 11:30 )    Color: Latanya / Appearance: Slightly Turbid / S.015 / pH: x  Gluc: x / Ketone: Trace  / Bili: Negative / Urobili: Negative mg/dL   Blood: x / Protein: 500 mg/dL / Nitrite: Negative   Leuk Esterase: Negative / RBC: >50 /HPF / WBC 0-2 /HPF   Sq Epi: x / Non Sq Epi: Few / Bacteria: Moderate        < from: Xray Chest 1 View- PORTABLE-Routine (Xray Chest 1 View- PORTABLE-Routine in AM.) (22 @ 06:00) >  ACC: [id number] EXAM:  XR CHEST PORTABLE ROUTINE 1V                        ACC: [id number] EXAM:  XR CHEST PORTABLE URGENT 1V                          PROCEDURE DATE:  2022          INTERPRETATION:  CLINICAL STATEMENT: Status post chest tube removal    TECHNIQUE:    AP view of the chest 2022 at 7:14 PM.    COMPARISON: Earlier study same day at 4:37 AM    Interval removal right chest tube. Second chest tube medial right base   remains. Unchanged small right apical pneumothorax.    Vertical midline skin staples, sternotomy sutures, aortic and mitral   valve prostheses again noted. The heart is not enlarged. Mild platelike   atelectatic streak or scars left lower lung. No focal lung consolidation   or sizable pleural effusion.    AP view of the chest 2022 at 5:21 AM.    No significant interval changes.    IMPRESSION:    Status post OHS. Interval removal one right sided chest tube. Unchanged   small right apical pneumothorax.    --- End of Report ---    < end of copied text >      < from: TTE Echo Complete w/ Contrast w/ Doppler (22 @ 13:07) >  Summary:   1. Technically limited study.   2. Normal left ventricular internal cavity size.   3. Normal global left ventricular systolic function.   4. Left ventricular ejection fraction, by visual estimation, is 60%.   5. The left ventricular diastolic function could not be assessed in this   study.   6. Normal right ventricular size and function.   7. Normal left atrial size.   8. Normal right atrial size.   9. A bioprosthetic valve is present in the mitral position.  10. Bioprosthesis in the aortic position.  11. Mitral prosthesis Valve leaflet thickening.  12. Trivial organized pericardial effusion.    < end of copied text >

## 2022-12-26 NOTE — PROGRESS NOTE ADULT - ASSESSMENT
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 Cleared by neurosurgery for OR after MRI on 12/1/22 revealed scattered bilateral parieto-occipital acute infarcts.  LHC 12/5 with clean coronaries. Post cath patient noted with RADHA and Urinary retention with ram placed 12/8. Patient now s/p AVR, MVR, and patch repair of the aortic root on 12/13/22 with Dr. Flanagan. Postoperative course significant for pacing requirements due to asystole, with patient regaining rhythm 12/15 (Dopamine for inotropic support now weaned off, plan for MCOT monitor on discharge to be followed by EP), hypotension (on Midodrine for BP support), persistent chest tube output (now resolved), and persistent urinary retention (failed TOV 12/19 with Ram replaced). Patient needs PICC line and home IV antibiotics to be set up.  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 Cleared by neurosurgery for OR after MRI on 12/1/22 revealed scattered bilateral parieto-occipital acute infarcts.  LHC 12/5 with clean coronaries. Post cath patient noted with RADHA and Urinary retention with ram placed 12/8. Patient now s/p AVR, MVR, and patch repair of the aortic root on 12/13/22 with Dr. Flanagan. Postoperative course significant for pacing requirements due to asystole, with patient regaining rhythm 12/15 (Dopamine for inotropic support now weaned off, plan for MCOT monitor on discharge to be followed by EP), hypotension (on Midodrine for BP support), persistent chest tube output (now resolved), and persistent urinary retention (failed TOV 12/19 with Ram replaced). Patient needs PICC line and home IV antibiotics to be set up.

## 2022-12-26 NOTE — PROGRESS NOTE ADULT - ASSESSMENT
77 year old male with PMHx of recent  COVID  on 10/6/22, HTN , Vit B12 deficiency, presenting to the ED on 11/2 with body aches, fatigue and fever (Tmax 102) at home for 12 days found to have streptococcus bacteremia and admitted with unclear source, no recent dental work, skin infections, no respiratory symptoms. Pan scan was negative at that time ( LILIAN was recommended but pt refused) He was treated for Bacteremia ( Blood cultures + streptococcus anginosis pansensitive ) and norovirus with supportive care and contact isolation . He was discharged home on 11/7 with IV Rocephin via PICC line. Recommendation was to continue ABX  daily via pic end 11/30/22    He presented to ER after and episode of syncope and collapse, found to be septic, hypotensive, hypoxic and febrile in the ER.   In response to hypotension he failed to respond to Initial sepsis fluid protocol in ER is required IV pressor in form of levophed to maintain MAP greater than 60 -65. In the ER he was febrile with initial labs significant for WBC of 22.4 , ProCal of 21.2 first lactate 4.9 via abg with repeat post fluids of 2.2 venous sample.   PT admitted to [facility] with septic shock unclear source. Found to have new onset A fib and on amio. 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  - underwent ct max/c/ap-no source identified  - indium negative  - MRI head cannot r/o septic emboli  - s/p OR for AVR, MVR, aortic root patch repair 12/13/22  - f/u OR CX ngtd  - Was n meropenem from 11/27/22 to 12/25/22, on Ceftriaxone from 12/25 to present   - Tentative plan was 6 weeks of IV antibiotics from date of surgery until 1/24/23, now has fever 12/26 so fever work up repeated       Will follow      77 year old male with PMHx of recent  COVID  on 10/6/22, HTN , Vit B12 deficiency, presenting to the ED on 11/2 with body aches, fatigue and fever (Tmax 102) at home for 12 days found to have streptococcus bacteremia and admitted with unclear source, no recent dental work, skin infections, no respiratory symptoms. Pan scan was negative at that time ( LILIAN was recommended but pt refused) He was treated for Bacteremia ( Blood cultures + streptococcus anginosis pansensitive ) and norovirus with supportive care and contact isolation . He was discharged home on 11/7 with IV Rocephin via PICC line. Recommendation was to continue ABX  daily via pic end 11/30/22    He presented to ER after and episode of syncope and collapse, found to be septic, hypotensive, hypoxic and febrile in the ER.   In response to hypotension he failed to respond to Initial sepsis fluid protocol in ER is required IV pressor in form of levophed to maintain MAP greater than 60 -65. In the ER he was febrile with initial labs significant for WBC of 22.4 , ProCal of 21.2 first lactate 4.9 via abg with repeat post fluids of 2.2 venous sample.   PT admitted to [facility] with septic shock unclear source. Found to have new onset A fib and on amio. 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  - underwent ct max/c/ap-no source identified  - indium negative  - MRI head cannot r/o septic emboli  - s/p OR for AVR, MVR, aortic root patch repair 12/13/22  - f/u OR CX ngtd  - Was n meropenem from 11/27/22 to 12/25/22, on Ceftriaxone from 12/25 to present   - Since patient is febrile to Ceftriaxone, will switch to Meropenem  - Start Vancomycin  - Monitor trough  - Monitor for Vancomycin toxicity   - Vancomycin required at this time pending culture results  - Tentative plan was 6 weeks of IV antibiotics from date of surgery until 1/24/23, now has fever 12/26 so fever work up repeated       Will follow

## 2022-12-26 NOTE — PROGRESS NOTE ADULT - PROBLEM SELECTOR PLAN 1
S/p AVR, MVR, and patch repair of the aortic root on 12/13/22 with Dr. Flanagan.  Blood cultures were cleared 11/27.   Merrem switched to Rocephin as per ID.    Home IV antibiotics being set up by case management.   Pending PICC line placement.   Dopamine weaned off 12/19/22.   EPWs isolated.   MCOT on discharge set up by EP.   Continue BP support with Midodrine as patient now requiring IV diuresis for fluid overload.   Oxygenating well, coughing and deep breathing exercises/incentive spirometry encouraged.  Follow up AM CXR.   Continue diuresis with IV Lasix 40BID, monitor strict I/Os, replenish electrolytes PRN to keep K>4 and Mg>2.   Continue with PT, increase activity as tolerated.  Tylenol, Oxy PRN for analgesia.  Case and plan to be reviewed / discussed with CT Surgery attending / team during AM rounds.

## 2022-12-26 NOTE — PROGRESS NOTE ADULT - SUBJECTIVE AND OBJECTIVE BOX
POD #13 s/p AVR (27 mm inspiris), MVR (29 mm mitral valve), Aortic Root Patch repair    PAST MEDICAL & SURGICAL HISTORY:  Hypertension  Aortic stenosis  H/O inguinal hernia repair, B/L 2013  S/P laparoscopic colectomy, right colectomy with ileotransverse anastomosis    FAMILY HISTORY:  FH: heart disease (Father, Mother)    Brief Hospital Course: 77 year old female with a medical history of HTN, Moderate AS, recent Covid infection 10/6/2022 per chart, recent admission 11/1/22 for SIRS, found to have strep anginosus bacteremia, norovirus, TTE negative for endocarditis at the time, with patient refusing LILIAN that admission, was started on Ceftriaxone (completed 11/20/22), however patient presented 11/27 with syncope, found to have AFib RVR, sepsis, copious diarrhea requiring rectal tube, and eventual LILIAN revealed 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 Cleared by neurosurgery for OR after MRI on 12/1/22 revealed scattered bilateral parieto-occipital acute infarcts.  LHC 12/5 with clean coronaries. Post cath patient noted with RADHA and Urinary retention with ram placed 12/8. Patient now s/p AVR, MVR, and patch repair of the aortic root on 12/13/22 with Dr. Flanagan. Postoperative course significant for pacing requirements due to asystole, with patient regaining rhythm 12/15 (Dopamine for inotropic support now weaned off, plan for MCOT monitor on discharge to be followed by EP), hypotension (on Midodrine for BP support), persistent chest tube output (on IV diuresis, colchicine d/c'd due to diarrhea), and persistent urinary retention (failed TOV 12/19 with Ram replaced). Patient needs PICC line and home IV antibiotics to be set up.     Significant recent/past 24 hr events: No overnight events reported.    Subjective: Patient lying in bed in NAD. +Tolerating diet. +Passing BMs since surgery. +Pain currently controlled. Denies fevers, chills, lightheadedness, dizziness, HA, CP, palpitations, SOB, cough, abdominal pain, N/V, diarrhea, numbness/tingling in extremities, or any other acute complaints. ROS negative x 10 systems except as noted above.    MEDICATIONS  (STANDING):  apixaban 2.5 milliGRAM(s) Oral two times a day  aspirin enteric coated 81 milliGRAM(s) Oral daily  cefTRIAXone Injectable. 2000 milliGRAM(s) IV Push every 24 hours  chlorhexidine 2% Cloths 1 Application(s) Topical daily  furosemide   Injectable 40 milliGRAM(s) IV Push every 12 hours  lactobacillus acidophilus 1 Tablet(s) Oral two times a day with meals  midodrine. 5 milliGRAM(s) Oral three times a day  pantoprazole    Tablet 40 milliGRAM(s) Oral daily  polyethylene glycol 3350 17 Gram(s) Oral daily  potassium chloride    Tablet ER 40 milliEquivalent(s) Oral daily  senna 2 Tablet(s) Oral at bedtime  tamsulosin 0.4 milliGRAM(s) Oral at bedtime    MEDICATIONS  (PRN):  ondansetron Injectable 4 milliGRAM(s) IV Push once PRN Nausea and/or Vomiting    Allergies: No Known Allergies    Vitals   T(C): 36.7 (26 Dec 2022 00:39), Max: 36.7 (25 Dec 2022 08:14)  T(F): 98 (26 Dec 2022 00:39), Max: 98 (25 Dec 2022 08:14)  HR: 59 (26 Dec 2022 00:39) (59 - 67)  BP: 105/58 (26 Dec 2022 00:39) (100/61 - 145/85)  RR: 17 (26 Dec 2022 00:39) (16 - 17)  SpO2: 96% (26 Dec 2022 00:39) (96% - 100%)  O2 Parameters below as of 26 Dec 2022 00:39  Patient On (Oxygen Delivery Method): room air    I&O's Detail    24 Dec 2022 07:01  -  25 Dec 2022 07:00  --------------------------------------------------------  IN:  Total IN: 0 mL    OUT:    Chest Tube (mL): 23 mL    Chest Tube (mL): 25 mL    Indwelling Catheter - Urethral (mL): 800 mL    Voided (mL): 1100 mL  Total OUT: 1948 mL    Total NET: -1948 mL      25 Dec 2022 07:01  -  26 Dec 2022 03:41  --------------------------------------------------------  IN:    Oral Fluid: 120 mL  Total IN: 120 mL    OUT:    Indwelling Catheter - Urethral (mL): 1800 mL    Voided (mL): 900 mL  Total OUT: 2700 mL    Total NET: -2580 mL    Physical Exam  Neuro: A+O x 3, non-focal, speech clear and intact  HEENT:  NCAT, No conjuctival edema or icterus, no thrush.    Neck:  Supple, trachea midline  Pulm: +Diminished BSs at bases bilaterally, no accessory muscle use noted  Chest: +PW (isolated)  CV: regular rate, regular rhythm, +S1S2, no murmur noted  Abd: soft, NT, ND, + BS  : ram in situ to bedside drainage  Ext: MEZA x 4, +1 LE pitting edema b/l, no cyanosis, distal motor/neuro/circ intact  Skin: warm, dry, perfused  Incisions: midsternal incision with island dressing C/D/I, sternum stable    LABS                        10.1   17.76 )-----------( 404      ( 25 Dec 2022 05:28 )             30.8     12-25    138  |  100  |  26.9<H>  ----------------------------<  84  3.7   |  26.0  |  0.79    Ca    9.0      25 Dec 2022 05:28  Mg     2.0     12-25    TPro  6.5<L>  /  Alb  3.1<L>  /  TBili  0.7  /  DBili  x   /  AST  21  /  ALT  21  /  AlkPhos  80  12-24      Last CXR:  < from: Xray Chest 1 View- PORTABLE-Routine (Xray Chest 1 View- PORTABLE-Routine in AM.) (12.25.22 @ 06:00) >  Interval removal right chest tube. Second chest tube medial right base remains. Unchanged small right apical pneumothorax.  Vertical midline skin staples, sternotomy sutures, aortic and mitral valve prostheses again noted. The heart is not enlarged. Mild platelike atelectatic streak or scars left lower lung. No focal lung consolidation or sizable pleural effusion.  AP view of the chest 12/25/2022 at 5:21 AM.  No significant interval changes.  IMPRESSION:  Status post OHS. Interval removal one right sided chest tube. Unchanged small right apical pneumothorax.  < end of copied text >     POD #13 s/p AVR (27 mm inspiris), MVR (29 mm mitral valve), Aortic Root Patch repair    PAST MEDICAL & SURGICAL HISTORY:  Hypertension  Aortic stenosis  H/O inguinal hernia repair, B/L 2013  S/P laparoscopic colectomy, right colectomy with ileotransverse anastomosis    FAMILY HISTORY:  FH: heart disease (Father, Mother)    Brief Hospital Course: 77 year old male with a medical history of HTN, Moderate AS, recent Covid infection 10/6/2022 per chart, recent admission 11/1/22 for SIRS, found to have strep anginosus bacteremia, norovirus, TTE negative for endocarditis at the time, with patient refusing LILIAN that admission, was started on Ceftriaxone (completed 11/20/22), however patient presented 11/27 with syncope, found to have AFib RVR, sepsis, copious diarrhea requiring rectal tube, and eventual LILIAN revealed 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 Cleared by neurosurgery for OR after MRI on 12/1/22 revealed scattered bilateral parieto-occipital acute infarcts.  LHC 12/5 with clean coronaries. Post cath patient noted with RADHA and Urinary retention with ram placed 12/8. Patient now s/p AVR, MVR, and patch repair of the aortic root on 12/13/22 with Dr. Flanagan. Postoperative course significant for pacing requirements due to asystole, with patient regaining rhythm 12/15 (Dopamine for inotropic support now weaned off, plan for MCOT monitor on discharge to be followed by EP), hypotension (on Midodrine for BP support), persistent chest tube output (on IV diuresis, colchicine d/c'd due to diarrhea), and persistent urinary retention (failed TOV 12/19 with Ram replaced). Patient needs PICC line and home IV antibiotics to be set up.     Significant recent/past 24 hr events: No overnight events reported.    Subjective: Patient lying in bed in NAD. +Tolerating diet. +Passing BMs since surgery. +Pain currently controlled. Denies fevers, chills, lightheadedness, dizziness, HA, CP, palpitations, SOB, cough, abdominal pain, N/V, diarrhea, numbness/tingling in extremities, or any other acute complaints. ROS negative x 10 systems except as noted above.    MEDICATIONS  (STANDING):  apixaban 2.5 milliGRAM(s) Oral two times a day  aspirin enteric coated 81 milliGRAM(s) Oral daily  cefTRIAXone Injectable. 2000 milliGRAM(s) IV Push every 24 hours  chlorhexidine 2% Cloths 1 Application(s) Topical daily  furosemide   Injectable 40 milliGRAM(s) IV Push every 12 hours  lactobacillus acidophilus 1 Tablet(s) Oral two times a day with meals  midodrine. 5 milliGRAM(s) Oral three times a day  pantoprazole    Tablet 40 milliGRAM(s) Oral daily  polyethylene glycol 3350 17 Gram(s) Oral daily  potassium chloride    Tablet ER 40 milliEquivalent(s) Oral daily  senna 2 Tablet(s) Oral at bedtime  tamsulosin 0.4 milliGRAM(s) Oral at bedtime    MEDICATIONS  (PRN):  ondansetron Injectable 4 milliGRAM(s) IV Push once PRN Nausea and/or Vomiting    Allergies: No Known Allergies    Vitals   T(C): 36.7 (26 Dec 2022 00:39), Max: 36.7 (25 Dec 2022 08:14)  T(F): 98 (26 Dec 2022 00:39), Max: 98 (25 Dec 2022 08:14)  HR: 59 (26 Dec 2022 00:39) (59 - 67)  BP: 105/58 (26 Dec 2022 00:39) (100/61 - 145/85)  RR: 17 (26 Dec 2022 00:39) (16 - 17)  SpO2: 96% (26 Dec 2022 00:39) (96% - 100%)  O2 Parameters below as of 26 Dec 2022 00:39  Patient On (Oxygen Delivery Method): room air    I&O's Detail    24 Dec 2022 07:01  -  25 Dec 2022 07:00  --------------------------------------------------------  IN:  Total IN: 0 mL    OUT:    Chest Tube (mL): 23 mL    Chest Tube (mL): 25 mL    Indwelling Catheter - Urethral (mL): 800 mL    Voided (mL): 1100 mL  Total OUT: 1948 mL    Total NET: -1948 mL      25 Dec 2022 07:01  -  26 Dec 2022 03:41  --------------------------------------------------------  IN:    Oral Fluid: 120 mL  Total IN: 120 mL    OUT:    Indwelling Catheter - Urethral (mL): 1800 mL    Voided (mL): 900 mL  Total OUT: 2700 mL    Total NET: -2580 mL    Physical Exam  Neuro: A+O x 3, non-focal, speech clear and intact  HEENT:  NCAT, No conjuctival edema or icterus, no thrush.    Neck:  Supple, trachea midline  Pulm: +Diminished BSs at bases bilaterally, no accessory muscle use noted  Chest: +PW (isolated)  CV: regular rate, regular rhythm, +S1S2, no murmur noted  Abd: soft, NT, ND, + BS  : ram in situ to bedside drainage  Ext: MEZA x 4, +1 LE pitting edema b/l, no cyanosis, distal motor/neuro/circ intact  Skin: warm, dry, perfused  Incisions: midsternal incision with island dressing C/D/I, sternum stable    LABS                        10.1   17.76 )-----------( 404      ( 25 Dec 2022 05:28 )             30.8     12-25    138  |  100  |  26.9<H>  ----------------------------<  84  3.7   |  26.0  |  0.79    Ca    9.0      25 Dec 2022 05:28  Mg     2.0     12-25    TPro  6.5<L>  /  Alb  3.1<L>  /  TBili  0.7  /  DBili  x   /  AST  21  /  ALT  21  /  AlkPhos  80  12-24      Last CXR:  < from: Xray Chest 1 View- PORTABLE-Routine (Xray Chest 1 View- PORTABLE-Routine in AM.) (12.25.22 @ 06:00) >  Interval removal right chest tube. Second chest tube medial right base remains. Unchanged small right apical pneumothorax.  Vertical midline skin staples, sternotomy sutures, aortic and mitral valve prostheses again noted. The heart is not enlarged. Mild platelike atelectatic streak or scars left lower lung. No focal lung consolidation or sizable pleural effusion.  AP view of the chest 12/25/2022 at 5:21 AM.  No significant interval changes.  IMPRESSION:  Status post OHS. Interval removal one right sided chest tube. Unchanged small right apical pneumothorax.  < end of copied text >

## 2022-12-26 NOTE — CHART NOTE - NSCHARTNOTEFT_GEN_A_CORE
Pt upgraded to CTICU for sepsis, pt TMax 102.7 rectally, pt in rigors, hypotensive SBP 80's on 4 tower. Pt received 500 NS, prior to transfer. OI exam pt w/o complaints of fever or chills. Patient denies acute pain with radiating or aggravating factors. He denies chest pain, shortness of breath, palpitations, headache, dizziness, nausea, or vomiting. Pt states, " I feel fine" 1 albumin given to continue fluid resuscitation, Right Radial A line placed, pt not requiring pressors at this moment,  w/ fluid resuscitation.  Will cont to [facility]. Pt upgraded to CTICU for sepsis, pt TMax 102.7 rectally, pt in rigors, hypotensive SBP 80's on 4 tower. Pt received 500 NS, prior to transfer. OI exam pt w/o complaints of fever or chills. Patient denies acute pain with radiating or aggravating factors. He denies chest pain, shortness of breath, palpitations, headache, dizziness, nausea, or vomiting. Pt states, " I feel fine" 1 albumin given to continue fluid resuscitation, Right Radial A line placed, pt not requiring pressors at this moment,  w/ fluid resuscitation.  ABG pending, pt on rm air Will cont to [facility].    Critical Care time: 35 mins assessing presenting problems of acute illness that poses high probability of life threatening deterioration or end organ damage/dysfunction.  Medical decision making including Initiating plan of care, reviewing data, reviewing radiology, discussing with multidisciplinary team, non inclusive of procedures, discussing goals of care with patient/family. Pt upgraded to CTICU for fever with hypoptension suspecting sepsis, Bld cx sent awaiting results. pt TMax 102.7 rectally, pt in rigors, hypotensive SBP 80's on 4 tower. Pt received 500 NS, prior to transfer. On exam pt w/o complaints of fever or chills. Patient denies acute pain with radiating or aggravating factors. He denies chest pain, shortness of breath, palpitations, headache, dizziness, nausea, or vomiting. Pt states, " I feel fine" 1 albumin 250cc given to continue fluid resuscitation, Right Radial A line placed, and right central line placed as per Dr. Colmenares,  w/ fluid resuscitation.  Lactate 3. Repeat lactate on ABG after fluids 2.1. Pt on rm air Will cont to monitor    Critical Care time: 35 mins assessing presenting problems of acute illness that poses high probability of life threatening deterioration or end organ damage/dysfunction.  Medical decision making including Initiating plan of care, reviewing data, reviewing radiology, discussing with multidisciplinary team, non inclusive of procedures, discussing goals of care with patient/family.

## 2022-12-26 NOTE — PROGRESS NOTE ADULT - SUBJECTIVE AND OBJECTIVE BOX
HOLDSWORTH, GEORGE  MRN-[id number]    HPI:  77 year old male with PMHx of recent  COVID  on 10/6/22, HTN , Vit B12 deficiency, presenting to the ED on  with body aches, fatigue and fever (Tmax 102) at home for 12 days found to have streptococcus bacteremia and admitted with unclear source, no recent dental work, skin infections, no respiratory symptoms. Pan scan was negative at that time ( LILIAN was recommended but pt refused) He was treated for Bacteremia ( Blood cultures + streptococcus anginosis pansensitive ) and norovirus with supportive care and contact isolation . He was discharged home on  with IV Rocephin via PICC line. Recommendation was to continue ABX  daily via pic end 22  Tonight he presented to ER after and episode of syncope and collapse, found to be septic, hypotensive, hypoxic and febrile in the ER. He is unsure of mechanism of fall but remembers being on the ground no reported LOC . He was found face-down on bathroom floor buy family with left leg wedged under cabinet. On my interview he is alert and oriented to person place and time, he has a baseline studder in his speech pattern but otherwise neurologically intcact without defecit.  In responose to hypotension he failed to repsoond to Inital sepsis fluid protocol in ER and now is requiring IV pressor in form of levophed to maintain MAP greater than 60 -65. In the ER he was febrile with inital labs signifacnt for WBC of 22.4 , ProCal of 21.2 first lactate 4.9 via abg with repeat post fluids of 2.2 venous sample.   (2022 02:26)    Surgery/Hospital Course:  ·  PRE-OP DIAGNOSIS:  Aortic valve endocarditis   ·  POST-OP DIAGNOSIS:  Aortic valve endocarditis   ·  PROCEDURES:  Valve replacement, aortic and mitral 13-Dec-2022   AVR 27 mm inspiris, 29 mm mitral valve, Patch repair aortic root -  Today:  -Patient with temp 102.7 rectally and with rigors earlier this morning.    -Blood cultures sent.  RVP/covid swab sent and negative.    -Ram changed to new ram and UA sent. Tylenol given.    -Despite tylenol, pt temp remains 102 and SBP 80's.     --IV bolus given, as well as a dose of Vancomycin.    -continue fluid resuscitation, Right Radial A line placed, and right central line placed -    ICU Vital Signs Last 24 Hrs  T(C): 38.6 (26 Dec 2022 17:00), Max: 38.9 (26 Dec 2022 10:45)  T(F): 101.4 (26 Dec 2022 17:00), Max: 102.1 (26 Dec 2022 10:45)  HR: 83 (26 Dec 2022 17:30) (59 - 90)  BP: 118/62 (26 Dec 2022 17:00) (73/45 - 131/51)  BP(mean): 75 (26 Dec 2022 17:00) (55 - 75)  ABP: 126/38 (26 Dec 2022 17:30) (93/30 - 135/39)  ABP(mean): 57 (26 Dec 2022 17:30) (46 - 66)  RR: 31 (26 Dec 2022 17:30) (16 - 36)  SpO2: 96% (26 Dec 2022 17:30) (94% - 100%)    O2 Parameters below as of 26 Dec 2022 13:25  Patient On (Oxygen Delivery Method): room air            Physical Exam:  Gen: A&O   CNS: non focal 	  Neck: no JVD  RES : clear , no wheezing              CVS: Regular  rhythm. Normal S1/S2  Abd: Soft, non-distended. Bowel sounds present.  Skin: No rash.  Ext:  no edema    ============================I/O===========================   I&O's Detail    25 Dec 2022 07:01  -  26 Dec 2022 07:00  --------------------------------------------------------  IN:    Oral Fluid: 240 mL  Total IN: 240 mL    OUT:    Indwelling Catheter - Urethral (mL): 2500 mL    Voided (mL): 900 mL  Total OUT: 3400 mL    Total NET: -3160 mL      26 Dec 2022 07:01  -  26 Dec 2022 17:46  --------------------------------------------------------  IN:    Albumin 5%  - 250 mL: 250 mL    Oral Fluid: 480 mL  Total IN: 730 mL    OUT:    Indwelling Catheter - Urethral (mL): 60 mL  Total OUT: 60 mL    Total NET: 670 mL        ============================ LABS =========================                        11.3   16.47 )-----------( 491      ( 26 Dec 2022 04:46 )             35.9         139  |  100  |  20.6<H>  ----------------------------<  99  4.0   |  28.0  |  0.76    Ca    9.4      26 Dec 2022 04:46  Mg     1.8               ABG - ( 26 Dec 2022 16:17 )  pH, Arterial: 7.480 pH, Blood: x     /  pCO2: 34    /  pO2: 95    / HCO3: 25    / Base Excess: 1.8   /  SaO2: 99.3              Urinalysis Basic - ( 26 Dec 2022 11:30 )    Color: Latanya / Appearance: Slightly Turbid / S.015 / pH: x  Gluc: x / Ketone: Trace  / Bili: Negative / Urobili: Negative mg/dL   Blood: x / Protein: 500 mg/dL / Nitrite: Negative   Leuk Esterase: Negative / RBC: >50 /HPF / WBC 0-2 /HPF   Sq Epi: x / Non Sq Epi: Few / Bacteria: Moderate      ======================Micro/Rad/Cardio=================  Culture: Reviewed   CXR: Reviewed  Echo:Reviewed  ======================================================  PAST MEDICAL & SURGICAL HISTORY:  Hypertension      Aortic stenosis      H/O inguinal hernia repair  B/L       S/P laparoscopic colectomy  right colectomy with ileotransverse anastomosis        ====================ASSESSMENT ==============  77M, pmhx HTN, moderate AS, recent Covid infection 10/6/2022 per chart, recent admission  for SIRS, found to have strep anginosus bactermia, norovirus, TTE neg for endocarditis, refused LILIAN that admission, was started on ceftriaxone to complete abx , however pt presented  with syncope, found to have AF RVR, sepsis, copious diarrhea requiring rectal tube, Eventual LILIAN revealed 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 [facility]  with clean coronaries. Cleared by neurosurgery for OR. Urinary retention and RADHA post cath resolving since ram placed .  Now s/p AVR/MVR on  .    --- Endocarditis.   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  --- Abscess of aortic root.   --- Atrial fibrillation, new onset.   ---RADHA (acute kidney injury).   ---Hematuria.   --- Urinary retention.   --- Diarrhea.   ---Prior norovirus + 11/3.  ---Post op Hypovolemic shock   ---Post op respiratory insufficiency -  ---CTICU for fever with hypoptension suspecting sepsis    Plan:  -continue fluid resuscitation,   -Right Radial A line placed  -Right central line placed   -ABX per ID   -Continue Flomax.  -Eliquis and SCDs for DVT prophylaxis.  -Protonix for GI prophylaxis.    ====================== NEUROLOGY=====================  ondansetron Injectable 4 milliGRAM(s) IV Push once PRN Nausea and/or Vomiting    ==================== RESPIRATORY======================  Post op respiratory insufficiency    ====================CARDIOVASCULAR==================  Post op Hypovolemia  midodrine. 5 milliGRAM(s) Oral three times a day    ===================HEMATOLOGIC/ONC ===================  Monitor H&H/Plts    apixaban 2.5 milliGRAM(s) Oral two times a day  aspirin enteric coated 81 milliGRAM(s) Oral daily    ===================== RENAL =========================  Continue monitoring urine output, I&OS, BUN/Cr   tamsulosin 0.4 milliGRAM(s) Oral at bedtime    ==================== GASTROINTESTINAL===================  pantoprazole    Tablet 40 milliGRAM(s) Oral daily  polyethylene glycol 3350 17 Gram(s) Oral daily  potassium chloride    Tablet ER 40 milliEquivalent(s) Oral daily  senna 2 Tablet(s) Oral at bedtime    =======================    ENDOCRINE  =====================    ========================INFECTIOUS DISEASE================  cefTRIAXone Injectable. 2000 milliGRAM(s) IV Push every 24 hours  meropenem Injectable 1000 milliGRAM(s) IV Push every 8 hours      -Close hemodynamic , ventilatory and drain monitoring and management per post op routine .  -Monitor Neurologic status ,   -Head of the bed should remain elevated to 45 degrees,  -Monitor adequacy of oxygenation and ventilation and attempt to wean oxygen ,  -Monitor for arrhythmias and monitor parameters for organ perfusion,  -Glycemic control is satisfactory,  -Nutritional goals will be met using po eventually , insure adequate caloric intake and monitor the same ,  -Electrolytes have been repleted as necessary , pain control has been achieved  and wound care has been carried out ,  -Stress ulcer and VTE prophylaxis will be achieved,  -Agressive PT and early mobility and ambulation goals will be met,  -The family was updated about the course and plan .      I have spent 35 minutes providing acute care for this critically ill patient     Patient requires continuous monitoring with bedside rhythm monitoring, pulse ox monitoring, and intermittent blood gas analysis. Care plan discussed with ICU care team. Patient remained critical and at risk for life threatening decompensation.

## 2022-12-27 DIAGNOSIS — A41.9 SEPSIS, UNSPECIFIED ORGANISM: ICD-10-CM

## 2022-12-27 LAB
AMYLASE P1 CFR SERPL: 122 U/L — SIGNIFICANT CHANGE UP (ref 36–128)
AMYLASE P1 CFR SERPL: 132 U/L — HIGH (ref 36–128)
ANION GAP SERPL CALC-SCNC: 10 MMOL/L — SIGNIFICANT CHANGE UP (ref 5–17)
ANION GAP SERPL CALC-SCNC: 11 MMOL/L — SIGNIFICANT CHANGE UP (ref 5–17)
BLD GP AB SCN SERPL QL: SIGNIFICANT CHANGE UP
BUN SERPL-MCNC: 19.5 MG/DL — SIGNIFICANT CHANGE UP (ref 8–20)
BUN SERPL-MCNC: 22.7 MG/DL — HIGH (ref 8–20)
CALCIUM SERPL-MCNC: 8.5 MG/DL — SIGNIFICANT CHANGE UP (ref 8.4–10.5)
CALCIUM SERPL-MCNC: 8.7 MG/DL — SIGNIFICANT CHANGE UP (ref 8.4–10.5)
CHLORIDE SERPL-SCNC: 95 MMOL/L — LOW (ref 96–108)
CHLORIDE SERPL-SCNC: 96 MMOL/L — SIGNIFICANT CHANGE UP (ref 96–108)
CO2 SERPL-SCNC: 23 MMOL/L — SIGNIFICANT CHANGE UP (ref 22–29)
CO2 SERPL-SCNC: 25 MMOL/L — SIGNIFICANT CHANGE UP (ref 22–29)
CREAT SERPL-MCNC: 0.74 MG/DL — SIGNIFICANT CHANGE UP (ref 0.5–1.3)
CREAT SERPL-MCNC: 0.79 MG/DL — SIGNIFICANT CHANGE UP (ref 0.5–1.3)
CULTURE RESULTS: NO GROWTH — SIGNIFICANT CHANGE UP
EGFR: 91 ML/MIN/1.73M2 — SIGNIFICANT CHANGE UP
EGFR: 93 ML/MIN/1.73M2 — SIGNIFICANT CHANGE UP
GLUCOSE SERPL-MCNC: 109 MG/DL — HIGH (ref 70–99)
GLUCOSE SERPL-MCNC: 120 MG/DL — HIGH (ref 70–99)
HCT VFR BLD CALC: 27.3 % — LOW (ref 39–50)
HCT VFR BLD CALC: 27.8 % — LOW (ref 39–50)
HCT VFR BLD CALC: 27.9 % — LOW (ref 39–50)
HGB BLD-MCNC: 9.3 G/DL — LOW (ref 13–17)
HGB BLD-MCNC: 9.4 G/DL — LOW (ref 13–17)
HGB BLD-MCNC: 9.5 G/DL — LOW (ref 13–17)
LACTATE SERPL-SCNC: 1.2 MMOL/L — SIGNIFICANT CHANGE UP (ref 0.5–2)
LIDOCAIN IGE QN: 84 U/L — HIGH (ref 22–51)
LIDOCAIN IGE QN: 91 U/L — HIGH (ref 22–51)
MAGNESIUM SERPL-MCNC: 2.3 MG/DL — SIGNIFICANT CHANGE UP (ref 1.6–2.6)
MCHC RBC-ENTMCNC: 29.8 PG — SIGNIFICANT CHANGE UP (ref 27–34)
MCHC RBC-ENTMCNC: 29.8 PG — SIGNIFICANT CHANGE UP (ref 27–34)
MCHC RBC-ENTMCNC: 30.2 PG — SIGNIFICANT CHANGE UP (ref 27–34)
MCHC RBC-ENTMCNC: 33.7 GM/DL — SIGNIFICANT CHANGE UP (ref 32–36)
MCHC RBC-ENTMCNC: 34.1 GM/DL — SIGNIFICANT CHANGE UP (ref 32–36)
MCHC RBC-ENTMCNC: 34.2 GM/DL — SIGNIFICANT CHANGE UP (ref 32–36)
MCV RBC AUTO: 87.1 FL — SIGNIFICANT CHANGE UP (ref 80–100)
MCV RBC AUTO: 88.6 FL — SIGNIFICANT CHANGE UP (ref 80–100)
MCV RBC AUTO: 88.6 FL — SIGNIFICANT CHANGE UP (ref 80–100)
PLATELET # BLD AUTO: 250 K/UL — SIGNIFICANT CHANGE UP (ref 150–400)
PLATELET # BLD AUTO: 311 K/UL — SIGNIFICANT CHANGE UP (ref 150–400)
PLATELET # BLD AUTO: 344 K/UL — SIGNIFICANT CHANGE UP (ref 150–400)
POTASSIUM SERPL-MCNC: 4.1 MMOL/L — SIGNIFICANT CHANGE UP (ref 3.5–5.3)
POTASSIUM SERPL-MCNC: 4.1 MMOL/L — SIGNIFICANT CHANGE UP (ref 3.5–5.3)
POTASSIUM SERPL-SCNC: 4.1 MMOL/L — SIGNIFICANT CHANGE UP (ref 3.5–5.3)
POTASSIUM SERPL-SCNC: 4.1 MMOL/L — SIGNIFICANT CHANGE UP (ref 3.5–5.3)
PROCALCITONIN SERPL-MCNC: 1.47 NG/ML — HIGH (ref 0.02–0.1)
RBC # BLD: 3.08 M/UL — LOW (ref 4.2–5.8)
RBC # BLD: 3.15 M/UL — LOW (ref 4.2–5.8)
RBC # BLD: 3.19 M/UL — LOW (ref 4.2–5.8)
RBC # FLD: 13.3 % — SIGNIFICANT CHANGE UP (ref 10.3–14.5)
RBC # FLD: 13.6 % — SIGNIFICANT CHANGE UP (ref 10.3–14.5)
RBC # FLD: 13.7 % — SIGNIFICANT CHANGE UP (ref 10.3–14.5)
SODIUM SERPL-SCNC: 130 MMOL/L — LOW (ref 135–145)
SODIUM SERPL-SCNC: 130 MMOL/L — LOW (ref 135–145)
SPECIMEN SOURCE: SIGNIFICANT CHANGE UP
SURGICAL PATHOLOGY STUDY: SIGNIFICANT CHANGE UP
WBC # BLD: 14.75 K/UL — HIGH (ref 3.8–10.5)
WBC # BLD: 23.03 K/UL — HIGH (ref 3.8–10.5)
WBC # BLD: 27.23 K/UL — HIGH (ref 3.8–10.5)
WBC # FLD AUTO: 14.75 K/UL — HIGH (ref 3.8–10.5)
WBC # FLD AUTO: 23.03 K/UL — HIGH (ref 3.8–10.5)
WBC # FLD AUTO: 27.23 K/UL — HIGH (ref 3.8–10.5)

## 2022-12-27 PROCEDURE — 99233 SBSQ HOSP IP/OBS HIGH 50: CPT

## 2022-12-27 PROCEDURE — 99291 CRITICAL CARE FIRST HOUR: CPT

## 2022-12-27 PROCEDURE — 71045 X-RAY EXAM CHEST 1 VIEW: CPT | Mod: 26

## 2022-12-27 RX ORDER — MIDODRINE HYDROCHLORIDE 2.5 MG/1
20 TABLET ORAL EVERY 8 HOURS
Refills: 0 | Status: DISCONTINUED | OUTPATIENT
Start: 2022-12-27 | End: 2022-12-29

## 2022-12-27 RX ORDER — ALBUMIN HUMAN 25 %
250 VIAL (ML) INTRAVENOUS
Refills: 0 | Status: COMPLETED | OUTPATIENT
Start: 2022-12-27 | End: 2022-12-27

## 2022-12-27 RX ORDER — MIDODRINE HYDROCHLORIDE 2.5 MG/1
10 TABLET ORAL THREE TIMES A DAY
Refills: 0 | Status: DISCONTINUED | OUTPATIENT
Start: 2022-12-27 | End: 2022-12-27

## 2022-12-27 RX ORDER — SODIUM CHLORIDE 9 MG/ML
500 INJECTION, SOLUTION INTRAVENOUS ONCE
Refills: 0 | Status: COMPLETED | OUTPATIENT
Start: 2022-12-27 | End: 2022-12-27

## 2022-12-27 RX ORDER — VANCOMYCIN HCL 1 G
1000 VIAL (EA) INTRAVENOUS EVERY 12 HOURS
Refills: 0 | Status: DISCONTINUED | OUTPATIENT
Start: 2022-12-27 | End: 2022-12-31

## 2022-12-27 RX ADMIN — MEROPENEM 1000 MILLIGRAM(S): 1 INJECTION INTRAVENOUS at 05:26

## 2022-12-27 RX ADMIN — Medication 650 MILLIGRAM(S): at 05:14

## 2022-12-27 RX ADMIN — Medication 40 MILLIEQUIVALENT(S): at 12:58

## 2022-12-27 RX ADMIN — Medication 650 MILLIGRAM(S): at 03:55

## 2022-12-27 RX ADMIN — Medication 7.18 MICROGRAM(S)/KG/MIN: at 19:59

## 2022-12-27 RX ADMIN — Medication 650 MILLIGRAM(S): at 15:47

## 2022-12-27 RX ADMIN — SODIUM CHLORIDE 1000 MILLILITER(S): 9 INJECTION, SOLUTION INTRAVENOUS at 07:00

## 2022-12-27 RX ADMIN — TAMSULOSIN HYDROCHLORIDE 0.4 MILLIGRAM(S): 0.4 CAPSULE ORAL at 21:07

## 2022-12-27 RX ADMIN — Medication 125 MILLIGRAM(S): at 18:36

## 2022-12-27 RX ADMIN — APIXABAN 2.5 MILLIGRAM(S): 2.5 TABLET, FILM COATED ORAL at 18:24

## 2022-12-27 RX ADMIN — SENNA PLUS 2 TABLET(S): 8.6 TABLET ORAL at 21:08

## 2022-12-27 RX ADMIN — Medication 7.18 MICROGRAM(S)/KG/MIN: at 12:59

## 2022-12-27 RX ADMIN — MEROPENEM 1000 MILLIGRAM(S): 1 INJECTION INTRAVENOUS at 14:14

## 2022-12-27 RX ADMIN — MIDODRINE HYDROCHLORIDE 20 MILLIGRAM(S): 2.5 TABLET ORAL at 21:07

## 2022-12-27 RX ADMIN — Medication 650 MILLIGRAM(S): at 16:00

## 2022-12-27 RX ADMIN — Medication 81 MILLIGRAM(S): at 12:57

## 2022-12-27 RX ADMIN — PANTOPRAZOLE SODIUM 40 MILLIGRAM(S): 20 TABLET, DELAYED RELEASE ORAL at 05:25

## 2022-12-27 RX ADMIN — Medication 1 TABLET(S): at 18:24

## 2022-12-27 RX ADMIN — SODIUM CHLORIDE 2000 MILLILITER(S): 9 INJECTION, SOLUTION INTRAVENOUS at 07:00

## 2022-12-27 RX ADMIN — VASOPRESSIN 2.4 UNIT(S)/MIN: 20 INJECTION INTRAVENOUS at 19:58

## 2022-12-27 RX ADMIN — CHLORHEXIDINE GLUCONATE 1 APPLICATION(S): 213 SOLUTION TOPICAL at 05:27

## 2022-12-27 RX ADMIN — MIDODRINE HYDROCHLORIDE 10 MILLIGRAM(S): 2.5 TABLET ORAL at 12:58

## 2022-12-27 RX ADMIN — MEROPENEM 1000 MILLIGRAM(S): 1 INJECTION INTRAVENOUS at 22:46

## 2022-12-27 RX ADMIN — Medication 1 TABLET(S): at 10:26

## 2022-12-27 RX ADMIN — CHLORHEXIDINE GLUCONATE 1 APPLICATION(S): 213 SOLUTION TOPICAL at 12:59

## 2022-12-27 RX ADMIN — Medication 125 MILLILITER(S): at 05:50

## 2022-12-27 RX ADMIN — MIDODRINE HYDROCHLORIDE 5 MILLIGRAM(S): 2.5 TABLET ORAL at 05:25

## 2022-12-27 RX ADMIN — Medication 125 MILLILITER(S): at 05:18

## 2022-12-27 RX ADMIN — APIXABAN 2.5 MILLIGRAM(S): 2.5 TABLET, FILM COATED ORAL at 05:25

## 2022-12-27 NOTE — PROGRESS NOTE ADULT - ASSESSMENT
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 Cleared by neurosurgery for OR after MRI on 12/1/22 revealed scattered bilateral parieto-occipital acute infarcts.  LHC 12/5 with clean coronaries. Post cath patient noted with RADHA and Urinary retention with ram placed 12/8.     Patient s/p AVR, MVR, and patch repair of the aortic root on 12/13/22 with Dr. Flanagan. Postoperative course significant for pacing requirements having regained rhythm 12/15 (Dopamine for inotropic support weaned off, plan for MCOT monitor on discharge to be followed by EP), hypotension (on Midodrine for BP support), persistent chest tube output (now resolved), persistent urinary retention (failed TOV 12/19 with Ram replaced most recently 12/26), and was upgraded to CTICU 12/26 r/o sepsis, now suspect septic shock.    Once ready for discharge, patient needs PICC line and home IV antibiotics.

## 2022-12-27 NOTE — PROGRESS NOTE ADULT - ASSESSMENT
77 year old male with PMHx of recent  COVID  on 10/6/22, HTN , Vit B12 deficiency, presenting to the ED on 11/2 with body aches, fatigue and fever (Tmax 102) at home for 12 days found to have streptococcus bacteremia and admitted with unclear source, no recent dental work, skin infections, no respiratory symptoms. Pan scan was negative at that time ( LILIAN was recommended but pt refused) He was treated for Bacteremia ( Blood cultures + streptococcus anginosis pansensitive ) and norovirus with supportive care and contact isolation . He was discharged home on 11/7 with IV Rocephin via PICC line. Recommendation was to continue ABX  daily via pic end 11/30/22    He presented to ER after and episode of syncope and collapse, found to be septic, hypotensive, hypoxic and febrile in the ER.   In response to hypotension he failed to respond to Initial sepsis fluid protocol in ER is required IV pressor in form of levophed to maintain MAP greater than 60 -65. In the ER he was febrile with initial labs significant for WBC of 22.4 , ProCal of 21.2 first lactate 4.9 via abg with repeat post fluids of 2.2 venous sample.   PT admitted to [facility] with septic shock unclear source. Found to have new onset A fib and on amio. 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 Dose adjustments as per pharmacy protocol   - No diarrheareported; however, monitor for diarrhea given fever and uptrending leukocytosis   - Follow repeat blood cultures  - UA, repeated after ram change, not suggestive of UTI. UCX w/o growth  - 12/26 RVP neg   - bcx 11/27 ngtd  - s/p OR for AVR, MVR, aortic root patch repair 12/13/22  - OR CX ngtd  - Tentative plan was 6 weeks of IV antibiotics from date of surgery until 1/24/23, now has fever 12/26 so fever work up repeated       D/w [facility] and patient     Will follow

## 2022-12-27 NOTE — PROGRESS NOTE ADULT - SUBJECTIVE AND OBJECTIVE BOX
HOLDSWORTH, GEORGE  MRN-[id number]    HPI:  77 year old male with PMHx of recent  COVID  on 10/6/22, HTN , Vit B12 deficiency, presenting to the ED on  with body aches, fatigue and fever (Tmax 102) at home for 12 days found to have streptococcus bacteremia and admitted with unclear source, no recent dental work, skin infections, no respiratory symptoms. Pan scan was negative at that time ( LILIAN was recommended but pt refused) He was treated for Bacteremia ( Blood cultures + streptococcus anginosis pansensitive ) and norovirus with supportive care and contact isolation . He was discharged home on  with IV Rocephin via PICC line. Recommendation was to continue ABX  daily via pic end 22  Tonight he presented to ER after and episode of syncope and collapse, found to be septic, hypotensive, hypoxic and febrile in the ER. He is unsure of mechanism of fall but remembers being on the ground no reported LOC . He was found face-down on bathroom floor buy family with left leg wedged under cabinet. On my interview he is alert and oriented to person place and time, he has a baseline studder in his speech pattern but otherwise neurologically intcact without defecit.  In responose to hypotension he failed to repsoond to Inital sepsis fluid protocol in ER and now is requiring IV pressor in form of levophed to maintain MAP greater than 60 -65. In the ER he was febrile with inital labs signifacnt for WBC of 22.4 , ProCal of 21.2 first lactate 4.9 via abg with repeat post fluids of 2.2 venous sample.   (2022 02:26)    Surgery/Hospital Course:  ·  PRE-OP DIAGNOSIS:  Aortic valve endocarditis   ·  POST-OP DIAGNOSIS:  Aortic valve endocarditis   ·  PROCEDURES:  Valve replacement, aortic and mitral 13-Dec-2022   AVR 27 mm inspiris, 29 mm mitral valve, Patch repair aortic root -  Today:  -Patient was upgraded to [facility]ICU  r/o sepsis.  -On levo/vaso gtt to maintain MAP > 65.-      ICU Vital Signs Last 24 Hrs  T(C): 37.3 (27 Dec 2022 10:51), Max: 38.9 (26 Dec 2022 13:25)  T(F): 99.1 (27 Dec 2022 10:51), Max: 102 (26 Dec 2022 13:25)  HR: 86 (27 Dec 2022 10:51) (65 - 89)  BP: 122/59 (27 Dec 2022 10:00) (72/45 - 136/69)  BP(mean): 84 (27 Dec 2022 10:00) (53 - 96)  ABP: 102/50 (27 Dec 2022 10:51) (71/37 - 150/69)  ABP(mean): 68 (27 Dec 2022 10:51) (42 - 94)  RR: 29 (27 Dec 2022 10:51) (15 - 45)  SpO2: 97% (27 Dec 2022 10:51) (84% - 98%)    O2 Parameters below as of 27 Dec 2022 08:00  Patient On (Oxygen Delivery Method): room air            Physical Exam:  Gen: A&O   CNS: non focal 	  Neck: no JVD  RES : clear , no wheezing              CVS: Regular  rhythm. Normal S1/S2  Abd: Soft, non-distended. Bowel sounds present.  Skin: No rash.  Ext:  no edema    ============================I/O===========================   I&O's Detail    26 Dec 2022 07:01  -  27 Dec 2022 07:00  --------------------------------------------------------  IN:    Albumin 5%  - 250 mL: 1250 mL    IV PiggyBack: 100 mL    Lactated Ringers Bolus: 2000 mL    Norepinephrine: 188.8 mL    Oral Fluid: 730 mL    Vasopressin: 19.2 mL  Total IN: 4288 mL    OUT:    Indwelling Catheter - Urethral (mL): 1385 mL  Total OUT: 1385 mL    Total NET: 2903 mL      27 Dec 2022 07:01  -  27 Dec 2022 11:10  --------------------------------------------------------  IN:    Lactated Ringers Bolus: 1000 mL    Norepinephrine: 50.4 mL    Oral Fluid: 240 mL    Vasopressin: 9.6 mL  Total IN: 1300 mL    OUT:    Indwelling Catheter - Urethral (mL): 140 mL  Total OUT: 140 mL    Total NET: 1160 mL        ============================ LABS =========================                        9.3    23.03 )-----------( 311      ( 27 Dec 2022 04:10 )             27.3     12    130<L>  |  95<L>  |  19.5  ----------------------------<  109<H>  4.1   |  25.0  |  0.79    Ca    8.7      27 Dec 2022 04:10  Mg     2.3         TPro  5.6<L>  /  Alb  3.3  /  TBili  1.2  /  DBili  x   /  AST  28  /  ALT  24  /  AlkPhos  79      LIVER FUNCTIONS - ( 26 Dec 2022 20:00 )  Alb: 3.3 g/dL / Pro: 5.6 g/dL / ALK PHOS: 79 U/L / ALT: 24 U/L / AST: 28 U/L / GGT: x             ABG - ( 26 Dec 2022 16:17 )  pH, Arterial: 7.480 pH, Blood: x     /  pCO2: 34    /  pO2: 95    / HCO3: 25    / Base Excess: 1.8   /  SaO2: 99.3              Urinalysis Basic - ( 26 Dec 2022 11:30 )    Color: Latanya / Appearance: Slightly Turbid / S.015 / pH: x  Gluc: x / Ketone: Trace  / Bili: Negative / Urobili: Negative mg/dL   Blood: x / Protein: 500 mg/dL / Nitrite: Negative   Leuk Esterase: Negative / RBC: >50 /HPF / WBC 0-2 /HPF   Sq Epi: x / Non Sq Epi: Few / Bacteria: Moderate      ======================Micro/Rad/Cardio=================  Culture: Reviewed   CXR: Reviewed  Echo:Reviewed  ======================================================  PAST MEDICAL & SURGICAL HISTORY:  Hypertension      Aortic stenosis      H/O inguinal hernia repair  B/L       S/P laparoscopic colectomy  right colectomy with ileotransverse anastomosis        ====================ASSESSMENT ==============  77M PMH HTN, Moderate AS, recent Covid infection 10/6/2022 per chart, recent admission 22 for SIRS, found to have strep anginosus bacteremia, norovirus, TTE negative for endocarditis at the time, with patient refusing LILIAN that admission, was started on Ceftriaxone (completed 22), however patient presented  with syncope, found to have AFib RVR, sepsis, copious diarrhea requiring rectal tube, and eventual LILIAN revealed 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 Cleared by neurosurgery for OR after MRI on 22 revealed scattered bilateral parieto-occipital acute infarcts.  LHC  with clean coronaries. Post cath patient noted with RADHA and Urinary retention with ram placed .     Patient s/p AVR, MVR, and patch repair of the aortic root on 22 with Dr. Flanagan. Postoperative course significant for pacing requirements having regained rhythm 12/15 (Dopamine for inotropic support weaned off, plan for MCOT monitor on discharge to be followed by EP), hypotension (on Midodrine for BP support), persistent chest tube output (now resolved), persistent urinary retention (failed TOV  with Ram replaced most recently ), and was upgraded to CTICU  r/o sepsis, now suspect septic shock.    Once ready for discharge, patient needs PICC line and home IV antibiotics.      --- Septic shock.   ---Patient was upgraded to CTICU  r/o sepsis - rigors, rectal Temp 102, lactate 3, hypotensive SBP 50s  --- Endocarditis.   ---S/p AVR, MVR, and patch repair of the aortic root on 22   --- Abscess of aortic root.   --- Atrial fibrillation, new onset.   ---RADHA (acute kidney injury).   --- Hematuria.   --- Urinary retention.   ---Post op Hypovolemia  ---Post op respiratory insufficiency       Plan:  -ID involved - Rocephin changed back to Meropenem  - Monitor strict I/Os, replenish electrolytes PRN to keep K>4 and Mg>2.   -Continue with PT, increase activity as tolerated.  -Tylenol, Oxy PRN for analgesia.  -Beta blocker held at this time  -Continue Eliquis 2.5 BID.   -Avoid nephrotoxic drugs.  -Ram exchanged  as part of sepsis work up.   -Continue Flomax.  -Protonix for GI prophylaxis.  -Xfuse 1 UNIT PRBC  -wean off Levo , Vasop.    ====================== NEUROLOGY=====================  acetaminophen     Tablet .. 650 milliGRAM(s) Oral every 6 hours PRN Temp greater or equal to 38C (100.4F), Moderate Pain (4 - 6)  ondansetron Injectable 4 milliGRAM(s) IV Push once PRN Nausea and/or Vomiting    ==================== RESPIRATORY======================  Post op respiratory insufficiency    ====================CARDIOVASCULAR==================  Post op Hypovolemia  midodrine. 10 milliGRAM(s) Oral three times a day  norepinephrine Infusion 0.05 MICROgram(s)/kG/Min (7.18 mL/Hr) IV Continuous <Continuous>    ===================HEMATOLOGIC/ONC ===================  Monitor H&H/Plts    apixaban 2.5 milliGRAM(s) Oral two times a day  aspirin enteric coated 81 milliGRAM(s) Oral daily    ===================== RENAL =========================  Continue monitoring urine output, I&OS, BUN/Cr   tamsulosin 0.4 milliGRAM(s) Oral at bedtime    ==================== GASTROINTESTINAL===================  pantoprazole    Tablet 40 milliGRAM(s) Oral daily  polyethylene glycol 3350 17 Gram(s) Oral daily  potassium chloride    Tablet ER 40 milliEquivalent(s) Oral daily  senna 2 Tablet(s) Oral at bedtime  sodium chloride 0.9% lock flush 10 milliLiter(s) IV Push every 1 hour PRN Pre/post blood products, medications, blood draw, and to maintain line patency  sodium chloride 0.9% lock flush 10 milliLiter(s) IV Push every 1 hour PRN Pre/post blood products, medications, blood draw, and to maintain line patency    =======================    ENDOCRINE  =====================  vasopressin Infusion 0.016 Unit(s)/Min (2.4 mL/Hr) IV Continuous <Continuous>    ========================INFECTIOUS DISEASE================  meropenem Injectable 1000 milliGRAM(s) IV Push every 8 hours      -Close hemodynamic , ventilatory and drain monitoring and management per post op routine .  -Monitor Neurologic status ,   -Head of the bed should remain elevated to 45 degrees,  -Monitor adequacy of oxygenation and ventilation and attempt to wean oxygen ,  -Monitor for arrhythmias and monitor parameters for organ perfusion,  -Glycemic control is satisfactory,  -Nutritional goals will be met using po eventually , insure adequate caloric intake and monitor the same ,  -Electrolytes have been repleted as necessary , pain control has been achieved  and wound care has been carried out ,  -Stress ulcer and VTE prophylaxis will be achieved,  -Agressive PT and early mobility and ambulation goals will be met,  -The family was updated about the course and plan .      I have spent 35 minutes providing acute care for this critically ill patient     Patient requires continuous monitoring with bedside rhythm monitoring, pulse ox monitoring, and intermittent blood gas analysis. Care plan discussed with ICU care team. Patient remained critical and at risk for life threatening decompensation.

## 2022-12-27 NOTE — PROGRESS NOTE ADULT - PROBLEM SELECTOR PLAN 1
Patient was upgraded to CTICU 12/26 r/o sepsis - rigors, rectal Temp 102, lactate 3, hypotensive SBP 50s  Now suspect septic shock - lactemia, elevated procalcitonin. On levo/vaso gtt to maintain MAP > 65.  Blood / Urine cultures pending (UA negative)  ID involved - Rocephin changed back to Meropenem  Non toxic appearing  Continue to monitor

## 2022-12-27 NOTE — PROGRESS NOTE ADULT - PROBLEM SELECTOR PLAN 4
Initially post op pt was ventricular paced at 60bpm with no underlying escape rhythm.   Continues to be NSR with LBBB with prolonged PA interval.  No pauses or pacing overnight on telemetry.   Dopamine weaned off 12/19/22.   EP following.   EPWs isolated.   Beta blocker held at this time due to Midodrine use for BP support.  Continue Eliquis 2.5 BID.   TTE 12/19 with trivial pericardial effusion.  MCOT on discharge set up by EP.

## 2022-12-27 NOTE — PROGRESS NOTE ADULT - SUBJECTIVE AND OBJECTIVE BOX
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 Cleared by neurosurgery for OR after MRI on 12/1/22 revealed scattered bilateral parieto-occipital acute infarcts.  LHC 12/5 with clean coronaries. Post cath patient noted with RADHA and Urinary retention with ram placed 12/8. Patient now s/p AVR, MVR, and patch repair of the aortic root on 12/13/22 with Dr. Flanagan. Postoperative course significant for pacing requirements due to asystole, with patient regaining rhythm 12/15 (Dopamine for inotropic support now weaned off, plan for MCOT monitor on discharge to be followed by EP), hypotension (on Midodrine for BP support), persistent chest tube output (on IV diuresis, colchicine d/c'd due to diarrhea), and persistent urinary retention (failed TOV 12/19 with Ram replaced). Patient needs PICC line and home IV antibiotics to be set up.     Overnight events:  Patient was upgraded to [facility]ICU 12/26 r/o sepsis, now suspect septic shock. On levo/vaso gtt to maintain MAP > 65. Patient non-toxic in appearance stating he "feels fine."  Patient denies acute pain with radiating or aggravating factors.  He denies chest pain, shortness of breath, palpitations, headache, dizziness, nausea, or vomiting.       PAST MEDICAL & SURGICAL HISTORY:  Hypertension  Aortic stenosis  H/O inguinal hernia repair  B/L 2013  S/P laparoscopic colectomy  right colectomy with ileotransverse anastomosis    Medications:  acetaminophen     Tablet .. 650 milliGRAM(s) Oral every 6 hours PRN  apixaban 2.5 milliGRAM(s) Oral two times a day  aspirin enteric coated 81 milliGRAM(s) Oral daily  chlorhexidine 2% Cloths 1 Application(s) Topical daily  chlorhexidine 4% Liquid 1 Application(s) Topical <User Schedule>  lactobacillus acidophilus 1 Tablet(s) Oral two times a day with meals  meropenem Injectable 1000 milliGRAM(s) IV Push every 8 hours  midodrine. 5 milliGRAM(s) Oral three times a day  norepinephrine Infusion 0.05 MICROgram(s)/kG/Min IV Continuous <Continuous>  ondansetron Injectable 4 milliGRAM(s) IV Push once PRN  pantoprazole    Tablet 40 milliGRAM(s) Oral daily  polyethylene glycol 3350 17 Gram(s) Oral daily  potassium chloride    Tablet ER 40 milliEquivalent(s) Oral daily  senna 2 Tablet(s) Oral at bedtime  sodium chloride 0.9% lock flush 10 milliLiter(s) IV Push every 1 hour PRN  sodium chloride 0.9% lock flush 10 milliLiter(s) IV Push every 1 hour PRN  tamsulosin 0.4 milliGRAM(s) Oral at bedtime  vasopressin Infusion 0.016 Unit(s)/Min IV Continuous <Continuous>      MEDICATIONS  (PRN):  acetaminophen     Tablet .. 650 milliGRAM(s) Oral every 6 hours PRN Temp greater or equal to 38C (100.4F), Moderate Pain (4 - 6)  ondansetron Injectable 4 milliGRAM(s) IV Push once PRN Nausea and/or Vomiting  sodium chloride 0.9% lock flush 10 milliLiter(s) IV Push every 1 hour PRN Pre/post blood products, medications, blood draw, and to maintain line patency  sodium chloride 0.9% lock flush 10 milliLiter(s) IV Push every 1 hour PRN Pre/post blood products, medications, blood draw, and to maintain line patency      Daily Review:    ABG - ( 26 Dec 2022 16:17 )  pH, Arterial: 7.480 pH, Blood: x     /  pCO2: 34    /  pO2: 95    / HCO3: 25    / Base Excess: 1.8   /  SaO2: 99.3                            9.4    27.23 )-----------( 344      ( 27 Dec 2022 00:00 )             27.9   12-26    130<L>  |  93<L>  |  22.8<H>  ----------------------------<  116<H>  4.5   |  24.0  |  1.00    Ca    8.6      26 Dec 2022 20:00  Mg     1.2     12-26    TPro  5.6<L>  /  Alb  3.3  /  TBili  1.2  /  DBili  x   /  AST  28  /  ALT  24  /  AlkPhos  79  12-26      T(C): 37.9 (12-26-22 @ 23:40), Max: 38.9 (12-26-22 @ 10:45)  HR: 72 (12-27-22 @ 00:45) (65 - 90)  BP: 119/58 (12-27-22 @ 00:00) (73/45 - 120/67)  RR: 18 (12-27-22 @ 00:45) (15 - 36)  SpO2: 95% (12-27-22 @ 00:45) (90% - 98%)  Wt(kg): --    CAPILLARY BLOOD GLUCOSE    I&O's Summary    25 Dec 2022 07:01  -  26 Dec 2022 07:00  --------------------------------------------------------  IN: 240 mL / OUT: 3400 mL / NET: -3160 mL    26 Dec 2022 07:01  -  27 Dec 2022 01:05  --------------------------------------------------------  IN: 3054.2 mL / OUT: 1010 mL / NET: 2044.2 mL        Physical Exam  Neuro: A+O x 3, non-focal, speech clear and intact  Pulm: +Diminished BSs at bases bilaterally, no accessory muscle use noted  Chest: +PW (isolated)  CV: regular rate, regular rhythm, +S1S2, no murmur noted  Abd: soft, NT, ND, + BS  : ram in situ to bedside drainage  Ext: MEZA x 4, +1 LE pitting edema b/l, no cyanosis  Skin: warm, dry, perfused  Incisions: midsternal incision with island dressing C/D/I, sternum stable, no click

## 2022-12-27 NOTE — PROGRESS NOTE ADULT - PROBLEM SELECTOR PLAN 2
S/p AVR, MVR, and patch repair of the aortic root on 12/13/22 with Dr. Flanagan.  Blood cultures were cleared 11/27.   Continue Meropenem (changed back from Ceftriaxone 12/26)  Once ready for discharge will need: Home IV antibiotics being set up by case management. PICC line placement.   EPWs isolated.   Continue BP support with Midodrine as patient is currently on Midodrine.  Oxygenating well, coughing and deep breathing exercises/incentive spirometry encouraged.  Follow up daily AM CXR.   Holding diuretic in setting of r/o sepsis. Monitor strict I/Os, replenish electrolytes PRN to keep K>4 and Mg>2.   Continue with PT, increase activity as tolerated.  Tylenol, Oxy PRN for analgesia.  Case and plan to be reviewed / discussed with CT Surgery attending / team during AM rounds.

## 2022-12-28 LAB
ALBUMIN SERPL ELPH-MCNC: 3 G/DL — LOW (ref 3.3–5.2)
ALP SERPL-CCNC: 102 U/L — SIGNIFICANT CHANGE UP (ref 40–120)
ALT FLD-CCNC: 25 U/L — SIGNIFICANT CHANGE UP
AMYLASE P1 CFR SERPL: 174 U/L — HIGH (ref 36–128)
ANION GAP SERPL CALC-SCNC: 10 MMOL/L — SIGNIFICANT CHANGE UP (ref 5–17)
AST SERPL-CCNC: 23 U/L — SIGNIFICANT CHANGE UP
BILIRUB DIRECT SERPL-MCNC: 0.3 MG/DL — SIGNIFICANT CHANGE UP (ref 0–0.3)
BILIRUB INDIRECT FLD-MCNC: 0.4 MG/DL — SIGNIFICANT CHANGE UP (ref 0.2–1)
BILIRUB SERPL-MCNC: 0.7 MG/DL — SIGNIFICANT CHANGE UP (ref 0.4–2)
BUN SERPL-MCNC: 25.1 MG/DL — HIGH (ref 8–20)
CALCIUM SERPL-MCNC: 8.6 MG/DL — SIGNIFICANT CHANGE UP (ref 8.4–10.5)
CHLORIDE SERPL-SCNC: 100 MMOL/L — SIGNIFICANT CHANGE UP (ref 96–108)
CO2 SERPL-SCNC: 22 MMOL/L — SIGNIFICANT CHANGE UP (ref 22–29)
CREAT SERPL-MCNC: 0.64 MG/DL — SIGNIFICANT CHANGE UP (ref 0.5–1.3)
EGFR: 97 ML/MIN/1.73M2 — SIGNIFICANT CHANGE UP
GLUCOSE SERPL-MCNC: 100 MG/DL — HIGH (ref 70–99)
HCT VFR BLD CALC: 27.7 % — LOW (ref 39–50)
HGB BLD-MCNC: 9.3 G/DL — LOW (ref 13–17)
LACTATE SERPL-SCNC: 0.8 MMOL/L — SIGNIFICANT CHANGE UP (ref 0.5–2)
LIDOCAIN IGE QN: 158 U/L — HIGH (ref 22–51)
MAGNESIUM SERPL-MCNC: 1.9 MG/DL — SIGNIFICANT CHANGE UP (ref 1.8–2.6)
MCHC RBC-ENTMCNC: 29.6 PG — SIGNIFICANT CHANGE UP (ref 27–34)
MCHC RBC-ENTMCNC: 33.6 GM/DL — SIGNIFICANT CHANGE UP (ref 32–36)
MCV RBC AUTO: 88.2 FL — SIGNIFICANT CHANGE UP (ref 80–100)
PLATELET # BLD AUTO: 240 K/UL — SIGNIFICANT CHANGE UP (ref 150–400)
POTASSIUM SERPL-MCNC: 3.8 MMOL/L — SIGNIFICANT CHANGE UP (ref 3.5–5.3)
POTASSIUM SERPL-SCNC: 3.8 MMOL/L — SIGNIFICANT CHANGE UP (ref 3.5–5.3)
PROCALCITONIN SERPL-MCNC: 0.86 NG/ML — HIGH (ref 0.02–0.1)
PROT SERPL-MCNC: 5.2 G/DL — LOW (ref 6.6–8.7)
RBC # BLD: 3.14 M/UL — LOW (ref 4.2–5.8)
RBC # FLD: 13.7 % — SIGNIFICANT CHANGE UP (ref 10.3–14.5)
SODIUM SERPL-SCNC: 132 MMOL/L — LOW (ref 135–145)
VANCOMYCIN TROUGH SERPL-MCNC: 11.9 UG/ML — SIGNIFICANT CHANGE UP (ref 10–20)
WBC # BLD: 13.73 K/UL — HIGH (ref 3.8–10.5)
WBC # FLD AUTO: 13.73 K/UL — HIGH (ref 3.8–10.5)

## 2022-12-28 PROCEDURE — 99291 CRITICAL CARE FIRST HOUR: CPT

## 2022-12-28 PROCEDURE — 76705 ECHO EXAM OF ABDOMEN: CPT | Mod: 26

## 2022-12-28 PROCEDURE — 93010 ELECTROCARDIOGRAM REPORT: CPT

## 2022-12-28 PROCEDURE — 71045 X-RAY EXAM CHEST 1 VIEW: CPT | Mod: 26

## 2022-12-28 PROCEDURE — 99292 CRITICAL CARE ADDL 30 MIN: CPT

## 2022-12-28 PROCEDURE — 99233 SBSQ HOSP IP/OBS HIGH 50: CPT

## 2022-12-28 RX ORDER — MAGNESIUM SULFATE 500 MG/ML
2 VIAL (ML) INJECTION ONCE
Refills: 0 | Status: COMPLETED | OUTPATIENT
Start: 2022-12-28 | End: 2022-12-28

## 2022-12-28 RX ORDER — POTASSIUM CHLORIDE 20 MEQ
40 PACKET (EA) ORAL ONCE
Refills: 0 | Status: COMPLETED | OUTPATIENT
Start: 2022-12-28 | End: 2022-12-28

## 2022-12-28 RX ADMIN — Medication 81 MILLIGRAM(S): at 12:36

## 2022-12-28 RX ADMIN — Medication 1 TABLET(S): at 08:29

## 2022-12-28 RX ADMIN — APIXABAN 2.5 MILLIGRAM(S): 2.5 TABLET, FILM COATED ORAL at 05:14

## 2022-12-28 RX ADMIN — MEROPENEM 1000 MILLIGRAM(S): 1 INJECTION INTRAVENOUS at 05:15

## 2022-12-28 RX ADMIN — MIDODRINE HYDROCHLORIDE 20 MILLIGRAM(S): 2.5 TABLET ORAL at 21:33

## 2022-12-28 RX ADMIN — Medication 1 TABLET(S): at 17:20

## 2022-12-28 RX ADMIN — MIDODRINE HYDROCHLORIDE 20 MILLIGRAM(S): 2.5 TABLET ORAL at 15:39

## 2022-12-28 RX ADMIN — Medication 25 GRAM(S): at 05:13

## 2022-12-28 RX ADMIN — CHLORHEXIDINE GLUCONATE 1 APPLICATION(S): 213 SOLUTION TOPICAL at 05:16

## 2022-12-28 RX ADMIN — Medication 125 MILLIGRAM(S): at 06:30

## 2022-12-28 RX ADMIN — MIDODRINE HYDROCHLORIDE 20 MILLIGRAM(S): 2.5 TABLET ORAL at 05:14

## 2022-12-28 RX ADMIN — Medication 40 MILLIEQUIVALENT(S): at 05:14

## 2022-12-28 RX ADMIN — MEROPENEM 1000 MILLIGRAM(S): 1 INJECTION INTRAVENOUS at 21:33

## 2022-12-28 RX ADMIN — Medication 40 MILLIEQUIVALENT(S): at 12:36

## 2022-12-28 RX ADMIN — PANTOPRAZOLE SODIUM 40 MILLIGRAM(S): 20 TABLET, DELAYED RELEASE ORAL at 06:05

## 2022-12-28 RX ADMIN — Medication 125 MILLIGRAM(S): at 17:20

## 2022-12-28 RX ADMIN — TAMSULOSIN HYDROCHLORIDE 0.4 MILLIGRAM(S): 0.4 CAPSULE ORAL at 21:33

## 2022-12-28 RX ADMIN — APIXABAN 2.5 MILLIGRAM(S): 2.5 TABLET, FILM COATED ORAL at 17:19

## 2022-12-28 RX ADMIN — MEROPENEM 1000 MILLIGRAM(S): 1 INJECTION INTRAVENOUS at 16:00

## 2022-12-28 RX ADMIN — CHLORHEXIDINE GLUCONATE 1 APPLICATION(S): 213 SOLUTION TOPICAL at 12:37

## 2022-12-28 NOTE — PROGRESS NOTE ADULT - ASSESSMENT
77 year old male with PMHx of recent  COVID  on 10/6/22, HTN , Vit B12 deficiency, presenting to the ED on 11/2 with body aches, fatigue and fever (Tmax 102) at home for 12 days found to have streptococcus bacteremia and admitted with unclear source, no recent dental work, skin infections, no respiratory symptoms. Pan scan was negative at that time ( LILIAN was recommended but pt refused) He was treated for Bacteremia ( Blood cultures + streptococcus anginosis pansensitive ) and norovirus with supportive care and contact isolation . He was discharged home on 11/7 with IV Rocephin via PICC line. Recommendation was to continue ABX  daily via pic end 11/30/22    He presented to ER after and episode of syncope and collapse, found to be septic, hypotensive, hypoxic and febrile in the ER.   In response to hypotension he failed to respond to Initial sepsis fluid protocol in ER is required IV pressor in form of levophed to maintain MAP greater than 60 -65. In the ER he was febrile with initial labs significant for WBC of 22.4 , ProCal of 21.2 first lactate 4.9 via abg with repeat post fluids of 2.2 venous sample.   PT admitted to [facility] with septic shock unclear source. Found to have new onset A fib and on amio. 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 Dose adjustments as per pharmacy protocol   - Leukocytosis trending down   - Follow repeat blood cultures - no growth thus far   - UA, repeated after ram change, not suggestive of UTI. UCX w/o growth  - 12/26 RVP neg   - bcx 11/27 ngtd  - s/p OR for AVR, MVR, aortic root patch repair 12/13/22  - OR CX ngtd  - Tentative plan was 6 weeks of IV antibiotics from date of surgery until 1/24/23, but currently undergoing work up for septic shock     D/w  patient     Will follow

## 2022-12-28 NOTE — PROGRESS NOTE ADULT - PROBLEM SELECTOR PLAN 4
Initially post op pt was ventricular paced at 60bpm with no underlying escape rhythm.   Continues to be NSR with LBBB with prolonged IA interval.  No pauses or pacing overnight on telemetry.   Dopamine weaned off 12/19/22.   EP following.   EPWs isolated.   Beta blocker held at this time due to Midodrine use for BP support.  Continue Eliquis 2.5 BID.   TTE 12/26 with small pericardial effusion.  MCOT on discharge set up by EP.

## 2022-12-28 NOTE — PROGRESS NOTE ADULT - SUBJECTIVE AND OBJECTIVE BOX
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 Cleared by neurosurgery for OR after MRI on 12/1/22 revealed scattered bilateral parieto-occipital acute infarcts.  [facility] 12/5 with clean coronaries. Post cath patient noted with RADHA and Urinary retention with ram placed 12/8. Patient now s/p AVR, MVR, and patch repair of the aortic root on 12/13/22 with Dr. Flanagan. Postoperative course significant for pacing requirements due to asystole, with patient regaining rhythm 12/15 (Dopamine for inotropic support now weaned off, plan for MCOT monitor on discharge to be followed by EP), hypotension (on Midodrine for BP support), persistent chest tube output (on IV diuresis, colchicine d/c'd due to diarrhea), and persistent urinary retention (failed TOV 12/19 with Ram replaced). Patient needs PICC line and home IV antibiotics to be set up.     Overnight events:  Remains afebrile and non toxic in appearance.  Levo / Vaso weaning for MAP > 65.  Patient denies acute pain with radiating or aggravating factors.  He denies chest pain, shortness of breath, palpitations, headache, dizziness, nausea, or vomiting.       PAST MEDICAL & SURGICAL HISTORY:  Hypertension  Aortic stenosis  H/O inguinal hernia repair  B/L 2013  S/P laparoscopic colectomy  right colectomy with ileotransverse anastomosis      Medications:  acetaminophen     Tablet .. 650 milliGRAM(s) Oral every 6 hours PRN  apixaban 2.5 milliGRAM(s) Oral two times a day  aspirin enteric coated 81 milliGRAM(s) Oral daily  chlorhexidine 2% Cloths 1 Application(s) Topical daily  chlorhexidine 4% Liquid 1 Application(s) Topical <User Schedule>  lactobacillus acidophilus 1 Tablet(s) Oral two times a day with meals  meropenem Injectable 1000 milliGRAM(s) IV Push every 8 hours  midodrine 20 milliGRAM(s) Oral every 8 hours  norepinephrine Infusion 0.05 MICROgram(s)/kG/Min IV Continuous <Continuous>  ondansetron Injectable 4 milliGRAM(s) IV Push once PRN  pantoprazole    Tablet 40 milliGRAM(s) Oral daily  polyethylene glycol 3350 17 Gram(s) Oral daily  potassium chloride    Tablet ER 40 milliEquivalent(s) Oral daily  senna 2 Tablet(s) Oral at bedtime  sodium chloride 0.9% lock flush 10 milliLiter(s) IV Push every 1 hour PRN  sodium chloride 0.9% lock flush 10 milliLiter(s) IV Push every 1 hour PRN  tamsulosin 0.4 milliGRAM(s) Oral at bedtime  vancomycin  IVPB 1000 milliGRAM(s) IV Intermittent every 12 hours  vasopressin Infusion 0.016 Unit(s)/Min IV Continuous <Continuous>      MEDICATIONS  (PRN):  acetaminophen     Tablet .. 650 milliGRAM(s) Oral every 6 hours PRN Temp greater or equal to 38C (100.4F), Moderate Pain (4 - 6)  ondansetron Injectable 4 milliGRAM(s) IV Push once PRN Nausea and/or Vomiting  sodium chloride 0.9% lock flush 10 milliLiter(s) IV Push every 1 hour PRN Pre/post blood products, medications, blood draw, and to maintain line patency  sodium chloride 0.9% lock flush 10 milliLiter(s) IV Push every 1 hour PRN Pre/post blood products, medications, blood draw, and to maintain line patency      Daily Review:    ABG - ( 26 Dec 2022 16:17 )  pH, Arterial: 7.480 pH, Blood: x     /  pCO2: 34    /  pO2: 95    / HCO3: 25    / Base Excess: 1.8   /  SaO2: 99.3                            9.5    14.75 )-----------( 250      ( 27 Dec 2022 15:30 )             27.8   12-27    130<L>  |  96  |  22.7<H>  ----------------------------<  120<H>  4.1   |  23.0  |  0.74    Ca    8.5      27 Dec 2022 15:30  Mg     2.3     12-27    TPro  5.6<L>  /  Alb  3.3  /  TBili  1.2  /  DBili  x   /  AST  28  /  ALT  24  /  AlkPhos  79  12-26      T(C): 36.8 (12-28-22 @ 00:01), Max: 38.7 (12-27-22 @ 04:00)  HR: 74 (12-27-22 @ 23:45) (58 - 89)  BP: 109/57 (12-27-22 @ 23:45) (72/45 - 143/72)  RR: 24 (12-27-22 @ 23:45) (13 - 45)  SpO2: 91% (12-27-22 @ 23:45) (84% - 100%)  Wt(kg): --    CAPILLARY BLOOD GLUCOSE      I&O's Summary    26 Dec 2022 07:01  -  27 Dec 2022 07:00  --------------------------------------------------------  IN: 4288 mL / OUT: 1385 mL / NET: 2903 mL    27 Dec 2022 07:01  -  28 Dec 2022 00:32  --------------------------------------------------------  IN: 2469.4 mL / OUT: 580 mL / NET: 1889.4 mL        Physical Exam  Neuro: A+O x 3, non-focal, speech clear and intact  Pulm: +Diminished BSs at bases bilaterally, no accessory muscle use noted  Chest: +PW (isolated)  CV: regular rate, regular rhythm, +S1S2, no murmur noted  Abd: soft, NT, ND, + BS  : ram in situ to bedside drainage  Ext: MEZA x 4, +1 LE pitting edema b/l, no cyanosis  Skin: warm, dry, perfused  Incisions: midsternal incision with island dressing C/D/I, sternum stable, no click

## 2022-12-28 NOTE — PROGRESS NOTE ADULT - PROBLEM SELECTOR PLAN 1
Patient was upgraded to CTICU 12/26 r/o sepsis - rigors, rectal Temp 102, lactate 3, hypotensive SBP 50s  Now suspect septic shock - lactemia (now normal), elevated procalcitonin. On levo/vaso gtt to maintain MAP > 65.  Blood cultures x 2 from 12/26 NGTD  Urine culture from 12/26 NGTD  Blood cultures x2 from 12/26 and x 1 from 12/27 Pending  D involved - Rocephin changed back to Meropenem, Vanco added  Non toxic appearing, afebrile  Midodrine increased to 20 TID to wean off vasopressors  Continue to monitor

## 2022-12-28 NOTE — PROGRESS NOTE ADULT - SUBJECTIVE AND OBJECTIVE BOX
HOLDSWORTH, GEORGE  MRN-[id number]    HPI:  77 year old male with PMHx of recent  COVID  on 10/6/22, HTN , Vit B12 deficiency, presenting to the ED on 11/2 with body aches, fatigue and fever (Tmax 102) at home for 12 days found to have streptococcus bacteremia and admitted with unclear source, no recent dental work, skin infections, no respiratory symptoms. Pan scan was negative at that time ( LILIAN was recommended but pt refused) He was treated for Bacteremia ( Blood cultures + streptococcus anginosis pansensitive ) and norovirus with supportive care and contact isolation . He was discharged home on 11/7 with IV Rocephin via PICC line. Recommendation was to continue ABX  daily via pic end 11/30/22  Tonight he presented to ER after and episode of syncope and collapse, found to be septic, hypotensive, hypoxic and febrile in the ER. He is unsure of mechanism of fall but remembers being on the ground no reported LOC . He was found face-down on bathroom floor buy family with left leg wedged under cabinet. On my interview he is alert and oriented to person place and time, he has a baseline studder in his speech pattern but otherwise neurologically intcact without defecit.  In responose to hypotension he failed to repsoond to Inital sepsis fluid protocol in ER and now is requiring IV pressor in form of levophed to maintain MAP greater than 60 -65. In the ER he was febrile with inital labs signifacnt for WBC of 22.4 , ProCal of 21.2 first lactate 4.9 via abg with repeat post fluids of 2.2 venous sample.   (27 Nov 2022 02:26)    Surgery/Hospital Course:  ·  PRE-OP DIAGNOSIS:  Aortic valve endocarditis   ·  POST-OP DIAGNOSIS:  Aortic valve endocarditis   ·  PROCEDURES:  Valve replacement, aortic and mitral 13-Dec-2022   AVR 27 mm inspiris, 29 mm mitral valve, Patch repair aortic root -  Today:  -Patient still requiring IV Levophed for septic shock  -On levo gtt to maintain MAP > 65.    ICU Vital Signs Last 24 Hrs  T(C): 36.4 (28 Dec 2022 08:00), Max: 38.2 (27 Dec 2022 16:00)  T(F): 97.6 (28 Dec 2022 08:00), Max: 100.8 (27 Dec 2022 16:00)  HR: 78 (28 Dec 2022 10:00) (58 - 82)  BP: 102/61 (28 Dec 2022 10:00) (79/45 - 143/72)  BP(mean): 76 (28 Dec 2022 10:00) (59 - 101)  ABP: 100/46 (28 Dec 2022 10:00) (86/37 - 150/62)  ABP(mean): 63 (28 Dec 2022 10:00) (54 - 96)  RR: 26 (28 Dec 2022 10:00) (13 - 40)  SpO2: 98% (28 Dec 2022 10:00) (86% - 100%)    O2 Parameters below as of 28 Dec 2022 10:00  Patient On (Oxygen Delivery Method): room air    Physical Exam:  Gen: A&O   CNS: non focal 	  Neck: no JVD  RES : clear , no wheezing              CVS: Regular  rhythm. Normal S1/S2  Abd: Soft, non-distended. Bowel sounds present.  Skin: No rash.  Ext:  no edema    ============================I/O===========================  I&O's Summary    27 Dec 2022 07:01  -  28 Dec 2022 07:00  --------------------------------------------------------  IN: 2869.6 mL / OUT: 890 mL / NET: 1979.6 mL    28 Dec 2022 07:01  -  28 Dec 2022 11:26  --------------------------------------------------------  IN: 252.2 mL / OUT: 120 mL / NET: 132.2 mL  ============================ LABS =========================                        9.3    13.73 )-----------( 240      ( 28 Dec 2022 02:30 )             27.7   12-28    132<L>  |  100  |  25.1<H>  ----------------------------<  100<H>  3.8   |  22.0  |  0.64    Ca    8.6      28 Dec 2022 02:30  Mg     1.9     12-28    TPro  5.2<L>  /  Alb  3.0<L>  /  TBili  0.7  /  DBili  0.3  /  AST  23  /  ALT  25  /  AlkPhos  102  12-28    ======================Micro/Rad/Cardio=================  Culture: Reviewed   CXR: Reviewed  Echo:Reviewed  ======================================================  PAST MEDICAL & SURGICAL HISTORY:  Hypertension      Aortic stenosis      H/O inguinal hernia repair  B/L 2013      S/P laparoscopic colectomy  right colectomy with ileotransverse anastomosis        ====================ASSESSMENT ==============  77M PMH HTN, Moderate AS, recent Covid infection 10/6/2022 per chart, recent admission 11/1/22 for SIRS, found to have strep anginosus bacteremia, norovirus, TTE negative for endocarditis at the time, with patient refusing LILIAN that admission, was started on Ceftriaxone (completed 11/20/22), however patient presented 11/27 with syncope, found to have AFib RVR, sepsis, copious diarrhea requiring rectal tube, and eventual LILIAN revealed 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 Cleared by neurosurgery for OR after MRI on 12/1/22 revealed scattered bilateral parieto-occipital acute infarcts.  [facility] 12/5 with clean coronaries. Post cath patient noted with RADHA and Urinary retention with ram placed 12/8.     Patient s/p AVR, MVR, and patch repair of the aortic root on 12/13/22 with Dr. Flanagan. Postoperative course significant for pacing requirements having regained rhythm 12/15 (Dopamine for inotropic support weaned off, plan for MCOT monitor on discharge to be followed by EP), hypotension (on Midodrine for BP support), persistent chest tube output (now resolved), persistent urinary retention (failed TOV 12/19 with Ram replaced most recently 12/26), and was upgraded to CTICU 12/26 r/o sepsis, now suspect septic shock.    --- Septic shock.   ---Patient was upgraded to CTICU 12/26 r/o sepsis - rigors, rectal Temp 102, lactate 3, hypotensive SBP 50s  --- Endocarditis.   ---S/p AVR, MVR, and patch repair of the aortic root on 12/13/22   --- Abscess of aortic root.   --- Atrial fibrillation, new onset.   ---RADHA (acute kidney injury).   --- Hematuria.   --- Urinary retention.   ---Post op Hypovolemia  ---Post op respiratory insufficiency       Plan:  -ID involved - Rocephin changed back to Meropenem and Vancomycin  - Monitor strict I/Os, replenish electrolytes PRN to keep K>4 and Mg>2.   -Continue with PT, increase activity as tolerated.  -Tylenol, Oxy PRN for analgesia.  -Beta blocker held at this time  -Continue Eliquis 2.5 BID.   -Avoid nephrotoxic drugs.  -Ram exchanged 12/26 as part of sepsis work up.   -Continue Flomax.  -Protonix for GI prophylaxis.  -Xfuse 1 UNIT PRBC  -wean off Levo , Vasop.    ====================== NEUROLOGY=====================  acetaminophen     Tablet .. 650 milliGRAM(s) Oral every 6 hours PRN Temp greater or equal to 38C (100.4F), Moderate Pain (4 - 6)  ondansetron Injectable 4 milliGRAM(s) IV Push once PRN Nausea and/or Vomiting    ==================== RESPIRATORY======================  Post op respiratory insufficiency    ====================CARDIOVASCULAR==================  Post op Hypovolemia  midodrine. 10 milliGRAM(s) Oral three times a day  norepinephrine Infusion 0.05 MICROgram(s)/kG/Min (7.18 mL/Hr) IV Continuous <Continuous>    ===================HEMATOLOGIC/ONC ===================  Monitor H&H/Plts    apixaban 2.5 milliGRAM(s) Oral two times a day  aspirin enteric coated 81 milliGRAM(s) Oral daily    ===================== RENAL =========================  Continue monitoring urine output, I&OS, BUN/Cr   tamsulosin 0.4 milliGRAM(s) Oral at bedtime    ==================== GASTROINTESTINAL===================  pantoprazole    Tablet 40 milliGRAM(s) Oral daily  polyethylene glycol 3350 17 Gram(s) Oral daily  potassium chloride    Tablet ER 40 milliEquivalent(s) Oral daily  senna 2 Tablet(s) Oral at bedtime  sodium chloride 0.9% lock flush 10 milliLiter(s) IV Push every 1 hour PRN Pre/post blood products, medications, blood draw, and to maintain line patency  sodium chloride 0.9% lock flush 10 milliLiter(s) IV Push every 1 hour PRN Pre/post blood products, medications, blood draw, and to maintain line patency    =======================    ENDOCRINE  =====================  vasopressin Infusion 0.016 Unit(s)/Min (2.4 mL/Hr) IV Continuous <Continuous>    ========================INFECTIOUS DISEASE================  meropenem Injectable 1000 milliGRAM(s) IV Push every 8 hours      -Close hemodynamic , ventilatory and drain monitoring and management per post op routine .  -Monitor Neurologic status ,   -Head of the bed should remain elevated to 45 degrees,  -Monitor adequacy of oxygenation and ventilation and attempt to wean oxygen ,  -Monitor for arrhythmias and monitor parameters for organ perfusion,  -Glycemic control is satisfactory,  -Nutritional goals will be met using po eventually , insure adequate caloric intake and monitor the same ,  -Electrolytes have been repleted as necessary , pain control has been achieved  and wound care has been carried out ,  -Stress ulcer and VTE prophylaxis will be achieved,     I have spent 80 minutes providing acute care for this critically ill patient     Patient requires continuous monitoring with bedside rhythm monitoring, pulse ox monitoring, and intermittent blood gas analysis. Care plan discussed with ICU care team. Patient remained critical and at risk for life threatening decompensation.

## 2022-12-28 NOTE — PROGRESS NOTE ADULT - SUBJECTIVE AND OBJECTIVE BOX
[facility]  INFECTIOUS DISEASES at [place] and [place]  ===============================================================                               Jl Vaughan MD*     Sunita Laws MD*                         Mk Alaniz MD*       Leila Pederson MD*            Diplomates American Board of Internal Medicine & Infectious Diseases                * [place] Office - Appt - Tel  [phone] Fax [phone]                * [place] Office - Appt - Tel [phone] Fax [phone]                                  Hospital Consult line:  [phone]  ==============================================================    HOLDSWORTH, GEORGE [id number]    Follow up: Septic shock    Weaned off vasopressin, still on levo   Last fever 12/27 afternoon  Culture without growth thus far     I have personally reviewed the labs and data; pertinent labs and data are listed in this note; please see below.     _______________________________________________________________  REVIEW OF SYSTEMS  Wife at bedside. Patient feeling well. Offers no complaints. Admits to soft BM but no senia diarrhea.   ________________________________________________________________  Allergies:  No Known Allergies    ________________________________________________________________  PHYSICAL EXAM  GEN: NAD, sitting in chair   HEENT: Anicteric sclerae, Moist mucous membranes. No mucosal lesions.   LUNGS: eupneic. CTA B/L.  HEART: RRR, no m/r/g  ABDOMEN: Soft, NT, ND   :  Barrios catheter  EXTREMITIES: well perfused, without  edema.  MSK: No joint swelling or deformity   NEUROLOGIC: Grossly no focal deficits   PSYCHIATRIC: Appropriate affect and mood  SKIN: No rash, wounds or jaundice  LINES: R radial a-line, R IJ CVC   ________________________________________________________________  Vitals:  T(F): 99 (28 Dec 2022 16:04), Max: 99.7 (27 Dec 2022 17:00)  HR: 69 (28 Dec 2022 15:45)  BP: 107/67 (28 Dec 2022 15:30)  RR: 24 (28 Dec 2022 15:45)  SpO2: 99% (28 Dec 2022 15:45) (90% - 100%)  temp max in last 48H T(F): , Max: 101.7 (12-27-22 @ 04:00)    Current Antibiotics:  meropenem Injectable 1000 milliGRAM(s) IV Push every 8 hours  vancomycin  IVPB 1000 milliGRAM(s) IV Intermittent every 12 hours    Other medications:  apixaban 2.5 milliGRAM(s) Oral two times a day  aspirin enteric coated 81 milliGRAM(s) Oral daily  chlorhexidine 2% Cloths 1 Application(s) Topical daily  chlorhexidine 4% Liquid 1 Application(s) Topical <User Schedule>  lactobacillus acidophilus 1 Tablet(s) Oral two times a day with meals  midodrine 20 milliGRAM(s) Oral every 8 hours  norepinephrine Infusion 0.05 MICROgram(s)/kG/Min IV Continuous <Continuous>  pantoprazole    Tablet 40 milliGRAM(s) Oral daily  polyethylene glycol 3350 17 Gram(s) Oral daily  potassium chloride    Tablet ER 40 milliEquivalent(s) Oral daily  senna 2 Tablet(s) Oral at bedtime  tamsulosin 0.4 milliGRAM(s) Oral at bedtime  vasopressin Infusion 0.016 Unit(s)/Min IV Continuous <Continuous>                            9.3    13.73 )-----------( 240      ( 28 Dec 2022 02:30 )             27.7     12-28    132<L>  |  100  |  25.1<H>  ----------------------------<  100<H>  3.8   |  22.0  |  0.64    Ca    8.6      28 Dec 2022 02:30  Mg     1.9     12-28    TPro  5.2<L>  /  Alb  3.0<L>  /  TBili  0.7  /  DBili  0.3  /  AST  23  /  ALT  25  /  AlkPhos  102  12-28    RECENT CULTURES:  12-27 @ 04:06 .Blood Blood     No growth to date.      12-26 @ 18:15 .Blood Blood     No growth to date.      12-26 @ 14:00 Catheterized Catheterized     No growth      12-26 @ 11:30 .Blood Blood-Peripheral     No growth to date.      12-26 @ 10:51    RVP with SARS-CoV-2   NotDete      WBC Count: 13.73 K/uL (12-28-22 @ 02:30)  WBC Count: 14.75 K/uL (12-27-22 @ 15:30)  WBC Count: 23.03 K/uL (12-27-22 @ 04:10)  WBC Count: 27.23 K/uL (12-27-22 @ 00:00)  WBC Count: 35.46 K/uL (12-26-22 @ 20:00)  WBC Count: 16.47 K/uL (12-26-22 @ 04:46)  WBC Count: 17.76 K/uL (12-25-22 @ 05:28)  WBC Count: 14.31 K/uL (12-24-22 @ 06:54)    Creatinine, Serum: 0.64 mg/dL (12-28-22 @ 02:30)  Creatinine, Serum: 0.74 mg/dL (12-27-22 @ 15:30)  Creatinine, Serum: 0.79 mg/dL (12-27-22 @ 04:10)  Creatinine, Serum: 1.00 mg/dL (12-26-22 @ 20:00)  Creatinine, Serum: 0.76 mg/dL (12-26-22 @ 04:46)  Creatinine, Serum: 0.79 mg/dL (12-25-22 @ 05:28)  Creatinine, Serum: 0.77 mg/dL (12-24-22 @ 06:54)    Procalcitonin, Serum: 0.86 ng/mL (12-28-22 @ 02:30)  Procalcitonin, Serum: 1.47 ng/mL (12-27-22 @ 04:10)  Procalcitonin, Serum: 0.71 ng/mL (12-26-22 @ 13:54)     SARS-CoV-2: NotDetec (12-26-22 @ 10:51)  COVID-19 PCR: NotDetec (12-12-22 @ 11:34)  COVID-19 PCR: NotDetec (12-05-22 @ 09:55)    ________________________________________________________________  RADIOLOGY  < from: US Abdomen Limited (12.28.22 @ 11:30) >    FINDINGS:    Liver: Within normal limits.  Bile ducts: Common bile duct is not visualized.  Gallbladder: Gallbladder is diffusely thickened, measuring 0.7 cm. No   gallbladder distention or focal tenderness.  Pancreas: Poorly visualized.  Right kidney: 12.0 cm. No hydronephrosis. Upper pole cyst, measuring 2.9   cm.  Ascites: None.  IVC: Visualized portions are within normal limits.    IMPRESSION:    Nonspecific diffuse gallbladder wall thickening, without other signs of   acute cholecystitis.    < end of copied text >    < from: Xray Chest 1 View- PORTABLE-Routine (Xray Chest 1 View- PORTABLE-Routine in AM.) (12.27.22 @ 05:48) >  INTERPRETATION:  Portable chest radiograph    CLINICAL INFORMATION:  Postoperative  Cardiac surgery.    TECHNIQUE:  Portable  AP view ofthe chest was obtained.    FINDINGS:   12/26/2022 chest x-ray available for review.  RIGHT IJ catheter tip in SVC.    The lungs are clear of gross airspace consolidations or effusions. No   pneumothorax.    The heart and mediastinum are within normallimits following cardiac    surgery.    Visualized osseous structures are intact.    IMPRESSION:  Status post cardiac surgery.  No evidence of active chest pathology.  RIGHT IJ catheter tip in SVC.  No interval change.    < end of copied text >

## 2022-12-29 LAB
ALBUMIN SERPL ELPH-MCNC: 2.9 G/DL — LOW (ref 3.3–5.2)
ALP SERPL-CCNC: 127 U/L — HIGH (ref 40–120)
ALT FLD-CCNC: 32 U/L — SIGNIFICANT CHANGE UP
ANION GAP SERPL CALC-SCNC: 8 MMOL/L — SIGNIFICANT CHANGE UP (ref 5–17)
AST SERPL-CCNC: 27 U/L — SIGNIFICANT CHANGE UP
BILIRUB SERPL-MCNC: 0.6 MG/DL — SIGNIFICANT CHANGE UP (ref 0.4–2)
BUN SERPL-MCNC: 21.6 MG/DL — HIGH (ref 8–20)
CALCIUM SERPL-MCNC: 8.7 MG/DL — SIGNIFICANT CHANGE UP (ref 8.4–10.5)
CHLORIDE SERPL-SCNC: 105 MMOL/L — SIGNIFICANT CHANGE UP (ref 96–108)
CO2 SERPL-SCNC: 22 MMOL/L — SIGNIFICANT CHANGE UP (ref 22–29)
CREAT SERPL-MCNC: 0.57 MG/DL — SIGNIFICANT CHANGE UP (ref 0.5–1.3)
EGFR: 100 ML/MIN/1.73M2 — SIGNIFICANT CHANGE UP
GLUCOSE SERPL-MCNC: 84 MG/DL — SIGNIFICANT CHANGE UP (ref 70–99)
HCT VFR BLD CALC: 28.9 % — LOW (ref 39–50)
HGB BLD-MCNC: 9.6 G/DL — LOW (ref 13–17)
MAGNESIUM SERPL-MCNC: 1.9 MG/DL — SIGNIFICANT CHANGE UP (ref 1.6–2.6)
MCHC RBC-ENTMCNC: 29.2 PG — SIGNIFICANT CHANGE UP (ref 27–34)
MCHC RBC-ENTMCNC: 33.2 GM/DL — SIGNIFICANT CHANGE UP (ref 32–36)
MCV RBC AUTO: 87.8 FL — SIGNIFICANT CHANGE UP (ref 80–100)
PLATELET # BLD AUTO: 251 K/UL — SIGNIFICANT CHANGE UP (ref 150–400)
POTASSIUM SERPL-MCNC: 3.9 MMOL/L — SIGNIFICANT CHANGE UP (ref 3.5–5.3)
POTASSIUM SERPL-SCNC: 3.9 MMOL/L — SIGNIFICANT CHANGE UP (ref 3.5–5.3)
PROT SERPL-MCNC: 5.3 G/DL — LOW (ref 6.6–8.7)
RBC # BLD: 3.29 M/UL — LOW (ref 4.2–5.8)
RBC # FLD: 13.5 % — SIGNIFICANT CHANGE UP (ref 10.3–14.5)
SODIUM SERPL-SCNC: 134 MMOL/L — LOW (ref 135–145)
VANCOMYCIN TROUGH SERPL-MCNC: 15 UG/ML — SIGNIFICANT CHANGE UP (ref 10–20)
WBC # BLD: 14.55 K/UL — HIGH (ref 3.8–10.5)
WBC # FLD AUTO: 14.55 K/UL — HIGH (ref 3.8–10.5)

## 2022-12-29 PROCEDURE — 71045 X-RAY EXAM CHEST 1 VIEW: CPT | Mod: 26

## 2022-12-29 PROCEDURE — 93010 ELECTROCARDIOGRAM REPORT: CPT

## 2022-12-29 PROCEDURE — 99291 CRITICAL CARE FIRST HOUR: CPT

## 2022-12-29 PROCEDURE — 99024 POSTOP FOLLOW-UP VISIT: CPT

## 2022-12-29 RX ORDER — MIDODRINE HYDROCHLORIDE 2.5 MG/1
10 TABLET ORAL EVERY 8 HOURS
Refills: 0 | Status: DISCONTINUED | OUTPATIENT
Start: 2022-12-29 | End: 2022-12-30

## 2022-12-29 RX ORDER — POTASSIUM CHLORIDE 20 MEQ
40 PACKET (EA) ORAL ONCE
Refills: 0 | Status: COMPLETED | OUTPATIENT
Start: 2022-12-29 | End: 2022-12-29

## 2022-12-29 RX ORDER — MAGNESIUM SULFATE 500 MG/ML
2 VIAL (ML) INJECTION ONCE
Refills: 0 | Status: COMPLETED | OUTPATIENT
Start: 2022-12-29 | End: 2022-12-29

## 2022-12-29 RX ADMIN — MEROPENEM 1000 MILLIGRAM(S): 1 INJECTION INTRAVENOUS at 15:00

## 2022-12-29 RX ADMIN — APIXABAN 2.5 MILLIGRAM(S): 2.5 TABLET, FILM COATED ORAL at 06:40

## 2022-12-29 RX ADMIN — APIXABAN 2.5 MILLIGRAM(S): 2.5 TABLET, FILM COATED ORAL at 17:36

## 2022-12-29 RX ADMIN — Medication 1 TABLET(S): at 08:21

## 2022-12-29 RX ADMIN — Medication 81 MILLIGRAM(S): at 11:21

## 2022-12-29 RX ADMIN — CHLORHEXIDINE GLUCONATE 1 APPLICATION(S): 213 SOLUTION TOPICAL at 11:22

## 2022-12-29 RX ADMIN — Medication 125 MILLIGRAM(S): at 17:36

## 2022-12-29 RX ADMIN — MEROPENEM 1000 MILLIGRAM(S): 1 INJECTION INTRAVENOUS at 06:38

## 2022-12-29 RX ADMIN — MIDODRINE HYDROCHLORIDE 20 MILLIGRAM(S): 2.5 TABLET ORAL at 06:40

## 2022-12-29 RX ADMIN — Medication 125 MILLIGRAM(S): at 06:39

## 2022-12-29 RX ADMIN — Medication 40 MILLIEQUIVALENT(S): at 06:39

## 2022-12-29 RX ADMIN — Medication 1 TABLET(S): at 16:54

## 2022-12-29 RX ADMIN — SENNA PLUS 2 TABLET(S): 8.6 TABLET ORAL at 21:36

## 2022-12-29 RX ADMIN — Medication 25 GRAM(S): at 06:39

## 2022-12-29 RX ADMIN — Medication 40 MILLIEQUIVALENT(S): at 11:21

## 2022-12-29 RX ADMIN — TAMSULOSIN HYDROCHLORIDE 0.4 MILLIGRAM(S): 0.4 CAPSULE ORAL at 21:36

## 2022-12-29 RX ADMIN — CHLORHEXIDINE GLUCONATE 1 APPLICATION(S): 213 SOLUTION TOPICAL at 06:52

## 2022-12-29 RX ADMIN — MIDODRINE HYDROCHLORIDE 10 MILLIGRAM(S): 2.5 TABLET ORAL at 21:36

## 2022-12-29 RX ADMIN — PANTOPRAZOLE SODIUM 40 MILLIGRAM(S): 20 TABLET, DELAYED RELEASE ORAL at 06:40

## 2022-12-29 NOTE — PROGRESS NOTE ADULT - PROBLEM SELECTOR PLAN 1
Patient was upgraded to CTICU 12/26 r/o sepsis - rigors, rectal Temp 102, lactate 3, hypotensive SBP 50s  Now suspect septic shock - lactemia (now normal), elevated procalcitonin. On levo/vaso gtt to maintain MAP > 65.  Blood cultures x 2 from 12/26 NGTD  Urine culture from 12/26 NGTD  Blood cultures x2 from 12/26 and x 1 from 12/27 Pending  D involved - Rocephin changed back to Meropenem, Vanco added  Non toxic appearing, afebrile  Midodrine increased to 20 TID to wean off vasopressors  Continue to monitor  ID consult appreciated

## 2022-12-29 NOTE — PROGRESS NOTE ADULT - PROBLEM SELECTOR PLAN 4
Initially post op pt was ventricular paced at 60bpm with no underlying escape rhythm.   Continues to be NSR with LBBB with prolonged NH interval.  No pauses or pacing overnight on telemetry.   Dopamine weaned off 12/19/22.   EP following.   EPWs isolated.   Beta blocker held at this time due to Midodrine use for BP support.  Continue Eliquis 2.5 BID.   TTE 12/26 with small pericardial effusion.  MCOT on discharge set up by EP.

## 2022-12-29 NOTE — PROGRESS NOTE ADULT - SUBJECTIVE AND OBJECTIVE BOX
Significant recent/past 24 hr events:  No acute overnight events. Pt remained HD stable with without acute complaints.  Pt currently in NAD and denies N/V/D HA, dizziness, blurry vision, numbness/tingling, SOB, cough, chest pain, palpitations, abd pain or urinary sx's.   Afebrile. Remain on Pressor support (LEVO gtt)    Subjective:  Review of Systems    ROS negative x 10 systems except as noted above    Patient is a 77y old  Male who presents with a chief complaint of septic shock unknown source (28 Dec 2022 14:12)    HPI:  77 year old male with PMHx of recent  COVID  on 10/6/22, HTN , Vit B12 deficiency, presenting to the ED on 11/2 with body aches, fatigue and fever (Tmax 102) at home for 12 days found to have streptococcus bacteremia and admitted with unclear source, no recent dental work, skin infections, no respiratory symptoms. Pan scan was negative at that time ( LILIAN was recommended but pt refused) He was treated for Bacteremia ( Blood cultures + streptococcus anginosis pansensitive ) and norovirus with supportive care and contact isolation . He was discharged home on 11/7 with IV Rocephin via PICC line. Recommendation was to continue ABX  daily via pic end 11/30/22    Tonight he presented to ER after and episode of syncope and collapse, found to be septic, hypotensive, hypoxic and febrile in the ER. He is unsure of mechanism of fall but remembers being on the ground no reported LOC . He was found face-down on bathroom floor buy family with left leg wedged under cabinet. On my interview he is alert and oriented to person place and time, he has a baseline studder in his speech pattern but otherwise neurologically intcact without defecit.  In responose to hypotension he failed to repsoond to Inital sepsis fluid protocol in ER and now is requiring IV pressor in form of levophed to maintain MAP greater than 60 -65. In the ER he was febrile with inital labs signifacnt for WBC of 22.4 , ProCal of 21.2 first lactate 4.9 via abg with repeat post fluids of 2.2 venous sample.        (27 Nov 2022 02:26)    PAST MEDICAL & SURGICAL HISTORY:  Hypertension      Aortic stenosis      H/O inguinal hernia repair  B/L 2013      S/P laparoscopic colectomy  right colectomy with ileotransverse anastomosis        FAMILY HISTORY:  FH: heart disease (Father, Mother)        Vitals   ICU Vital Signs Last 24 Hrs  T(C): 36.9 (29 Dec 2022 00:00), Max: 37.3 (28 Dec 2022 18:00)  T(F): 98.4 (29 Dec 2022 00:00), Max: 99.1 (28 Dec 2022 18:00)  HR: 74 (29 Dec 2022 00:30) (59 - 82)  BP: 107/66 (29 Dec 2022 00:30) (83/47 - 139/63)  BP(mean): 80 (29 Dec 2022 00:30) (59 - 97)  ABP: 115/55 (29 Dec 2022 00:30) (86/37 - 143/76)  ABP(mean): 76 (29 Dec 2022 00:30) (54 - 96)  RR: 20 (29 Dec 2022 00:30) (13 - 53)  SpO2: 96% (29 Dec 2022 00:30) (90% - 100%)    O2 Parameters below as of 29 Dec 2022 00:00  Patient On (Oxygen Delivery Method): room air        I&O's Detail    27 Dec 2022 07:01  -  28 Dec 2022 07:00  --------------------------------------------------------  IN:    IV PiggyBack: 500 mL    IV PiggyBack: 100 mL    Lactated Ringers Bolus: 1000 mL    Norepinephrine: 173.6 mL    Oral Fluid: 1040 mL    PRBCs (Packed Red Blood Cells): 350 mL    Vasopressin: 56 mL  Total IN: 3219.6 mL    OUT:    Indwelling Catheter - Urethral (mL): 890 mL  Total OUT: 890 mL    Total NET: 2329.6 mL      28 Dec 2022 07:01  -  29 Dec 2022 02:14  --------------------------------------------------------  IN:    IV PiggyBack: 250 mL    Norepinephrine: 74.9 mL    Oral Fluid: 1160 mL  Total IN: 1484.9 mL    OUT:    Indwelling Catheter - Urethral (mL): 870 mL    Vasopressin: 0 mL  Total OUT: 870 mL    Total NET: 614.9 mL        LABS                        9.3    13.73 )-----------( 240      ( 28 Dec 2022 02:30 )             27.7     12-28    132<L>  |  100  |  25.1<H>  ----------------------------<  100<H>  3.8   |  22.0  |  0.64    Ca    8.6      28 Dec 2022 02:30  Mg     1.9     12-28    TPro  5.2<L>  /  Alb  3.0<L>  /  TBili  0.7  /  DBili  0.3  /  AST  23  /  ALT  25  /  AlkPhos  102  12-28    LIVER FUNCTIONS - ( 28 Dec 2022 02:30 )  Alb: 3.0 g/dL / Pro: 5.2 g/dL / ALK PHOS: 102 U/L / ALT: 25 U/L / AST: 23 U/L / GGT: x               MEDICATIONS  (STANDING):  apixaban 2.5 milliGRAM(s) Oral two times a day  aspirin enteric coated 81 milliGRAM(s) Oral daily  chlorhexidine 2% Cloths 1 Application(s) Topical daily  chlorhexidine 4% Liquid 1 Application(s) Topical <User Schedule>  lactobacillus acidophilus 1 Tablet(s) Oral two times a day with meals  meropenem Injectable 1000 milliGRAM(s) IV Push every 8 hours  midodrine 20 milliGRAM(s) Oral every 8 hours  norepinephrine Infusion 0.05 MICROgram(s)/kG/Min (7.18 mL/Hr) IV Continuous <Continuous>  pantoprazole    Tablet 40 milliGRAM(s) Oral daily  polyethylene glycol 3350 17 Gram(s) Oral daily  potassium chloride    Tablet ER 40 milliEquivalent(s) Oral daily  senna 2 Tablet(s) Oral at bedtime  tamsulosin 0.4 milliGRAM(s) Oral at bedtime  vancomycin  IVPB 1000 milliGRAM(s) IV Intermittent every 12 hours    MEDICATIONS  (PRN):  acetaminophen     Tablet .. 650 milliGRAM(s) Oral every 6 hours PRN Temp greater or equal to 38C (100.4F), Moderate Pain (4 - 6)  ondansetron Injectable 4 milliGRAM(s) IV Push once PRN Nausea and/or Vomiting  sodium chloride 0.9% lock flush 10 milliLiter(s) IV Push every 1 hour PRN Pre/post blood products, medications, blood draw, and to maintain line patency  sodium chloride 0.9% lock flush 10 milliLiter(s) IV Push every 1 hour PRN Pre/post blood products, medications, blood draw, and to maintain line patency      Allergies:  No Known Allergies      Physical Exam:   Neuro: A+O x 3, non-focal, speech clear and intact  Pulm: +Diminished BSs at bases bilaterally, no accessory muscle use noted  Chest: +PW (isolated)  CV: regular rate, regular rhythm, +S1S2, no murmur noted  Abd: soft, NT, ND, + BS  : ram in situ to bedside drainage  Ext: MEZA x 4, +1 LE pitting edema b/l, no cyanosis  Skin: warm, dry, perfused  Incisions: midsternal incision with island dressing C/D/I, sternum stable, no click         Code Status: Full Code

## 2022-12-29 NOTE — PROGRESS NOTE ADULT - SUBJECTIVE AND OBJECTIVE BOX
HOLDSWORTH, GEORGE  MRN-[id number]    HPI:  77 year old male with PMHx of recent  COVID  on 10/6/22, HTN , Vit B12 deficiency, presenting to the ED on 11/2 with body aches, fatigue and fever (Tmax 102) at home for 12 days found to have streptococcus bacteremia and admitted with unclear source, no recent dental work, skin infections, no respiratory symptoms. Pan scan was negative at that time ( LILIAN was recommended but pt refused) He was treated for Bacteremia ( Blood cultures + streptococcus anginosis pansensitive ) and norovirus with supportive care and contact isolation . He was discharged home on 11/7 with IV Rocephin via PICC line. Recommendation was to continue ABX  daily via pic end 11/30/22  Tonight he presented to ER after and episode of syncope and collapse, found to be septic, hypotensive, hypoxic and febrile in the ER. He is unsure of mechanism of fall but remembers being on the ground no reported LOC . He was found face-down on bathroom floor buy family with left leg wedged under cabinet. On my interview he is alert and oriented to person place and time, he has a baseline studder in his speech pattern but otherwise neurologically intcact without defecit.  In responose to hypotension he failed to repsoond to Inital sepsis fluid protocol in ER and now is requiring IV pressor in form of levophed to maintain MAP greater than 60 -65. In the ER he was febrile with inital labs signifacnt for WBC of 22.4 , ProCal of 21.2 first lactate 4.9 via abg with repeat post fluids of 2.2 venous sample.   (27 Nov 2022 02:26)    Surgery/Hospital Course:  ·  PRE-OP DIAGNOSIS:  Aortic valve endocarditis   ·  POST-OP DIAGNOSIS:  Aortic valve endocarditis   ·  PROCEDURES:  Valve replacement, aortic and mitral 13-Dec-2022   AVR 27 mm inspiris, 29 mm mitral valve, Patch repair aortic root -  Today:  -Patient still requiring high dose Midodrine for septic shock  -maintaining MAP > 65.    ICU Vital Signs Last 24 Hrs  T(C): 36.6 (29 Dec 2022 08:00), Max: 37.3 (28 Dec 2022 18:00)  T(F): 97.9 (29 Dec 2022 08:00), Max: 99.1 (28 Dec 2022 18:00)  HR: 59 (29 Dec 2022 08:00) (59 - 81)  BP: 110/55 (29 Dec 2022 08:00) (83/47 - 139/63)  BP(mean): 77 (29 Dec 2022 08:00) (59 - 97)  ABP: 108/45 (29 Dec 2022 08:00) (86/37 - 143/76)  ABP(mean): 65 (29 Dec 2022 08:00) (54 - 96)  RR: 20 (29 Dec 2022 08:00) (13 - 53)  SpO2: 95% (29 Dec 2022 08:00) (92% - 100%)    O2 Parameters below as of 29 Dec 2022 08:00  Patient On (Oxygen Delivery Method): room air  Physical Exam:  Gen: A&O   CNS: non focal 	  Neck: no JVD  RES : clear , no wheezing; right-sided [id number] ecchymosis               CVS: Regular  rhythm. Normal S1/S2  Abd: Soft, non-distended. Bowel sounds present.  Skin: No rash.  Ext:  no edema    ============================I/O===========================  I&O's Detail    28 Dec 2022 07:01  -  29 Dec 2022 07:00  --------------------------------------------------------  IN:    IV PiggyBack: 250 mL    Norepinephrine: 95.2 mL    Oral Fluid: 1160 mL  Total IN: 1505.2 mL    OUT:    Indwelling Catheter - Urethral (mL): 1265 mL    Vasopressin: 0 mL  Total OUT: 1265 mL    Total NET: 240.2 mL  ============================ LABS =========================                        9.6    14.55 )-----------( 251      ( 29 Dec 2022 02:00 )             28.9   12-29    134<L>  |  105  |  21.6<H>  ----------------------------<  84  3.9   |  22.0  |  0.57    Ca    8.7      29 Dec 2022 02:00  Mg     1.9     12-29    TPro  5.3<L>  /  Alb  2.9<L>  /  TBili  0.6  /  DBili  x   /  AST  27  /  ALT  32  /  AlkPhos  127<H>  12-29    ======================Micro/Rad/Cardio=================    Culture - Blood (collected 27 Dec 2022 04:06)  Source: .Blood Blood  Preliminary Report (28 Dec 2022 09:01):    No growth to date.    Culture - Blood (collected 26 Dec 2022 18:15)  Source: .Blood Blood  Preliminary Report (28 Dec 2022 01:02):    No growth to date.    Culture - Blood (collected 26 Dec 2022 18:15)  Source: .Blood Blood  Preliminary Report (28 Dec 2022 01:02):    No growth to date.    Culture - Urine (collected 26 Dec 2022 14:00)  Source: Catheterized Catheterized  Final Report (27 Dec 2022 13:15):    No growth    Culture - Blood (collected 26 Dec 2022 11:30)  Source: .Blood Blood-Peripheral  Preliminary Report (27 Dec 2022 17:01):    No growth to date.    Culture - Blood (collected 26 Dec 2022 11:30)  Source: .Blood Blood-Peripheral  Preliminary Report (27 Dec 2022 17:01):    No growth to date.  Culture: Reviewed   CXR: Reviewed  Echo:Reviewed  ======================================================  PAST MEDICAL & SURGICAL HISTORY:  Hypertension      Aortic stenosis      H/O inguinal hernia repair  B/L 2013      S/P laparoscopic colectomy  right colectomy with ileotransverse anastomosis        ====================ASSESSMENT ==============  77M PMH HTN, Moderate AS, recent Covid infection 10/6/2022 per chart, recent admission 11/1/22 for SIRS, found to have strep anginosus bacteremia, norovirus, TTE negative for endocarditis at the time, with patient refusing LILIAN that admission, was started on Ceftriaxone (completed 11/20/22), however patient presented 11/27 with syncope, found to have AFib RVR, sepsis, copious diarrhea requiring rectal tube, and eventual LILIAN revealed 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 Cleared by neurosurgery for OR after MRI on 12/1/22 revealed scattered bilateral parieto-occipital acute infarcts.  LHC 12/5 with clean coronaries. Post cath patient noted with RADHA and Urinary retention with ram placed 12/8.     Patient s/p AVR, MVR, and patch repair of the aortic root on 12/13/22 with Dr. Flanagan. Postoperative course significant for pacing requirements having regained rhythm 12/15 (Dopamine for inotropic support weaned off, plan for MCOT monitor on discharge to be followed by EP), hypotension (on Midodrine for BP support), persistent chest tube output (now resolved), persistent urinary retention (failed TOV 12/19 with Ram replaced most recently 12/26), and was upgraded to CTICU 12/26 r/o sepsis, now suspect septic shock.    --- Septic shock  ---Patient was upgraded to CTICU 12/26 r/o sepsis - rigors, rectal Temp 102, lactate 3, hypotensive SBP 50s  --- Endocarditis.   ---S/p AVR, MVR, and patch repair of the aortic root on 12/13/22   --- Abscess of aortic root.   --- Atrial fibrillation, new onset.   ---RADHA (acute kidney injury).   --- Hematuria.   --- Urinary retention.   ---Post op Hypovolemia  ---Post op respiratory insufficiency       Plan:  -Meropenem and Vancomycin, ID follow-up  - Monitor strict I/Os, replenish electrolytes PRN to keep K>4 and Mg>2.   -Continue with PT, increase activity as tolerated.  -Tylenol, Oxy PRN for analgesia.  -Beta blocker held at this time  -Continue Eliquis 2.5 BID.   -Avoid nephrotoxic drugs.  -Ram exchanged 12/26 as part of sepsis work up.   -Continue Flomax.  -Protonix for GI prophylaxis.  -Wean Midodrine to off from high dose 20 mg TID    ==================== RESPIRATORY======================  Post op respiratory insufficiency, now on RA with resolved CXR findings of ARDS/APE     ====================CARDIOVASCULAR==================  Post op Hypovolemia  midodrine. 20 milliGRAM(s) Oral three times a day  norepinephrine Infusion 0.05 MICROgram(s)/kG/Min (7.18 mL/Hr) IV Continuous <Continuous>, now off    ===================HEMATOLOGIC/ONC ===================  Monitor H&H/Plts    apixaban 2.5 milliGRAM(s) Oral two times a day  aspirin enteric coated 81 milliGRAM(s) Oral daily    ===================== RENAL =========================  Continue monitoring urine output, I&OS, BUN/Cr   tamsulosin 0.4 milliGRAM(s) Oral at bedtime    ==================== GASTROINTESTINAL===================  pantoprazole    Tablet 40 milliGRAM(s) Oral daily  polyethylene glycol 3350 17 Gram(s) Oral daily  potassium chloride    Tablet ER 40 milliEquivalent(s) Oral daily  senna 2 Tablet(s) Oral at bedtime  sodium chloride 0.9% lock flush 10 milliLiter(s) IV Push every 1 hour PRN Pre/post blood products, medications, blood draw, and to maintain line patency  sodium chloride 0.9% lock flush 10 milliLiter(s) IV Push every 1 hour PRN Pre/post blood products, medications, blood draw, and to maintain line patency    ========================INFECTIOUS DISEASE================  meropenem Injectable 1000 milliGRAM(s) IV Push every 8 hours      -Close hemodynamic , ventilatory and drain monitoring and management per post op routine .  -Monitor Neurologic status ,   -Head of the bed should remain elevated to 45 degrees,  -Monitor for arrhythmias and monitor parameters for organ perfusion,  -Glycemic control is satisfactory,  -Nutritional goals will be met using po eventually , insure adequate caloric intake and monitor the same ,  -Electrolytes have been repleted as necessary , pain control has been achieved  and wound care has been carried out ,     I have spent 35 minutes providing acute care for this critically ill patient     Patient requires continuous monitoring with bedside rhythm monitoring, pulse ox monitoring, and intermittent blood gas analysis. Care plan discussed with ICU care team. Patient remained critical and at risk for life threatening decompensation.

## 2022-12-30 LAB
ANION GAP SERPL CALC-SCNC: 10 MMOL/L — SIGNIFICANT CHANGE UP (ref 5–17)
BLD GP AB SCN SERPL QL: SIGNIFICANT CHANGE UP
BUN SERPL-MCNC: 19.3 MG/DL — SIGNIFICANT CHANGE UP (ref 8–20)
CALCIUM SERPL-MCNC: 9.4 MG/DL — SIGNIFICANT CHANGE UP (ref 8.4–10.5)
CHLORIDE SERPL-SCNC: 104 MMOL/L — SIGNIFICANT CHANGE UP (ref 96–108)
CO2 SERPL-SCNC: 20 MMOL/L — LOW (ref 22–29)
CREAT SERPL-MCNC: 0.76 MG/DL — SIGNIFICANT CHANGE UP (ref 0.5–1.3)
EGFR: 92 ML/MIN/1.73M2 — SIGNIFICANT CHANGE UP
GLUCOSE SERPL-MCNC: 86 MG/DL — SIGNIFICANT CHANGE UP (ref 70–99)
HCT VFR BLD CALC: 33.6 % — LOW (ref 39–50)
HGB BLD-MCNC: 10.8 G/DL — LOW (ref 13–17)
MAGNESIUM SERPL-MCNC: 2 MG/DL — SIGNIFICANT CHANGE UP (ref 1.8–2.6)
MCHC RBC-ENTMCNC: 29.1 PG — SIGNIFICANT CHANGE UP (ref 27–34)
MCHC RBC-ENTMCNC: 32.1 GM/DL — SIGNIFICANT CHANGE UP (ref 32–36)
MCV RBC AUTO: 90.6 FL — SIGNIFICANT CHANGE UP (ref 80–100)
PLATELET # BLD AUTO: 253 K/UL — SIGNIFICANT CHANGE UP (ref 150–400)
POTASSIUM SERPL-MCNC: 4.4 MMOL/L — SIGNIFICANT CHANGE UP (ref 3.5–5.3)
POTASSIUM SERPL-SCNC: 4.4 MMOL/L — SIGNIFICANT CHANGE UP (ref 3.5–5.3)
RBC # BLD: 3.71 M/UL — LOW (ref 4.2–5.8)
RBC # FLD: 13.7 % — SIGNIFICANT CHANGE UP (ref 10.3–14.5)
SODIUM SERPL-SCNC: 134 MMOL/L — LOW (ref 135–145)
VANCOMYCIN TROUGH SERPL-MCNC: 17.6 UG/ML — SIGNIFICANT CHANGE UP (ref 10–20)
WBC # BLD: 12.43 K/UL — HIGH (ref 3.8–10.5)
WBC # FLD AUTO: 12.43 K/UL — HIGH (ref 3.8–10.5)

## 2022-12-30 PROCEDURE — 33274 TCAT INSJ/RPL PERM LDLS PM: CPT

## 2022-12-30 PROCEDURE — 99233 SBSQ HOSP IP/OBS HIGH 50: CPT | Mod: 25

## 2022-12-30 PROCEDURE — 99291 CRITICAL CARE FIRST HOUR: CPT

## 2022-12-30 PROCEDURE — 93010 ELECTROCARDIOGRAM REPORT: CPT | Mod: 76

## 2022-12-30 PROCEDURE — 71045 X-RAY EXAM CHEST 1 VIEW: CPT | Mod: 26

## 2022-12-30 RX ORDER — SODIUM CHLORIDE 9 MG/ML
500 INJECTION INTRAMUSCULAR; INTRAVENOUS; SUBCUTANEOUS ONCE
Refills: 0 | Status: COMPLETED | OUTPATIENT
Start: 2022-12-30 | End: 2022-12-30

## 2022-12-30 RX ADMIN — Medication 125 MILLIGRAM(S): at 19:15

## 2022-12-30 RX ADMIN — TAMSULOSIN HYDROCHLORIDE 0.4 MILLIGRAM(S): 0.4 CAPSULE ORAL at 21:08

## 2022-12-30 RX ADMIN — MEROPENEM 1000 MILLIGRAM(S): 1 INJECTION INTRAVENOUS at 07:02

## 2022-12-30 RX ADMIN — Medication 40 MILLIEQUIVALENT(S): at 19:15

## 2022-12-30 RX ADMIN — MEROPENEM 1000 MILLIGRAM(S): 1 INJECTION INTRAVENOUS at 21:08

## 2022-12-30 RX ADMIN — SODIUM CHLORIDE 100 MILLILITER(S): 9 INJECTION INTRAMUSCULAR; INTRAVENOUS; SUBCUTANEOUS at 14:25

## 2022-12-30 RX ADMIN — CHLORHEXIDINE GLUCONATE 1 APPLICATION(S): 213 SOLUTION TOPICAL at 19:07

## 2022-12-30 RX ADMIN — Medication 1 TABLET(S): at 19:15

## 2022-12-30 RX ADMIN — Medication 125 MILLIGRAM(S): at 06:10

## 2022-12-30 RX ADMIN — Medication 81 MILLIGRAM(S): at 19:15

## 2022-12-30 RX ADMIN — MEROPENEM 1000 MILLIGRAM(S): 1 INJECTION INTRAVENOUS at 13:37

## 2022-12-30 RX ADMIN — CHLORHEXIDINE GLUCONATE 1 APPLICATION(S): 213 SOLUTION TOPICAL at 06:05

## 2022-12-30 NOTE — PROGRESS NOTE ADULT - NS ATTEND BILL GEN_ALL_CORE
Attending to bill
PA/NP to bill

## 2022-12-30 NOTE — PROGRESS NOTE ADULT - SUBJECTIVE AND OBJECTIVE BOX
HOLDSWORTH, GEORGE  MRN-[id number]    HPI:  77 year old male with PMHx of recent  COVID  on 10/6/22, HTN , Vit B12 deficiency, presenting to the ED on 11/2 with body aches, fatigue and fever (Tmax 102) at home for 12 days found to have streptococcus bacteremia and admitted with unclear source, no recent dental work, skin infections, no respiratory symptoms. Pan scan was negative at that time ( LILIAN was recommended but pt refused) He was treated for Bacteremia ( Blood cultures + streptococcus anginosis pansensitive ) and norovirus with supportive care and contact isolation . He was discharged home on 11/7 with IV Rocephin via PICC line. Recommendation was to continue ABX  daily via pic end 11/30/22  Tonight he presented to ER after and episode of syncope and collapse, found to be septic, hypotensive, hypoxic and febrile in the ER. He is unsure of mechanism of fall but remembers being on the ground no reported LOC . He was found face-down on bathroom floor buy family with left leg wedged under cabinet. On my interview he is alert and oriented to person place and time, he has a baseline studder in his speech pattern but otherwise neurologically intcact without defecit.  In responose to hypotension he failed to repsoond to Inital sepsis fluid protocol in ER and now is requiring IV pressor in form of levophed to maintain MAP greater than 60 -65. In the ER he was febrile with inital labs signifacnt for WBC of 22.4 , ProCal of 21.2 first lactate 4.9 via abg with repeat post fluids of 2.2 venous sample.   (27 Nov 2022 02:26)    Surgery/Hospital Course:  ·  PRE-OP DIAGNOSIS:  Aortic valve endocarditis   ·  POST-OP DIAGNOSIS:  Aortic valve endocarditis   ·  PROCEDURES:  Valve replacement, aortic and mitral 13-Dec-2022   AVR 27 mm inspiris, 29 mm mitral valve, Patch repair aortic root -  Today:  -Patient required Micra PPM secondry to 14 second pause  - high dose Midodrine was stopped secondary to pause-asystolic episode    ICU Vital Signs Last 24 Hrs  T(C): 36.6 (30 Dec 2022 09:15), Max: 36.8 (30 Dec 2022 03:30)  T(F): 97.9 (30 Dec 2022 09:15), Max: 98.2 (30 Dec 2022 03:30)  HR: 108 (30 Dec 2022 15:00) (51 - 108)  BP: 90/67 (30 Dec 2022 15:00) (89/45 - 168/83)  BP(mean): 79 (30 Dec 2022 09:00) (74 - 86)  ABP: --  ABP(mean): --  RR: 17 (30 Dec 2022 16:00) (14 - 22)  SpO2: 97% (30 Dec 2022 16:00) (92% - 100%)    O2 Parameters below as of 30 Dec 2022 16:00  Patient On (Oxygen Delivery Method): room air    Physical Exam:  Gen: A&O   CNS: non focal 	  Neck: no JVD  RES : clear , no wheezing; right-sided [id number] ecchymosis               CVS: Regular  rhythm. Normal S1/S2  Abd: Soft, non-distended. Bowel sounds present.  Skin: No rash.  Ext:  no edema    MEDICATIONS  (STANDING):  aspirin enteric coated 81 milliGRAM(s) Oral daily  chlorhexidine 2% Cloths 1 Application(s) Topical daily  chlorhexidine 4% Liquid 1 Application(s) Topical <User Schedule>  lactobacillus acidophilus 1 Tablet(s) Oral two times a day with meals  meropenem Injectable 1000 milliGRAM(s) IV Push every 8 hours  pantoprazole    Tablet 40 milliGRAM(s) Oral daily  polyethylene glycol 3350 17 Gram(s) Oral daily  potassium chloride    Tablet ER 40 milliEquivalent(s) Oral daily  senna 2 Tablet(s) Oral at bedtime  tamsulosin 0.4 milliGRAM(s) Oral at bedtime  vancomycin  IVPB 1000 milliGRAM(s) IV Intermittent every 12 hours    MEDICATIONS  (PRN):  acetaminophen     Tablet .. 650 milliGRAM(s) Oral every 6 hours PRN Temp greater or equal to 38C (100.4F), Moderate Pain (4 - 6)  ondansetron Injectable 4 milliGRAM(s) IV Push once PRN Nausea and/or Vomiting  sodium chloride 0.9% lock flush 10 milliLiter(s) IV Push every 1 hour PRN Pre/post blood products, medications, blood draw, and to maintain line patency    ============================I/O===========================  I&O's Summary    29 Dec 2022 07:01  -  30 Dec 2022 07:00  --------------------------------------------------------  IN: 1710 mL / OUT: 1120 mL / NET: 590 mL    30 Dec 2022 07:01  -  30 Dec 2022 16:35  --------------------------------------------------------  IN: 0 mL / OUT: 75 mL / NET: -75 mL      ============================ LABS =========================                        10.8   12.43 )-----------( 253      ( 30 Dec 2022 05:10 )             33.6   12-30    134<L>  |  104  |  19.3  ----------------------------<  86  4.4   |  20.0<L>  |  0.76    Ca    9.4      30 Dec 2022 05:10  Mg     2.0     12-30    TPro  5.3<L>  /  Alb  2.9<L>  /  TBili  0.6  /  DBili  x   /  AST  27  /  ALT  32  /  AlkPhos  127<H>  12-29    ======================Micro/Rad/Cardio=================  Culture: Reviewed   CXR: Reviewed  Echo:Reviewed  ======================================================  PAST MEDICAL & SURGICAL HISTORY:  Hypertension      Aortic stenosis      H/O inguinal hernia repair  B/L 2013      S/P laparoscopic colectomy  right colectomy with ileotransverse anastomosis        ====================ASSESSMENT ==============  77M PMH HTN, Moderate AS, recent Covid infection 10/6/2022 per chart, recent admission 11/1/22 for SIRS, found to have strep anginosus bacteremia, norovirus, TTE negative for endocarditis at the time, with patient refusing LILIAN that admission, was started on Ceftriaxone (completed 11/20/22), however patient presented 11/27 with syncope, found to have AFib RVR, sepsis, copious diarrhea requiring rectal tube, and eventual LILIAN revealed 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 Cleared by neurosurgery for OR after MRI on 12/1/22 revealed scattered bilateral parieto-occipital acute infarcts.  C 12/5 with clean coronaries. Post cath patient noted with RADHA and Urinary retention with ram placed 12/8.     Patient s/p AVR, MVR, and patch repair of the aortic root on 12/13/22 with Dr. Flanagan. Postoperative course significant for pacing requirements having regained rhythm 12/15 (Dopamine for inotropic support weaned off, plan for MCOT monitor on discharge to be followed by EP), hypotension (on Midodrine for BP support), persistent chest tube output (now resolved), persistent urinary retention (failed TOV 12/19 with Ram replaced most recently 12/26), and was upgraded to CTICU 12/26 r/o sepsis, now suspect septic shock.    --- Septic shock  ---Patient was upgraded to CTICU 12/26 r/o sepsis - rigors, rectal Temp 102, lactate 3, hypotensive SBP 50s  --- Endocarditis.   ---S/p AVR, MVR, and patch repair of the aortic root on 12/13/22   --- Abscess of aortic root.   --- Atrial fibrillation, new onset.   ---RADHA (acute kidney injury).   --- Hematuria.   --- Urinary retention.   ---Post op Hypovolemia  ---Post op respiratory insufficiency       Plan:  -Meropenem and Vancomycin, ID follow-up  - Monitor strict I/Os, replenish electrolytes PRN to keep K>4 and Mg>2.   -Continue with PT, increase activity as tolerated.  -Tylenol, Oxy PRN for analgesia.  -AV moreno blocking drugs as per EP  -Restart Eliquis 2.5 BID.   -Avoid nephrotoxic drugs.  -Continue Flomax.  -Protonix for GI prophylaxis.  -No further Midodrine at this time  -Nutritional goals will be met using po eventually , insure adequate caloric intake and monitor the same ,  -Electrolytes have been repleted as necessary , pain control has been achieved  and wound care has been carried out ,     I have spent 35 minutes providing acute care for this critically ill patient

## 2022-12-30 NOTE — PROGRESS NOTE ADULT - SUBJECTIVE AND OBJECTIVE BOX
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 Cleared by neurosurgery for OR after MRI on 12/1/22 revealed scattered bilateral parieto-occipital acute infarcts.  LHC 12/5 with clean coronaries. Post cath patient noted with RADHA and Urinary retention with ram placed 12/8. Patient now s/p AVR, MVR, and patch repair of the aortic root on 12/13/22 with Dr. Flanagan. Postoperative course significant for pacing requirements due to asystole, with patient regaining rhythm 12/15 (Dopamine for inotropic support now weaned off, plan for MCOT monitor on discharge to be followed by EP), hypotension (on Midodrine for BP support), persistent chest tube output (on IV diuresis, colchicine d/c'd due to diarrhea), and persistent urinary retention (failed TOV 12/19 with Ram replaced). Patient needs PICC line and home IV antibiotics to be set up.      Overnight events:      PAST MEDICAL & SURGICAL HISTORY:  Hypertension      Aortic stenosis      H/O inguinal hernia repair  B/L 2013      S/P laparoscopic colectomy  right colectomy with ileotransverse anastomosis          Medications:  acetaminophen     Tablet .. 650 milliGRAM(s) Oral every 6 hours PRN  apixaban 2.5 milliGRAM(s) Oral two times a day  aspirin enteric coated 81 milliGRAM(s) Oral daily  chlorhexidine 2% Cloths 1 Application(s) Topical daily  chlorhexidine 4% Liquid 1 Application(s) Topical <User Schedule>  lactobacillus acidophilus 1 Tablet(s) Oral two times a day with meals  meropenem Injectable 1000 milliGRAM(s) IV Push every 8 hours  midodrine 10 milliGRAM(s) Oral every 8 hours  ondansetron Injectable 4 milliGRAM(s) IV Push once PRN  pantoprazole    Tablet 40 milliGRAM(s) Oral daily  polyethylene glycol 3350 17 Gram(s) Oral daily  potassium chloride    Tablet ER 40 milliEquivalent(s) Oral daily  senna 2 Tablet(s) Oral at bedtime  sodium chloride 0.9% lock flush 10 milliLiter(s) IV Push every 1 hour PRN  tamsulosin 0.4 milliGRAM(s) Oral at bedtime  vancomycin  IVPB 1000 milliGRAM(s) IV Intermittent every 12 hours      MEDICATIONS  (PRN):  acetaminophen     Tablet .. 650 milliGRAM(s) Oral every 6 hours PRN Temp greater or equal to 38C (100.4F), Moderate Pain (4 - 6)  ondansetron Injectable 4 milliGRAM(s) IV Push once PRN Nausea and/or Vomiting  sodium chloride 0.9% lock flush 10 milliLiter(s) IV Push every 1 hour PRN Pre/post blood products, medications, blood draw, and to maintain line patency      Daily Review:                                9.6    14.55 )-----------( 251      ( 29 Dec 2022 02:00 )             28.9   12-29    134<L>  |  105  |  21.6<H>  ----------------------------<  84  3.9   |  22.0  |  0.57    Ca    8.7      29 Dec 2022 02:00  Mg     1.9     12-29    TPro  5.3<L>  /  Alb  2.9<L>  /  TBili  0.6  /  DBili  x   /  AST  27  /  ALT  32  /  AlkPhos  127<H>  12-29          T(C): 36.5 (12-30-22 @ 00:00), Max: 36.9 (12-29-22 @ 12:00)  HR: 54 (12-30-22 @ 00:00) (54 - 80)  BP: 96/56 (12-30-22 @ 00:00) (96/56 - 129/60)  RR: 20 (12-30-22 @ 00:00) (13 - 22)  SpO2: 97% (12-30-22 @ 00:00) (93% - 100%)  Wt(kg): --    CAPILLARY BLOOD GLUCOSE          I&O's Summary    28 Dec 2022 07:01  -  29 Dec 2022 07:00  --------------------------------------------------------  IN: 1505.2 mL / OUT: 1265 mL / NET: 240.2 mL    29 Dec 2022 07:01  -  30 Dec 2022 02:12  --------------------------------------------------------  IN: 1460 mL / OUT: 510 mL / NET: 950 mL        Physical Exam  Neuro: A+O x 3, non-focal, speech clear and intact  Pulm: coarse breath sounds bilaterally, no wheezing or rales  CV: RRR, irregularly irregular, +S1S2  Abd: soft, NT, ND, hypoactive / normoactive / hyperactive bowel sounds  Ext: +DP Pulses b/l, +PT pulses, no edema  Inc: Mediastinal sternal incision C/D/I/stable w/ dressing, right / left C/D/I with Ace wrap  Chest tubes: left  / right / mediastinal chest tubes               Brief summary:  77 year old male with a medical history of HTN, Moderate AS, recent Covid infection 10/6/2022 per chart, recent admission 11/1/22 for SIRS, found to have strep anginosus bacteremia, norovirus, TTE negative for endocarditis at the time, with patient refusing LILIAN that admission, was started on Ceftriaxone (completed 11/20/22), however patient presented 11/27 with syncope, found to have AFib RVR, sepsis, copious diarrhea requiring rectal tube, and eventual LILIAN revealed 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 Cleared by neurosurgery for OR after MRI on 12/1/22 revealed scattered bilateral parieto-occipital acute infarcts.  LHC 12/5 with clean coronaries. Post cath patient noted with RADHA and Urinary retention with ram placed 12/8. Patient now s/p AVR, MVR, and patch repair of the aortic root on 12/13/22 with Dr. Flanagan. Postoperative course significant for pacing requirements due to asystole, with patient regaining rhythm 12/15 (Dopamine for inotropic support now weaned off, plan for MCOT monitor on discharge to be followed by EP), hypotension (on Midodrine for BP support), persistent chest tube output (on IV diuresis, colchicine d/c'd due to diarrhea), and persistent urinary retention (failed TOV 12/19 with Ram replaced). Patient needs PICC line and home IV antibiotics to be set up.      Overnight events:  Patient with 14 second pause overnight during sleep.  Patient seen at bedside, endorsing no acute complaints. Denies any chest pain, palpitations, shortness of breath, abd pain, nausea, vomiting, lightheadedness, headaches, fevers, or chills.   Patient upgraded back to CTICU for monitoring.    PAST MEDICAL & SURGICAL HISTORY:  Hypertension  Aortic stenosis  H/O inguinal hernia repair  B/L 2013  S/P laparoscopic colectomy  right colectomy with ileotransverse anastomosis    Medications:  acetaminophen     Tablet .. 650 milliGRAM(s) Oral every 6 hours PRN  apixaban 2.5 milliGRAM(s) Oral two times a day  aspirin enteric coated 81 milliGRAM(s) Oral daily  chlorhexidine 2% Cloths 1 Application(s) Topical daily  chlorhexidine 4% Liquid 1 Application(s) Topical <User Schedule>  lactobacillus acidophilus 1 Tablet(s) Oral two times a day with meals  meropenem Injectable 1000 milliGRAM(s) IV Push every 8 hours  midodrine 10 milliGRAM(s) Oral every 8 hours  ondansetron Injectable 4 milliGRAM(s) IV Push once PRN  pantoprazole    Tablet 40 milliGRAM(s) Oral daily  polyethylene glycol 3350 17 Gram(s) Oral daily  potassium chloride    Tablet ER 40 milliEquivalent(s) Oral daily  senna 2 Tablet(s) Oral at bedtime  sodium chloride 0.9% lock flush 10 milliLiter(s) IV Push every 1 hour PRN  tamsulosin 0.4 milliGRAM(s) Oral at bedtime  vancomycin  IVPB 1000 milliGRAM(s) IV Intermittent every 12 hours    MEDICATIONS  (PRN):  acetaminophen     Tablet .. 650 milliGRAM(s) Oral every 6 hours PRN Temp greater or equal to 38C (100.4F), Moderate Pain (4 - 6)  ondansetron Injectable 4 milliGRAM(s) IV Push once PRN Nausea and/or Vomiting  sodium chloride 0.9% lock flush 10 milliLiter(s) IV Push every 1 hour PRN Pre/post blood products, medications, blood draw, and to maintain line patency      Daily Review:                       9.6    14.55 )-----------( 251      ( 29 Dec 2022 02:00 )             28.9   12-29    134<L>  |  105  |  21.6<H>  ----------------------------<  84  3.9   |  22.0  |  0.57    Ca    8.7      29 Dec 2022 02:00  Mg     1.9     12-29    TPro  5.3<L>  /  Alb  2.9<L>  /  TBili  0.6  /  DBili  x   /  AST  27  /  ALT  32  /  AlkPhos  127<H>  12-29    T(C): 36.5 (12-30-22 @ 00:00), Max: 36.9 (12-29-22 @ 12:00)  HR: 54 (12-30-22 @ 00:00) (54 - 80)  BP: 96/56 (12-30-22 @ 00:00) (96/56 - 129/60)  RR: 20 (12-30-22 @ 00:00) (13 - 22)  SpO2: 97% (12-30-22 @ 00:00) (93% - 100%)  Wt(kg): --    CAPILLARY BLOOD GLUCOSE    I&O's Summary    28 Dec 2022 07:01  -  29 Dec 2022 07:00  --------------------------------------------------------  IN: 1505.2 mL / OUT: 1265 mL / NET: 240.2 mL    29 Dec 2022 07:01  -  30 Dec 2022 02:12  --------------------------------------------------------  IN: 1460 mL / OUT: 510 mL / NET: 950 mL    Physical Exam  Neuro: A+O x 3, non-focal, speech clear and intact  Pulm: +Diminished BSs at bases bilaterally, no accessory muscle use noted  Chest: +PW (isolated)  CV: regular rate, regular rhythm, +S1S2, no murmur noted  Abd: soft, NT, ND, + BS  : ram in situ to bedside drainage  Ext: MEZA x 4, +1 LE pitting edema b/l, no cyanosis  Skin: warm, dry, perfused  Incisions: midsternal incision with island dressing C/D/I, sternum stable, no click

## 2022-12-30 NOTE — PROGRESS NOTE ADULT - NS ATTEND OPT1 GEN_ALL_CORE

## 2022-12-30 NOTE — PROGRESS NOTE ADULT - ASSESSMENT
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 Cleared by neurosurgery for OR after MRI on 12/1/22 revealed scattered bilateral parieto-occipital acute infarcts.  LHC 12/5 with clean coronaries. Post cath patient noted with RADHA and Urinary retention with ram placed 12/8. Patient now s/p AVR, MVR, and patch repair of the aortic root on 12/13/22 with Dr. Flanagan. Postoperative course significant for pacing requirements due to asystole, with patient regaining rhythm 12/15 (Dopamine for inotropic support now weaned off, plan for MCOT monitor on discharge to be followed by EP), hypotension (on Midodrine for BP support), persistent chest tube output (on IV diuresis, colchicine d/c'd due to diarrhea), and persistent urinary retention (failed TOV 12/19 with Ram replaced). Patient needs PICC line and home IV antibiotics to be set up. 12/30 patient upgraded to CTICU for monitoring after 14 second pause during sleep.

## 2022-12-30 NOTE — PROGRESS NOTE ADULT - PROBLEM SELECTOR PLAN 1
Patient was upgraded to CTICU 12/26 r/o sepsis - rigors, rectal Temp 102, lactate 3, hypotensive SBP 50s  Now suspect septic shock - lactemia (now normal), elevated procalcitonin. Levo/vaso gtt have been weaned  Blood cultures x 2 from 12/26 NGTD  Urine culture from 12/26 NGTD  Blood cultures from 12/27 NGTD  ID involved -  Meropenem, Vanco   Non toxic appearing, afebrile  Midodrine decreased to 10 TID   Continue to monitor  ID consult appreciated.

## 2022-12-30 NOTE — PROGRESS NOTE ADULT - SUBJECTIVE AND OBJECTIVE BOX
PROCEDURE(S): Leadless Permanent Pacemaker Implant     ELECTROPHYSIOLOGIST(S): Thai Monterroso MD         COMPLICATIONS:  none        DISPOSITION:  observation     CONDITION: stable      Pt doing well s/p Micra AV implant - 2 deployments (VDD  bpm) via RFV. Postop limited echo with small effusion (unchanged). Denies complaint.     Contrast: 110cc      MEDICATIONS  (STANDING):  aspirin enteric coated 81 milliGRAM(s) Oral daily  chlorhexidine 2% Cloths 1 Application(s) Topical daily  chlorhexidine 4% Liquid 1 Application(s) Topical <User Schedule>  lactobacillus acidophilus 1 Tablet(s) Oral two times a day with meals  meropenem Injectable 1000 milliGRAM(s) IV Push every 8 hours  pantoprazole    Tablet 40 milliGRAM(s) Oral daily  polyethylene glycol 3350 17 Gram(s) Oral daily  potassium chloride    Tablet ER 40 milliEquivalent(s) Oral daily  senna 2 Tablet(s) Oral at bedtime  tamsulosin 0.4 milliGRAM(s) Oral at bedtime  vancomycin  IVPB 1000 milliGRAM(s) IV Intermittent every 12 hours    MEDICATIONS  (PRN):  acetaminophen     Tablet .. 650 milliGRAM(s) Oral every 6 hours PRN Temp greater or equal to 38C (100.4F), Moderate Pain (4 - 6)  ondansetron Injectable 4 milliGRAM(s) IV Push once PRN Nausea and/or Vomiting  sodium chloride 0.9% lock flush 10 milliLiter(s) IV Push every 1 hour PRN Pre/post blood products, medications, blood draw, and to maintain line patency      Allergies  No Known Allergies      Exam:   HR: 70  BP: 98/60  RR: 16  SpO2: 100% RA    VSS, NAD, Awake and alert  Card: S1/S2, Regular   Resp: lungs CTA b/l  Abd: S/NT/ND  Groin (right): hemostatic suture in place; site C/D/I; no bleeding, hematoma, erythema, exudate or edema  Ext: no edema; doppler PT pulse    ECG:    Assessment:   77 year old male with a history of vit B12 def, HTN, HLD, BPH, moderate aortic stenosis, COVID infection 10/6/22, recent admission 11/2/22 for streptococcus anginosus bacteremia admitted with unclear source (no recent dental work, skin infections, no respiratory symptoms. Pan scan was negative at that time LILIAN was recommended but pt refused). He was treated for Bacteremia (Blood cultures + streptococcus anginosis pansensitive) and norovirus and was discharged 11/7/22 with PICC line/IV Rocephin (recommended through 11/30/22). 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  He underwent a AVR/MVR aortic root patch repair on 12/13/22 with Dr Flanagan). Post op patient had SR with LBBB and 1st degree AVB. We advised close monitoring with tele while inpatient and MCOT upon discharge. Discharge plans delayed due to one episode of severe hypotension and fever, now resolved, repeat echo showed small pericardial effusion. He has been stable till 12/28/22 when routine ECG showed regular wide rhythm without clear P waves most c/w fine AF with CHB and junctional escape with RBBB vs sinus arrest with junctional escape and RBBB. ECG from 12/29/22 was similar. Nonetheless, both indicated alternating BBB which is highly concerning for risk of potentially life threatening AVB and require PPM. Earlier this morning pt had > 10 second of asystole. Now status post leadless Micra AV implant via RFV access.     Plan  Bedrest x 6 hours post implant, then OOB w/ assist & progress as tolerated.    Continued observation on telemetry overnight.   Cont current antibiotic regiment with Vanco and Meropenem.   Pain control with PO analgesia PRN.   Hold Eliquis x 24 hours.  Keep epicardial wires unplugged. Can remove tomorrow after device check.   PA/Lat CXR and device check in AM.   Groin sutures to be removed by EP service in AM.   Outpt follow up with Dr. Monterroso in 1-2 weeks.     Please include in discharge instructions:   - Bruising at the groin, sometimes extending down the leg, and/or a small lump under the skin at the groin access site is normal and will resolve within 2 – 3 weeks.   - You may walk and take stairs at a regular pace.   - Do not perform any exercise more strenuous than walking for 1 week.   - Do not strain or lift heavy objects for 1 week.  - You may shower the day after the procedure.  - Do not soak in water (such as tub baths, hot tubs, swimming, etc.) for 1 week.   - You may resume all other activities the day after the procedure.  Call your doctor if:   - you notice bleeding, redness, drainage, swelling, increased tenderness or a hot sensation around the catheter insertion site.   - your temperature is greater than 100 degrees F for more than 24 hours.  - you have any questions or concerns regarding the procedure.  If significant bleeding and/or a large lump (the size of a golf ball or bigger) occurs:  - Lie flat and apply continuous direct pressure just above the puncture site for at least 10 minutes  - If the issue resolves, notify your physician immediately.    - If the bleeding cannot be controlled, please seek immediate medical attention.  If you experience increased difficulty breathing or chest pain, or if you faint or have dizzy spells, please seek immediate medical attention.   PROCEDURE(S): Leadless Permanent Pacemaker Implant     ELECTROPHYSIOLOGIST(S): Thai Monterroso MD         COMPLICATIONS:  none        DISPOSITION:  observation     CONDITION: stable      Pt doing well s/p Micra AV implant - 2 deployments (VDD  bpm) via RFV. Postop limited echo with small effusion (unchanged). Denies complaint.     Contrast: 110cc      MEDICATIONS  (STANDING):  aspirin enteric coated 81 milliGRAM(s) Oral daily  chlorhexidine 2% Cloths 1 Application(s) Topical daily  chlorhexidine 4% Liquid 1 Application(s) Topical <User Schedule>  lactobacillus acidophilus 1 Tablet(s) Oral two times a day with meals  meropenem Injectable 1000 milliGRAM(s) IV Push every 8 hours  pantoprazole    Tablet 40 milliGRAM(s) Oral daily  polyethylene glycol 3350 17 Gram(s) Oral daily  potassium chloride    Tablet ER 40 milliEquivalent(s) Oral daily  senna 2 Tablet(s) Oral at bedtime  tamsulosin 0.4 milliGRAM(s) Oral at bedtime  vancomycin  IVPB 1000 milliGRAM(s) IV Intermittent every 12 hours    MEDICATIONS  (PRN):  acetaminophen     Tablet .. 650 milliGRAM(s) Oral every 6 hours PRN Temp greater or equal to 38C (100.4F), Moderate Pain (4 - 6)  ondansetron Injectable 4 milliGRAM(s) IV Push once PRN Nausea and/or Vomiting  sodium chloride 0.9% lock flush 10 milliLiter(s) IV Push every 1 hour PRN Pre/post blood products, medications, blood draw, and to maintain line patency      Allergies  No Known Allergies      Exam:   HR: 70  BP: 98/60  RR: 16  SpO2: 100% RA    VSS, NAD, Awake and alert  Card: S1/S2, Regular   Resp: lungs CTA b/l  Abd: S/NT/ND  Groin (right): hemostatic suture in place; site C/D/I; no bleeding, hematoma, erythema, exudate or edema  Ext: no edema; doppler PT pulse    EC2022 @ 11:21 Fine AF vs sinus arrest with RBBB at 63bpm   2022 @ 11:39 (Paced at 70bpm); starts with fine AF vs sinus arrest and RBBB followed by Vpaced at 70bpm     Assessment:   77 year old male with a history of vit B12 def, HTN, HLD, BPH, moderate aortic stenosis, COVID infection 10/6/22, recent admission 22 for streptococcus anginosus bacteremia admitted with unclear source (no recent dental work, skin infections, no respiratory symptoms. Pan scan was negative at that time LILIAN was recommended but pt refused). He was treated for Bacteremia (Blood cultures + streptococcus anginosis pansensitive) and norovirus and was discharged 22 with PICC line/IV Rocephin (recommended through 22). 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  He underwent a AVR/MVR aortic root patch repair on 22 with Dr Flanagan). Post op patient had SR with LBBB and 1st degree AVB. We advised close monitoring with tele while inpatient and MCOT upon discharge. Discharge plans delayed due to one episode of severe hypotension and fever, now resolved, repeat echo showed small pericardial effusion. He has been stable till 22 when routine ECG showed regular wide rhythm without clear P waves most c/w fine AF with CHB and junctional escape with RBBB vs sinus arrest with junctional escape and RBBB. ECG from 22 was similar. Nonetheless, both indicated alternating BBB which is highly concerning for risk of potentially life threatening AVB and require PPM. Earlier this morning pt had > 10 second of asystole. Now status post leadless Micra AV implant via RFV access.     Plan  Bedrest x 6 hours post implant, then OOB w/ assist & progress as tolerated.    Continued observation on telemetry overnight.   Cont current antibiotic regiment with Vanco and Meropenem.   Pain control with PO analgesia PRN.   Hold Eliquis x 24 hours.  Keep epicardial wires unplugged. Can remove tomorrow after device check.   PA/Lat CXR and device check in AM.   Groin sutures to be removed by EP service in AM.   Outpt follow up with Dr. Monterroso in 1-2 weeks.     Please include in discharge instructions:   - Bruising at the groin, sometimes extending down the leg, and/or a small lump under the skin at the groin access site is normal and will resolve within 2 – 3 weeks.   - You may walk and take stairs at a regular pace.   - Do not perform any exercise more strenuous than walking for 1 week.   - Do not strain or lift heavy objects for 1 week.  - You may shower the day after the procedure.  - Do not soak in water (such as tub baths, hot tubs, swimming, etc.) for 1 week.   - You may resume all other activities the day after the procedure.  Call your doctor if:   - you notice bleeding, redness, drainage, swelling, increased tenderness or a hot sensation around the catheter insertion site.   - your temperature is greater than 100 degrees F for more than 24 hours.  - you have any questions or concerns regarding the procedure.  If significant bleeding and/or a large lump (the size of a golf ball or bigger) occurs:  - Lie flat and apply continuous direct pressure just above the puncture site for at least 10 minutes  - If the issue resolves, notify your physician immediately.    - If the bleeding cannot be controlled, please seek immediate medical attention.  If you experience increased difficulty breathing or chest pain, or if you faint or have dizzy spells, please seek immediate medical attention.

## 2022-12-30 NOTE — PROGRESS NOTE ADULT - PROBLEM SELECTOR PLAN 4
Initially post op pt was ventricular paced at 60bpm with no underlying escape rhythm.   Continues to be NSR with LBBB with prolonged CA interval.  Episode of 14 second pause overnight on telemetry. PWs reconnected and set to vvi backup at 40.  Dopamine weaned off 12/19/22.   EP following.   Beta blocker held at this time due to Midodrine use for BP support.  Continue Eliquis 2.5 BID.   TTE 12/26 with small pericardial effusion.  MCOT on discharge set up by EP.

## 2022-12-30 NOTE — PROGRESS NOTE ADULT - PROBLEM SELECTOR PLAN 2
S/p AVR, MVR, and patch repair of the aortic root on 12/13/22 with Dr. Flanagan.  Blood cultures were cleared 11/27.   Continue Meropenem (changed back from Ceftriaxone 12/26), Vanco  Once ready for discharge will need: Home IV antibiotics being set up by case management. PICC line placement.   12/30 Episode of 14 second pause on monitor PWs reconnected set to vvi at 40.   Continue BP support with Midodrine  Oxygenating well, coughing and deep breathing exercises/incentive spirometry encouraged.  Follow up daily AM CXR.   Holding diuretic in setting of r/o sepsis. Monitor strict I/Os, replenish electrolytes PRN to keep K>4 and Mg>2.   Continue with PT, increase activity as tolerated.  Tylenol, Oxy PRN for analgesia.  Case and plan to be reviewed / discussed with CT Surgery attending / team during AM rounds.

## 2022-12-30 NOTE — CHART NOTE - NSCHARTNOTEFT_GEN_A_CORE
Patient transferred to [facility] overnight, noted to have 2 mins of junctional tachycardia rate 120-140 followed by 14 seconds pause/asystolic event. Upon exam, patient found sleeping, easy to arouse and alert and oriented x4 without acute complaints of chest pain, shortness of breath, palpitations, headache, nausea, or vomiting. Vitals stable. Epicardial pacing wires reconnected to generator with back up of VVI 40/25/0.8 (threshold 18).  Discussed with ICU provider and patient to be upgraded to CTICU for further mgmt. Will plan for EP re-consult in AM.

## 2022-12-30 NOTE — CHART NOTE - NSCHARTNOTEFT_GEN_A_CORE
Seen and examined, well known to EP service from prior assessments during this admission. At time of last assessments, patient had SR with LBBB and 1st degree AVB. We advised close monitoring with tel while inpatient and MCOT upon discharge. Discharge plans delayed due to one episode of severe hypotension and fever, now resolved, repeat echo showed small pericardial effusion. He has been stable till 12/28/22 when routine ECG showed regular wide rhythm without clear P waves most c/w fine AF with CHB and junctional escape with RBBB vs sinus arrest with junctional escape and RBBB. ECG from 12/29/22 was similar. Nonetheless, both indicated alternating BBB which is highly concerning for risk of potentially life threatening AVB and require PPM. Earlier this morning pt had > 10 second of asystole     EKG 12/30/22: low ectopic atrial rhythm with varying rate and LBBB     No current fever, on Ab for recent endocarditis. renal function normal   epicardial threshold is 15 Ma, will program VVI 40 at max output   Not on any rate control agents    CTS asked for consideration of Micra PPM which I agree with as patient at very high risk of potentially life threatening gurpreet events. discussed with pt in length. Explained that in the future, we may need to upgrade this to conventional PPM when risk of infection is low. All B.R.A discussed. Risks explained and include but not limited to vascular complication, infection, bleeding, cardiac perforation, PTX, dislodgment, PM malfunction, stroke, heart attack and death. he provided informed consent.     Full note to follow

## 2022-12-30 NOTE — CHART NOTE - NSCHARTNOTESELECT_GEN_ALL_CORE
EP PA/Event Note
Event Note
Event Note
ACP/Event Note
CTS/Event Note
Cardiology/Event Note
Critical Care/Transfer Note
EP/Event Note
Event Note
Nutrition Services
PICC Eval Note
Registered Dietitian
urology/Event Note

## 2022-12-30 NOTE — CHART NOTE - NSCHARTNOTEFT_GEN_A_CORE
Source: Patient [x ]  Family [ ]   other [x ] EMR and staff     Current Diet: Diet, NPO (12-30-22 @ 02:48)    Current Weight:   (12/29) 171.8 lbs   (12/23) 166.6 lbs  12/15) 208.12 lbs   (12/9) 168.4lbs  (12/8) 192lbs  (12/7) 185.9lbs  (12/6) 195.3lbs  (12/5) 189.5lbs  (12/4) 189.9lbs  (12/3) 192.2lbs  (11/29) 195.4lbs  (2+ edema to B/L legs, 3+ edema to B/L feet)     % Weight Change: Unsure of accuracy of weights; will continue to monitor weights for trends.     Pertinent Medications: MEDICATIONS  (STANDING):  aspirin enteric coated 81 milliGRAM(s) Oral daily  chlorhexidine 2% Cloths 1 Application(s) Topical daily  chlorhexidine 4% Liquid 1 Application(s) Topical <User Schedule>  lactobacillus acidophilus 1 Tablet(s) Oral two times a day with meals  meropenem Injectable 1000 milliGRAM(s) IV Push every 8 hours  pantoprazole    Tablet 40 milliGRAM(s) Oral daily  polyethylene glycol 3350 17 Gram(s) Oral daily  potassium chloride    Tablet ER 40 milliEquivalent(s) Oral daily  senna 2 Tablet(s) Oral at bedtime  tamsulosin 0.4 milliGRAM(s) Oral at bedtime  vancomycin  IVPB 1000 milliGRAM(s) IV Intermittent every 12 hours    MEDICATIONS  (PRN):  acetaminophen     Tablet .. 650 milliGRAM(s) Oral every 6 hours PRN Temp greater or equal to 38C (100.4F), Moderate Pain (4 - 6)  ondansetron Injectable 4 milliGRAM(s) IV Push once PRN Nausea and/or Vomiting  sodium chloride 0.9% lock flush 10 milliLiter(s) IV Push every 1 hour PRN Pre/post blood products, medications, blood draw, and to maintain line patency    Pertinent Labs: CBC Full  -  ( 30 Dec 2022 05:10 )  WBC Count : 12.43 K/uL  RBC Count : 3.71 M/uL  Hemoglobin : 10.8 g/dL  Hematocrit : 33.6 %  Platelet Count - Automated : 253 K/uL  Mean Cell Volume : 90.6 fl  Mean Cell Hemoglobin : 29.1 pg  Mean Cell Hemoglobin Concentration : 32.1 gm/dL  Auto Neutrophil # : x  Auto Lymphocyte # : x  Auto Monocyte # : x  Auto Eosinophil # : x  Auto Basophil # : x  Auto Neutrophil % : x  Auto Lymphocyte % : x  Auto Monocyte % : x  Auto Eosinophil % : x  Auto Basophil % : x        Skin: sx MSI    Nutrition focused physical exam previously conducted - found signs of malnutrition [ ]absent [ x]present    Subcutaneous fat loss: [ x] Orbital fat pads region, [x ]Buccal fat region, [ ]Triceps region,  [ ]Ribs region    Muscle wasting: [ x]Temples region, [ x]Clavicle region, [ x]Shoulder region, [ ]Scapula region, [ ]Interosseous region,  [ ]thigh region, [ ]Calf region    Current Nutrition Diagnosis: Pt remains at high nutrition risk secondary to severe protein calorie malnutrition (chronic) related to inability to meet sufficient protein-energy requiremetns in setting of multiple comorbidities, recent COVID, norovirus, endocarditis, and 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  Pt reports improved appetite and fair/good po intake at meals.  Pt is currently NPO for procedure noted; however reports consuming 50-75% at meals. Reinforced importance of consuming adequate protein intake at meals to optimize nutrition status; pt receptive.  RD to remain available.     Recommendations:   1. RX: MVI and Vit. C daily (500mg).   2. Check weight daily to monitor trends   3. Encourage/Assist with meals as needed   4. Ensure TID (as/when diet resumed)     Monitoring and Evaluation:   [x ] PO intake [x ] Tolerance to diet prescription [X] Weights  [X] Follow up per protocol [X] Labs:

## 2022-12-31 LAB
ANION GAP SERPL CALC-SCNC: 9 MMOL/L — SIGNIFICANT CHANGE UP (ref 5–17)
BUN SERPL-MCNC: 16.3 MG/DL — SIGNIFICANT CHANGE UP (ref 8–20)
CALCIUM SERPL-MCNC: 9.3 MG/DL — SIGNIFICANT CHANGE UP (ref 8.4–10.5)
CHLORIDE SERPL-SCNC: 106 MMOL/L — SIGNIFICANT CHANGE UP (ref 96–108)
CO2 SERPL-SCNC: 22 MMOL/L — SIGNIFICANT CHANGE UP (ref 22–29)
CREAT SERPL-MCNC: 0.72 MG/DL — SIGNIFICANT CHANGE UP (ref 0.5–1.3)
CULTURE RESULTS: SIGNIFICANT CHANGE UP
CULTURE RESULTS: SIGNIFICANT CHANGE UP
EGFR: 94 ML/MIN/1.73M2 — SIGNIFICANT CHANGE UP
GLUCOSE SERPL-MCNC: 84 MG/DL — SIGNIFICANT CHANGE UP (ref 70–99)
HCT VFR BLD CALC: 28.7 % — LOW (ref 39–50)
HGB BLD-MCNC: 9.4 G/DL — LOW (ref 13–17)
MAGNESIUM SERPL-MCNC: 1.7 MG/DL — SIGNIFICANT CHANGE UP (ref 1.6–2.6)
MCHC RBC-ENTMCNC: 29.6 PG — SIGNIFICANT CHANGE UP (ref 27–34)
MCHC RBC-ENTMCNC: 32.8 GM/DL — SIGNIFICANT CHANGE UP (ref 32–36)
MCV RBC AUTO: 90.3 FL — SIGNIFICANT CHANGE UP (ref 80–100)
PLATELET # BLD AUTO: 228 K/UL — SIGNIFICANT CHANGE UP (ref 150–400)
POTASSIUM SERPL-MCNC: 4.5 MMOL/L — SIGNIFICANT CHANGE UP (ref 3.5–5.3)
POTASSIUM SERPL-SCNC: 4.5 MMOL/L — SIGNIFICANT CHANGE UP (ref 3.5–5.3)
RBC # BLD: 3.18 M/UL — LOW (ref 4.2–5.8)
RBC # FLD: 13.6 % — SIGNIFICANT CHANGE UP (ref 10.3–14.5)
SODIUM SERPL-SCNC: 137 MMOL/L — SIGNIFICANT CHANGE UP (ref 135–145)
SPECIMEN SOURCE: SIGNIFICANT CHANGE UP
SPECIMEN SOURCE: SIGNIFICANT CHANGE UP
WBC # BLD: 8.76 K/UL — SIGNIFICANT CHANGE UP (ref 3.8–10.5)
WBC # FLD AUTO: 8.76 K/UL — SIGNIFICANT CHANGE UP (ref 3.8–10.5)

## 2022-12-31 PROCEDURE — 99232 SBSQ HOSP IP/OBS MODERATE 35: CPT

## 2022-12-31 PROCEDURE — 93010 ELECTROCARDIOGRAM REPORT: CPT

## 2022-12-31 PROCEDURE — 99024 POSTOP FOLLOW-UP VISIT: CPT

## 2022-12-31 PROCEDURE — 71046 X-RAY EXAM CHEST 2 VIEWS: CPT | Mod: 26

## 2022-12-31 PROCEDURE — 99233 SBSQ HOSP IP/OBS HIGH 50: CPT

## 2022-12-31 RX ORDER — MAGNESIUM SULFATE 500 MG/ML
2 VIAL (ML) INJECTION ONCE
Refills: 0 | Status: COMPLETED | OUTPATIENT
Start: 2022-12-31 | End: 2022-12-31

## 2022-12-31 RX ADMIN — CHLORHEXIDINE GLUCONATE 1 APPLICATION(S): 213 SOLUTION TOPICAL at 12:31

## 2022-12-31 RX ADMIN — Medication 25 GRAM(S): at 06:31

## 2022-12-31 RX ADMIN — MEROPENEM 1000 MILLIGRAM(S): 1 INJECTION INTRAVENOUS at 05:18

## 2022-12-31 RX ADMIN — MEROPENEM 1000 MILLIGRAM(S): 1 INJECTION INTRAVENOUS at 21:06

## 2022-12-31 RX ADMIN — PANTOPRAZOLE SODIUM 40 MILLIGRAM(S): 20 TABLET, DELAYED RELEASE ORAL at 06:31

## 2022-12-31 RX ADMIN — Medication 81 MILLIGRAM(S): at 12:33

## 2022-12-31 RX ADMIN — CHLORHEXIDINE GLUCONATE 1 APPLICATION(S): 213 SOLUTION TOPICAL at 05:18

## 2022-12-31 RX ADMIN — Medication 125 MILLIGRAM(S): at 05:21

## 2022-12-31 RX ADMIN — Medication 1 TABLET(S): at 19:36

## 2022-12-31 RX ADMIN — TAMSULOSIN HYDROCHLORIDE 0.4 MILLIGRAM(S): 0.4 CAPSULE ORAL at 21:05

## 2022-12-31 RX ADMIN — Medication 40 MILLIEQUIVALENT(S): at 12:30

## 2022-12-31 RX ADMIN — MEROPENEM 1000 MILLIGRAM(S): 1 INJECTION INTRAVENOUS at 14:49

## 2022-12-31 RX ADMIN — Medication 1 TABLET(S): at 09:17

## 2022-12-31 NOTE — PROGRESS NOTE ADULT - SUBJECTIVE AND OBJECTIVE BOX
HOLDSWORTH, GEORGE  MRN-[id number]    HPI:  77 year old male with PMHx of recent  COVID  on 10/6/22, HTN , Vit B12 deficiency, presenting to the ED on 11/2 with body aches, fatigue and fever (Tmax 102) at home for 12 days found to have streptococcus bacteremia and admitted with unclear source, no recent dental work, skin infections, no respiratory symptoms. Pan scan was negative at that time ( LILIAN was recommended but pt refused) He was treated for Bacteremia ( Blood cultures + streptococcus anginosis pansensitive ) and norovirus with supportive care and contact isolation . He was discharged home on 11/7 with IV Rocephin via PICC line. Recommendation was to continue ABX  daily via pic end 11/30/22  Tonight he presented to ER after and episode of syncope and collapse, found to be septic, hypotensive, hypoxic and febrile in the ER. He is unsure of mechanism of fall but remembers being on the ground no reported LOC . He was found face-down on bathroom floor buy family with left leg wedged under cabinet. On my interview he is alert and oriented to person place and time, he has a baseline studder in his speech pattern but otherwise neurologically intcact without defecit.  In responose to hypotension he failed to repsoond to Inital sepsis fluid protocol in ER and now is requiring IV pressor in form of levophed to maintain MAP greater than 60 -65. In the ER he was febrile with inital labs signifacnt for WBC of 22.4 , ProCal of 21.2 first lactate 4.9 via abg with repeat post fluids of 2.2 venous sample.   (27 Nov 2022 02:26)    Surgery/Hospital Course:  ·  PRE-OP DIAGNOSIS:  Aortic valve endocarditis   ·  POST-OP DIAGNOSIS:  Aortic valve endocarditis   ·  PROCEDURES:  Valve replacement, aortic and mitral 13-Dec-2022   AVR 27 mm inspiris, 29 mm mitral valve, Patch repair aortic root -  Today:  -Patient S/P Micra PPM secondary to 14 second pause  -high dose Midodrine was stopped secondary to pause-asystolic episode    ICU Vital Signs Last 24 Hrs  T(C): 36.8 (31 Dec 2022 07:31), Max: 36.8 (31 Dec 2022 07:31)  T(F): 98.2 (31 Dec 2022 07:31), Max: 98.2 (31 Dec 2022 07:31)  HR: 61 (31 Dec 2022 10:00) (59 - 109)  BP: 122/90 (31 Dec 2022 10:00) (90/67 - 138/67)  BP(mean): 99 (31 Dec 2022 10:00) (63 - 99)  ABP: --  ABP(mean): --  RR: 33 (31 Dec 2022 10:00) (15 - 46)  SpO2: 84% (31 Dec 2022 10:00) (84% - 100%)    O2 Parameters below as of 31 Dec 2022 10:00  Patient On (Oxygen Delivery Method): room air        Physical Exam:  Gen: A&O   CNS: non focal 	  Neck: no JVD  RES : clear , no wheezing; right-sided [id number]-hodgi ecchymosis               CVS: Regular  rhythm. Normal S1/S2  Abd: Soft, non-distended. Bowel sounds present.  Skin: No rash.  Ext:  no edema    MEDICATIONS  (STANDING):  aspirin enteric coated 81 milliGRAM(s) Oral daily  chlorhexidine 2% Cloths 1 Application(s) Topical daily  chlorhexidine 4% Liquid 1 Application(s) Topical <User Schedule>  lactobacillus acidophilus 1 Tablet(s) Oral two times a day with meals  meropenem Injectable 1000 milliGRAM(s) IV Push every 8 hours  pantoprazole    Tablet 40 milliGRAM(s) Oral daily  potassium chloride    Tablet ER 40 milliEquivalent(s) Oral daily  tamsulosin 0.4 milliGRAM(s) Oral at bedtime    MEDICATIONS  (PRN):  acetaminophen     Tablet .. 650 milliGRAM(s) Oral every 6 hours PRN Temp greater or equal to 38C (100.4F), Moderate Pain (4 - 6)  ondansetron Injectable 4 milliGRAM(s) IV Push once PRN Nausea and/or Vomiting  sodium chloride 0.9% lock flush 10 milliLiter(s) IV Push every 1 hour PRN Pre/post blood products, medications, blood draw, and to maintain line patency      ============================I/O===========================  I&O's Summary    30 Dec 2022 07:01  -  31 Dec 2022 07:00  --------------------------------------------------------  IN: 600 mL / OUT: 2335 mL / NET: -1735 mL    31 Dec 2022 07:01  -  31 Dec 2022 11:54  --------------------------------------------------------  IN: 240 mL / OUT: 1275 mL / NET: -1035 mL    ============================ LABS =========================                        9.4    8.76  )-----------( 228      ( 31 Dec 2022 03:12 )             28.7   12-31    137  |  106  |  16.3  ----------------------------<  84  4.5   |  22.0  |  0.72    Ca    9.3      31 Dec 2022 03:12  Mg     1.7     12-31    ======================Micro/Rad/Cardio=================  Culture: Reviewed   CXR: Reviewed  Echo:Reviewed  ======================================================  PAST MEDICAL & SURGICAL HISTORY:  Hypertension      Aortic stenosis      H/O inguinal hernia repair  B/L 2013      S/P laparoscopic colectomy  right colectomy with ileotransverse anastomosis        ====================ASSESSMENT ==============  77M PMH HTN, Moderate AS, recent Covid infection 10/6/2022 per chart, recent admission 11/1/22 for SIRS, found to have strep anginosus bacteremia, norovirus, TTE negative for endocarditis at the time, with patient refusing LILIAN that admission, was started on Ceftriaxone (completed 11/20/22), however patient presented 11/27 with syncope, found to have AFib RVR, sepsis, copious diarrhea requiring rectal tube, and eventual LILIAN revealed 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 Cleared by neurosurgery for OR after MRI on 12/1/22 revealed scattered bilateral parieto-occipital acute infarcts.  LHC 12/5 with clean coronaries. Post cath patient noted with RADHA and Urinary retention with ram placed 12/8.     Patient s/p AVR, MVR, and patch repair of the aortic root on 12/13/22 with Dr. Flanagan. Postoperative course significant for pacing requirements having regained rhythm 12/15 (Dopamine for inotropic support weaned off, plan for MCOT monitor on discharge to be followed by EP), hypotension (on Midodrine for BP support), persistent chest tube output (now resolved), persistent urinary retention (failed TOV 12/19 with Ram replaced most recently 12/26), and was upgraded to CTICU 12/26 r/o sepsis, now suspect septic shock.    --- Septic shock  ---Patient was upgraded to CTICU 12/26 r/o sepsis - rigors, rectal Temp 102, lactate 3, hypotensive SBP 50s  --- Endocarditis.   ---S/p AVR, MVR, and patch repair of the aortic root on 12/13/22   --- Abscess of aortic root.   --- Atrial fibrillation, new onset.   ---RADHA (acute kidney injury).   --- Hematuria.   --- Urinary retention.   ---Post op Hypovolemia  ---Post op respiratory insufficiency       Plan:  -Meropenem and Vancomycin, ID follow-up  - Monitor strict I/Os, replenish electrolytes PRN to keep K>4 and Mg>2.   -Continue with PT, increase activity as tolerated.  -Tylenol, Oxy PRN for analgesia.  -AV moreno blocking drugs as per EP  -Continuing to hold Eliquis 2.5 BID.   -Avoid nephrotoxic drugs.  -Continue Flomax.  -Protonix for GI prophylaxis.  -No further Midodrine at this time  -Nutritional goals will be met using po eventually , insure adequate caloric intake and monitor the same ,  -Electrolytes have been repleted as necessary , pain control has been achieved  and wound care has been carried out ,

## 2022-12-31 NOTE — PROGRESS NOTE ADULT - PROBLEM SELECTOR PLAN 4
Initially post op pt was ventricular paced at 60bpm with no underlying escape rhythm.   Continues to be NSR with LBBB with prolonged DC interval.  Episode of 14 second pause overnight on telemetry. PWs reconnected and set to vvi backup at 40.  Dopamine weaned off 12/19/22.   EP following.   Beta blocker held at this time due to low bp  eliquis on hold  TTE 12/26 with small pericardial effusion.

## 2022-12-31 NOTE — PROGRESS NOTE ADULT - PROBLEM SELECTOR PLAN 1
Patient was upgraded to CTICU 12/26 r/o sepsis - rigors, rectal Temp 102, lactate 3, hypotensive SBP 50s  Now suspect septic shock - lactemia (now normal), elevated procalcitonin. Levo/vaso gtt have been weaned  Blood cultures x 2 from 12/26 NGTD  Urine culture from 12/26 NGTD  Blood cultures from 12/27 NGTD  ID involved -  Meropenem, Vanco   Non toxic appearing, afebrile  Continue to monitor  ID consult appreciated.

## 2022-12-31 NOTE — PROGRESS NOTE ADULT - SUBJECTIVE AND OBJECTIVE BOX
[facility]  INFECTIOUS DISEASES at [place] and [place]  ===============================================================                               Jl Vaughan MD*     Sunita Laws MD*                         Mk Alaniz MD*       Leila Pederson MD*            Diplomates American Board of Internal Medicine & Infectious Diseases                * [place] Office - Appt - Tel  [phone] Fax [phone]                * [place] Office - Appt - Tel [phone] Fax [phone]                                  Hospital Consult line:  [phone]  ==============================================================    HOLDSWORTH, GEORGE [id number]    Follow up: septic shock     s/p leadless PPM after >10 sec asystole   Remains in CT ICU but being moved to [facility] soon  Afebrile, HD stable    I have personally reviewed the labs and data; pertinent labs and data are listed in this note; please see below.     _______________________________________________________________  REVIEW OF SYSTEMS  ROS negative. Patient offers no complaints and denies any symptoms except for being [place]  ________________________________________________________________  Allergies:  No Known Allergies    ________________________________________________________________  PHYSICAL EXAM  GEN: NAD, sitting in chair  HEENT: Moist mucous membranes. No mucosal lesions. [place]  LUNGS: eupneic. CTA B/L.  HEART: RRR, no m/r/g  ABDOMEN: Soft, NT, ND  :  Barrios catheter  EXTREMITIES: + edema.  NEUROLOGIC: Grossly no focal deficits   PSYCHIATRIC: Appropriate affect and mood  SKIN: surgical incision covered with clean dressings   LINES: PIV  ________________________________________________________________  Vitals:  T(F): 97.7 (31 Dec 2022 12:03), Max: 98.2 (31 Dec 2022 07:31)  HR: 61 (31 Dec 2022 10:00)  BP: 122/90 (31 Dec 2022 10:00)  RR: 33 (31 Dec 2022 10:00)  SpO2: 84% (31 Dec 2022 10:00) (84% - 100%)  temp max in last 48H T(F): , Max: 98.2 (12-30-22 @ 03:30)    Current Antibiotics:  meropenem Injectable 1000 milliGRAM(s) IV Push every 8 hours    Other medications:  aspirin enteric coated 81 milliGRAM(s) Oral daily  chlorhexidine 2% Cloths 1 Application(s) Topical daily  chlorhexidine 4% Liquid 1 Application(s) Topical <User Schedule>  lactobacillus acidophilus 1 Tablet(s) Oral two times a day with meals  pantoprazole    Tablet 40 milliGRAM(s) Oral daily  potassium chloride    Tablet ER 40 milliEquivalent(s) Oral daily  tamsulosin 0.4 milliGRAM(s) Oral at bedtime                            9.4    8.76  )-----------( 228      ( 31 Dec 2022 03:12 )             28.7     12-31    137  |  106  |  16.3  ----------------------------<  84  4.5   |  22.0  |  0.72    Ca    9.3      31 Dec 2022 03:12  Mg     1.7     12-31      RECENT CULTURES:  12-27 @ 04:06 .Blood Blood     No growth to date.    12-26 @ 18:15 .Blood Blood     No growth to date.    12-26 @ 14:00 Catheterized Catheterized     No growth    12-26 @ 11:30 .Blood Blood-Peripheral     No growth to date.    12-26 @ 10:51  RVP with SARS-CoV-2   NotDetec      WBC Count: 8.76 K/uL (12-31-22 @ 03:12)  WBC Count: 12.43 K/uL (12-30-22 @ 05:10)  WBC Count: 14.55 K/uL (12-29-22 @ 02:00)  WBC Count: 13.73 K/uL (12-28-22 @ 02:30)  WBC Count: 14.75 K/uL (12-27-22 @ 15:30)  WBC Count: 23.03 K/uL (12-27-22 @ 04:10)  WBC Count: 27.23 K/uL (12-27-22 @ 00:00)  WBC Count: 35.46 K/uL (12-26-22 @ 20:00)    Creatinine, Serum: 0.72 mg/dL (12-31-22 @ 03:12)  Creatinine, Serum: 0.76 mg/dL (12-30-22 @ 05:10)  Creatinine, Serum: 0.57 mg/dL (12-29-22 @ 02:00)  Creatinine, Serum: 0.64 mg/dL (12-28-22 @ 02:30)  Creatinine, Serum: 0.74 mg/dL (12-27-22 @ 15:30)  Creatinine, Serum: 0.79 mg/dL (12-27-22 @ 04:10)  Creatinine, Serum: 1.00 mg/dL (12-26-22 @ 20:00)    Procalcitonin, Serum: 0.86 ng/mL (12-28-22 @ 02:30)  Procalcitonin, Serum: 1.47 ng/mL (12-27-22 @ 04:10)  Procalcitonin, Serum: 0.71 ng/mL (12-26-22 @ 13:54)     SARS-CoV-2: NotDetec (12-26-22 @ 10:51)  COVID-19 PCR: NotDetec (12-12-22 @ 11:34)  COVID-19 PCR: NotDetec (12-05-22 @ 09:55)    ________________________________________________________________  RADIOLOGY  < from: US Abdomen Limited (12.28.22 @ 11:30) >  FINDINGS:    Liver: Within normal limits.  Bile ducts: Common bile duct is not visualized.  Gallbladder: Gallbladder is diffusely thickened, measuring 0.7 cm. No   gallbladder distention or focal tenderness.  Pancreas: Poorly visualized.  Right kidney: 12.0 cm. No hydronephrosis. Upper pole cyst, measuring 2.9   cm.  Ascites: None.  IVC: Visualized portions are within normal limits.    IMPRESSION:    Nonspecific diffuse gallbladder wall thickening, without other signs of   acute cholecystitis.    < end of copied text >    < from: Xray Chest 1 View- PORTABLE-Routine (Xray Chest 1 View- PORTABLE-Routine in AM.) (12.29.22 @ 06:35) >    INTERPRETATION:    Heart size and the mediastinum cannot be accurately evaluated on this   projection. Median sternotomy sutures, surgical clips, prosthetic aortic   and mitral valves, and skin staples again seen.  Right IJ line with tip at the SVC/right atrial junction.  There are low lung volumes.  There is new pulmonary vascular congestion.  No pleural effusion or pneumothorax is seen.  No acute bony abnormality is noted.    AP portable chest x-ray from December 29, 2022 at 5:10 AM:    CLINICAL INFORMATION: Status post open heart surgery.    INTERPRETATION:    Improved inspiratory effort.  Right IJ line unchanged in position.  Resolution of pulmonary vascular congestion.  Left lower lung linear atelectasis.  No pleural effusion or pneumothorax seen.      IMPRESSION:  Right IJ line with tip at the SVC/right atrial junction. No   pneumothorax.    < end of copied text >

## 2022-12-31 NOTE — PROGRESS NOTE ADULT - ASSESSMENT
77 year old male with PMHx of recent  COVID  on 10/6/22, HTN , Vit B12 deficiency, presenting to the ED on 11/2 with body aches, fatigue and fever (Tmax 102) at home for 12 days found to have streptococcus bacteremia and admitted with unclear source, no recent dental work, skin infections, no respiratory symptoms. Pan scan was negative at that time ( LILIAN was recommended but pt refused) He was treated for Bacteremia ( Blood cultures + streptococcus anginosis pansensitive ) and norovirus with supportive care and contact isolation . He was discharged home on 11/7 with IV Rocephin via PICC line. Recommendation was to continue ABX  daily via pic end 11/30/22    He presented to ER after and episode of syncope and collapse, found to be septic, hypotensive, hypoxic and febrile in the ER.   In response to hypotension he failed to respond to Initial sepsis fluid protocol in ER is required IV pressor in form of levophed to maintain MAP greater than 60 -65. In the ER he was febrile with initial labs significant for WBC of 22.4 , ProCal of 21.2 first lactate 4.9 via abg with repeat post fluids of 2.2 venous sample.   PT admitted to MIcu with septic shock unclear source. Found to have new onset A fib and on amio. 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, MVR, aortic root patch repair 12/13/22  New onset Afib  Leukocytosis   Fever    - on meropenem; would continue through 1/2/2023 then switch to ceftriaxone 2 gm IV daily   - discontinue vancomycin   - Leukocytosis resolved  -  repeat blood cultures - no growth thus far   - 12/26 RVP neg   - s/p OR for AVR, MVR, aortic root patch repair 12/13/22  - OR CX negative  - Plan to treat for 6 weeks of IV antibiotics from date of surgery until 1/24/23  - Will place PICC this week     D/w  CTICU    Will follow

## 2022-12-31 NOTE — PROGRESS NOTE ADULT - SUBJECTIVE AND OBJECTIVE BOX
Subjective:  Pt resting in bed comfortably. No acute events overnight. Pt denies any headache, syncope, chest pain, palpitations, SOB, difficulty breathing, nausea, vomiting, fevers, chills, abdominal discomfort, urinary retention.   Vital Signs:  Vital Signs Last 24 Hrs  T(C): 36.7 (12-31-22 @ 00:00), Max: 36.8 (12-30-22 @ 03:30)  T(F): 98 (12-31-22 @ 00:00), Max: 98.2 (12-30-22 @ 03:30)  HR: 60 (12-31-22 @ 00:01) (51 - 109)  BP: 106/59 (12-31-22 @ 00:01) (89/45 - 168/83)  RR: 21 (12-31-22 @ 00:01) (14 - 25)  SpO2: 94% (12-31-22 @ 00:01) (92% - 100%) on (O2)    Telemetry/Alarms:    Relevant labs, radiology and Medications reviewed    Pertinent Physical Exam      12-29 @ 07:01  -  12-30 @ 07:00  --------------------------------------------------------  IN:    IV PiggyBack: 500 mL    Oral Fluid: 960 mL    Sodium Chloride 0.9% Bolus: 250 mL  Total IN: 1710 mL    OUT:    Indwelling Catheter - Urethral (mL): 1120 mL  Total OUT: 1120 mL    Total NET: 590 mL      12-30 @ 07:01  -  12-31 @ 00:45  --------------------------------------------------------  IN:    IV PiggyBack: 250 mL  Total IN: 250 mL    OUT:    Indwelling Catheter - Urethral (mL): 2020 mL  Total OUT: 2020 mL    Total NET: -1770 mL      General: NAD  Neuro: A+O x 3, non-focal, speech clear and intact  HEENT: PERRL, EOMI, oral mucosa pink and moist  Neck: supple, no JVD  CV: regular rate, regular rhythm, +S1S2, no murmurs or rub  Pulm/chest: lung sounds CTA and equal bilaterally, no accessory muscle use noted  Abd: soft, NT, ND, +BS  : normal and skin intact   Ext: MEZA x 4, no C/C/E  Skin: warm, well perfused       right groin dressing c/d/i         Assessment  77y Male admitted with complaints of Patient is a 77y old  Male who presents with a chief complaint of septic shock unknown source (30 Dec 2022 16:32)  .    On (Date), patient underwent Aortic valve replacement, tissue    Valve replacement, aortic and mitral    .

## 2022-12-31 NOTE — PROGRESS NOTE ADULT - ASSESSMENT
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 Cleared by neurosurgery for OR after MRI on 12/1/22 revealed scattered bilateral parieto-occipital acute infarcts.  LHC 12/5 with clean coronaries. Post cath patient noted with RADHA and Urinary retention with ram placed 12/8. Patient now s/p AVR, MVR, and patch repair of the aortic root on 12/13/22 with Dr. Flanagan. Postoperative course significant for pacing requirements due to asystole, with patient regaining rhythm 12/15 (Dopamine for inotropic support now weaned off, plan for MCOT monitor on discharge to be followed by EP), hypotension (on Midodrine for BP support), persistent chest tube output (on IV diuresis, colchicine d/c'd due to diarrhea), and persistent urinary retention (failed TOV 12/19 with Ram replaced). Patient needs PICC line and home IV antibiotics to be set up. 12/30 patient upgraded to [facility] for monitoring after 14 second pause during sleep.   12/30 s/p Micra leadless PPM

## 2022-12-31 NOTE — PROGRESS NOTE ADULT - SUBJECTIVE AND OBJECTIVE BOX
Pt doing well POD #1 s/p Micra AV implant (VDD  bpm) via RFV. Postop limited echo with small effusion (unchanged). Pt seen and examined, reports feeling well denies any complaints. Morning labs and telemetry reviewed. Right groin suture removed without incident.     EKG: VPaced @ 60bpm  TELE: AF yesterday evening (rate controlled); VPaced @60bpm   CXR:   Device Interrogation:     MEDICATIONS  (STANDING):  aspirin enteric coated 81 milliGRAM(s) Oral daily  chlorhexidine 2% Cloths 1 Application(s) Topical daily  chlorhexidine 4% Liquid 1 Application(s) Topical <User Schedule>  lactobacillus acidophilus 1 Tablet(s) Oral two times a day with meals  meropenem Injectable 1000 milliGRAM(s) IV Push every 8 hours  pantoprazole    Tablet 40 milliGRAM(s) Oral daily  potassium chloride    Tablet ER 40 milliEquivalent(s) Oral daily  tamsulosin 0.4 milliGRAM(s) Oral at bedtime  vancomycin  IVPB 1000 milliGRAM(s) IV Intermittent every 12 hours    MEDICATIONS  (PRN):  acetaminophen     Tablet .. 650 milliGRAM(s) Oral every 6 hours PRN Temp greater or equal to 38C (100.4F), Moderate Pain (4 - 6)  ondansetron Injectable 4 milliGRAM(s) IV Push once PRN Nausea and/or Vomiting  sodium chloride 0.9% lock flush 10 milliLiter(s) IV Push every 1 hour PRN Pre/post blood products, medications, blood draw, and to maintain line patency      Allergies  No Known Allergies      PAST MEDICAL & SURGICAL HISTORY:  Hypertension  Aortic stenosis  H/O inguinal hernia repair  B/L 2013  S/P laparoscopic colectomy  right colectomy with ileotransverse anastomosis      Vital Signs Last 24 Hrs  T(C): 36.8 (31 Dec 2022 07:31), Max: 36.8 (31 Dec 2022 07:31)  T(F): 98.2 (31 Dec 2022 07:31), Max: 98.2 (31 Dec 2022 07:31)  HR: 60 (31 Dec 2022 08:00) (59 - 109)  BP: 124/67 (31 Dec 2022 08:00) (90/67 - 130/66)  BP(mean): 89 (31 Dec 2022 08:00) (63 - 95)  RR: 29 (31 Dec 2022 08:00) (15 - 29)  SpO2: 92% (31 Dec 2022 08:00) (92% - 100%)    Parameters below as of 31 Dec 2022 08:00  Patient On (Oxygen Delivery Method): room air        Physical Exam:  Constitutional: NAD, AAOx3  Cardiovascular: Regular   Pulmonary: CTA b/l, unlabored  Abd: soft NTND   Groin(right): C/D/I; no bleeding, hematoma, edema  Extremities: no pedal edema  Neuro: non focal, MEZA x4    LABS:                        9.4    8.76  )-----------( 228      ( 31 Dec 2022 03:12 )             28.7     12-31    137  |  106  |  16.3  ----------------------------<  84  4.5   |  22.0  |  0.72    Ca    9.3      31 Dec 2022 03:12  Mg     1.7     12-31            Assessment:   77 year old male with a history of vit B12 def, HTN, HLD, BPH, moderate aortic stenosis, COVID infection 10/6/22, recent admission 11/2/22 for streptococcus anginosus bacteremia admitted with unclear source (no recent dental work, skin infections, no respiratory symptoms. Pan scan was negative at that time LILIAN was recommended but pt refused). He was treated for Bacteremia (Blood cultures + streptococcus anginosis pansensitive) and norovirus and was discharged 11/7/22 with PICC line/IV Rocephin (recommended through 11/30/22). 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  He underwent a AVR/MVR aortic root patch repair on 12/13/22 with Dr Flanagan). Post op patient had SR with LBBB and 1st degree AVB. We advised close monitoring with tele while inpatient and [facility]OT upon discharge. Discharge plans delayed due to one episode of severe hypotension and fever, now resolved, repeat echo showed small pericardial effusion. He has been stable till 12/28/22 when routine ECG showed regular wide rhythm without clear P waves most c/w fine AF with CHB and junctional escape with RBBB vs sinus arrest with junctional escape and RBBB. ECG from 12/29/22 was similar. Nonetheless, both indicated alternating BBB which is highly concerning for risk of potentially life threatening AVB and require PPM. Earlier this morning pt had > 10 second of asystole. Now POD #1 status post leadless Micra AV implant via RFV access.     Plan  May resume Eliquis tonight.  Keep epicardial wires unplugged. Can remove today following device check.   PA/Lat CXR and device check in AM.   Access site care and activity limitations reviewed w/ pt.   Outpt f/up with Dr Monterroso in 1-2 weeks.        Please include in discharge instructions:   - Bruising at the groin, sometimes extending down the leg, and/or a small lump under the skin at the groin access site is normal and will resolve within 2 – 3 weeks.   - You may walk and take stairs at a regular pace.   - Do not perform any exercise more strenuous than walking for 1 week.   - Do not strain or lift heavy objects for 1 week.  - You may shower the day after the procedure.  - Do not soak in water (such as tub baths, hot tubs, swimming, etc.) for 1 week.   - You may resume all other activities the day after the procedure.  Call your doctor if:   - you notice bleeding, redness, drainage, swelling, increased tenderness or a hot sensation around the catheter insertion site.   - your temperature is greater than 100 degrees F for more than 24 hours.  - you have any questions or concerns regarding the procedure.  If significant bleeding and/or a large lump (the size of a golf ball or bigger) occurs:  - Lie flat and apply continuous direct pressure just above the puncture site for at least 10 minutes  - If the issue resolves, notify your physician immediately.    - If the bleeding cannot be controlled, please seek immediate medical attention.  If you experience increased difficulty breathing or chest pain, or if you faint or have dizzy spells, please seek immediate medical attention.           Pt doing well POD #1 s/p Micra AV implant (VDD  bpm) via RFV. Postop limited echo with small effusion (unchanged). Pt seen and examined, reports feeling well denies any complaints. Morning labs and telemetry reviewed. Right groin suture removed without incident.     EKG: VPaced @ 60bpm  TELE: AF yesterday evening (rate controlled); VPaced @60bpm   CXR:   Device Interrogation:     MEDICATIONS  (STANDING):  aspirin enteric coated 81 milliGRAM(s) Oral daily  chlorhexidine 2% Cloths 1 Application(s) Topical daily  chlorhexidine 4% Liquid 1 Application(s) Topical <User Schedule>  lactobacillus acidophilus 1 Tablet(s) Oral two times a day with meals  meropenem Injectable 1000 milliGRAM(s) IV Push every 8 hours  pantoprazole    Tablet 40 milliGRAM(s) Oral daily  potassium chloride    Tablet ER 40 milliEquivalent(s) Oral daily  tamsulosin 0.4 milliGRAM(s) Oral at bedtime  vancomycin  IVPB 1000 milliGRAM(s) IV Intermittent every 12 hours    MEDICATIONS  (PRN):  acetaminophen     Tablet .. 650 milliGRAM(s) Oral every 6 hours PRN Temp greater or equal to 38C (100.4F), Moderate Pain (4 - 6)  ondansetron Injectable 4 milliGRAM(s) IV Push once PRN Nausea and/or Vomiting  sodium chloride 0.9% lock flush 10 milliLiter(s) IV Push every 1 hour PRN Pre/post blood products, medications, blood draw, and to maintain line patency      Allergies  No Known Allergies      PAST MEDICAL & SURGICAL HISTORY:  Hypertension  Aortic stenosis  H/O inguinal hernia repair  B/L 2013  S/P laparoscopic colectomy  right colectomy with ileotransverse anastomosis      Vital Signs Last 24 Hrs  T(C): 36.8 (31 Dec 2022 07:31), Max: 36.8 (31 Dec 2022 07:31)  T(F): 98.2 (31 Dec 2022 07:31), Max: 98.2 (31 Dec 2022 07:31)  HR: 60 (31 Dec 2022 08:00) (59 - 109)  BP: 124/67 (31 Dec 2022 08:00) (90/67 - 130/66)  BP(mean): 89 (31 Dec 2022 08:00) (63 - 95)  RR: 29 (31 Dec 2022 08:00) (15 - 29)  SpO2: 92% (31 Dec 2022 08:00) (92% - 100%)    Parameters below as of 31 Dec 2022 08:00  Patient On (Oxygen Delivery Method): room air        Physical Exam:  Constitutional: NAD, AAOx3  Cardiovascular: Regular   Pulmonary: CTA b/l, unlabored  Abd: soft NTND   Groin(right): C/D/I; no bleeding, hematoma, edema  Extremities: no pedal edema  Neuro: non focal, MEZA x4    LABS:                        9.4    8.76  )-----------( 228      ( 31 Dec 2022 03:12 )             28.7     12-31    137  |  106  |  16.3  ----------------------------<  84  4.5   |  22.0  |  0.72    Ca    9.3      31 Dec 2022 03:12  Mg     1.7     12-31            Assessment:   77 year old male with a history of vit B12 def, HTN, HLD, BPH, moderate aortic stenosis, COVID infection 10/6/22, recent admission 11/2/22 for streptococcus anginosus bacteremia admitted with unclear source (no recent dental work, skin infections, no respiratory symptoms. Pan scan was negative at that time LILIAN was recommended but pt refused). He was treated for Bacteremia (Blood cultures + streptococcus anginosis pansensitive) and norovirus and was discharged 11/7/22 with PICC line/IV Rocephin (recommended through 11/30/22). 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  He underwent a AVR/MVR aortic root patch repair on 12/13/22 with Dr Flanagan). Post op patient had SR with LBBB and 1st degree AVB. We advised close monitoring with tele while inpatient and [facility]OT upon discharge. Discharge plans delayed due to one episode of severe hypotension and fever, now resolved, repeat echo showed small pericardial effusion. He has been stable till 12/28/22 when routine ECG showed regular wide rhythm without clear P waves most c/w fine AF with CHB and junctional escape with RBBB vs sinus arrest with junctional escape and RBBB. ECG from 12/29/22 was similar. Nonetheless, both indicated alternating BBB which is highly concerning for risk of potentially life threatening AVB and require PPM. Earlier this morning pt had > 10 second of asystole. Now POD #1 status post leadless Micra AV implant via RFV access.     Plan  Maintain Mg>2 K>4  May resume Eliquis tonight.  Keep epicardial wires unplugged. Can remove today following device check.   PA/Lat CXR and device check in AM.   Access site care and activity limitations reviewed w/ pt.   Outpt f/up with Dr Monterroso in 1-2 weeks.        Please include in discharge instructions:   - Bruising at the groin, sometimes extending down the leg, and/or a small lump under the skin at the groin access site is normal and will resolve within 2 – 3 weeks.   - You may walk and take stairs at a regular pace.   - Do not perform any exercise more strenuous than walking for 1 week.   - Do not strain or lift heavy objects for 1 week.  - You may shower the day after the procedure.  - Do not soak in water (such as tub baths, hot tubs, swimming, etc.) for 1 week.   - You may resume all other activities the day after the procedure.  Call your doctor if:   - you notice bleeding, redness, drainage, swelling, increased tenderness or a hot sensation around the catheter insertion site.   - your temperature is greater than 100 degrees F for more than 24 hours.  - you have any questions or concerns regarding the procedure.  If significant bleeding and/or a large lump (the size of a golf ball or bigger) occurs:  - Lie flat and apply continuous direct pressure just above the puncture site for at least 10 minutes  - If the issue resolves, notify your physician immediately.    - If the bleeding cannot be controlled, please seek immediate medical attention.  If you experience increased difficulty breathing or chest pain, or if you faint or have dizzy spells, please seek immediate medical attention.           Pt doing well POD #1 s/p Micra AV implant (VDD  bpm) via RFV. Postop limited echo with small effusion (unchanged). Pt seen and examined, reports feeling well denies any complaints. Morning labs and telemetry reviewed. Right groin suture removed without incident.     EKG: VPaced @ 60bpm  TELE: AF yesterday evening (rate controlled); VPaced @60bpm   CXR:   Device Interrogation: Measurements appropriate and stable; parameters confirmed    MEDICATIONS  (STANDING):  aspirin enteric coated 81 milliGRAM(s) Oral daily  chlorhexidine 2% Cloths 1 Application(s) Topical daily  chlorhexidine 4% Liquid 1 Application(s) Topical <User Schedule>  lactobacillus acidophilus 1 Tablet(s) Oral two times a day with meals  meropenem Injectable 1000 milliGRAM(s) IV Push every 8 hours  pantoprazole    Tablet 40 milliGRAM(s) Oral daily  potassium chloride    Tablet ER 40 milliEquivalent(s) Oral daily  tamsulosin 0.4 milliGRAM(s) Oral at bedtime  vancomycin  IVPB 1000 milliGRAM(s) IV Intermittent every 12 hours    MEDICATIONS  (PRN):  acetaminophen     Tablet .. 650 milliGRAM(s) Oral every 6 hours PRN Temp greater or equal to 38C (100.4F), Moderate Pain (4 - 6)  ondansetron Injectable 4 milliGRAM(s) IV Push once PRN Nausea and/or Vomiting  sodium chloride 0.9% lock flush 10 milliLiter(s) IV Push every 1 hour PRN Pre/post blood products, medications, blood draw, and to maintain line patency      Allergies  No Known Allergies      PAST MEDICAL & SURGICAL HISTORY:  Hypertension  Aortic stenosis  H/O inguinal hernia repair  B/L 2013  S/P laparoscopic colectomy  right colectomy with ileotransverse anastomosis      Vital Signs Last 24 Hrs  T(C): 36.8 (31 Dec 2022 07:31), Max: 36.8 (31 Dec 2022 07:31)  T(F): 98.2 (31 Dec 2022 07:31), Max: 98.2 (31 Dec 2022 07:31)  HR: 60 (31 Dec 2022 08:00) (59 - 109)  BP: 124/67 (31 Dec 2022 08:00) (90/67 - 130/66)  BP(mean): 89 (31 Dec 2022 08:00) (63 - 95)  RR: 29 (31 Dec 2022 08:00) (15 - 29)  SpO2: 92% (31 Dec 2022 08:00) (92% - 100%)    Parameters below as of 31 Dec 2022 08:00  Patient On (Oxygen Delivery Method): room air        Physical Exam:  Constitutional: NAD, AAOx3  Cardiovascular: Regular   Pulmonary: CTA b/l, unlabored  Abd: soft NTND   Groin(right): C/D/I; no bleeding, hematoma, edema  Extremities: no pedal edema  Neuro: non focal, MEZA x4    LABS:                        9.4    8.76  )-----------( 228      ( 31 Dec 2022 03:12 )             28.7     12-31    137  |  106  |  16.3  ----------------------------<  84  4.5   |  22.0  |  0.72    Ca    9.3      31 Dec 2022 03:12  Mg     1.7     12-31            Assessment:   77 year old male with a history of vit B12 def, HTN, HLD, BPH, moderate aortic stenosis, COVID infection 10/6/22, recent admission 11/2/22 for streptococcus anginosus bacteremia admitted with unclear source (no recent dental work, skin infections, no respiratory symptoms. Pan scan was negative at that time LILIAN was recommended but pt refused). He was treated for Bacteremia (Blood cultures + streptococcus anginosis pansensitive) and norovirus and was discharged 11/7/22 with PICC line/IV Rocephin (recommended through 11/30/22). 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  He underwent a AVR/MVR aortic root patch repair on 12/13/22 with Dr Flanagan). Post op patient had SR with LBBB and 1st degree AVB. We advised close monitoring with tele while inpatient and [facility]OT upon discharge. Discharge plans delayed due to one episode of severe hypotension and fever, now resolved, repeat echo showed small pericardial effusion. He has been stable till 12/28/22 when routine ECG showed regular wide rhythm without clear P waves most c/w fine AF with CHB and junctional escape with RBBB vs sinus arrest with junctional escape and RBBB. ECG from 12/29/22 was similar. Nonetheless, both indicated alternating BBB which is highly concerning for risk of potentially life threatening AVB and require PPM. Earlier this morning pt had > 10 second of asystole. Now POD #1 status post leadless Micra AV implant via RFV access.     Plan  Maintain Mg>2 K>4  May resume Eliquis tonight.  Keep epicardial wires unplugged. Can remove today following device check.   PA/Lat CXR Pending  Access site care and activity limitations reviewed w/ pt.   Outpt f/up with Dr Monterroso in 1-2 weeks.       Please include in discharge instructions:   - Bruising at the groin, sometimes extending down the leg, and/or a small lump under the skin at the groin access site is normal and will resolve within 2 – 3 weeks.   - You may walk and take stairs at a regular pace.   - Do not perform any exercise more strenuous than walking for 1 week.   - Do not strain or lift heavy objects for 1 week.  - You may shower the day after the procedure.  - Do not soak in water (such as tub baths, hot tubs, swimming, etc.) for 1 week.   - You may resume all other activities the day after the procedure.  Call your doctor if:   - you notice bleeding, redness, drainage, swelling, increased tenderness or a hot sensation around the catheter insertion site.   - your temperature is greater than 100 degrees F for more than 24 hours.  - you have any questions or concerns regarding the procedure.  If significant bleeding and/or a large lump (the size of a golf ball or bigger) occurs:  - Lie flat and apply continuous direct pressure just above the puncture site for at least 10 minutes  - If the issue resolves, notify your physician immediately.    - If the bleeding cannot be controlled, please seek immediate medical attention.  If you experience increased difficulty breathing or chest pain, or if you faint or have dizzy spells, please seek immediate medical attention.           Pt doing well POD #1 s/p Micra AV implant (VDD  bpm) via RFV. Postop limited echo with small effusion (unchanged). Pt seen and examined, reports feeling well denies any complaints. Morning labs and telemetry reviewed. Right groin suture removed without incident.     EKG: VPaced @ 60bpm   TELE: AF yesterday evening (rate controlled); VPaced @60bpm   CXR:   Device Interrogation: Measurements appropriate and stable; parameters confirmed    MEDICATIONS  (STANDING):  aspirin enteric coated 81 milliGRAM(s) Oral daily  chlorhexidine 2% Cloths 1 Application(s) Topical daily  chlorhexidine 4% Liquid 1 Application(s) Topical <User Schedule>  lactobacillus acidophilus 1 Tablet(s) Oral two times a day with meals  meropenem Injectable 1000 milliGRAM(s) IV Push every 8 hours  pantoprazole    Tablet 40 milliGRAM(s) Oral daily  potassium chloride    Tablet ER 40 milliEquivalent(s) Oral daily  tamsulosin 0.4 milliGRAM(s) Oral at bedtime  vancomycin  IVPB 1000 milliGRAM(s) IV Intermittent every 12 hours    MEDICATIONS  (PRN):  acetaminophen     Tablet .. 650 milliGRAM(s) Oral every 6 hours PRN Temp greater or equal to 38C (100.4F), Moderate Pain (4 - 6)  ondansetron Injectable 4 milliGRAM(s) IV Push once PRN Nausea and/or Vomiting  sodium chloride 0.9% lock flush 10 milliLiter(s) IV Push every 1 hour PRN Pre/post blood products, medications, blood draw, and to maintain line patency      Allergies  No Known Allergies      PAST MEDICAL & SURGICAL HISTORY:  Hypertension  Aortic stenosis  H/O inguinal hernia repair  B/L 2013  S/P laparoscopic colectomy  right colectomy with ileotransverse anastomosis      Vital Signs Last 24 Hrs  T(C): 36.8 (31 Dec 2022 07:31), Max: 36.8 (31 Dec 2022 07:31)  T(F): 98.2 (31 Dec 2022 07:31), Max: 98.2 (31 Dec 2022 07:31)  HR: 60 (31 Dec 2022 08:00) (59 - 109)  BP: 124/67 (31 Dec 2022 08:00) (90/67 - 130/66)  BP(mean): 89 (31 Dec 2022 08:00) (63 - 95)  RR: 29 (31 Dec 2022 08:00) (15 - 29)  SpO2: 92% (31 Dec 2022 08:00) (92% - 100%)    Parameters below as of 31 Dec 2022 08:00  Patient On (Oxygen Delivery Method): room air        Physical Exam:  Constitutional: NAD, AAOx3  Cardiovascular: Regular   Pulmonary: CTA b/l, unlabored  Abd: soft NTND   Groin(right): C/D/I; no bleeding, hematoma, edema  Extremities: no pedal edema  Neuro: non focal, MEZA x4    LABS:                        9.4    8.76  )-----------( 228      ( 31 Dec 2022 03:12 )             28.7     12-31    137  |  106  |  16.3  ----------------------------<  84  4.5   |  22.0  |  0.72    Ca    9.3      31 Dec 2022 03:12  Mg     1.7     12-31            Assessment:   77 year old male with a history of vit B12 def, HTN, HLD, BPH, moderate aortic stenosis, COVID infection 10/6/22, recent admission 11/2/22 for streptococcus anginosus bacteremia admitted with unclear source (no recent dental work, skin infections, no respiratory symptoms. Pan scan was negative at that time LILIAN was recommended but pt refused). He was treated for Bacteremia (Blood cultures + streptococcus anginosis pansensitive) and norovirus and was discharged 11/7/22 with PICC line/IV Rocephin (recommended through 11/30/22). 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  He underwent a AVR/MVR aortic root patch repair on 12/13/22 with Dr Flanagan). Post op patient had SR with LBBB and 1st degree AVB. We advised close monitoring with tele while inpatient and [facility]OT upon discharge. Discharge plans delayed due to one episode of severe hypotension and fever, now resolved, repeat echo showed small pericardial effusion. He has been stable till 12/28/22 when routine ECG showed regular wide rhythm without clear P waves most c/w fine AF with CHB and junctional escape with RBBB vs sinus arrest with junctional escape and RBBB. ECG from 12/29/22 was similar.  On 12/30 had a > 10 second of asystole. Now POD #1 status post leadless Micra AV implant via RFV access.     Plan  Maintain Mg>2 K>4  May resume Eliquis tonight.  Keep epicardial wires unplugged. Can remove today following device check.   PA/Lat CXR Pending  Access site care and activity limitations reviewed w/ pt.   Outpt f/up with Dr Monterroso in 1-2 weeks.       Please include in discharge instructions:   - Bruising at the groin, sometimes extending down the leg, and/or a small lump under the skin at the groin access site is normal and will resolve within 2 – 3 weeks.   - You may walk and take stairs at a regular pace.   - Do not perform any exercise more strenuous than walking for 1 week.   - Do not strain or lift heavy objects for 1 week.  - You may shower the day after the procedure.  - Do not soak in water (such as tub baths, hot tubs, swimming, etc.) for 1 week.   - You may resume all other activities the day after the procedure.  Call your doctor if:   - you notice bleeding, redness, drainage, swelling, increased tenderness or a hot sensation around the catheter insertion site.   - your temperature is greater than 100 degrees F for more than 24 hours.  - you have any questions or concerns regarding the procedure.  If significant bleeding and/or a large lump (the size of a golf ball or bigger) occurs:  - Lie flat and apply continuous direct pressure just above the puncture site for at least 10 minutes  - If the issue resolves, notify your physician immediately.    - If the bleeding cannot be controlled, please seek immediate medical attention.  If you experience increased difficulty breathing or chest pain, or if you faint or have dizzy spells, please seek immediate medical attention.           Pt doing well POD #1 s/p Micra AV implant (VDD  bpm) via RFV. Postop limited echo with small effusion (unchanged). Pt seen and examined, reports feeling well denies any complaints. Morning labs and telemetry reviewed. Right groin suture removed without incident.     EKG: VPaced @ 60bpm   TELE: AF yesterday evening (rate controlled); VPaced @60bpm   CXR: Micra in good position   Device Interrogation: Measurements appropriate and stable; parameters confirmed    MEDICATIONS  (STANDING):  aspirin enteric coated 81 milliGRAM(s) Oral daily  chlorhexidine 2% Cloths 1 Application(s) Topical daily  chlorhexidine 4% Liquid 1 Application(s) Topical <User Schedule>  lactobacillus acidophilus 1 Tablet(s) Oral two times a day with meals  meropenem Injectable 1000 milliGRAM(s) IV Push every 8 hours  pantoprazole    Tablet 40 milliGRAM(s) Oral daily  potassium chloride    Tablet ER 40 milliEquivalent(s) Oral daily  tamsulosin 0.4 milliGRAM(s) Oral at bedtime  vancomycin  IVPB 1000 milliGRAM(s) IV Intermittent every 12 hours    MEDICATIONS  (PRN):  acetaminophen     Tablet .. 650 milliGRAM(s) Oral every 6 hours PRN Temp greater or equal to 38C (100.4F), Moderate Pain (4 - 6)  ondansetron Injectable 4 milliGRAM(s) IV Push once PRN Nausea and/or Vomiting  sodium chloride 0.9% lock flush 10 milliLiter(s) IV Push every 1 hour PRN Pre/post blood products, medications, blood draw, and to maintain line patency      Allergies  No Known Allergies      PAST MEDICAL & SURGICAL HISTORY:  Hypertension  Aortic stenosis  H/O inguinal hernia repair  B/L 2013  S/P laparoscopic colectomy  right colectomy with ileotransverse anastomosis      Vital Signs Last 24 Hrs  T(C): 36.8 (31 Dec 2022 07:31), Max: 36.8 (31 Dec 2022 07:31)  T(F): 98.2 (31 Dec 2022 07:31), Max: 98.2 (31 Dec 2022 07:31)  HR: 60 (31 Dec 2022 08:00) (59 - 109)  BP: 124/67 (31 Dec 2022 08:00) (90/67 - 130/66)  BP(mean): 89 (31 Dec 2022 08:00) (63 - 95)  RR: 29 (31 Dec 2022 08:00) (15 - 29)  SpO2: 92% (31 Dec 2022 08:00) (92% - 100%)    Parameters below as of 31 Dec 2022 08:00  Patient On (Oxygen Delivery Method): room air        Physical Exam:  Constitutional: NAD, AAOx3  Cardiovascular: Regular   Pulmonary: CTA b/l, unlabored  Abd: soft NTND   Groin(right): C/D/I; no bleeding, hematoma, edema  Extremities: no pedal edema  Neuro: non focal, MEZA x4    LABS:                        9.4    8.76  )-----------( 228      ( 31 Dec 2022 03:12 )             28.7     12-31    137  |  106  |  16.3  ----------------------------<  84  4.5   |  22.0  |  0.72    Ca    9.3      31 Dec 2022 03:12  Mg     1.7     12-31            Assessment:   77 year old male with a history of vit B12 def, HTN, HLD, BPH, moderate aortic stenosis, COVID infection 10/6/22, recent admission 11/2/22 for streptococcus anginosus bacteremia admitted with unclear source (no recent dental work, skin infections, no respiratory symptoms. Pan scan was negative at that time LILIAN was recommended but pt refused). He was treated for Bacteremia (Blood cultures + streptococcus anginosis pansensitive) and norovirus and was discharged 11/7/22 with PICC line/IV Rocephin (recommended through 11/30/22). 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  He underwent a AVR/MVR aortic root patch repair on 12/13/22 with Dr Flanagan). Post op patient had SR with LBBB and 1st degree AVB. We advised close monitoring with tele while inpatient and MCOT upon discharge. Discharge plans delayed due to one episode of severe hypotension and fever, now resolved, repeat echo showed small pericardial effusion. He has been stable till 12/28/22 when routine ECG showed regular wide rhythm without clear P waves most c/w fine AF with CHB and junctional escape with RBBB vs sinus arrest with junctional escape and RBBB. ECG from 12/29/22 was similar.  On 12/30 had a > 10 second of asystole. Now POD #1 status post leadless Micra AV implant via RFV access.     Plan  Maintain Mg>2 K>4  May resume Eliquis tonight.  Can remove epicardial wires    CXR reviewed  Access site care and activity limitations reviewed w/ pt.   Outpt f/up with Dr Monterroso in 1-2 weeks.       Please include in discharge instructions:   - Bruising at the groin, sometimes extending down the leg, and/or a small lump under the skin at the groin access site is normal and will resolve within 2 – 3 weeks.   - You may walk and take stairs at a regular pace.   - Do not perform any exercise more strenuous than walking for 1 week.   - Do not strain or lift heavy objects for 1 week.  - You may shower the day after the procedure.  - Do not soak in water (such as tub baths, hot tubs, swimming, etc.) for 1 week.   - You may resume all other activities the day after the procedure.  Call your doctor if:   - you notice bleeding, redness, drainage, swelling, increased tenderness or a hot sensation around the catheter insertion site.   - your temperature is greater than 100 degrees F for more than 24 hours.  - you have any questions or concerns regarding the procedure.  If significant bleeding and/or a large lump (the size of a golf ball or bigger) occurs:  - Lie flat and apply continuous direct pressure just above the puncture site for at least 10 minutes  - If the issue resolves, notify your physician immediately.    - If the bleeding cannot be controlled, please seek immediate medical attention.  If you experience increased difficulty breathing or chest pain, or if you faint or have dizzy spells, please seek immediate medical attention.           Pt doing well POD #1 s/p Micra AV implant (VDD  bpm) via RFV. Postop limited echo with small effusion (unchanged). Pt seen and examined, reports feeling well denies any complaints. Morning labs and telemetry reviewed. Right groin suture removed without incident.     EKG: VPaced @ 60bpm   TELE: AF yesterday evening (rate controlled); VPaced @60bpm   CXR: Micra in good position   Device Interrogation: Measurements appropriate and stable; parameters confirmed    MEDICATIONS  (STANDING):  aspirin enteric coated 81 milliGRAM(s) Oral daily  chlorhexidine 2% Cloths 1 Application(s) Topical daily  chlorhexidine 4% Liquid 1 Application(s) Topical <User Schedule>  lactobacillus acidophilus 1 Tablet(s) Oral two times a day with meals  meropenem Injectable 1000 milliGRAM(s) IV Push every 8 hours  pantoprazole    Tablet 40 milliGRAM(s) Oral daily  potassium chloride    Tablet ER 40 milliEquivalent(s) Oral daily  tamsulosin 0.4 milliGRAM(s) Oral at bedtime  vancomycin  IVPB 1000 milliGRAM(s) IV Intermittent every 12 hours    MEDICATIONS  (PRN):  acetaminophen     Tablet .. 650 milliGRAM(s) Oral every 6 hours PRN Temp greater or equal to 38C (100.4F), Moderate Pain (4 - 6)  ondansetron Injectable 4 milliGRAM(s) IV Push once PRN Nausea and/or Vomiting  sodium chloride 0.9% lock flush 10 milliLiter(s) IV Push every 1 hour PRN Pre/post blood products, medications, blood draw, and to maintain line patency      Allergies  No Known Allergies      PAST MEDICAL & SURGICAL HISTORY:  Hypertension  Aortic stenosis  H/O inguinal hernia repair  B/L 2013  S/P laparoscopic colectomy  right colectomy with ileotransverse anastomosis      Vital Signs Last 24 Hrs  T(C): 36.8 (31 Dec 2022 07:31), Max: 36.8 (31 Dec 2022 07:31)  T(F): 98.2 (31 Dec 2022 07:31), Max: 98.2 (31 Dec 2022 07:31)  HR: 60 (31 Dec 2022 08:00) (59 - 109)  BP: 124/67 (31 Dec 2022 08:00) (90/67 - 130/66)  BP(mean): 89 (31 Dec 2022 08:00) (63 - 95)  RR: 29 (31 Dec 2022 08:00) (15 - 29)  SpO2: 92% (31 Dec 2022 08:00) (92% - 100%)    Parameters below as of 31 Dec 2022 08:00  Patient On (Oxygen Delivery Method): room air        Physical Exam:  Constitutional: NAD, AAOx3  Cardiovascular: Regular   Pulmonary: CTA b/l, unlabored  Abd: soft NTND   Groin(right): C/D/I; no bleeding, hematoma, edema  Extremities: no pedal edema  Neuro: non focal, MEZA x4    LABS:                        9.4    8.76  )-----------( 228      ( 31 Dec 2022 03:12 )             28.7     12-31    137  |  106  |  16.3  ----------------------------<  84  4.5   |  22.0  |  0.72    Ca    9.3      31 Dec 2022 03:12  Mg     1.7     12-31            Assessment:   77 year old male with a history of vit B12 def, HTN, HLD, BPH, moderate aortic stenosis, COVID infection 10/6/22, recent admission 11/2/22 for streptococcus anginosus bacteremia admitted with unclear source (no recent dental work, skin infections, no respiratory symptoms. Pan scan was negative at that time LILIAN was recommended but pt refused). He was treated for Bacteremia (Blood cultures + streptococcus anginosis pansensitive) and norovirus and was discharged 11/7/22 with PICC line/IV Rocephin (recommended through 11/30/22). 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  He underwent a AVR/MVR aortic root patch repair on 12/13/22 with Dr Flanagan). Post op patient had SR with LBBB and 1st degree AVB. We advised close monitoring with tele while inpatient and MCOT upon discharge. Discharge plans delayed due to one episode of severe hypotension and fever, now resolved, repeat echo showed small pericardial effusion. He has been stable till 12/28/22 when routine ECG showed regular wide rhythm without clear P waves most c/w fine AF with CHB and junctional escape with RBBB vs sinus arrest with junctional escape and RBBB. ECG from 12/29/22 was similar.  On 12/30 had a > 10 second of asystole. Now POD #1 status post leadless Micra AV implant via RFV access.     Plan  Maintain Mg>2 K>4  May resume Eliquis tonight.  Can remove epicardial wires    CXR reviewed  Access site care and activity limitations reviewed w/ pt.   Outpt f/up with Dr Monterroso in 1-2 weeks.   EP will sign off      Please include in discharge instructions:   - Bruising at the groin, sometimes extending down the leg, and/or a small lump under the skin at the groin access site is normal and will resolve within 2 – 3 weeks.   - You may walk and take stairs at a regular pace.   - Do not perform any exercise more strenuous than walking for 1 week.   - Do not strain or lift heavy objects for 1 week.  - You may shower the day after the procedure.  - Do not soak in water (such as tub baths, hot tubs, swimming, etc.) for 1 week.   - You may resume all other activities the day after the procedure.  Call your doctor if:   - you notice bleeding, redness, drainage, swelling, increased tenderness or a hot sensation around the catheter insertion site.   - your temperature is greater than 100 degrees F for more than 24 hours.  - you have any questions or concerns regarding the procedure.  If significant bleeding and/or a large lump (the size of a golf ball or bigger) occurs:  - Lie flat and apply continuous direct pressure just above the puncture site for at least 10 minutes  - If the issue resolves, notify your physician immediately.    - If the bleeding cannot be controlled, please seek immediate medical attention.  If you experience increased difficulty breathing or chest pain, or if you faint or have dizzy spells, please seek immediate medical attention.

## 2022-12-31 NOTE — PROGRESS NOTE ADULT - PROBLEM SELECTOR PLAN 2
S/p AVR, MVR, and patch repair of the aortic root on 12/13/22 with Dr. Flanagan.  Blood cultures were cleared 11/27.   Continue Meropenem (changed back from Ceftriaxone 12/26), Vanco  Once ready for discharge will need: Home IV antibiotics being set up by case management. PICC line placement.   12/30 Episode of 14 second pause on monitor PWs reconnected set to vvi at 40.   Oxygenating well, coughing and deep breathing exercises/incentive spirometry encouraged.  Follow up daily AM CXR.   Holding diuretic in setting of r/o sepsis. Monitor strict I/Os, replenish electrolytes PRN to keep K>4 and Mg>2.   Continue with PT, increase activity as tolerated.  Tylenol, Oxy PRN for analgesia.  Case and plan to be reviewed / discussed with CT Surgery attending / team during AM rounds.

## 2023-01-01 LAB
ANION GAP SERPL CALC-SCNC: 7 MMOL/L — SIGNIFICANT CHANGE UP (ref 5–17)
BUN SERPL-MCNC: 14.1 MG/DL — SIGNIFICANT CHANGE UP (ref 8–20)
CALCIUM SERPL-MCNC: 9.3 MG/DL — SIGNIFICANT CHANGE UP (ref 8.4–10.5)
CHLORIDE SERPL-SCNC: 105 MMOL/L — SIGNIFICANT CHANGE UP (ref 96–108)
CO2 SERPL-SCNC: 23 MMOL/L — SIGNIFICANT CHANGE UP (ref 22–29)
CREAT SERPL-MCNC: 0.6 MG/DL — SIGNIFICANT CHANGE UP (ref 0.5–1.3)
CULTURE RESULTS: SIGNIFICANT CHANGE UP
EGFR: 99 ML/MIN/1.73M2 — SIGNIFICANT CHANGE UP
GLUCOSE SERPL-MCNC: 79 MG/DL — SIGNIFICANT CHANGE UP (ref 70–99)
HCT VFR BLD CALC: 29.9 % — LOW (ref 39–50)
HGB BLD-MCNC: 9.7 G/DL — LOW (ref 13–17)
MAGNESIUM SERPL-MCNC: 1.6 MG/DL — SIGNIFICANT CHANGE UP (ref 1.6–2.6)
MCHC RBC-ENTMCNC: 28.9 PG — SIGNIFICANT CHANGE UP (ref 27–34)
MCHC RBC-ENTMCNC: 32.4 GM/DL — SIGNIFICANT CHANGE UP (ref 32–36)
MCV RBC AUTO: 89 FL — SIGNIFICANT CHANGE UP (ref 80–100)
PHOSPHATE SERPL-MCNC: 3.1 MG/DL — SIGNIFICANT CHANGE UP (ref 2.4–4.7)
PLATELET # BLD AUTO: 240 K/UL — SIGNIFICANT CHANGE UP (ref 150–400)
POTASSIUM SERPL-MCNC: 4.2 MMOL/L — SIGNIFICANT CHANGE UP (ref 3.5–5.3)
POTASSIUM SERPL-SCNC: 4.2 MMOL/L — SIGNIFICANT CHANGE UP (ref 3.5–5.3)
RBC # BLD: 3.36 M/UL — LOW (ref 4.2–5.8)
RBC # FLD: 13.4 % — SIGNIFICANT CHANGE UP (ref 10.3–14.5)
SODIUM SERPL-SCNC: 135 MMOL/L — SIGNIFICANT CHANGE UP (ref 135–145)
SPECIMEN SOURCE: SIGNIFICANT CHANGE UP
WBC # BLD: 7.41 K/UL — SIGNIFICANT CHANGE UP (ref 3.8–10.5)
WBC # FLD AUTO: 7.41 K/UL — SIGNIFICANT CHANGE UP (ref 3.8–10.5)

## 2023-01-01 PROCEDURE — 99024 POSTOP FOLLOW-UP VISIT: CPT

## 2023-01-01 PROCEDURE — 71045 X-RAY EXAM CHEST 1 VIEW: CPT | Mod: 26

## 2023-01-01 PROCEDURE — 93010 ELECTROCARDIOGRAM REPORT: CPT

## 2023-01-01 RX ORDER — MAGNESIUM SULFATE 500 MG/ML
2 VIAL (ML) INJECTION ONCE
Refills: 0 | Status: COMPLETED | OUTPATIENT
Start: 2023-01-01 | End: 2023-01-01

## 2023-01-01 RX ORDER — APIXABAN 2.5 MG/1
2.5 TABLET, FILM COATED ORAL
Refills: 0 | Status: DISCONTINUED | OUTPATIENT
Start: 2023-01-01 | End: 2023-01-03

## 2023-01-01 RX ORDER — MAGNESIUM OXIDE 400 MG ORAL TABLET 241.3 MG
400 TABLET ORAL DAILY
Refills: 0 | Status: DISCONTINUED | OUTPATIENT
Start: 2023-01-01 | End: 2023-01-03

## 2023-01-01 RX ADMIN — MAGNESIUM OXIDE 400 MG ORAL TABLET 400 MILLIGRAM(S): 241.3 TABLET ORAL at 05:54

## 2023-01-01 RX ADMIN — MEROPENEM 1000 MILLIGRAM(S): 1 INJECTION INTRAVENOUS at 14:22

## 2023-01-01 RX ADMIN — PANTOPRAZOLE SODIUM 40 MILLIGRAM(S): 20 TABLET, DELAYED RELEASE ORAL at 05:54

## 2023-01-01 RX ADMIN — MEROPENEM 1000 MILLIGRAM(S): 1 INJECTION INTRAVENOUS at 05:54

## 2023-01-01 RX ADMIN — CHLORHEXIDINE GLUCONATE 1 APPLICATION(S): 213 SOLUTION TOPICAL at 05:54

## 2023-01-01 RX ADMIN — Medication 150 GRAM(S): at 05:53

## 2023-01-01 RX ADMIN — Medication 40 MILLIEQUIVALENT(S): at 13:01

## 2023-01-01 RX ADMIN — TAMSULOSIN HYDROCHLORIDE 0.4 MILLIGRAM(S): 0.4 CAPSULE ORAL at 22:22

## 2023-01-01 RX ADMIN — APIXABAN 2.5 MILLIGRAM(S): 2.5 TABLET, FILM COATED ORAL at 08:58

## 2023-01-01 RX ADMIN — Medication 81 MILLIGRAM(S): at 13:01

## 2023-01-01 RX ADMIN — APIXABAN 2.5 MILLIGRAM(S): 2.5 TABLET, FILM COATED ORAL at 17:24

## 2023-01-01 RX ADMIN — Medication 1 TABLET(S): at 17:24

## 2023-01-01 RX ADMIN — MEROPENEM 1000 MILLIGRAM(S): 1 INJECTION INTRAVENOUS at 22:22

## 2023-01-01 RX ADMIN — Medication 1 TABLET(S): at 08:58

## 2023-01-01 NOTE — PROGRESS NOTE ADULT - PROBLEM SELECTOR PLAN 2
S/p AVR, MVR, and patch repair of the aortic root on 12/13/22 with Dr. Flanagan.  Blood cultures were cleared 11/27.   Continue Meropenem (changed back from Ceftriaxone 12/26),   Once ready for discharge will need: Home IV antibiotics being set up by case management. PICC line placement.   12/30 Episode of 14 second pause on monitor Now S/P micra 12/30  Oxygenating well, coughing and deep breathing exercises/incentive spirometry encouraged.  Follow up daily AM CXR.   Holding diuretic . Monitor strict I/Os, replenish electrolytes PRN to keep K>4 and Mg>2.   Continue with PT, increase activity as tolerated.  Tylenol, Oxy PRN for analgesia.  Case and plan to be reviewed / discussed with CT Surgery attending / team during AM rounds.

## 2023-01-01 NOTE — PROGRESS NOTE ADULT - PROBLEM SELECTOR PLAN 1
Patient was upgraded to CTICU 12/26 r/o sepsis - rigors, rectal Temp 102, lactate 3, hypotensive SBP 50s  Blood cultures x 2 from 12/26 No growth  Urine culture from 12/26 No growth  Blood cultures from 12/27 no growth  ID involved -  Meropenem,   Non toxic appearing, afebrile  Continue to monitor  ID consult appreciated.  Condition improved

## 2023-01-01 NOTE — PROGRESS NOTE ADULT - PROBLEM SELECTOR PLAN 4
Initially post op pt was ventricular paced at 60bpm with no underlying escape rhythm.   Continues to be NSR with LBBB with prolonged ID interval.  Episode of 14 second pause overnight on telemetry.Now S/P micra 12/30  Dopamine weaned off 12/19/22.   EP following.   Beta blocker held at this time due to low bp  eliquis 2.5 restarted  TTE 12/26 with small pericardial effusion.

## 2023-01-01 NOTE — PROGRESS NOTE ADULT - SUBJECTIVE AND OBJECTIVE BOX
Patient seen and examined.  Denies CP, SOB, N/V. Doing well today. Mediastinal staples removed today.  Awaiting transfer to [address] [place] [address]    T(C): 36.7 (01-01-23 @ 04:00)  T(F): 98 (01-01-23 @ 04:00)  HR: 72 (01-01-23 @ 11:00)  BP: 118/74 (01-01-23 @ 11:00)  BP(mean): 94 (01-01-23 @ 11:00)    RR: 25 (01-01-23 @ 11:00)  SpO2: 94% (01-01-23 @ 11:00)      Physical Exam:  Gen: A&Ox3  Pulm:  CTA b/l, no r/r/w  CV:  S1S2, RRR,   Abd: +BS, soft, NT, ND  Ext:  +DP b/l, no c/c/e  Incision:  c/d/i no click, no exudate    I&O's Detail    31 Dec 2022 07:01  -  01 Jan 2023 07:00  --------------------------------------------------------  IN:    Oral Fluid: 720 mL  Total IN: 720 mL    OUT:    Indwelling Catheter - Urethral (mL): 3550 mL  Total OUT: 3550 mL    Total NET: -2830 mL      01 Jan 2023 07:01  -  01 Jan 2023 13:52  --------------------------------------------------------  IN:  Total IN: 0 mL    OUT:    Indwelling Catheter - Urethral (mL): 250 mL  Total OUT: 250 mL    Total NET: -250 mL                              9.7    7.41  )-----------( 240      ( 01 Jan 2023 03:00 )             29.9   01-01    135  |  105  |  14.1  ----------------------------<  79  4.2   |  23.0  |  0.60    Ca    9.3      01 Jan 2023 03:00  Phos  3.1     01-01  Mg     1.6     01-01        CAPILLARY BLOOD GLUCOSE            Medications:  acetaminophen     Tablet .. 650 milliGRAM(s) Oral every 6 hours PRN  apixaban 2.5 milliGRAM(s) Oral two times a day  aspirin enteric coated 81 milliGRAM(s) Oral daily  chlorhexidine 2% Cloths 1 Application(s) Topical daily  chlorhexidine 4% Liquid 1 Application(s) Topical <User Schedule>  lactobacillus acidophilus 1 Tablet(s) Oral two times a day with meals  magnesium oxide 400 milliGRAM(s) Oral daily  meropenem Injectable 1000 milliGRAM(s) IV Push every 8 hours  ondansetron Injectable 4 milliGRAM(s) IV Push once PRN  pantoprazole    Tablet 40 milliGRAM(s) Oral daily  potassium chloride    Tablet ER 40 milliEquivalent(s) Oral daily  sodium chloride 0.9% lock flush 10 milliLiter(s) IV Push every 1 hour PRN  tamsulosin 0.4 milliGRAM(s) Oral at bedtime

## 2023-01-02 LAB
ANION GAP SERPL CALC-SCNC: 12 MMOL/L — SIGNIFICANT CHANGE UP (ref 5–17)
BUN SERPL-MCNC: 16.1 MG/DL — SIGNIFICANT CHANGE UP (ref 8–20)
CALCIUM SERPL-MCNC: 10 MG/DL — SIGNIFICANT CHANGE UP (ref 8.4–10.5)
CHLORIDE SERPL-SCNC: 103 MMOL/L — SIGNIFICANT CHANGE UP (ref 96–108)
CO2 SERPL-SCNC: 25 MMOL/L — SIGNIFICANT CHANGE UP (ref 22–29)
CREAT SERPL-MCNC: 0.71 MG/DL — SIGNIFICANT CHANGE UP (ref 0.5–1.3)
EGFR: 94 ML/MIN/1.73M2 — SIGNIFICANT CHANGE UP
GLUCOSE BLDC GLUCOMTR-MCNC: 89 MG/DL — SIGNIFICANT CHANGE UP (ref 70–99)
GLUCOSE SERPL-MCNC: 67 MG/DL — LOW (ref 70–99)
HCT VFR BLD CALC: 36.1 % — LOW (ref 39–50)
HGB BLD-MCNC: 11.4 G/DL — LOW (ref 13–17)
MAGNESIUM SERPL-MCNC: 1.8 MG/DL — SIGNIFICANT CHANGE UP (ref 1.6–2.6)
MCHC RBC-ENTMCNC: 28.9 PG — SIGNIFICANT CHANGE UP (ref 27–34)
MCHC RBC-ENTMCNC: 31.6 GM/DL — LOW (ref 32–36)
MCV RBC AUTO: 91.6 FL — SIGNIFICANT CHANGE UP (ref 80–100)
PLATELET # BLD AUTO: 312 K/UL — SIGNIFICANT CHANGE UP (ref 150–400)
POTASSIUM SERPL-MCNC: 4.5 MMOL/L — SIGNIFICANT CHANGE UP (ref 3.5–5.3)
POTASSIUM SERPL-SCNC: 4.5 MMOL/L — SIGNIFICANT CHANGE UP (ref 3.5–5.3)
RBC # BLD: 3.94 M/UL — LOW (ref 4.2–5.8)
RBC # FLD: 13.5 % — SIGNIFICANT CHANGE UP (ref 10.3–14.5)
SODIUM SERPL-SCNC: 139 MMOL/L — SIGNIFICANT CHANGE UP (ref 135–145)
WBC # BLD: 10.04 K/UL — SIGNIFICANT CHANGE UP (ref 3.8–10.5)
WBC # FLD AUTO: 10.04 K/UL — SIGNIFICANT CHANGE UP (ref 3.8–10.5)

## 2023-01-02 PROCEDURE — 71045 X-RAY EXAM CHEST 1 VIEW: CPT | Mod: 26

## 2023-01-02 PROCEDURE — 99233 SBSQ HOSP IP/OBS HIGH 50: CPT

## 2023-01-02 PROCEDURE — 99024 POSTOP FOLLOW-UP VISIT: CPT

## 2023-01-02 RX ORDER — MAGNESIUM SULFATE 500 MG/ML
2 VIAL (ML) INJECTION ONCE
Refills: 0 | Status: COMPLETED | OUTPATIENT
Start: 2023-01-02 | End: 2023-01-02

## 2023-01-02 RX ADMIN — Medication 81 MILLIGRAM(S): at 13:13

## 2023-01-02 RX ADMIN — Medication 1 TABLET(S): at 17:42

## 2023-01-02 RX ADMIN — TAMSULOSIN HYDROCHLORIDE 0.4 MILLIGRAM(S): 0.4 CAPSULE ORAL at 21:47

## 2023-01-02 RX ADMIN — MEROPENEM 1000 MILLIGRAM(S): 1 INJECTION INTRAVENOUS at 05:42

## 2023-01-02 RX ADMIN — APIXABAN 2.5 MILLIGRAM(S): 2.5 TABLET, FILM COATED ORAL at 05:41

## 2023-01-02 RX ADMIN — CHLORHEXIDINE GLUCONATE 1 APPLICATION(S): 213 SOLUTION TOPICAL at 13:13

## 2023-01-02 RX ADMIN — Medication 40 MILLIEQUIVALENT(S): at 13:13

## 2023-01-02 RX ADMIN — CHLORHEXIDINE GLUCONATE 1 APPLICATION(S): 213 SOLUTION TOPICAL at 15:48

## 2023-01-02 RX ADMIN — MAGNESIUM OXIDE 400 MG ORAL TABLET 400 MILLIGRAM(S): 241.3 TABLET ORAL at 13:14

## 2023-01-02 RX ADMIN — APIXABAN 2.5 MILLIGRAM(S): 2.5 TABLET, FILM COATED ORAL at 17:41

## 2023-01-02 RX ADMIN — MEROPENEM 1000 MILLIGRAM(S): 1 INJECTION INTRAVENOUS at 13:14

## 2023-01-02 RX ADMIN — PANTOPRAZOLE SODIUM 40 MILLIGRAM(S): 20 TABLET, DELAYED RELEASE ORAL at 05:43

## 2023-01-02 RX ADMIN — Medication 25 GRAM(S): at 13:14

## 2023-01-02 RX ADMIN — MEROPENEM 1000 MILLIGRAM(S): 1 INJECTION INTRAVENOUS at 21:47

## 2023-01-02 RX ADMIN — Medication 1 TABLET(S): at 13:13

## 2023-01-02 NOTE — PROGRESS NOTE ADULT - SUBJECTIVE AND OBJECTIVE BOX
77y Male s/p Aortic valve replacement, tissue    Valve replacement, aortic and mitral for endocarditis        Subjective: sleeping, no complaints.    T(C): 36.4 (01-02-23 @ 00:39), Max: 36.7 (01-01-23 @ 04:00)  HR: 73 (01-02-23 @ 00:39) (66 - 93)  BP: 119/73 (01-02-23 @ 00:39) (113/67 - 137/66)  ABP: --  ABP(mean): --  RR: 18 (01-02-23 @ 00:39) (18 - 25)  SpO2: 97% (01-02-23 @ 00:39) (93% - 99%)  Wt(kg): --  CVP(mm Hg): --  CO: --  CI: --  PA: --         01-01    135  |  105  |  14.1  ----------------------------<  79  4.2   |  23.0  |  0.60    Ca    9.3      01 Jan 2023 03:00  Phos  3.1     01-01  Mg     1.6     01-01                                 9.7    7.41  )-----------( 240      ( 01 Jan 2023 03:00 )             29.9                     CAPILLARY BLOOD GLUCOSE               CXR:    I&O's Detail    31 Dec 2022 07:01  -  01 Jan 2023 07:00  --------------------------------------------------------  IN:    Oral Fluid: 720 mL  Total IN: 720 mL    OUT:    Indwelling Catheter - Urethral (mL): 3550 mL  Total OUT: 3550 mL    Total NET: -2830 mL      01 Jan 2023 07:01  -  02 Jan 2023 01:18  --------------------------------------------------------  IN:  Total IN: 0 mL    OUT:    Indwelling Catheter - Urethral (mL): 1550 mL  Total OUT: 1550 mL    Total NET: -1550 mL          MEDICATIONS  (STANDING):  apixaban 2.5 milliGRAM(s) Oral two times a day  aspirin enteric coated 81 milliGRAM(s) Oral daily  chlorhexidine 2% Cloths 1 Application(s) Topical daily  chlorhexidine 4% Liquid 1 Application(s) Topical <User Schedule>  lactobacillus acidophilus 1 Tablet(s) Oral two times a day with meals  magnesium oxide 400 milliGRAM(s) Oral daily  meropenem Injectable 1000 milliGRAM(s) IV Push every 8 hours  pantoprazole    Tablet 40 milliGRAM(s) Oral daily  potassium chloride    Tablet ER 40 milliEquivalent(s) Oral daily  tamsulosin 0.4 milliGRAM(s) Oral at bedtime    MEDICATIONS  (PRN):  acetaminophen     Tablet .. 650 milliGRAM(s) Oral every 6 hours PRN Temp greater or equal to 38C (100.4F), Moderate Pain (4 - 6)  ondansetron Injectable 4 milliGRAM(s) IV Push once PRN Nausea and/or Vomiting  sodium chloride 0.9% lock flush 10 milliLiter(s) IV Push every 1 hour PRN Pre/post blood products, medications, blood draw, and to maintain line patency      Physical Exam:  Gen: A&Ox3  Pulm:  CTA b/l, no r/r/w  CV:  S1S2, RRR,   Abd: +BS, soft, NT, ND  Ext:  +DP b/l, no c/c/e  Incision:  c/d/i no click, no exudate    -----------------------------------------------------------------------------------------------------------------------------------------------------------------------------  -----------------------------------------------------------------------------------------------------------------------------------------------------------------------------

## 2023-01-02 NOTE — PROGRESS NOTE ADULT - PROBLEM SELECTOR PLAN 4
Initially post op pt was ventricular paced at 60bpm with no underlying escape rhythm.   Continues to be NSR/junctional with LBBB with prolonged IL interval.  Episode of 14 second pause overnight on telemetry.Now S/P micra 12/30  Dopamine off 12/19/22.   EP following.   Beta blocker held at this time due to low bp  eliquis 2.5 restarted  TTE 12/26 with small pericardial effusion.

## 2023-01-02 NOTE — PROGRESS NOTE ADULT - PROBLEM SELECTOR PLAN 1
resolved  Patient was upgraded to CTICU 12/26 r/o sepsis - rigors, rectal Temp 102, lactate 3, hypotensive SBP 50s  Blood cultures x 2 from 12/26 No growth  Urine culture from 12/26 No growth  Blood cultures from 12/27 no growth  ID -cont abx

## 2023-01-02 NOTE — PROGRESS NOTE ADULT - SUBJECTIVE AND OBJECTIVE BOX
[facility]  INFECTIOUS DISEASES at [place] and [place]  =======================================================                               Jl Vaughan MD#   Mk Alaniz MD*                             Sunita Laws MD*   Leila Pederson MD*            Diplomates American Board of Internal Medicine & Infectious Diseases                # [place] Office - Appt - Tel  [phone] Fax [phone]                * [place] Office - Appt - Tel [phone] Fax [phone]                                  Hospital Consult line:  [phone]  =======================================================    HOLDSWORTH HANNAH [id number]    Follow up: Strep anginosus endocarditis, aortic root abscess     s/p OR for AVR, MVR, aortic root patch repair 12/13/22    No fevers   No complaints       Allergies:  No Known Allergies       REVIEW OF SYSTEMS:  CONSTITUTIONAL:  No Fever or chills  HEENT:   No diplopia or blurred vision.  No earache, sore throat or runny nose.  CARDIOVASCULAR:  No Chest Pain  RESPIRATORY:  No cough, shortness of breath  GASTROINTESTINAL:  No nausea, vomiting or diarrhea.  GENITOURINARY:  No dysuria, frequency or urgency. No Blood in urine  MUSCULOSKELETAL:  no joint aches, no muscle pain  SKIN:  No change in skin, hair or nails.  NEUROLOGIC:  No Headaches, seizures   PSYCHIATRIC:  No disorder of thought or mood.  ENDOCRINE:  No heat or cold intolerance  HEMATOLOGICAL:  No easy bruising or bleeding.       Physical Exam:  GEN: NAD  HEENT: normocephalic and atraumatic. EOMI. PERRL.    NECK: Supple.   LUNGS: CTA B/L.  HEART: RRR  ABDOMEN: Soft, NT, ND.  +BS.    : No CVA tenderness  EXTREMITIES: Without  edema.  MSK: No joint swelling  NEUROLOGIC: No Focal Deficits   PSYCHIATRIC: Appropriate affect .  SKIN: No rash      Vitals:  T(F): 97.5 (02 Jan 2023 09:00), Max: 97.9 (02 Jan 2023 04:49)  HR: 83 (02 Jan 2023 09:00)  BP: 127/70 (02 Jan 2023 09:00)  RR: 18 (02 Jan 2023 09:00)  SpO2: 96% (02 Jan 2023 09:00) (93% - 98%)  temp max in last 48H T(F): , Max: 98.6 (01-01-23 @ 00:00)      Current Antibiotics:  meropenem Injectable 1000 milliGRAM(s) IV Push every 8 hours    Other medications:  apixaban 2.5 milliGRAM(s) Oral two times a day  aspirin enteric coated 81 milliGRAM(s) Oral daily  chlorhexidine 2% Cloths 1 Application(s) Topical daily  chlorhexidine 4% Liquid 1 Application(s) Topical <User Schedule>  lactobacillus acidophilus 1 Tablet(s) Oral two times a day with meals  magnesium oxide 400 milliGRAM(s) Oral daily  pantoprazole    Tablet 40 milliGRAM(s) Oral daily  potassium chloride    Tablet ER 40 milliEquivalent(s) Oral daily  tamsulosin 0.4 milliGRAM(s) Oral at bedtime                 11.4   10.04 )-----------( 312      ( 02 Jan 2023 05:49 )             36.1     01-02    139  |  103  |  16.1  ----------------------------<  67<L>  4.5   |  25.0  |  0.71    Ca    10.0      02 Jan 2023 05:49  Phos  3.1     01-01  Mg     1.8     01-02      RECENT CULTURES:  12-27 @ 04:06 .Blood Blood     No Growth Final    12-26 @ 18:15 .Blood Blood     No Growth Final    12-26 @ 14:00 Catheterized Catheterized     No growth    12-26 @ 11:30 .Blood Blood-Peripheral     No Growth Final    12-26 @ 10:51    RVP  NotDetec      WBC Count: 10.04 K/uL (01-02-23 @ 05:49)  WBC Count: 7.41 K/uL (01-01-23 @ 03:00)  WBC Count: 8.76 K/uL (12-31-22 @ 03:12)  WBC Count: 12.43 K/uL (12-30-22 @ 05:10)  WBC Count: 14.55 K/uL (12-29-22 @ 02:00)    Creatinine, Serum: 0.71 mg/dL (01-02-23 @ 05:49)  Creatinine, Serum: 0.60 mg/dL (01-01-23 @ 03:00)  Creatinine, Serum: 0.72 mg/dL (12-31-22 @ 03:12)  Creatinine, Serum: 0.76 mg/dL (12-30-22 @ 05:10)  Creatinine, Serum: 0.57 mg/dL (12-29-22 @ 02:00)    Procalcitonin, Serum: 0.86 ng/mL (12-28-22 @ 02:30)  Procalcitonin, Serum: 1.47 ng/mL (12-27-22 @ 04:10)  Procalcitonin, Serum: 0.71 ng/mL (12-26-22 @ 13:54)     SARS-CoV-2: NotDetec (12-26-22 @ 10:51)  COVID-19 PCR: NotDetec (12-12-22 @ 11:34)  COVID-19 PCR: NotDetec (12-05-22 @ 09:55)        < from: Xray Chest 1 View- PORTABLE-Routine (Xray Chest 1 View- PORTABLE-Routine in AM.) (01.02.23 @ 05:07) >  ACC: [id number] EXAM:  XR CHEST PORTABLE ROUTINE 1V                        ACC: [id number] EXAM:  XR CHEST PA LAT 2V                        ACC: [id number] EXAM:  XR CHEST PORTABLE ROUTINE 1V                        ACC: [id number] EXAM:  XR CHEST PORTABLE ROUTINE 1V                          PROCEDURE DATE:  12/30/2022      INTERPRETATION:  Chest one view 12/30/2022 5:24 AM    HISTORY: Postop    COMPARISON STUDY: 12/29/2022    Frontal expiratory view of the chest shows the heart to be similar in   size. Sternal wires and cardiac valve rings are again noted.    The lungs show mild basilar atelectasis with small left effusion and   there is no evidence of pneumothorax nor definite right pleural effusion.    Chest two views 12/31/2022 1:54 PM  Compared to the prior study, there is less basilar atelectasis. Left   pleural effusion is slightly larger.    Chest one view 1/1/2023 5:42 AM  Compared to the prior study, there is mild increase in pulmonary   congestion with slight reduction of left effusion.    Chest one view 1/2/2023 4:17 AM  Compared to the prior study, pulmonary vascularity is less prominent.   Left effusion is smaller.    IMPRESSION:  Pulmonary congestion and left pleural effusion as noted.    Thank you for the courtesy of this referral.    --- End of Report ---  < end of copied text >

## 2023-01-02 NOTE — PROGRESS NOTE ADULT - ASSESSMENT
77 year old male with PMHx of recent  COVID  on 10/6/22, HTN , Vit B12 deficiency, presenting to the ED on 11/2 with body aches, fatigue and fever (Tmax 102) at home for 12 days found to have streptococcus bacteremia and admitted with unclear source, no recent dental work, skin infections, no respiratory symptoms. Pan scan was negative at that time ( LILIAN was recommended but pt refused) He was treated for Bacteremia ( Blood cultures + streptococcus anginosis pansensitive ) and norovirus with supportive care and contact isolation . He was discharged home on 11/7 with IV Rocephin via PICC line. Recommendation was to continue ABX  daily via pic end 11/30/22  He presented to ER after and episode of syncope and collapse, found to be septic, hypotensive, hypoxic and febrile in the ER.   In response to hypotension he failed to respond to Initial sepsis fluid protocol in ER is required IV pressor in form of levophed to maintain MAP greater than 60 -65. In the ER he was febrile with initial labs significant for WBC of 22.4 , ProCal of 21.2 first lactate 4.9 via abg with repeat post fluids of 2.2 venous sample.   PT admitted to [facility] with septic shock unclear source. Found to have new onset A fib and on amio. 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, MVR, aortic root patch repair 12/13/22  New onset Afib  Leukocytosis   Fever      - Continue meropenem  - repeat blood cultures 12/26 - no growth thus far   - blood culture 11/27 ngtd  - LILIAN + MV vegetations and possible early aortic root abscess  - underwent ct max/c/ap-no source identified  - indium negative  - MRI head cannot r/o septic emboli  - s/p OR for AVR, MVR, aortic root patch repair 12/13/22  - OR CX no growth   - Was n meropenem from 11/27/22 to 12/25/22, on Ceftriaxone from 12/25 and back to Meropenem 12/26  - Plan to treat for 6 weeks of IV antibiotics from date of surgery until 1/24/23  - Will place PICC 1/3/23 if remains afebrile and cultures remain negative       Will follow       d/w CT Surgery

## 2023-01-03 VITALS
HEART RATE: 83 BPM | DIASTOLIC BLOOD PRESSURE: 75 MMHG | OXYGEN SATURATION: 98 % | TEMPERATURE: 97 F | RESPIRATION RATE: 16 BRPM | SYSTOLIC BLOOD PRESSURE: 127 MMHG

## 2023-01-03 PROCEDURE — 86900 BLOOD TYPING SEROLOGIC ABO: CPT

## 2023-01-03 PROCEDURE — 84133 ASSAY OF URINE POTASSIUM: CPT

## 2023-01-03 PROCEDURE — 80162 ASSAY OF DIGOXIN TOTAL: CPT

## 2023-01-03 PROCEDURE — C1894: CPT

## 2023-01-03 PROCEDURE — P9016: CPT

## 2023-01-03 PROCEDURE — 84300 ASSAY OF URINE SODIUM: CPT

## 2023-01-03 PROCEDURE — 80053 COMPREHEN METABOLIC PANEL: CPT

## 2023-01-03 PROCEDURE — 86891 AUTOLOGOUS BLOOD OP SALVAGE: CPT

## 2023-01-03 PROCEDURE — P9100: CPT

## 2023-01-03 PROCEDURE — 0225U NFCT DS DNA&RNA 21 SARSCOV2: CPT

## 2023-01-03 PROCEDURE — 82947 ASSAY GLUCOSE BLOOD QUANT: CPT

## 2023-01-03 PROCEDURE — 70496 CT ANGIOGRAPHY HEAD: CPT

## 2023-01-03 PROCEDURE — 84156 ASSAY OF PROTEIN URINE: CPT

## 2023-01-03 PROCEDURE — 97530 THERAPEUTIC ACTIVITIES: CPT

## 2023-01-03 PROCEDURE — 74176 CT ABD & PELVIS W/O CONTRAST: CPT

## 2023-01-03 PROCEDURE — 87493 C DIFF AMPLIFIED PROBE: CPT

## 2023-01-03 PROCEDURE — 33274 TCAT INSJ/RPL PERM LDLS PM: CPT

## 2023-01-03 PROCEDURE — 71250 CT THORAX DX C-: CPT

## 2023-01-03 PROCEDURE — 82550 ASSAY OF CK (CPK): CPT

## 2023-01-03 PROCEDURE — 80202 ASSAY OF VANCOMYCIN: CPT

## 2023-01-03 PROCEDURE — 86965 POOLING BLOOD PLATELETS: CPT

## 2023-01-03 PROCEDURE — C1781: CPT

## 2023-01-03 PROCEDURE — 85730 THROMBOPLASTIN TIME PARTIAL: CPT

## 2023-01-03 PROCEDURE — 99233 SBSQ HOSP IP/OBS HIGH 50: CPT

## 2023-01-03 PROCEDURE — 71046 X-RAY EXAM CHEST 2 VIEWS: CPT

## 2023-01-03 PROCEDURE — C1751: CPT

## 2023-01-03 PROCEDURE — 97116 GAIT TRAINING THERAPY: CPT

## 2023-01-03 PROCEDURE — 81001 URINALYSIS AUTO W/SCOPE: CPT

## 2023-01-03 PROCEDURE — 74177 CT ABD & PELVIS W/CONTRAST: CPT

## 2023-01-03 PROCEDURE — 84100 ASSAY OF PHOSPHORUS: CPT

## 2023-01-03 PROCEDURE — 87641 MR-STAPH DNA AMP PROBE: CPT

## 2023-01-03 PROCEDURE — 97110 THERAPEUTIC EXERCISES: CPT

## 2023-01-03 PROCEDURE — 88305 TISSUE EXAM BY PATHOLOGIST: CPT

## 2023-01-03 PROCEDURE — 82803 BLOOD GASES ANY COMBINATION: CPT

## 2023-01-03 PROCEDURE — 82962 GLUCOSE BLOOD TEST: CPT

## 2023-01-03 PROCEDURE — 83735 ASSAY OF MAGNESIUM: CPT

## 2023-01-03 PROCEDURE — 94003 VENT MGMT INPAT SUBQ DAY: CPT

## 2023-01-03 PROCEDURE — 93971 EXTREMITY STUDY: CPT

## 2023-01-03 PROCEDURE — 86850 RBC ANTIBODY SCREEN: CPT

## 2023-01-03 PROCEDURE — 84295 ASSAY OF SERUM SODIUM: CPT

## 2023-01-03 PROCEDURE — 99291 CRITICAL CARE FIRST HOUR: CPT | Mod: 25

## 2023-01-03 PROCEDURE — 85520 HEPARIN ASSAY: CPT

## 2023-01-03 PROCEDURE — 76775 US EXAM ABDO BACK WALL LIM: CPT

## 2023-01-03 PROCEDURE — U0005: CPT

## 2023-01-03 PROCEDURE — C1887: CPT

## 2023-01-03 PROCEDURE — 70450 CT HEAD/BRAIN W/O DYE: CPT

## 2023-01-03 PROCEDURE — 87040 BLOOD CULTURE FOR BACTERIA: CPT

## 2023-01-03 PROCEDURE — 70486 CT MAXILLOFACIAL W/O DYE: CPT

## 2023-01-03 PROCEDURE — 94002 VENT MGMT INPAT INIT DAY: CPT

## 2023-01-03 PROCEDURE — U0003: CPT

## 2023-01-03 PROCEDURE — 83036 HEMOGLOBIN GLYCOSYLATED A1C: CPT

## 2023-01-03 PROCEDURE — 78803 RP LOCLZJ TUM SPECT 1 AREA: CPT

## 2023-01-03 PROCEDURE — P9045: CPT

## 2023-01-03 PROCEDURE — 85014 HEMATOCRIT: CPT

## 2023-01-03 PROCEDURE — 96375 TX/PRO/DX INJ NEW DRUG ADDON: CPT

## 2023-01-03 PROCEDURE — 93880 EXTRACRANIAL BILAT STUDY: CPT

## 2023-01-03 PROCEDURE — 93320 DOPPLER ECHO COMPLETE: CPT

## 2023-01-03 PROCEDURE — 71260 CT THORAX DX C+: CPT

## 2023-01-03 PROCEDURE — 87507 IADNA-DNA/RNA PROBE TQ 12-25: CPT

## 2023-01-03 PROCEDURE — 83935 ASSAY OF URINE OSMOLALITY: CPT

## 2023-01-03 PROCEDURE — 84132 ASSAY OF SERUM POTASSIUM: CPT

## 2023-01-03 PROCEDURE — P9037: CPT

## 2023-01-03 PROCEDURE — P9012: CPT

## 2023-01-03 PROCEDURE — 85027 COMPLETE CBC AUTOMATED: CPT

## 2023-01-03 PROCEDURE — 87086 URINE CULTURE/COLONY COUNT: CPT

## 2023-01-03 PROCEDURE — 36573 INSJ PICC RS&I 5 YR+: CPT

## 2023-01-03 PROCEDURE — A9570: CPT

## 2023-01-03 PROCEDURE — 85025 COMPLETE CBC W/AUTO DIFF WBC: CPT

## 2023-01-03 PROCEDURE — 82150 ASSAY OF AMYLASE: CPT

## 2023-01-03 PROCEDURE — 85379 FIBRIN DEGRADATION QUANT: CPT

## 2023-01-03 PROCEDURE — 83605 ASSAY OF LACTIC ACID: CPT

## 2023-01-03 PROCEDURE — 71045 X-RAY EXAM CHEST 1 VIEW: CPT | Mod: 26

## 2023-01-03 PROCEDURE — 36430 TRANSFUSION BLD/BLD COMPNT: CPT

## 2023-01-03 PROCEDURE — 70551 MRI BRAIN STEM W/O DYE: CPT

## 2023-01-03 PROCEDURE — 82435 ASSAY OF BLOOD CHLORIDE: CPT

## 2023-01-03 PROCEDURE — 88311 DECALCIFY TISSUE: CPT

## 2023-01-03 PROCEDURE — 87070 CULTURE OTHR SPECIMN AEROBIC: CPT

## 2023-01-03 PROCEDURE — 82043 UR ALBUMIN QUANTITATIVE: CPT

## 2023-01-03 PROCEDURE — 76937 US GUIDE VASCULAR ACCESS: CPT | Mod: 26,59

## 2023-01-03 PROCEDURE — 93308 TTE F-UP OR LMTD: CPT

## 2023-01-03 PROCEDURE — 71045 X-RAY EXAM CHEST 1 VIEW: CPT

## 2023-01-03 PROCEDURE — 86901 BLOOD TYPING SEROLOGIC RH(D): CPT

## 2023-01-03 PROCEDURE — 87640 STAPH A DNA AMP PROBE: CPT

## 2023-01-03 PROCEDURE — 82570 ASSAY OF URINE CREATININE: CPT

## 2023-01-03 PROCEDURE — 80076 HEPATIC FUNCTION PANEL: CPT

## 2023-01-03 PROCEDURE — 85018 HEMOGLOBIN: CPT

## 2023-01-03 PROCEDURE — 87045 FECES CULTURE AEROBIC BACT: CPT

## 2023-01-03 PROCEDURE — 76705 ECHO EXAM OF ABDOMEN: CPT

## 2023-01-03 PROCEDURE — 96374 THER/PROPH/DIAG INJ IV PUSH: CPT

## 2023-01-03 PROCEDURE — 80048 BASIC METABOLIC PNL TOTAL CA: CPT

## 2023-01-03 PROCEDURE — C1786: CPT

## 2023-01-03 PROCEDURE — 93325 DOPPLER ECHO COLOR FLOW MAPG: CPT

## 2023-01-03 PROCEDURE — 87075 CULTR BACTERIA EXCEPT BLOOD: CPT

## 2023-01-03 PROCEDURE — 93005 ELECTROCARDIOGRAM TRACING: CPT

## 2023-01-03 PROCEDURE — 70498 CT ANGIOGRAPHY NECK: CPT

## 2023-01-03 PROCEDURE — 36415 COLL VENOUS BLD VENIPUNCTURE: CPT

## 2023-01-03 PROCEDURE — C1769: CPT

## 2023-01-03 PROCEDURE — 83690 ASSAY OF LIPASE: CPT

## 2023-01-03 PROCEDURE — 93458 L HRT ARTERY/VENTRICLE ANGIO: CPT

## 2023-01-03 PROCEDURE — 84484 ASSAY OF TROPONIN QUANT: CPT

## 2023-01-03 PROCEDURE — 76942 ECHO GUIDE FOR BIOPSY: CPT | Mod: 26,59

## 2023-01-03 PROCEDURE — 99024 POSTOP FOLLOW-UP VISIT: CPT

## 2023-01-03 PROCEDURE — 82330 ASSAY OF CALCIUM: CPT

## 2023-01-03 PROCEDURE — C8929: CPT

## 2023-01-03 PROCEDURE — 87046 STOOL CULTR AEROBIC BACT EA: CPT

## 2023-01-03 PROCEDURE — 78802 RP LOCLZJ TUM WHBDY 1 D IMG: CPT

## 2023-01-03 PROCEDURE — 93312 ECHO TRANSESOPHAGEAL: CPT

## 2023-01-03 PROCEDURE — 82553 CREATINE MB FRACTION: CPT

## 2023-01-03 PROCEDURE — 85610 PROTHROMBIN TIME: CPT

## 2023-01-03 PROCEDURE — C1889: CPT

## 2023-01-03 PROCEDURE — 86923 COMPATIBILITY TEST ELECTRIC: CPT

## 2023-01-03 PROCEDURE — 84145 PROCALCITONIN (PCT): CPT

## 2023-01-03 RX ORDER — VALSARTAN 80 MG/1
1 TABLET ORAL
Qty: 0 | Refills: 0 | DISCHARGE

## 2023-01-03 RX ORDER — CHLORHEXIDINE GLUCONATE 213 G/1000ML
1 SOLUTION TOPICAL
Refills: 0 | Status: DISCONTINUED | OUTPATIENT
Start: 2023-01-03 | End: 2023-01-03

## 2023-01-03 RX ORDER — METOPROLOL TARTRATE 50 MG
1 TABLET ORAL
Qty: 0 | Refills: 0 | DISCHARGE

## 2023-01-03 RX ORDER — TAMSULOSIN HYDROCHLORIDE 0.4 MG/1
1 CAPSULE ORAL
Qty: 30 | Refills: 2
Start: 2023-01-03 | End: 2023-04-02

## 2023-01-03 RX ORDER — ERTAPENEM SODIUM 1 G/1
1000 INJECTION, POWDER, LYOPHILIZED, FOR SOLUTION INTRAMUSCULAR; INTRAVENOUS EVERY 24 HOURS
Refills: 0 | Status: DISCONTINUED | OUTPATIENT
Start: 2023-01-03 | End: 2023-01-03

## 2023-01-03 RX ORDER — APIXABAN 2.5 MG/1
1 TABLET, FILM COATED ORAL
Qty: 60 | Refills: 1
Start: 2023-01-03 | End: 2023-03-03

## 2023-01-03 RX ORDER — LACTOBACILLUS ACIDOPHILUS 100MM CELL
1 CAPSULE ORAL
Qty: 60 | Refills: 3
Start: 2023-01-03 | End: 2023-05-02

## 2023-01-03 RX ORDER — CEFTRIAXONE 500 MG/1
2 INJECTION, POWDER, FOR SOLUTION INTRAMUSCULAR; INTRAVENOUS
Qty: 0 | Refills: 0 | DISCHARGE

## 2023-01-03 RX ORDER — ASPIRIN/CALCIUM CARB/MAGNESIUM 324 MG
1 TABLET ORAL
Qty: 30 | Refills: 2
Start: 2023-01-03 | End: 2023-04-02

## 2023-01-03 RX ADMIN — Medication 40 MILLIEQUIVALENT(S): at 13:53

## 2023-01-03 RX ADMIN — APIXABAN 2.5 MILLIGRAM(S): 2.5 TABLET, FILM COATED ORAL at 05:31

## 2023-01-03 RX ADMIN — ERTAPENEM SODIUM 120 MILLIGRAM(S): 1 INJECTION, POWDER, LYOPHILIZED, FOR SOLUTION INTRAMUSCULAR; INTRAVENOUS at 13:53

## 2023-01-03 RX ADMIN — PANTOPRAZOLE SODIUM 40 MILLIGRAM(S): 20 TABLET, DELAYED RELEASE ORAL at 05:31

## 2023-01-03 RX ADMIN — MEROPENEM 1000 MILLIGRAM(S): 1 INJECTION INTRAVENOUS at 05:35

## 2023-01-03 RX ADMIN — Medication 81 MILLIGRAM(S): at 13:53

## 2023-01-03 RX ADMIN — CHLORHEXIDINE GLUCONATE 1 APPLICATION(S): 213 SOLUTION TOPICAL at 13:53

## 2023-01-03 RX ADMIN — MAGNESIUM OXIDE 400 MG ORAL TABLET 400 MILLIGRAM(S): 241.3 TABLET ORAL at 13:53

## 2023-01-03 NOTE — PROGRESS NOTE ADULT - PROBLEM SELECTOR PROBLEM 3
Atrial fibrillation, new onset
Abscess of aortic root
Atrial fibrillation, new onset
Endocarditis
Acute CVA (cerebrovascular accident)
Atrial fibrillation, new onset
Abscess of aortic root
Atrial fibrillation, new onset
Abscess of aortic root
Abscess of aortic root
Atrial fibrillation, new onset
Endocarditis
Atrial fibrillation, new onset
Abscess of aortic root
Abscess of aortic root
Aortic stenosis
Abscess of aortic root
Atrial fibrillation, new onset
Atrial fibrillation, new onset
R/O Syncope
Endocarditis
Abscess of aortic root
Atrial fibrillation, new onset
Abscess of aortic root
Atrial fibrillation, new onset
Abscess of aortic root
Atrial fibrillation, new onset
Atrial fibrillation, new onset
Abscess of aortic root

## 2023-01-03 NOTE — PROGRESS NOTE ADULT - PROBLEM SELECTOR PROBLEM 1
Endocarditis
Endocarditis
Septic shock
Endocarditis
Septic shock
Atrial fibrillation, new onset
Septic shock
Atrial fibrillation, new onset
Atrial fibrillation, new onset
Endocarditis
Septic shock
Atrial fibrillation, new onset
Endocarditis
Septic shock
Atrial fibrillation, new onset
Endocarditis
Septic shock
Atrial fibrillation, new onset
Endocarditis
Septic shock
Atrial fibrillation, new onset
Endocarditis
Septic shock
Endocarditis
Endocarditis

## 2023-01-03 NOTE — PROGRESS NOTE ADULT - PROBLEM SELECTOR PLAN 7
Barrios placed 12/8 for >1L residual urine in bladder.  Failed TOV 12/19 with Barrios replaced.  Barrios exchanged 12/26 as part of sepsis work up.   Patient to be discharged with Barrios with plan to follow up with Urology as an outpatient.    Continue Flomax.  Continue to monitor.
Barrios placed 12/8 for >1L residual urine in bladder.  Failed TOV 12/19 with Barrios replaced.  Barrios exchanged 12/26 as part of sepsis work up.   Patient to be discharged with Barrios with plan to follow up with Urology as an outpatient.    Continue Flomax.  Continue to monitor.
Improved.  Prior norovirus + 11/3.  GI consult appreciated.
Barrios placed 12/8 for >1L residual urine in bladder.  Failed TOV 12/19 with Barrios replaced.  Barrios exchanged 12/26 as part of sepsis work up.   Patient to be discharged with Barrios with plan to follow up with Urology as an outpatient.    Continue Flomax.  Continue to monitor.
Recent episode of diarrhea secondary to colchicine, now d/c'd.  Continue to monitor.
Improved.  Prior norovirus + 11/3.  GI consult appreciated.
Improved.  Prior norovirus + 11/3.  GI consult appreciated.  Continue imodium and cholestyramine PRN.--> pt requesting d/c of imodium, likey stop today
Barrios placed 12/8 for >1L residual urine in bladder.  Failed TOV 12/19 with Barrios replaced.  Barrios exchanged 12/26 as part of sepsis work up.   Patient to be discharged with Barrios with plan to follow up with Urology as an outpatient.    Continue Flomax.  Continue to monitor.
Recent episode of diarrhea secondary to colchicine, now d/c'd.  Resolved.
recent episodes 2/2 to colchicine which is now d/c'd
Recent episode of diarrhea secondary to colchicine, now d/c'd.  Resolved.
Improved.  Prior norovirus + 11/3.  GI consult appreciated.
Resolved.  Prior norovirus + 11/3.  GI consult appreciated.
Barrios placed 12/8 for >1L residual urine in bladder.  Failed TOV 12/19 with Barrios replaced.  Barrios exchanged 12/26 as part of sepsis work up.   Patient to be discharged with Barrios with plan to follow up with Urology as an outpatient.    Continue Flomax.  Continue to monitor.
Recent episode of diarrhea secondary to colchicine, now d/c'd.  Resolved.
Barrios placed 12/8 for >1L residual urine in bladder.  Failed TOV 12/19 with Barrios replaced.  Barrios exchanged 12/26 as part of sepsis work up.   Patient to be discharged with Barrios with plan to follow up with Urology as an outpatient.    Continue Flomax.  Continue to monitor.
Resolved.  Prior norovirus + 11/3.  GI consult appreciated.
Improved.  Prior norovirus + 11/3.  GI consult appreciated.  Continue imodium and cholestyramine PRN.
Barrios placed 12/8 for >1L residual urine in bladder.  Failed TOV 12/19 with Barrios replaced.  Barrios exchanged 12/26 as part of sepsis work up.   Patient to be discharged with Barrios with plan to follow up with Urology as an outpatient.    Continue Flomax.  Continue to monitor.
Improved.  Prior norovirus + 11/3.  GI consult appreciated.
Recent episode of diarrhea secondary to colchicine, now d/c'd.  Continue to monitor.
Barrios placed 12/8 for >1L residual urine in bladder.  Failed TOV 12/19 with Barrios replaced.  Barrios exchanged 12/26 as part of sepsis work up.   Patient to be discharged with Barrios with plan to follow up with Urology as an outpatient.    Continue Flomax.  Continue to monitor.
Recent episode of diarrhea secondary to colchicine, now d/c'd.  Resolved.

## 2023-01-03 NOTE — PROGRESS NOTE ADULT - ASSESSMENT
77 year old male with PMHx of recent  COVID  on 10/6/22, HTN , Vit B12 deficiency, presenting to the ED on 11/2 with body aches, fatigue and fever (Tmax 102) at home for 12 days found to have streptococcus bacteremia and admitted with unclear source, no recent dental work, skin infections, no respiratory symptoms. Pan scan was negative at that time ( LILIAN was recommended but pt refused) He was treated for Bacteremia ( Blood cultures + streptococcus anginosis pansensitive ) and norovirus with supportive care and contact isolation . He was discharged home on 11/7 with IV Rocephin via PICC line. Recommendation was to continue ABX  daily via pic end 11/30/22  He presented to ER after and episode of syncope and collapse, found to be septic, hypotensive, hypoxic and febrile in the ER.   In response to hypotension he failed to respond to Initial sepsis fluid protocol in ER is required IV pressor in form of levophed to maintain MAP greater than 60 -65. In the ER he was febrile with initial labs significant for WBC of 22.4 , ProCal of 21.2 first lactate 4.9 via abg with repeat post fluids of 2.2 venous sample.   PT admitted to [facility] with septic shock unclear source. Found to have new onset A fib and on amio. 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, MVR, aortic root patch repair 12/13/22  New onset Afib  Leukocytosis   Fever      - repeat blood cultures 12/26 - no growth thus far   - blood culture 11/27 ngtd  - LILIAN + MV vegetations and possible early aortic root abscess  - underwent ct max/c/ap-no source identified  - indium negative  - MRI head cannot r/o septic emboli  - s/p OR for AVR, MVR, aortic root patch repair 12/13/22  - OR CX no growth   - Was n meropenem from 11/27/22 to 12/25/22, on Ceftriaxone from 12/25 and back to Meropenem 12/26  - Will switch to Ertapenem 1gm Daily for ease of dosing   - Will need weekly CBC and CMP faxed to [phone]  - Appointment with us in 7 to 10 days - [phone]  - Plan to treat for 6 weeks of IV antibiotics from date of surgery until 1/24/23  - Will place PICC 1/3/23 if remains afebrile and cultures remain negative       Will follow       d/w CT Surgery

## 2023-01-03 NOTE — PROGRESS NOTE ADULT - PROBLEM SELECTOR PROBLEM 2
Abscess of aortic root
CAD (coronary artery disease)
Abscess of aortic root
Endocarditis
Sepsis
Abscess of aortic root
Abscess of aortic root
Endocarditis
Sepsis
Septic shock
Abscess of aortic root
Sepsis
Sepsis
Septic shock
Abscess of aortic root
Endocarditis
Sepsis
Abscess of aortic root
Endocarditis
Sepsis
Endocarditis
Endocarditis
Abscess of aortic root
Endocarditis
Abscess of aortic root
Endocarditis
Abscess of aortic root
Abscess of aortic root

## 2023-01-03 NOTE — PROCEDURE NOTE - NSPOSTPRCRAD_GEN_A_CORE
chest radiograph/depth of insertion/line adjusted to depth of insertion/line in appropriate postion/postion of catheter/ultrasound
central line located in the/central line located in the superior vena cava/post-procedure radiography performed
Statement Selected

## 2023-01-03 NOTE — PROGRESS NOTE ADULT - PROBLEM SELECTOR PROBLEM 5
Hematuria
Hematuria
Urinary retention
Hematuria
Hematuria
Urinary retention
Hematuria
RADHA (acute kidney injury)
Hematuria
Hematuria
Acute CVA (cerebrovascular accident)
RADHA (acute kidney injury)
Hematuria
RADHA (acute kidney injury)
Diarrhea
Hematuria
Acute CVA (cerebrovascular accident)
Diarrhea
RADHA (acute kidney injury)
Hematuria
RADHA (acute kidney injury)
Abscess of aortic root
RADHA (acute kidney injury)
RADHA (acute kidney injury)
Hematuria
RADHA (acute kidney injury)
Acute CVA (cerebrovascular accident)

## 2023-01-03 NOTE — PROGRESS NOTE ADULT - SUBJECTIVE AND OBJECTIVE BOX
[facility]  INFECTIOUS DISEASES at [place] and [place]  =======================================================                               Jl Vaughan MD#   Mk Alaniz MD*                             Sunita Laws MD*   Leila Pederson MD*            Diplomates American Board of Internal Medicine & Infectious Diseases                # [place] Office - Appt - Tel  [phone] Fax [phone]                * [place] Office - Appt - Tel [phone] Fax [phone]                                  Hospital Consult line:  [phone]  =======================================================    HOLDSWORTH HANNAH [id number]    Follow up: Strep anginosus endocarditis, aortic root abscess     s/p OR for AVR, MVR, aortic root patch repair 12/13/22    No fevers   No complaints       Allergies:  No Known Allergies       REVIEW OF SYSTEMS:  CONSTITUTIONAL:  No Fever or chills  HEENT:   No diplopia or blurred vision.  No earache, sore throat or runny nose.  CARDIOVASCULAR:  No Chest Pain  RESPIRATORY:  No cough, shortness of breath  GASTROINTESTINAL:  No nausea, vomiting or diarrhea.  GENITOURINARY:  No dysuria, frequency or urgency. No Blood in urine  MUSCULOSKELETAL:  no joint aches, no muscle pain  SKIN:  No change in skin, hair or nails.  NEUROLOGIC:  No Headaches, seizures   PSYCHIATRIC:  No disorder of thought or mood.  ENDOCRINE:  No heat or cold intolerance  HEMATOLOGICAL:  No easy bruising or bleeding.       Physical Exam:  GEN: NAD  HEENT: normocephalic and atraumatic. EOMI. PERRL.    NECK: Supple.   LUNGS: CTA B/L.  HEART: RRR  ABDOMEN: Soft, NT, ND.  +BS.    : No CVA tenderness  EXTREMITIES: Without  edema.  MSK: No joint swelling  NEUROLOGIC: No Focal Deficits   PSYCHIATRIC: Appropriate affect .  SKIN: No rash      Vitals:  T(F): 97.4 (03 Jan 2023 08:32), Max: 98 (02 Jan 2023 20:42)  HR: 83 (03 Jan 2023 08:32)  BP: 127/75 (03 Jan 2023 08:32)  RR: 16 (03 Jan 2023 08:32)  SpO2: 98% (03 Jan 2023 08:32) (96% - 100%)  temp max in last 48H T(F): , Max: 98 (01-02-23 @ 20:42)    Current Antibiotics:  meropenem Injectable 1000 milliGRAM(s) IV Push every 8 hours    Other medications:  apixaban 2.5 milliGRAM(s) Oral two times a day  aspirin enteric coated 81 milliGRAM(s) Oral daily  chlorhexidine 2% Cloths 1 Application(s) Topical daily  chlorhexidine 2% Cloths 1 Application(s) Topical <User Schedule>  lactobacillus acidophilus 1 Tablet(s) Oral two times a day with meals  magnesium oxide 400 milliGRAM(s) Oral daily  pantoprazole    Tablet 40 milliGRAM(s) Oral daily  potassium chloride    Tablet ER 40 milliEquivalent(s) Oral daily  tamsulosin 0.4 milliGRAM(s) Oral at bedtime                            11.4   10.04 )-----------( 312      ( 02 Jan 2023 05:49 )             36.1     01-02    139  |  103  |  16.1  ----------------------------<  67<L>  4.5   |  25.0  |  0.71    Ca    10.0      02 Jan 2023 05:49  Mg     1.8     01-02      RECENT CULTURES:  12-27 @ 04:06 .Blood Blood     No Growth Final    12-26 @ 18:15 .Blood Blood     No Growth Final    12-26 @ 14:00 Catheterized Catheterized     No growth    12-26 @ 11:30 .Blood Blood-Peripheral     No Growth Final    12-26 @ 10:51    RVP  NotDetec      WBC Count: 10.04 K/uL (01-02-23 @ 05:49)  WBC Count: 7.41 K/uL (01-01-23 @ 03:00)  WBC Count: 8.76 K/uL (12-31-22 @ 03:12)  WBC Count: 12.43 K/uL (12-30-22 @ 05:10)    Creatinine, Serum: 0.71 mg/dL (01-02-23 @ 05:49)  Creatinine, Serum: 0.60 mg/dL (01-01-23 @ 03:00)  Creatinine, Serum: 0.72 mg/dL (12-31-22 @ 03:12)  Creatinine, Serum: 0.76 mg/dL (12-30-22 @ 05:10)    Procalcitonin, Serum: 0.86 ng/mL (12-28-22 @ 02:30)  Procalcitonin, Serum: 1.47 ng/mL (12-27-22 @ 04:10)  Procalcitonin, Serum: 0.71 ng/mL (12-26-22 @ 13:54)     SARS-CoV-2: NotDetec (12-26-22 @ 10:51)  COVID-19 PCR: NotDetec (12-12-22 @ 11:34)  COVID-19 PCR: NotDetec (12-05-22 @ 09:55)        < from: Xray Chest 1 View- PORTABLE-Routine (Xray Chest 1 View- PORTABLE-Routine in AM.) (01.02.23 @ 05:07) >  ACC: [id number] EXAM:  XR CHEST PORTABLE ROUTINE 1V                        ACC: [id number] EXAM:  XR CHEST PA LAT 2V                        ACC: [id number] EXAM:  XR CHEST PORTABLE ROUTINE 1V                        ACC: [id number] EXAM:  XR CHEST PORTABLE ROUTINE 1V                          PROCEDURE DATE:  12/30/2022      INTERPRETATION:  Chest one view 12/30/2022 5:24 AM    HISTORY: Postop    COMPARISON STUDY: 12/29/2022    Frontal expiratory view of the chest shows the heart to be similar in   size. Sternal wires and cardiac valve rings are again noted.    The lungs show mild basilar atelectasis with small left effusion and   there is no evidence of pneumothorax nor definite right pleural effusion.    Chest two views 12/31/2022 1:54 PM  Compared to the prior study, there is less basilar atelectasis. Left   pleural effusion is slightly larger.    Chest one view 1/1/2023 5:42 AM  Compared to the prior study, there is mild increase in pulmonary   congestion with slight reduction of left effusion.    Chest one view 1/2/2023 4:17 AM  Compared to the prior study, pulmonary vascularity is less prominent.   Left effusion is smaller.    IMPRESSION:  Pulmonary congestion and left pleural effusion as noted.    Thank you for the courtesy of this referral.    --- End of Report ---  < end of copied text >

## 2023-01-03 NOTE — PROGRESS NOTE ADULT - PROBLEM SELECTOR PLAN 4
Initially post op pt was ventricular paced at 60bpm with no underlying escape rhythm.   Continues to be NSR/junctional with LBBB with prolonged WI interval.  Episode of 14 second pause overnight on telemetry.Now S/P micra 12/30  Dopamine off 12/19/22.   EP following.   Beta blocker held at this time due to low bp  eliquis 2.5 restarted  TTE 12/26 with small pericardial effusion.

## 2023-01-03 NOTE — PROGRESS NOTE ADULT - PROBLEM SELECTOR PLAN 8
Eliquis and SCDs for DVT prophylaxis.  Protonix for GI prophylaxis.    Dispo: Home with ram, PICC line, and IV antibiotics.   Plan to be discussed / reviewed with CT Surgery attending / team during AM rounds.
SCDs for DVT prophylaxis.  Eliquis 2.5 bid  Protonix for GI prophylaxis.    Dispo: Home with ram, PICC line, and IV antibiotics.   Plan to be discussed / reviewed with CT Surgery attending / team during AM rounds.
Subcutaneous Heparin and SCDs for DVT prophylaxis.  Protonix for GI prophylaxis.
Eliquis and SCDs for DVT prophylaxis.  Protonix for GI prophylaxis.    Dispo: Home with ram, PICC line, and IV antibiotics.   Plan to be discussed / reviewed with CT Surgery attending / team during AM rounds.
SCDs for DVT prophylaxis.  Eliquis 2.5 bid  Protonix for GI prophylaxis.    Dispo: Home with ram, PICC line, and IV antibiotics.   Plan to be discussed / reviewed with CT Surgery attending / team during AM rounds.
SCDs for DVT prophylaxis.  Protonix for GI prophylaxis.    Dispo: Home with ram, PICC line, and IV antibiotics.   Plan to be discussed / reviewed with CT Surgery attending / team during AM rounds.
SCDs for DVT prophylaxis.  Eliquis 2.5 bid  Protonix for GI prophylaxis.    Dispo: Home with ram, PICC line, and IV antibiotics.   Plan to be discussed / reviewed with CT Surgery attending / team during AM rounds.
Subcutaneous Heparin and SCDs for DVT prophylaxis.  Protonix for GI prophylaxis.
Subcutaneous Heparin and SCDs for DVT prophylaxis.  Protonix for GI prophylaxis.
Eliquis and SCDs for DVT prophylaxis.  Protonix for GI prophylaxis.    Dispo: Home with ram, PICC line, and IV antibiotics.   Plan to be discussed / reviewed with CT Surgery attending / team during AM rounds.

## 2023-01-03 NOTE — PROCEDURE NOTE - NSPOSTCAREGUIDE_GEN_A_CORE
Verbal/written post procedure instructions were given to patient/caregiver/Care for catheter as per unit/ICU protocols
Verbal/written post procedure instructions were given to patient/caregiver/Instructed patient/caregiver to follow-up with primary care physician/Instructed patient/caregiver regarding signs and symptoms of infection/Keep the cast/splint/dressing clean and dry/Care for catheter as per unit/ICU protocols

## 2023-01-03 NOTE — PROGRESS NOTE ADULT - PROBLEM SELECTOR PLAN 3
-S/p AVR , MVR  -Management per CT SX
Plan as above.
CTSx following.    Strict I and O's
EP following, rec surgical ablation and ESTEFANY clip/ligation if open heart surgery is performed.   Dig load as per Dr. Flanagan, now dc'd d/t kidney disease.  Continue Lopressor for Rate control, with Midodrine for BP support.   Continue heparin gtt, PTT goal 55-90 per JG.
EP following, rec surgical ablation and ESTEFANY clip/ligation if open heart surgery
Initially post op pt was ventricular paced at 60bpm with no underlying escape rhythm.   Continues to be NSR with LBBB with prolonged RI interval.  No pauses or pacing overnight on telemetry.   Dopamine weaned off 12/19/22.   EP following.   EPWs isolated.   Beta blocker held at this time due to Midodrine use for BP support with IV diuresis.   Continue Eliquis 2.5BID.   TTE 12/19 with trivial pericardial effusion.  MCOT on discharge set up by EP.
Plan as above.
Plan as above.
plan as above
EP following, rec surgical ablation and ESTEFANY clip/ligation if open heart surgery  Dig load as per Dr Flanagan,  C/w Lopressor for Rate control
Initially post op pt was ventricular paced at 60bpm with no underlying escape rhythm.   Continues to be NSR with LBBB with prolonged KY interval.  No pauses or pacing overnight on telemetry.   Dopamine weaned off 12/19/22.   EP following.   EPWs isolated.   Beta blocker held at this time due to Midodrine use for BP support with IV diuresis.   Continue Eliquis 2.5BID.   TTE 12/19 with trivial pericardial effusion.
Plan as above.
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  No cardioversion was performed.      CTS planning surgery next week and requesting [facility] prior to surgery  [facility] scheduled for Monday- as D/W interventional, Dr Dunn  NPKAYLA Sunday night.
.  - Possible root abscess on LILIAN; but no findings on Indium scan  - ID following  - Possible surgical intervention next week, CTSx following
EP following, rec surgical ablation and ESTEFANY clip/ligation if open heart surgery  Dig load as per Dr Flanagan, now dc'd d/t kidney disease  C/w Lopressor for Rate control  Continue heparin gtt, PTT goal 55-90 per JG
EP following, rec surgical ablation and ESTEFANY clip/ligation if open heart surgery  Dig load as per Dr Flanagan, now dc'd d/t kidney disease  C/w Lopressor for Rate control  Continue heparin gtt, PTT goal 55-90 per JG
Initially post op pt was ventricular paced at 60bpm with no underlying escape rhythm.   Continues to be NSR with LBBB with prolonged SC interval.  No pauses or pacing overnight on telemetry.   dopamine weaned off   EP following.   EPW VVI backup at 50.   Beta blocker to be discussed in AM   May require anticoagulation at some point with newly diagnosed AFib with CHADSVASC 3.
EP following, rec surgical ablation and ESTEFANY clip/ligation if open heart surgery  Dig load as per Dr Flanagan,  C/w Lopressor for Rate control
EP following, rec surgical ablation and ESTEFANY clip/ligation if open heart surgery is performed.   Dig load as per Dr. Flanagan, now dc'd d/t kidney disease.  Continue Lopressor for Rate control, with Midodrine for BP support.   Heparin gtt discontinued due to Hematuria as per Dr. Flanagan.
.  - CTSx following
Initially post op pt was ventricular paced at 60bpm with no underlying escape rhythm.   Continues to be NSR with LBBB with prolonged [place] interval.  No pauses or pacing overnight on telemetry.   dopamine weaned off   EP following.   EPW VVI backup at 50.   Beta blocker to be discussed in AM   May require anticoagulation at some point with newly diagnosed AFib with CHADSVASC 3.
Initially post op pt was ventricular paced at 60bpm with no underlying escape rhythm.   Continues to be NSR with LBBB with prolonged LA interval.  No pauses or pacing overnight on telemetry.   Dopamine weaned off 12/19/22.   EP following.   EPWs isolated.   Beta blocker held at this time due to Midodrine use for BP support with IV diuresis.   Continue Eliquis 2.5BID.   TTE 12/19 with trivial pericardial effusion.  MCOT on discharge set up by EP.
.  - 1/22 revealed scattered bilateral parieto-occipital acute infarcts.    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   - cotn asa  - as per neurology "will likely need cerebral angiogram to rule out mycotic aneurysm prior to surgical intervention"
EP following, rec surgical ablation and ESTEFANY clip/ligation if open heart surgery  Dig load as per Dr Flanagan,  C/w Lopressor for Rate control
EP following, rec surgical ablation and ESTEFANY clip/ligation if open heart surgery  Dig load as per Dr Flanagan, maintenance dose decreased d/t kidney disease  C/w Lopressor for Rate control
Syncope  AS Moderate   No ventricular arrhythmias or bradycardia on monitor  Hypotensive on Ems arrival  fluid resuscitate  syncope likely related to hypovolemic secondary to diarrhea  Keep k >4 and MG >2  Continue telemetry monitor  Midodrine to maintain b/p
plan as above
Initially post op pt was ventricular paced at 60bpm with no underlying escape rhythm.   Continues to be NSR with LBBB with prolonged GA interval.  No pauses or pacing overnight on telemetry.   Dopamine weaned off 12/19/22.   EP following.   EPWs isolated.   Beta blocker held at this time due to Midodrine use for BP support with IV diuresis.   Continue Eliquis 2.5BID.   TTE 12/19 with trivial pericardial effusion.  MCOT on discharge set up by EP.
Initially post op pt was ventricular paced at 60bpm with no underlying escape rhythm.   Continues to be NSR with LBBB with prolonged ND interval.  No pauses or pacing overnight on telemetry.   Dopamine weaned off 12/19/22.   EP following.   EPWs isolated.   Beta blocker held at this time due to Midodrine use for BP support with IV diuresis.   Continue Eliquis 2.5BID.   TTE 12/19 with trivial pericardial effusion.  MCOT on discharge set up by EP.
EP following, rec surgical ablation and ESTEFANY clip/ligation if open heart surgery is performed.   Dig load as per Dr. Flanagan, now dc'd d/t kidney disease.  Continue Lopressor for Rate control, with Midodrine for BP support.   Continue heparin gtt, PTT goal 55-90 per JG.
EP following, rec surgical ablation and ESTEFANY clip/ligation if open heart surgery is performed.   s/p Dig load as per Dr. Flanagan, now dc'd d/t kidney disease.  Continue Lopressor for Rate control, with Midodrine for BP support.   Heparin gtt discontinued due to Hematuria 12/10 as per Dr. Flanagan.
Initially post op pt was ventricular paced at 60bpm with no underlying escape rhythm.   Continues to be NSR with LBBB with prolonged TX interval.  No pauses or pacing overnight on telemetry.   Weaning off Dopamine gtt.  EP following.   EPW VVI backup at 40.   Beta blocker contraindicated at this time.  May require anticoagulation at some point with newly diagnosed AFib with CHADSVASC 3.
Plan as above.
plan as above
Initially post op pt was ventricular paced at 60bpm with no underlying escape rhythm.   Continues to be NSR with LBBB with prolonged AK interval.  No pauses or pacing overnight on telemetry.   Dopamine weaned off 12/19/22.   EP following.   EPWs isolated.   Beta blocker held at this time due to Midodrine use for BP support with IV diuresis.   Continue Eliquis 2.5BID.   TTE 12/19 with trivial pericardial effusion.  MCOT on discharge to be set up by EP.
Plan as above.
Plan as above.
plan as above
Plan as above.

## 2023-01-03 NOTE — PROGRESS NOTE ADULT - PROBLEM SELECTOR PLAN 1
resolved  Patient was upgraded to CTICU 12/26 r/o sepsis - rigors, rectal Temp 102, lactate 3, hypotensive SBP 50s  Blood cultures x 2 from 12/26 No growth  Urine culture from 12/26 No growth  Blood cultures from 12/27 no growth  ID -cont abx No

## 2023-01-03 NOTE — PROGRESS NOTE ADULT - SUBJECTIVE AND OBJECTIVE BOX
Subjective: Patient states "I want to go home" At time of exam, Pt denies chest pain, palpitations, dizziness, headache, shortness of breath, abdominal pain or N/V/D/C.    Pertinent events of the past 24 hours: Uneventful, recovering as expected    VITAL SIGNS  Vital Signs Last 24 Hrs  T(C): 36.6 (23 @ 00:30), Max: 36.7 (23 @ 20:42)  T(F): 97.9 (23 @ 00:30), Max: 98 (23 @ 20:42)  HR: 78 (23 @ 00:30) (74 - 83)  BP: 130/70 (23 @ 00:30) (122/68 - 141/71)  RR: 18 (23 @ 00:30) (18 - 18)  SpO2: 99% (23 @ 00:30) (96% - 99%)  on (O2)              Telemetry/Alarms:  Junctional/Paced 70-80s    MEDICATIONS  acetaminophen     Tablet .. 650 milliGRAM(s) Oral every 6 hours PRN  apixaban 2.5 milliGRAM(s) Oral two times a day  aspirin enteric coated 81 milliGRAM(s) Oral daily  chlorhexidine 2% Cloths 1 Application(s) Topical daily  chlorhexidine 4% Liquid 1 Application(s) Topical <User Schedule>  lactobacillus acidophilus 1 Tablet(s) Oral two times a day with meals  magnesium oxide 400 milliGRAM(s) Oral daily  meropenem Injectable 1000 milliGRAM(s) IV Push every 8 hours  ondansetron Injectable 4 milliGRAM(s) IV Push once PRN  pantoprazole    Tablet 40 milliGRAM(s) Oral daily  potassium chloride    Tablet ER 40 milliEquivalent(s) Oral daily  sodium chloride 0.9% lock flush 10 milliLiter(s) IV Push every 1 hour PRN  tamsulosin 0.4 milliGRAM(s) Oral at bedtime    PHYSICAL EXAM  Constitutional: NAD  Neuro: A+O x 3, non-focal, speech clear and intact  CV: regular rate, regular rhythm, +S1S2, no murmurs or rub  Pulm/chest: lung sounds CTA and equal bilaterally, no accessory muscle use noted  Abd: +BS, soft, NT, ND  Ext: MEZA x 4, no C/C/E, +pulese  Skin: warm, well perfused  Psych: calm, appropriate affect  Incision: midline sternal incision open to air, healing appropriately, sternum stable, no audible sternal click, Right groin soft, nonternder no hematoma    Intake and Output   @ 07:01  -  02 @ 07:00  --------------------------------------------------------  IN: 0 mL / OUT: 2850 mL / NET: -2850 mL     @ 07:01  -  03 @ 01:32  --------------------------------------------------------  IN: 970 mL / OUT: 3850 mL / NET: -2880 mL    Weights:  Daily     Daily Weight in k.2 (2023 05:47)  Admit Wt: Drug Dosing Weight  Height (cm): 177.8 (30 Dec 2022 09:25)  Weight (kg): 77.111 (30 Dec 2022 09:25)  BMI (kg/m2): 24.4 (30 Dec 2022 09:25)  BSA (m2): 1.95 (30 Dec 2022 09:25)    All laboratory results, radiology and medications reviewed.    LABS      139  |  103  |  16.1  ----------------------------<  67<L>  4.5   |  25.0  |  0.71    Ca    10.0      2023 05:49  Phos  3.1     -01  Mg     1.8                                        11.4   10.04 )-----------( 312      ( 2023 05:49 )             36.1              CAPILLARY BLOOD GLUCOSE  POCT Blood Glucose.: 89 mg/dL (2023 22:06)         < from: Xray Chest 1 View- PORTABLE-Routine (Xray Chest 1 View- PORTABLE-Routine in AM.) (23 @ 05:07) >    IMPRESSION:  Pulmonary congestion and left pleural effusion as noted.    < end of copied text >

## 2023-01-03 NOTE — PROGRESS NOTE ADULT - REASON FOR ADMISSION
septic shock unknown source

## 2023-01-03 NOTE — PROGRESS NOTE ADULT - PROBLEM SELECTOR PROBLEM 7
Diarrhea
Diarrhea
Urinary retention
Diarrhea
Urinary retention
Diarrhea
Urinary retention
Diarrhea
Urinary retention
Urinary retention
Diarrhea
Diarrhea
Urinary retention
Diarrhea

## 2023-01-03 NOTE — PROCEDURE NOTE - NSINDICATIONS_GEN_A_CORE
arterial puncture to obtain ABG's/critical patient/monitoring purposes
critical illness/hemodynamic monitoring/volume resuscitation
antibiotic therapy

## 2023-01-03 NOTE — PROCEDURE NOTE - NSPROCDETAILS_GEN_ALL_CORE
location identified, draped/prepped, sterile technique used, needle inserted/introduced/positive blood return obtained via catheter/connected to a pressurized flush line/sutured in place/hemostasis with direct pressure, dressing applied/Seldinger technique/all materials/supplies accounted for at end of procedure
location identified, draped/prepped, sterile technique used/sterile dressing applied/sterile technique, catheter placed/supine position/ultrasound guidance
guidewire recovered/lumen(s) aspirated and flushed/sterile dressing applied/sterile technique, catheter placed/ultrasound guidance with use of sterile gel and probe cove

## 2023-01-03 NOTE — PROGRESS NOTE ADULT - PROBLEM SELECTOR PLAN 6
Barrios placed 12/8 for >1L residual urine in bladder.  Continue to monitor.   Continue Flomax.
Resolved.   Urology consult appreciated.  Monitor H/H.
Barrios placed 12/8 for >1L residual urine in bladder.  Continue to monitor.   Continue Flomax.   Renal function improved
Barrios placed 12/8 for >1L residual urine in bladder.  Failed TOV 12/19 with Barrios replaced.  Patient to be discharged with Barrios with plan to follow up with Urology as an outpatient.    Continue Flomax.  Continue to monitor.
Resolved.   Urology consult appreciated.  Monitor H/H.
Barrios placed 12/8 for >1L residual urine in bladder.  Continue to monitor.   Continue Flomax.   Renal function improved
Barrios placed 12/8 for >1L residual urine in bladder.  Continue to monitor.   Continue Flomax.   Renal function improved
Ram placed 12/8 for >1L residual urine in bladder.  Failed TOV 12/19 ram replaced and to remain, pt can f/u as outpt with urology as per urology notes   Continue to monitor.   Continue Flomax.
Resolved.   Urology consult appreciated.  Monitor H/H.
Barrios placed 12/8 for >1L residual urine in bladder.  Failed TOV 12/19 with Barrios replaced.  Patient to be discharged with Barrios with plan to follow up with Urology as an outpatient.    Continue Flomax.  Continue to monitor.
Resolved.   Urology consult appreciated.  Monitor H/H.
Barrios placed 12/8 for >1L residual urine in bladder.  Continue to monitor.   Continue Flomax.
Resolved.   Urology consult appreciated.  Monitor H/H.
Barrios placed 12/8 for >1L residual urine in bladder.  Continue to monitor.   Continue Flomax.   Renal function continues to normalize.
Barrios placed 12/8 for >1L residual urine in bladder.  Continue to monitor.   Resume Flomax.   Renal function improved
Barrios placed 12/8 for >1L residual urine in bladder.  Failed TOV 12/19 with Barrios replaced.  Patient to be discharged with Barrios with plan to follow up with Urology as an outpatient.    Continue Flomax.  Continue to monitor.
Improved.  Prior norovirus + 11/3.  GI consult appreciated.  Continue imodium and cholestyramine.
Resolved.   Urology consult appreciated.  Monitor H/H.
Barrios placed 12/8 for >1L residual urine in bladder.  Failed TOV 12/19 with Barrios replaced.  Patient to be discharged with Barrios with plan to follow up with Urology as an outpatient.    Continue Flomax.  Continue to monitor.
.  - rectal tube removed  - norovirus + 11/3  - tx as per primary team
Improved  Prior norovirus + 11/3.  GI consult appreciated.  Continue imodium and cholestyramine.
Resolved.   Urology consult appreciated.  Monitor H/H.
Barrios placed 12/8 for >1L residual urine in bladder.  Continue to monitor.   restart Flomax.   Renal function improved
Resolved.   Urology consult appreciated.  Monitor H/H.
Barrios placed 12/8 for >1L residual urine in bladder.  Failed TOV 12/19 with Barrios replaced.  Patient to be discharged with Barrios with plan to follow up with Urology as an outpatient.    Continue Flomax.  Continue to monitor.
Barrios placed 12/8 for >1L residual urine in bladder.  Failed TOV 12/19 with Barrios replaced.  Patient to be discharged with Barrios with plan to follow up with Urology as an outpatient.    Continue Flomax.  Continue to monitor.

## 2023-01-03 NOTE — PROGRESS NOTE ADULT - PROBLEM SELECTOR PROBLEM 6
Diarrhea
Urinary retention
Hematuria
Diarrhea
Urinary retention
Hematuria
Urinary retention
Hematuria
Urinary retention
Diarrhea
Urinary retention
Hematuria
Hematuria
Urinary retention
Urinary retention
Hematuria

## 2023-01-03 NOTE — PROGRESS NOTE ADULT - PROBLEM SELECTOR PLAN 5
Urology consult appreciated, agreed with stopping heparin for now.  Irrigate the Barrios PRN.  Monitor H/H.
Urology consult appreciated  Irrigate the Barrios PRN.  Monitor H/H.  urine improved
Resolved.   Urology consult appreciated.  Monitor H/H.
Resolved.   Urology consult appreciated.  Monitor H/H.
Resolved  Barrios remains in place as patient failed TOV 12/19.   Avoid nephrotoxic drugs.  UCx negative 12/8.  Repeat UCx with sepsis work up from 12/26 NGTD. UA negative.
Resolved.   Urology consult appreciated.  Monitor H/H.
Urology consult appreciated  Irrigate the Barrios PRN.  Monitor H/H.  urine improved
Resolved  Avoid nephrotoxic drugs.
Resolved.   Barrios remains in place as patient failed TOV 12/19.   Avoid nephrotoxic drugs.  UCx negative 12/8.  Repeat UCx with sepsis work up from 12/26 pending. UA negative.
Resolved.   Urology consult appreciated.  Monitor H/H.
Urology consult appreciated  Monitor H/H.
improved  prior norovirus + 11/3  GI consult appreciated  neg c diff ? antibiotics associated  cont imodium and cholestyramine
.  - 12/1/22 revealed scattered bilateral parieto-occipital acute infarcts.    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   - cont asa  - as per neurology "will likely need cerebral angiogram to rule out mycotic aneurysm prior to surgical intervention".
Barrios placed 12/8 for >1L residual urine in bladder.  Continue to monitor.   Trend renal function.
1/22 revealed scattered bilateral parieto-occipital acute infarcts.    Carotid US without evidence of Etiology likely cardioembolic in the setting of atrial fibrillation and endocarditis.   MRA Head w/o and Neck w/contrast if no contraindication ,   Patient will likely need cerebral angiogram to rule out mycotic aneurysm prior to surgical intervention.  Continue  ASA 81mg daily. Heparin gtt/ PTT goal 50-70 for now as to minimize risk of hemorrhagic transformation in setting of acute cerebral infarction, avoid bolus if feasible.
Resolved.   Urology consult appreciated.  Monitor H/H.
improved  prior norovirus + 11/3  GI consult appreciated  neg c diff ? antibiotics associated  cont imodium and cholestyramine
Resolved.   Barrios remains in place as patient failed TOV 12/19.   Avoid nephrotoxic drugs.  UCx negative 12/8.  Repeat UCx with sepsis work up from 12/26 NGTD. UA negative.
Resolved  Barrios remains in place as patient failed TOV 12/19.   Avoid nephrotoxic drugs.  UCx negative 12/8.  Repeat UCx with sepsis work up from 12/26 NGTD. UA negative.
Barrios placed 12/8 for >1L residual urine in bladder.  Continue to monitor.   Renal function continues to normalize.
Resolved.   Urology consult appreciated.  Monitor H/H.
Resolved.   Urology consult appreciated.  Monitor H/H.
Urology consult appreciated  Irrigate the Barrios PRN.  Monitor H/H.  urine improved
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
Urology consult appreciated, gabby d/c'd  Irrigate the Barrios PRN.  Monitor H/H.  urine improved
Resolved  Avoid nephrotoxic drugs.
Resolved.   Urology consult appreciated.  Monitor H/H.
MRI head performed on 12/1 as part of w/u revealed acute ischemia embolic phenomenon is suggested.    Neuro following for possible cerebral angiogram to be determined.    Heparin as per neuro - ICU  ASA per CTS   Statin - as per neuro
Resolved.   Urology consult appreciated.  Monitor H/H.
Resolved.   Barrios remains in place as patient failed TOV 12/19.   Avoid nephrotoxic drugs.  UCx negative 12/8.  Repeat UCx with sepsis work up from 12/26 NGTD. UA negative.
Resolved  Barrios remains in place as patient failed TOV 12/19.   Avoid nephrotoxic drugs.  UCx negative 12/8.  Repeat UCx with sepsis work up from 12/26 NGTD. UA negative.

## 2023-01-03 NOTE — PROGRESS NOTE ADULT - PROVIDER SPECIALTY LIST ADULT
Cardiology
Critical Care
Electrophysiology
Infectious Disease
Nephrology
CT Surgery
Cardiology
Critical Care
Electrophysiology
Infectious Disease
Urology
CT Surgery
Critical Care
Electrophysiology
Infectious Disease
MICU
Nephrology
Nephrology
CT Surgery
Cardiology
Critical Care
Electrophysiology
Gastroenterology
Infectious Disease
Intervent Cardiology
Nephrology
Neurology
CT Surgery
CT Surgery
Critical Care
CT Surgery
CT Surgery
MICU
Cardiology
CT Surgery
Cardiology
CT Surgery
CT Surgery
Cardiology
CT Surgery
Cardiology
CT Surgery

## 2023-01-03 NOTE — PROGRESS NOTE ADULT - NUTRITIONAL ASSESSMENT
This patient has been assessed with a concern for Malnutrition and has been determined to have a diagnosis/diagnoses of Severe protein-calorie malnutrition.    This patient is being managed with:   Diet DASH/TLC-  Sodium & Cholesterol Restricted  High Fiber (HIFIBER)  Entered: Dec  1 2022  9:31AM    
This patient has been assessed with a concern for Malnutrition and has been determined to have a diagnosis/diagnoses of Severe protein-calorie malnutrition.    This patient is being managed with:   Diet DASH/TLC-  Sodium & Cholesterol Restricted  High Fiber (HIFIBER)  Entered: Dec  1 2022  9:31AM    
This patient has been assessed with a concern for Malnutrition and has been determined to have a diagnosis/diagnoses of Severe protein-calorie malnutrition.    This patient is being managed with:   Diet NPO after Midnight-     NPO Start Date: 04-Dec-2022   NPO Start Time: 23:59  Entered: Dec  4 2022  8:03AM    Diet DASH/TLC-  Sodium & Cholesterol Restricted  High Fiber (HIFIBER)  Entered: Dec  1 2022  9:31AM    
This patient has been assessed with a concern for Malnutrition and has been determined to have a diagnosis/diagnoses of Severe protein-calorie malnutrition.    This patient is being managed with:   Diet NPO after Midnight-     NPO Start Date: 04-Dec-2022   NPO Start Time: 23:59  Entered: Dec  4 2022  8:03AM    Diet DASH/TLC-  Sodium & Cholesterol Restricted  High Fiber (HIFIBER)  Entered: Dec  1 2022  9:31AM    
This patient has been assessed with a concern for Malnutrition and has been determined to have a diagnosis/diagnoses of Severe protein-calorie malnutrition.      
This patient has been assessed with a concern for Malnutrition and has been determined to have a diagnosis/diagnoses of Severe protein-calorie malnutrition.    This patient is being managed with:   Diet DASH/TLC-  Sodium & Cholesterol Restricted  High Fiber (HIFIBER)  Entered: Dec  1 2022  9:31AM    
This patient has been assessed with a concern for Malnutrition and has been determined to have a diagnosis/diagnoses of Severe protein-calorie malnutrition.    This patient is being managed with:   Diet DASH/TLC-  Sodium & Cholesterol Restricted  1500mL Fluid Restriction ([id number])  Supplement Feeding Modality:  Oral  Ensure Enlive Cans or Servings Per Day:  2       Frequency:  Two Times a day  Entered: Dec 21 2022  7:39AM    
This patient has been assessed with a concern for Malnutrition and has been determined to have a diagnosis/diagnoses of Severe protein-calorie malnutrition.    This patient is being managed with:   Diet DASH/TLC-  Sodium & Cholesterol Restricted  1500mL Fluid Restriction ([id number])  Supplement Feeding Modality:  Oral  Ensure Enlive Cans or Servings Per Day:  2       Frequency:  Two Times a day  Entered: Dec 21 2022  7:39AM    
This patient has been assessed with a concern for Malnutrition and has been determined to have a diagnosis/diagnoses of Severe protein-calorie malnutrition.    This patient is being managed with:   Diet DASH/TLC-  Sodium & Cholesterol Restricted  1500mL Fluid Restriction ([id number])  Supplement Feeding Modality:  Oral  Ensure Enlive Cans or Servings Per Day:  2       Frequency:  Two Times a day  Entered: Dec 30 2022  4:57PM    
This patient has been assessed with a concern for Malnutrition and has been determined to have a diagnosis/diagnoses of Severe protein-calorie malnutrition.    This patient is being managed with:   Diet DASH/TLC-  Sodium & Cholesterol Restricted  1500mL Fluid Restriction ([id number])  Supplement Feeding Modality:  Oral  Ensure Enlive Cans or Servings Per Day:  2       Frequency:  Two Times a day  Entered: Dec 21 2022  7:39AM    
This patient has been assessed with a concern for Malnutrition and has been determined to have a diagnosis/diagnoses of Severe protein-calorie malnutrition.    This patient is being managed with:   Diet DASH/TLC-  Sodium & Cholesterol Restricted  Entered: Nov 27 2022  8:43AM    
This patient has been assessed with a concern for Malnutrition and has been determined to have a diagnosis/diagnoses of Severe protein-calorie malnutrition.    This patient is being managed with:   Diet NPO-  Entered: Dec 30 2022  2:47AM    
This patient has been assessed with a concern for Malnutrition and has been determined to have a diagnosis/diagnoses of Severe protein-calorie malnutrition.    This patient is being managed with:   Diet DASH/TLC-  Sodium & Cholesterol Restricted  1500mL Fluid Restriction ([id number])  Supplement Feeding Modality:  Oral  Ensure Enlive Cans or Servings Per Day:  2       Frequency:  Two Times a day  Entered: Dec 21 2022  7:39AM    
This patient has been assessed with a concern for Malnutrition and has been determined to have a diagnosis/diagnoses of Severe protein-calorie malnutrition.    This patient is being managed with:   Diet DASH/TLC-  Sodium & Cholesterol Restricted  1500mL Fluid Restriction ([id number])  Supplement Feeding Modality:  Oral  Ensure Enlive Cans or Servings Per Day:  2       Frequency:  Two Times a day  Entered: Dec 30 2022  4:57PM    
This patient has been assessed with a concern for Malnutrition and has been determined to have a diagnosis/diagnoses of Severe protein-calorie malnutrition.    This patient is being managed with:   Diet DASH/TLC-  Sodium & Cholesterol Restricted  1500mL Fluid Restriction ([id number])  Supplement Feeding Modality:  Oral  Ensure Enlive Cans or Servings Per Day:  2       Frequency:  Two Times a day  Entered: Dec 21 2022  7:39AM    
This patient has been assessed with a concern for Malnutrition and has been determined to have a diagnosis/diagnoses of Severe protein-calorie malnutrition.    This patient is being managed with:   Diet DASH/TLC-  Sodium & Cholesterol Restricted  1500mL Fluid Restriction ([id number])  Supplement Feeding Modality:  Oral  Ensure Enlive Cans or Servings Per Day:  2       Frequency:  Two Times a day  Entered: Dec 21 2022  7:39AM    
This patient has been assessed with a concern for Malnutrition and has been determined to have a diagnosis/diagnoses of Severe protein-calorie malnutrition.    This patient is being managed with:   Diet DASH/TLC-  Sodium & Cholesterol Restricted  High Fiber (HIFIBER)  Entered: Dec  1 2022  9:31AM      This patient has been assessed with a concern for Malnutrition and has been determined to have a diagnosis/diagnoses of Severe protein-calorie malnutrition.    This patient is being managed with:   Diet DASH/TLC-  Sodium & Cholesterol Restricted  High Fiber (HIFIBER)  Entered: Dec  1 2022  9:31AM    
This patient has been assessed with a concern for Malnutrition and has been determined to have a diagnosis/diagnoses of Severe protein-calorie malnutrition.    This patient is being managed with:   Diet NPO after Midnight-     NPO Start Date: 04-Dec-2022   NPO Start Time: 23:59  Entered: Dec  4 2022  8:03AM    Diet DASH/TLC-  Sodium & Cholesterol Restricted  High Fiber (HIFIBER)  Entered: Dec  1 2022  9:31AM    
This patient has been assessed with a concern for Malnutrition and has been determined to have a diagnosis/diagnoses of Severe protein-calorie malnutrition.    This patient is being managed with:   Diet NPO-  Entered: Dec 30 2022  2:47AM    
This patient has been assessed with a concern for Malnutrition and has been determined to have a diagnosis/diagnoses of Severe protein-calorie malnutrition.    This patient is being managed with:   Diet Regular-  Consistent Carbohydrate {No Snacks} (CSTCHO)  DASH/TLC {Sodium & Cholesterol Restricted} (DASH)  Entered: Dec 14 2022  2:21AM    
This patient has been assessed with a concern for Malnutrition and has been determined to have a diagnosis/diagnoses of Severe protein-calorie malnutrition.    This patient is being managed with:   Diet Regular-  Consistent Carbohydrate {No Snacks} (CSTCHO)  DASH/TLC {Sodium & Cholesterol Restricted} (DASH)  Entered: Dec 14 2022  2:21AM    
This patient has been assessed with a concern for Malnutrition and has been determined to have a diagnosis/diagnoses of Severe protein-calorie malnutrition.    This patient is being managed with:   Diet Regular-  Consistent Carbohydrate {No Snacks} (CSTCHO)  DASH/TLC {Sodium & Cholesterol Restricted} (DASH)  Supplement Feeding Modality:  Oral  Ensure Enlive Cans or Servings Per Day:  1       Frequency:  Daily  Entered: Dec 15 2022  7:00PM    
This patient has been assessed with a concern for Malnutrition and has been determined to have a diagnosis/diagnoses of Severe protein-calorie malnutrition.    This patient is being managed with:   Diet DASH/TLC-  Sodium & Cholesterol Restricted  High Fiber (HIFIBER)  Entered: Dec  1 2022  9:31AM    
This patient has been assessed with a concern for Malnutrition and has been determined to have a diagnosis/diagnoses of Severe protein-calorie malnutrition.    This patient is being managed with:   Diet DASH/TLC-  Sodium & Cholesterol Restricted  High Fiber (HIFIBER)  Entered: Dec  1 2022  9:31AM    
This patient has been assessed with a concern for Malnutrition and has been determined to have a diagnosis/diagnoses of Severe protein-calorie malnutrition.    This patient is being managed with:   Diet DASH/TLC-  Sodium & Cholesterol Restricted  1500mL Fluid Restriction ([id number])  Supplement Feeding Modality:  Oral  Ensure Enlive Cans or Servings Per Day:  2       Frequency:  Two Times a day  Entered: Dec 30 2022  4:57PM    
This patient has been assessed with a concern for Malnutrition and has been determined to have a diagnosis/diagnoses of Severe protein-calorie malnutrition.    This patient is being managed with:   Diet DASH/TLC-  Sodium & Cholesterol Restricted  High Fiber (HIFIBER)  Entered: Dec  1 2022  9:31AM    
This patient has been assessed with a concern for Malnutrition and has been determined to have a diagnosis/diagnoses of Severe protein-calorie malnutrition.    This patient is being managed with:   Diet Regular-  Consistent Carbohydrate {No Snacks} (CSTCHO)  DASH/TLC {Sodium & Cholesterol Restricted} (DASH)  Supplement Feeding Modality:  Oral  Ensure Enlive Cans or Servings Per Day:  1       Frequency:  Daily  Entered: Dec 15 2022  7:00PM    
This patient has been assessed with a concern for Malnutrition and has been determined to have a diagnosis/diagnoses of Severe protein-calorie malnutrition.    This patient is being managed with:   Diet DASH/TLC-  Sodium & Cholesterol Restricted  High Fiber (HIFIBER)  Entered: Dec  1 2022  9:31AM    
This patient has been assessed with a concern for Malnutrition and has been determined to have a diagnosis/diagnoses of Severe protein-calorie malnutrition.    This patient is being managed with:   Diet NPO after Midnight-     NPO Start Date: 04-Dec-2022   NPO Start Time: 23:59  Entered: Dec  4 2022  8:03AM    Diet DASH/TLC-  Sodium & Cholesterol Restricted  High Fiber (HIFIBER)  Entered: Dec  1 2022  9:31AM    
This patient has been assessed with a concern for Malnutrition and has been determined to have a diagnosis/diagnoses of Severe protein-calorie malnutrition.    This patient is being managed with:   Diet Regular-  Consistent Carbohydrate {No Snacks} (CSTCHO)  DASH/TLC {Sodium & Cholesterol Restricted} (DASH)  Supplement Feeding Modality:  Oral  Ensure Enlive Cans or Servings Per Day:  1       Frequency:  Daily  Entered: Dec 15 2022  7:00PM    
This patient has been assessed with a concern for Malnutrition and has been determined to have a diagnosis/diagnoses of Severe protein-calorie malnutrition.    This patient is being managed with:   Diet DASH/TLC-  Sodium & Cholesterol Restricted  1500mL Fluid Restriction ([id number])  Supplement Feeding Modality:  Oral  Ensure Enlive Cans or Servings Per Day:  2       Frequency:  Two Times a day  Entered: Dec 21 2022  7:39AM    
This patient has been assessed with a concern for Malnutrition and has been determined to have a diagnosis/diagnoses of Severe protein-calorie malnutrition.    This patient is being managed with:   Diet DASH/TLC-  Sodium & Cholesterol Restricted  1500mL Fluid Restriction ([id number])  Supplement Feeding Modality:  Oral  Ensure Enlive Cans or Servings Per Day:  2       Frequency:  Two Times a day  Entered: Dec 21 2022  7:39AM    
This patient has been assessed with a concern for Malnutrition and has been determined to have a diagnosis/diagnoses of Severe protein-calorie malnutrition.    This patient is being managed with:   Diet DASH/TLC-  Sodium & Cholesterol Restricted  High Fiber (HIFIBER)  Entered: Dec  1 2022  9:31AM    
This patient has been assessed with a concern for Malnutrition and has been determined to have a diagnosis/diagnoses of Severe protein-calorie malnutrition.    This patient is being managed with:   Diet DASH/TLC-  Sodium & Cholesterol Restricted  1500mL Fluid Restriction ([id number])  Supplement Feeding Modality:  Oral  Ensure Enlive Cans or Servings Per Day:  2       Frequency:  Two Times a day  Entered: Dec 30 2022  4:57PM    
This patient has been assessed with a concern for Malnutrition and has been determined to have a diagnosis/diagnoses of Severe protein-calorie malnutrition.    This patient is being managed with:   Diet Regular-  Consistent Carbohydrate {No Snacks} (CSTCHO)  DASH/TLC {Sodium & Cholesterol Restricted} (DASH)  Supplement Feeding Modality:  Oral  Ensure Enlive Cans or Servings Per Day:  1       Frequency:  Daily  Entered: Dec 15 2022  7:00PM    
This patient has been assessed with a concern for Malnutrition and has been determined to have a diagnosis/diagnoses of Severe protein-calorie malnutrition.    This patient is being managed with:   Diet DASH/TLC-  Sodium & Cholesterol Restricted  1500mL Fluid Restriction ([id number])  Supplement Feeding Modality:  Oral  Ensure Enlive Cans or Servings Per Day:  2       Frequency:  Two Times a day  Entered: Dec 21 2022  7:39AM    
This patient has been assessed with a concern for Malnutrition and has been determined to have a diagnosis/diagnoses of Severe protein-calorie malnutrition.    This patient is being managed with:   Diet DASH/TLC-  Sodium & Cholesterol Restricted  1500mL Fluid Restriction ([id number])  Supplement Feeding Modality:  Oral  Ensure Enlive Cans or Servings Per Day:  2       Frequency:  Two Times a day  Entered: Dec 30 2022  4:57PM    
This patient has been assessed with a concern for Malnutrition and has been determined to have a diagnosis/diagnoses of Severe protein-calorie malnutrition.    This patient is being managed with:   Diet NPO after Midnight-     NPO Start Date: 04-Dec-2022   NPO Start Time: 23:59  Entered: Dec  4 2022  8:03AM    Diet DASH/TLC-  Sodium & Cholesterol Restricted  High Fiber (HIFIBER)  Entered: Dec  1 2022  9:31AM    
This patient has been assessed with a concern for Malnutrition and has been determined to have a diagnosis/diagnoses of Severe protein-calorie malnutrition.      
This patient has been assessed with a concern for Malnutrition and has been determined to have a diagnosis/diagnoses of Severe protein-calorie malnutrition.    This patient is being managed with:   Diet DASH/TLC-  Sodium & Cholesterol Restricted  1500mL Fluid Restriction ([id number])  Supplement Feeding Modality:  Oral  Ensure Enlive Cans or Servings Per Day:  2       Frequency:  Two Times a day  Entered: Dec 30 2022  4:57PM    
This patient has been assessed with a concern for Malnutrition and has been determined to have a diagnosis/diagnoses of Severe protein-calorie malnutrition.    This patient is being managed with:   Diet Regular-  Consistent Carbohydrate {No Snacks} (CSTCHO)  DASH/TLC {Sodium & Cholesterol Restricted} (DASH)  Supplement Feeding Modality:  Oral  Ensure Enlive Cans or Servings Per Day:  1       Frequency:  Daily  Entered: Dec 15 2022  7:00PM    
This patient has been assessed with a concern for Malnutrition and has been determined to have a diagnosis/diagnoses of Severe protein-calorie malnutrition.    This patient is being managed with:   Diet Regular-  Consistent Carbohydrate {No Snacks} (CSTCHO)  DASH/TLC {Sodium & Cholesterol Restricted} (DASH)  Supplement Feeding Modality:  Oral  Ensure Enlive Cans or Servings Per Day:  1       Frequency:  Daily  Entered: Dec 15 2022  7:00PM    
This patient has been assessed with a concern for Malnutrition and has been determined to have a diagnosis/diagnoses of Severe protein-calorie malnutrition.    This patient is being managed with:   Diet DASH/TLC-  Sodium & Cholesterol Restricted  High Fiber (HIFIBER)  Entered: Dec  1 2022  9:31AM    
This patient has been assessed with a concern for Malnutrition and has been determined to have a diagnosis/diagnoses of Severe protein-calorie malnutrition.    This patient is being managed with:   Diet Regular-  Consistent Carbohydrate {No Snacks} (CSTCHO)  DASH/TLC {Sodium & Cholesterol Restricted} (DASH)  Entered: Dec 14 2022  2:21AM    
This patient has been assessed with a concern for Malnutrition and has been determined to have a diagnosis/diagnoses of Severe protein-calorie malnutrition.    This patient is being managed with:   Diet DASH/TLC-  Sodium & Cholesterol Restricted  1500mL Fluid Restriction ([id number])  Supplement Feeding Modality:  Oral  Ensure Enlive Cans or Servings Per Day:  2       Frequency:  Two Times a day  Entered: Dec 21 2022  7:39AM    
This patient has been assessed with a concern for Malnutrition and has been determined to have a diagnosis/diagnoses of Severe protein-calorie malnutrition.    This patient is being managed with:   Diet DASH/TLC-  Sodium & Cholesterol Restricted  High Fiber (HIFIBER)  Entered: Dec  1 2022  9:31AM    
This patient has been assessed with a concern for Malnutrition and has been determined to have a diagnosis/diagnoses of Severe protein-calorie malnutrition.    This patient is being managed with:   Diet DASH/TLC-  Sodium & Cholesterol Restricted  1500mL Fluid Restriction ([id number])  Supplement Feeding Modality:  Oral  Ensure Enlive Cans or Servings Per Day:  2       Frequency:  Two Times a day  Entered: Dec 21 2022  7:39AM    
This patient has been assessed with a concern for Malnutrition and has been determined to have a diagnosis/diagnoses of Severe protein-calorie malnutrition.    This patient is being managed with:   Diet Regular-  Consistent Carbohydrate {No Snacks} (CSTCHO)  DASH/TLC {Sodium & Cholesterol Restricted} (DASH)  Supplement Feeding Modality:  Oral  Ensure Enlive Cans or Servings Per Day:  1       Frequency:  Daily  Entered: Dec 15 2022  7:00PM    
29-Mar-2021

## 2023-01-03 NOTE — PROGRESS NOTE ADULT - PROBLEM SELECTOR PROBLEM 4
RADHA (acute kidney injury)
Diarrhea
RADHA (acute kidney injury)
Atrial fibrillation, new onset
Diarrhea
RADHA (acute kidney injury)
Abscess of aortic root
Atrial fibrillation, new onset
Atrial fibrillation, new onset
RADHA (acute kidney injury)
Atrial fibrillation, new onset
Endocarditis
RADHA (acute kidney injury)
Endocarditis
Diarrhea
Atrial fibrillation, new onset
RADHA (acute kidney injury)
Atrial fibrillation, new onset
RADHA (acute kidney injury)
RADHA (acute kidney injury)
Atrial fibrillation, new onset
Atrial fibrillation, new onset
RADHA (acute kidney injury)

## 2023-01-04 ENCOUNTER — TRANSCRIPTION ENCOUNTER (OUTPATIENT)
Age: 78
End: 2023-01-04

## 2023-01-04 RX ORDER — ERTAPENEM SODIUM 1 G/1
1 INJECTION, POWDER, LYOPHILIZED, FOR SOLUTION INTRAMUSCULAR; INTRAVENOUS
Qty: 0 | Refills: 0 | DISCHARGE
Start: 2023-01-04

## 2023-01-05 ENCOUNTER — APPOINTMENT (OUTPATIENT)
Dept: CARE COORDINATION | Facility: HOME HEALTH | Age: 78
End: 2023-01-05
Payer: MEDICARE

## 2023-01-05 VITALS
BODY MASS INDEX: 23.48 KG/M2 | HEART RATE: 90 BPM | SYSTOLIC BLOOD PRESSURE: 118 MMHG | HEIGHT: 70 IN | DIASTOLIC BLOOD PRESSURE: 70 MMHG | OXYGEN SATURATION: 99 % | RESPIRATION RATE: 16 BRPM | WEIGHT: 164 LBS

## 2023-01-05 PROCEDURE — 99024 POSTOP FOLLOW-UP VISIT: CPT

## 2023-01-05 RX ORDER — VALSARTAN 80 MG/1
80 TABLET, COATED ORAL
Qty: 135 | Refills: 1 | Status: DISCONTINUED | COMMUNITY
Start: 1900-01-01 | End: 2023-01-05

## 2023-01-05 RX ORDER — METOPROLOL SUCCINATE 25 MG/1
25 TABLET, EXTENDED RELEASE ORAL DAILY
Qty: 90 | Refills: 1 | Status: DISCONTINUED | COMMUNITY
Start: 2022-07-15 | End: 2023-01-05

## 2023-01-05 NOTE — ASSESSMENT
[FreeTextEntry1] : Pt recovering well at home s/p OHS. Reviewed all medications and dosages with pt understanding. Pt has all medications in home and is taking as prescribed. Pt administering IV antibiotics without a problem. Pt with a few nosebleeds since being home. He states they stop within minutes with pressure held to bridge of nose. Pt on ASA and Eliquis. Pt eager to use stairs but would prefer to be seen by PT first. Message sent to expedite PT services for pt. Pain controlled with current medication regimen. Pt completing Barrios catheter care without difficulty.  No further new symptoms, issues or concerns to report at this time.\par

## 2023-01-05 NOTE — HISTORY OF PRESENT ILLNESS
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  LHC 12/5 with clean coronaries.\par Post cath patient noted with RADHA and Urinary retention with ram placed 12/8.\par Patient now s/p AVR, MVR, and patch repair of the aortic root on 12/13/22 with\par Dr. Flanagan. Postoperative course significant for pacing requirements due to\par asystole, with patient regaining rhythm 12/15, persistent chest tube output (on IV\par diuresis, colchicine d/c'd due to diarrhea), and persistent urinary retention\par (failed TOV 12/19 with Ram replaced). 12/30 patient upgraded to [facility] for\par monitoring after 14 second pause during sleep. 12/30 s/p Micra leadless PPM. \par  Pt now with s/p Left PICC line, CXR completed.  ABX switched as per ID to\par Ertapenem 1gm Daily for ease of dosing (Will need weekly CBC and CMP faxed to\par [phone], Appointment with us in 7 to 10 days - [phone]). Plan to\par treat for 6 weeks of IV antibiotics from date of surgery until 1/24/23. Pt remained hemodynamically stable and discharged home with support of spouse/family, home care services and the [facility] team. Initial visit completed in home\par CC "I'm doing alright, good as expected"\par

## 2023-01-05 NOTE — REVIEW OF SYSTEMS
[Earache] : no earache [Loss Of Hearing] : no hearing loss [Nosebleeds] : nosebleeds [Nasal Discharge] : no nasal discharge [Sore Throat] : no sore throat [Hoarseness] : no hoarseness [Heart Rate Is Slow] : the heart rate was not slow [Heart Rate Is Fast] : the heart rate was not fast [Chest Pain] : no chest pain [Palpitations] : no palpitations [Leg Claudication] : no intermittent leg claudication [Lower Ext Edema] : lower extremity edema [Negative] : Psychiatric [FreeTextEntry4] : nosebleeds 2-3 times per day, for approx 2-3 mins each, resolves with pressure [FreeTextEntry7] : last BM today [FreeTextEntry8] : Barrios in place to leg bag, gravity drainage, pt denies symptoms

## 2023-01-05 NOTE — REASON FOR VISIT
[Follow-Up: _____] : a [unfilled] follow-up visit [Spouse] : spouse [FreeTextEntry1] : FOLLOW [facility]- Transitional Care Management Program- [facility]\par \par

## 2023-01-05 NOTE — PHYSICAL EXAM
[Sclera] : the sclera and conjunctiva were normal [Neck Appearance] : the appearance of the neck was normal [Respiration, Rhythm And Depth] : normal respiratory rhythm and effort [Exaggerated Use Of Accessory Muscles For Inspiration] : no accessory muscle use [Auscultation Breath Sounds / Voice Sounds] : lungs were clear to auscultation bilaterally [Apical Impulse] : the apical impulse was normal [Heart Rate And Rhythm] : heart rate was normal and rhythm regular [Heart Sounds] : normal S1 and S2 [Murmurs] : no murmurs [Chest Visual Inspection Thoracic Asymmetry] : no chest asymmetry [1+] : left 1+ [FreeTextEntry2] : B/L LE with +2 pedal edema, B/L calves soft, NT [Bowel Sounds] : normal bowel sounds [Abdomen Soft] : soft [Abdomen Tenderness] : non-tender [Abnormal Walk] : normal gait [Skin Color & Pigmentation] : normal skin color and pigmentation [Skin Turgor] : normal skin turgor [] : no rash [FreeTextEntry1] : Left arm PICC site unremarkable, drsg CD&I [Oriented To Time, Place, And Person] : oriented to person, place, and time [Impaired Insight] : insight and judgment were intact [Affect] : the affect was normal

## 2023-01-06 ENCOUNTER — APPOINTMENT (OUTPATIENT)
Dept: UROLOGY | Facility: CLINIC | Age: 78
End: 2023-01-06
Payer: MEDICARE

## 2023-01-06 VITALS — DIASTOLIC BLOOD PRESSURE: 61 MMHG | HEART RATE: 88 BPM | SYSTOLIC BLOOD PRESSURE: 129 MMHG

## 2023-01-06 PROCEDURE — 51702 INSERT TEMP BLADDER CATH: CPT

## 2023-01-09 ENCOUNTER — NON-APPOINTMENT (OUTPATIENT)
Age: 78
End: 2023-01-09

## 2023-01-13 ENCOUNTER — APPOINTMENT (OUTPATIENT)
Dept: UROLOGY | Facility: CLINIC | Age: 78
End: 2023-01-13
Payer: MEDICARE

## 2023-01-13 VITALS — SYSTOLIC BLOOD PRESSURE: 130 MMHG | HEART RATE: 106 BPM | DIASTOLIC BLOOD PRESSURE: 73 MMHG

## 2023-01-13 PROCEDURE — 52000 CYSTOURETHROSCOPY: CPT

## 2023-01-17 ENCOUNTER — APPOINTMENT (OUTPATIENT)
Dept: INTERNAL MEDICINE | Facility: CLINIC | Age: 78
End: 2023-01-17
Payer: MEDICARE

## 2023-01-17 VITALS
HEIGHT: 70 IN | DIASTOLIC BLOOD PRESSURE: 82 MMHG | OXYGEN SATURATION: 99 % | WEIGHT: 164 LBS | SYSTOLIC BLOOD PRESSURE: 121 MMHG | HEART RATE: 113 BPM | BODY MASS INDEX: 23.48 KG/M2

## 2023-01-17 PROCEDURE — 99214 OFFICE O/P EST MOD 30 MIN: CPT

## 2023-01-17 NOTE — HISTORY OF PRESENT ILLNESS
[FreeTextEntry1] : 77 year old male with PMHx of recent  COVID  on 10/6/22, HTN , Vit B12 deficiency, presenting to the ED on 11/2 with body aches, fatigue and fever (Tmax 102) at home for 12 days found to have streptococcus bacteremia and admitted with unclear source, no recent dental work, skin infections, no respiratory symptoms. Pan scan was negative at that time ( LILIAN was recommended but pt refused) He was treated for Bacteremia ( Blood cultures + streptococcus anginosis pansensitive ) and norovirus with supportive care and contact isolation . He was discharged home on 11/7 with IV Rocephin via PICC line. Recommendation was to continue ABX  daily via pic end 11/30/22\par He presented to ER after and episode of syncope and collapse on November 27, 2022, found to be septic, hypotensive, hypoxic and febrile in the ER. \par In response to hypotension he failed to respond to Initial sepsis fluid protocol in ER is required IV pressor in form of levophed to maintain MAP greater than 60 -65. In the ER he was febrile with initial labs significant for WBC of 22.4 , ProCal of 21.2 first lactate 4.9 via abg with repeat post fluids of 2.2 venous sample. \par PT admitted to [facility] with septic shock unclear source. Found to have new onset A fib and on amio. 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  Patient remains in the hospital afterwards and was on meropenem from November 27 to December 25, 2022.  He was switched to ceftriaxone on December 25.  On December 26 patient had to be transferred back to the ICU with hypotension.  He had repeat blood cultures done which were unremarkable.  His antibiotics was switched back to meropenem.  Patient improved from the ICU at which point he was eventually discharged from the hospital on January 3, 2023.  Patient was discharged with a PICC line and he was switched to IV ertapenem 1 g daily.  He is plan to complete his 6 weeks of IV antibiotics from the date of surgery on January 24, 2023.  Patient is here for follow-up.  He reports no fever no chills.  Denies any diarrhea.  Overall he feels better.

## 2023-01-17 NOTE — ASSESSMENT
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 s/p OR for AVR, MVR, aortic root patch repair 12/13/22\par \par Streptococcus anginosus bacteremia/sepsis\par Mitral valve endocarditis\par Aortic root abscess\par s/p AVR, MVR, aortic root patch repair 12/13/22\par \par Recommendation:\par Blood cultures November 27 and December 26 no growth.\par s/p AVR, MVR, aortic root patch repair 12/13/22.\par OR culture with no growth\par Plan to continue ertapenem 1 g daily to complete 6 weeks from date of surgery on January 24, 2023.\par Weekly CBC and CMP reviewed with the patient.\par No signs of any adverse reactions to the ertapenem.\par Continue probiotics\par \par Follow-up in 3 to 4 weeks\par \par Counseling included lab results, differential diagnosis, treatment options, risks and benefits, lifestyle changes, current condition, medications and dose adjustments.\par The patient was interactive attentive and asked questions and verbalized understanding.\par

## 2023-01-17 NOTE — PHYSICAL EXAM
[General Appearance - Alert] : alert [General Appearance - In No Acute Distress] : in no acute distress [General Appearance - Well Nourished] : well nourished [General Appearance - Well-Appearing] : healthy appearing [Sclera] : the sclera and conjunctiva were normal [Outer Ear] : the ears and nose were normal in appearance [Oropharynx] : the oropharynx was normal with no thrush [Neck Appearance] : the appearance of the neck was normal [] : no respiratory distress [Exaggerated Use Of Accessory Muscles For Inspiration] : no accessory muscle use [Edema] : there was no peripheral edema [FreeTextEntry1] : Sternal surgical site intact, no cellulitis or signs of infection [Motor Exam] : the motor exam was normal [No Focal Deficits] : no focal deficits [Oriented To Time, Place, And Person] : oriented to person, place, and time [Affect] : the affect was normal

## 2023-01-18 ENCOUNTER — APPOINTMENT (OUTPATIENT)
Dept: CARDIOLOGY | Facility: CLINIC | Age: 78
End: 2023-01-18
Payer: MEDICARE

## 2023-01-18 ENCOUNTER — NON-APPOINTMENT (OUTPATIENT)
Age: 78
End: 2023-01-18

## 2023-01-18 ENCOUNTER — APPOINTMENT (OUTPATIENT)
Dept: ELECTROPHYSIOLOGY | Facility: CLINIC | Age: 78
End: 2023-01-18
Payer: MEDICARE

## 2023-01-18 VITALS
HEART RATE: 103 BPM | SYSTOLIC BLOOD PRESSURE: 114 MMHG | WEIGHT: 167 LBS | BODY MASS INDEX: 23.91 KG/M2 | HEIGHT: 70 IN | OXYGEN SATURATION: 97 % | DIASTOLIC BLOOD PRESSURE: 75 MMHG | TEMPERATURE: 98 F

## 2023-01-18 DIAGNOSIS — R60.0 LOCALIZED EDEMA: ICD-10-CM

## 2023-01-18 PROCEDURE — 93000 ELECTROCARDIOGRAM COMPLETE: CPT | Mod: 59

## 2023-01-18 PROCEDURE — 99024 POSTOP FOLLOW-UP VISIT: CPT

## 2023-01-18 PROCEDURE — 93279 PRGRMG DEV EVAL PM/LDLS PM: CPT

## 2023-01-18 PROCEDURE — 99215 OFFICE O/P EST HI 40 MIN: CPT

## 2023-01-18 RX ORDER — MULTIVITAMIN
CAPSULE ORAL
Refills: 0 | Status: DISCONTINUED | COMMUNITY
End: 2023-01-18

## 2023-01-18 RX ORDER — ASPIRIN ENTERIC COATED TABLETS 81 MG 81 MG/1
81 TABLET, DELAYED RELEASE ORAL
Refills: 0 | Status: DISCONTINUED | COMMUNITY
End: 2023-01-18

## 2023-01-19 NOTE — COUNSELING
[Hygeine (Including Daily Shower)] : hygeine (including daily shower) [Importance of Regular Medical Follow-Up] : the importance of regular medical follow-up [No Heavy Lifting] : no heavy lifting (>15-20 lb. for 1 month or 25 lb. for 3 months from date of surgery) [Blood Pressure Control] : blood pressure control [Weight Management] : weight management [S/S of infection] : signs and symptoms of infection (and to whom it should be reported) [Progressive Ambulation/Activity] : progressive ambulation/activity [Medication/Vitamin/Herb/Food Interaction] : medication/vitamin/herb/food interaction [SBE antibiotic prophylaxis] : SBE antibiotic prophylaxis was recommended [Low Fat/Low Cholesterol Diet] : low fat/low cholesterol diet [Stress Management] : stress management

## 2023-01-20 ENCOUNTER — APPOINTMENT (OUTPATIENT)
Dept: CARDIOTHORACIC SURGERY | Facility: CLINIC | Age: 78
End: 2023-01-20
Payer: MEDICARE

## 2023-01-20 ENCOUNTER — RESULT REVIEW (OUTPATIENT)
Age: 78
End: 2023-01-20

## 2023-01-20 ENCOUNTER — OUTPATIENT (OUTPATIENT)
Dept: OUTPATIENT SERVICES | Facility: HOSPITAL | Age: 78
LOS: 1 days | End: 2023-01-20
Payer: MEDICARE

## 2023-01-20 VITALS
TEMPERATURE: 98.2 F | SYSTOLIC BLOOD PRESSURE: 113 MMHG | DIASTOLIC BLOOD PRESSURE: 71 MMHG | HEART RATE: 110 BPM | BODY MASS INDEX: 23.91 KG/M2 | HEIGHT: 70 IN | OXYGEN SATURATION: 98 % | RESPIRATION RATE: 16 BRPM | WEIGHT: 167 LBS

## 2023-01-20 DIAGNOSIS — J90 PLEURAL EFFUSION, NOT ELSEWHERE CLASSIFIED: ICD-10-CM

## 2023-01-20 PROCEDURE — 99024 POSTOP FOLLOW-UP VISIT: CPT

## 2023-01-20 PROCEDURE — 71046 X-RAY EXAM CHEST 2 VIEWS: CPT | Mod: 26

## 2023-01-20 PROCEDURE — 71046 X-RAY EXAM CHEST 2 VIEWS: CPT

## 2023-01-20 NOTE — CONSULT LETTER
[Dear  ___] : Dear  [unfilled], [Courtesy Letter:] : I had the pleasure of seeing your patient, [unfilled], in my office today. [Please see my note below.] : Please see my note below. [Consult Closing:] : Thank you very much for allowing me to participate in the care of this patient.  If you have any questions, please do not hesitate to contact me. [Sincerely,] : Sincerely, [DrPadmini  ___] : Dr. MUÑIZ [FreeTextEntry2] : Robb Young MD [FreeTextEntry3] : Nicholas Flanagan MD\par Chief of Cardiovascular and Thoracic Surgery\par System Director of Endovascular and Cardiovascular Surgery\par , Cardiovascular and Thoracic Surgery\par [facility], [facility]\par [facility]\par [facility]\par [address] [place] [address]\[place] [address]\par Tel. [phone]\par Fax [phone]

## 2023-01-20 NOTE — REASON FOR VISIT
[de-identified] : aortic valve replacement and mitral valve replacement with reconstrcution of aortomitral curain and left atrium with bovine pericardial patch (Commando procedure) [de-identified] : 12/13/22 [de-identified] : 77 year old male with a medical history of hypertension, moderate AS, recent Covid infection 10/6/2022 per chart, recent admission 11/1/22 for SIRS, found to have strep anginosus bacteremia, norovirus, TTE negative for endocarditis at the time, with patient refusing LILIAN that admission, was started on Ceftriaxone (completed 11/20/22), however patient presented 11/27 with syncope, found to have atrial fibrillation with rapid ventricular rate, sepsis, copious diarrhea requiring rectal tube, and eventual LILIAN revealed 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 Cleared by neurosurgery for OR after MRI on 12/1/22 revealed scattered bilateral parieto-occipital acute infarcts.  LHC 12/5 with clean coronaries. Post cath patient noted with RADHA and Urinary retention with ram placed 12/8. Patient then underwent AVR, MVR, and patch repair of the aortic root on 12/13/22 with Dr. Flanagan. Postoperative course significant for pacing requirements due to asystole, with patient regaining rhythm 12/15 (Dopamine for inotropic support now weaned off, plan for MCOT monitor on discharge to be followed by EP), hypotension (on Midodrine for BP support), persistent chest tube output (on IV diuresis, colchicine d/c'd due to diarrhea), and persistent urinary retention (failed TOV 12/19 with Ram replaced). 12/30 patient upgraded to CTICU for monitoring after 14 second pause during sleep. 12/30 s/p Micra leadless PPM. Pt advised to mail back MCOT device since no longer needed. PA/ Lateral completed and reviewed by EP. Pt now with s/p Left PICC line, CXR with noting line with the tip at the junction of the SVC and right atrium. ABX switched as per ID to Ertapenem 1gm Daily for ease of dosing. Plan to treat for 6 weeks of IV antibiotics from date of surgery until 1/24/23. Discharged to home on 1/3/23.\par \par Today, HANNAH reports felling pretty well overall. He is ambulating with a walker here because it is unfamiliar but at home he is doing well. He has had a physical therapist at home and hopes to start cardiac rehab. He feels like he does get fatigued by the end of the day but has energy in the morning and still feels some weakness in the legs. Patient denies chest pain, palpitations, shortness of breath, cough, fevers, chills, and unintentional weight loss or gain. \par \par He still has a ram catheter in and is considering changing urologists. He has no urinary symptoms. He has started water pills this week for the lower extremity edema. [Spouse] : spouse

## 2023-01-20 NOTE — ASSESSMENT
[FreeTextEntry1] : I had the pleasure of seeing Mr. GEORGE HOLDSWORTH  in the office today.\par \par Briefly, Mr. HOLDSWORTH  is an 77 year old male who is status post aortic valve replacement and mitral valve replacement with reconstrcution of aortomitral curain and left atrium with bovine pericardial patch (Commando procedure) on 12/13/22 for infective endocarditis. The hospital course was notable for atrial fibrillation with rapid ventricular rate, sepsis, bilateral parieto-occipital acute infarcts, acute kidney injury, urinary retention,  postoperative asystolic pause requiring Micra pacemaker implantation 12/30, hypotension and persistent chest tube output. He has a PICC line for antibiotics administration. \par \par Since discharge, Mr. HOLDSWORTH  has been slowly improving and at this time primarily complains of weakness, continued ram requirement and lower extremity edema that he was recently started on a diuretic for. He remains on half dose of Eliquis for the mitral valve replacement (only half dose due to nosebleeds, as well as half dose of a baby aspirin).\par \par Exam was benign with well healing incisions and chest xray was reviewed, showing no signs of active lung disease.\par \par During the visit, we discussed the importance of increasing activity and exercise as tolerated. Heavy lifting should still be avoided for the first 3 months after open heart surgery.\par We discussed the need for antibiotic prophylaxis with procedures such as dental work and colonoscopy or endoscopy. We recommend waiting a full six months before undergoing any dental procedure or cleaning.\par \par Overall, I am happy with Mr. HOLDSWORTH's  progress. All questions were answered and concerns were addressed. I encouraged the patient to call the office with any other concerns.\par \par In summary:\par - Continue current medications \par - May drive when cleared by physical therapy\par - Cardiac rehab\par - Continue incentive spirometry\par - Increase activity as tolerated\par - Eat a heart healthy diet\par - No need for further followup in our office unless specific surgical concern\par - Follow up with their cardiologist, Dr. Young and Dr. Monterroso, urologist and primary care doctor.\par - Follow up with neurologist given inpatient embolic stroke.

## 2023-01-20 NOTE — PHYSICAL EXAM
[] : no respiratory distress [Respiration, Rhythm And Depth] : normal respiratory rhythm and effort [Auscultation Breath Sounds / Voice Sounds] : lungs were clear to auscultation bilaterally [Heart Sounds] : normal S1 and S2 [Tachycardic ___] : the heart rate was tachycardic at [unfilled] bpm [Regular] : the rhythm was regular [Clean] : clean [Dry] : dry [Healing Well] : healing well [___ +] : left pretibial [unfilled]U+ pretibial pitting edema [Bleeding] : no active bleeding [Foul Odor] : no foul smell [Purulent Drainage] : no purulent drainage [Serosanguinous Drainage] : no serosanguinous drainage [Erythema] : not erythematous [Warm] : not warm [Tender] : not tender [FreeTextEntry3] : micra insertion site healing well

## 2023-01-23 NOTE — DISCUSSION/SUMMARY
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 Cleared by neurosurgery for OR after MRI on 12/1/22 revealed scattered bilateral parieto-occipital acute infarcts. LHC 12/5/22 with clean coronaries. Post cath patient noted with RADHA, now resolved. Patient now s/p AVR, MVR, and patch repair of the aortic root on 12/13/22 with Dr. Flanagan. Postoperative course significant for new LBBB and 1st degree AVB. \par During hospitalization, patient developed CHB and alternating BBB on 12/28/2022 and > 10 seconds of asystole without clear P waves on 12/30/2022. He was deemed in need for permanent PPM and given recent endocarditis and risk of infection a Micra AV PM was deemed more appropriate. We explained that he may need an upgrade to a conventional PM in the future when risk of infection is lower.\par \par Presents for post implant device and wound check. Doing well and denies CP, SOB, GERMAN, dizziness, palpitations, syncope or presyncope. Initially on low dose Eliquis and ASA and experienced transient nose bleeds post discharge. ASA stopped by Dr. Flanagan. Remains on Eliquis 2.5mg BID (per CT Sx) and tolerating.  \par \par - Normal MICRA AV function,  3%. Sensing, capture threshold, and impedance are all stable and WNL\par - EKG shows ST @ 103bpm, HR histograms show HR primarily 80-100bpm.  \par - PAF noted during perioperative period. Unclear if isolated PAF events or chronic condition. Recommend 1 week MCOT in 3 mnonths with EP f/up after. \par - Continue Eliquis (low dose per CTSx) and revisit role of long term a/c at future f/up\par \par Seen and discussed with Dr. Monterroso.\par Kiley MORSE\par \par

## 2023-01-23 NOTE — HISTORY OF PRESENT ILLNESS
[de-identified] : 78 y/o M with medical history of HTN, moderate AS, recent Covid infection 10/6/2022, recent strep anginosus bacteremia 11/1/22, norovirus, TTE negative for endocarditis at the time, with patient refusing LILIAN that admission, was started on Ceftriaxone (completed 11/20/22), however patient presented 11/27/22 with syncope, found to have AFib RVR, sepsis, copious diarrhea requiring rectal tube, and eventual LILIAN revealed 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 Cleared by neurosurgery for OR after MRI on 12/1/22 revealed scattered bilateral parieto-occipital acute infarcts. LHC 12/5/22 with clean coronaries. Post cath patient noted with RADHA, now resolved. Patient now s/p AVR, MVR, and patch repair of the aortic root on 12/13/22 with Dr. Flanagan. Postoperative course significant for new LBBB and 1st degree AVB. \par During hospitalization, patient developed CHB and alternating BBB on 12/28/2022 and > 10 seconds of asystole without clear P waves on 12/30/2022. He was deemed in need for permanent PPM and given recent endocarditis and risk of infection a Micra AV PM was deemed more appropriate. We explained that he may need an upgrade to a conventional PM in the future when risk of infection is lower.\par \par Presents for post implant device and wound check. Doing well and denies CP, SOB, GERMAN, dizziness, palpitations, syncope or presyncope. Initially on low dose Eliquis and ASA and experienced transient nose bleeds post discharge. ASA stopped by Dr. Flanagan. Remains on Eliquis 2.5mg BID (per CT Sx) and tolerating.  \par

## 2023-01-23 NOTE — PROCEDURE
[No] : not [NSR] : normal sinus rhythm [See Device Printout] : See device printout [Pacemaker] : pacemaker [Medtronic] : Medtronic [de-identified] : 12/30/23

## 2023-01-23 NOTE — PHYSICAL EXAM
[General Appearance - Well Developed] : well developed [General Appearance - Well Nourished] : well nourished [] : no respiratory distress [Nail Clubbing] : no clubbing of the fingernails [Cyanosis, Localized] : no localized cyanosis [Well-Healed] : not well-healed [Erythema] : not erythematous [Warm] : not warm [Tender] : not tender [Indurated] : not indurated [FreeTextEntry2] : right groin is well healed and WNL, without swelling, drainage or tenderness.

## 2023-01-27 ENCOUNTER — APPOINTMENT (OUTPATIENT)
Dept: UROLOGY | Facility: CLINIC | Age: 78
End: 2023-01-27
Payer: MEDICARE

## 2023-01-27 VITALS — SYSTOLIC BLOOD PRESSURE: 130 MMHG | HEART RATE: 121 BPM | DIASTOLIC BLOOD PRESSURE: 83 MMHG

## 2023-01-27 PROCEDURE — A4216: CPT | Mod: NC

## 2023-01-27 PROCEDURE — 51700 IRRIGATION OF BLADDER: CPT

## 2023-01-30 LAB
ANION GAP SERPL CALC-SCNC: 15 MMOL/L
BUN SERPL-MCNC: 15 MG/DL
CALCIUM SERPL-MCNC: 10.8 MG/DL
CHLORIDE SERPL-SCNC: 103 MMOL/L
CO2 SERPL-SCNC: 21 MMOL/L
CREAT SERPL-MCNC: 0.75 MG/DL
EGFR: 92 ML/MIN/1.73M2
GLUCOSE SERPL-MCNC: 84 MG/DL
NT-PROBNP SERPL-MCNC: 649 PG/ML
POTASSIUM SERPL-SCNC: 4.1 MMOL/L
SODIUM SERPL-SCNC: 139 MMOL/L

## 2023-01-31 ENCOUNTER — APPOINTMENT (OUTPATIENT)
Dept: FAMILY MEDICINE | Facility: CLINIC | Age: 78
End: 2023-01-31
Payer: MEDICARE

## 2023-01-31 VITALS
WEIGHT: 163 LBS | HEIGHT: 70 IN | DIASTOLIC BLOOD PRESSURE: 84 MMHG | SYSTOLIC BLOOD PRESSURE: 144 MMHG | HEART RATE: 112 BPM | BODY MASS INDEX: 23.34 KG/M2 | OXYGEN SATURATION: 98 % | TEMPERATURE: 98.2 F

## 2023-01-31 PROCEDURE — 99215 OFFICE O/P EST HI 40 MIN: CPT

## 2023-02-06 LAB
ALBUMIN SERPL ELPH-MCNC: 4.3 G/DL
ALP BLD-CCNC: 119 U/L
ALT SERPL-CCNC: 10 U/L
AST SERPL-CCNC: 15 U/L
BACTERIA STL CULT: NORMAL
BASOPHILS # BLD AUTO: 0.08 K/UL
BASOPHILS NFR BLD AUTO: 0.5 %
BILIRUB DIRECT SERPL-MCNC: 0.2 MG/DL
BILIRUB INDIRECT SERPL-MCNC: 0.6 MG/DL
BILIRUB SERPL-MCNC: 0.8 MG/DL
CDIFF BY PCR: DETECTED
DEPRECATED O AND P PREP STL: NORMAL
EOSINOPHIL # BLD AUTO: 0.36 K/UL
EOSINOPHIL NFR BLD AUTO: 2.4 %
HCT VFR BLD CALC: 35.8 %
HEMOCCULT STL QL IA: POSITIVE
HGB BLD-MCNC: 11.4 G/DL
IMM GRANULOCYTES NFR BLD AUTO: 0.5 %
LYMPHOCYTES # BLD AUTO: 1.38 K/UL
LYMPHOCYTES NFR BLD AUTO: 9.4 %
MAN DIFF?: NORMAL
MCHC RBC-ENTMCNC: 28.9 PG
MCHC RBC-ENTMCNC: 31.8 GM/DL
MCV RBC AUTO: 90.6 FL
MONOCYTES # BLD AUTO: 1.2 K/UL
MONOCYTES NFR BLD AUTO: 8.2 %
NEUTROPHILS # BLD AUTO: 11.62 K/UL
NEUTROPHILS NFR BLD AUTO: 79 %
PLATELET # BLD AUTO: 327 K/UL
PROT SERPL-MCNC: 7.1 G/DL
RBC # BLD: 3.95 M/UL
RBC # FLD: 15.3 %
WBC # FLD AUTO: 14.71 K/UL

## 2023-02-07 ENCOUNTER — APPOINTMENT (OUTPATIENT)
Dept: INTERNAL MEDICINE | Facility: CLINIC | Age: 78
End: 2023-02-07
Payer: MEDICARE

## 2023-02-07 VITALS
HEIGHT: 70 IN | TEMPERATURE: 97 F | HEART RATE: 109 BPM | OXYGEN SATURATION: 96 % | SYSTOLIC BLOOD PRESSURE: 132 MMHG | DIASTOLIC BLOOD PRESSURE: 85 MMHG | BODY MASS INDEX: 24.34 KG/M2 | RESPIRATION RATE: 16 BRPM | WEIGHT: 170 LBS

## 2023-02-07 DIAGNOSIS — D72.829 ELEVATED WHITE BLOOD CELL COUNT, UNSPECIFIED: ICD-10-CM

## 2023-02-07 PROCEDURE — 36415 COLL VENOUS BLD VENIPUNCTURE: CPT

## 2023-02-07 PROCEDURE — 99214 OFFICE O/P EST MOD 30 MIN: CPT | Mod: 25

## 2023-02-07 NOTE — PHYSICAL EXAM
98 [General Appearance - Alert] : alert [General Appearance - In No Acute Distress] : in no acute distress [General Appearance - Well Nourished] : well nourished [General Appearance - Well-Appearing] : healthy appearing [Sclera] : the sclera and conjunctiva were normal [Outer Ear] : the ears and nose were normal in appearance [Oropharynx] : the oropharynx was normal with no thrush [Neck Appearance] : the appearance of the neck was normal [] : no respiratory distress [Exaggerated Use Of Accessory Muscles For Inspiration] : no accessory muscle use [Edema] : there was no peripheral edema [FreeTextEntry1] : Sternal surgical site intact, no cellulitis or signs of infection [Motor Exam] : the motor exam was normal [No Focal Deficits] : no focal deficits [Oriented To Time, Place, And Person] : oriented to person, place, and time [Affect] : the affect was normal

## 2023-02-07 NOTE — HISTORY OF PRESENT ILLNESS
[FreeTextEntry1] : 77 year old male with PMHx of recent  COVID  on 10/6/22, HTN , Vit B12 deficiency, presenting to the ED on 11/2 with body aches, fatigue and fever (Tmax 102) at home for 12 days found to have streptococcus bacteremia and admitted with unclear source, no recent dental work, skin infections, no respiratory symptoms. Pan scan was negative at that time ( LILIAN was recommended but pt refused) He was treated for Bacteremia ( Blood cultures + streptococcus anginosis pansensitive ) and norovirus with supportive care and contact isolation . He was discharged home on 11/7 with IV Rocephin via PICC line. Recommendation was to continue ABX  daily via pic end 11/30/22\par He presented to ER after and episode of syncope and collapse on November 27, 2022, found to be septic, hypotensive, hypoxic and febrile in the ER. \par In response to hypotension he failed to respond to Initial sepsis fluid protocol in ER is required IV pressor in form of levophed to maintain MAP greater than 60 -65. In the ER he was febrile with initial labs significant for WBC of 22.4 , ProCal of 21.2 first lactate 4.9 via abg with repeat post fluids of 2.2 venous sample. \par PT admitted to [facility] with septic shock unclear source. Found to have new onset A fib and on amio. 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  Patient remained in the hospital afterwards and was on meropenem from November 27 to December 25, 2022.  He was switched to ceftriaxone on December 25.  On December 26 patient had to be transferred back to the ICU with hypotension.  He had repeat blood cultures done which were unremarkable.  His antibiotics was switched back to meropenem.  Patient improved from the ICU at which point he was eventually discharged from the hospital on January 3, 2023.  Patient was discharged with a PICC line and he was switched to IV ertapenem 1 g daily.  \par Patient completed 6 weeks of IV ertapenem from the date of surgery on January 24, 2023.  He was seen here for follow-up on January 17, 2023.  He did not have any diarrhea at that time.  He was seen last week by his primary care physician and noted to have diarrhea.  He was tested for C. difficile and was positive.  His PMD called me and patient was started on oral vancomycin 125 mg every 6 hours.  Patient has been taking the medications since last week February 2, 2023.  He reports no diarrhea today.  Denies any fever or chills.\par

## 2023-02-07 NOTE — ASSESSMENT
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 difficile diarrhea\par Leukocytosis\par Streptococcus anginosus bacteremia/sepsis\par Mitral valve endocarditis\par Aortic root abscess\par s/p AVR, MVR, aortic root patch repair 12/13/22\par \par Recommendation:\par Found to have positive stool C. difficile on February 1, 2023.\par Started on oral vancomycin 125 mg every 6 hours on February 2, 2023\par Currently has no diarrhea.\par Noted to have leukocytosis on blood work from primary first 2023 as well.\par Patient has no  abdominal pain at this time.\par Feels well.\par We will check CBC to trend leukocytosis\par Blood cultures November 27 and December 26 no growth.\par s/p AVR, MVR, aortic root patch repair 12/13/22.\par OR culture with no growth\par Completed 6 weeks of ertapenem 1 g daily on January 24, 2023.\par \par Follow-up in 3 to 4 weeks\par \par Counseling included lab results, differential diagnosis, treatment options, risks and benefits, lifestyle changes, current condition, medications and dose adjustments.\par The patient was interactive attentive and asked questions and verbalized understanding.\par

## 2023-02-09 LAB
BASOPHILS # BLD AUTO: 0.07 K/UL
BASOPHILS NFR BLD AUTO: 0.7 %
EOSINOPHIL # BLD AUTO: 0.57 K/UL
EOSINOPHIL NFR BLD AUTO: 5.4 %
HCT VFR BLD CALC: 37.4 %
HGB BLD-MCNC: 11.7 G/DL
IMM GRANULOCYTES NFR BLD AUTO: 0.3 %
LYMPHOCYTES # BLD AUTO: 1.67 K/UL
LYMPHOCYTES NFR BLD AUTO: 15.8 %
MAN DIFF?: NORMAL
MCHC RBC-ENTMCNC: 28.9 PG
MCHC RBC-ENTMCNC: 31.3 GM/DL
MCV RBC AUTO: 92.3 FL
MONOCYTES # BLD AUTO: 0.94 K/UL
MONOCYTES NFR BLD AUTO: 8.9 %
NEUTROPHILS # BLD AUTO: 7.31 K/UL
NEUTROPHILS NFR BLD AUTO: 68.9 %
PLATELET # BLD AUTO: 304 K/UL
RBC # BLD: 4.05 M/UL
RBC # FLD: 15.9 %
WBC # FLD AUTO: 10.59 K/UL

## 2023-02-12 NOTE — REVIEW OF SYSTEMS
[Diarrhea] : diarrhea [Negative] : Heme/Lymph [Abdominal Pain] : no abdominal pain [Vomiting] : no vomiting [Melena] : no melena

## 2023-02-12 NOTE — HISTORY OF PRESENT ILLNESS
[FreeTextEntry8] : MS. HOLDSWORTH IS A PLEASANT 78 YO PRESENTING FOR ACUTE VISIT WITH HIS WIFE.  HISTORY OF COVID IN OCTOBER 2022, HYPERTENSION AND VITAMIN B12 DEFICIENCY.  WAS HOSPITALIZED NOVEMBER 2, 2022 - NOVEMBER 7, 2022 FOR BODY ACHES, FATIGUE AND FEVER.  HAD WORK-UP AND FOUND TO HAVE STREPTOCOCCUS BACTEREMIA OF UNCLEAR SOURCE.  A PAN SCAN WAS NEGATIVE AT THAT TIME.  LILIAN WAS RECOMMENDED BUT REPORTEDLY REFUSED BY MR. HOLDSWORTH.  HE WAS TREATED AND DISCHARGED HOME ON IV ROCEPHIN VIA PICC LINE.  ON NOVEMBER 27, 2022 HE PRESENTED BACK TO THE ER AFTER A SYNCOPAL EPISODE AND FOUND TO BE SEPTIC, HYPOTENSIVE, HYPOXIC AND FEBRILE.  HE REQUIRED IV PRESSORS AND WAS ADMITTED TO THE MICU WITH SEPTIC SHOCK.  FOUND TO HAVE NEW ONSET AFIB.  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 HE IS S/P AVR, MVR AND AORTIC ROOT PATCH REPAIR ON 12/13/22.  TREATED WITH ANTIBIOTICS BY ID.  HE WAS DISCHARGED HOME ON JANUARY 3, 2023 WITH OUTPATIENT FOLLOW-UP WITH ID AND SPECIALISTS.  HE IS S/P PPM.  IS USING A WALKER FOR LONGER DISTANCES.  BALANCE A LITTLE OFF.  HAD PT AT HOME FOR BALANCE AND STRENGTHENING.  IS TO START CARDIAC REHAB.  COMPLETED IV ANTIBIOTICS A WEEK AGO.  NEED DENTAL PROPHYLAXIS.  STATES ADVISED TO HOLD FUROSEMIDE PER HIS SURGEON OFFICE DUE TO DIARRHEA.  DIARRHEA STARTED ON THE 24TH.  NOT WATERY.  LOOSE.  NO BLOOD IN STOOL.  NO ABDOMINAL PAIN.  HAS HAD NO N/V. NO FEVER.  JUST HAD CATHETER REMOVED BY UROLOGY.

## 2023-02-12 NOTE — PLAN
[FreeTextEntry1] : REMAIN HYDRATED\par BRAT DIET\par WILL CHECK STOOL STUDIES INCLUDING C.DIF\par FURTHER RECOMMENDATIONS PENDING RESULTS\par FOLLOW-UP WITH ID\par FALL PRECAUTIONS\par NOTES AND DISCHARGE REVIEWED\par FOLLOW-UP ALL SPECIALISTS AS DIRECTED \par AWARE WHEN TO SEEK EMERGENT CARE\par CALL WITH ANY QUESTIONS, CONCERNS OR CHANGES

## 2023-02-21 ENCOUNTER — APPOINTMENT (OUTPATIENT)
Dept: FAMILY MEDICINE | Facility: CLINIC | Age: 78
End: 2023-02-21
Payer: MEDICARE

## 2023-02-21 VITALS
SYSTOLIC BLOOD PRESSURE: 142 MMHG | BODY MASS INDEX: 24.62 KG/M2 | OXYGEN SATURATION: 94 % | HEART RATE: 99 BPM | WEIGHT: 172 LBS | HEIGHT: 70 IN | DIASTOLIC BLOOD PRESSURE: 84 MMHG

## 2023-02-21 VITALS — DIASTOLIC BLOOD PRESSURE: 82 MMHG | SYSTOLIC BLOOD PRESSURE: 132 MMHG

## 2023-02-21 DIAGNOSIS — R19.5 OTHER FECAL ABNORMALITIES: ICD-10-CM

## 2023-02-21 PROCEDURE — 99214 OFFICE O/P EST MOD 30 MIN: CPT

## 2023-02-21 RX ORDER — ERTAPENEM SODIUM 1 G/1
1 INJECTION, POWDER, LYOPHILIZED, FOR SOLUTION INTRAMUSCULAR; INTRAVENOUS
Refills: 0 | Status: DISCONTINUED | COMMUNITY
End: 2023-02-21

## 2023-02-21 RX ORDER — VANCOMYCIN HYDROCHLORIDE 125 MG/1
125 CAPSULE ORAL 4 TIMES DAILY
Qty: 56 | Refills: 0 | Status: DISCONTINUED | COMMUNITY
Start: 2023-02-02 | End: 2023-02-21

## 2023-02-21 NOTE — HISTORY OF PRESENT ILLNESS
[FreeTextEntry1] : CHECK-UP [de-identified] : MR. HOLDSWORTH IS A PLEASANT 76 YO PRESENTING FOR FOLLOW-UP.  HISTORY OF COVID IN OCTOBER 2022, HYPERTENSION AND VITAMIN B12 DEFICIENCY. WAS HOSPITALIZED NOVEMBER 2, 2022 - NOVEMBER 7, 2022 AND FOUND TO HAVE STREPTOCOCCUS BACTEREMIA OF UNCLEAR SOURCE.  HE WAS TREATED AND DISCHARGED HOME ON IV ROCEPHIN VIA PICC LINE. ON NOVEMBER 27, 2022 HE PRESENTED BACK TO THE ER AND FOUND TO BE SEPTIC, HYPOTENSIVE, HYPOXIC AND FEBRILE. HE REQUIRED IV PRESSORS AND WAS ADMITTED TO THE MICU WITH SEPTIC SHOCK. FOUND TO HAVE NEW ONSET AFIB. 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 HE IS S/P AVR, MVR AND AORTIC ROOT PATCH REPAIR ON 12/13/22. HE IS S/P PPM.  RECENTLY SEEN IN FOLLOW-UP HERE AND DIAGNOSED WITH C.DIF.  STARTED ON VANCOMYCIN.  SAW ID IN FOLLOW-UP.  IS BEING FOLLOWED AND HAVING CBC MONITORED.  IS NOW MUCH BETTER.  NO FEVER.  FEELING WELL.  IS SUPPOSED TO START CARDIAC REHAB.  NO FALLS.  NO CHEST PAIN OR SHORTNESS OF BREATH.  IS ON ANTICOAGULATION WITH ELIQUIS.  NO S/S OF BLEEDING.

## 2023-02-21 NOTE — PLAN
[FreeTextEntry1] : ID CONSULTANT NOTE APPRECIATED\par HAVING CBC MONITORED BY ID AND WILL BE CLOSELY FOLLOWED\par GI FOR POSITIVE STOOL OCCULT - LIKELY FROM IRRITATION FROM C.DIF BUT WOULD RECOMMEND FOLLOW-UP\par BLOOD PRESSURE CONTROLLED ON RECHECK\par HEALTHY DIET AND LIFESTYLE MODIFICATIONS\par FALL PRECAUTIONS\par HAS APPOINTMENT WITH CARDIOLOGY NEXT WEEK - TO DISCUSS CARDIAC REHAB\par FOLLOW-UP ALL SPECIALISTS AS DIRECTED \par LAB WORK REVIEWED\par CALL WITH ANY QUESTIONS, CONCERNS OR CHANGES \par AWARE WHEN TO SEEK EMERGENT CARE\par CHART REVIEWED

## 2023-02-27 NOTE — H&P PST ADULT - VASCULAR
02/27/23 1556   Provider Notification   Provider Name/Title Dr. Paniagua   Method of Notification Phone   Request Evaluate - Remote   Notification Reason Other (Comment)        Equal and normal pulses (carotid, femoral, dorsalis pedis)

## 2023-02-28 ENCOUNTER — APPOINTMENT (OUTPATIENT)
Dept: CARDIOLOGY | Facility: CLINIC | Age: 78
End: 2023-02-28
Payer: MEDICARE

## 2023-02-28 VITALS
HEART RATE: 106 BPM | HEIGHT: 70 IN | BODY MASS INDEX: 24.34 KG/M2 | WEIGHT: 170 LBS | OXYGEN SATURATION: 99 % | SYSTOLIC BLOOD PRESSURE: 145 MMHG | DIASTOLIC BLOOD PRESSURE: 88 MMHG | TEMPERATURE: 97.8 F

## 2023-02-28 DIAGNOSIS — I25.10 ATHEROSCLEROTIC HEART DISEASE OF NATIVE CORONARY ARTERY W/OUT ANGINA PECTORIS: ICD-10-CM

## 2023-02-28 PROCEDURE — 93000 ELECTROCARDIOGRAM COMPLETE: CPT

## 2023-02-28 PROCEDURE — 99215 OFFICE O/P EST HI 40 MIN: CPT

## 2023-02-28 RX ORDER — L. ACIDOPHILUS/L.BULGARICUS 1MM CELL
TABLET ORAL TWICE DAILY
Refills: 0 | Status: DISCONTINUED | COMMUNITY
End: 2023-02-28

## 2023-02-28 NOTE — HISTORY OF PRESENT ILLNESS
[FreeTextEntry1] : moderate aortic stenosis, hypertension \par \par HPI for today: :   in nov 2022, he was feelign sick.  he went to PCP. got vit B 12 shot.  and right after that he felt sick.  he thought he had a flu for 2 weks. his sife said he was looking bad.    he was found to have bacteremia.  unclear source.   no dental work or no dental caries, and no pnuemonia. just a b12 shot. s/p AVR and MVR and aortic root graft in dec 2022 by Dr. allen for endocarditis.   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 Cleared by neurosurgery for OR after MRI on 12/1/22 revealed scattered bilateral parieto-occipital acute infarcts.  LHC 12/5 with clean coronaries. Post cath patient noted with RADHA and rinary retention with ram placed 12/8. Patient now s/p AVR, MVR, and patch repair of the aortic root on 12/13/22 with Dr. Allen. Postoperative course significant for pacing requirements due to asystole, with patient regaining rhythm 12/15 (Dopamine for inotropic support now weaned off, plan for MCOT monitor on discharge to be followed by EP), hypotension (on Midodrine for BP support), persistent chest tube output (on IV diuresis, colchicine d/c'd due to diarrhea), and persistent urinary retention (failed TOV 12/19 with Ram replaced). 12/30 patient upgraded to CTICU for monitoring after 14 second pause during sleep. 12/30 s/p Micra leadless PPM. Pt advised to mail back MCOT device since no longer needed. PA/ Lateral completed and reviewed by EP. Pt now with s/p Left PICC line, CXR with noting line with the tip at the junction of the SVC and right atrium. ABX switched as per ID to Ertapenem 1gm Daily for ease of dosing (Will need weekly CBC and CMP faxed to [phone], Appointment with us in 7 to 10 days - [phone]). Plan to treat for 6 weeks of IV antibiotics from date of surgery until 1/24/23. Pt remains hemodynamically stable and determined stable to be discharged ome as per attending on call Dr. Thompson with follow up appointment. \par \par old note: no headches. no dizziness. no palpitations.  drinks one glass of wine every day\par he is out.  physically active.\par ct scan reviewed: my self.  midl aortic calficiation. atlease motderate to severe coronay calcificaitons in LAd and PDA . \par last time he had side effect of statins. possible. he is not on statin. only taking losartan\par \par \par OLD NOTE:  : feels good. No headaches./ Drinks wine.  every day. 2-3 glasses. no headaches. no dizziness.  \par \par old note: This is a 73 year old male with history of aortic stenosis, hypertension  here for a new cardiology appt\par feels good. denies any cehst pauin no dyspnea. 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 no dizziness. no chest pauin. no syncope.\par

## 2023-02-28 NOTE — DISCUSSION/SUMMARY
[Patient] : the patient [Risks] : risks [Benefits] : benefits [Alternatives] : alternatives [With Me] : with me [___ Month(s)] : in [unfilled] month(s) [FreeTextEntry1] : This is a 77 year old male with history of hypertension and dyslipidemia , moderate aortic stenosis, here with change of cardiologist.\par \par 1) Moderate aortic stenosis:  no w s/p AVR for endocarditis. \par 2) bacterial endocaredtitis strept.  unclear source.  now he needs abx ppx . 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 \par 2) coronary artery disease evaluation: moderate coronary artery calcifications.  no deferrring  statins.  coq10 multivitamine \par 3) s/p MVR and AVR.  s./p Aortic root graft.  aspirin  abx ppx as needed. \par 3) hypertension :     not on any Bp meds.  BP check at home 24 hr BP monitor. \par 4) mild carotid plaque:  deferring statins \par 5) aortic calcifications,  carotid plaque:   aspirin 81 .\par 6) parox afib : anticoagulation eliquis.  [EKG obtained to assist in diagnosis and management of assessed problem(s)] : EKG obtained to assist in diagnosis and management of assessed problem(s)

## 2023-02-28 NOTE — CARDIOLOGY SUMMARY
[de-identified] : 2 28 2023  [de-identified] : s/p PPM in dec 2022 .  leadless PPM.  [de-identified] : dec 2022:  Mild CAD.  [de-identified] : 12/2022  \par Patient now s/p AVR, MVR, and patch repair of the aortic root on 12/13/22 with \par Dr. Flanagan. [No Exercise Ind Arr] : no exercise induced arrhythmias [No Symptoms] : no Symptoms [___] : [unfilled] [de-identified] : carotid Doppler: Mild plaque without stensois.

## 2023-03-02 NOTE — PATIENT PROFILE ADULT - NSASFUNCLEVELADLTOILET_GEN_A_NUR
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

## 2023-03-06 ENCOUNTER — APPOINTMENT (OUTPATIENT)
Dept: CARDIOLOGY | Facility: CLINIC | Age: 78
End: 2023-03-06

## 2023-03-07 ENCOUNTER — EMERGENCY (EMERGENCY)
Facility: HOSPITAL | Age: 78
LOS: 1 days | Discharge: HOME | End: 2023-03-07
Attending: EMERGENCY MEDICINE
Payer: MEDICARE

## 2023-03-07 ENCOUNTER — APPOINTMENT (OUTPATIENT)
Dept: INTERNAL MEDICINE | Facility: CLINIC | Age: 78
End: 2023-03-07
Payer: MEDICARE

## 2023-03-07 VITALS
RESPIRATION RATE: 18 BRPM | TEMPERATURE: 98 F | SYSTOLIC BLOOD PRESSURE: 137 MMHG | HEIGHT: 70 IN | WEIGHT: 169.98 LBS | DIASTOLIC BLOOD PRESSURE: 90 MMHG | HEART RATE: 110 BPM | OXYGEN SATURATION: 98 %

## 2023-03-07 VITALS
HEIGHT: 70 IN | DIASTOLIC BLOOD PRESSURE: 94 MMHG | BODY MASS INDEX: 24.48 KG/M2 | TEMPERATURE: 98 F | SYSTOLIC BLOOD PRESSURE: 161 MMHG | HEART RATE: 107 BPM | OXYGEN SATURATION: 99 % | WEIGHT: 171 LBS

## 2023-03-07 DIAGNOSIS — Z90.49 ACQUIRED ABSENCE OF OTHER SPECIFIED PARTS OF DIGESTIVE TRACT: Chronic | ICD-10-CM

## 2023-03-07 DIAGNOSIS — Z98.890 OTHER SPECIFIED POSTPROCEDURAL STATES: Chronic | ICD-10-CM

## 2023-03-07 PROCEDURE — 99214 OFFICE O/P EST MOD 30 MIN: CPT

## 2023-03-07 PROCEDURE — 99282 EMERGENCY DEPT VISIT SF MDM: CPT

## 2023-03-07 PROCEDURE — 99283 EMERGENCY DEPT VISIT LOW MDM: CPT | Mod: GC

## 2023-03-07 RX ORDER — OXYMETAZOLINE HYDROCHLORIDE 0.5 MG/ML
2 SPRAY NASAL ONCE
Refills: 0 | Status: COMPLETED | OUTPATIENT
Start: 2023-03-07 | End: 2023-03-07

## 2023-03-07 RX ORDER — FUROSEMIDE 20 MG/1
20 TABLET ORAL
Qty: 180 | Refills: 2 | Status: DISCONTINUED | COMMUNITY
Start: 2023-01-18 | End: 2023-03-07

## 2023-03-07 RX ADMIN — OXYMETAZOLINE HYDROCHLORIDE 2 SPRAY(S): 0.5 SPRAY NASAL at 08:46

## 2023-03-07 NOTE — ED PROVIDER NOTE - ATTENDING CONTRIBUTION TO CARE
pleasant adult male with intermittent epistaxis, left nares; no senia bleeding on exam, small area of ooze in left nares along septum, relatively external; airway fully patent; afrin and pressure applied with resolution; understands need to follow up with ENT; safe and stable for d/c    I personally saw the patient with the resident, and completed the key components of the history and physical exam. I then discussed the management plan with the resident.

## 2023-03-07 NOTE — ED PROVIDER NOTE - OBJECTIVE STATEMENT
77 year old male with hx of recent mitral valve replacement and aortic valve replacement on eliquis 2.5 mg BID who presents with nose bleed. 77 year old male with hx of recent mitral valve replacement and aortic valve replacement on eliquis 2.5 mg BID who presents with nose bleed. Patient states that throughout his life he has had nose bleeds. Since his cardiac surgery he has had them more frequently. If he is sitting or standing they stop. However if he bends forward his nose starts to bleed. About 1 hour ago bent over and had some bleeding from left nostril. In the past has had chemical cautery with his PCP however his PCP . Called his new PCP who suggested he could be seen in the ED for chemical cautery. No active bleeding at this time. No lightheadedness, dyspnea, chest pain, or other complaints. Has an appt with ID later today.

## 2023-03-07 NOTE — ED ADULT NURSE NOTE - OBJECTIVE STATEMENT
Pt A&Ox4, NAD. Pt presents to the ED with complaints of nose bleeds x3 days. Pt on blood thinners. No active bleeding at this time. Breathing even and unlabored.

## 2023-03-07 NOTE — ED PROVIDER NOTE - CARE PROVIDER_API CALL
Josue Mendez)  Otolaryngology  [address] [place] [address]  Phone: [phone]  Fax: [phone]  Follow Up Time:

## 2023-03-07 NOTE — ED PROVIDER NOTE - CLINICAL SUMMARY MEDICAL DECISION MAKING FREE TEXT BOX
77 year old male with hx of MVR, AVR who presents with intermittent epistaxis. No active bleeding during ED stay. See prog notes for risk/benefits discussion. Pt opted for afrin as temporary measure and plans to call ENT today. Has personal relationship with Dr. Mendez (ENT) who he says he can call when he leaves. Will dc with afrin, give return precautions, and stress importance of ENT f/u

## 2023-03-07 NOTE — ED PROVIDER NOTE - PROGRESS NOTE DETAILS
[place]: Risk of silver nitrate cautery (vessel bleeding more proximal in future) discussed with patient. Benefits of nasal packing explained and patient declines to have nasal packing. States he is good friends with Dr. Jose Mendez and will call him later today. Offered afrin which patient accepts. Pt understands this is a temporary fix and he will still need to f/u with ENT.

## 2023-03-07 NOTE — HISTORY OF PRESENT ILLNESS
[FreeTextEntry1] : 77 year old male with PMHx of recent  COVID  on 10/6/22, HTN , Vit B12 deficiency, presenting to the ED on 11/2 with body aches, fatigue and fever (Tmax 102) at home for 12 days found to have streptococcus bacteremia and admitted with unclear source, no recent dental work, skin infections, no respiratory symptoms. Pan scan was negative at that time ( LILIAN was recommended but pt refused) He was treated for Bacteremia ( Blood cultures + streptococcus anginosis pansensitive ) and norovirus with supportive care and contact isolation . He was discharged home on 11/7 with IV Rocephin via PICC line. Recommendation was to continue ABX  daily via pic end 11/30/22\par He presented to ER after and episode of syncope and collapse on November 27, 2022, found to be septic, hypotensive, hypoxic and febrile in the ER. \par In response to hypotension he failed to respond to Initial sepsis fluid protocol in ER is required IV pressor in form of levophed to maintain MAP greater than 60 -65. In the ER he was febrile with initial labs significant for WBC of 22.4 , ProCal of 21.2 first lactate 4.9 via abg with repeat post fluids of 2.2 venous sample. \par PT admitted to [facility] with septic shock unclear source. Found to have new onset A fib and on amio. 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  Patient remained in the hospital afterwards and was on meropenem from November 27 to December 25, 2022.  He was switched to ceftriaxone on December 25.  On December 26 patient had to be transferred back to the ICU with hypotension.  He had repeat blood cultures done which were unremarkable.  His antibiotics was switched back to meropenem.  Patient improved from the ICU at which point he was eventually discharged from the hospital on January 3, 2023.  Patient was discharged with a PICC line and he was switched to IV ertapenem 1 g daily.  \par Patient completed 6 weeks of IV ertapenem from the date of surgery on January 24, 2023.  He was seen here for follow-up on January 17, 2023.  He did not have any diarrhea at that time.  He was seen by his primary care physician and noted to have diarrhea.  He was tested for C. difficile and was positive.  His PMD called me and patient was started on oral vancomycin 125 mg every 6 hours.  Patient has started taking PO Vanco on February 2, 2023 and has completed course 2 weeks ago. He is here for follow up.  He reports no diarrhea today.  Denies any fever or chills.\par

## 2023-03-07 NOTE — ED PROVIDER NOTE - NSFOLLOWUPINSTRUCTIONS_ED_ALL_ED_FT
-Follow up with the ENT team     Epistaxis    Epistaxis is the medical term for a nosebleed. Nosebleeds are common and can be caused by many conditions, such as injury, infections, dry environments, medicines, nose picking, and home heating and cooling systems. Try controlling your nosebleed by pinching your nose continuously for at least 10 minutes. Avoid lying down while you are having a nosebleed. Sit up and lean forward. Avoid blowing or sniffing your nose for a number of hours after having a nosebleed. Resume your normal activities as you are able, but avoid straining, lifting, or bending at the waist for several days. Maintain humidity in your home by using less air conditioning or by using a humidifier.     If your nose was packed by your health care provider, keep the packing inside of your nose until a health care provider removes it. If a balloon catheter was used to pack your nose, do not cut or remove it unless your health care provider has instructed you to do that.     Aspirin and blood thinners make bleeding more likely. If you are prescribed these medicines and you suffer from nosebleeds, ask your health care provider if you should stop taking the medicines or adjust the dose. Do not stop medicines unless directed by your health care provider.    SEEK IMMEDIATE MEDICAL CARE IF YOU HAVE ANY OF THE FOLLOWING SYMPTOMS: nosebleed lasting longer than 20 minutes, unusual bleeding from or bruising on other parts of your body, dizziness or lightheadedness, fainting, nosebleed occurring after a head injury, or fever. -Follow up with the ENT team! call when you leave!  -Use Afrin 2-3 sprays 2-3 times per day as needed     Epistaxis    Epistaxis is the medical term for a nosebleed. Nosebleeds are common and can be caused by many conditions, such as injury, infections, dry environments, medicines, nose picking, and home heating and cooling systems. Try controlling your nosebleed by pinching your nose continuously for at least 10 minutes. Avoid lying down while you are having a nosebleed. Sit up and lean forward. Avoid blowing or sniffing your nose for a number of hours after having a nosebleed. Resume your normal activities as you are able, but avoid straining, lifting, or bending at the waist for several days. Maintain humidity in your home by using less air conditioning or by using a humidifier.     If your nose was packed by your health care provider, keep the packing inside of your nose until a health care provider removes it. If a balloon catheter was used to pack your nose, do not cut or remove it unless your health care provider has instructed you to do that.     Aspirin and blood thinners make bleeding more likely. If you are prescribed these medicines and you suffer from nosebleeds, ask your health care provider if you should stop taking the medicines or adjust the dose. Do not stop medicines unless directed by your health care provider.    SEEK IMMEDIATE MEDICAL CARE IF YOU HAVE ANY OF THE FOLLOWING SYMPTOMS: nosebleed lasting longer than 20 minutes, unusual bleeding from or bruising on other parts of your body, dizziness or lightheadedness, fainting, nosebleed occurring after a head injury, or fever.

## 2023-03-07 NOTE — ED PROVIDER NOTE - PATIENT PORTAL LINK FT
You can access the FollowMyHealth Patient Portal offered by [facility] by registering at the following website: [email or web address]. By joining [facility]’s FollowMyHealth portal, you will also be able to view your health information using other applications (apps) compatible with our system.

## 2023-03-07 NOTE — ED PROVIDER NOTE - PHYSICAL EXAMINATION
General: well appearing, NAD  Head: NC, AT  EENT: EOMI, no scleral icterus, dried blood L nares with small spot of wet blood medial aspect of L nare  Cardiac: tachycardic   Respiratory: no respiratory distress   MSK/Vascular: full ROM, soft compartments, warm extremities  Skin: no acute rash   Neuro: AAOx3, sensation to light touch intact  Psych: calm, cooperative

## 2023-03-07 NOTE — ED PROVIDER NOTE - NS ED ROS FT
Constitutional: no fever, no chills  Head: NC, AT   Eyes: no redness   ENMT: no nasal congestion, +EPISTAXIS, no sore throat   CV: no chest pain, no edema  Resp: no cough, no dyspnea  GI: no abdominal pain, no nausea, no vomiting, no diarrhea  : no dysuria, no hematuria   Skin:  no rashes   Neuro: no LOC, no headache, no sensory deficits, no weakness

## 2023-03-07 NOTE — ED ADULT TRIAGE NOTE - CHIEF COMPLAINT QUOTE
Pt states that his nose has been bleeding for 3 days. Pt states that he is on Eliquis. Doesn't appear to be bleeding at this time. He states that he stopped it with nasal spray. He called his PMD and was told to go to see an ENT.

## 2023-03-07 NOTE — ASSESSMENT
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 difficile diarrhea\par Leukocytosis\par Streptococcus anginosus bacteremia/sepsis\par Mitral valve endocarditis\par Aortic root abscess\par s/p AVR, MVR, aortic root patch repair 12/13/22\par \par \par Recommendation:\par Found to have positive stool C. difficile on February 1, 2023.\par Started on oral vancomycin 125 mg every 6 hours on February 2, 2023 and completed course of treatment.\par Currently has no diarrhea.\par Leukocytosis improved on prior blood work.\par Patient has no  abdominal pain at this time.\par Feels well.\par Blood cultures November 27 and December 26 no growth.\par s/p AVR, MVR, aortic root patch repair 12/13/22.\par OR culture with no growth\par Completed 6 weeks of ertapenem 1 g daily on January 24, 2023 for endocarditis.\par No need for more antimicrobial therapy.\par \par Follow-up 3 months\par \par Counseling included lab results, differential diagnosis, treatment options, risks and benefits, lifestyle changes, current condition, medications and dose adjustments.\par The patient was interactive attentive and asked questions and verbalized understanding.\par

## 2023-03-08 ENCOUNTER — APPOINTMENT (OUTPATIENT)
Dept: CARDIOLOGY | Facility: CLINIC | Age: 78
End: 2023-03-08
Payer: MEDICARE

## 2023-03-08 PROCEDURE — 93798 PHYS/QHP OP CAR RHAB W/ECG: CPT

## 2023-03-10 ENCOUNTER — APPOINTMENT (OUTPATIENT)
Dept: CARDIOLOGY | Facility: CLINIC | Age: 78
End: 2023-03-10
Payer: MEDICARE

## 2023-03-10 PROCEDURE — 93798 PHYS/QHP OP CAR RHAB W/ECG: CPT

## 2023-03-13 ENCOUNTER — APPOINTMENT (OUTPATIENT)
Dept: CARDIOLOGY | Facility: CLINIC | Age: 78
End: 2023-03-13
Payer: MEDICARE

## 2023-03-13 PROCEDURE — 93798 PHYS/QHP OP CAR RHAB W/ECG: CPT

## 2023-03-15 ENCOUNTER — APPOINTMENT (OUTPATIENT)
Dept: CARDIOLOGY | Facility: CLINIC | Age: 78
End: 2023-03-15
Payer: MEDICARE

## 2023-03-15 PROCEDURE — 93798 PHYS/QHP OP CAR RHAB W/ECG: CPT

## 2023-03-17 ENCOUNTER — APPOINTMENT (OUTPATIENT)
Dept: CARDIOLOGY | Facility: CLINIC | Age: 78
End: 2023-03-17
Payer: MEDICARE

## 2023-03-17 PROCEDURE — 93798 PHYS/QHP OP CAR RHAB W/ECG: CPT

## 2023-03-20 ENCOUNTER — APPOINTMENT (OUTPATIENT)
Dept: CARDIOLOGY | Facility: CLINIC | Age: 78
End: 2023-03-20
Payer: MEDICARE

## 2023-03-20 PROCEDURE — 93798 PHYS/QHP OP CAR RHAB W/ECG: CPT

## 2023-03-22 ENCOUNTER — APPOINTMENT (OUTPATIENT)
Dept: CARDIOLOGY | Facility: CLINIC | Age: 78
End: 2023-03-22
Payer: MEDICARE

## 2023-03-22 PROCEDURE — 93798 PHYS/QHP OP CAR RHAB W/ECG: CPT

## 2023-03-24 ENCOUNTER — APPOINTMENT (OUTPATIENT)
Dept: CARDIOLOGY | Facility: CLINIC | Age: 78
End: 2023-03-24
Payer: MEDICARE

## 2023-03-24 ENCOUNTER — APPOINTMENT (OUTPATIENT)
Dept: FAMILY MEDICINE | Facility: CLINIC | Age: 78
End: 2023-03-24
Payer: MEDICARE

## 2023-03-24 VITALS — SYSTOLIC BLOOD PRESSURE: 130 MMHG | DIASTOLIC BLOOD PRESSURE: 86 MMHG

## 2023-03-24 VITALS
BODY MASS INDEX: 24.91 KG/M2 | OXYGEN SATURATION: 98 % | SYSTOLIC BLOOD PRESSURE: 156 MMHG | HEART RATE: 100 BPM | HEIGHT: 70 IN | DIASTOLIC BLOOD PRESSURE: 88 MMHG | WEIGHT: 174 LBS

## 2023-03-24 DIAGNOSIS — D64.9 ANEMIA, UNSPECIFIED: ICD-10-CM

## 2023-03-24 PROCEDURE — 93798 PHYS/QHP OP CAR RHAB W/ECG: CPT

## 2023-03-24 PROCEDURE — 99214 OFFICE O/P EST MOD 30 MIN: CPT

## 2023-03-24 RX ORDER — TAMSULOSIN HYDROCHLORIDE 0.4 MG/1
0.4 CAPSULE ORAL
Qty: 30 | Refills: 2 | Status: DISCONTINUED | COMMUNITY
End: 2023-03-24

## 2023-03-25 PROBLEM — D64.9 ANEMIA: Status: ACTIVE | Noted: 2023-03-24

## 2023-03-25 NOTE — HISTORY OF PRESENT ILLNESS
[FreeTextEntry1] : check-up [de-identified] : MR. HOLDSWORTH IS A PLEASANT 76 YO PRESENTING FOR FOLLOW-UP.  HISTORY OF HYPERTENSION, AFIB AS WELL AS PRIOR ADMISSION FOR MITRAL VALVE VEGETATION AND POSSIBLE AORTIC ROOT ABSCESS S/P AORTIC ROOT PATCH REPAIR AND PPM AND TREATED FOR ENDOCARDITIS.  HE IS NOW ATTENDING CARDIAC REHAB THREE DAYS A WEEK.  RECENTLY INCREASED TOPROL TO 50 MG DAILY.  HAS HAD NO FALLS.  HAD CAUTERIZATION DUE TO NOSE BLEED AND NO FURTHER EPISODES.  TREATED FOR C.DIF.  HAS HAD NO FURTHER DIARRHEA.  SEEING ID EVERY THREE MONTHS.  HAVING LAB WORK MONITORED.  FEELING WELL TODAY.

## 2023-03-25 NOTE — PLAN
[FreeTextEntry1] : CARDIOLOGY AND ID CONSULTANT NOTES REVIEWED\par RECENT LAB WORK REVIEWED\par +STOOL OCCULT; RE-ADVISED GI FOLLOW-UP\par WILL CHECK ADDITIONAL LAB WORK INCLUDING IRON LEVELS\par BLOOD PRESSURE CONTROLLED\par CONTINUE METOPROLOL 50 MG DAILY\par FALL PRECAUTIONS\par FOLLOW-UP ALL SPECIALISTS AS DIRECTED \par CALL WITH ANY QUESTIONS, CONCERNS OR CHANGES

## 2023-03-25 NOTE — PHYSICAL EXAM
[No Acute Distress] : no acute distress [Well Nourished] : well nourished [Well-Appearing] : well-appearing [Normal Sclera/Conjunctiva] : normal sclera/conjunctiva [PERRL] : pupils equal round and reactive to light [No Respiratory Distress] : no respiratory distress  [No Accessory Muscle Use] : no accessory muscle use [Clear to Auscultation] : lungs were clear to auscultation bilaterally [Normal Rate] : normal rate  [Regular Rhythm] : with a regular rhythm [Normal S1, S2] : normal S1 and S2 [Grossly Normal Strength/Tone] : grossly normal strength/tone [No Joint Swelling] : no joint swelling [Speech Grossly Normal] : speech grossly normal [Memory Grossly Normal] : memory grossly normal [Normal Mood] : the mood was normal

## 2023-03-27 ENCOUNTER — APPOINTMENT (OUTPATIENT)
Dept: CARDIOLOGY | Facility: CLINIC | Age: 78
End: 2023-03-27
Payer: MEDICARE

## 2023-03-27 PROCEDURE — 93798 PHYS/QHP OP CAR RHAB W/ECG: CPT

## 2023-03-29 ENCOUNTER — APPOINTMENT (OUTPATIENT)
Dept: CARDIOLOGY | Facility: CLINIC | Age: 78
End: 2023-03-29
Payer: MEDICARE

## 2023-03-29 PROCEDURE — 93798 PHYS/QHP OP CAR RHAB W/ECG: CPT

## 2023-03-31 ENCOUNTER — APPOINTMENT (OUTPATIENT)
Dept: ELECTROPHYSIOLOGY | Facility: CLINIC | Age: 78
End: 2023-03-31
Payer: MEDICARE

## 2023-03-31 ENCOUNTER — APPOINTMENT (OUTPATIENT)
Dept: CARDIOLOGY | Facility: CLINIC | Age: 78
End: 2023-03-31
Payer: MEDICARE

## 2023-03-31 VITALS
SYSTOLIC BLOOD PRESSURE: 134 MMHG | BODY MASS INDEX: 24.62 KG/M2 | OXYGEN SATURATION: 95 % | HEIGHT: 70 IN | DIASTOLIC BLOOD PRESSURE: 62 MMHG | HEART RATE: 112 BPM | WEIGHT: 172 LBS | TEMPERATURE: 97.3 F

## 2023-03-31 DIAGNOSIS — I44.0 ATRIOVENTRICULAR BLOCK, FIRST DEGREE: ICD-10-CM

## 2023-03-31 DIAGNOSIS — I44.7 LEFT BUNDLE-BRANCH BLOCK, UNSPECIFIED: ICD-10-CM

## 2023-03-31 LAB
ALBUMIN SERPL ELPH-MCNC: 4.6 G/DL
ALP BLD-CCNC: 78 U/L
ALT SERPL-CCNC: 12 U/L
ANION GAP SERPL CALC-SCNC: 12 MMOL/L
AST SERPL-CCNC: 21 U/L
BASOPHILS # BLD AUTO: 0.1 K/UL
BASOPHILS NFR BLD AUTO: 1.1 %
BILIRUB SERPL-MCNC: 0.6 MG/DL
BUN SERPL-MCNC: 14 MG/DL
CALCIUM SERPL-MCNC: 10.4 MG/DL
CHLORIDE SERPL-SCNC: 102 MMOL/L
CO2 SERPL-SCNC: 26 MMOL/L
CREAT SERPL-MCNC: 0.74 MG/DL
EGFR: 93 ML/MIN/1.73M2
EOSINOPHIL # BLD AUTO: 0.54 K/UL
EOSINOPHIL NFR BLD AUTO: 6 %
FERRITIN SERPL-MCNC: 172 NG/ML
GLUCOSE SERPL-MCNC: 100 MG/DL
HCT VFR BLD CALC: 44.5 %
HGB BLD-MCNC: 14 G/DL
IMM GRANULOCYTES NFR BLD AUTO: 0.3 %
IRON SATN MFR SERPL: 25 %
IRON SERPL-MCNC: 82 UG/DL
LYMPHOCYTES # BLD AUTO: 1.7 K/UL
LYMPHOCYTES NFR BLD AUTO: 18.8 %
MAN DIFF?: NORMAL
MCHC RBC-ENTMCNC: 29.9 PG
MCHC RBC-ENTMCNC: 31.5 GM/DL
MCV RBC AUTO: 95.1 FL
MONOCYTES # BLD AUTO: 0.9 K/UL
MONOCYTES NFR BLD AUTO: 9.9 %
NEUTROPHILS # BLD AUTO: 5.79 K/UL
NEUTROPHILS NFR BLD AUTO: 63.9 %
PLATELET # BLD AUTO: 271 K/UL
POTASSIUM SERPL-SCNC: 4.8 MMOL/L
PROT SERPL-MCNC: 7.4 G/DL
RBC # BLD: 4.68 M/UL
RBC # FLD: 14.6 %
SODIUM SERPL-SCNC: 140 MMOL/L
TIBC SERPL-MCNC: 329 UG/DL
UIBC SERPL-MCNC: 247 UG/DL
WBC # FLD AUTO: 9.06 K/UL

## 2023-03-31 PROCEDURE — 93000 ELECTROCARDIOGRAM COMPLETE: CPT | Mod: 59

## 2023-03-31 PROCEDURE — 93279 PRGRMG DEV EVAL PM/LDLS PM: CPT

## 2023-03-31 PROCEDURE — 99214 OFFICE O/P EST MOD 30 MIN: CPT

## 2023-03-31 PROCEDURE — 93798 PHYS/QHP OP CAR RHAB W/ECG: CPT

## 2023-03-31 NOTE — REVIEW OF SYSTEMS
[Headache] : no headache [Feeling Fatigued] : not feeling fatigued [SOB] : no shortness of breath [Dyspnea on exertion] : not dyspnea during exertion [Palpitations] : no palpitations [Syncope] : no syncope [Dizziness] : no dizziness

## 2023-03-31 NOTE — HISTORY OF PRESENT ILLNESS
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 Cleared by neurosurgery for OR after MRI on 12/1/22 revealed scattered bilateral parieto-occipital acute infarcts. LHC 12/5/22 with clean coronaries. Post cath patient noted with RADHA, now resolved. Patient now s/p AVR, MVR, and patch repair of the aortic root on 12/13/22 with Dr. Flanagan. Postoperative course significant for new LBBB and 1st degree AVB. \par \par During hospitalization, patient developed CHB and alternating BBB on 12/28/2022 and > 10 seconds of asystole without clear P waves on 12/30/2022. He was deemed in need for permanent PPM and given recent endocarditis and risk of infection a Micra AV PM was deemed more appropriate. \par \par MCOT recommended for AF surveillance 3 months post OHS. MCOT worn 2/28/23-3/13/23 revealed SR, run of SVT and NSVT. PVC burden 5%.\par \par Today, reports he has been feeling well since last follow up. Denies palpitations, dizziness, near syncope, syncope. Maintained on Eliquis 2.5 mg BID. C/o bruising on forearms, denies significant bleeding/falls.\par \par Micra AV interrogation reveals normal function. Minimal  burden <1%. Adequate sense and pace thresholds.

## 2023-04-03 ENCOUNTER — NON-APPOINTMENT (OUTPATIENT)
Age: 78
End: 2023-04-03

## 2023-04-03 ENCOUNTER — APPOINTMENT (OUTPATIENT)
Dept: CARDIOLOGY | Facility: CLINIC | Age: 78
End: 2023-04-03
Payer: MEDICARE

## 2023-04-03 PROCEDURE — 93798 PHYS/QHP OP CAR RHAB W/ECG: CPT

## 2023-04-05 ENCOUNTER — APPOINTMENT (OUTPATIENT)
Dept: CARDIOLOGY | Facility: CLINIC | Age: 78
End: 2023-04-05
Payer: MEDICARE

## 2023-04-05 VITALS
HEART RATE: 111 BPM | WEIGHT: 171 LBS | BODY MASS INDEX: 24.48 KG/M2 | OXYGEN SATURATION: 97 % | DIASTOLIC BLOOD PRESSURE: 74 MMHG | HEIGHT: 70 IN | TEMPERATURE: 98.1 F | SYSTOLIC BLOOD PRESSURE: 112 MMHG

## 2023-04-05 DIAGNOSIS — Z95.2 PRESENCE OF PROSTHETIC HEART VALVE: ICD-10-CM

## 2023-04-05 DIAGNOSIS — Z95.0 PRESENCE OF CARDIAC PACEMAKER: ICD-10-CM

## 2023-04-05 PROCEDURE — 93798 PHYS/QHP OP CAR RHAB W/ECG: CPT

## 2023-04-05 PROCEDURE — 93000 ELECTROCARDIOGRAM COMPLETE: CPT

## 2023-04-05 PROCEDURE — 99215 OFFICE O/P EST HI 40 MIN: CPT

## 2023-04-07 ENCOUNTER — APPOINTMENT (OUTPATIENT)
Dept: CARDIOLOGY | Facility: CLINIC | Age: 78
End: 2023-04-07
Payer: MEDICARE

## 2023-04-07 PROCEDURE — 93798 PHYS/QHP OP CAR RHAB W/ECG: CPT

## 2023-04-10 ENCOUNTER — APPOINTMENT (OUTPATIENT)
Dept: CARDIOLOGY | Facility: CLINIC | Age: 78
End: 2023-04-10
Payer: MEDICARE

## 2023-04-10 PROCEDURE — 93798 PHYS/QHP OP CAR RHAB W/ECG: CPT

## 2023-04-12 ENCOUNTER — APPOINTMENT (OUTPATIENT)
Dept: CARDIOLOGY | Facility: CLINIC | Age: 78
End: 2023-04-12
Payer: MEDICARE

## 2023-04-12 PROCEDURE — 93798 PHYS/QHP OP CAR RHAB W/ECG: CPT

## 2023-04-14 ENCOUNTER — APPOINTMENT (OUTPATIENT)
Dept: CARDIOLOGY | Facility: CLINIC | Age: 78
End: 2023-04-14
Payer: MEDICARE

## 2023-04-14 PROCEDURE — 93798 PHYS/QHP OP CAR RHAB W/ECG: CPT

## 2023-04-17 ENCOUNTER — APPOINTMENT (OUTPATIENT)
Dept: ELECTROPHYSIOLOGY | Facility: CLINIC | Age: 78
End: 2023-04-17

## 2023-04-17 ENCOUNTER — APPOINTMENT (OUTPATIENT)
Dept: CARDIOLOGY | Facility: CLINIC | Age: 78
End: 2023-04-17
Payer: MEDICARE

## 2023-04-17 PROCEDURE — 93798 PHYS/QHP OP CAR RHAB W/ECG: CPT

## 2023-04-19 ENCOUNTER — APPOINTMENT (OUTPATIENT)
Dept: ELECTROPHYSIOLOGY | Facility: CLINIC | Age: 78
End: 2023-04-19
Payer: MEDICARE

## 2023-04-19 ENCOUNTER — NON-APPOINTMENT (OUTPATIENT)
Age: 78
End: 2023-04-19

## 2023-04-19 ENCOUNTER — APPOINTMENT (OUTPATIENT)
Dept: CARDIOLOGY | Facility: CLINIC | Age: 78
End: 2023-04-19
Payer: MEDICARE

## 2023-04-19 PROCEDURE — 93296 REM INTERROG EVL PM/IDS: CPT

## 2023-04-19 PROCEDURE — 93294 REM INTERROG EVL PM/LDLS PM: CPT

## 2023-04-19 PROCEDURE — 93798 PHYS/QHP OP CAR RHAB W/ECG: CPT

## 2023-04-21 ENCOUNTER — APPOINTMENT (OUTPATIENT)
Dept: CARDIOLOGY | Facility: CLINIC | Age: 78
End: 2023-04-21
Payer: MEDICARE

## 2023-04-21 PROCEDURE — 93798 PHYS/QHP OP CAR RHAB W/ECG: CPT

## 2023-04-24 ENCOUNTER — APPOINTMENT (OUTPATIENT)
Dept: CARDIOLOGY | Facility: CLINIC | Age: 78
End: 2023-04-24
Payer: MEDICARE

## 2023-04-24 PROCEDURE — 93798 PHYS/QHP OP CAR RHAB W/ECG: CPT

## 2023-04-26 ENCOUNTER — APPOINTMENT (OUTPATIENT)
Dept: CARDIOLOGY | Facility: CLINIC | Age: 78
End: 2023-04-26
Payer: MEDICARE

## 2023-04-26 PROCEDURE — 93798 PHYS/QHP OP CAR RHAB W/ECG: CPT

## 2023-04-28 ENCOUNTER — APPOINTMENT (OUTPATIENT)
Dept: CARDIOLOGY | Facility: CLINIC | Age: 78
End: 2023-04-28
Payer: MEDICARE

## 2023-04-28 PROCEDURE — 93798 PHYS/QHP OP CAR RHAB W/ECG: CPT

## 2023-05-01 ENCOUNTER — APPOINTMENT (OUTPATIENT)
Dept: CARDIOLOGY | Facility: CLINIC | Age: 78
End: 2023-05-01
Payer: MEDICARE

## 2023-05-01 PROCEDURE — 93798 PHYS/QHP OP CAR RHAB W/ECG: CPT

## 2023-05-03 ENCOUNTER — APPOINTMENT (OUTPATIENT)
Dept: CARDIOLOGY | Facility: CLINIC | Age: 78
End: 2023-05-03
Payer: MEDICARE

## 2023-05-03 PROCEDURE — 93798 PHYS/QHP OP CAR RHAB W/ECG: CPT

## 2023-05-05 ENCOUNTER — APPOINTMENT (OUTPATIENT)
Dept: CARDIOLOGY | Facility: CLINIC | Age: 78
End: 2023-05-05
Payer: MEDICARE

## 2023-05-05 PROCEDURE — 93798 PHYS/QHP OP CAR RHAB W/ECG: CPT

## 2023-05-08 ENCOUNTER — APPOINTMENT (OUTPATIENT)
Dept: CARDIOLOGY | Facility: CLINIC | Age: 78
End: 2023-05-08
Payer: MEDICARE

## 2023-05-08 PROCEDURE — 93798 PHYS/QHP OP CAR RHAB W/ECG: CPT

## 2023-05-10 ENCOUNTER — APPOINTMENT (OUTPATIENT)
Dept: CARDIOLOGY | Facility: CLINIC | Age: 78
End: 2023-05-10
Payer: MEDICARE

## 2023-05-10 PROCEDURE — 93798 PHYS/QHP OP CAR RHAB W/ECG: CPT

## 2023-05-12 ENCOUNTER — APPOINTMENT (OUTPATIENT)
Dept: CARDIOLOGY | Facility: CLINIC | Age: 78
End: 2023-05-12
Payer: MEDICARE

## 2023-05-12 PROCEDURE — 93798 PHYS/QHP OP CAR RHAB W/ECG: CPT

## 2023-05-14 NOTE — ED PROVIDER NOTE - NPI NUMBER (FOR SYSADMIN USE ONLY) :
Writer notified MD Ronnie Jenkins, that the patient is discharging today. MD Jenkins stated to change patient's Amiodarone dose to 200mg twice a day, and to give 1800 dose of Lovenox now. MD Jenkins stated that it is fine to change twice daily dose of Lovenox to 24 hour/daily dose. MD Jenkins stated to discontinue Metoprolol and Cardizem doses on discharge and recommends to order Losartan if BP greater than 140/90, and to follow-up with PCP in 1-2 weeks. MD Clara Uribe to make adjustments to discharge orders and AVS. Will implement and continue to monitor.    [[id number]]

## 2023-05-15 ENCOUNTER — APPOINTMENT (OUTPATIENT)
Dept: CARDIOLOGY | Facility: CLINIC | Age: 78
End: 2023-05-15
Payer: MEDICARE

## 2023-05-15 PROCEDURE — 93798 PHYS/QHP OP CAR RHAB W/ECG: CPT

## 2023-05-17 ENCOUNTER — APPOINTMENT (OUTPATIENT)
Dept: CARDIOLOGY | Facility: CLINIC | Age: 78
End: 2023-05-17
Payer: MEDICARE

## 2023-05-17 PROCEDURE — 93798 PHYS/QHP OP CAR RHAB W/ECG: CPT

## 2023-05-19 ENCOUNTER — APPOINTMENT (OUTPATIENT)
Dept: CARDIOLOGY | Facility: CLINIC | Age: 78
End: 2023-05-19
Payer: MEDICARE

## 2023-05-19 PROCEDURE — 93798 PHYS/QHP OP CAR RHAB W/ECG: CPT

## 2023-05-22 ENCOUNTER — APPOINTMENT (OUTPATIENT)
Dept: CARDIOLOGY | Facility: CLINIC | Age: 78
End: 2023-05-22
Payer: MEDICARE

## 2023-05-22 PROCEDURE — 93798 PHYS/QHP OP CAR RHAB W/ECG: CPT

## 2023-05-23 ENCOUNTER — NON-APPOINTMENT (OUTPATIENT)
Age: 78
End: 2023-05-23

## 2023-05-24 ENCOUNTER — APPOINTMENT (OUTPATIENT)
Dept: CARDIOLOGY | Facility: CLINIC | Age: 78
End: 2023-05-24
Payer: MEDICARE

## 2023-05-24 PROCEDURE — 93798 PHYS/QHP OP CAR RHAB W/ECG: CPT

## 2023-05-26 ENCOUNTER — APPOINTMENT (OUTPATIENT)
Dept: CARDIOLOGY | Facility: CLINIC | Age: 78
End: 2023-05-26
Payer: MEDICARE

## 2023-05-26 PROCEDURE — 93798 PHYS/QHP OP CAR RHAB W/ECG: CPT

## 2023-05-31 ENCOUNTER — APPOINTMENT (OUTPATIENT)
Dept: CARDIOLOGY | Facility: CLINIC | Age: 78
End: 2023-05-31
Payer: MEDICARE

## 2023-05-31 PROCEDURE — 93798 PHYS/QHP OP CAR RHAB W/ECG: CPT

## 2023-06-02 ENCOUNTER — APPOINTMENT (OUTPATIENT)
Dept: CARDIOLOGY | Facility: CLINIC | Age: 78
End: 2023-06-02

## 2023-06-05 ENCOUNTER — APPOINTMENT (OUTPATIENT)
Dept: CARDIOLOGY | Facility: CLINIC | Age: 78
End: 2023-06-05

## 2023-06-07 ENCOUNTER — APPOINTMENT (OUTPATIENT)
Dept: CARDIOLOGY | Facility: CLINIC | Age: 78
End: 2023-06-07

## 2023-06-09 ENCOUNTER — APPOINTMENT (OUTPATIENT)
Dept: CARDIOLOGY | Facility: CLINIC | Age: 78
End: 2023-06-09

## 2023-06-12 ENCOUNTER — APPOINTMENT (OUTPATIENT)
Dept: CARDIOLOGY | Facility: CLINIC | Age: 78
End: 2023-06-12

## 2023-06-13 ENCOUNTER — APPOINTMENT (OUTPATIENT)
Dept: INTERNAL MEDICINE | Facility: CLINIC | Age: 78
End: 2023-06-13
Payer: MEDICARE

## 2023-06-13 VITALS
SYSTOLIC BLOOD PRESSURE: 120 MMHG | DIASTOLIC BLOOD PRESSURE: 70 MMHG | HEIGHT: 70 IN | HEART RATE: 91 BPM | OXYGEN SATURATION: 97 % | TEMPERATURE: 97.2 F | BODY MASS INDEX: 24.77 KG/M2 | WEIGHT: 173 LBS

## 2023-06-13 DIAGNOSIS — R78.81 BACTEREMIA: ICD-10-CM

## 2023-06-13 PROCEDURE — 36415 COLL VENOUS BLD VENIPUNCTURE: CPT

## 2023-06-13 PROCEDURE — 99214 OFFICE O/P EST MOD 30 MIN: CPT | Mod: 25

## 2023-06-13 NOTE — PHYSICAL EXAM
[General Appearance - Alert] : alert [General Appearance - In No Acute Distress] : in no acute distress [General Appearance - Well Nourished] : well nourished [General Appearance - Well-Appearing] : healthy appearing [Sclera] : the sclera and conjunctiva were normal [Outer Ear] : the ears and nose were normal in appearance [Oropharynx] : the oropharynx was normal with no thrush [Neck Appearance] : the appearance of the neck was normal [] : no respiratory distress [Exaggerated Use Of Accessory Muscles For Inspiration] : no accessory muscle use [Edema] : there was no peripheral edema [Motor Exam] : the motor exam was normal [No Focal Deficits] : no focal deficits [Oriented To Time, Place, And Person] : oriented to person, place, and time [Affect] : the affect was normal [FreeTextEntry1] : Sternal surgical site intact, no cellulitis or signs of infection

## 2023-06-13 NOTE — HISTORY OF PRESENT ILLNESS
[FreeTextEntry1] : 77 year old male with PMHx of recent  COVID  on 10/6/22, HTN , Vit B12 deficiency, presenting to the ED on 11/2 with body aches, fatigue and fever (Tmax 102) at home for 12 days found to have streptococcus bacteremia and admitted with unclear source, no recent dental work, skin infections, no respiratory symptoms. Pan scan was negative at that time ( LILIAN was recommended but pt refused) He was treated for Bacteremia ( Blood cultures + streptococcus anginosis pansensitive ) and norovirus with supportive care and contact isolation . He was discharged home on 11/7 with IV Rocephin via PICC line. Recommendation was to continue ABX  daily via pic end 11/30/22\par He presented to ER after and episode of syncope and collapse on November 27, 2022, found to be septic, hypotensive, hypoxic and febrile in the ER. \par In response to hypotension he failed to respond to Initial sepsis fluid protocol in ER is required IV pressor in form of levophed to maintain MAP greater than 60 -65. In the ER he was febrile with initial labs significant for WBC of 22.4 , ProCal of 21.2 first lactate 4.9 via abg with repeat post fluids of 2.2 venous sample. \par PT admitted to [facility] with septic shock unclear source. Found to have new onset A fib and on amio. 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  Patient remained in the hospital afterwards and was on meropenem from November 27 to December 25, 2022.  He was switched to ceftriaxone on December 25.  On December 26 patient had to be transferred back to the ICU with hypotension.  He had repeat blood cultures done which were unremarkable.  His antibiotics was switched back to meropenem.  Patient improved from the ICU at which point he was eventually discharged from the hospital on January 3, 2023.  Patient was discharged with a PICC line and he was switched to IV ertapenem 1 g daily.  \par Patient completed 6 weeks of IV ertapenem from the date of surgery on January 24, 2023.  He was seen here for follow-up on January 17, 2023.  He did not have any diarrhea at that time.  He was seen by his primary care physician and noted to have diarrhea.  He was tested for C. difficile and was positive.  His PMD called me and patient was started on oral vancomycin 125 mg every 6 hours.  Patient has started taking PO Vanco on February 2, 2023 and has completed course. He is here for follow up.  He reports no diarrhea today.  Denies any fever or chills.\par

## 2023-06-14 ENCOUNTER — APPOINTMENT (OUTPATIENT)
Dept: CARDIOLOGY | Facility: CLINIC | Age: 78
End: 2023-06-14

## 2023-06-15 LAB
ALBUMIN SERPL ELPH-MCNC: 4.5 G/DL
ALP BLD-CCNC: 71 U/L
ALT SERPL-CCNC: 21 U/L
ANION GAP SERPL CALC-SCNC: 15 MMOL/L
AST SERPL-CCNC: 27 U/L
BILIRUB SERPL-MCNC: 0.8 MG/DL
BUN SERPL-MCNC: 16 MG/DL
CALCIUM SERPL-MCNC: 10.3 MG/DL
CHLORIDE SERPL-SCNC: 102 MMOL/L
CO2 SERPL-SCNC: 24 MMOL/L
CREAT SERPL-MCNC: 0.78 MG/DL
EGFR: 91 ML/MIN/1.73M2
GLUCOSE SERPL-MCNC: 105 MG/DL
POTASSIUM SERPL-SCNC: 4.3 MMOL/L
PROT SERPL-MCNC: 7.3 G/DL
SODIUM SERPL-SCNC: 141 MMOL/L

## 2023-06-16 ENCOUNTER — APPOINTMENT (OUTPATIENT)
Dept: CARDIOLOGY | Facility: CLINIC | Age: 78
End: 2023-06-16

## 2023-06-26 ENCOUNTER — APPOINTMENT (OUTPATIENT)
Dept: ELECTROPHYSIOLOGY | Facility: CLINIC | Age: 78
End: 2023-06-26
Payer: MEDICARE

## 2023-06-26 ENCOUNTER — NON-APPOINTMENT (OUTPATIENT)
Age: 78
End: 2023-06-26

## 2023-06-26 VITALS
OXYGEN SATURATION: 97 % | SYSTOLIC BLOOD PRESSURE: 124 MMHG | BODY MASS INDEX: 24.48 KG/M2 | TEMPERATURE: 98 F | WEIGHT: 171 LBS | HEART RATE: 88 BPM | HEIGHT: 70 IN | DIASTOLIC BLOOD PRESSURE: 76 MMHG

## 2023-06-26 PROCEDURE — 99215 OFFICE O/P EST HI 40 MIN: CPT

## 2023-06-26 PROCEDURE — 93288 INTERROG EVL PM/LDLS PM IP: CPT

## 2023-06-26 PROCEDURE — 93000 ELECTROCARDIOGRAM COMPLETE: CPT | Mod: 59

## 2023-06-26 NOTE — HISTORY OF PRESENT ILLNESS
[FreeTextEntry1] : 76 y/o M with medical history of HTN, moderate AS, recent Covid infection 10/6/2022, recent strep anginosus bacteremia 11/1/22, norovirus, TTE negative for endocarditis at the time, with patient refusing LILIAN that admission, was started on Ceftriaxone (completed 11/20/22), however patient presented 11/27/22 with syncope, found to have AFib RVR, sepsis, copious diarrhea requiring rectal tube, and eventual LILIAN revealed 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 Cleared by neurosurgery for OR after MRI on 12/1/22 revealed scattered bilateral parieto-occipital acute infarcts. LHC 12/5/22 with clean coronaries. Post cath patient noted with RADHA, now resolved. Patient now s/p AVR, MVR, and patch repair of the aortic root on 12/13/22 with Dr. Flanagan. Postoperative course significant for new LBBB and 1st degree AVB. \par \par During hospitalization, patient developed CHB and alternating BBB on 12/28/2022 and > 10 seconds of asystole without clear P waves on 12/30/2022. He was deemed in need for permanent PPM and given recent endocarditis and risk of infection a Micra AV PM was deemed more appropriate. \par \par MCOT recommended for AF surveillance 3 months post OHS. MCOT worn 2/28/23-3/13/23 revealed SR, run of SVT and NSVT. PVC burden 5%.\par \par During previous f/u discussed that AF may have been sole AF however given history of CVA (in the setting of endocarditis) invasive monitorign with ILR would be recommended if he had strong desire to avoid lifelong AC. 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 \par \par Presents for EP f/up and remains asymptomatic. Repeat MCOT 4/5-5/4/23, showed brief nonsustained PAT. NO sustained events or AF noted. Overall doing well and denies CP, SOB, GERMAN, dizziness, palpitations, syncope or presyncope. \par Micra AV interrogation reveals normal function. AV conduction 99%

## 2023-06-26 NOTE — DISCUSSION/SUMMARY
[EKG obtained to assist in diagnosis and management of assessed problem(s)] : EKG obtained to assist in diagnosis and management of assessed problem(s) [FreeTextEntry1] : 78 y/o M with medical history of HTN, moderate AS, recent Covid infection 10/6/2022, recent strep anginosus bacteremia 11/1/22, norovirus, TTE negative for endocarditis at the time, with patient refusing LILIAN that admission, was started on Ceftriaxone (completed 11/20/22), however patient presented 11/27/22 with syncope, found to have AFib RVR, sepsis, copious diarrhea requiring rectal tube, and eventual LILIAN revealed 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 Cleared by neurosurgery for OR after MRI on 12/1/22 revealed scattered bilateral parieto-occipital acute infarcts. LHC 12/5/22 with clean coronaries. Post cath patient noted with RADHA, now resolved. Patient now s/p AVR, MVR, and patch repair of the aortic root on 12/13/22 with Dr. Flanagan. Postoperative course significant for new LBBB and 1st degree AVB. \par \par During hospitalization, patient developed CHB and alternating BBB on 12/28/2022 and > 10 seconds of asystole without clear P waves on 12/30/2022. He was deemed in need for permanent PPM and given recent endocarditis and risk of infection a Micra AV PM was deemed more appropriate. \par \par MCOT recommended for AF surveillance 3 months post OHS. MCOT worn 2/28/23-3/13/23 revealed SR, run of SVT and NSVT. PVC burden 5%.\par \par During previous f/u discussed that AF may have been sole AF however given history of CVA (in the setting of endocarditis) invasive monitorign with ILR would be recommended if he had strong desire to avoid lifelong AC. 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 \par \par Presents for EP f/up and remains asymptomatic. Repeat MCOT 4/5-5/4/23, showed brief nonsustained PAT. NO sustained events or AF noted. Overall doing well and denies CP, SOB, GERMAN, dizziness, palpitations, syncope or presyncope. \par Micra AV interrogation reveals normal function. AV conduction 99%\par \par Patient continues to expressed interest in stopping anticoagulation, however, refusing ILR.  CVA may have been in the setting of endocarditis but invasive monitoring with ILR would be the safest option if hoping to discontinue a/c long term. Continues to decline ILR. Currently tolerating a/c and will continue at this time. Preliminary discussion about the role for LAAO, especially if recurrent AF noted, but patient current declines considering. \par \par Recommend periodic noninvasive monitoring to screen for AF. Also encouraged purchasing a watch with AF monitoring capabilities to screen for AF recurrence. Patient agreeable.\par Will continue a/c at this  time.  EP f/up in 6 months with one week monitor prior. Continue to consider risk/benefits of a/c at future f/up\par \par Seen and discussed with Dr. Monterroso.\par Kiley Davis PAC\par \par \par -

## 2023-08-25 ENCOUNTER — APPOINTMENT (OUTPATIENT)
Dept: CARDIOLOGY | Facility: CLINIC | Age: 78
End: 2023-08-25
Payer: MEDICARE

## 2023-08-25 VITALS
WEIGHT: 172 LBS | BODY MASS INDEX: 24.62 KG/M2 | SYSTOLIC BLOOD PRESSURE: 126 MMHG | HEART RATE: 73 BPM | HEIGHT: 70 IN | DIASTOLIC BLOOD PRESSURE: 72 MMHG | OXYGEN SATURATION: 97 % | TEMPERATURE: 98 F

## 2023-08-25 PROCEDURE — 99215 OFFICE O/P EST HI 40 MIN: CPT

## 2023-08-25 PROCEDURE — 93000 ELECTROCARDIOGRAM COMPLETE: CPT

## 2023-08-25 RX ORDER — APIXABAN 2.5 MG/1
2.5 TABLET, FILM COATED ORAL
Qty: 180 | Refills: 2 | Status: ACTIVE | COMMUNITY
Start: 1900-01-01 | End: 1900-01-01

## 2023-08-25 RX ORDER — ASPIRIN 81 MG/1
81 TABLET, COATED ORAL DAILY
Refills: 0 | Status: DISCONTINUED | COMMUNITY
Start: 2023-01-20 | End: 2023-08-25

## 2023-08-25 NOTE — PHYSICAL EXAM
[General Appearance - Well Developed] : well developed [Normal Appearance] : normal appearance [Well Groomed] : well groomed [General Appearance - Well Nourished] : well nourished [No Deformities] : no deformities [General Appearance - In No Acute Distress] : no acute distress [Normal Conjunctiva] : the conjunctiva exhibited no abnormalities [Eyelids - No Xanthelasma] : the eyelids demonstrated no xanthelasmas [Normal Oral Mucosa] : normal oral mucosa [No Oral Pallor] : no oral pallor [No Oral Cyanosis] : no oral cyanosis [Normal Jugular Venous A Waves Present] : normal jugular venous A waves present [Normal Jugular Venous V Waves Present] : normal jugular venous V waves present [No Jugular Venous Parks A Waves] : no jugular venous parks A waves [Respiration, Rhythm And Depth] : normal respiratory rhythm and effort [Exaggerated Use Of Accessory Muscles For Inspiration] : no accessory muscle use [Auscultation Breath Sounds / Voice Sounds] : lungs were clear to auscultation bilaterally [Heart Rate And Rhythm] : heart rate and rhythm were normal [Heart Sounds] : normal S1 and S2 [Abdomen Soft] : soft [Abdomen Tenderness] : non-tender [Abdomen Mass (___ Cm)] : no abdominal mass palpated [Abnormal Walk] : normal gait [Gait - Sufficient For Exercise Testing] : the gait was sufficient for exercise testing [Nail Clubbing] : no clubbing of the fingernails [Cyanosis, Localized] : no localized cyanosis [Petechial Hemorrhages (___cm)] : no petechial hemorrhages [Skin Color & Pigmentation] : normal skin color and pigmentation [] : no rash [No Venous Stasis] : no venous stasis [Skin Lesions] : no skin lesions [No Skin Ulcers] : no skin ulcer [No Xanthoma] : no  xanthoma was observed [Oriented To Time, Place, And Person] : oriented to person, place, and time [Affect] : the affect was normal [Mood] : the mood was normal [No Anxiety] : not feeling anxious [FreeTextEntry1] : 4/6 systolci ejection murmur.

## 2023-08-25 NOTE — DISCUSSION/SUMMARY
[Patient] : the patient [Risks] : risks [Benefits] : benefits [Alternatives] : alternatives [With Me] : with me [___ Month(s)] : in [unfilled] month(s) [FreeTextEntry1] : This is a 77 year old male with history of hypertension and dyslipidemia , moderate aortic stenosis, here with change of cardiologist.  1) Moderate aortic stenosis:  now s/p AVR for endocarditis.  2) bacterial endocaredtitis strept.  unclear source.  now he needs abx ppx . 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  2) coronary artery disease evaluation: moderate coronary artery calcifications.  no deferrring  statins.  coq10 multivitamine  3) s/p MVR and AVR.  s./p Aortic root graft.  on eliquis. stop aspirin.   abx ppx as needed.  3) hypertension :     not on any Bp meds.   4) mild carotid plaque:  deferring statins  5) aortic calcifications,  carotid plaque: stop aspirin 81 . 6) parox afib : anticoagulation  significant brusing.  does not want to puruse watchman.  no afibon last josé has applewatch for afib.  will d./ c aspirin. reduce eliqusi to 2.5 mg BID. that will work for his aortic calcifcations.  and if still a lot of bruise then d/c eliquis and restart aspirn 81.  reept 4 weeks MCOt  next visit  & ) ectipic atrial rhythm   [EKG obtained to assist in diagnosis and management of assessed problem(s)] : EKG obtained to assist in diagnosis and management of assessed problem(s)

## 2023-08-25 NOTE — REASON FOR VISIT
[Initial Evaluation] : an initial evaluation of [FreeTextEntry1] : Aortic stenosis.  [FreeTextEntry2] : Aortic stenosis.

## 2023-08-25 NOTE — CARDIOLOGY SUMMARY
[No Exercise Ind Arr] : no exercise induced arrhythmias [No Symptoms] : no Symptoms [___] : [unfilled] [de-identified] : 8 25 2023 :   Ectopic atrial  rhythm -With rate variation  cv = 10. -Left bundle branch block.  ABNORMAL  2 28 2023  [de-identified] : may 2023:  No significant events.   [de-identified] : dec 2022   LVEf normal.  normal RV bioprosthtis aortci adn mitral vlave.  well seated valve  [de-identified] : s/p PPM in dec 2022 .  leadless PPM.  [de-identified] : dec 2022:  Mild CAD.  [de-identified] : 12/2022  \par  Patient now s/p AVR, MVR, and patch repair of the aortic root on 12/13/22 with \par  Dr. Flanagan. [de-identified] : carotid Doppler: Mild plaque without stensois.

## 2023-08-25 NOTE — HISTORY OF PRESENT ILLNESS
[FreeTextEntry1] : moderate aortic stenosis, hypertension   HPI for today: :  no chest pain .  physically active.  no dyspnea on exertion . no  syncope.   he is contractor.    old note:   in nov 2022, he was feelign sick.  he went to PCP. got vit B 12 shot.  and right after that he felt sick.  he thought he had a flu for 2 weks. his sife said he was looking bad.    he was found to have bacteremia.  unclear source.   no dental work or no dental caries, and no pnuemonia. just a b12 shot. s/p AVR and MVR and aortic root graft in dec 2022 by Dr. allen for endocarditis.    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 Cleared by neurosurgery for OR after MRI on 12/1/22 revealed scattered bilateral parieto-occipital acute infarcts.  LHC 12/5 with clean coronaries. Post cath patient noted with RADHA and rinary retention with ram placed 12/8. Patient now s/p AVR, MVR, and patch repair of the aortic root on 12/13/22 with Dr. Allen. Postoperative course significant for pacing requirements due to asystole, with patient regaining rhythm 12/15 (Dopamine for inotropic support now weaned off, plan for MCOT monitor on discharge to be followed by EP), hypotension (on Midodrine for BP support), persistent chest tube output (on IV diuresis, colchicine d/c'd due to diarrhea), and persistent urinary retention (failed TOV 12/19 with Ram replaced). 12/30 patient upgraded to CTICU for monitoring after 14 second pause during sleep. 12/30 s/p Micra leadless PPM. Pt advised to mail back MCOT device since no longer needed. PA/ Lateral completed and reviewed by EP. Pt now with s/p Left PICC line, CXR with noting line with the tip at the junction of the SVC and right atrium. ABX switched as per ID to Ertapenem 1gm Daily for ease of dosing (Will need weekly CBC and CMP faxed to [phone], Appointment with us in 7 to 10 days - [phone]). Plan to treat for 6 weeks of IV antibiotics from date of surgery until 1/24/23. Pt remains hemodynamically stable and determined stable to be discharged ome as per attending on call Dr. Thompson with follow up appointment.   old note: no headches. no dizziness. no palpitations.  drinks one glass of wine every day he is out.  physically active. ct scan reviewed: my self.  midl aortic calficiation. atlease motderate to severe coronay calcificaitons in LAd and PDA .  last time he had side effect of statins. possible. he is not on statin. only taking losartan   OLD NOTE:  : feels good. No headaches./ Drinks wine.  every day. 2-3 glasses. no headaches. no dizziness.    old note: This is a 73 year old male with history of aortic stenosis, hypertension  here for a new cardiology appt feels good. denies any cehst pauin no dyspnea. 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 no dizziness. no chest pauin. no syncope.

## 2023-09-12 ENCOUNTER — APPOINTMENT (OUTPATIENT)
Dept: INTERNAL MEDICINE | Facility: CLINIC | Age: 78
End: 2023-09-12
Payer: MEDICARE

## 2023-09-12 VITALS — DIASTOLIC BLOOD PRESSURE: 82 MMHG | SYSTOLIC BLOOD PRESSURE: 144 MMHG | HEART RATE: 74 BPM | OXYGEN SATURATION: 95 %

## 2023-09-12 PROCEDURE — 99214 OFFICE O/P EST MOD 30 MIN: CPT

## 2023-09-18 ENCOUNTER — APPOINTMENT (OUTPATIENT)
Dept: ELECTROPHYSIOLOGY | Facility: CLINIC | Age: 78
End: 2023-09-18
Payer: MEDICARE

## 2023-09-18 ENCOUNTER — NON-APPOINTMENT (OUTPATIENT)
Age: 78
End: 2023-09-18

## 2023-09-18 PROCEDURE — 93296 REM INTERROG EVL PM/IDS: CPT

## 2023-09-18 PROCEDURE — 93294 REM INTERROG EVL PM/LDLS PM: CPT

## 2023-09-19 ENCOUNTER — APPOINTMENT (OUTPATIENT)
Dept: INTERNAL MEDICINE | Facility: CLINIC | Age: 78
End: 2023-09-19

## 2023-10-18 ENCOUNTER — APPOINTMENT (OUTPATIENT)
Dept: DERMATOLOGY | Facility: CLINIC | Age: 78
End: 2023-10-18
Payer: MEDICARE

## 2023-10-18 PROCEDURE — 99214 OFFICE O/P EST MOD 30 MIN: CPT | Mod: 25

## 2023-10-18 PROCEDURE — 17003 DESTRUCT PREMALG LES 2-14: CPT

## 2023-10-18 PROCEDURE — 17000 DESTRUCT PREMALG LESION: CPT

## 2023-11-28 ENCOUNTER — NON-APPOINTMENT (OUTPATIENT)
Age: 78
End: 2023-11-28

## 2023-11-29 ENCOUNTER — NON-APPOINTMENT (OUTPATIENT)
Age: 78
End: 2023-11-29

## 2023-12-06 NOTE — ED PROVIDER NOTE - NSCAREINITIATED _GEN_ER
Jaciel Poe(Resident) [id number] OBS or IP - low complexity OR 25-34 mins [id number] OBS or IP - low complexity OR 25-34 mins

## 2023-12-11 ENCOUNTER — APPOINTMENT (OUTPATIENT)
Dept: FAMILY MEDICINE | Facility: CLINIC | Age: 78
End: 2023-12-11
Payer: MEDICARE

## 2023-12-11 VITALS
OXYGEN SATURATION: 99 % | BODY MASS INDEX: 24.77 KG/M2 | WEIGHT: 173 LBS | HEIGHT: 70 IN | SYSTOLIC BLOOD PRESSURE: 148 MMHG | DIASTOLIC BLOOD PRESSURE: 66 MMHG | HEART RATE: 90 BPM

## 2023-12-11 VITALS — DIASTOLIC BLOOD PRESSURE: 74 MMHG | SYSTOLIC BLOOD PRESSURE: 134 MMHG

## 2023-12-11 DIAGNOSIS — R82.90 UNSPECIFIED ABNORMAL FINDINGS IN URINE: ICD-10-CM

## 2023-12-11 DIAGNOSIS — Z87.898 PERSONAL HISTORY OF OTHER SPECIFIED CONDITIONS: ICD-10-CM

## 2023-12-11 PROCEDURE — 99213 OFFICE O/P EST LOW 20 MIN: CPT

## 2023-12-22 ENCOUNTER — APPOINTMENT (OUTPATIENT)
Dept: ELECTROPHYSIOLOGY | Facility: CLINIC | Age: 78
End: 2023-12-22

## 2023-12-26 NOTE — HISTORY OF PRESENT ILLNESS
[FreeTextEntry8] : MR. HOLDSWORTH IS A PLEASANT 77 YO PRESENTING FOR FOLLOW-UP.  WENT TO URGENT CARE FOR HEMATURIA EVALUATION.  GIVEN ANTIBIOTIC WHICH HE HAS NOW COMPLETED.  STARTED TO GET DIARRHEA ON FRIDAY.  "IT'S NOT BAD LIKE WHEN I HAD C.DIF".  NO N/V.  NO BLOOD IN STOOL.  NO FURTHER BLOOD IN URINE.  NO FEVER.  NO D/H/F.  DIARRHEA IS DAILY AND PARTLY FORMED.  NO ABDOMINAL PAIN.  NOT WORSENING.  ONLY ONE BOWEL MOVEMENT THIS MORNING AND NONE SINCE.  TWO BOWEL MOVEMENTS YESTERDAY.  WATCHING DIET.

## 2023-12-26 NOTE — PLAN
[FreeTextEntry1] : FOLLOW-UP UROLOGY REMAIN HYDRATED BRATE DIET TO ADVANCE AS TOLERATED CHECK STOOL STUDIES RECHECK U/A AND URINE CULTURE CALL WITH ANY QUESTIONS, CONCERNS OR CHANGES

## 2023-12-26 NOTE — PHYSICAL EXAM
[No Acute Distress] : no acute distress [Well Nourished] : well nourished [Well-Appearing] : well-appearing [No Respiratory Distress] : no respiratory distress  [No Accessory Muscle Use] : no accessory muscle use [Clear to Auscultation] : lungs were clear to auscultation bilaterally [Normal Rate] : normal rate  [Regular Rhythm] : with a regular rhythm [Normal S1, S2] : normal S1 and S2 [Soft] : abdomen soft [Non Tender] : non-tender [Normal Bowel Sounds] : normal bowel sounds [Speech Grossly Normal] : speech grossly normal [Normal Mood] : the mood was normal

## 2024-01-16 ENCOUNTER — APPOINTMENT (OUTPATIENT)
Dept: INTERNAL MEDICINE | Facility: CLINIC | Age: 79
End: 2024-01-16
Payer: MEDICARE

## 2024-01-16 VITALS
DIASTOLIC BLOOD PRESSURE: 81 MMHG | OXYGEN SATURATION: 98 % | WEIGHT: 172 LBS | BODY MASS INDEX: 24.62 KG/M2 | TEMPERATURE: 97.6 F | HEIGHT: 70 IN | HEART RATE: 80 BPM | SYSTOLIC BLOOD PRESSURE: 155 MMHG

## 2024-01-16 DIAGNOSIS — I33.0 ACUTE AND SUBACUTE INFECTIVE ENDOCARDITIS: ICD-10-CM

## 2024-01-16 DIAGNOSIS — A04.72 ENTEROCOLITIS DUE TO CLOSTRIDIUM DIFFICILE, NOT SPECIFIED AS RECURRENT: ICD-10-CM

## 2024-01-16 DIAGNOSIS — A49.1 STREPTOCOCCAL INFECTION, UNSPECIFIED SITE: ICD-10-CM

## 2024-01-16 PROCEDURE — 99214 OFFICE O/P EST MOD 30 MIN: CPT | Mod: 25

## 2024-01-16 PROCEDURE — 36415 COLL VENOUS BLD VENIPUNCTURE: CPT

## 2024-01-16 NOTE — PHYSICAL EXAM
Health Maintenance Due   Topic Date Due   • DM/CKD Microalbumin  07/08/1959   • Medicare Wellness Visit  01/02/2021       Patient is due for topics as listed above but is not proceeding with DM/CKD Microalbumin and MWV (Medicare Wellness Visit) at this time. Education provided for DM/CKD Microalbumin and MWV (Medicare Wellness Visit).          [General Appearance - Alert] : alert [General Appearance - In No Acute Distress] : in no acute distress [General Appearance - Well Nourished] : well nourished [General Appearance - Well-Appearing] : healthy appearing [Sclera] : the sclera and conjunctiva were normal [Outer Ear] : the ears and nose were normal in appearance [Oropharynx] : the oropharynx was normal with no thrush [Neck Appearance] : the appearance of the neck was normal [] : no respiratory distress [Exaggerated Use Of Accessory Muscles For Inspiration] : no accessory muscle use [Edema] : there was no peripheral edema [FreeTextEntry1] : Sternal surgical site intact, no cellulitis or signs of infection [Motor Exam] : the motor exam was normal [No Focal Deficits] : no focal deficits [Oriented To Time, Place, And Person] : oriented to person, place, and time [Affect] : the affect was normal

## 2024-01-16 NOTE — ASSESSMENT
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 difficile diarrhea Leukocytosis Streptococcus anginosus bacteremia/sepsis Mitral valve endocarditis Aortic root abscess s/p AVR, MVR, aortic root patch repair 12/13/22   Recommendation: Found to have positive stool C. difficile on February 1, 2023. Started on oral vancomycin 125 mg every 6 hours on February 2, 2023 and completed course of treatment. Currently has no diarrhea. Leukocytosis improved on prior blood work. Patient has no abdominal pain at this time. Feels well. Blood cultures November 27, 2022 and December 26, 2022 no growth. s/p AVR, MVR, aortic root patch repair 12/13/22. OR culture with no growth Completed 6 weeks of ertapenem 1 g daily on January 24, 2023 for endocarditis. No need for more antimicrobial therapy. Patient with no symptoms Check CBC, CMP, ESR, CRP  Follow-up as needed  Counseling included lab results, differential diagnosis, treatment options, risks and benefits, lifestyle changes, current condition, medications and dose adjustments. The patient was interactive attentive and asked questions and verbalized understanding.

## 2024-01-18 LAB
ALBUMIN SERPL ELPH-MCNC: 4.6 G/DL
ALP BLD-CCNC: 69 U/L
ALT SERPL-CCNC: 27 U/L
ANION GAP SERPL CALC-SCNC: 13 MMOL/L
AST SERPL-CCNC: 32 U/L
BASOPHILS # BLD AUTO: 0.09 K/UL
BASOPHILS NFR BLD AUTO: 1.1 %
BILIRUB SERPL-MCNC: 0.6 MG/DL
BUN SERPL-MCNC: 16 MG/DL
CALCIUM SERPL-MCNC: 9.9 MG/DL
CHLORIDE SERPL-SCNC: 103 MMOL/L
CO2 SERPL-SCNC: 24 MMOL/L
CREAT SERPL-MCNC: 0.76 MG/DL
CRP SERPL-MCNC: <3 MG/L
EGFR: 91 ML/MIN/1.73M2
EOSINOPHIL # BLD AUTO: 0.41 K/UL
EOSINOPHIL NFR BLD AUTO: 4.8 %
ERYTHROCYTE [SEDIMENTATION RATE] IN BLOOD BY WESTERGREN METHOD: 14 MM/HR
HCT VFR BLD CALC: 44.4 %
HGB BLD-MCNC: 15 G/DL
IMM GRANULOCYTES NFR BLD AUTO: 0.2 %
LYMPHOCYTES # BLD AUTO: 1.49 K/UL
LYMPHOCYTES NFR BLD AUTO: 17.5 %
MAN DIFF?: NORMAL
MCHC RBC-ENTMCNC: 32.6 PG
MCHC RBC-ENTMCNC: 33.8 GM/DL
MCV RBC AUTO: 96.5 FL
MONOCYTES # BLD AUTO: 0.84 K/UL
MONOCYTES NFR BLD AUTO: 9.8 %
NEUTROPHILS # BLD AUTO: 5.68 K/UL
NEUTROPHILS NFR BLD AUTO: 66.6 %
PLATELET # BLD AUTO: 250 K/UL
POTASSIUM SERPL-SCNC: 4.2 MMOL/L
PROT SERPL-MCNC: 7.3 G/DL
RBC # BLD: 4.6 M/UL
RBC # FLD: 12.7 %
SODIUM SERPL-SCNC: 139 MMOL/L
WBC # FLD AUTO: 8.53 K/UL

## 2024-01-24 ENCOUNTER — APPOINTMENT (OUTPATIENT)
Dept: ELECTROPHYSIOLOGY | Facility: CLINIC | Age: 79
End: 2024-01-24
Payer: MEDICARE

## 2024-01-24 VITALS
HEIGHT: 70 IN | HEART RATE: 75 BPM | OXYGEN SATURATION: 100 % | DIASTOLIC BLOOD PRESSURE: 62 MMHG | SYSTOLIC BLOOD PRESSURE: 118 MMHG | WEIGHT: 173 LBS | BODY MASS INDEX: 24.77 KG/M2

## 2024-01-24 DIAGNOSIS — I48.0 PAROXYSMAL ATRIAL FIBRILLATION: ICD-10-CM

## 2024-01-24 DIAGNOSIS — I45.9 CONDUCTION DISORDER, UNSPECIFIED: ICD-10-CM

## 2024-01-24 PROCEDURE — 93000 ELECTROCARDIOGRAM COMPLETE: CPT

## 2024-01-24 PROCEDURE — 99214 OFFICE O/P EST MOD 30 MIN: CPT | Mod: 25

## 2024-01-24 NOTE — HISTORY OF PRESENT ILLNESS
[FreeTextEntry1] : The patient is a 78-year-old male here for follow up today (previously seen by Dr. Young). The patient has a history of HTN, aortic stenosis, mitral valve endocarditis s/p MVR/AVR/aortic root repair in 12/2022 with post-op CHB/alternating bundle branch block s/p Micra AV PPM. He was also noted to have AF in the setting of endocarditis and MRI evidence of scattered bilateral parieto-occipital infarcts. He has been on AC and ILR monitoring has been recommended in the past which he has declined. He has an Apple Watch now and the dose of eliquis has been reduced by his cardiologist, Dr. Young, to 2.5 mg bid because of excessive bruising and lack of AF documentation since initial diagnosis ((also no AF on MCOT monitoringx2). He has not received any AF alerts from his watch but is not wearing his watch consistent. He denies any symptoms of palpitations, shortness of breath, dizziness, chest pain, syncope or extremity edema.

## 2024-01-24 NOTE — DISCUSSION/SUMMARY
[FreeTextEntry1] : In summary, the patient is a 78-year-old male with a history of aortic stenosis, mitral valve endocarditis s/p AVR/MVR/aortic root repair, post-op complete heart block s/p Micra AV leadless PPM, atrial fibrillation, and brain MRI evidence of scattered bilateral infarcts (in the setting of endocarditis). AF was first detected in the setting of endocarditis. He was on full AC which has now been reduced to eliquis 2.5 mg bid by Dr. Young because of excessive bruising. No AF has been documented as outpatient (MCOTx2). He is now wearing an Apple Watch although not consistently. He has declined ILR in the past for AF monitoring. I emphasized that he needs to be wearing the Apple Watch all the time for AF diagnosis to be excluded. If AF recurs, he should be on full dose oral anticoagulation or alternative stroke prophylaxis should be pursued.  [EKG obtained to assist in diagnosis and management of assessed problem(s)] : EKG obtained to assist in diagnosis and management of assessed problem(s)

## 2024-03-05 ENCOUNTER — NON-APPOINTMENT (OUTPATIENT)
Age: 79
End: 2024-03-05

## 2024-03-08 ENCOUNTER — APPOINTMENT (OUTPATIENT)
Dept: DERMATOLOGY | Facility: CLINIC | Age: 79
End: 2024-03-08
Payer: MEDICARE

## 2024-03-08 PROCEDURE — 11102 TANGNTL BX SKIN SINGLE LES: CPT

## 2024-03-08 PROCEDURE — 99212 OFFICE O/P EST SF 10 MIN: CPT | Mod: 25

## 2024-03-12 ENCOUNTER — NON-APPOINTMENT (OUTPATIENT)
Age: 79
End: 2024-03-12

## 2024-03-12 ENCOUNTER — APPOINTMENT (OUTPATIENT)
Dept: CARDIOLOGY | Facility: CLINIC | Age: 79
End: 2024-03-12
Payer: MEDICARE

## 2024-03-12 VITALS
BODY MASS INDEX: 25.48 KG/M2 | DIASTOLIC BLOOD PRESSURE: 61 MMHG | HEIGHT: 70 IN | TEMPERATURE: 98.7 F | OXYGEN SATURATION: 97 % | SYSTOLIC BLOOD PRESSURE: 140 MMHG | HEART RATE: 74 BPM | WEIGHT: 178 LBS

## 2024-03-12 DIAGNOSIS — Z95.0 PRESENCE OF CARDIAC PACEMAKER: ICD-10-CM

## 2024-03-12 DIAGNOSIS — I05.9 RHEUMATIC MITRAL VALVE DISEASE, UNSPECIFIED: ICD-10-CM

## 2024-03-12 DIAGNOSIS — I10 ESSENTIAL (PRIMARY) HYPERTENSION: ICD-10-CM

## 2024-03-12 DIAGNOSIS — I65.23 OCCLUSION AND STENOSIS OF BILATERAL CAROTID ARTERIES: ICD-10-CM

## 2024-03-12 DIAGNOSIS — I48.0 PAROXYSMAL ATRIAL FIBRILLATION: ICD-10-CM

## 2024-03-12 DIAGNOSIS — E78.5 HYPERLIPIDEMIA, UNSPECIFIED: ICD-10-CM

## 2024-03-12 PROCEDURE — G2211 COMPLEX E/M VISIT ADD ON: CPT

## 2024-03-12 PROCEDURE — 99215 OFFICE O/P EST HI 40 MIN: CPT

## 2024-03-12 PROCEDURE — 93000 ELECTROCARDIOGRAM COMPLETE: CPT

## 2024-03-12 NOTE — CARDIOLOGY SUMMARY
[de-identified] : 3 12 2024:  ectopic atrial rhyhtm. native LBBB.   8 25 2023 :   Ectopic atrial  rhythm -With rate variation  cv = 10. -Left bundle branch block.  ABNORMAL  2 28 2023  [de-identified] : may 2023:  No significant events.   [de-identified] : dec 2022   LVEf normal.  normal RV bioprosthtis aortci adn mitral vlave.  well seated valve  [de-identified] : PPM: dec 2022:  Omplantation.  V paced 3% .  Native LBBB [de-identified] : s/p PPM in dec 2022 .  leadless PPM.  [de-identified] : dec 2022:  Mild CAD.  [de-identified] : 12/2022  \par  Patient now s/p AVR, MVR, and patch repair of the aortic root on 12/13/22 with \par  Dr. Flanagan. [No Exercise Ind Arr] : no exercise induced arrhythmias [No Symptoms] : no Symptoms [___] : [unfilled] [de-identified] : carotid Doppler: Mild plaque without stensois.

## 2024-03-12 NOTE — DISCUSSION/SUMMARY
[Patient] : the patient [Benefits] : benefits [Risks] : risks [Alternatives] : alternatives [___ Month(s)] : in [unfilled] month(s) [With Me] : with me [FreeTextEntry1] : This is a 77 year old male with history of hypertension and dyslipidemia , moderate aortic stenosis, here with change of cardiologist.  1) bacterial endocaredtitis strept.  unclear source.  now he needs abx ppx . 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  repeat transthoracic echocardiogram  2) coronary artery disease evaluation: moderate coronary artery calcifications.  no deferrring  statins.  coq10 multivitamine  3) s/p MVR and AVR.  s./p Aortic root graft.  on eliquis. stop aspirin.   abx ppx as needed.  3) hypertension :     not on any Bp meds.   4) mild carotid plaque:  deferring statins  5) aortic calcifications,  carotid plaque: off aspirin 81 . 6) parox afib : anticoagulation  significant brusing.  does not want to puruse watchman.  no afibon last josé has applewatch for afib.   eliqusi to 2.5 mg BID. that will work for his aortic calcifcations toelarteing   tolerating anticoagulation low dose albeit.  has apple watch. no significant aafib burden as per patient. no evalaute for monitor 4 weeks next visit. as he is still low dose eliqwuis.   7) s/p AV micra PPM:   [EKG obtained to assist in diagnosis and management of assessed problem(s)] : EKG obtained to assist in diagnosis and management of assessed problem(s)

## 2024-03-12 NOTE — PHYSICAL EXAM
[General Appearance - Well Developed] : well developed [Normal Appearance] : normal appearance [Well Groomed] : well groomed [General Appearance - Well Nourished] : well nourished [No Deformities] : no deformities [General Appearance - In No Acute Distress] : no acute distress [Normal Conjunctiva] : the conjunctiva exhibited no abnormalities [Normal Oral Mucosa] : normal oral mucosa [Eyelids - No Xanthelasma] : the eyelids demonstrated no xanthelasmas [No Oral Pallor] : no oral pallor [No Oral Cyanosis] : no oral cyanosis [Normal Jugular Venous A Waves Present] : normal jugular venous A waves present [Normal Jugular Venous V Waves Present] : normal jugular venous V waves present [No Jugular Venous Parks A Waves] : no jugular venous parks A waves [Respiration, Rhythm And Depth] : normal respiratory rhythm and effort [Exaggerated Use Of Accessory Muscles For Inspiration] : no accessory muscle use [Auscultation Breath Sounds / Voice Sounds] : lungs were clear to auscultation bilaterally [Heart Rate And Rhythm] : heart rate and rhythm were normal [Heart Sounds] : normal S1 and S2 [FreeTextEntry1] : 4/6 systolci ejection murmur.  [Abdomen Soft] : soft [Abdomen Tenderness] : non-tender [Abnormal Walk] : normal gait [Abdomen Mass (___ Cm)] : no abdominal mass palpated [Gait - Sufficient For Exercise Testing] : the gait was sufficient for exercise testing [Nail Clubbing] : no clubbing of the fingernails [Cyanosis, Localized] : no localized cyanosis [Petechial Hemorrhages (___cm)] : no petechial hemorrhages [Skin Color & Pigmentation] : normal skin color and pigmentation [] : no rash [No Venous Stasis] : no venous stasis [Skin Lesions] : no skin lesions [No Skin Ulcers] : no skin ulcer [No Xanthoma] : no  xanthoma was observed [Oriented To Time, Place, And Person] : oriented to person, place, and time [Mood] : the mood was normal [Affect] : the affect was normal [No Anxiety] : not feeling anxious

## 2024-03-12 NOTE — HISTORY OF PRESENT ILLNESS
[FreeTextEntry1] : moderate aortic stenosis, hypertension   HPI for today: :   feels good. no chest pain . no dyspnea on exertion .  has a PPm. no issues.    old note:  no chest pain .  physically active.  no dyspnea on exertion . no  syncope.   he is contractor.    old note:   in nov 2022, he was feelign sick.  he went to PCP. got vit B 12 shot.  and right after that he felt sick.  he thought he had a flu for 2 weks. his sife said he was looking bad.    he was found to have bacteremia.  unclear source.   no dental work or no dental caries, and no pnuemonia. just a b12 shot. s/p AVR and MVR and aortic root graft in dec 2022 by Dr. allen for endocarditis.    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 Cleared by neurosurgery for OR after MRI on 12/1/22 revealed scattered bilateral parieto-occipital acute infarcts.  LHC 12/5 with clean coronaries. Post cath patient noted with RADHA and rinary retention with ram placed 12/8. Patient now s/p AVR, MVR, and patch repair of the aortic root on 12/13/22 with Dr. Allen. Postoperative course significant for pacing requirements due to asystole, with patient regaining rhythm 12/15 (Dopamine for inotropic support now weaned off, plan for MCOT monitor on discharge to be followed by EP), hypotension (on Midodrine for BP support), persistent chest tube output (on IV diuresis, colchicine d/c'd due to diarrhea), and persistent urinary retention (failed TOV 12/19 with Ram replaced). 12/30 patient upgraded to CTICU for monitoring after 14 second pause during sleep. 12/30 s/p Micra leadless PPM. Pt advised to mail back MCOT device since no longer needed. PA/ Lateral completed and reviewed by EP. Pt now with s/p Left PICC line, CXR with noting line with the tip at the junction of the SVC and right atrium. ABX switched as per ID to Ertapenem 1gm Daily for ease of dosing (Will need weekly CBC and CMP faxed to [phone], Appointment with us in 7 to 10 days - [phone]). Plan to treat for 6 weeks of IV antibiotics from date of surgery until 1/24/23. Pt remains hemodynamically stable and determined stable to be discharged ome as per attending on call Dr. Thompson with follow up appointment.   old note: no headches. no dizziness. no palpitations.  drinks one glass of wine every day he is out.  physically active. ct scan reviewed: my self.  midl aortic calficiation. atlease motderate to severe coronay calcificaitons in LAd and PDA .  last time he had side effect of statins. possible. he is not on statin. only taking losartan   OLD NOTE:  : feels good. No headaches./ Drinks wine.  every day. 2-3 glasses. no headaches. no dizziness.    old note: This is a 73 year old male with history of aortic stenosis, hypertension  here for a new cardiology appt feels good. denies any cehst pauin no dyspnea. 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 no dizziness. no chest pauin. no syncope.

## 2024-03-14 ENCOUNTER — APPOINTMENT (OUTPATIENT)
Dept: CARDIOLOGY | Facility: CLINIC | Age: 79
End: 2024-03-14
Payer: MEDICARE

## 2024-03-14 ENCOUNTER — APPOINTMENT (OUTPATIENT)
Dept: FAMILY MEDICINE | Facility: CLINIC | Age: 79
End: 2024-03-14
Payer: MEDICARE

## 2024-03-14 VITALS
HEIGHT: 70 IN | SYSTOLIC BLOOD PRESSURE: 138 MMHG | HEART RATE: 74 BPM | WEIGHT: 178 LBS | OXYGEN SATURATION: 96 % | DIASTOLIC BLOOD PRESSURE: 70 MMHG | BODY MASS INDEX: 25.48 KG/M2

## 2024-03-14 DIAGNOSIS — Z87.898 PERSONAL HISTORY OF OTHER SPECIFIED CONDITIONS: ICD-10-CM

## 2024-03-14 DIAGNOSIS — Z00.00 ENCOUNTER FOR GENERAL ADULT MEDICAL EXAMINATION W/OUT ABNORMAL FINDINGS: ICD-10-CM

## 2024-03-14 PROCEDURE — G0439: CPT

## 2024-03-14 PROCEDURE — 93356 MYOCRD STRAIN IMG SPCKL TRCK: CPT

## 2024-03-14 PROCEDURE — 93306 TTE W/DOPPLER COMPLETE: CPT

## 2024-03-17 NOTE — HISTORY OF PRESENT ILLNESS
[FreeTextEntry1] : PHYSICAL [de-identified] : MR. HOLDSWORTH IS A PLEASANT 77 YO PRESENTING FOR YEARLY WELLNESS EXAM.  HISTORY OF HYPERTENSION, AFIB AS WELL AS PRIOR ADMISSION FOR MITRAL VALVE VEGETATION AND POSSIBLE AORTIC ROOT ABSCESS S/P AORTIC ROOT PATCH REPAIR AND PPM AND TREATED FOR ENDOCARDITIS.  SAW CARDIOLOGY IN FOLLOW-UP.  NO CHANGES IN MEDICATIONS WERE MADE.  NO FALLS OR SIGNS OR SYMPTOMS OF BLEEDING ON ELIQUIS FOR ANTICOAGULATION.  FEELING WELL TODAY.  RECENT BIOPSY ON RIGHT LOWER FOREARM.

## 2024-03-17 NOTE — HEALTH RISK ASSESSMENT
[Yes] : Yes [4 or more  times a week (4 pts)] : 4 or more  times a week (4 points) [3 or 4 (1 pt)] : 3 or 4  (1 point) [Never (0 pts)] : Never (0 points) [No] : In the past 12 months have you used drugs other than those required for medical reasons? No [Never] : Never [Excellent] : ~his/her~  mood as  excellent [No falls in past year] : Patient reported no falls in the past year [Little interest or pleasure doing things] : 1) Little interest or pleasure doing things [0] : 2) Feeling down, depressed, or hopeless: Not at all (0) [Feeling down, depressed, or hopeless] : 2) Feeling down, depressed, or hopeless [PHQ-2 Negative - No further assessment needed] : PHQ-2 Negative - No further assessment needed [Patient declined colonoscopy] : Patient declined colonoscopy [HIV test declined] : HIV test declined [Hepatitis C test declined] : Hepatitis C test declined [None] : None [With Family] : lives with family [# of Members in Household ___] :  household currently consist of [unfilled] member(s) [Employed] : employed [College] : College [] :  [# Of Children ___] : has [unfilled] children [Sexually Active] : sexually active [Feels Safe at Home] : Feels safe at home [Fully functional (bathing, dressing, toileting, transferring, walking, feeding)] : Fully functional (bathing, dressing, toileting, transferring, walking, feeding) [Fully functional (using the telephone, shopping, preparing meals, housekeeping, doing laundry, using] : Fully functional and needs no help or supervision to perform IADLs (using the telephone, shopping, preparing meals, housekeeping, doing laundry, using transportation, managing medications and managing finances) [Reports normal functional visual acuity (ie: able to read med bottle)] : Reports normal functional visual acuity [Smoke Detector] : smoke detector [Carbon Monoxide Detector] : carbon monoxide detector [Safety elements used in home] : safety elements used in home [Seat Belt] :  uses seat belt [With Patient/Caregiver] : , with patient/caregiver [Designated Healthcare Proxy] : Designated healthcare proxy [Name: ___] : Health Care Proxy's Name: [unfilled]  [Relationship: ___] : Relationship: [unfilled] [de-identified] : [place], [place], VERY ACTIVE [Audit-CScore] : 5 [de-identified] : WATCHES DIET, CUT DOWN ON CHEESE, VEGETABLES, LEAN PROTEIN [[id number]] : 0 [Language] : denies difficulty with language [Change in mental status noted] : No change in mental status noted [Handling Complex Tasks] : denies difficulty handling complex tasks [Learning/Retaining New Information] : denies difficulty learning/retaining new information [Reports changes in hearing] : Reports no changes in hearing [High Risk Behavior] : no high risk behavior [Reports changes in vision] : Reports no changes in vision [Reports changes in dental health] : Reports no changes in dental health [Sunscreen] : does not use sunscreen [de-identified] : READING GLASSES [AdvancecareDate] : 03/24

## 2024-03-17 NOTE — PLAN
[FreeTextEntry1] : OPHTHALMOLOGY VISION SCREENING SAFETY / HEALTHY HABITS REVIEWED HEALTHY DIET AND LIFESTYLE MODIFICATIONS VACCINATIONS DISCUSSED: COVID, RSV CHECK ROUTINE LAB WORK FOLLOW-UP ALL SPECIALISTS AS DIRECTED CALL WITH ANY QUESTIONS, CONCERNS OR CHANGES

## 2024-03-22 DIAGNOSIS — I70.0 ATHEROSCLEROSIS OF AORTA: ICD-10-CM

## 2024-03-22 DIAGNOSIS — I35.0 NONRHEUMATIC AORTIC (VALVE) STENOSIS: ICD-10-CM

## 2024-03-24 LAB
ALBUMIN SERPL ELPH-MCNC: 4.5 G/DL
ALP BLD-CCNC: 71 U/L
ALT SERPL-CCNC: 27 U/L
ANION GAP SERPL CALC-SCNC: 14 MMOL/L
AST SERPL-CCNC: 34 U/L
BILIRUB SERPL-MCNC: 0.6 MG/DL
BUN SERPL-MCNC: 15 MG/DL
CALCIUM SERPL-MCNC: 9.7 MG/DL
CHLORIDE SERPL-SCNC: 103 MMOL/L
CO2 SERPL-SCNC: 22 MMOL/L
CREAT SERPL-MCNC: 0.88 MG/DL
EGFR: 87 ML/MIN/1.73M2
ESTIMATED AVERAGE GLUCOSE: 97 MG/DL
GLUCOSE SERPL-MCNC: 89 MG/DL
HBA1C MFR BLD HPLC: 5 %
POTASSIUM SERPL-SCNC: 4.3 MMOL/L
PROT SERPL-MCNC: 7 G/DL
PSA SERPL-MCNC: 3.35 NG/ML
SODIUM SERPL-SCNC: 140 MMOL/L
T4 FREE SERPL-MCNC: 1 NG/DL
TSH SERPL-ACNC: 1.96 UIU/ML

## 2024-03-26 LAB
CHOLEST SERPL-MCNC: 246 MG/DL
HDLC SERPL-MCNC: 69 MG/DL
LDLC SERPL CALC-MCNC: 164 MG/DL
NONHDLC SERPL-MCNC: 177 MG/DL
TRIGL SERPL-MCNC: 75 MG/DL

## 2024-03-27 ENCOUNTER — APPOINTMENT (OUTPATIENT)
Dept: DERMATOLOGY | Facility: CLINIC | Age: 79
End: 2024-03-27
Payer: MEDICARE

## 2024-03-27 PROCEDURE — 99212 OFFICE O/P EST SF 10 MIN: CPT | Mod: 25

## 2024-03-27 PROCEDURE — 17262 DSTRJ MAL LES T/A/L 1.1-2.0: CPT

## 2024-03-28 ENCOUNTER — RX RENEWAL (OUTPATIENT)
Age: 79
End: 2024-03-28

## 2024-03-28 RX ORDER — METOPROLOL SUCCINATE 100 MG/1
100 TABLET, EXTENDED RELEASE ORAL DAILY
Qty: 90 | Refills: 3 | Status: ACTIVE | COMMUNITY
Start: 2023-02-28 | End: 1900-01-01

## 2024-04-24 ENCOUNTER — NON-APPOINTMENT (OUTPATIENT)
Age: 79
End: 2024-04-24

## 2024-04-24 ENCOUNTER — APPOINTMENT (OUTPATIENT)
Dept: ELECTROPHYSIOLOGY | Facility: CLINIC | Age: 79
End: 2024-04-24
Payer: MEDICARE

## 2024-04-24 PROCEDURE — 93296 REM INTERROG EVL PM/IDS: CPT

## 2024-04-24 PROCEDURE — 93294 REM INTERROG EVL PM/LDLS PM: CPT

## 2024-07-23 ENCOUNTER — NON-APPOINTMENT (OUTPATIENT)
Age: 79
End: 2024-07-23

## 2024-07-23 ENCOUNTER — APPOINTMENT (OUTPATIENT)
Dept: ELECTROPHYSIOLOGY | Facility: CLINIC | Age: 79
End: 2024-07-23
Payer: MEDICARE

## 2024-07-23 VITALS
SYSTOLIC BLOOD PRESSURE: 129 MMHG | DIASTOLIC BLOOD PRESSURE: 67 MMHG | OXYGEN SATURATION: 98 % | HEIGHT: 70 IN | WEIGHT: 170 LBS | BODY MASS INDEX: 24.34 KG/M2 | HEART RATE: 60 BPM | TEMPERATURE: 98 F

## 2024-07-23 PROCEDURE — 93000 ELECTROCARDIOGRAM COMPLETE: CPT | Mod: 59

## 2024-07-23 PROCEDURE — 99215 OFFICE O/P EST HI 40 MIN: CPT

## 2024-07-23 PROCEDURE — 93279 PRGRMG DEV EVAL PM/LDLS PM: CPT

## 2024-07-23 NOTE — HISTORY OF PRESENT ILLNESS
[FreeTextEntry1] : The patient is a 78-year-old male here for follow up today (previously seen by Dr. Young). The patient has a history of HTN, aortic stenosis, mitral valve endocarditis s/p MVR/AVR/aortic root repair in 12/2022 with post-op CHB/alternating bundle branch block s/p Micra AV PPM. He was also noted to have AF in the setting of endocarditis and MRI evidence of scattered bilateral parieto-occipital infarcts. He has been on AC and ILR monitoring has been recommended in the past which he has declined. He has an Apple Watch now and the dose of eliquis has been reduced by his cardiologist, Dr. Young, to 2.5 mg bid because of excessive bruising and lack of AF documentation since initial diagnosis ((also no AF on MCOT monitoringx2). He has not received any AF alerts from his watch but is not wearing his watch consistent.   Presents today for device check and arrhythmia follow-up, denies any symptoms of palpitations, shortness of breath, dizziness, chest pain, syncope or extremity edema.   Micra PPM interrogation with well-functioning device, battery-life 8years,  11.5%  He was taking Eliquis 2.5mg po daily, educated regarding the importance of taking q12hr and he conveys understanding and agreement, he denies significant bleeding events, reports easy bruising and superficial bleeding while on full dose Eliquis, also taking Toprol 100mg po daily

## 2024-07-23 NOTE — CARDIOLOGY SUMMARY
[de-identified] : 7/23/24: Paced 61bpm 3/14/24: Paced 61bpm 1/24/24: sinus rhythm, LBBB [de-identified] : 3/14/24:  1. The left atrium is severely dilated. 2. Left ventricular cavity is normal in size. Left ventricular systolic function is normal with an ejection fraction of 62 % by Tucker's method of disks. 3. There is moderate (grade 2) left ventricular diastolic dysfunction. 4. LASr_ED:: 22.3%. LASct_ED: -10.4%. 5. The right atrium is normal in size. 6. Normal right ventricular cavity size and normal systolic function. 7. Bioprosthetic valve is present in the mitral position. Well seated valve. Normal gradients. Trivial regurgitation. 8. No pericardial effusion seen. 9. Aortic root at the sinuses of Valsalva is dilated, measuring 4.34 cm (indexed 2.18 cm/m). 10. Compared to the transthoracic echocardiogram performed on 12/25/2022, the aortic sinus is dilated and the LA is measuring as severely dilated.

## 2024-07-23 NOTE — END OF VISIT
[Time Spent: ___ minutes] : I have spent [unfilled] minutes of time on the encounter. [FreeTextEntry3] : I have personally seen, examined, and participated in the care of this patient. I have reviewed all pertinent clinical information, including history, physical exam, plan, and the PA/NP's note and agree except as noted below.  Patient to increase Apixaban to 2.5mg BID. Will continue using an Apple Watch to monitor for AF. He will f/up in 1 year.  Trenton Bain MD, FACC, [facility] Clinical Cardiac Electrophysiology

## 2024-07-23 NOTE — DISCUSSION/SUMMARY
[EKG obtained to assist in diagnosis and management of assessed problem(s)] : EKG obtained to assist in diagnosis and management of assessed problem(s) [FreeTextEntry1] : In summary, the patient is a 78-year-old male with a history of aortic stenosis, mitral valve endocarditis s/p AVR/MVR/aortic root repair, post-op complete heart block s/p Micra AV leadless PPM, atrial fibrillation, and brain MRI evidence of scattered bilateral infarcts (in the setting of endocarditis). AF was first detected in the setting of endocarditis. He was on full AC which has now been reduced to eliquis 2.5 mg bid by Dr. Young because of excessive bruising. No AF has been documented as outpatient (MCOTx2). He is now wearing an Apple Watch although not consistently. He has declined ILR in the past for AF monitoring. I emphasized that he needs to be wearing the Apple Watch all the time for AF diagnosis to be excluded. If AF recurs, he should be on full dose oral anticoagulation or alternative stroke prophylaxis should be pursued. Presents today for follow-up, reports doing well and denies arrhythmia symptomatology, Micra device interrogation with well-functioning device,  11.5%.  Recommendations: -Continue home monitoring with apple watch -Continue Toprol 100mg po daily -Continue Eliquis 2.5mg po q12hr, reinforced importance of adherence with dosing schedule -Continue to follow with cardiologist Dr Young -Follow-up with EP in 1-year or sooner as indicated

## 2024-08-15 NOTE — PATIENT PROFILE ADULT - CENTRAL VENOUS CATHETER/PICC LINE
Problem: Postpartum (Vaginal Delivery)  Goal: Successful Parent Role Transition  Outcome: Progressing  Goal: Hemostasis  Outcome: Progressing  Goal: Absence of Infection Signs and Symptoms  Outcome: Progressing  Goal: Anesthesia/Sedation Recovery  Outcome: Progressing  Goal: Optimal Pain Control and Function  Outcome: Progressing  Goal: Effective Urinary Elimination  Outcome: Progressing      no

## 2024-08-28 NOTE — DISCHARGE NOTE PROVIDER - NSDCCPCAREPLAN_GEN_ALL_CORE_FT
PRINCIPAL DISCHARGE DIAGNOSIS  Diagnosis: Endocarditis  Assessment and Plan of Treatment: 1. Take ALL of your medications as ordered. Fill your prescriptions the day you are discharged and take according to the schedule you were given. Continue to take a stool softener if you are taking narcotic pain medications. AVOID medications such as ibuprofen or naproxen if you have had bypass surgery. If you have any questions or are unable to fill the prescriptions, please call the office right away at [phone].  2. Shower daily. Clean all incisions daily while showering with warm water and mild soap, pat dry with a clean towel and do not cover with any dressings unless instructed to. No bathing, swimming in a pool or the ocean until instructed by MD.  DO NOT use creams or lotions on the wound.  3. We advise that you do not drive until instructed by MD.   4. You may not return to work until instructed by MD.   5. Please eat a low fat, low cholesterol, low salt diet. (No added/extra salt)  6. Weigh yourself every day in the morning and record it in the weight log in your red folder. Notify the office of any weight gain more than 2-3 pounds in 24 hours.  7. Continue breathing exercises several times a day. Continue to use your heart pillow.  8. No heavy lifting nothing greater than 5 pounds until cleared by MD.   9. Call / Notify MD any fever greater than 101.0, any drainage from incisions or if they become red, hot or very tender to the touch.  10. Increase activity as tolerated. Walk indoors and/or outdoors at least 3 times a day.        SECONDARY DISCHARGE DIAGNOSES  Diagnosis: Urinary retention  Assessment and Plan of Treatment: please follow up withy your urologist for possible ram removal    Diagnosis: Abscess of aortic root  Assessment and Plan of Treatment: now repaired    Diagnosis: History of first degree heart block  Assessment and Plan of Treatment: WIth a 14 second pause now s/p Micra     [Negative] : Heme/Lymph

## 2024-09-10 ENCOUNTER — APPOINTMENT (OUTPATIENT)
Dept: CARDIOLOGY | Facility: CLINIC | Age: 79
End: 2024-09-10
Payer: MEDICARE

## 2024-09-10 VITALS
WEIGHT: 171 LBS | DIASTOLIC BLOOD PRESSURE: 68 MMHG | SYSTOLIC BLOOD PRESSURE: 118 MMHG | HEIGHT: 70 IN | BODY MASS INDEX: 24.48 KG/M2 | OXYGEN SATURATION: 97 % | HEART RATE: 71 BPM

## 2024-09-10 DIAGNOSIS — I65.23 OCCLUSION AND STENOSIS OF BILATERAL CAROTID ARTERIES: ICD-10-CM

## 2024-09-10 DIAGNOSIS — I48.0 PAROXYSMAL ATRIAL FIBRILLATION: ICD-10-CM

## 2024-09-10 DIAGNOSIS — I10 ESSENTIAL (PRIMARY) HYPERTENSION: ICD-10-CM

## 2024-09-10 DIAGNOSIS — E78.5 HYPERLIPIDEMIA, UNSPECIFIED: ICD-10-CM

## 2024-09-10 DIAGNOSIS — Z95.0 PRESENCE OF CARDIAC PACEMAKER: ICD-10-CM

## 2024-09-10 DIAGNOSIS — I05.9 RHEUMATIC MITRAL VALVE DISEASE, UNSPECIFIED: ICD-10-CM

## 2024-09-10 DIAGNOSIS — Z95.2 PRESENCE OF PROSTHETIC HEART VALVE: ICD-10-CM

## 2024-09-10 PROCEDURE — 99214 OFFICE O/P EST MOD 30 MIN: CPT

## 2024-09-10 RX ORDER — APIXABAN 2.5 MG/1
2.5 TABLET, FILM COATED ORAL
Refills: 0 | Status: ACTIVE | COMMUNITY

## 2024-09-17 NOTE — BRIEF OPERATIVE NOTE - PRIMARY SURGEON
Dr. Chung [Noncontributory] : The patient’s family history was noncontributory to the condition being treated. [phone]

## 2024-09-22 NOTE — CONSULT NOTE ADULT - CONSULT REQUESTED DATE/TIME
29-Nov-2022 14:10
29-Nov-2022 15:47
[facility]  Hematology & Medical Oncology  09/23/24        Patient Name:  Trino Teixeira  YOB: 1962  MRN:  [id number]    Referring Physician:  Dr. Campbell    Reason for Encounter/Consultation:  Pancreatic adenocarcinoma    History of Present Illness:    Trino Teixeira is a 62 year old male is a 62-year-old man who presents for initial medical oncology consultation for  a new diagnosis of pancreatic adenocarcinoma.  His medical history includes seronegative rheumatoid arthritis, early stage melanoma in April 2024 requiring no therapy other than excision, nephrolithiasis, latent autoimmune diabetes, dyslipidemia, GERD, hypertension, gout, hyperlipidemia, Bah's esophagus, and cryoablation with biopsy of a right renal lesion in August 2024 with pathology negative for malignancy.    The patient underwent a CT of the abdomen on July 5, 2024 for follow-up evaluation of the right renal mass.  This incidentally revealed a focal area of hypoattenuation involving the pancreatic neck measuring 1.7 cm with significant upstream pancreatic parenchymal atrophy.  There were additionally noted a few conspicuous retroperitoneal lymph nodes though none were definitively enlarged by size criteria.  As above, he underwent cryoablation with biopsy of the right renal lesion in August 2024 with pathology negative for malignancy.    He underwent CT of the pancreas with and without contrast on September 4, 2024 which demonstrated an ill-defined hypoenhancing mass within the pancreatic neck measuring 1.6 x 1.9 cm with moderate to severe upstream parenchymal atrophy and without significant pancreatic duct dilatation.  The mass was noted to abut the portal splenic SMV confluence.  There was no evidence of direct celiac or SMA involvement.  Mildly prominent peripancreatic lymph nodes were present measuring up to 1 cm in size.  CT of the chest demonstrated bilateral pulmonary micronodules thought to be clinically 
02-Dec-2022 12:47
05-Dec-2022 11:39
06-Dec-2022 17:38
10-Dec-2022 13:00
27-Nov-2022
insignificant.    EUS on 9/10/2024 revealed an irregular well-defined hypoechoic masslike area in the pancreatic neck measuring 2.8 x 1.5 cm in greatest dimensions. Additionally one small peripancreatic lymph node was seen which measured less than 5 mm in size and was mildly hypoechoic. The pancreatic head mass was sampled, cytology from which was positive for adenocarcinoma.   measured 357 U/mL.  The patient underwent was evaluated by surgical oncology Dr. Campbell and was felt to have resectable disease.  He did undergo diagnostic laparoscopy on September 20, 2024 under the care of Dr. Campbell.  There was no sign of metastatic disease on evaluation of liver, diaphragms, omentum, or peritoneum.  Peritoneal washings were performed, cytology from which is pending.  He is referred for consideration of neoadjuvant chemotherapy.  Mediport was placed at the time of surgery.  He is tentatively planned for total pancreatectomy after neoadjuvant therapy.    He presents today to discuss neoadjuvant therapy accompanied by his wife Tiny.  He reports mild neuropathy affecting the toes of bilateral feet.  He has had intermittent lower abdominal pelvic pain for years without apparent cause.  He is recovering well from surgery.        review of Symptoms:    A 10 point review of symptoms was conducted and is negative except for what is described above in HPI.     Past Medical History:   Diagnosis Date    Atypical chest pain of uncertain etiology     Back pain     Bah's esophagus     Benign essential hypertension     Cataracts, bilateral 2014    Chickenpox     Clostridium difficile colitis     h/o    Diabetes mellitus, type II  (CMD)     Dysphagia 01/2022    Ganglion cyst of joint of finger of left hand 05/2019    Gastroesophageal reflux disease     Gout     Hiatal hernia     w/Bah's esophagus    History of gout     Hyperlipemia     Irregular heartbeat     PVCs    Mumps     Otitis media     Pancreatic cancer  (CMD) 
05-Dec-2022 12:28
09/10/2024    Pancreatic lesion (CMD) 08/2024    Renal lesion, right 08/2024    Rheumatoid arthritis  (CMD)     Snoring     Wears contact lenses     Lt eye only    Wears reading eyeglasses     cheaters         ALLERGIES:  No Known Allergies      Past Surgical History:   Procedure Laterality Date    Colonoscopy  10/21/2016    normal    Ct cryo renal ablation Right 08/30/2024    w/ Rt renal biopsy    Endoscopic ultrasound (upper)  09/10/2024    w/ FNA    Esophagogastroduodenoscopy transoral flex w/bx single or mult  05/31/2016    Hiatal hernia 2cm; There is some pinkish mucosa extending above the Z line-biopsies obtained to rule out Bah's.    Esophagogastroduodenoscopy transoral flex w/bx single or mult  08/16/2019    [place] colored segment consistent with C1M0 Bah's esophagus. 4-quadrant bx taken    Esophagogastroduodenoscopy transoral flex w/bx single or mult  07/28/2014    hiatal hernia 2cm    Eye surgery Right 08/16/2018    Cataract w/ IOL implant    Finger surgery Left 2017    removal of cyst    Skin cancer excision  04/2024         SOCIAL HX  The patient lives in [place] with his wife Tiny  Retired  Previously worked in HR  Typically more sedentary but able to be active and is fully active and indep in ADLs  He has 2 adult children - one in [place] and another out of state    Social History     Socioeconomic History    Marital status: /Civil Union     Spouse name: Not on file    Number of children: Not on file    Years of education: Not on file    Highest education level: Not on file   Occupational History    Not on file   Tobacco Use    Smoking status: Never     Passive exposure: Past    Smokeless tobacco: Never   Vaping Use    Vaping status: never used   Substance and Sexual Activity    Alcohol use: Yes     Comment: but rarely    Drug use: No    Sexual activity: Not on file   Other Topics Concern     Service Not Asked    Blood Transfusions Not Asked    Caffeine Concern Yes     Comment: 
2 cups a day decaf - decaf diet coke 2 a day    Occupational Exposure Not Asked    Hobby Hazards Not Asked    Sleep Concern Yes     Comment: 6-7hrs- not well rested - interrupted sleep    Stress Concern Not Asked    Weight Concern Not Asked    Special Diet Not Asked    Back Care Not Asked    Exercise Yes     Comment: treadmill 1x wk - or weights    Bike Helmet Not Asked    Seat Belt Not Asked    Self-Exams Not Asked   Social History Narrative    Not on file     Social Determinants of Health     Financial Resource Strain: Low Risk  (8/5/2024)    Financial Resource Strain     Unable to Get: None   Food Insecurity: Low Risk  (8/5/2024)    Food Insecurity     Worried about Food: Never true     Food is Gone: Never true   Transportation Needs: Not At Risk (8/5/2024)    Transportation Needs     Lack of Reliable Transportation: No   Physical Activity: Medium Risk (8/5/2024)    Exercise Vital Sign     Days of Exercise per Week: 2 days     Minutes of Exercise per Session: 20 min   Stress: Low Risk  (8/5/2024)    Stress     How Stressed: Not at all   Social Connections: Unknown (8/25/2024)    [facility]    Social Connections     Social Connections: Last Flowsheet Data: Not on file     Social Connections: Recent Result: Not on file   Interpersonal Safety: Low Risk  (9/19/2024)    Interpersonal Safety     How often physically hurt: Never     How often insulted or talked down to: Never     How often threatened with harm: Never     How often scream or curse at: Never         Family History   Problem Relation Age of Onset    Diabetes Mother     Heart disease Mother     Hypertension Mother     Macular degeneration Mother     Cataracts Mother     Depression Mother     Diabetes Father     Heart disease Father     Hypertension Father     Cancer, CNS Father     Diabetes Brother     Parkinsonism Brother     Patient is unaware of any medical problems Daughter     Patient is unaware of any 
medical problems Daughter     Heart disease Maternal Grandmother         arterial issues, stents    Diabetes Maternal Grandmother     Emphysema Maternal Grandfather          Physical Exam:  Vitals:    24 1445   BP: 138/72   Patient Position: Sitting   Pulse: 74   Resp: 18   Temp: 97.6 °F (36.4 °C)   SpO2: 99%   Weight: 91.4 kg (201 lb 8 oz)   PainSc:  0     ECO  General Appearance - Alert, well appearing, and in no distress.  Mental Status - Alert, oriented  Eyes - Pupils equal and reactive, extraocular eye movements intact.   Mouth - Mucous membranes moist, pharynx normal without lesions.  Neck - Supple, no significant adenopathy.   Lymphatics - No palpable lymphadenopathy.  Chest - Clear to auscultation, no wheezes, rales or rhonchi, symmetric air entry.  Heart - Normal rate, regular rhythm, normal S1, S2, no murmurs, rubs, clicks or gallops.   Abdomen - Soft, nontender, nondistended, no masses or organomegaly.   Neurological - Alert, oriented, normal speech, no focal findings or movement disorder noted.  Musculoskeletal - No noted deformity or swelling.  Extremities - No edema, clubbing or cyanosis.  Skin - Normal coloration, no rashes, no suspicious skin lesions noted.    Labs/Imaging:  WBC (K/mcL)   Date Value   2024 7.4     RBC (mil/mcL)   Date Value   2024 4.83     HCT (%)   Date Value   2024 43.4     HGB (g/dL)   Date Value   2024 14.7     PLT (K/mcL)   Date Value   2024 214     Sodium (mmol/L)   Date Value   2024 140     Potassium (mmol/L)   Date Value   2024 3.9     Chloride (mmol/L)   Date Value   2024 104     Glucose (mg/dL)   Date Value   2024 100 (H)     Calcium (mg/dL)   Date Value   2024 9.8     Carbon Dioxide (mmol/L)   Date Value   2024 29     BUN (mg/dL)   Date Value   2024 13     Creatinine (mg/dL)   Date Value   2024 0.77       GOT/AST (Units/L)   Date Value   2024 14     GPT/ALT (Units/L)   Date Value 
  08/07/2024 18     No results found for: \"GGTP\"  Alkaline Phosphatase (Units/L)   Date Value   08/07/2024 62     Bilirubin, Total (mg/dL)   Date Value   08/07/2024 0.6         Impression & Plan:    Trino Teixeira is a 62 year old male presenting with a new diagnosis of pancreatic adenocarcinoma.  He is felt to have resectable disease and is referred for neoadjuvant chemotherapy.  Diagnostic laparoscopy was performed given elevated CA 19-9 without clear evidence of metastatic disease.  Cytology from washings is pending.    The patient and I reviewed his course to date, pathology findings, and diagnosis in detail.  We discussed that systemic chemotherapy is used in all stages of pancreatic cancer including for those with resectable disease.  We reviewed the rationale for neoadjuvant therapy including early treatment of micrometastases, greater likelihood of receiving chemotherapy, higher rates of margin negative resection, and the ability to avoid the morbidity of surgery in patients who rapidly develop metastatic disease.  I do think that neoadjuvant therapy makes sense in this patient with a markedly elevated CA 19 9 and a questionably enlarged lymph node.  We discussed the total duration of systemic therapy (combined neoadjuvant and adjuvant) is typically six-months.  The patient is a candidate for modified FOLFIRINOX.  The rationale for and toxicity profile of this regimen was discussed in detail with the patient today.  We discussed potential side effects including nausea, emesis, fatigue, myelosuppression with risk of infection, neuropathy, diarrhea, and cold sensitivity.  All questions were answered to the patient's satisfaction.    We will proceed with neoadjuvant FOLFIRINOX and reimage after 4 cycles.      Regimen:  A cycle is every 14 days:  Irinotecan   (Camptosar)  150 mg/m² IV once on day 1  Oxaliplatin  85 mg/m² IV once on day 1  Leucovorin  400 mg/m² IV once on day 1  Fluorouracil  1,200 mg/m²/day CIV on 
01-Dec-2022 10:03
days 1 and 2. Total dose = 2,400 mg/m² over 46 hours.  Additional Orders:  Regimen is based on the PRODIGE 24 trial by Shady et al., 2018.     Plan:  Prior auth for mFOLFIRINOX  Await cytology from washings  Chemotherapy teaching next week with MISTY Armas.  He would like to schedule each treatment session  See me on 10/7/24 to start neoadjuvant modified FOLFIRINOX  CBC CMP CA 19 9 at that time.    His tumor specimen will be sent to [facility] for next generation sequencing to assess for pathogenic somatic or germline mutations/HRR deficiency.    He will be referred to Genetics for germline testing  Mediport was placed at the time of surgery.  Restaging CT every 4 cycles to follow response to  therapies           Discussed with patient the natural history, course and prognosis of illness.  Therapeutic options di  scussed and explained.  Side effects, risks and benefits, and alternatives discussed.  Patient acknowledges understanding of disease and treatment plan.      used?  No    Loco Johnson MD  Hematology & Medical Oncology  Formerly [facility]        
30-Nov-2022 18:36

## 2024-09-25 NOTE — PATIENT PROFILE ADULT - FUNCTIONAL ASSESSMENT - DAILY ACTIVITY 6.
[FreeTextEntry1] : Mr. JESUS GRAY is a 75 year male who presents with left back/buttock pain, of note is s/p lumbar decompression and fusion 9/2023. Etiology of his pain is likely multifactorial due to ?S1 lumbar radiculopathy, myofascial pain, left SIJ pain and left piriformis pain.   Patient reports overall mild improvement of his left SIJ pain and left piriformis pain. Upon palpation of these two regions, he reports pain is less severe. Today, he complains of pain along his lumbar paraspinals, QL and along the posterior aspect of his sacrum that seems to radiate down his posterior LLE to his foot. Pain is consistent with myofascial pain and possible S1 radiculopathy. He is pending a repeat MRI of his lumbar spine for further evaluation. Recommending trigger point injections for his myofascial pain, and a trial of pregabalin for his possible lumbar radiculopathy.  Patient will follow up after MR L spine to discuss if RONALD is indicated.     Impression: lumbar myofascial pain ?S1 lumbar radiculopathy left piriformis syndrome  low back pain left SIJ pain s/p lumbar fusion  peripheral neuropathy    Plan:  - TPI performed, procedure note above  - XR L spine reviewed, final report pending.  - Agree with MR L spine as ordered by Dr. Pollard  - continue tylenol 1000mg BID PRN  - stop gabapentin - start pregabalin 25mg qhs x7 days, then increase to 50mg qhs, rx sent.  - continue methocarbamol [id number] - R/B/A of lumbar RONALD discussed, will wait for MR L spine results prior to consideration of further spinal injections.  - RTC in 1 month   This patient is being managed for a complex chronic pain that requires ongoing medical management. The nature of this condition requires a longitudinal relationship and monitoring over time for appropriate treatment.  The patient expressed verbal understanding and is in agreement with the plan of care. All of the patient's questions and concerns were addressed during today's visit.     
4 = No assist / stand by assistance

## 2024-10-21 ENCOUNTER — APPOINTMENT (OUTPATIENT)
Dept: DERMATOLOGY | Facility: CLINIC | Age: 79
End: 2024-10-21
Payer: MEDICARE

## 2024-10-21 PROCEDURE — 17003 DESTRUCT PREMALG LES 2-14: CPT

## 2024-10-21 PROCEDURE — 99213 OFFICE O/P EST LOW 20 MIN: CPT | Mod: 25

## 2024-10-21 PROCEDURE — 17000 DESTRUCT PREMALG LESION: CPT

## 2024-10-22 ENCOUNTER — APPOINTMENT (OUTPATIENT)
Dept: ELECTROPHYSIOLOGY | Facility: CLINIC | Age: 79
End: 2024-10-22
Payer: MEDICARE

## 2024-10-22 ENCOUNTER — NON-APPOINTMENT (OUTPATIENT)
Age: 79
End: 2024-10-22

## 2024-10-22 PROCEDURE — 93294 REM INTERROG EVL PM/LDLS PM: CPT

## 2024-10-22 PROCEDURE — 93296 REM INTERROG EVL PM/IDS: CPT

## 2024-11-20 ENCOUNTER — RX RENEWAL (OUTPATIENT)
Age: 79
End: 2024-11-20

## 2024-12-10 ENCOUNTER — NON-APPOINTMENT (OUTPATIENT)
Age: 79
End: 2024-12-10

## 2025-01-21 ENCOUNTER — NON-APPOINTMENT (OUTPATIENT)
Age: 80
End: 2025-01-21

## 2025-01-21 ENCOUNTER — APPOINTMENT (OUTPATIENT)
Dept: ELECTROPHYSIOLOGY | Facility: CLINIC | Age: 80
End: 2025-01-21
Payer: MEDICARE

## 2025-01-21 PROCEDURE — 93294 REM INTERROG EVL PM/LDLS PM: CPT

## 2025-01-21 PROCEDURE — 93296 REM INTERROG EVL PM/IDS: CPT

## 2025-03-11 ENCOUNTER — APPOINTMENT (OUTPATIENT)
Dept: CARDIOLOGY | Facility: CLINIC | Age: 80
End: 2025-03-11
Payer: MEDICARE

## 2025-03-11 ENCOUNTER — NON-APPOINTMENT (OUTPATIENT)
Age: 80
End: 2025-03-11

## 2025-03-11 VITALS
DIASTOLIC BLOOD PRESSURE: 63 MMHG | HEART RATE: 63 BPM | BODY MASS INDEX: 26.05 KG/M2 | SYSTOLIC BLOOD PRESSURE: 122 MMHG | OXYGEN SATURATION: 97 % | WEIGHT: 182 LBS | HEIGHT: 70 IN

## 2025-03-11 DIAGNOSIS — Z95.0 PRESENCE OF CARDIAC PACEMAKER: ICD-10-CM

## 2025-03-11 DIAGNOSIS — I48.0 PAROXYSMAL ATRIAL FIBRILLATION: ICD-10-CM

## 2025-03-11 DIAGNOSIS — I70.0 ATHEROSCLEROSIS OF AORTA: ICD-10-CM

## 2025-03-11 DIAGNOSIS — I65.23 OCCLUSION AND STENOSIS OF BILATERAL CAROTID ARTERIES: ICD-10-CM

## 2025-03-11 DIAGNOSIS — E78.5 HYPERLIPIDEMIA, UNSPECIFIED: ICD-10-CM

## 2025-03-11 DIAGNOSIS — Z95.2 PRESENCE OF PROSTHETIC HEART VALVE: ICD-10-CM

## 2025-03-11 DIAGNOSIS — Z90.49 ACQUIRED ABSENCE OF OTHER SPECIFIED PARTS OF DIGESTIVE TRACT: ICD-10-CM

## 2025-03-11 DIAGNOSIS — I10 ESSENTIAL (PRIMARY) HYPERTENSION: ICD-10-CM

## 2025-03-11 DIAGNOSIS — I35.0 NONRHEUMATIC AORTIC (VALVE) STENOSIS: ICD-10-CM

## 2025-03-11 PROCEDURE — 93000 ELECTROCARDIOGRAM COMPLETE: CPT

## 2025-03-11 PROCEDURE — 99215 OFFICE O/P EST HI 40 MIN: CPT

## 2025-03-14 ENCOUNTER — APPOINTMENT (OUTPATIENT)
Dept: FAMILY MEDICINE | Facility: CLINIC | Age: 80
End: 2025-03-14
Payer: MEDICARE

## 2025-03-14 ENCOUNTER — NON-APPOINTMENT (OUTPATIENT)
Age: 80
End: 2025-03-14

## 2025-03-14 VITALS
SYSTOLIC BLOOD PRESSURE: 128 MMHG | DIASTOLIC BLOOD PRESSURE: 70 MMHG | HEART RATE: 61 BPM | OXYGEN SATURATION: 97 % | HEIGHT: 70 IN | WEIGHT: 183 LBS | BODY MASS INDEX: 26.2 KG/M2

## 2025-03-14 DIAGNOSIS — Z00.00 ENCOUNTER FOR GENERAL ADULT MEDICAL EXAMINATION W/OUT ABNORMAL FINDINGS: ICD-10-CM

## 2025-03-14 DIAGNOSIS — Z23 ENCOUNTER FOR IMMUNIZATION: ICD-10-CM

## 2025-03-14 PROCEDURE — G0439: CPT

## 2025-03-14 PROCEDURE — G0009: CPT

## 2025-03-14 PROCEDURE — 90677 PCV20 VACCINE IM: CPT

## 2025-03-15 PROBLEM — Z23 NEED FOR PNEUMOCOCCAL VACCINATION: Status: ACTIVE | Noted: 2025-03-14

## 2025-03-21 LAB
HBA1C MFR BLD HPLC: 5.2
LDLC SERPL-MCNC: 166

## 2025-04-01 ENCOUNTER — RX RENEWAL (OUTPATIENT)
Age: 80
End: 2025-04-01

## 2025-04-03 ENCOUNTER — APPOINTMENT (OUTPATIENT)
Dept: FAMILY MEDICINE | Facility: CLINIC | Age: 80
End: 2025-04-03

## 2025-04-03 VITALS — DIASTOLIC BLOOD PRESSURE: 80 MMHG | SYSTOLIC BLOOD PRESSURE: 146 MMHG

## 2025-04-03 VITALS
OXYGEN SATURATION: 96 % | BODY MASS INDEX: 25.77 KG/M2 | WEIGHT: 180 LBS | SYSTOLIC BLOOD PRESSURE: 160 MMHG | DIASTOLIC BLOOD PRESSURE: 68 MMHG | HEIGHT: 70 IN | HEART RATE: 61 BPM

## 2025-04-03 PROCEDURE — G2211 COMPLEX E/M VISIT ADD ON: CPT

## 2025-04-03 PROCEDURE — 99213 OFFICE O/P EST LOW 20 MIN: CPT

## 2025-04-22 ENCOUNTER — NON-APPOINTMENT (OUTPATIENT)
Age: 80
End: 2025-04-22

## 2025-04-22 ENCOUNTER — APPOINTMENT (OUTPATIENT)
Dept: ELECTROPHYSIOLOGY | Facility: CLINIC | Age: 80
End: 2025-04-22
Payer: MEDICARE

## 2025-04-22 ENCOUNTER — APPOINTMENT (OUTPATIENT)
Dept: ELECTROPHYSIOLOGY | Facility: CLINIC | Age: 80
End: 2025-04-22

## 2025-04-22 PROCEDURE — 93294 REM INTERROG EVL PM/LDLS PM: CPT

## 2025-04-22 PROCEDURE — 93296 REM INTERROG EVL PM/IDS: CPT

## 2025-06-09 ENCOUNTER — APPOINTMENT (OUTPATIENT)
Dept: CARDIOLOGY | Facility: CLINIC | Age: 80
End: 2025-06-09
Payer: MEDICARE

## 2025-06-09 ENCOUNTER — APPOINTMENT (OUTPATIENT)
Dept: DERMATOLOGY | Facility: CLINIC | Age: 80
End: 2025-06-09
Payer: MEDICARE

## 2025-06-09 PROCEDURE — 17003 DESTRUCT PREMALG LES 2-14: CPT | Mod: 59

## 2025-06-09 PROCEDURE — 11102 TANGNTL BX SKIN SINGLE LES: CPT

## 2025-06-09 PROCEDURE — 17000 DESTRUCT PREMALG LESION: CPT | Mod: 59

## 2025-06-09 PROCEDURE — 93306 TTE W/DOPPLER COMPLETE: CPT

## 2025-06-09 PROCEDURE — 99213 OFFICE O/P EST LOW 20 MIN: CPT | Mod: 25

## 2025-07-17 ENCOUNTER — APPOINTMENT (OUTPATIENT)
Dept: ELECTROPHYSIOLOGY | Facility: CLINIC | Age: 80
End: 2025-07-17
Payer: MEDICARE

## 2025-07-17 VITALS
OXYGEN SATURATION: 95 % | DIASTOLIC BLOOD PRESSURE: 70 MMHG | HEART RATE: 62 BPM | BODY MASS INDEX: 25.11 KG/M2 | WEIGHT: 175 LBS | SYSTOLIC BLOOD PRESSURE: 140 MMHG

## 2025-07-17 PROCEDURE — 93000 ELECTROCARDIOGRAM COMPLETE: CPT | Mod: 59

## 2025-07-17 PROCEDURE — 99214 OFFICE O/P EST MOD 30 MIN: CPT

## 2025-07-17 RX ORDER — APIXABAN 5 MG/1
5 TABLET, FILM COATED ORAL
Qty: 60 | Refills: 6 | Status: ACTIVE | COMMUNITY
Start: 2025-07-17 | End: 1900-01-01

## 2025-07-21 ENCOUNTER — APPOINTMENT (OUTPATIENT)
Dept: DERMATOLOGY | Facility: CLINIC | Age: 80
End: 2025-07-21
Payer: MEDICARE

## 2025-07-21 PROCEDURE — 99214 OFFICE O/P EST MOD 30 MIN: CPT

## 2025-07-25 ENCOUNTER — APPOINTMENT (OUTPATIENT)
Dept: DERMATOLOGY | Facility: CLINIC | Age: 80
End: 2025-07-25
Payer: MEDICARE

## 2025-07-25 ENCOUNTER — NON-APPOINTMENT (OUTPATIENT)
Age: 80
End: 2025-07-25

## 2025-07-25 PROCEDURE — 99214 OFFICE O/P EST MOD 30 MIN: CPT | Mod: 57

## 2025-07-25 PROCEDURE — 17311 MOHS 1 STAGE H/N/HF/G: CPT

## 2025-07-25 PROCEDURE — 14061 TIS TRNFR E/N/E/L10.1-30SQCM: CPT

## 2025-07-25 RX ORDER — CEPHALEXIN 500 MG/1
500 CAPSULE ORAL 3 TIMES DAILY
Qty: 21 | Refills: 0 | Status: ACTIVE | COMMUNITY
Start: 2025-07-25 | End: 1900-01-01

## 2025-08-05 ENCOUNTER — APPOINTMENT (OUTPATIENT)
Dept: DERMATOLOGY | Facility: CLINIC | Age: 80
End: 2025-08-05
Payer: MEDICARE

## 2025-08-05 DIAGNOSIS — C44.311 BASAL CELL CARCINOMA OF SKIN OF NOSE: ICD-10-CM

## 2025-08-05 PROCEDURE — 99024 POSTOP FOLLOW-UP VISIT: CPT

## 2025-08-13 PROBLEM — C44.311 BASAL CELL CARCINOMA (BCC) OF DORSUM OF NOSE: Status: ACTIVE | Noted: 2025-07-22

## (undated) DEVICE — TRAY IRRIGATION SYR BULB 60CC

## (undated) DEVICE — SUT PROLENE 4-0 30" SH-1

## (undated) DEVICE — SAW BLADE STRYKER SAGITTAL SAFEDGE 40.5X47.5X0.64

## (undated) DEVICE — SYR LUER LOK 20CC

## (undated) DEVICE — MARKING PEN W RULER

## (undated) DEVICE — SUT VICRYL 2-0 27" CT-1 UNDYED

## (undated) DEVICE — SUT PROLENE 3-0 36" SH

## (undated) DEVICE — SUT PROLENE 4-0 36" RB-1

## (undated) DEVICE — SUT SILK 0 30" TIES

## (undated) DEVICE — SUT PROLENE 6-0 24" C-1

## (undated) DEVICE — DRAPE SLUSH / WARMER 44 X 66"

## (undated) DEVICE — DRSG TEGADERM 4X4.75"

## (undated) DEVICE — CONNECTOR STRAIGHT 3/8 X 1/2"

## (undated) DEVICE — SUCTION YANKAUER NO CONTROL VENT

## (undated) DEVICE — PHRENIC NERVE PAD MEDIUM

## (undated) DEVICE — SUT SILK 0 30" SH

## (undated) DEVICE — SUT PLEDGET 9MM X 4MM X 1.5MM

## (undated) DEVICE — CHEST DRAIN PLEUR-EVAC DRY/WET ADULT-PEDS SINGLE (QUICK)

## (undated) DEVICE — SUT SILK 5-0 60" TIES

## (undated) DEVICE — SUT PERMAHAND SILK 2 60" TIES

## (undated) DEVICE — GLV 7 PROTEXIS (WHITE)

## (undated) DEVICE — GLV 7 PROTEXIS (BLUE)

## (undated) DEVICE — SUT PROLENE 8-0 24" BV175-6

## (undated) DEVICE — MULTIPLE PERFUSION SET FEMALE 1 INLET LEG W 4 LEGS 15" (BLUE/RED)

## (undated) DEVICE — BLADE SURGICAL #10 CARBON

## (undated) DEVICE — VESSEL LOOP ASPEN SUPERMAXI BLUE

## (undated) DEVICE — SUT STAINLESS STEEL 5 18" SCC

## (undated) DEVICE — TOURNIQUET SET TOURNIKWIK 12FR (4 TUBES, 1 SNARE) 7.5"

## (undated) DEVICE — SUT PROLENE 7-0 24" BV-1

## (undated) DEVICE — TUBING MEDI-VAC W MAXIGRIP CONNECTORS 1/4"X6'

## (undated) DEVICE — SUT SOFSILK 4-0 24" CV-15

## (undated) DEVICE — GLV 7.5 PROTEXIS (WHITE)

## (undated) DEVICE — NDL PERC BASEPLT 18GX7CM

## (undated) DEVICE — SET BLOOD Y-TYPE

## (undated) DEVICE — Device

## (undated) DEVICE — DRSG MEPILEX 10 X 30CM (4 X 12") AG

## (undated) DEVICE — SUT PROLENE 6-0 30" C-1

## (undated) DEVICE — STAPLER SKIN PROXIMATE

## (undated) DEVICE — DRSG OPSITE 13.75 X 4"

## (undated) DEVICE — SUT PROLENE 7-0 24" BV175-6

## (undated) DEVICE — TUBING ALARIS PUMP MODULE NON-DEHP

## (undated) DEVICE — SOL ANTI FOG

## (undated) DEVICE — DRAPE TOWEL WHITE 17" X 24"

## (undated) DEVICE — SUT VICRYL 1 36" CTX UNDYED

## (undated) DEVICE — DRSG MEPILEX 10 X 10CM (4 X 4") AG

## (undated) DEVICE — TONGUE DEPRESSOR

## (undated) DEVICE — SUT PROLENE 4-0 54" SH-1

## (undated) DEVICE — BLADE SURGICAL #11 CARBON

## (undated) DEVICE — PACING CABLE A/V TEMP SCREW DOWN 6FT

## (undated) DEVICE — SUT SILK 2-0 18" SH (POP-OFF)

## (undated) DEVICE — SUT PDS II 2-0 27" CT-1

## (undated) DEVICE — SUT PROLENE 3-0 36" MH

## (undated) DEVICE — FOLEY TRAY 16FR 5CC LF LUBRISIL ADVANCE TEMP CLOSED

## (undated) DEVICE — TAPE UMBILICAL 1/8 X 30" STRANDS

## (undated) DEVICE — SPONGE PEANUT AUTO COUNT

## (undated) DEVICE — CONNECTOR "Y" 3/8 X 3/8 X 3/8"

## (undated) DEVICE — SUT SILK 2 30" MH

## (undated) DEVICE — TUBING SUCTION 20FT

## (undated) DEVICE — FRAZIER SUCTION TIP 10FR

## (undated) DEVICE — SUT ETHIBOND 1 30" OS6

## (undated) DEVICE — MEDICATION LABELS W MARKER

## (undated) DEVICE — SUT VICRYL 3-0 27" CT-1

## (undated) DEVICE — SUT PROLENE 7-0 30" BV-1

## (undated) DEVICE — SUT ETHIBOND 2-0 4-30" RB-1 GREEN

## (undated) DEVICE — TUBING IV SET SECOND 34" W/O LOK-BLUNT

## (undated) DEVICE — GLV 8 PROTEXIS (WHITE)

## (undated) DEVICE — PACK OPEN HEART VAMP PLUS

## (undated) DEVICE — SUT ETHIBOND 2 30" V37

## (undated) DEVICE — SUT MONOCRYL 4-0 27" PS-2 UNDYED

## (undated) DEVICE — DRSG MEPILEX 10 X 25CM (4 X 10") AG

## (undated) DEVICE — WARMING BLANKET DUO-THERM HYPER/HYPOTHERM ADULT

## (undated) DEVICE — GLV 6.5 PROTEXIS (BLUE)

## (undated) DEVICE — GLV 6.5 PROTEXIS (WHITE)

## (undated) DEVICE — SWITCH ARISS TABLE MOUNT UNEQUAL LEGS 13"

## (undated) DEVICE — DRAPE TOWEL BLUE 17" X 24"

## (undated) DEVICE — DRSG OPSITE 2.5 X 2"

## (undated) DEVICE — SUT ETHIBOND 2-0 4-30" RB-1 WHITE

## (undated) DEVICE — VISITEC 4X4

## (undated) DEVICE — URETERAL CATH RED RUBBER 18FR (RED)

## (undated) DEVICE — DRSG ALLEVYN LIFE SACRUM 6.75 X 6.75 (PINK)

## (undated) DEVICE — PRESSURE INFUSOR BAG 500ML

## (undated) DEVICE — MAXI-FLO SUCTION CATHETER KIT 14FR STRAIGHT

## (undated) DEVICE — SUT PROLENE 5-0 36" RB-1

## (undated) DEVICE — DRAPE INSTRUMENT POUCH 6.75" X 11"

## (undated) DEVICE — STOPCOCK 3 WAY W SWIVEL MALE LUER LOCK

## (undated) DEVICE — SUT VICRYL 2 27" TP-1 UNDYED

## (undated) DEVICE — SUT PROLENE 5-0 36" C-1

## (undated) DEVICE — DRSG CURITY GAUZE SPONGE 4 X 4" 12-PLY

## (undated) DEVICE — PREP CHLORAPREP HI-LITE ORANGE 26ML

## (undated) DEVICE — GOWN XL W TOWEL

## (undated) DEVICE — SUT ETHIBOND 5 4-30" CCS

## (undated) DEVICE — SUT ETHIBOND 2-0 30" SH-1 GREEN/WHITE

## (undated) DEVICE — TUBING TRUWAVE PRESSURE MALE/FEMALE 72"

## (undated) DEVICE — SUT VICRYL 0 36" CTX UNDYED

## (undated) DEVICE — DRAPE 3/4 SHEET 52X76"

## (undated) DEVICE — PRESSURE INFUSOR BAG 1000ML

## (undated) DEVICE — SUT DOUBLE 6 WIRE STERNAL

## (undated) DEVICE — SUT SILK 0 30" KS

## (undated) DEVICE — CONNECTOR "Y" 1/2 X 3/8 X 3/8"

## (undated) DEVICE — BLADE SURGICAL #20 CARBON

## (undated) DEVICE — LAP PAD 18 X 18"

## (undated) DEVICE — STOPCOCK 3 WAY W TUBE 35"

## (undated) DEVICE — SUT VICRYL 0 54" REEL UNDYED

## (undated) DEVICE — DRAPE CV 106" X 135"

## (undated) DEVICE — LIGATING CLIPS AESCULAP MEDIUM BLUE 24

## (undated) DEVICE — VENTING ADAPTER "Y" (RED/BLUE) 7.5"

## (undated) DEVICE — ELCTR BOVIE BLADE 3/4" EXTENDED LENGTH 6"

## (undated) DEVICE — GLV 6 PROTEXIS (WHITE)

## (undated) DEVICE — SUT BLUNT SZ 5

## (undated) DEVICE — DRSG TAPE TRANSPORE 3"

## (undated) DEVICE — SUT PROLENE 5-0 30" RB-2

## (undated) DEVICE — TUBING INSUFFLATION LAP FILTER 10FT

## (undated) DEVICE — RANGER BLOOD/FLUID WARMING SET DISP

## (undated) DEVICE — SUT PROLENE 4-0 36" SH

## (undated) DEVICE — SUT ETHIBOND 2-0 36" SH

## (undated) DEVICE — SUT PROLENE 4-0 36" SH-1

## (undated) DEVICE — NDL HYPO SAFE 18G X 1.5" (PINK)

## (undated) DEVICE — SUT ETHIBOND 2-0 30" V5 WHITE

## (undated) DEVICE — BLADE SURGICAL #15 CARBON

## (undated) DEVICE — SUT ETHIBOND 3-0 36" RB-1

## (undated) DEVICE — SUT ETHIBOND 4-0 36" RB-1

## (undated) DEVICE — GOWN TRIMAX LG

## (undated) DEVICE — ELCTR MULTIFUNCTION DEFIBRILLATION ELECTRODE EDGE SYSTEM ADULT

## (undated) DEVICE — PREP SCRUB BRUSH W CHG 4%

## (undated) DEVICE — CONNECTOR "Y" 1/2 X 1/2 X 3/8"

## (undated) DEVICE — GLV 8.5 PROTEXIS (WHITE)

## (undated) DEVICE — PACK VALVE

## (undated) DEVICE — SAW BLADE STRYKER STERNUM 31MM X 6.27 X .79

## (undated) DEVICE — NDL COUNTER FOAM AND MAGNET 40-70

## (undated) DEVICE — GLV 7.5 SENSICARE W ALOE

## (undated) DEVICE — SUT MONOCRYL 3-0 27" PS-2 UNDYED